# Patient Record
Sex: FEMALE | Race: BLACK OR AFRICAN AMERICAN | NOT HISPANIC OR LATINO | Employment: OTHER | URBAN - METROPOLITAN AREA
[De-identification: names, ages, dates, MRNs, and addresses within clinical notes are randomized per-mention and may not be internally consistent; named-entity substitution may affect disease eponyms.]

---

## 2017-04-10 ENCOUNTER — HISTORICAL (OUTPATIENT)
Dept: INTERVENTIONAL RADIOLOGY/VASCULAR | Facility: HOSPITAL | Age: 57
End: 2017-04-10

## 2017-11-06 LAB
COLOR STL: NORMAL
CONSISTENCY STL: NORMAL
HEMOCCULT SP1 STL QL: NEGATIVE

## 2017-11-07 LAB
COLOR STL: NORMAL
CONSISTENCY STL: NORMAL
HEMOCCULT SP2 STL QL: NEGATIVE

## 2017-11-08 ENCOUNTER — HISTORICAL (OUTPATIENT)
Dept: INTERVENTIONAL RADIOLOGY/VASCULAR | Facility: HOSPITAL | Age: 57
End: 2017-11-08

## 2017-11-08 LAB
ABS NEUT (OLG): 3.18 X10(3)/MCL (ref 2.1–9.2)
ALBUMIN SERPL-MCNC: 3.8 GM/DL (ref 3.4–5)
ALBUMIN/GLOB SERPL: 1 RATIO (ref 1–2)
ALP SERPL-CCNC: 99 UNIT/L (ref 45–117)
ALT SERPL-CCNC: 17 UNIT/L (ref 12–78)
APPEARANCE, UA: CLEAR
AST SERPL-CCNC: 16 UNIT/L (ref 15–37)
BACTERIA #/AREA URNS AUTO: ABNORMAL /[HPF]
BASOPHILS # BLD AUTO: 0.02 X10(3)/MCL
BASOPHILS NFR BLD AUTO: 0 % (ref 0–1)
BILIRUB SERPL-MCNC: 0.4 MG/DL (ref 0.2–1)
BILIRUB UR QL STRIP: NEGATIVE
BILIRUBIN DIRECT+TOT PNL SERPL-MCNC: 0.1 MG/DL
BILIRUBIN DIRECT+TOT PNL SERPL-MCNC: 0.3 MG/DL
BUN SERPL-MCNC: 13 MG/DL (ref 7–18)
CALCIUM SERPL-MCNC: 9.5 MG/DL (ref 8.5–10.1)
CHLORIDE SERPL-SCNC: 102 MMOL/L (ref 98–107)
CHOLEST SERPL-MCNC: 223 MG/DL
CHOLEST/HDLC SERPL: 5.3 {RATIO} (ref 0–4.4)
CO2 SERPL-SCNC: 32 MMOL/L (ref 21–32)
COLOR STL: NORMAL
COLOR UR: COLORLESS
CONSISTENCY STL: NORMAL
CREAT SERPL-MCNC: 0.9 MG/DL (ref 0.6–1.3)
EOSINOPHIL # BLD AUTO: 0.12 X10(3)/MCL
EOSINOPHIL NFR BLD AUTO: 2 % (ref 0–5)
ERYTHROCYTE [DISTWIDTH] IN BLOOD BY AUTOMATED COUNT: 13.1 % (ref 11.5–14.5)
EST. AVERAGE GLUCOSE BLD GHB EST-MCNC: 120 MG/DL
GLOBULIN SER-MCNC: 4.7 GM/ML (ref 2.3–3.5)
GLUCOSE (UA): NORMAL
GLUCOSE SERPL-MCNC: 85 MG/DL (ref 74–106)
HAV IGM SERPL QL IA: NONREACTIVE
HBA1C MFR BLD: 5.8 % (ref 4.2–6.3)
HBV CORE IGM SERPL QL IA: NONREACTIVE
HBV SURFACE AG SERPL QL IA: NEGATIVE
HCT VFR BLD AUTO: 42.8 % (ref 35–46)
HCV AB SERPL QL IA: NONREACTIVE
HDLC SERPL-MCNC: 42 MG/DL
HGB BLD-MCNC: 13.9 GM/DL (ref 12–16)
HGB UR QL STRIP: NEGATIVE
HIV 1+2 AB+HIV1 P24 AG SERPL QL IA: NONREACTIVE
HYALINE CASTS #/AREA URNS LPF: ABNORMAL /[LPF]
IMM GRANULOCYTES # BLD AUTO: 0.01 10*3/UL
IMM GRANULOCYTES NFR BLD AUTO: 0 %
KETONES UR QL STRIP: NEGATIVE
LDLC SERPL CALC-MCNC: 164 MG/DL (ref 0–130)
LEUKOCYTE ESTERASE UR QL STRIP: NEGATIVE
LYMPHOCYTES # BLD AUTO: 1.94 X10(3)/MCL
LYMPHOCYTES NFR BLD AUTO: 34 % (ref 15–40)
MCH RBC QN AUTO: 27.7 PG (ref 26–34)
MCHC RBC AUTO-ENTMCNC: 32.5 GM/DL (ref 31–37)
MCV RBC AUTO: 85.3 FL (ref 80–100)
MONOCYTES # BLD AUTO: 0.35 X10(3)/MCL
MONOCYTES NFR BLD AUTO: 6 % (ref 4–12)
NEUTROPHILS # BLD AUTO: 3.18 X10(3)/MCL
NEUTROPHILS NFR BLD AUTO: 57 X10(3)/MCL
NITRITE UR QL STRIP: NEGATIVE
PH UR STRIP: 5.5 [PH] (ref 4.5–8)
PLATELET # BLD AUTO: 308 X10(3)/MCL (ref 130–400)
PMV BLD AUTO: 9.7 FL (ref 7.4–10.4)
POTASSIUM SERPL-SCNC: 3.5 MMOL/L (ref 3.5–5.1)
PROT SERPL-MCNC: 8.5 GM/DL (ref 6.4–8.2)
PROT UR QL STRIP: NEGATIVE
RBC # BLD AUTO: 5.02 X10(6)/MCL (ref 4–5.2)
RBC #/AREA URNS AUTO: ABNORMAL /[HPF]
SODIUM SERPL-SCNC: 136 MMOL/L (ref 136–145)
SP GR UR STRIP: 1 (ref 1–1.03)
SQUAMOUS #/AREA URNS LPF: ABNORMAL /[LPF]
TRIGL SERPL-MCNC: 85 MG/DL
TSH SERPL-ACNC: 0.7 MIU/L (ref 0.36–3.74)
UROBILINOGEN UR STRIP-ACNC: NORMAL
VLDLC SERPL CALC-MCNC: 17 MG/DL
WBC # SPEC AUTO: 5.6 X10(3)/MCL (ref 4.5–11)
WBC #/AREA URNS AUTO: ABNORMAL /HPF

## 2018-01-19 ENCOUNTER — HISTORICAL (OUTPATIENT)
Dept: INTERNAL MEDICINE | Facility: CLINIC | Age: 58
End: 2018-01-19

## 2018-02-09 ENCOUNTER — HISTORICAL (OUTPATIENT)
Dept: RESPIRATORY THERAPY | Facility: HOSPITAL | Age: 58
End: 2018-02-09

## 2018-04-19 ENCOUNTER — HISTORICAL (OUTPATIENT)
Dept: RADIOLOGY | Facility: HOSPITAL | Age: 58
End: 2018-04-19

## 2018-07-19 ENCOUNTER — HISTORICAL (OUTPATIENT)
Dept: ADMINISTRATIVE | Facility: HOSPITAL | Age: 58
End: 2018-07-19

## 2018-07-19 LAB
ALBUMIN SERPL-MCNC: 3.6 GM/DL (ref 3.4–5)
ALBUMIN/GLOB SERPL: 1 RATIO (ref 1–2)
ALP SERPL-CCNC: 110 UNIT/L (ref 45–117)
ALT SERPL-CCNC: 22 UNIT/L (ref 12–78)
AST SERPL-CCNC: 15 UNIT/L (ref 15–37)
BILIRUB SERPL-MCNC: 0.3 MG/DL (ref 0.2–1)
BILIRUBIN DIRECT+TOT PNL SERPL-MCNC: <0.1 MG/DL
BILIRUBIN DIRECT+TOT PNL SERPL-MCNC: >0.2 MG/DL
BUN SERPL-MCNC: 11 MG/DL (ref 7–18)
CALCIUM SERPL-MCNC: 8.8 MG/DL (ref 8.5–10.1)
CHLORIDE SERPL-SCNC: 107 MMOL/L (ref 98–107)
CHOLEST SERPL-MCNC: 141 MG/DL
CHOLEST/HDLC SERPL: 3.6 {RATIO} (ref 0–4.4)
CO2 SERPL-SCNC: 29 MMOL/L (ref 21–32)
CREAT SERPL-MCNC: 1 MG/DL (ref 0.6–1.3)
GLOBULIN SER-MCNC: 4 GM/ML (ref 2.3–3.5)
GLUCOSE SERPL-MCNC: 75 MG/DL (ref 74–106)
HDLC SERPL-MCNC: 39 MG/DL
LDLC SERPL CALC-MCNC: 88 MG/DL (ref 0–130)
POTASSIUM SERPL-SCNC: 3.5 MMOL/L (ref 3.5–5.1)
PROT SERPL-MCNC: 7.6 GM/DL (ref 6.4–8.2)
SODIUM SERPL-SCNC: 141 MMOL/L (ref 136–145)
TRIGL SERPL-MCNC: 69 MG/DL
VLDLC SERPL CALC-MCNC: 14 MG/DL

## 2018-07-30 ENCOUNTER — HISTORICAL (OUTPATIENT)
Dept: RADIOLOGY | Facility: HOSPITAL | Age: 58
End: 2018-07-30

## 2019-05-09 ENCOUNTER — HISTORICAL (OUTPATIENT)
Dept: INTERNAL MEDICINE | Facility: CLINIC | Age: 59
End: 2019-05-09

## 2019-05-09 LAB
ABS NEUT (OLG): 4.25 X10(3)/MCL (ref 2.1–9.2)
ALBUMIN SERPL-MCNC: 3.6 GM/DL (ref 3.4–5)
ALBUMIN/GLOB SERPL: 0.9 RATIO (ref 1.1–2)
ALP SERPL-CCNC: 121 UNIT/L (ref 45–117)
ALT SERPL-CCNC: 14 UNIT/L (ref 12–78)
AST SERPL-CCNC: 16 UNIT/L (ref 15–37)
BASOPHILS # BLD AUTO: 0.02 X10(3)/MCL
BASOPHILS NFR BLD AUTO: 0 %
BILIRUB SERPL-MCNC: 0.3 MG/DL (ref 0.2–1)
BILIRUBIN DIRECT+TOT PNL SERPL-MCNC: 0.1 MG/DL
BILIRUBIN DIRECT+TOT PNL SERPL-MCNC: 0.2 MG/DL
BUN SERPL-MCNC: 14 MG/DL (ref 7–18)
CALCIUM SERPL-MCNC: 9.1 MG/DL (ref 8.5–10.1)
CHLORIDE SERPL-SCNC: 107 MMOL/L (ref 98–107)
CHOLEST SERPL-MCNC: 152 MG/DL
CHOLEST/HDLC SERPL: 3.8 {RATIO} (ref 0–4.4)
CO2 SERPL-SCNC: 30 MMOL/L (ref 21–32)
CREAT SERPL-MCNC: 1 MG/DL (ref 0.6–1.3)
EOSINOPHIL # BLD AUTO: 0.17 X10(3)/MCL
EOSINOPHIL NFR BLD AUTO: 2 %
ERYTHROCYTE [DISTWIDTH] IN BLOOD BY AUTOMATED COUNT: 13.2 % (ref 11.5–14.5)
EST. AVERAGE GLUCOSE BLD GHB EST-MCNC: 126 MG/DL
GLOBULIN SER-MCNC: 4.1 GM/ML (ref 2.3–3.5)
GLUCOSE SERPL-MCNC: 96 MG/DL (ref 74–106)
HBA1C MFR BLD: 6 % (ref 4.2–6.3)
HCT VFR BLD AUTO: 45.4 % (ref 35–46)
HDLC SERPL-MCNC: 40 MG/DL
HGB BLD-MCNC: 14.4 GM/DL (ref 12–16)
IMM GRANULOCYTES # BLD AUTO: 0.01 10*3/UL
IMM GRANULOCYTES NFR BLD AUTO: 0 %
LDLC SERPL CALC-MCNC: 100 MG/DL (ref 0–130)
LYMPHOCYTES # BLD AUTO: 2.16 X10(3)/MCL
LYMPHOCYTES NFR BLD AUTO: 31 % (ref 13–40)
MCH RBC QN AUTO: 27.7 PG (ref 26–34)
MCHC RBC AUTO-ENTMCNC: 31.7 GM/DL (ref 31–37)
MCV RBC AUTO: 87.5 FL (ref 80–100)
MONOCYTES # BLD AUTO: 0.42 X10(3)/MCL
MONOCYTES NFR BLD AUTO: 6 % (ref 4–12)
NEUTROPHILS # BLD AUTO: 4.25 X10(3)/MCL
NEUTROPHILS NFR BLD AUTO: 60 X10(3)/MCL
PLATELET # BLD AUTO: 318 X10(3)/MCL (ref 130–400)
PMV BLD AUTO: 10.3 FL (ref 7.4–10.4)
POTASSIUM SERPL-SCNC: 3.8 MMOL/L (ref 3.5–5.1)
PROT SERPL-MCNC: 7.7 GM/DL (ref 6.4–8.2)
RBC # BLD AUTO: 5.19 X10(6)/MCL (ref 4–5.2)
SODIUM SERPL-SCNC: 141 MMOL/L (ref 136–145)
TRIGL SERPL-MCNC: 60 MG/DL
TSH SERPL-ACNC: 1.99 MIU/L (ref 0.36–3.74)
VLDLC SERPL CALC-MCNC: 12 MG/DL
WBC # SPEC AUTO: 7 X10(3)/MCL (ref 4.5–11)

## 2019-06-13 ENCOUNTER — HISTORICAL (OUTPATIENT)
Dept: RADIOLOGY | Facility: HOSPITAL | Age: 59
End: 2019-06-13

## 2020-05-25 ENCOUNTER — HISTORICAL (OUTPATIENT)
Dept: RESPIRATORY THERAPY | Facility: HOSPITAL | Age: 60
End: 2020-05-25

## 2020-08-03 ENCOUNTER — HISTORICAL (OUTPATIENT)
Dept: RADIOLOGY | Facility: HOSPITAL | Age: 60
End: 2020-08-03

## 2021-02-05 ENCOUNTER — HISTORICAL (OUTPATIENT)
Dept: INTERNAL MEDICINE | Facility: CLINIC | Age: 61
End: 2021-02-05

## 2021-02-22 ENCOUNTER — HISTORICAL (OUTPATIENT)
Dept: RADIOLOGY | Facility: HOSPITAL | Age: 61
End: 2021-02-22

## 2021-04-26 ENCOUNTER — HISTORICAL (OUTPATIENT)
Dept: RADIOLOGY | Facility: HOSPITAL | Age: 61
End: 2021-04-26

## 2021-06-22 ENCOUNTER — HISTORICAL (OUTPATIENT)
Dept: RADIOLOGY | Facility: HOSPITAL | Age: 61
End: 2021-06-22

## 2021-07-19 ENCOUNTER — HISTORICAL (OUTPATIENT)
Dept: INTERNAL MEDICINE | Facility: CLINIC | Age: 61
End: 2021-07-19

## 2021-07-19 LAB
ABS NEUT (OLG): 2.83 X10(3)/MCL (ref 2.1–9.2)
ALBUMIN SERPL-MCNC: 3.8 GM/DL (ref 3.4–4.8)
ALBUMIN/GLOB SERPL: 1.2 RATIO (ref 1.1–2)
ALP SERPL-CCNC: 108 UNIT/L (ref 40–150)
ALT SERPL-CCNC: 12 UNIT/L (ref 0–55)
AST SERPL-CCNC: 15 UNIT/L (ref 5–34)
BASOPHILS # BLD AUTO: 0 X10(3)/MCL (ref 0–0.2)
BASOPHILS NFR BLD AUTO: 0 %
BILIRUB SERPL-MCNC: 0.5 MG/DL
BILIRUBIN DIRECT+TOT PNL SERPL-MCNC: 0.2 MG/DL (ref 0–0.5)
BILIRUBIN DIRECT+TOT PNL SERPL-MCNC: 0.3 MG/DL (ref 0–0.8)
BUN SERPL-MCNC: 7.5 MG/DL (ref 9.8–20.1)
CALCIUM SERPL-MCNC: 9.7 MG/DL (ref 8.4–10.2)
CHLORIDE SERPL-SCNC: 104 MMOL/L (ref 98–107)
CHOLEST SERPL-MCNC: 174 MG/DL
CHOLEST/HDLC SERPL: 4 {RATIO} (ref 0–5)
CO2 SERPL-SCNC: 31 MMOL/L (ref 23–31)
CREAT SERPL-MCNC: 0.83 MG/DL (ref 0.55–1.02)
EOSINOPHIL # BLD AUTO: 0.1 X10(3)/MCL (ref 0–0.9)
EOSINOPHIL NFR BLD AUTO: 2 %
ERYTHROCYTE [DISTWIDTH] IN BLOOD BY AUTOMATED COUNT: 13.6 % (ref 11.5–14.5)
GLOBULIN SER-MCNC: 3.2 GM/DL (ref 2.4–3.5)
GLUCOSE SERPL-MCNC: 89 MG/DL (ref 82–115)
HCT VFR BLD AUTO: 42.8 % (ref 35–46)
HDLC SERPL-MCNC: 41 MG/DL (ref 35–60)
HGB BLD-MCNC: 13.4 GM/DL (ref 12–16)
LDLC SERPL CALC-MCNC: 124 MG/DL (ref 50–140)
LYMPHOCYTES # BLD AUTO: 1.4 X10(3)/MCL (ref 0.6–4.6)
LYMPHOCYTES NFR BLD AUTO: 30 %
MCH RBC QN AUTO: 27 PG (ref 26–34)
MCHC RBC AUTO-ENTMCNC: 31.3 GM/DL (ref 31–37)
MCV RBC AUTO: 86.3 FL (ref 80–100)
MONOCYTES # BLD AUTO: 0.3 X10(3)/MCL (ref 0.1–1.3)
MONOCYTES NFR BLD AUTO: 6 %
NEUTROPHILS # BLD AUTO: 2.83 X10(3)/MCL (ref 2.1–9.2)
NEUTROPHILS NFR BLD AUTO: 61 %
NRBC BLD AUTO-RTO: 0 % (ref 0–0.2)
PLATELET # BLD AUTO: 337 X10(3)/MCL (ref 130–400)
PMV BLD AUTO: 9.4 FL (ref 7.4–10.4)
POTASSIUM SERPL-SCNC: 4.7 MMOL/L (ref 3.5–5.1)
PROT SERPL-MCNC: 7 GM/DL (ref 5.8–7.6)
RBC # BLD AUTO: 4.96 X10(6)/MCL (ref 4–5.2)
SODIUM SERPL-SCNC: 139 MMOL/L (ref 136–145)
T4 FREE SERPL-MCNC: 0.96 NG/DL (ref 0.7–1.48)
TRIGL SERPL-MCNC: 45 MG/DL (ref 37–140)
TSH SERPL-ACNC: 1.27 UIU/ML (ref 0.35–4.94)
VLDLC SERPL CALC-MCNC: 9 MG/DL
WBC # SPEC AUTO: 4.6 X10(3)/MCL (ref 4.5–11)

## 2021-07-21 ENCOUNTER — HISTORICAL (OUTPATIENT)
Dept: ADMINISTRATIVE | Facility: HOSPITAL | Age: 61
End: 2021-07-21

## 2022-04-07 ENCOUNTER — HISTORICAL (OUTPATIENT)
Dept: ADMINISTRATIVE | Facility: HOSPITAL | Age: 62
End: 2022-04-07
Payer: MEDICAID

## 2022-04-11 LAB — NONINV COLON CA DNA+OCC BLD SCRN STL QL: NEGATIVE

## 2022-04-24 VITALS
HEIGHT: 66 IN | BODY MASS INDEX: 24.77 KG/M2 | WEIGHT: 154.13 LBS | OXYGEN SATURATION: 98 % | SYSTOLIC BLOOD PRESSURE: 99 MMHG | DIASTOLIC BLOOD PRESSURE: 67 MMHG

## 2022-05-25 RX ORDER — BUPROPION HYDROCHLORIDE 150 MG/1
150 TABLET, EXTENDED RELEASE ORAL
COMMUNITY
Start: 2021-07-21 | End: 2022-05-31

## 2022-05-25 RX ORDER — CYCLOBENZAPRINE HCL 10 MG
10 TABLET ORAL
COMMUNITY
Start: 2021-07-21 | End: 2022-05-31 | Stop reason: DRUGHIGH

## 2022-05-25 RX ORDER — DEXAMETHASONE 4 MG/1
4 TABLET ORAL
COMMUNITY
Start: 2021-07-21 | End: 2022-05-31 | Stop reason: ALTCHOICE

## 2022-05-25 RX ORDER — PROMETHAZINE HYDROCHLORIDE AND DEXTROMETHORPHAN HYDROBROMIDE 6.25; 15 MG/5ML; MG/5ML
5 SYRUP ORAL EVERY 6 HOURS
COMMUNITY
Start: 2021-07-21 | End: 2022-05-31 | Stop reason: ALTCHOICE

## 2022-05-25 RX ORDER — FLUTICASONE PROPIONATE AND SALMETEROL 250; 50 UG/1; UG/1
POWDER RESPIRATORY (INHALATION)
COMMUNITY
Start: 2022-01-05 | End: 2022-07-14 | Stop reason: ALTCHOICE

## 2022-05-25 RX ORDER — NAPROXEN 500 MG/1
500 TABLET ORAL
COMMUNITY
Start: 2021-07-21 | End: 2022-05-31 | Stop reason: ALTCHOICE

## 2022-05-25 RX ORDER — HYDROCHLOROTHIAZIDE 12.5 MG/1
TABLET ORAL
COMMUNITY
Start: 2021-11-16 | End: 2022-07-14 | Stop reason: SDUPTHER

## 2022-05-25 RX ORDER — LEVOFLOXACIN 750 MG/1
750 TABLET ORAL
COMMUNITY
Start: 2021-07-21 | End: 2022-05-31 | Stop reason: ALTCHOICE

## 2022-05-25 RX ORDER — ALBUTEROL SULFATE 0.83 MG/ML
2.5 SOLUTION RESPIRATORY (INHALATION)
COMMUNITY
Start: 2021-06-01 | End: 2022-05-31 | Stop reason: DRUGHIGH

## 2022-05-31 ENCOUNTER — OFFICE VISIT (OUTPATIENT)
Dept: PULMONOLOGY | Facility: CLINIC | Age: 62
End: 2022-05-31
Payer: MEDICAID

## 2022-05-31 VITALS
WEIGHT: 154 LBS | BODY MASS INDEX: 24.17 KG/M2 | HEIGHT: 67 IN | RESPIRATION RATE: 20 BRPM | OXYGEN SATURATION: 100 % | DIASTOLIC BLOOD PRESSURE: 60 MMHG | SYSTOLIC BLOOD PRESSURE: 94 MMHG | HEART RATE: 77 BPM | TEMPERATURE: 98 F

## 2022-05-31 DIAGNOSIS — R06.09 DOE (DYSPNEA ON EXERTION): ICD-10-CM

## 2022-05-31 DIAGNOSIS — J44.9 STAGE 3 SEVERE COPD BY GOLD CLASSIFICATION: ICD-10-CM

## 2022-05-31 DIAGNOSIS — F17.200 TOBACCO DEPENDENCE WITH CURRENT USE: ICD-10-CM

## 2022-05-31 DIAGNOSIS — R91.1 LUNG NODULE: Primary | ICD-10-CM

## 2022-05-31 PROCEDURE — 4010F ACE/ARB THERAPY RXD/TAKEN: CPT | Mod: CPTII,,, | Performed by: INTERNAL MEDICINE

## 2022-05-31 PROCEDURE — 3074F PR MOST RECENT SYSTOLIC BLOOD PRESSURE < 130 MM HG: ICD-10-PCS | Mod: CPTII,,, | Performed by: INTERNAL MEDICINE

## 2022-05-31 PROCEDURE — 1159F PR MEDICATION LIST DOCUMENTED IN MEDICAL RECORD: ICD-10-PCS | Mod: CPTII,,, | Performed by: INTERNAL MEDICINE

## 2022-05-31 PROCEDURE — 4010F PR ACE/ARB THEARPY RXD/TAKEN: ICD-10-PCS | Mod: CPTII,,, | Performed by: INTERNAL MEDICINE

## 2022-05-31 PROCEDURE — 1160F PR REVIEW ALL MEDS BY PRESCRIBER/CLIN PHARMACIST DOCUMENTED: ICD-10-PCS | Mod: CPTII,,, | Performed by: INTERNAL MEDICINE

## 2022-05-31 PROCEDURE — 1160F RVW MEDS BY RX/DR IN RCRD: CPT | Mod: CPTII,,, | Performed by: INTERNAL MEDICINE

## 2022-05-31 PROCEDURE — 3078F DIAST BP <80 MM HG: CPT | Mod: CPTII,,, | Performed by: INTERNAL MEDICINE

## 2022-05-31 PROCEDURE — 1159F MED LIST DOCD IN RCRD: CPT | Mod: CPTII,,, | Performed by: INTERNAL MEDICINE

## 2022-05-31 PROCEDURE — 3074F SYST BP LT 130 MM HG: CPT | Mod: CPTII,,, | Performed by: INTERNAL MEDICINE

## 2022-05-31 PROCEDURE — 99213 OFFICE O/P EST LOW 20 MIN: CPT | Mod: PBBFAC

## 2022-05-31 PROCEDURE — 3078F PR MOST RECENT DIASTOLIC BLOOD PRESSURE < 80 MM HG: ICD-10-PCS | Mod: CPTII,,, | Performed by: INTERNAL MEDICINE

## 2022-05-31 PROCEDURE — 99214 OFFICE O/P EST MOD 30 MIN: CPT | Mod: S$PBB,,, | Performed by: INTERNAL MEDICINE

## 2022-05-31 PROCEDURE — 3008F PR BODY MASS INDEX (BMI) DOCUMENTED: ICD-10-PCS | Mod: CPTII,,, | Performed by: INTERNAL MEDICINE

## 2022-05-31 PROCEDURE — 3008F BODY MASS INDEX DOCD: CPT | Mod: CPTII,,, | Performed by: INTERNAL MEDICINE

## 2022-05-31 PROCEDURE — 99214 PR OFFICE/OUTPT VISIT, EST, LEVL IV, 30-39 MIN: ICD-10-PCS | Mod: S$PBB,,, | Performed by: INTERNAL MEDICINE

## 2022-05-31 RX ORDER — ALBUTEROL SULFATE 90 UG/1
1 AEROSOL, METERED RESPIRATORY (INHALATION) EVERY 4 HOURS PRN
COMMUNITY
Start: 2022-03-17 | End: 2022-07-14 | Stop reason: SDUPTHER

## 2022-05-31 RX ORDER — ATORVASTATIN CALCIUM 40 MG/1
40 TABLET, FILM COATED ORAL DAILY
COMMUNITY
Start: 2022-05-21 | End: 2022-07-14 | Stop reason: SDUPTHER

## 2022-05-31 RX ORDER — LISINOPRIL 10 MG/1
10 TABLET ORAL DAILY
COMMUNITY
Start: 2022-03-09 | End: 2022-07-14 | Stop reason: SDUPTHER

## 2022-05-31 RX ORDER — BUPROPION HYDROCHLORIDE 150 MG/1
150 TABLET ORAL DAILY
COMMUNITY
End: 2022-05-31 | Stop reason: SDUPTHER

## 2022-05-31 RX ORDER — AMLODIPINE BESYLATE 5 MG/1
5 TABLET ORAL DAILY
COMMUNITY
Start: 2022-05-21 | End: 2022-07-14 | Stop reason: SDUPTHER

## 2022-05-31 RX ORDER — ASPIRIN 81 MG/1
81 TABLET ORAL DAILY
COMMUNITY
End: 2022-07-14 | Stop reason: SDUPTHER

## 2022-05-31 NOTE — PROGRESS NOTES
[facility] Visit Note    CC: 1 year F/U    HPI   63 yo F with PMH of COPD GS III, chronic tobacco use, CKD, HTN, HLD presents for routine f/u. Has been unable to afford her Trelegy so has been using advair. Reports increasing dry cough at night since stopping Trelegy Ellipta . Reports Advair is not helping her at all and has actually been using SA inhalor 2-3 times/ week, with daily nebulizer treatment . Reports has decreased smoking from 1-1.5 PPD to 1/2 PPD. Reports does not refills at this time.       Review of Systems  14 pt ROS negative unless mentioned above    Physical Examination  /69 (BP Location: Left arm, Patient Position: Sitting)   Pulse (!) 54   Temp 97.9 °F (36.6 °C) (Oral)   Resp 18   Ht 6' (1.829 m)   Wt 85.7 kg (189 lb)   SpO2 99%   BMI 25.63 kg/m²   General appearance: alert, cooperative, no distress  HENT: Normocephalic, atraumatic, neck symmetrical, no nasal discharge   Lungs: clear to auscultation bilaterally, no dullness to percussion bilaterally  Heart: regular rate and rhythm without rub; no displacement of the PMI   Abdomen: soft, non-tender; bowel sounds normoactive; no organomegaly  Extremities: extremities symmetric; no clubbing, cyanosis, or edema  Neurologic: Alert and oriented X 3, normal strength, normal coordination and gait    Imagin2021 LDCT:  Lung-RADS 2: Benign Appearance or Behavior Lung-RADS 2: Benign Appearance or Behavior    Assessment/Plan:    COPD Gold, Stage III  Chronic Tobacco Use, Current  PPD  2020 PFT: severe obstruction, no bronchodilator response  -LDCT annually  -PFT every 2 years  -Exercise capacity 3 METS, get SOB with housework, climbing stairs, and with moderate housework   -Reports seeing smoking cessation counselor doesn't help and that she has tried multiple methods without success. Currently on Wellbutrin 150mg daily for Depression   -Pneumovax 2022, TO be administered Prevnar in 1 year  - Re-prescribed Trelegy  Ellipta 100-62.5 mcg INH 30p, 1 puff at the same time every day. Will send for PA.  -Continue Ventolin and nebulizer treatments as needed      RTC 1 year    Markos Sauer MD  PGY-2, Internal Medicine Resident

## 2022-06-01 ENCOUNTER — TELEPHONE (OUTPATIENT)
Dept: PULMONOLOGY | Facility: CLINIC | Age: 62
End: 2022-06-01
Payer: MEDICAID

## 2022-06-20 ENCOUNTER — TELEPHONE (OUTPATIENT)
Dept: INTERNAL MEDICINE | Facility: CLINIC | Age: 62
End: 2022-06-20
Payer: MEDICAID

## 2022-06-20 NOTE — TELEPHONE ENCOUNTER
Pt says she received a letter saying she needs a Low Dose CT scan of her lungs and she needs an order put in for it.

## 2022-06-22 ENCOUNTER — TELEPHONE (OUTPATIENT)
Dept: INTERNAL MEDICINE | Facility: CLINIC | Age: 62
End: 2022-06-22
Payer: MEDICAID

## 2022-06-22 DIAGNOSIS — Z72.0 TOBACCO ABUSE: Primary | ICD-10-CM

## 2022-06-22 NOTE — TELEPHONE ENCOUNTER
Hugh called from ultrasound and stated an order was needed for Mrs. Nazario. Hugh stated that the order as to be put in as CT chest-lung screening low dose, if it does not read this then they will not be able to schedule pt

## 2022-06-28 NOTE — TELEPHONE ENCOUNTER
CT will need to be re-ordered for patient as requested by radiology in directions below. Thank you.

## 2022-07-06 ENCOUNTER — HOSPITAL ENCOUNTER (OUTPATIENT)
Dept: RADIOLOGY | Facility: HOSPITAL | Age: 62
Discharge: HOME | End: 2022-07-06
Attending: INTERNAL MEDICINE
Payer: MEDICAID

## 2022-07-06 DIAGNOSIS — R91.8 LUNG NODULE, MULTIPLE: Primary | ICD-10-CM

## 2022-07-06 DIAGNOSIS — Z72.0 TOBACCO ABUSE: ICD-10-CM

## 2022-07-06 PROCEDURE — 71271 CT THORAX LUNG CANCER SCR C-: CPT | Mod: TC

## 2022-07-06 NOTE — PROGRESS NOTES
Informed patient that low dose ct picked up suspicious findings. I ordered a PET scan. Please make sure it is scheduled and inform patient when it is scheduled.   Thanks.

## 2022-07-14 ENCOUNTER — OFFICE VISIT (OUTPATIENT)
Dept: INTERNAL MEDICINE | Facility: CLINIC | Age: 62
End: 2022-07-14
Payer: MEDICAID

## 2022-07-14 ENCOUNTER — HOSPITAL ENCOUNTER (OUTPATIENT)
Dept: RADIOLOGY | Facility: HOSPITAL | Age: 62
Discharge: HOME | End: 2022-07-14
Attending: NURSE PRACTITIONER
Payer: MEDICAID

## 2022-07-14 VITALS
HEIGHT: 67 IN | WEIGHT: 155.44 LBS | SYSTOLIC BLOOD PRESSURE: 100 MMHG | HEART RATE: 73 BPM | DIASTOLIC BLOOD PRESSURE: 62 MMHG | TEMPERATURE: 98 F | BODY MASS INDEX: 24.4 KG/M2 | RESPIRATION RATE: 18 BRPM

## 2022-07-14 DIAGNOSIS — I10 PRIMARY HYPERTENSION: Chronic | ICD-10-CM

## 2022-07-14 DIAGNOSIS — F17.200 TOBACCO DEPENDENCE WITH CURRENT USE: Chronic | ICD-10-CM

## 2022-07-14 DIAGNOSIS — G89.29 CHRONIC MIDLINE LOW BACK PAIN WITHOUT SCIATICA: ICD-10-CM

## 2022-07-14 DIAGNOSIS — E78.49 OTHER HYPERLIPIDEMIA: Chronic | ICD-10-CM

## 2022-07-14 DIAGNOSIS — M54.50 CHRONIC MIDLINE LOW BACK PAIN WITHOUT SCIATICA: Primary | ICD-10-CM

## 2022-07-14 DIAGNOSIS — M54.50 CHRONIC MIDLINE LOW BACK PAIN WITHOUT SCIATICA: ICD-10-CM

## 2022-07-14 DIAGNOSIS — R91.8 LUNG NODULES: Primary | ICD-10-CM

## 2022-07-14 DIAGNOSIS — G89.29 CHRONIC MIDLINE LOW BACK PAIN WITHOUT SCIATICA: Primary | ICD-10-CM

## 2022-07-14 DIAGNOSIS — J44.9 STAGE 3 SEVERE COPD BY GOLD CLASSIFICATION: Chronic | ICD-10-CM

## 2022-07-14 DIAGNOSIS — N18.2 STAGE 2 CHRONIC KIDNEY DISEASE: Chronic | ICD-10-CM

## 2022-07-14 DIAGNOSIS — F17.200 TOBACCO DEPENDENCE WITH CURRENT USE: ICD-10-CM

## 2022-07-14 DIAGNOSIS — R91.1 SOLITARY PULMONARY NODULE: ICD-10-CM

## 2022-07-14 DIAGNOSIS — J44.9 STAGE 3 SEVERE COPD BY GOLD CLASSIFICATION: ICD-10-CM

## 2022-07-14 PROBLEM — E78.5 HYPERLIPIDEMIA: Status: ACTIVE | Noted: 2022-07-14

## 2022-07-14 PROBLEM — E78.5 HYPERLIPIDEMIA: Chronic | Status: ACTIVE | Noted: 2022-07-14

## 2022-07-14 PROCEDURE — 3008F BODY MASS INDEX DOCD: CPT | Mod: CPTII,,, | Performed by: NURSE PRACTITIONER

## 2022-07-14 PROCEDURE — 3078F DIAST BP <80 MM HG: CPT | Mod: CPTII,,, | Performed by: NURSE PRACTITIONER

## 2022-07-14 PROCEDURE — 99214 OFFICE O/P EST MOD 30 MIN: CPT | Mod: S$PBB,,, | Performed by: NURSE PRACTITIONER

## 2022-07-14 PROCEDURE — 1160F RVW MEDS BY RX/DR IN RCRD: CPT | Mod: CPTII,,, | Performed by: NURSE PRACTITIONER

## 2022-07-14 PROCEDURE — 1159F PR MEDICATION LIST DOCUMENTED IN MEDICAL RECORD: ICD-10-PCS | Mod: CPTII,,, | Performed by: NURSE PRACTITIONER

## 2022-07-14 PROCEDURE — 4010F ACE/ARB THERAPY RXD/TAKEN: CPT | Mod: CPTII,,, | Performed by: NURSE PRACTITIONER

## 2022-07-14 PROCEDURE — 3066F NEPHROPATHY DOC TX: CPT | Mod: CPTII,,, | Performed by: NURSE PRACTITIONER

## 2022-07-14 PROCEDURE — 3061F NEG MICROALBUMINURIA REV: CPT | Mod: CPTII,,, | Performed by: NURSE PRACTITIONER

## 2022-07-14 PROCEDURE — 72100 X-RAY EXAM L-S SPINE 2/3 VWS: CPT | Mod: TC

## 2022-07-14 PROCEDURE — 3074F SYST BP LT 130 MM HG: CPT | Mod: CPTII,,, | Performed by: NURSE PRACTITIONER

## 2022-07-14 PROCEDURE — 99214 OFFICE O/P EST MOD 30 MIN: CPT | Mod: PBBFAC | Performed by: NURSE PRACTITIONER

## 2022-07-14 PROCEDURE — 3061F PR NEG MICROALBUMINURIA RESULT DOCUMENTED/REVIEW: ICD-10-PCS | Mod: CPTII,,, | Performed by: NURSE PRACTITIONER

## 2022-07-14 PROCEDURE — 99214 PR OFFICE/OUTPT VISIT, EST, LEVL IV, 30-39 MIN: ICD-10-PCS | Mod: S$PBB,,, | Performed by: NURSE PRACTITIONER

## 2022-07-14 PROCEDURE — 1160F PR REVIEW ALL MEDS BY PRESCRIBER/CLIN PHARMACIST DOCUMENTED: ICD-10-PCS | Mod: CPTII,,, | Performed by: NURSE PRACTITIONER

## 2022-07-14 PROCEDURE — 3008F PR BODY MASS INDEX (BMI) DOCUMENTED: ICD-10-PCS | Mod: CPTII,,, | Performed by: NURSE PRACTITIONER

## 2022-07-14 PROCEDURE — 3074F PR MOST RECENT SYSTOLIC BLOOD PRESSURE < 130 MM HG: ICD-10-PCS | Mod: CPTII,,, | Performed by: NURSE PRACTITIONER

## 2022-07-14 PROCEDURE — 3066F PR DOCUMENTATION OF TREATMENT FOR NEPHROPATHY: ICD-10-PCS | Mod: CPTII,,, | Performed by: NURSE PRACTITIONER

## 2022-07-14 PROCEDURE — 1159F MED LIST DOCD IN RCRD: CPT | Mod: CPTII,,, | Performed by: NURSE PRACTITIONER

## 2022-07-14 PROCEDURE — 4010F PR ACE/ARB THEARPY RXD/TAKEN: ICD-10-PCS | Mod: CPTII,,, | Performed by: NURSE PRACTITIONER

## 2022-07-14 PROCEDURE — 3078F PR MOST RECENT DIASTOLIC BLOOD PRESSURE < 80 MM HG: ICD-10-PCS | Mod: CPTII,,, | Performed by: NURSE PRACTITIONER

## 2022-07-14 RX ORDER — AMLODIPINE BESYLATE 5 MG/1
5 TABLET ORAL DAILY
Qty: 90 TABLET | Refills: 2 | Status: SHIPPED | OUTPATIENT
Start: 2022-07-14 | End: 2022-11-22 | Stop reason: SDUPTHER

## 2022-07-14 RX ORDER — FLUTICASONE PROPIONATE 50 MCG
1 SPRAY, SUSPENSION (ML) NASAL DAILY
Qty: 15.8 ML | Refills: 2 | Status: SHIPPED | OUTPATIENT
Start: 2022-07-14 | End: 2022-11-22 | Stop reason: SDUPTHER

## 2022-07-14 RX ORDER — LISINOPRIL 10 MG/1
10 TABLET ORAL DAILY
Qty: 90 TABLET | Refills: 2 | Status: SHIPPED | OUTPATIENT
Start: 2022-07-14 | End: 2022-11-22 | Stop reason: SDUPTHER

## 2022-07-14 RX ORDER — LORATADINE 10 MG/1
10 TABLET ORAL DAILY
Qty: 90 TABLET | Refills: 2 | Status: SHIPPED | OUTPATIENT
Start: 2022-07-14 | End: 2022-08-31

## 2022-07-14 RX ORDER — BUPROPION HYDROCHLORIDE 150 MG/1
150 TABLET, EXTENDED RELEASE ORAL DAILY
COMMUNITY
Start: 2022-03-17 | End: 2022-07-14

## 2022-07-14 RX ORDER — ASPIRIN 81 MG/1
81 TABLET ORAL DAILY
Qty: 90 TABLET | Refills: 2 | Status: SHIPPED | OUTPATIENT
Start: 2022-07-14 | End: 2022-11-22 | Stop reason: SDUPTHER

## 2022-07-14 RX ORDER — ALBUTEROL SULFATE 90 UG/1
2 AEROSOL, METERED RESPIRATORY (INHALATION) EVERY 4 HOURS PRN
Qty: 18 G | Refills: 2 | Status: SHIPPED | OUTPATIENT
Start: 2022-07-14 | End: 2022-11-22

## 2022-07-14 RX ORDER — HYDROCHLOROTHIAZIDE 12.5 MG/1
12.5 TABLET ORAL DAILY
Qty: 90 TABLET | Refills: 2 | Status: SHIPPED | OUTPATIENT
Start: 2022-07-14 | End: 2022-11-22 | Stop reason: SDUPTHER

## 2022-07-14 RX ORDER — ATORVASTATIN CALCIUM 40 MG/1
40 TABLET, FILM COATED ORAL NIGHTLY
Qty: 90 TABLET | Refills: 2 | Status: SHIPPED | OUTPATIENT
Start: 2022-07-14 | End: 2022-11-22 | Stop reason: SDUPTHER

## 2022-07-14 NOTE — ASSESSMENT & PLAN NOTE
B/P: 100/62  BUN/Cr: 7.5/0.8  GFR: 89  HgbA1c: glucose level 89  H/H: 13.4/32.8  Vitamin D Level: ordered  Smoking cessation encouraged.  Increase physical activity to 30 minutes daily 5 days a week.  Avoid processed/fast foods.  Home blood pressure monitoring, notify office if B/P >139/89.  Avoid NSAIDs.

## 2022-07-14 NOTE — ASSESSMENT & PLAN NOTE
Continue Atorvastatin 40 mg daily  Weight loss encouraged  Low fat/high fiber diet  Increase physical activity  Tobacco cessation encouraged  Chol: 174  HDL: 41  Tri  LDL: 124  Repeat lipid panel ordered for today.

## 2022-07-14 NOTE — ASSESSMENT & PLAN NOTE
PFTs: 5/25/2020, no bronchodilator response, severe obstruction  Tobacco Cessation Encouraged  Continue Trelegy Ellipta daily, albuterol inhaler as needed.   Patient followed by Pulmonology Clinic

## 2022-07-14 NOTE — ASSESSMENT & PLAN NOTE
B/P: 100/62  UA Creatinine: ordered  UA Microalbumin: ordered  EK2021  Continue Amlodipine 5 mg daily, Lisinopril 10 mg daily, HCTZ 12.5 mg daily  DASH diet  Encouraged home blood pressure monitoring

## 2022-07-14 NOTE — PROGRESS NOTES
Subjective:       Patient ID: Mansi Jin is a 62 y.o. female.    Chief Complaint: Establish Care (C/O lower back pain.  Seen 03/17/22 in [facility] wanted to change providers/Need refills)    Patient has diagnosis of HTN, HLD, CKD Stage 2, COPD Stage 3, Tobacco Use. Patient seen in clinic today to establish care. Patient last seen in clinic on 03/17/2022 by Dr. Shree Carrasco. Patient would like to change providers. Patient was seen for right-sided lower back pain and memory issues. Patient instructed to use ice and continue ambulation, Tylenol as needed, informed that back pain likely pulled muscle. Patient was referred to GYN Clinic. Patient has low dose CT thorax on 07/06/2022; indicated LUNG RADS 4B; suspicious; risk of malignancy greater than 15%, recommend PET/CT and/or tissue sampling; for new large nodules that develop on an annual repeat screening, a 1 month LDCT may be considered to address potentially infection or inflammatory conditions. NM PET CT of Skull to mid thigh ordered on 07/06/2022, no appointment noted. Patient given number to scheduling department to call and have scan scheduled. Family Hx: Mother - liver cancer; sister with breast cancer (cancer in the age of 30s); Father also had colon cancer. Today, patient states still having low back pain, denies radiculopathy. Patient denies bladder/bowel incontinence, saddle anesthesia. Patient denies any other acute complaints.     Patient followed by Pulmonology Clinic. Last appointment on 05/31/2022. PMH of COPD GS III, chronic tobacco use, CKD, HTN, HLD presents for routine f/u. Has been unable to afford her Trelegy so has been using advair. Reports increasing dry cough at night since stopping Trelegy Ellipta . Reports Advair is not helping her at all and has actually been using SA inhalor 2-3 times/ week, with daily nebulizer treatment. Reports has decreased smoking from 1-1.5 PPD to 1/2 PPD. Reports does not need refills at this time. COPD Gold, Stage  III: Chronic Tobacco Use, Current 1/2 PPD; 2020 PFT: severe obstruction, no bronchodilator response; LDCT annually; PFT every 2 years; Exercise capacity 3 METS, get SOB with housework, climbing stairs, and with moderate housework; Reports seeing smoking cessation counselor doesn't help and that she has tried multiple methods without success; Currently on Wellbutrin 150mg daily for Depression; Pneumovax 2022, TO be administered Prevnar in 1 year; Re-prescribed Trelegy Ellipta 100-62.5 mcg INH 30p, 1 puff at the same time every day, will send for PA; Continue Ventolin and nebulizer treatments as needed. Patient has follow up appointment scheduled for 2023.       Mammogram: ordered 2022  Pap: Hysterectomy, GYN referral 2022  FIT: Cologuard negative (2022)  Lung Cancer Screenin2022  Bone Density: deferred due to age  Medicare Wellness: N/A    Review of Systems   Constitutional: Negative.  Negative for unexpected weight change.   HENT: Negative.  Negative for hearing loss, rhinorrhea and trouble swallowing.    Eyes: Negative.  Negative for discharge and visual disturbance.   Respiratory: Positive for wheezing. Negative for chest tightness.    Cardiovascular: Negative.  Negative for chest pain.   Gastrointestinal: Negative.  Negative for blood in stool, constipation, diarrhea and vomiting.   Endocrine: Negative.  Negative for polydipsia and polyuria.   Genitourinary: Negative.  Negative for difficulty urinating, dysuria, hematuria and menstrual problem.   Musculoskeletal: Positive for back pain. Negative for arthralgias and joint swelling.   Integumentary:  Negative.   Allergic/Immunologic: Negative.    Neurological: Negative.  Negative for weakness and headaches.   Hematological: Negative.    Psychiatric/Behavioral: Negative.  Negative for dysphoric mood.         Objective:      Physical Exam  Vitals reviewed.   Constitutional:       Appearance: Normal appearance.   HENT:      Head:  Normocephalic and atraumatic.      Mouth/Throat:      Mouth: Mucous membranes are moist.      Pharynx: Oropharynx is clear.   Eyes:      Extraocular Movements: Extraocular movements intact.      Conjunctiva/sclera: Conjunctivae normal.      Pupils: Pupils are equal, round, and reactive to light.   Cardiovascular:      Rate and Rhythm: Normal rate and regular rhythm.      Heart sounds: Normal heart sounds.   Pulmonary:      Effort: Pulmonary effort is normal.      Breath sounds: Normal breath sounds.   Abdominal:      General: Bowel sounds are normal.   Musculoskeletal:         General: Tenderness present. Normal range of motion.      Cervical back: Normal range of motion.      Lumbar back: Tenderness present.        Back:    Skin:     General: Skin is warm and dry.   Neurological:      Mental Status: She is alert and oriented to person, place, and time.   Psychiatric:         Mood and Affect: Mood normal.         Behavior: Behavior normal.         Assessment:       Problem List Items Addressed This Visit        Pulmonary    Stage 3 severe COPD by GOLD classification (Chronic)     PFTs: 2020, no bronchodilator response, severe obstruction  Tobacco Cessation Encouraged  Continue Trelegy Ellipta daily, albuterol inhaler as needed.   Patient followed by Pulmonology Clinic              Cardiac/Vascular    Hyperlipidemia (Chronic)     Continue Atorvastatin 40 mg daily  Weight loss encouraged  Low fat/high fiber diet  Increase physical activity  Tobacco cessation encouraged  Chol: 174  HDL: 41  Tri  LDL: 124  Repeat lipid panel ordered for today.           Relevant Orders    Lipid Panel (Completed)    Hypertension (Chronic)     B/P: 100/62  UA Creatinine: ordered  UA Microalbumin: ordered  EK2021  Continue Amlodipine 5 mg daily, Lisinopril 10 mg daily, HCTZ 12.5 mg daily  DASH diet  Encouraged home blood pressure monitoring           Relevant Orders    CBC Auto Differential (Completed)    Comprehensive  Metabolic Panel (Completed)    Urinalysis, Reflex to Urine Culture Urine, Clean Catch (Completed)    TSH (Completed)    Microalbumin/Creatinine Ratio, Urine (Completed)       Renal/    Stage 2 chronic kidney disease (Chronic)     B/P: 100/62  BUN/Cr: 7.5/0.8  GFR: 89  HgbA1c: glucose level 89  H/H: 13.4/32.8  Vitamin D Level: ordered  Smoking cessation encouraged.  Increase physical activity to 30 minutes daily 5 days a week.  Avoid processed/fast foods.  Home blood pressure monitoring, notify office if B/P >139/89.  Avoid NSAIDs.              Orthopedic    Chronic midline low back pain without sciatica - Primary     Lumbar X-ray ordered           Relevant Orders    X-Ray Lumbar Spine 2 Or 3 Views (Completed)       Other    Tobacco dependence with current use (Chronic)     Smoking cessation education provided, encouraged. Patient refused smoking cessation at this time.                  Plan:    Patient to follow up in 4 months with labs to be done prior to visit to assess HLD, Vitamin D, CKD.

## 2022-07-15 RX ORDER — CYCLOBENZAPRINE HCL 10 MG
10 TABLET ORAL NIGHTLY
Qty: 10 TABLET | Refills: 0 | Status: SHIPPED | OUTPATIENT
Start: 2022-07-15 | End: 2022-07-25

## 2022-07-15 NOTE — PROGRESS NOTES
Informed back X-ray was negative. Flexeril 10 mg Qhs as needed for pain. If no relief with medication,  notify clinic for referral to physical therapy.  She voiced understanding.

## 2022-07-19 ENCOUNTER — TELEPHONE (OUTPATIENT)
Dept: PULMONOLOGY | Facility: CLINIC | Age: 62
End: 2022-07-19
Payer: MEDICAID

## 2022-07-19 NOTE — TELEPHONE ENCOUNTER
Halima Carlos,    She is scheduled for a PET/CT and I will make sure she has a follow up appt with pulmonologist.      Thank you,  Zora

## 2022-07-27 ENCOUNTER — TELEPHONE (OUTPATIENT)
Dept: INTERNAL MEDICINE | Facility: CLINIC | Age: 62
End: 2022-07-27
Payer: MEDICAID

## 2022-07-27 ENCOUNTER — TELEPHONE (OUTPATIENT)
Dept: PULMONOLOGY | Facility: CLINIC | Age: 62
End: 2022-07-27
Payer: MEDICAID

## 2022-07-27 DIAGNOSIS — I10 PRIMARY HYPERTENSION: Primary | ICD-10-CM

## 2022-07-27 NOTE — TELEPHONE ENCOUNTER
PET CT scan was denied. Peer to Peer was completed on 07/27/2022 @ 3:40 pm.   Authorization # [id number]  Authorization valid until 09/24/2022  PET CT to be performed at [facility].

## 2022-07-28 ENCOUNTER — HOSPITAL ENCOUNTER (OUTPATIENT)
Dept: PULMONOLOGY | Facility: HOSPITAL | Age: 62
Discharge: HOME | End: 2022-07-28
Attending: STUDENT IN AN ORGANIZED HEALTH CARE EDUCATION/TRAINING PROGRAM
Payer: MEDICAID

## 2022-07-28 DIAGNOSIS — J44.9 STAGE 3 SEVERE COPD BY GOLD CLASSIFICATION: Primary | ICD-10-CM

## 2022-07-28 LAB
DLCO SINGLE BREATH LLN: 18.29
DLCO SINGLE BREATH PRE REF: 29.8 %
DLCO SINGLE BREATH REF: 24.02
DLCOC SBVA LLN: 3.09
DLCOC SBVA REF: 4.41
DLCOC SINGLE BREATH LLN: 18.29
DLCOC SINGLE BREATH REF: 24.02
DLCOCSBVAULN: 5.74
DLCOCSINGLEBREATHULN: 29.75
DLCOSINGLEBREATHULN: 29.75
DLCOVA LLN: 3.09
DLCOVA PRE REF: 46.5 %
DLCOVA PRE: 2.05 ML/(MIN*MMHG*L) (ref 3.09–5.74)
DLCOVA REF: 4.41
DLCOVAULN: 5.74
ERV LLN: -16449.23
ERV PRE REF: 85.6 %
ERV REF: 0.77
ERVULN: ABNORMAL
FEF 25 75 LLN: 0.8
FEF 25 75 PRE REF: 35.3 %
FEF 25 75 REF: 1.96
FEV1 FVC LLN: 67
FEV1 FVC PRE REF: 80.1 %
FEV1 FVC REF: 79
FEV1 LLN: 1.61
FEV1 PRE REF: 62.8 %
FEV1 REF: 2.25
FRCPLETH LLN: 2.05
FRCPLETH PREREF: 103.6 %
FRCPLETH REF: 2.88
FRCPLETHULN: 3.7
FVC LLN: 2.08
FVC PRE REF: 77.9 %
FVC REF: 2.88
IVC PRE: 2.37 L (ref 2.08–3.71)
IVC SINGLE BREATH LLN: 2.08
IVC SINGLE BREATH PRE REF: 82.3 %
IVC SINGLE BREATH REF: 2.88
IVCSINGLEBREATHULN: 3.71
LLN IC: -16447.66
MVV LLN: 86
MVV PRE REF: 44.6 %
MVV REF: 101
PEF LLN: 3.63
PEF PRE REF: 52.6 %
PEF REF: 5.82
PRE DLCO: 7.16 ML/(MIN*MMHG) (ref 18.29–29.75)
PRE ERV: 0.66 L (ref -16449.23–16450.77)
PRE FEF 25 75: 0.69 L/S (ref 0.8–3.67)
PRE FET 100: 8.16 SEC
PRE FEV1 FVC: 63.13 % (ref 66.72–89.3)
PRE FEV1: 1.41 L (ref 1.61–2.86)
PRE FRC PL: 2.98 L (ref 2.05–3.7)
PRE FVC: 2.24 L (ref 2.08–3.71)
PRE IC: 1.62 L (ref -16447.66–16452.34)
PRE MVV: 44.99 L/MIN (ref 85.75–116.02)
PRE PEF: 3.06 L/S (ref 3.63–8.02)
PRE REF IC: 69.1 %
PRE RV: 2.32 L (ref 1.53–2.68)
PRE TLC: 4.6 L (ref 4.45–6.43)
RAW PRE REF: 61.7 %
RAW PRE: 1.89 CMH2O*S/L (ref 3.06–3.06)
RAW REF: 3.06
REF IC: 2.34
RV LLN: 1.53
RV PRE REF: 110.3 %
RV REF: 2.1
RVTLC LLN: 31
RVTLC PRE REF: 123.9 %
RVTLC PRE: 50.43 % (ref 31.13–50.31)
RVTLC REF: 41
RVTLCULN: 50
RVULN: 2.68
SGAW PRE REF: 158.4 %
SGAW PRE: 0.16 1/(CMH2O*S) (ref 0.1–0.1)
SGAW REF: 0.1
TLC LLN: 4.45
TLC PRE REF: 84.5 %
TLC REF: 5.44
TLC ULN: 6.43
ULN IC: ABNORMAL
VA PRE: 3.49 L (ref 5.29–5.29)
VA SINGLE BREATH PRE REF: 65.9 %
VA SINGLE BREATH REF: 5.29
VC LLN: 2.08
VC PRE REF: 79.3 %
VC PRE: 2.28 L (ref 2.08–3.71)
VC REF: 2.88
VC ULN: 3.71

## 2022-07-28 PROCEDURE — 94726 PLETHYSMOGRAPHY LUNG VOLUMES: CPT

## 2022-07-28 PROCEDURE — 94010 BREATHING CAPACITY TEST: CPT

## 2022-07-28 PROCEDURE — 94729 DIFFUSING CAPACITY: CPT

## 2022-07-28 RX ORDER — IPRATROPIUM BROMIDE 0.5 MG/2.5ML
0.5 SOLUTION RESPIRATORY (INHALATION) ONCE
Status: COMPLETED | OUTPATIENT
Start: 2022-07-28 | End: 2022-07-28

## 2022-07-28 RX ADMIN — IPRATROPIUM BROMIDE 0.5 MG: 0.5 SOLUTION RESPIRATORY (INHALATION) at 09:07

## 2022-08-04 ENCOUNTER — TELEPHONE (OUTPATIENT)
Dept: INTERNAL MEDICINE | Facility: CLINIC | Age: 62
End: 2022-08-04
Payer: MEDICAID

## 2022-08-04 NOTE — TELEPHONE ENCOUNTER
Simon called from [facility] called and stated that they were no able to do Ms. Mansi PET scan. The place out [facility] where the medication comes from for the PET scan is out, so her test was not done. Pt needs to be rescheduled and Simon stated that he did inform the pt

## 2022-08-08 ENCOUNTER — TELEPHONE (OUTPATIENT)
Dept: PULMONOLOGY | Facility: CLINIC | Age: 62
End: 2022-08-08
Payer: MEDICAID

## 2022-08-10 ENCOUNTER — HOSPITAL ENCOUNTER (OUTPATIENT)
Dept: RADIOLOGY | Facility: HOSPITAL | Age: 62
Discharge: HOME | End: 2022-08-10
Attending: NURSE PRACTITIONER
Payer: MEDICAID

## 2022-08-10 DIAGNOSIS — F17.200 TOBACCO DEPENDENCE WITH CURRENT USE: ICD-10-CM

## 2022-08-10 DIAGNOSIS — J44.9 STAGE 3 SEVERE COPD BY GOLD CLASSIFICATION: ICD-10-CM

## 2022-08-10 DIAGNOSIS — R91.8 LUNG NODULES: ICD-10-CM

## 2022-08-10 PROCEDURE — 78815 PET IMAGE W/CT SKULL-THIGH: CPT | Mod: TC

## 2022-08-23 ENCOUNTER — OFFICE VISIT (OUTPATIENT)
Dept: PULMONOLOGY | Facility: CLINIC | Age: 62
End: 2022-08-23
Payer: MEDICAID

## 2022-08-23 VITALS
TEMPERATURE: 98 F | HEART RATE: 86 BPM | RESPIRATION RATE: 20 BRPM | OXYGEN SATURATION: 99 % | DIASTOLIC BLOOD PRESSURE: 70 MMHG | BODY MASS INDEX: 25.13 KG/M2 | SYSTOLIC BLOOD PRESSURE: 105 MMHG | HEIGHT: 66 IN | WEIGHT: 156.38 LBS

## 2022-08-23 DIAGNOSIS — Z72.0 TOBACCO USE: Primary | ICD-10-CM

## 2022-08-23 DIAGNOSIS — J44.9 STAGE 3 SEVERE COPD BY GOLD CLASSIFICATION: ICD-10-CM

## 2022-08-23 DIAGNOSIS — F17.200 TOBACCO DEPENDENCE WITH CURRENT USE: Chronic | ICD-10-CM

## 2022-08-23 DIAGNOSIS — R91.8 PULMONARY NODULES: ICD-10-CM

## 2022-08-23 DIAGNOSIS — J44.9 STAGE 3 SEVERE COPD BY GOLD CLASSIFICATION: Primary | Chronic | ICD-10-CM

## 2022-08-23 PROCEDURE — 3008F BODY MASS INDEX DOCD: CPT | Mod: CPTII,,, | Performed by: INTERNAL MEDICINE

## 2022-08-23 PROCEDURE — 4010F ACE/ARB THERAPY RXD/TAKEN: CPT | Mod: CPTII,,, | Performed by: INTERNAL MEDICINE

## 2022-08-23 PROCEDURE — 99214 OFFICE O/P EST MOD 30 MIN: CPT | Mod: PBBFAC

## 2022-08-23 PROCEDURE — 3061F NEG MICROALBUMINURIA REV: CPT | Mod: CPTII,,, | Performed by: INTERNAL MEDICINE

## 2022-08-23 PROCEDURE — 1160F RVW MEDS BY RX/DR IN RCRD: CPT | Mod: CPTII,,, | Performed by: INTERNAL MEDICINE

## 2022-08-23 PROCEDURE — 3066F PR DOCUMENTATION OF TREATMENT FOR NEPHROPATHY: ICD-10-PCS | Mod: CPTII,,, | Performed by: INTERNAL MEDICINE

## 2022-08-23 PROCEDURE — 4010F PR ACE/ARB THEARPY RXD/TAKEN: ICD-10-PCS | Mod: CPTII,,, | Performed by: INTERNAL MEDICINE

## 2022-08-23 PROCEDURE — 3061F PR NEG MICROALBUMINURIA RESULT DOCUMENTED/REVIEW: ICD-10-PCS | Mod: CPTII,,, | Performed by: INTERNAL MEDICINE

## 2022-08-23 PROCEDURE — 3074F SYST BP LT 130 MM HG: CPT | Mod: CPTII,,, | Performed by: INTERNAL MEDICINE

## 2022-08-23 PROCEDURE — 3074F PR MOST RECENT SYSTOLIC BLOOD PRESSURE < 130 MM HG: ICD-10-PCS | Mod: CPTII,,, | Performed by: INTERNAL MEDICINE

## 2022-08-23 PROCEDURE — 1159F MED LIST DOCD IN RCRD: CPT | Mod: CPTII,,, | Performed by: INTERNAL MEDICINE

## 2022-08-23 PROCEDURE — 3078F DIAST BP <80 MM HG: CPT | Mod: CPTII,,, | Performed by: INTERNAL MEDICINE

## 2022-08-23 PROCEDURE — 3008F PR BODY MASS INDEX (BMI) DOCUMENTED: ICD-10-PCS | Mod: CPTII,,, | Performed by: INTERNAL MEDICINE

## 2022-08-23 PROCEDURE — 1159F PR MEDICATION LIST DOCUMENTED IN MEDICAL RECORD: ICD-10-PCS | Mod: CPTII,,, | Performed by: INTERNAL MEDICINE

## 2022-08-23 PROCEDURE — 1160F PR REVIEW ALL MEDS BY PRESCRIBER/CLIN PHARMACIST DOCUMENTED: ICD-10-PCS | Mod: CPTII,,, | Performed by: INTERNAL MEDICINE

## 2022-08-23 PROCEDURE — 99214 PR OFFICE/OUTPT VISIT, EST, LEVL IV, 30-39 MIN: ICD-10-PCS | Mod: S$PBB,,, | Performed by: INTERNAL MEDICINE

## 2022-08-23 PROCEDURE — 3078F PR MOST RECENT DIASTOLIC BLOOD PRESSURE < 80 MM HG: ICD-10-PCS | Mod: CPTII,,, | Performed by: INTERNAL MEDICINE

## 2022-08-23 PROCEDURE — 99214 OFFICE O/P EST MOD 30 MIN: CPT | Mod: S$PBB,,, | Performed by: INTERNAL MEDICINE

## 2022-08-23 PROCEDURE — 3066F NEPHROPATHY DOC TX: CPT | Mod: CPTII,,, | Performed by: INTERNAL MEDICINE

## 2022-08-23 NOTE — PROGRESS NOTES
[facility] Visit Note    HPI   63 yo F with PMH of COPD GS III, chronic tobacco use, CKD, HTN, HLD presents for routine f/u. Last seen May 2022     Remains smoking about 10 cigs/day; max was 2.5 PPD in life since teenager  PET CT was ordered since last visit 2/2 nodules on low dose CT as below   Endorses SOB after 50 feet of walking   Trelegy switch at this visit has improved symptoms   Uses duoneb 1x/month and albuterol 1x/every other week   Has cough, yellow, no hemoptysis, uses OTC mucus relief; no f/c/s/cp  Stable weight.  Good appetite.  No refills at this time    Review of Systems  14 pt ROS negative unless mentioned above    Physical Examination  Vitals:    22 1510   BP: 105/70   Pulse: 86   Resp: 20   Temp: 98.1 °F (36.7 °C)     General appearance: alert, cooperative, no distress, on room air   HENT: Normocephalic, atraumatic, neck symmetrical, no nasal discharge   Lungs: clear to auscultation bilaterally  Heart: regular rate and rhythm without rub; no edema  Abdomen: soft, non-tender; bowel sounds normoactive  Extremities: extremities symmetric  Neurologic: Alert and oriented X 3, normal strength, normal coordination and gait, no FND    Imagin2022 LDCT:  Impression:     New pulmonary nodules.  The largest is seen in the periphery of the right lower lobe.  Suggest further evaluation with PET-CT.     ASSESSMENT:     LUNG RADS 4B. Suspicious. Risk of malignancy greater than 15%.  Recommend PET/CT and/or tissue sampling.     Note: For new large nodules that develop on an annual repeat screening, a 1 month LDCT may be considered to address potentially infection or inflammatory conditions.    PET CT: 8/10/22    FINDINGS:  Activity within the imaged brain is symmetric.  There is fairly intense but symmetric tonsillar and parotid activity.  No hypermetabolic cervical lymph nodes.     No hypermetabolic mediastinal, hilar or axillary adenopathy.     There is severe emphysema.  There is  elongated subpleural right lower lobe nodule image 98 series 3 which measures up to 19 mm with max SUV 5.9.  Adjacent 8 mm right lower lobe nodule image 100 series 3 has max SUV only 0.9.  Tiny left upper lobe nodule discussed on recent prior CT not clearly evident on this study.  No significant FDG activity here.     No suspicious PET findings in the abdomen or pelvis.  No adrenal nodule.     No suspicious osseous lesion.     Impression:     1. The elongated subpleural right lower lobe nodule demonstrates fairly intense FDG activity.  Infectious, inflammatory and neoplastic etiologies remain possible.  2. No suspicious PET findings elsewhere.      Current Outpatient Medications:     albuterol (PROVENTIL/VENTOLIN HFA) 90 mcg/actuation inhaler, Inhale 2 puffs into the lungs every 4 (four) hours as needed for Wheezing or Shortness of Breath., Disp: 18 g, Rfl: 2    amLODIPine (NORVASC) 5 MG tablet, Take 1 tablet (5 mg total) by mouth once daily., Disp: 90 tablet, Rfl: 2    aspirin (ECOTRIN) 81 MG EC tablet, Take 1 tablet (81 mg total) by mouth once daily., Disp: 90 tablet, Rfl: 2    atorvastatin (LIPITOR) 40 MG tablet, Take 1 tablet (40 mg total) by mouth every evening., Disp: 90 tablet, Rfl: 2    fluticasone propionate (FLONASE) 50 mcg/actuation nasal spray, 1 spray (50 mcg total) by Each Nostril route once daily., Disp: 15.8 mL, Rfl: 2    fluticasone-umeclidin-vilanter (TRELEGY ELLIPTA) 100-62.5-25 mcg DsDv, Inhale 1 puff into the lungs once daily at 6am., Disp: , Rfl:     hydroCHLOROthiazide (HYDRODIURIL) 12.5 MG Tab, Take 1 tablet (12.5 mg total) by mouth once daily., Disp: 90 tablet, Rfl: 2    lisinopriL 10 MG tablet, Take 1 tablet (10 mg total) by mouth once daily., Disp: 90 tablet, Rfl: 2    loratadine (CLARITIN) 10 mg tablet, Take 1 tablet (10 mg total) by mouth once daily., Disp: 90 tablet, Rfl: 2    Assessment/Plan:    COPD Gold, Stage III  Hypermetabolic RLL pulmonary nodule   Chronic Tobacco Use,  Current 1/2 PPD    -reviewed recent PFT from July 2022  -reviewed recent low-dose CT and PET-CT scan   -Highly concerning right lower lobe hypermetabolic nodule on PET-CT as above   -will schedule IR CT-guided biopsy as soon as possible   -PFT every 2 years  -encouraged absolute smoking cessation  -continue Trelegy triple inhaler  -Continue Ventolin and nebulizer treatments as needed    RTC post biopsy result     Rick De Santiago MD, PGY-3

## 2022-08-31 ENCOUNTER — TELEPHONE (OUTPATIENT)
Dept: PREADMISSION TESTING | Facility: HOSPITAL | Age: 62
End: 2022-08-31

## 2022-09-01 ENCOUNTER — HOSPITAL ENCOUNTER (OUTPATIENT)
Dept: RADIOLOGY | Facility: HOSPITAL | Age: 62
Discharge: HOME | End: 2022-09-01
Attending: INTERNAL MEDICINE
Payer: MEDICAID

## 2022-09-01 ENCOUNTER — HOSPITAL ENCOUNTER (OUTPATIENT)
Dept: INTERVENTIONAL RADIOLOGY/VASCULAR | Facility: HOSPITAL | Age: 62
Discharge: HOME | End: 2022-09-01
Attending: INTERNAL MEDICINE
Payer: MEDICAID

## 2022-09-01 VITALS
RESPIRATION RATE: 20 BRPM | DIASTOLIC BLOOD PRESSURE: 64 MMHG | WEIGHT: 157.44 LBS | TEMPERATURE: 98 F | SYSTOLIC BLOOD PRESSURE: 100 MMHG | OXYGEN SATURATION: 100 % | HEART RATE: 79 BPM | BODY MASS INDEX: 25.41 KG/M2

## 2022-09-01 DIAGNOSIS — R91.1 LUNG NODULE: ICD-10-CM

## 2022-09-01 DIAGNOSIS — J44.9 STAGE 3 SEVERE COPD BY GOLD CLASSIFICATION: ICD-10-CM

## 2022-09-01 DIAGNOSIS — I10 PRIMARY HYPERTENSION: ICD-10-CM

## 2022-09-01 DIAGNOSIS — M54.50 CHRONIC MIDLINE LOW BACK PAIN WITHOUT SCIATICA: ICD-10-CM

## 2022-09-01 DIAGNOSIS — N18.2 STAGE 2 CHRONIC KIDNEY DISEASE: Primary | ICD-10-CM

## 2022-09-01 DIAGNOSIS — G89.18 POST-OP PAIN: ICD-10-CM

## 2022-09-01 DIAGNOSIS — F17.200 TOBACCO DEPENDENCE WITH CURRENT USE: ICD-10-CM

## 2022-09-01 DIAGNOSIS — R06.09 DOE (DYSPNEA ON EXERTION): ICD-10-CM

## 2022-09-01 DIAGNOSIS — G89.29 CHRONIC MIDLINE LOW BACK PAIN WITHOUT SCIATICA: ICD-10-CM

## 2022-09-01 DIAGNOSIS — E78.49 OTHER HYPERLIPIDEMIA: ICD-10-CM

## 2022-09-01 LAB
ANION GAP SERPL CALC-SCNC: 7 MEQ/L
BASOPHILS # BLD AUTO: 0.02 X10(3)/MCL (ref 0–0.2)
BASOPHILS NFR BLD AUTO: 0.4 %
BUN SERPL-MCNC: 11.5 MG/DL (ref 9.8–20.1)
CALCIUM SERPL-MCNC: 9 MG/DL (ref 8.4–10.2)
CHLORIDE SERPL-SCNC: 109 MMOL/L (ref 98–107)
CO2 SERPL-SCNC: 25 MMOL/L (ref 23–31)
CREAT SERPL-MCNC: 0.73 MG/DL (ref 0.55–1.02)
CREAT/UREA NIT SERPL: 16
EOSINOPHIL # BLD AUTO: 0.14 X10(3)/MCL (ref 0–0.9)
EOSINOPHIL NFR BLD AUTO: 2.5 %
ERYTHROCYTE [DISTWIDTH] IN BLOOD BY AUTOMATED COUNT: 13.6 % (ref 11.5–17)
GFR SERPLBLD CREATININE-BSD FMLA CKD-EPI: >60 MLS/MIN/1.73/M2
GLUCOSE SERPL-MCNC: 96 MG/DL (ref 82–115)
HCT VFR BLD AUTO: 39 % (ref 37–47)
HGB BLD-MCNC: 12.5 GM/DL (ref 12–16)
IMM GRANULOCYTES # BLD AUTO: 0.01 X10(3)/MCL (ref 0–0.04)
IMM GRANULOCYTES NFR BLD AUTO: 0.2 %
LYMPHOCYTES # BLD AUTO: 1.59 X10(3)/MCL (ref 0.6–4.6)
LYMPHOCYTES NFR BLD AUTO: 28.9 %
MCH RBC QN AUTO: 27.4 PG (ref 27–31)
MCHC RBC AUTO-ENTMCNC: 32.1 MG/DL (ref 33–36)
MCV RBC AUTO: 85.3 FL (ref 80–94)
MONOCYTES # BLD AUTO: 0.53 X10(3)/MCL (ref 0.1–1.3)
MONOCYTES NFR BLD AUTO: 9.6 %
NEUTROPHILS # BLD AUTO: 3.2 X10(3)/MCL (ref 2.1–9.2)
NEUTROPHILS NFR BLD AUTO: 58.4 %
NRBC BLD AUTO-RTO: 0 %
PLATELET # BLD AUTO: 305 X10(3)/MCL (ref 130–400)
PMV BLD AUTO: 10.3 FL (ref 7.4–10.4)
POTASSIUM SERPL-SCNC: 3.9 MMOL/L (ref 3.5–5.1)
RBC # BLD AUTO: 4.57 X10(6)/MCL (ref 4.2–5.4)
SODIUM SERPL-SCNC: 141 MMOL/L (ref 136–145)
WBC # SPEC AUTO: 5.5 X10(3)/MCL (ref 4.5–11.5)

## 2022-09-01 PROCEDURE — 85025 COMPLETE CBC W/AUTO DIFF WBC: CPT | Performed by: INTERNAL MEDICINE

## 2022-09-01 PROCEDURE — 32408 CORE NDL BX LNG/MED PERQ: CPT

## 2022-09-01 PROCEDURE — 99152 MOD SED SAME PHYS/QHP 5/>YRS: CPT | Performed by: RADIOLOGY

## 2022-09-01 PROCEDURE — 99153 MOD SED SAME PHYS/QHP EA: CPT | Performed by: RADIOLOGY

## 2022-09-01 PROCEDURE — 85610 PROTHROMBIN TIME: CPT | Performed by: INTERNAL MEDICINE

## 2022-09-01 PROCEDURE — 71045 X-RAY EXAM CHEST 1 VIEW: CPT | Mod: TC

## 2022-09-01 PROCEDURE — 36415 COLL VENOUS BLD VENIPUNCTURE: CPT | Performed by: INTERNAL MEDICINE

## 2022-09-01 PROCEDURE — 63600175 PHARM REV CODE 636 W HCPCS: Performed by: RADIOLOGY

## 2022-09-01 PROCEDURE — 25000003 PHARM REV CODE 250: Performed by: RADIOLOGY

## 2022-09-01 PROCEDURE — 80048 BASIC METABOLIC PNL TOTAL CA: CPT | Performed by: INTERNAL MEDICINE

## 2022-09-01 RX ORDER — MIDAZOLAM HYDROCHLORIDE 5 MG/ML
INJECTION INTRAMUSCULAR; INTRAVENOUS
Status: DISCONTINUED | OUTPATIENT
Start: 2022-09-01 | End: 2022-09-02 | Stop reason: HOSPADM

## 2022-09-01 RX ORDER — SODIUM CHLORIDE 9 MG/ML
0-999 INJECTION, SOLUTION INTRAVENOUS CONTINUOUS
Status: DISCONTINUED | OUTPATIENT
Start: 2022-09-01 | End: 2022-09-02 | Stop reason: HOSPADM

## 2022-09-01 RX ORDER — LIDOCAINE HYDROCHLORIDE 10 MG/ML
INJECTION INFILTRATION; PERINEURAL
Status: DISCONTINUED | OUTPATIENT
Start: 2022-09-01 | End: 2022-09-02 | Stop reason: HOSPADM

## 2022-09-01 RX ORDER — SODIUM CHLORIDE 0.9 % (FLUSH) 0.9 %
10 SYRINGE (ML) INJECTION
Status: DISCONTINUED | OUTPATIENT
Start: 2022-09-01 | End: 2022-09-02 | Stop reason: HOSPADM

## 2022-09-01 RX ORDER — FENTANYL CITRATE 50 UG/ML
INJECTION, SOLUTION INTRAMUSCULAR; INTRAVENOUS
Status: DISCONTINUED | OUTPATIENT
Start: 2022-09-01 | End: 2022-09-02 | Stop reason: HOSPADM

## 2022-09-01 RX ADMIN — LIDOCAINE HYDROCHLORIDE 5 ML: 10 INJECTION, SOLUTION INFILTRATION; PERINEURAL at 09:09

## 2022-09-01 RX ADMIN — FENTANYL CITRATE 25 MCG: 50 INJECTION INTRAMUSCULAR; INTRAVENOUS at 09:09

## 2022-09-01 RX ADMIN — MIDAZOLAM HYDROCHLORIDE 0.5 MG: 5 INJECTION, SOLUTION INTRAMUSCULAR; INTRAVENOUS at 09:09

## 2022-09-01 NOTE — DISCHARGE SUMMARY
Radiology Post-Procedure Note    Pre Op Diagnosis: Right lung mass    Post Op Diagnosis: Right lung mass    Procedure: right lung biopsy    Procedure performed by: Johnny Osei M.D.    Written Informed Consent Obtained: Yes    Specimen Removed: YES Minimal    Estimated Blood Loss: Minimal    Findings:   Standard Lung Biopsy    Patient tolerated procedure well.    Johnny Osei MD

## 2022-09-01 NOTE — DISCHARGE INSTRUCTIONS
-Take it easy for the next 24 hours since you have had anesthesia sedation.      -Be sure to stay hydrated.     - No bending, straining or or heavy lifting over 15 pounds for 1 week.      -You may remove your bandaid in 24 hours and shower then.     - Notify MD if you are running fever over 100.4, see any reddness or foul smelling discharge from biopsy area.      -Go to your nearest ER or call 911 if you have any difficulty breathing or notice swelling or bleeding in your back. Thank you for choosing [facility] for your care and it was my pleasure taking care of you.  [phone]

## 2022-09-01 NOTE — SEDATION DOCUMENTATION
Dr Osei at bedside after viewing recan of CT. He advised that no chest tube is needed. OK to D/C to recovery.

## 2022-09-01 NOTE — H&P
Radiology History & Physical      SUBJECTIVE:     Chief Complaint: Right lung nodule    History of Present Illness:  Mansi Jin is a 62 y.o. female who presents for Right lung Biopsy  Past Medical History:   Diagnosis Date    ASTHMA     COPD (chronic obstructive pulmonary disease)     SEVERE    High cholesterol     HLD (hyperlipidemia)     HTN (hypertension)     Stage 2 chronic kidney disease     Tobacco abuse      Past Surgical History:   Procedure Laterality Date    LAPAROSCOPIC TOTAL HYSTERECTOMY      TOTAL ABDOMINAL HYSTERECTOMY W/ BILATERAL SALPINGOOPHORECTOMY         Home Meds:   Prior to Admission medications    Medication Sig Start Date End Date Taking? Authorizing Provider   albuterol (PROVENTIL/VENTOLIN HFA) 90 mcg/actuation inhaler Inhale 2 puffs into the lungs every 4 (four) hours as needed for Wheezing or Shortness of Breath. 7/14/22  Yes PAULINE Loya   amLODIPine (NORVASC) 5 MG tablet Take 1 tablet (5 mg total) by mouth once daily. 7/14/22 10/12/22 Yes PAULINE Loya   atorvastatin (LIPITOR) 40 MG tablet Take 1 tablet (40 mg total) by mouth every evening. 7/14/22 10/12/22 Yes PAULINE Loya   fluticasone-umeclidin-vilanter (TRELEGY ELLIPTA) 100-62.5-25 mcg DsDv Inhale 1 puff into the lungs once daily at 6am. 3/17/22  Yes Historical Provider   lisinopriL 10 MG tablet Take 1 tablet (10 mg total) by mouth once daily. 7/14/22 10/12/22 Yes PAULINE Loya   aspirin (ECOTRIN) 81 MG EC tablet Take 1 tablet (81 mg total) by mouth once daily. 7/14/22 10/12/22  PAULINE Loya   fluticasone propionate (FLONASE) 50 mcg/actuation nasal spray 1 spray (50 mcg total) by Each Nostril route once daily. 7/14/22   APULINE Loya   hydroCHLOROthiazide (HYDRODIURIL) 12.5 MG Tab Take 1 tablet (12.5 mg total) by mouth once daily. 7/14/22 10/12/22  PAULINE Loya     Anticoagulants/Antiplatelets: no anticoagulation    Allergies: Review of patient's allergies indicates:  No Known  Allergies  Sedation History:  no adverse reactions    Review of Systems:   Hematological: no known coagulopathies  Respiratory: no shortness of breath  Cardiovascular: no chest pain  Gastrointestinal: no abdominal pain  Genito-Urinary: no dysuria  Musculoskeletal: negative  Neurological: no TIA or stroke symptoms         OBJECTIVE:     Vital Signs (Most Recent)  Temp: 98 °F (36.7 °C) (09/01/22 0712)  Pulse: 81 (09/01/22 0930)  Resp: 16 (09/01/22 0930)  BP: 100/76 (09/01/22 0930)  SpO2: 100 % (09/01/22 0930)    Physical Exam:  ASA: 2    General: no acute distress  Mental Status: alert and oriented to person, place and time  HEENT: normocephalic, atraumatic  Chest: unlabored breathing  Heart: regular heart rate  Abdomen: nondistended  Extremity: moves all extremities    Laboratory  Lab Results   Component Value Date    INR 1.05 09/01/2022       Lab Results   Component Value Date    WBC 5.5 09/01/2022    HGB 12.5 09/01/2022    HCT 39.0 09/01/2022    MCV 85.3 09/01/2022     09/01/2022      Lab Results   Component Value Date     09/01/2022    K 3.9 09/01/2022    CO2 25 09/01/2022    BUN 11.5 09/01/2022    CREATININE 0.73 09/01/2022    CALCIUM 9.0 09/01/2022    MG 2.2 03/18/2019    ALT 12 07/14/2022    AST 15 07/14/2022    ALBUMIN 3.9 07/14/2022    BILITOT 0.4 07/14/2022    BILIDIR 0.2 07/19/2021       ASSESSMENT/PLAN:     Sedation Plan: Moderate  Patient will undergo Right lung Biopsy.    Johnny Osei MD

## 2022-09-02 NOTE — PROGRESS NOTES
Post op phone call to pt -denies fever, nausea or vomiting but does complain of some pain to the left lateral dorsal chest area under and mild to moderate SOB and increased use of home breathing treatments and inhaler since procedure yesterday.  Instructed pt to report to ER for SOB and to let ER know she had lung biopsy yesterday-pt verbalized understanding and states that she will go to [facility] ER.

## 2022-09-06 ENCOUNTER — OFFICE VISIT (OUTPATIENT)
Dept: PULMONOLOGY | Facility: CLINIC | Age: 62
End: 2022-09-06
Payer: MEDICAID

## 2022-09-06 VITALS
WEIGHT: 156.38 LBS | RESPIRATION RATE: 20 BRPM | HEIGHT: 66 IN | TEMPERATURE: 98 F | OXYGEN SATURATION: 96 % | HEART RATE: 104 BPM | SYSTOLIC BLOOD PRESSURE: 107 MMHG | DIASTOLIC BLOOD PRESSURE: 73 MMHG | BODY MASS INDEX: 25.13 KG/M2

## 2022-09-06 DIAGNOSIS — J44.1 CHRONIC OBSTRUCTIVE PULMONARY DISEASE WITH (ACUTE) EXACERBATION: ICD-10-CM

## 2022-09-06 DIAGNOSIS — R91.1 SOLITARY PULMONARY NODULE: ICD-10-CM

## 2022-09-06 LAB
ESTROGEN SERPL-MCNC: NORMAL PG/ML
INSULIN SERPL-ACNC: NORMAL U[IU]/ML
LAB AP CLINICAL INFORMATION: NORMAL
LAB AP GROSS DESCRIPTION: NORMAL
LAB AP INTRA OP: NORMAL
LAB AP REPORT FOOTNOTES: NORMAL
T3RU NFR SERPL: NORMAL %

## 2022-09-06 PROCEDURE — 99214 OFFICE O/P EST MOD 30 MIN: CPT | Mod: PBBFAC

## 2022-09-06 PROCEDURE — 99213 PR OFFICE/OUTPT VISIT, EST, LEVL III, 20-29 MIN: ICD-10-PCS | Mod: S$PBB,,, | Performed by: INTERNAL MEDICINE

## 2022-09-06 PROCEDURE — 99213 OFFICE O/P EST LOW 20 MIN: CPT | Mod: S$PBB,,, | Performed by: INTERNAL MEDICINE

## 2022-09-06 PROCEDURE — 3066F NEPHROPATHY DOC TX: CPT | Mod: CPTII,,, | Performed by: INTERNAL MEDICINE

## 2022-09-06 PROCEDURE — 1159F MED LIST DOCD IN RCRD: CPT | Mod: CPTII,,, | Performed by: INTERNAL MEDICINE

## 2022-09-06 PROCEDURE — 3074F PR MOST RECENT SYSTOLIC BLOOD PRESSURE < 130 MM HG: ICD-10-PCS | Mod: CPTII,,, | Performed by: INTERNAL MEDICINE

## 2022-09-06 PROCEDURE — 1159F PR MEDICATION LIST DOCUMENTED IN MEDICAL RECORD: ICD-10-PCS | Mod: CPTII,,, | Performed by: INTERNAL MEDICINE

## 2022-09-06 PROCEDURE — 3074F SYST BP LT 130 MM HG: CPT | Mod: CPTII,,, | Performed by: INTERNAL MEDICINE

## 2022-09-06 PROCEDURE — 3066F PR DOCUMENTATION OF TREATMENT FOR NEPHROPATHY: ICD-10-PCS | Mod: CPTII,,, | Performed by: INTERNAL MEDICINE

## 2022-09-06 PROCEDURE — 3061F NEG MICROALBUMINURIA REV: CPT | Mod: CPTII,,, | Performed by: INTERNAL MEDICINE

## 2022-09-06 PROCEDURE — 3078F DIAST BP <80 MM HG: CPT | Mod: CPTII,,, | Performed by: INTERNAL MEDICINE

## 2022-09-06 PROCEDURE — 4010F PR ACE/ARB THEARPY RXD/TAKEN: ICD-10-PCS | Mod: CPTII,,, | Performed by: INTERNAL MEDICINE

## 2022-09-06 PROCEDURE — 3078F PR MOST RECENT DIASTOLIC BLOOD PRESSURE < 80 MM HG: ICD-10-PCS | Mod: CPTII,,, | Performed by: INTERNAL MEDICINE

## 2022-09-06 PROCEDURE — 3008F PR BODY MASS INDEX (BMI) DOCUMENTED: ICD-10-PCS | Mod: CPTII,,, | Performed by: INTERNAL MEDICINE

## 2022-09-06 PROCEDURE — 4010F ACE/ARB THERAPY RXD/TAKEN: CPT | Mod: CPTII,,, | Performed by: INTERNAL MEDICINE

## 2022-09-06 PROCEDURE — 3008F BODY MASS INDEX DOCD: CPT | Mod: CPTII,,, | Performed by: INTERNAL MEDICINE

## 2022-09-06 PROCEDURE — 3061F PR NEG MICROALBUMINURIA RESULT DOCUMENTED/REVIEW: ICD-10-PCS | Mod: CPTII,,, | Performed by: INTERNAL MEDICINE

## 2022-09-06 RX ORDER — AZITHROMYCIN 250 MG/1
TABLET, FILM COATED ORAL
Qty: 6 TABLET | Refills: 0 | Status: SHIPPED | OUTPATIENT
Start: 2022-09-06 | End: 2022-09-11

## 2022-09-06 NOTE — PROGRESS NOTES
"  Subjective:        Chief Complaint: COPD (Pt states is having shortness of breath since she has done her biopsy states is using her nebulizer more often)      HPI: Mansi Jin is a 62 y.o. female.  She has a history of stage III COPD, chronic kidney disease, hypertension, and was recently diagnosed with a 1.9 cm solid right lower lobe pulmonary nodule.  She has a long history of smoking up to 2.5 packs per day since a teenager, currently around 1/2 pack per day.  PET/CT was performed 08/10/2022, demonstrating hypermetabolic uptake within the nodule to an SUV of 5.9.  She was then referred to Interventional Radiology for CT-guided needle biopsy, which was performed on 09/01/2022.  She admits to cough productive of yellow secretions, denies chest pain, back pain, or shoulder pain.  She has no hemoptysis.  It appears that her pathology is still not completed at this point.  We have called pathology Department while patient was in clinic today, who states that this is not been completed.    Complete PFTs (07/28/2022):  FEV1 1.41 (63% predicted), FVC 2.24 (78% predicted), FEV1/FVC equals 63%; TLC 4.6 (85% predicted), DLCO 7.2 (30% predicted), DLCO/VA 2.1 (47% predicted)      Current Outpatient Medications   Medication Instructions    albuterol (PROVENTIL/VENTOLIN HFA) 90 mcg/actuation inhaler 2 puffs, Inhalation, Every 4 hours PRN    amLODIPine (NORVASC) 5 mg, Oral, Daily    aspirin (ECOTRIN) 81 mg, Oral, Daily    atorvastatin (LIPITOR) 40 mg, Oral, Nightly    fluticasone propionate (FLONASE) 50 mcg, Each Nostril, Daily    fluticasone-umeclidin-vilanter (TRELEGY ELLIPTA) 100-62.5-25 mcg DsDv 1 puff, Inhalation, Daily    hydroCHLOROthiazide (HYDRODIURIL) 12.5 mg, Oral, Daily    lisinopriL 10 mg, Oral, Daily       No flowsheet data found.    Objective:   /73 (BP Location: Left arm, Patient Position: Sitting)   Pulse 104   Temp 98.3 °F (36.8 °C)   Resp 20   Ht 5' 6" (1.676 m)   Wt 70.9 kg (156 lb 6.4 oz)   " SpO2 96%   BMI 25.24 kg/m²     Physical Exam  Constitutional:       Appearance: Normal appearance.   HENT:      Nose: Nose normal.      Mouth/Throat:      Mouth: Mucous membranes are moist.      Pharynx: Oropharynx is clear.   Eyes:      Conjunctiva/sclera: Conjunctivae normal.      Pupils: Pupils are equal, round, and reactive to light.   Cardiovascular:      Rate and Rhythm: Normal rate and regular rhythm.      Heart sounds: No murmur heard.  Pulmonary:      Effort: Pulmonary effort is normal.      Breath sounds: Normal breath sounds.   Abdominal:      General: Bowel sounds are normal.      Palpations: Abdomen is soft.   Musculoskeletal:      Right lower leg: No edema.      Left lower leg: No edema.   Skin:     General: Skin is warm and dry.   Neurological:      Mental Status: She is alert and oriented to person, place, and time.       Assessment:     1.9 cm solid right lower lobe pulmonary nodule demonstrating hypermetabolic uptake (SUV 5.9), radiographically very suspicious for malignancy, suspecting primary bronchogenic carcinoma most likely at this point.  She has a long history of smoking, continues smoking currently.  She is post CT-guided needle biopsy performed 09/01/2022, pathology is still not completed at this time.  Chronic obstructive pulmonary disease with moderate obstruction and severe decreased DLCO.  Purulent tracheobronchitis    Plan:     I have discussed the above findings in detail with patient and her daughter today.  I have explained that her pathology is still not completed at this point.  I have again discussed my high suspicion of this being a malignancy.  Will have further to follow when pathology is completed, and will contact patient by phone at that time.  Zithromax Z-Toni for her purulent tracheal bronchitis.  I have again discussed the importance of complete smoking cessation with patient today.    Daily Adams MD, [facility]

## 2022-09-08 ENCOUNTER — TELEPHONE (OUTPATIENT)
Dept: PULMONOLOGY | Facility: CLINIC | Age: 62
End: 2022-09-08
Payer: MEDICAID

## 2022-09-08 NOTE — TELEPHONE ENCOUNTER
CT-guided needle biopsy of the hypermetabolic right lower lobe superior segment 1.9 cm transverse diameter nodular attenuation was performed by Interventional radiology on 09/01/2022.  Pathology returns insufficient tissue for diagnostic evaluation, noting bronchoalveolar macrophages only.  Her recent complete PFTs 07/28/2022 reveal moderate obstruction with decreased DLCO/VA, consistent with her known emphysema.    I have discussed the above findings in detail with patient and her daughter today.  I have explained that the CT-guided biopsy was nondiagnostic, and did not give any answer concerning the etiology of her above radiographic findings.  I have recommended considering either directly proceeding to thoracotomy and resection, or repeating CT of the chest 3 months after her PET/CT (which would be 11/10/2022), and proceeding to resection if still present at that time.  She and her daughter are currently discussing options, and will let me know her answer shortly.    Addendum:  Patient has decided to go with repeating CT of the chest without contrast on around 11/10/2022, proceeding to surgical resection at that point if radiographic findings persist.

## 2022-09-12 DIAGNOSIS — J44.1 CHRONIC OBSTRUCTIVE PULMONARY DISEASE WITH (ACUTE) EXACERBATION: Primary | ICD-10-CM

## 2022-09-12 DIAGNOSIS — R91.8 LUNG MASS: ICD-10-CM

## 2022-09-12 DIAGNOSIS — F17.200 TOBACCO DEPENDENCE WITH CURRENT USE: Chronic | ICD-10-CM

## 2022-09-12 DIAGNOSIS — R91.1 SOLITARY PULMONARY NODULE: ICD-10-CM

## 2022-11-11 ENCOUNTER — TELEPHONE (OUTPATIENT)
Dept: PULMONOLOGY | Facility: CLINIC | Age: 62
End: 2022-11-11
Payer: MEDICAID

## 2022-11-11 DIAGNOSIS — R91.1 SOLITARY PULMONARY NODULE: ICD-10-CM

## 2022-11-11 DIAGNOSIS — F17.200 TOBACCO DEPENDENCE WITH CURRENT USE: Chronic | ICD-10-CM

## 2022-11-11 DIAGNOSIS — R91.8 LUNG MASS: ICD-10-CM

## 2022-11-11 DIAGNOSIS — J44.9 STAGE 3 SEVERE COPD BY GOLD CLASSIFICATION: Primary | Chronic | ICD-10-CM

## 2022-11-11 DIAGNOSIS — R06.09 DOE (DYSPNEA ON EXERTION): ICD-10-CM

## 2022-11-22 ENCOUNTER — HOSPITAL ENCOUNTER (OUTPATIENT)
Dept: RADIOLOGY | Facility: HOSPITAL | Age: 62
Discharge: HOME | End: 2022-11-22
Attending: NURSE PRACTITIONER
Payer: MEDICAID

## 2022-11-22 ENCOUNTER — HOSPITAL ENCOUNTER (OUTPATIENT)
Dept: RADIOLOGY | Facility: HOSPITAL | Age: 62
Discharge: HOME | End: 2022-11-22
Attending: INTERNAL MEDICINE
Payer: MEDICARE

## 2022-11-22 ENCOUNTER — OFFICE VISIT (OUTPATIENT)
Dept: INTERNAL MEDICINE | Facility: CLINIC | Age: 62
End: 2022-11-22
Payer: MEDICAID

## 2022-11-22 VITALS
HEIGHT: 66 IN | HEART RATE: 109 BPM | BODY MASS INDEX: 25.26 KG/M2 | DIASTOLIC BLOOD PRESSURE: 75 MMHG | OXYGEN SATURATION: 96 % | RESPIRATION RATE: 18 BRPM | SYSTOLIC BLOOD PRESSURE: 109 MMHG | WEIGHT: 157.19 LBS | TEMPERATURE: 98 F

## 2022-11-22 DIAGNOSIS — F17.200 TOBACCO DEPENDENCE WITH CURRENT USE: ICD-10-CM

## 2022-11-22 DIAGNOSIS — F17.200 TOBACCO DEPENDENCE WITH CURRENT USE: Chronic | ICD-10-CM

## 2022-11-22 DIAGNOSIS — R91.1 SOLITARY PULMONARY NODULE: ICD-10-CM

## 2022-11-22 DIAGNOSIS — M54.50 CHRONIC RIGHT-SIDED LOW BACK PAIN WITHOUT SCIATICA: ICD-10-CM

## 2022-11-22 DIAGNOSIS — Z23 NEEDS FLU SHOT: ICD-10-CM

## 2022-11-22 DIAGNOSIS — G89.29 CHRONIC RIGHT-SIDED LOW BACK PAIN WITHOUT SCIATICA: ICD-10-CM

## 2022-11-22 DIAGNOSIS — R91.8 LUNG MASS: ICD-10-CM

## 2022-11-22 DIAGNOSIS — R06.09 DOE (DYSPNEA ON EXERTION): ICD-10-CM

## 2022-11-22 DIAGNOSIS — E78.2 MIXED HYPERLIPIDEMIA: Chronic | ICD-10-CM

## 2022-11-22 DIAGNOSIS — Z00.00 WELLNESS EXAMINATION: Primary | ICD-10-CM

## 2022-11-22 DIAGNOSIS — I10 PRIMARY HYPERTENSION: Chronic | ICD-10-CM

## 2022-11-22 DIAGNOSIS — J44.9 STAGE 3 SEVERE COPD BY GOLD CLASSIFICATION: Chronic | ICD-10-CM

## 2022-11-22 DIAGNOSIS — Z12.31 ENCOUNTER FOR SCREENING MAMMOGRAM FOR MALIGNANT NEOPLASM OF BREAST: ICD-10-CM

## 2022-11-22 DIAGNOSIS — J44.9 STAGE 3 SEVERE COPD BY GOLD CLASSIFICATION: ICD-10-CM

## 2022-11-22 PROCEDURE — 1160F PR REVIEW ALL MEDS BY PRESCRIBER/CLIN PHARMACIST DOCUMENTED: ICD-10-PCS | Mod: CPTII,,, | Performed by: NURSE PRACTITIONER

## 2022-11-22 PROCEDURE — 99396 PR PREVENTIVE VISIT,EST,40-64: ICD-10-PCS | Mod: S$PBB,,, | Performed by: NURSE PRACTITIONER

## 2022-11-22 PROCEDURE — 1159F PR MEDICATION LIST DOCUMENTED IN MEDICAL RECORD: ICD-10-PCS | Mod: CPTII,,, | Performed by: NURSE PRACTITIONER

## 2022-11-22 PROCEDURE — 4010F ACE/ARB THERAPY RXD/TAKEN: CPT | Mod: CPTII,,, | Performed by: NURSE PRACTITIONER

## 2022-11-22 PROCEDURE — 3078F DIAST BP <80 MM HG: CPT | Mod: CPTII,,, | Performed by: NURSE PRACTITIONER

## 2022-11-22 PROCEDURE — 3074F SYST BP LT 130 MM HG: CPT | Mod: CPTII,,, | Performed by: NURSE PRACTITIONER

## 2022-11-22 PROCEDURE — 1159F MED LIST DOCD IN RCRD: CPT | Mod: CPTII,,, | Performed by: NURSE PRACTITIONER

## 2022-11-22 PROCEDURE — 99396 PREV VISIT EST AGE 40-64: CPT | Mod: S$PBB,,, | Performed by: NURSE PRACTITIONER

## 2022-11-22 PROCEDURE — 3008F BODY MASS INDEX DOCD: CPT | Mod: CPTII,,, | Performed by: NURSE PRACTITIONER

## 2022-11-22 PROCEDURE — 99215 OFFICE O/P EST HI 40 MIN: CPT | Mod: PBBFAC,25 | Performed by: NURSE PRACTITIONER

## 2022-11-22 PROCEDURE — 3074F PR MOST RECENT SYSTOLIC BLOOD PRESSURE < 130 MM HG: ICD-10-PCS | Mod: CPTII,,, | Performed by: NURSE PRACTITIONER

## 2022-11-22 PROCEDURE — 1160F RVW MEDS BY RX/DR IN RCRD: CPT | Mod: CPTII,,, | Performed by: NURSE PRACTITIONER

## 2022-11-22 PROCEDURE — 3066F NEPHROPATHY DOC TX: CPT | Mod: CPTII,,, | Performed by: NURSE PRACTITIONER

## 2022-11-22 PROCEDURE — 4010F PR ACE/ARB THEARPY RXD/TAKEN: ICD-10-PCS | Mod: CPTII,,, | Performed by: NURSE PRACTITIONER

## 2022-11-22 PROCEDURE — 99214 OFFICE O/P EST MOD 30 MIN: CPT | Mod: 25,S$PBB,, | Performed by: NURSE PRACTITIONER

## 2022-11-22 PROCEDURE — 3061F NEG MICROALBUMINURIA REV: CPT | Mod: CPTII,,, | Performed by: NURSE PRACTITIONER

## 2022-11-22 PROCEDURE — 3078F PR MOST RECENT DIASTOLIC BLOOD PRESSURE < 80 MM HG: ICD-10-PCS | Mod: CPTII,,, | Performed by: NURSE PRACTITIONER

## 2022-11-22 PROCEDURE — 72100 X-RAY EXAM L-S SPINE 2/3 VWS: CPT | Mod: TC

## 2022-11-22 PROCEDURE — 3008F PR BODY MASS INDEX (BMI) DOCUMENTED: ICD-10-PCS | Mod: CPTII,,, | Performed by: NURSE PRACTITIONER

## 2022-11-22 PROCEDURE — 3061F PR NEG MICROALBUMINURIA RESULT DOCUMENTED/REVIEW: ICD-10-PCS | Mod: CPTII,,, | Performed by: NURSE PRACTITIONER

## 2022-11-22 PROCEDURE — 99214 PR OFFICE/OUTPT VISIT, EST, LEVL IV, 30-39 MIN: ICD-10-PCS | Mod: 25,S$PBB,, | Performed by: NURSE PRACTITIONER

## 2022-11-22 PROCEDURE — 3066F PR DOCUMENTATION OF TREATMENT FOR NEPHROPATHY: ICD-10-PCS | Mod: CPTII,,, | Performed by: NURSE PRACTITIONER

## 2022-11-22 PROCEDURE — 90686 IIV4 VACC NO PRSV 0.5 ML IM: CPT | Mod: PBBFAC

## 2022-11-22 PROCEDURE — 71250 CT THORAX DX C-: CPT | Mod: TC

## 2022-11-22 RX ORDER — LORATADINE 10 MG/1
10 TABLET ORAL DAILY
Qty: 90 TABLET | Refills: 2 | Status: SHIPPED | OUTPATIENT
Start: 2022-11-22 | End: 2023-01-25 | Stop reason: SDUPTHER

## 2022-11-22 RX ORDER — ASPIRIN 81 MG/1
81 TABLET ORAL DAILY
Qty: 90 TABLET | Refills: 2 | Status: SHIPPED | OUTPATIENT
Start: 2022-11-22 | End: 2023-11-22

## 2022-11-22 RX ORDER — FLUTICASONE PROPIONATE 50 MCG
1 SPRAY, SUSPENSION (ML) NASAL DAILY
Qty: 15.8 ML | Refills: 2 | Status: SHIPPED | OUTPATIENT
Start: 2022-11-22

## 2022-11-22 RX ORDER — ATORVASTATIN CALCIUM 40 MG/1
40 TABLET, FILM COATED ORAL NIGHTLY
Qty: 90 TABLET | Refills: 2 | Status: SHIPPED | OUTPATIENT
Start: 2022-11-22 | End: 2023-03-22 | Stop reason: SDUPTHER

## 2022-11-22 RX ORDER — HYDROCHLOROTHIAZIDE 12.5 MG/1
12.5 TABLET ORAL DAILY
Qty: 90 TABLET | Refills: 2 | Status: SHIPPED | OUTPATIENT
Start: 2022-11-22 | End: 2022-12-27

## 2022-11-22 RX ORDER — LISINOPRIL 10 MG/1
10 TABLET ORAL DAILY
Qty: 90 TABLET | Refills: 2 | Status: SHIPPED | OUTPATIENT
Start: 2022-11-22 | End: 2022-12-27

## 2022-11-22 RX ORDER — LORATADINE 10 MG/1
10 TABLET ORAL DAILY
COMMUNITY
Start: 2022-11-20 | End: 2022-11-22 | Stop reason: SDUPTHER

## 2022-11-22 RX ORDER — AMLODIPINE BESYLATE 5 MG/1
5 TABLET ORAL DAILY
Qty: 90 TABLET | Refills: 2 | Status: SHIPPED | OUTPATIENT
Start: 2022-11-22 | End: 2022-12-27

## 2022-11-22 NOTE — ASSESSMENT & PLAN NOTE
PFTs on 09/02/2022: moderate obstruction, decreased DLCO suggest altered pulmonary gas exchange surface, possible emphysema  Continue Trelegy Ellipta daily  Stop Albuterol Inh  Start Combivent inhaler QID prn SOB  Followed by Pulmonology

## 2022-11-22 NOTE — ASSESSMENT & PLAN NOTE
B/P: 109/75  [id number]  Continue Amlodipine 5 mg daily, Lisinopril 10 mg daily, HCTZ 12.5 mg daily  DASH diet  Encouraged home blood pressure monitoring   Latest Reference Range & Units 22 09:34   Creatinine, Urine 47.0 - 110.0 mg/dL 54.2   Microalbumin, Urine <=30.0 ug/ml <5.0

## 2022-11-22 NOTE — PROGRESS NOTES
Subjective:       Patient ID: Mansi Jin is a 62 y.o. female.    Chief Complaint: Follow-up (cough)    Patient has diagnosis of HTN, HLD, CKD Stage 2, COPD Stage 3, RLL Lung Nodule, Tobacco Use. Patient seen in clinic today for wellness exam. Patient last seen in clinic on 07/14/2022. Today, patient states SOB at night when she lays down. States she uses her Albuterol inhaler at night and it does help but not for long. Patient states she also uses an albuterol nebulizer but that doesn't help any better than the inhaler. Patient denies chest pain, edema, snoring. Patient currently on Trelegy inhaler daily. Patient a current tobacco user. Patient currently followed by Pulmonology for lung nodule. Patient has CT Chest scheduled for today per Pulmonology. Patient also stating low back pain. Patient denies unilateral/bilateral sciatica, bowel/bladder incontinence, saddle anesthesia. Patient taking OTC Tylenol/Ibuprofen but not helping with pain. Patient denies any physical therapy. Patient denies any other acute complaints.      Patient followed by Pulmonology Clinic. Last appointment on 09/06/2022. She has a history of stage III COPD, chronic kidney disease, hypertension, and was recently diagnosed with a 1.9 cm solid right lower lobe pulmonary nodule.  She has a long history of smoking up to 2.5 packs per day since a teenager, currently around 1/2 pack per day.  PET/CT was performed 08/10/2022, demonstrating hypermetabolic uptake within the nodule to an SUV of 5.9.  She was then referred to Interventional Radiology for CT-guided needle biopsy, which was performed on 09/01/2022.  She admits to cough productive of yellow secretions, denies chest pain, back pain, or shoulder pain.  She has no hemoptysis.  It appears that her pathology is still not completed at this point.  We have called pathology Department while patient was in clinic today, who states that this is not been completed. Complete PFTs (07/28/2022):  FEV1  1.41 (63% predicted), FVC 2.24 (78% predicted), FEV1/FVC equals 63%; TLC 4.6 (85% predicted), DLCO 7.2 (30% predicted), DLCO/VA 2.1 (47% predicted). 1.9 cm solid right lower lobe pulmonary nodule demonstrating hypermetabolic uptake (SUV 5.9), radiographically very suspicious for malignancy, suspecting primary bronchogenic carcinoma most likely at this point.  She has a long history of smoking, continues smoking currently.  She is post CT-guided needle biopsy performed 2022, pathology is still not completed at this time. Chronic obstructive pulmonary disease with moderate obstruction and severe decreased DLCO. Purulent tracheobronchitis. I have discussed the above findings in detail with patient and her daughter today.  I have explained that her pathology is still not completed at this point.  I have again discussed my high suspicion of this being a malignancy. Will have further to follow when pathology is completed, and will contact patient by phone at that time. Zithromax Z-Toni for her purulent tracheal bronchitis. I have again discussed the importance of complete smoking cessation with patient today. Patient has a follow up appointment scheduled for 2022.        Mammogram: ordered 2022  Pap: Hysterectomy, GYN referral 2022  Cologuard: 2022; negative  Lung Cancer Screenin2022  Bone Density: deferred due to age  Medicare Wellness: N/A    Review of Systems   Constitutional: Negative.    HENT: Negative.     Eyes: Negative.    Respiratory:  Positive for cough and shortness of breath.    Cardiovascular: Negative.    Gastrointestinal: Negative.    Endocrine: Negative.    Genitourinary: Negative.    Musculoskeletal:  Positive for back pain.   Integumentary:  Negative.   Allergic/Immunologic: Negative.    Neurological: Negative.    Hematological: Negative.    Psychiatric/Behavioral: Negative.         Objective:      Physical Exam  Vitals reviewed.   Constitutional:       Appearance:  Normal appearance.   HENT:      Head: Normocephalic and atraumatic.      Mouth/Throat:      Mouth: Mucous membranes are moist.      Pharynx: Oropharynx is clear.   Eyes:      Extraocular Movements: Extraocular movements intact.      Conjunctiva/sclera: Conjunctivae normal.      Pupils: Pupils are equal, round, and reactive to light.   Cardiovascular:      Rate and Rhythm: Normal rate and regular rhythm.      Heart sounds: Normal heart sounds.   Pulmonary:      Effort: Pulmonary effort is normal.      Breath sounds: Normal breath sounds.   Abdominal:      General: Bowel sounds are normal.   Musculoskeletal:         General: Normal range of motion.      Cervical back: Normal range of motion.   Skin:     General: Skin is warm and dry.   Neurological:      Mental Status: She is alert and oriented to person, place, and time.   Psychiatric:         Mood and Affect: Mood normal.         Behavior: Behavior normal.       Assessment:       Problem List Items Addressed This Visit          Pulmonary    Stage 3 severe COPD by GOLD classification (Chronic)     PFTs on 2022: moderate obstruction, decreased DLCO suggest altered pulmonary gas exchange surface, possible emphysema  Continue Trelegy Ellipta daily  Stop Albuterol Inh  Start Combivent inhaler QID prn SOB  Followed by Pulmonology         Solitary pulmonary nodule     Followed by Pulmonology            Cardiac/Vascular    Hyperlipidemia (Chronic)     Continue Atorvastatin 40 mg daily  Weight loss encouraged  Low fat/high fiber diet  Increase physical activity  Tobacco cessation encouraged   Latest Reference Range & Units 22 09:34   Cholesterol <=200 mg/dL 172   HDL 35 - 60 mg/dL 42   LDL Cholesterol External 50.00 - 140.00 mg/dL 119.00   Total Cholesterol/HDL Ratio 0 - 5  4   Triglycerides 37 - 140 mg/dL 57   Very Low Density Lipoprotein  11            Relevant Orders    Lipid Panel    Hypertension (Chronic)     B/P: 109/75  EK2021  Continue Amlodipine  5 mg daily, Lisinopril 10 mg daily, HCTZ 12.5 mg daily  DASH diet  Encouraged home blood pressure monitoring   Latest Reference Range & Units 07/14/22 09:34   Creatinine, Urine 47.0 - 110.0 mg/dL 54.2   Microalbumin, Urine <=30.0 ug/ml <5.0            Relevant Orders    CBC Auto Differential    Comprehensive Metabolic Panel    Urinalysis, Reflex to Urine Culture Urine, Clean Catch    Microalbumin/Creatinine Ratio, Urine       Renal/    Encounter for screening mammogram for malignant neoplasm of breast    Relevant Orders    Mammo Digital Screening Bilat w/ Tha       ID    Needs flu shot     Flu vaccine given today         Relevant Orders    Influenza - Quadrivalent (PF) (Completed)       Orthopedic    Chronic right-sided low back pain without sciatica     Lumbar X-ray ordered         Relevant Orders    X-Ray Lumbar Spine 2 Or 3 Views       Other    Tobacco dependence with current use (Chronic)     Smoking cessation education provided, encouraged. Patient refused smoking cessation at this time.          Wellness examination - Primary     Health Preventatives reviewed  Labs ordered              Plan:    Patient to follow up in 4 months with labs to be done prior to appointment to reassess HLD.

## 2022-11-22 NOTE — ASSESSMENT & PLAN NOTE
Continue Atorvastatin 40 mg daily  Weight loss encouraged  Low fat/high fiber diet  Increase physical activity  Tobacco cessation encouraged   Latest Reference Range & Units 07/14/22 09:34   Cholesterol <=200 mg/dL 172   HDL 35 - 60 mg/dL 42   LDL Cholesterol External 50.00 - 140.00 mg/dL 119.00   Total Cholesterol/HDL Ratio 0 - 5  4   Triglycerides 37 - 140 mg/dL 57   Very Low Density Lipoprotein  11

## 2022-11-23 DIAGNOSIS — M54.50 CHRONIC RIGHT-SIDED LOW BACK PAIN WITHOUT SCIATICA: Primary | ICD-10-CM

## 2022-11-23 DIAGNOSIS — G89.29 CHRONIC RIGHT-SIDED LOW BACK PAIN WITHOUT SCIATICA: Primary | ICD-10-CM

## 2022-11-28 ENCOUNTER — OFFICE VISIT (OUTPATIENT)
Dept: PULMONOLOGY | Facility: CLINIC | Age: 62
End: 2022-11-28
Payer: MEDICAID

## 2022-11-28 VITALS
BODY MASS INDEX: 24.17 KG/M2 | WEIGHT: 154 LBS | OXYGEN SATURATION: 95 % | SYSTOLIC BLOOD PRESSURE: 99 MMHG | TEMPERATURE: 98 F | RESPIRATION RATE: 20 BRPM | HEIGHT: 67 IN | HEART RATE: 95 BPM | DIASTOLIC BLOOD PRESSURE: 67 MMHG

## 2022-11-28 DIAGNOSIS — R91.8 LUNG MASS: Primary | ICD-10-CM

## 2022-11-28 DIAGNOSIS — R91.8 LUNG MASS: ICD-10-CM

## 2022-11-28 PROCEDURE — 99213 OFFICE O/P EST LOW 20 MIN: CPT | Mod: S$PBB,,, | Performed by: INTERNAL MEDICINE

## 2022-11-28 PROCEDURE — 4010F ACE/ARB THERAPY RXD/TAKEN: CPT | Mod: CPTII,,, | Performed by: INTERNAL MEDICINE

## 2022-11-28 PROCEDURE — 3078F DIAST BP <80 MM HG: CPT | Mod: CPTII,,, | Performed by: INTERNAL MEDICINE

## 2022-11-28 PROCEDURE — 3061F PR NEG MICROALBUMINURIA RESULT DOCUMENTED/REVIEW: ICD-10-PCS | Mod: CPTII,,, | Performed by: INTERNAL MEDICINE

## 2022-11-28 PROCEDURE — 4010F PR ACE/ARB THEARPY RXD/TAKEN: ICD-10-PCS | Mod: CPTII,,, | Performed by: INTERNAL MEDICINE

## 2022-11-28 PROCEDURE — 3061F NEG MICROALBUMINURIA REV: CPT | Mod: CPTII,,, | Performed by: INTERNAL MEDICINE

## 2022-11-28 PROCEDURE — 3078F PR MOST RECENT DIASTOLIC BLOOD PRESSURE < 80 MM HG: ICD-10-PCS | Mod: CPTII,,, | Performed by: INTERNAL MEDICINE

## 2022-11-28 PROCEDURE — 3008F PR BODY MASS INDEX (BMI) DOCUMENTED: ICD-10-PCS | Mod: CPTII,,, | Performed by: INTERNAL MEDICINE

## 2022-11-28 PROCEDURE — 3066F PR DOCUMENTATION OF TREATMENT FOR NEPHROPATHY: ICD-10-PCS | Mod: CPTII,,, | Performed by: INTERNAL MEDICINE

## 2022-11-28 PROCEDURE — 3074F PR MOST RECENT SYSTOLIC BLOOD PRESSURE < 130 MM HG: ICD-10-PCS | Mod: CPTII,,, | Performed by: INTERNAL MEDICINE

## 2022-11-28 PROCEDURE — 99213 PR OFFICE/OUTPT VISIT, EST, LEVL III, 20-29 MIN: ICD-10-PCS | Mod: S$PBB,,, | Performed by: INTERNAL MEDICINE

## 2022-11-28 PROCEDURE — 1159F PR MEDICATION LIST DOCUMENTED IN MEDICAL RECORD: ICD-10-PCS | Mod: CPTII,,, | Performed by: INTERNAL MEDICINE

## 2022-11-28 PROCEDURE — 3074F SYST BP LT 130 MM HG: CPT | Mod: CPTII,,, | Performed by: INTERNAL MEDICINE

## 2022-11-28 PROCEDURE — 1159F MED LIST DOCD IN RCRD: CPT | Mod: CPTII,,, | Performed by: INTERNAL MEDICINE

## 2022-11-28 PROCEDURE — 3008F BODY MASS INDEX DOCD: CPT | Mod: CPTII,,, | Performed by: INTERNAL MEDICINE

## 2022-11-28 PROCEDURE — 99214 OFFICE O/P EST MOD 30 MIN: CPT | Mod: PBBFAC

## 2022-11-28 PROCEDURE — 3066F NEPHROPATHY DOC TX: CPT | Mod: CPTII,,, | Performed by: INTERNAL MEDICINE

## 2022-11-28 NOTE — PROGRESS NOTES
"Subjective:       Patient ID: Mansi Jin is a 62 y.o. female.    Chief Complaint: Discuss CT results 11/22/22; lung mass (Reports sob at night when lying down; also reports dyspnea with exertion.)    Patient is a very pleasant 62-year-old black female seen previously by Dr. Adams.  She underwent CT-guided needle biopsy of the right lower lobe nodule.  Path eventually returned nondiagnostic.  Decision was made to repeat imaging which was recently completed.  Unfortunately that imaging showed further progression in size of the right lower lobe opacity.    Review of Systems   Constitutional: Negative.    HENT: Negative.     Eyes: Negative.    Respiratory:  Positive for cough and shortness of breath.    Cardiovascular: Negative.    Gastrointestinal: Negative.    Genitourinary: Negative.    Musculoskeletal:  Positive for back pain.   Skin: Negative.        Objective:   BP 99/67 (BP Location: Left arm, Patient Position: Sitting, BP Method: Large (Automatic))   Pulse 95   Temp 97.7 °F (36.5 °C) (Oral)   Resp 20   Ht 5' 7" (1.702 m)   Wt 69.9 kg (154 lb)   SpO2 95%   BMI 24.12 kg/m²     Physical Exam  Vitals reviewed.   Constitutional:       Appearance: Normal appearance.   HENT:      Head: Normocephalic and atraumatic.   Eyes:      Extraocular Movements: Extraocular movements intact.      Pupils: Pupils are equal, round, and reactive to light.   Cardiovascular:      Rate and Rhythm: Normal rate and regular rhythm.      Pulses: Normal pulses.      Heart sounds: Normal heart sounds.   Pulmonary:      Effort: Pulmonary effort is normal. No respiratory distress.      Breath sounds: Normal breath sounds. No wheezing, rhonchi or rales.   Abdominal:      General: There is no distension.      Palpations: Abdomen is soft. There is no mass.      Tenderness: There is no abdominal tenderness. There is no guarding.   Musculoskeletal:         General: Normal range of motion.      Cervical back: No tenderness.      Right lower " leg: No edema.      Left lower leg: No edema.   Lymphadenopathy:      Cervical: No cervical adenopathy.   Neurological:      General: No focal deficit present.      Mental Status: She is alert and oriented to person, place, and time.         EXAMINATION:  CT CHEST WITHOUT CONTRAST     CLINICAL HISTORY:  Lung nodule, < 6mm, high cancer risk;Lung nodule, > 8mm;Right lower lobe lung mass highly suspicious for malignancy with pts extensive h/o tobacco abuse/3 month repeat for possible lung resection; Chronic obstructive pulmonary disease, unspecified     TECHNIQUE:  Low dose axial images, sagittal and coronal reformations were obtained from the thoracic inlet to the lung bases. Contrast was not administered.  .  Automated exposure control used.     COMPARISON:  07/06/2022     FINDINGS:  Moderate atherosclerotic calcification.  Heart, pericardium, trachea, esophagus otherwise normal.  There is an enlarged precarinal lymph node measuring approximately 1.1 cm short axis on image 115 of series 10 compared to 9 mm on the prior.  Soft tissues of the surrounding chest wall are normal.  Visualized upper abdominal organs normal.  There are 3 soft tissue density nodules arranged in a linear arrangement along peribronchial vascular structure in the superior segment of the right lower lobe, the largest a subpleural nodule measuring 1.9 cm compared to 1.3 cm on the prior, the 2nd nodule 1.1 cm compared to 7 mm the prior, and the 3rd most medial nodule measuring 9 mm, new in the interval.  The left upper lobe nodule has essentially resolved.  There is severe upper lobe predominant centrilobular emphysematous change.  Diffuse ground-glass density noted throughout the lungs.  Bones intact.     Impression:     Linear arrangement of 3 nodules in the superior segment of the right lower lobe new or significantly larger compared to the prior exam suspicious for malignancy.     Interval resolution of the left apical nodule seen on the  prior.     Marginal enlargement of a precarinal lymph node in the mediastinum.     Moderate severe upper lobe predominant centrilobular emphysematous change.  Generalized ground-glass density.        Assessment:       Lung mass  -     CT Chest Without Contrast       Plan:     Unfortunately the right lower lobe nodularity as enlarged further.  This is very suspicious for neoplasm.  There was noted marginal mediastinal lymph node enlargement but this is not evident on my review of the CT scan.  I believe that resection of the right lower lobe mass even if only by wedge resection is appropriate at this time to obtain a tissue diagnosis.  We will refer to CV surgery.    Marck Ashraf MD

## 2022-11-29 ENCOUNTER — OFFICE VISIT (OUTPATIENT)
Dept: CARDIAC SURGERY | Facility: CLINIC | Age: 62
End: 2022-11-29
Payer: MEDICAID

## 2022-11-29 VITALS
WEIGHT: 155 LBS | DIASTOLIC BLOOD PRESSURE: 65 MMHG | HEART RATE: 97 BPM | SYSTOLIC BLOOD PRESSURE: 109 MMHG | BODY MASS INDEX: 24.33 KG/M2 | HEIGHT: 67 IN

## 2022-11-29 DIAGNOSIS — R91.8 LUNG MASS: ICD-10-CM

## 2022-11-29 PROCEDURE — 4010F ACE/ARB THERAPY RXD/TAKEN: CPT | Mod: CPTII,,, | Performed by: SPECIALIST

## 2022-11-29 PROCEDURE — 99203 PR OFFICE/OUTPT VISIT, NEW, LEVL III, 30-44 MIN: ICD-10-PCS | Mod: ,,, | Performed by: SPECIALIST

## 2022-11-29 PROCEDURE — 4010F PR ACE/ARB THEARPY RXD/TAKEN: ICD-10-PCS | Mod: CPTII,,, | Performed by: SPECIALIST

## 2022-11-29 PROCEDURE — 1159F PR MEDICATION LIST DOCUMENTED IN MEDICAL RECORD: ICD-10-PCS | Mod: CPTII,,, | Performed by: SPECIALIST

## 2022-11-29 PROCEDURE — 3066F PR DOCUMENTATION OF TREATMENT FOR NEPHROPATHY: ICD-10-PCS | Mod: CPTII,,, | Performed by: SPECIALIST

## 2022-11-29 PROCEDURE — 3066F NEPHROPATHY DOC TX: CPT | Mod: CPTII,,, | Performed by: SPECIALIST

## 2022-11-29 PROCEDURE — 3078F PR MOST RECENT DIASTOLIC BLOOD PRESSURE < 80 MM HG: ICD-10-PCS | Mod: CPTII,,, | Performed by: SPECIALIST

## 2022-11-29 PROCEDURE — 3008F PR BODY MASS INDEX (BMI) DOCUMENTED: ICD-10-PCS | Mod: CPTII,,, | Performed by: SPECIALIST

## 2022-11-29 PROCEDURE — 3008F BODY MASS INDEX DOCD: CPT | Mod: CPTII,,, | Performed by: SPECIALIST

## 2022-11-29 PROCEDURE — 1159F MED LIST DOCD IN RCRD: CPT | Mod: CPTII,,, | Performed by: SPECIALIST

## 2022-11-29 PROCEDURE — 99203 OFFICE O/P NEW LOW 30 MIN: CPT | Mod: ,,, | Performed by: SPECIALIST

## 2022-11-29 PROCEDURE — 3074F PR MOST RECENT SYSTOLIC BLOOD PRESSURE < 130 MM HG: ICD-10-PCS | Mod: CPTII,,, | Performed by: SPECIALIST

## 2022-11-29 PROCEDURE — 3061F PR NEG MICROALBUMINURIA RESULT DOCUMENTED/REVIEW: ICD-10-PCS | Mod: CPTII,,, | Performed by: SPECIALIST

## 2022-11-29 PROCEDURE — 3074F SYST BP LT 130 MM HG: CPT | Mod: CPTII,,, | Performed by: SPECIALIST

## 2022-11-29 PROCEDURE — 3078F DIAST BP <80 MM HG: CPT | Mod: CPTII,,, | Performed by: SPECIALIST

## 2022-11-29 PROCEDURE — 3061F NEG MICROALBUMINURIA REV: CPT | Mod: CPTII,,, | Performed by: SPECIALIST

## 2022-11-29 NOTE — H&P (VIEW-ONLY)
[facility]  History & Physical  Cardiothoracic Surgery    SUBJECTIVE:     Chief Complaint/Reason for Visit:   Chief Complaint   Patient presents with    Pre-op Exam      RE: Lung Mass        History of Present Illness:  Patient is a 62 y.o. female presents referred by Dr. Ashraf for evaluation of an enlarging right lower lobe lung mass.  She is a smoker who has been followed for a right lower lobe lung mass and is now enlarged.  She has attempted CT-guided lung biopsy that was nondiagnostic.  It is highly PET avid.  Her FEV1 is 1.4.  The mass is highly suspicious for a malignancy.  I do think the patient will benefit from a right lower pulmonary lobectomy and mediastinal lymph node dissection.  I discussed the risks, benefits, and alternatives in great detail with the patient and her daughter.  They understand and she would like to proceed as soon as possible.  We will plan for surgery next week.    Review of patient's allergies indicates:  No Known Allergies    Past Medical History:   Diagnosis Date    ASTHMA     COPD (chronic obstructive pulmonary disease)     SEVERE    High cholesterol     HLD (hyperlipidemia)     HTN (hypertension)     Stage 2 chronic kidney disease     Tobacco abuse      Past Surgical History:   Procedure Laterality Date    LAPAROSCOPIC TOTAL HYSTERECTOMY      TOTAL ABDOMINAL HYSTERECTOMY W/ BILATERAL SALPINGOOPHORECTOMY       Family History   Problem Relation Age of Onset    Hypertension Mother     Hepatitis Mother     Ovarian cancer Mother     Heart failure Father     Fibroids Sister     Asthma Brother     Asthma Brother      Social History     Tobacco Use    Smoking status: Every Day     Packs/day: 0.50     Years: 50.00     Pack years: 25.00     Types: Cigarettes    Smokeless tobacco: Never   Substance Use Topics    Alcohol use: Not Currently     Comment: quit 25years ago    Drug use: Never        Review of Systems:  Review of Systems    Constitutional: Negative.   HENT: Negative.     Eyes: Negative.    Cardiovascular: Negative.    Respiratory: Negative.     Endocrine: Negative.    Hematologic/Lymphatic: Negative.    Skin: Negative.    Musculoskeletal: Negative.    Gastrointestinal: Negative.    Genitourinary: Negative.    Neurological: Negative.    Psychiatric/Behavioral: Negative.     Allergic/Immunologic: Negative.      OBJECTIVE:     Vital Signs (Most Recent):  Pulse: 97 (11/29/22 1059)  BP: 109/65 (11/29/22 1059)    Admission: Weight: 70.3 kg (155 lb) (11/29/22 1059)   Most Recent: Weight: 70.3 kg (155 lb) (11/29/22 1059)    Physical Exam:  Physical Exam  Constitutional:       Appearance: Normal appearance.   HENT:      Head: Normocephalic and atraumatic.   Eyes:      Pupils: Pupils are equal, round, and reactive to light.   Neck:      Vascular: No carotid bruit.   Cardiovascular:      Rate and Rhythm: Normal rate and regular rhythm.      Pulses: Normal pulses.   Pulmonary:      Breath sounds: Normal breath sounds.   Abdominal:      Palpations: Abdomen is soft.      Tenderness: There is no abdominal tenderness.   Musculoskeletal:         General: Normal range of motion.      Cervical back: Neck supple.   Neurological:      General: No focal deficit present.      Mental Status: She is alert.   Psychiatric:         Mood and Affect: Mood normal.       Diagnostic Results:  CT: Reviewed  PFT Results: Reviewed      ASSESSMENT/PLAN:     1. Lung mass    Right lower lobe lung mass  COPD  FEV1 1.41   Hypertension  Hyperlipidemia  Chronic kidney disease, stage II   Tobacco abuse   Planning right lower lobe pulmonary lobectomy with lymph node dissection

## 2022-11-29 NOTE — PROGRESS NOTES
[facility]  History & Physical  Cardiothoracic Surgery    SUBJECTIVE:     Chief Complaint/Reason for Visit:   Chief Complaint   Patient presents with    Pre-op Exam      RE: Lung Mass        History of Present Illness:  Patient is a 62 y.o. female presents referred by Dr. Ashraf for evaluation of an enlarging right lower lobe lung mass.  She is a smoker who has been followed for a right lower lobe lung mass and is now enlarged.  She has attempted CT-guided lung biopsy that was nondiagnostic.  It is highly PET avid.  Her FEV1 is 1.4.  The mass is highly suspicious for a malignancy.  I do think the patient will benefit from a right lower pulmonary lobectomy and mediastinal lymph node dissection.  I discussed the risks, benefits, and alternatives in great detail with the patient and her daughter.  They understand and she would like to proceed as soon as possible.  We will plan for surgery next week.    Review of patient's allergies indicates:  No Known Allergies    Past Medical History:   Diagnosis Date    ASTHMA     COPD (chronic obstructive pulmonary disease)     SEVERE    High cholesterol     HLD (hyperlipidemia)     HTN (hypertension)     Stage 2 chronic kidney disease     Tobacco abuse      Past Surgical History:   Procedure Laterality Date    LAPAROSCOPIC TOTAL HYSTERECTOMY      TOTAL ABDOMINAL HYSTERECTOMY W/ BILATERAL SALPINGOOPHORECTOMY       Family History   Problem Relation Age of Onset    Hypertension Mother     Hepatitis Mother     Ovarian cancer Mother     Heart failure Father     Fibroids Sister     Asthma Brother     Asthma Brother      Social History     Tobacco Use    Smoking status: Every Day     Packs/day: 0.50     Years: 50.00     Pack years: 25.00     Types: Cigarettes    Smokeless tobacco: Never   Substance Use Topics    Alcohol use: Not Currently     Comment: quit 25years ago    Drug use: Never        Review of Systems:  Review of Systems    Constitutional: Negative.   HENT: Negative.     Eyes: Negative.    Cardiovascular: Negative.    Respiratory: Negative.     Endocrine: Negative.    Hematologic/Lymphatic: Negative.    Skin: Negative.    Musculoskeletal: Negative.    Gastrointestinal: Negative.    Genitourinary: Negative.    Neurological: Negative.    Psychiatric/Behavioral: Negative.     Allergic/Immunologic: Negative.      OBJECTIVE:     Vital Signs (Most Recent):  Pulse: 97 (11/29/22 1059)  BP: 109/65 (11/29/22 1059)    Admission: Weight: 70.3 kg (155 lb) (11/29/22 1059)   Most Recent: Weight: 70.3 kg (155 lb) (11/29/22 1059)    Physical Exam:  Physical Exam  Constitutional:       Appearance: Normal appearance.   HENT:      Head: Normocephalic and atraumatic.   Eyes:      Pupils: Pupils are equal, round, and reactive to light.   Neck:      Vascular: No carotid bruit.   Cardiovascular:      Rate and Rhythm: Normal rate and regular rhythm.      Pulses: Normal pulses.   Pulmonary:      Breath sounds: Normal breath sounds.   Abdominal:      Palpations: Abdomen is soft.      Tenderness: There is no abdominal tenderness.   Musculoskeletal:         General: Normal range of motion.      Cervical back: Neck supple.   Neurological:      General: No focal deficit present.      Mental Status: She is alert.   Psychiatric:         Mood and Affect: Mood normal.       Diagnostic Results:  CT: Reviewed  PFT Results: Reviewed      ASSESSMENT/PLAN:     1. Lung mass    Right lower lobe lung mass  COPD  FEV1 1.41   Hypertension  Hyperlipidemia  Chronic kidney disease, stage II   Tobacco abuse   Planning right lower lobe pulmonary lobectomy with lymph node dissection

## 2022-11-30 DIAGNOSIS — R91.8 LUNG MASS: Primary | ICD-10-CM

## 2022-12-03 ENCOUNTER — DOCUMENTATION ONLY (OUTPATIENT)
Dept: INTERNAL MEDICINE | Facility: CLINIC | Age: 62
End: 2022-12-03
Payer: MEDICAID

## 2022-12-05 ENCOUNTER — ANESTHESIA EVENT (OUTPATIENT)
Dept: SURGERY | Facility: HOSPITAL | Age: 62
DRG: 164 | End: 2022-12-05
Payer: MEDICAID

## 2022-12-06 ENCOUNTER — HOSPITAL ENCOUNTER (OUTPATIENT)
Dept: RADIOLOGY | Facility: HOSPITAL | Age: 62
Discharge: HOME | End: 2022-12-06
Attending: SPECIALIST
Payer: MEDICARE

## 2022-12-06 DIAGNOSIS — R91.8 LUNG MASS: ICD-10-CM

## 2022-12-06 PROCEDURE — 71046 X-RAY EXAM CHEST 2 VIEWS: CPT | Mod: TC

## 2022-12-08 ENCOUNTER — ANESTHESIA (OUTPATIENT)
Dept: SURGERY | Facility: HOSPITAL | Age: 62
DRG: 164 | End: 2022-12-08
Payer: MEDICAID

## 2022-12-08 ENCOUNTER — HOSPITAL ENCOUNTER (INPATIENT)
Facility: HOSPITAL | Age: 62
LOS: 5 days | Discharge: HOME HEALTH | DRG: 164 | End: 2022-12-13
Attending: SPECIALIST | Admitting: SPECIALIST
Payer: MEDICAID

## 2022-12-08 DIAGNOSIS — J44.1 CHRONIC OBSTRUCTIVE PULMONARY DISEASE WITH (ACUTE) EXACERBATION: Primary | ICD-10-CM

## 2022-12-08 DIAGNOSIS — R91.8 LUNG MASS: ICD-10-CM

## 2022-12-08 DIAGNOSIS — J44.9 STAGE 3 SEVERE COPD BY GOLD CLASSIFICATION: Chronic | ICD-10-CM

## 2022-12-08 LAB
BASOPHILS # BLD AUTO: 0.01 X10(3)/MCL (ref 0–0.2)
BASOPHILS NFR BLD AUTO: 0.2 %
EOSINOPHIL # BLD AUTO: 0.04 X10(3)/MCL (ref 0–0.9)
EOSINOPHIL NFR BLD AUTO: 0.7 %
ERYTHROCYTE [DISTWIDTH] IN BLOOD BY AUTOMATED COUNT: 13.2 % (ref 11.5–17)
HCT VFR BLD AUTO: 36.2 % (ref 37–47)
HGB BLD-MCNC: 11.2 GM/DL (ref 12–16)
IMM GRANULOCYTES # BLD AUTO: 0.02 X10(3)/MCL (ref 0–0.04)
IMM GRANULOCYTES NFR BLD AUTO: 0.3 %
LYMPHOCYTES # BLD AUTO: 1.23 X10(3)/MCL (ref 0.6–4.6)
LYMPHOCYTES NFR BLD AUTO: 20.3 %
MCH RBC QN AUTO: 27.1 PG (ref 27–31)
MCHC RBC AUTO-ENTMCNC: 30.9 MG/DL (ref 33–36)
MCV RBC AUTO: 87.4 FL (ref 80–94)
MONOCYTES # BLD AUTO: 0.3 X10(3)/MCL (ref 0.1–1.3)
MONOCYTES NFR BLD AUTO: 4.9 %
NEUTROPHILS # BLD AUTO: 4.5 X10(3)/MCL (ref 2.1–9.2)
NEUTROPHILS NFR BLD AUTO: 73.6 %
NRBC BLD AUTO-RTO: 0 %
PLATELET # BLD AUTO: 295 X10(3)/MCL (ref 130–400)
PMV BLD AUTO: 9.6 FL (ref 7.4–10.4)
RBC # BLD AUTO: 4.14 X10(6)/MCL (ref 4.2–5.4)
WBC # SPEC AUTO: 6.1 X10(3)/MCL (ref 4.5–11.5)

## 2022-12-08 PROCEDURE — 38746 REMOVE THORACIC LYMPH NODES: CPT | Mod: ,,, | Performed by: SPECIALIST

## 2022-12-08 PROCEDURE — 25000242 PHARM REV CODE 250 ALT 637 W/ HCPCS: Performed by: ANESTHESIOLOGY

## 2022-12-08 PROCEDURE — 63600175 PHARM REV CODE 636 W HCPCS

## 2022-12-08 PROCEDURE — C1729 CATH, DRAINAGE: HCPCS | Performed by: SPECIALIST

## 2022-12-08 PROCEDURE — 25000003 PHARM REV CODE 250: Performed by: SPECIALIST

## 2022-12-08 PROCEDURE — 37000008 HC ANESTHESIA 1ST 15 MINUTES: Performed by: SPECIALIST

## 2022-12-08 PROCEDURE — 25000003 PHARM REV CODE 250

## 2022-12-08 PROCEDURE — 94640 AIRWAY INHALATION TREATMENT: CPT

## 2022-12-08 PROCEDURE — 85025 COMPLETE CBC W/AUTO DIFF WBC: CPT | Performed by: SPECIALIST

## 2022-12-08 PROCEDURE — 36000711: Performed by: SPECIALIST

## 2022-12-08 PROCEDURE — 38746 REMOVE THORACIC LYMPH NODES: CPT | Mod: AS,,, | Performed by: PHYSICIAN ASSISTANT

## 2022-12-08 PROCEDURE — 38746 PR REMOVE THOR LYMPH NODES RAD REGNL: ICD-10-PCS | Mod: AS,,, | Performed by: PHYSICIAN ASSISTANT

## 2022-12-08 PROCEDURE — 63600175 PHARM REV CODE 636 W HCPCS: Performed by: SPECIALIST

## 2022-12-08 PROCEDURE — 27201423 OPTIME MED/SURG SUP & DEVICES STERILE SUPPLY: Performed by: SPECIALIST

## 2022-12-08 PROCEDURE — 32482 BILOBECTOMY: CPT | Mod: AS,,, | Performed by: PHYSICIAN ASSISTANT

## 2022-12-08 PROCEDURE — 37000009 HC ANESTHESIA EA ADD 15 MINS: Performed by: SPECIALIST

## 2022-12-08 PROCEDURE — 38746 PR REMOVE THOR LYMPH NODES RAD REGNL: ICD-10-PCS | Mod: ,,, | Performed by: SPECIALIST

## 2022-12-08 PROCEDURE — 32482 PR REMOVAL OF LUNG,BILOBECTOMY: ICD-10-PCS | Mod: AS,,, | Performed by: PHYSICIAN ASSISTANT

## 2022-12-08 PROCEDURE — 71000033 HC RECOVERY, INTIAL HOUR: Performed by: SPECIALIST

## 2022-12-08 PROCEDURE — 36620 INSERTION CATHETER ARTERY: CPT

## 2022-12-08 PROCEDURE — 21400001 HC TELEMETRY ROOM

## 2022-12-08 PROCEDURE — 25000242 PHARM REV CODE 250 ALT 637 W/ HCPCS: Performed by: SPECIALIST

## 2022-12-08 PROCEDURE — 36000710: Performed by: SPECIALIST

## 2022-12-08 PROCEDURE — 71000039 HC RECOVERY, EACH ADD'L HOUR: Performed by: SPECIALIST

## 2022-12-08 PROCEDURE — 27000221 HC OXYGEN, UP TO 24 HOURS

## 2022-12-08 PROCEDURE — 11000001 HC ACUTE MED/SURG PRIVATE ROOM

## 2022-12-08 PROCEDURE — 32482 BILOBECTOMY: CPT | Mod: ,,, | Performed by: SPECIALIST

## 2022-12-08 PROCEDURE — 32482 PR REMOVAL OF LUNG,BILOBECTOMY: ICD-10-PCS | Mod: ,,, | Performed by: SPECIALIST

## 2022-12-08 PROCEDURE — 63600175 PHARM REV CODE 636 W HCPCS: Performed by: ANESTHESIOLOGY

## 2022-12-08 RX ORDER — MIDAZOLAM HYDROCHLORIDE 1 MG/ML
INJECTION INTRAMUSCULAR; INTRAVENOUS
Status: DISCONTINUED | OUTPATIENT
Start: 2022-12-08 | End: 2022-12-08

## 2022-12-08 RX ORDER — IPRATROPIUM BROMIDE AND ALBUTEROL SULFATE 2.5; .5 MG/3ML; MG/3ML
SOLUTION RESPIRATORY (INHALATION)
Status: DISPENSED
Start: 2022-12-08 | End: 2022-12-09

## 2022-12-08 RX ORDER — TALC
6 POWDER (GRAM) TOPICAL NIGHTLY PRN
Status: DISCONTINUED | OUTPATIENT
Start: 2022-12-08 | End: 2022-12-13 | Stop reason: HOSPADM

## 2022-12-08 RX ORDER — ONDANSETRON 4 MG/1
8 TABLET, ORALLY DISINTEGRATING ORAL EVERY 8 HOURS PRN
Status: DISCONTINUED | OUTPATIENT
Start: 2022-12-08 | End: 2022-12-13 | Stop reason: HOSPADM

## 2022-12-08 RX ORDER — MUPIROCIN 20 MG/G
1 OINTMENT TOPICAL 2 TIMES DAILY
Status: COMPLETED | OUTPATIENT
Start: 2022-12-08 | End: 2022-12-10

## 2022-12-08 RX ORDER — METHOCARBAMOL 100 MG/ML
500 INJECTION, SOLUTION INTRAMUSCULAR; INTRAVENOUS ONCE
Status: COMPLETED | OUTPATIENT
Start: 2022-12-08 | End: 2022-12-08

## 2022-12-08 RX ORDER — ENOXAPARIN SODIUM 100 MG/ML
40 INJECTION SUBCUTANEOUS EVERY 24 HOURS
Status: DISCONTINUED | OUTPATIENT
Start: 2022-12-08 | End: 2022-12-13 | Stop reason: HOSPADM

## 2022-12-08 RX ORDER — CEFAZOLIN SODIUM 2 G/50ML
2 SOLUTION INTRAVENOUS
Status: COMPLETED | OUTPATIENT
Start: 2022-12-08 | End: 2022-12-09

## 2022-12-08 RX ORDER — ALBUTEROL SULFATE 0.83 MG/ML
2.5 SOLUTION RESPIRATORY (INHALATION) EVERY 4 HOURS PRN
Status: DISCONTINUED | OUTPATIENT
Start: 2022-12-08 | End: 2022-12-13 | Stop reason: HOSPADM

## 2022-12-08 RX ORDER — IPRATROPIUM BROMIDE AND ALBUTEROL SULFATE 2.5; .5 MG/3ML; MG/3ML
3 SOLUTION RESPIRATORY (INHALATION) ONCE
Status: COMPLETED | OUTPATIENT
Start: 2022-12-08 | End: 2022-12-08

## 2022-12-08 RX ORDER — FENTANYL CITRATE 50 UG/ML
INJECTION, SOLUTION INTRAMUSCULAR; INTRAVENOUS
Status: DISCONTINUED | OUTPATIENT
Start: 2022-12-08 | End: 2022-12-08

## 2022-12-08 RX ORDER — SODIUM CHLORIDE 450 MG/100ML
75 INJECTION, SOLUTION INTRAVENOUS CONTINUOUS
Status: DISCONTINUED | OUTPATIENT
Start: 2022-12-08 | End: 2022-12-09

## 2022-12-08 RX ORDER — SODIUM CHLORIDE 0.9 % (FLUSH) 0.9 %
10 SYRINGE (ML) INJECTION
Status: DISCONTINUED | OUTPATIENT
Start: 2022-12-08 | End: 2022-12-13 | Stop reason: HOSPADM

## 2022-12-08 RX ORDER — METHOCARBAMOL 100 MG/ML
INJECTION, SOLUTION INTRAMUSCULAR; INTRAVENOUS
Status: COMPLETED
Start: 2022-12-08 | End: 2022-12-08

## 2022-12-08 RX ORDER — NALOXONE HCL 0.4 MG/ML
0.02 VIAL (ML) INJECTION
Status: DISCONTINUED | OUTPATIENT
Start: 2022-12-08 | End: 2022-12-13 | Stop reason: HOSPADM

## 2022-12-08 RX ORDER — PROPOFOL 10 MG/ML
VIAL (ML) INTRAVENOUS
Status: DISCONTINUED | OUTPATIENT
Start: 2022-12-08 | End: 2022-12-08

## 2022-12-08 RX ORDER — FENTANYL CITRATE 50 UG/ML
25 INJECTION, SOLUTION INTRAMUSCULAR; INTRAVENOUS EVERY 5 MIN PRN
Status: DISCONTINUED | OUTPATIENT
Start: 2022-12-08 | End: 2022-12-13 | Stop reason: HOSPADM

## 2022-12-08 RX ORDER — 3% SODIUM CHLORIDE 3 G/100ML
INJECTION, SOLUTION INTRAVENOUS
Status: COMPLETED | OUTPATIENT
Start: 2022-12-08 | End: 2022-12-08

## 2022-12-08 RX ORDER — IPRATROPIUM BROMIDE AND ALBUTEROL SULFATE 2.5; .5 MG/3ML; MG/3ML
SOLUTION RESPIRATORY (INHALATION)
Status: DISPENSED
Start: 2022-12-08 | End: 2022-12-08

## 2022-12-08 RX ORDER — ALBUMIN HUMAN 250 G/1000ML
SOLUTION INTRAVENOUS CONTINUOUS PRN
Status: DISCONTINUED | OUTPATIENT
Start: 2022-12-08 | End: 2022-12-08

## 2022-12-08 RX ORDER — LOPERAMIDE HYDROCHLORIDE 2 MG/1
4 CAPSULE ORAL ONCE
Status: DISCONTINUED | OUTPATIENT
Start: 2022-12-08 | End: 2022-12-13 | Stop reason: HOSPADM

## 2022-12-08 RX ORDER — BUPIVACAINE HYDROCHLORIDE 2.5 MG/ML
INJECTION, SOLUTION EPIDURAL; INFILTRATION; INTRACAUDAL
Status: DISCONTINUED | OUTPATIENT
Start: 2022-12-08 | End: 2022-12-08

## 2022-12-08 RX ORDER — GLYCOPYRROLATE 0.2 MG/ML
INJECTION INTRAMUSCULAR; INTRAVENOUS
Status: DISCONTINUED | OUTPATIENT
Start: 2022-12-08 | End: 2022-12-08

## 2022-12-08 RX ORDER — FAMOTIDINE 10 MG/ML
20 INJECTION INTRAVENOUS ONCE
Status: CANCELLED | OUTPATIENT
Start: 2022-12-08

## 2022-12-08 RX ORDER — FAMOTIDINE 20 MG/1
20 TABLET, FILM COATED ORAL 2 TIMES DAILY
Status: DISCONTINUED | OUTPATIENT
Start: 2022-12-08 | End: 2022-12-13 | Stop reason: HOSPADM

## 2022-12-08 RX ORDER — ACETAMINOPHEN 325 MG/1
650 TABLET ORAL EVERY 4 HOURS PRN
Status: DISCONTINUED | OUTPATIENT
Start: 2022-12-08 | End: 2022-12-13 | Stop reason: HOSPADM

## 2022-12-08 RX ORDER — VANCOMYCIN HYDROCHLORIDE 1 G/20ML
INJECTION, POWDER, LYOPHILIZED, FOR SOLUTION INTRAVENOUS
Status: DISCONTINUED | OUTPATIENT
Start: 2022-12-08 | End: 2022-12-08 | Stop reason: HOSPADM

## 2022-12-08 RX ORDER — LIDOCAINE HYDROCHLORIDE 10 MG/ML
1 INJECTION, SOLUTION EPIDURAL; INFILTRATION; INTRACAUDAL; PERINEURAL ONCE
Status: CANCELLED | OUTPATIENT
Start: 2022-12-08 | End: 2022-12-08

## 2022-12-08 RX ORDER — IPRATROPIUM BROMIDE AND ALBUTEROL SULFATE 2.5; .5 MG/3ML; MG/3ML
3 SOLUTION RESPIRATORY (INHALATION) EVERY 4 HOURS PRN
Status: DISCONTINUED | OUTPATIENT
Start: 2022-12-08 | End: 2022-12-13 | Stop reason: HOSPADM

## 2022-12-08 RX ORDER — VANCOMYCIN HCL IN 5 % DEXTROSE 1G/250ML
1000 PLASTIC BAG, INJECTION (ML) INTRAVENOUS
Status: COMPLETED | OUTPATIENT
Start: 2022-12-08 | End: 2022-12-10

## 2022-12-08 RX ORDER — METOCLOPRAMIDE HYDROCHLORIDE 5 MG/ML
5 INJECTION INTRAMUSCULAR; INTRAVENOUS EVERY 6 HOURS PRN
Status: DISCONTINUED | OUTPATIENT
Start: 2022-12-08 | End: 2022-12-13 | Stop reason: HOSPADM

## 2022-12-08 RX ORDER — LIDOCAINE HYDROCHLORIDE 20 MG/ML
INJECTION, SOLUTION EPIDURAL; INFILTRATION; INTRACAUDAL; PERINEURAL
Status: DISCONTINUED | OUTPATIENT
Start: 2022-12-08 | End: 2022-12-08

## 2022-12-08 RX ORDER — DOCUSATE SODIUM 100 MG/1
100 CAPSULE, LIQUID FILLED ORAL 2 TIMES DAILY
Status: DISCONTINUED | OUTPATIENT
Start: 2022-12-08 | End: 2022-12-13 | Stop reason: HOSPADM

## 2022-12-08 RX ORDER — PHENYLEPHRINE HYDROCHLORIDE 10 MG/ML
INJECTION INTRAVENOUS
Status: DISCONTINUED | OUTPATIENT
Start: 2022-12-08 | End: 2022-12-08

## 2022-12-08 RX ORDER — CALCIUM CHLORIDE INJECTION 100 MG/ML
INJECTION, SOLUTION INTRAVENOUS
Status: DISCONTINUED | OUTPATIENT
Start: 2022-12-08 | End: 2022-12-08

## 2022-12-08 RX ORDER — OXYCODONE HYDROCHLORIDE 5 MG/1
5 TABLET ORAL EVERY 4 HOURS PRN
Status: DISCONTINUED | OUTPATIENT
Start: 2022-12-08 | End: 2022-12-13 | Stop reason: HOSPADM

## 2022-12-08 RX ORDER — KETAMINE HYDROCHLORIDE 100 MG/ML
INJECTION, SOLUTION INTRAMUSCULAR; INTRAVENOUS
Status: DISCONTINUED | OUTPATIENT
Start: 2022-12-08 | End: 2022-12-08

## 2022-12-08 RX ORDER — HYDROMORPHONE HYDROCHLORIDE 2 MG/ML
0.2 INJECTION, SOLUTION INTRAMUSCULAR; INTRAVENOUS; SUBCUTANEOUS EVERY 5 MIN PRN
Status: DISCONTINUED | OUTPATIENT
Start: 2022-12-08 | End: 2022-12-13 | Stop reason: HOSPADM

## 2022-12-08 RX ORDER — ROCURONIUM BROMIDE 10 MG/ML
INJECTION, SOLUTION INTRAVENOUS
Status: DISCONTINUED | OUTPATIENT
Start: 2022-12-08 | End: 2022-12-08

## 2022-12-08 RX ORDER — FENTANYL/BUPIVACAINE/NS/PF 2-1250MCG
PLASTIC BAG, INJECTION (ML) INJECTION CONTINUOUS
Status: DISPENSED | OUTPATIENT
Start: 2022-12-08 | End: 2022-12-09

## 2022-12-08 RX ORDER — KETOROLAC TROMETHAMINE 30 MG/ML
15 INJECTION, SOLUTION INTRAMUSCULAR; INTRAVENOUS 4 TIMES DAILY
Status: DISPENSED | OUTPATIENT
Start: 2022-12-08 | End: 2022-12-10

## 2022-12-08 RX ORDER — ACETAMINOPHEN 10 MG/ML
1000 INJECTION, SOLUTION INTRAVENOUS ONCE
Status: COMPLETED | OUTPATIENT
Start: 2022-12-08 | End: 2022-12-08

## 2022-12-08 RX ADMIN — PHENYLEPHRINE HYDROCHLORIDE 200 MCG: 10 INJECTION INTRAVENOUS at 07:12

## 2022-12-08 RX ADMIN — HYDROMORPHONE HYDROCHLORIDE 0.2 MG: 2 INJECTION INTRAMUSCULAR; INTRAVENOUS; SUBCUTANEOUS at 01:12

## 2022-12-08 RX ADMIN — ACETAMINOPHEN 1000 MG: 10 INJECTION, SOLUTION INTRAVENOUS at 11:12

## 2022-12-08 RX ADMIN — HYDROMORPHONE HYDROCHLORIDE 0.2 MG: 2 INJECTION INTRAMUSCULAR; INTRAVENOUS; SUBCUTANEOUS at 11:12

## 2022-12-08 RX ADMIN — KETAMINE HYDROCHLORIDE 30 MG: 100 INJECTION, SOLUTION, CONCENTRATE INTRAMUSCULAR; INTRAVENOUS at 08:12

## 2022-12-08 RX ADMIN — GLYCOPYRROLATE 0.2 MG: 0.2 INJECTION INTRAMUSCULAR; INTRAVENOUS at 07:12

## 2022-12-08 RX ADMIN — CALCIUM CHLORIDE INJECTION 250 MG: 100 INJECTION, SOLUTION INTRAVENOUS at 07:12

## 2022-12-08 RX ADMIN — FENTANYL CITRATE 75 MCG: 50 INJECTION, SOLUTION INTRAMUSCULAR; INTRAVENOUS at 10:12

## 2022-12-08 RX ADMIN — KETOROLAC TROMETHAMINE 15 MG: 30 INJECTION, SOLUTION INTRAMUSCULAR at 01:12

## 2022-12-08 RX ADMIN — LIDOCAINE HYDROCHLORIDE 80 MG: 20 INJECTION, SOLUTION EPIDURAL; INFILTRATION; INTRACAUDAL; PERINEURAL at 07:12

## 2022-12-08 RX ADMIN — FENTANYL CITRATE 100 MCG: 50 INJECTION, SOLUTION INTRAMUSCULAR; INTRAVENOUS at 07:12

## 2022-12-08 RX ADMIN — ALBUTEROL SULFATE 2.5 MG: 2.5 SOLUTION RESPIRATORY (INHALATION) at 09:12

## 2022-12-08 RX ADMIN — VANCOMYCIN HYDROCHLORIDE 1000 MG: 1 INJECTION, POWDER, LYOPHILIZED, FOR SOLUTION INTRAVENOUS at 06:12

## 2022-12-08 RX ADMIN — SODIUM CHLORIDE 75 ML/HR: 4.5 INJECTION, SOLUTION INTRAVENOUS at 12:12

## 2022-12-08 RX ADMIN — DOCUSATE SODIUM 100 MG: 100 CAPSULE, LIQUID FILLED ORAL at 09:12

## 2022-12-08 RX ADMIN — CALCIUM CHLORIDE INJECTION 250 MG: 100 INJECTION, SOLUTION INTRAVENOUS at 08:12

## 2022-12-08 RX ADMIN — MUPIROCIN 1 G: 20 OINTMENT TOPICAL at 09:12

## 2022-12-08 RX ADMIN — ALBUMIN HUMAN: 250 SOLUTION INTRAVENOUS at 07:12

## 2022-12-08 RX ADMIN — ENOXAPARIN SODIUM 40 MG: 40 INJECTION SUBCUTANEOUS at 05:12

## 2022-12-08 RX ADMIN — METHOCARBAMOL 500 MG: 100 INJECTION, SOLUTION INTRAMUSCULAR; INTRAVENOUS at 11:12

## 2022-12-08 RX ADMIN — SUGAMMADEX 200 MG: 100 INJECTION, SOLUTION INTRAVENOUS at 10:12

## 2022-12-08 RX ADMIN — SODIUM CHLORIDE, SODIUM GLUCONATE, SODIUM ACETATE, POTASSIUM CHLORIDE AND MAGNESIUM CHLORIDE: 526; 502; 368; 37; 30 INJECTION, SOLUTION INTRAVENOUS at 06:12

## 2022-12-08 RX ADMIN — KETAMINE HYDROCHLORIDE 20 MG: 100 INJECTION, SOLUTION, CONCENTRATE INTRAMUSCULAR; INTRAVENOUS at 10:12

## 2022-12-08 RX ADMIN — FENTANYL CITRATE 25 MCG: 50 INJECTION, SOLUTION INTRAMUSCULAR; INTRAVENOUS at 07:12

## 2022-12-08 RX ADMIN — IPRATROPIUM BROMIDE AND ALBUTEROL SULFATE 3 ML: 2.5; .5 SOLUTION RESPIRATORY (INHALATION) at 06:12

## 2022-12-08 RX ADMIN — ROCURONIUM BROMIDE 20 MG: 10 INJECTION, SOLUTION INTRAVENOUS at 07:12

## 2022-12-08 RX ADMIN — ROCURONIUM BROMIDE 50 MG: 10 INJECTION, SOLUTION INTRAVENOUS at 07:12

## 2022-12-08 RX ADMIN — KETOROLAC TROMETHAMINE 15 MG: 30 INJECTION, SOLUTION INTRAMUSCULAR at 09:12

## 2022-12-08 RX ADMIN — ROCURONIUM BROMIDE 20 MG: 10 INJECTION, SOLUTION INTRAVENOUS at 08:12

## 2022-12-08 RX ADMIN — HYDROMORPHONE HYDROCHLORIDE 0.2 MG: 2 INJECTION INTRAMUSCULAR; INTRAVENOUS; SUBCUTANEOUS at 12:12

## 2022-12-08 RX ADMIN — CEFAZOLIN SODIUM 2 G: 2 SOLUTION INTRAVENOUS at 11:12

## 2022-12-08 RX ADMIN — FAMOTIDINE 20 MG: 20 TABLET, FILM COATED ORAL at 09:12

## 2022-12-08 RX ADMIN — PHENYLEPHRINE HYDROCHLORIDE 50 MCG/MIN: 10 INJECTION INTRAVENOUS at 07:12

## 2022-12-08 RX ADMIN — CEFUROXIME SODIUM 1.5 G: 1.5 INJECTION, POWDER, FOR SOLUTION INTRAVENOUS at 07:12

## 2022-12-08 RX ADMIN — MIDAZOLAM HYDROCHLORIDE 2 MG: 1 INJECTION, SOLUTION INTRAMUSCULAR; INTRAVENOUS at 06:12

## 2022-12-08 RX ADMIN — CEFAZOLIN SODIUM 2 G: 2 SOLUTION INTRAVENOUS at 05:12

## 2022-12-08 RX ADMIN — BUPIVACAINE HYDROCHLORIDE 10 MG: 2.5 INJECTION, SOLUTION EPIDURAL; INFILTRATION; INTRACAUDAL; PERINEURAL at 10:12

## 2022-12-08 RX ADMIN — PROPOFOL 140 MG: 10 INJECTION, EMULSION INTRAVENOUS at 07:12

## 2022-12-08 NOTE — TRANSFER OF CARE
"Anesthesia Transfer of Care Note    Patient: Mansi Jin    Procedure(s) Performed: Procedure(s) (LRB):  THORACOTOMY (Right)    Patient location: PACU    Anesthesia Type: general    Transport from OR: Transported from OR on room air with adequate spontaneous ventilation    Post pain: adequate analgesia    Post assessment: no apparent anesthetic complications    Post vital signs: stable    Level of consciousness: responds to stimulation    Nausea/Vomiting: no nausea/vomiting    Complications: none    Transfer of care protocol was followed      Last vitals:   Visit Vitals  /70   Pulse 92   Temp 36.8 °C (98.2 °F) (Oral)   Resp 18   Ht 5' 7" (1.702 m)   Wt 68.8 kg (151 lb 10.8 oz)   SpO2 97%   Breastfeeding No   BMI 23.76 kg/m²     "

## 2022-12-08 NOTE — ANESTHESIA PREPROCEDURE EVALUATION
12/08/2022  Mansi Jin is a 62 y.o., female.      Pre-op Assessment    I have reviewed the Patient Summary Reports.     I have reviewed the Nursing Notes. I have reviewed the NPO Status.   I have reviewed the Medications.     Review of Systems  Social:  Smoker    Cardiovascular:   Hypertension Denies MI.    Denies Angina.  Denies CHF. STUBBS    Pulmonary:   COPD, moderate Asthma Shortness of breath COPD Stage 3   Renal/:   Chronic Renal Disease, CKD no renal calculi CKD Stage 2   Hepatic/GI:   Denies GERD. Denies Liver Disease.  Denies Hepatitis.    Neurological:   TIA, Denies Seizures.    Endocrine:   Denies Diabetes. Denies Hypothyroidism. Denies Hyperthyroidism.  Denies Obesity / BMI > 30      Physical Exam  General: Well nourished, Cooperative, Alert and Oriented    Airway:  Mallampati: I   Mouth Opening: Normal  TM Distance: Normal  Tongue: Normal  Neck ROM: Normal ROM    Dental:  Partial Dentures        Anesthesia Plan  Type of Anesthesia, risks & benefits discussed:    Anesthesia Type: Gen ETT  Intra-op Monitoring Plan: Standard ASA Monitors and Art Line  Induction:  IV  Airway Plan: Video  Informed Consent: Informed consent signed with the Patient and all parties understand the risks and agree with anesthesia plan.  All questions answered.   ASA Score: 4  Day of Surgery Review of History & Physical: H&P Update referred to the surgeon/provider.    Ready For Surgery From Anesthesia Perspective.     .

## 2022-12-08 NOTE — ANESTHESIA PROCEDURE NOTES
Arterial    Diagnosis: Thoracotomy    Patient location during procedure: pre-op  Procedure end time: 12/8/2022 6:50 AM    Staffing  Authorizing Provider: Neftaly Avitia DO  Performing Provider: Clint Bolden CRNA      Preanesthetic Checklist  Completed: patient identified, IV checked, site marked, risks and benefits discussed, surgical consent, monitors and equipment checked, pre-op evaluation, timeout performed and anesthesia consent givenArterial  Skin Prep: chlorhexidine gluconate and isopropyl alcohol  Local Infiltration: lidocaine  Orientation: right  Location: radial    Catheter Size: 20 G  Catheter placement by Anatomical landmarks. Heme positive aspiration all ports. Insertion Attempts: 1  Assessment  Dressing: secured with tape and tegaderm and tegaderm  Patient: Tolerated well

## 2022-12-08 NOTE — CONSULTS
[facility] - Periop Services  Pulmonary Critical Care Note    Patient Name: Mansi Jin  MRN: [id number]  Admission Date: 12/8/2022  Hospital Length of Stay: 0 days  Code Status: Prior  Attending Provider: Jass Browne IV, MD  Primary Care Provider: PAULINE Loya     Subjective:     HPI:   Ms. Jin is a 61yo female with RLL lung mass who has been seen in lung mass clinic and underwent CT guided biopsy which returned as non diagnostic due to insufficient tissue.  PET scanning performed demonstrated significant avidity within the lesion.  Given her high risk for pulmonary malignancy due to active tobacco abuse and PET avid lung mass, the decision was made to refer to thoracic surgery for consideration of resection.  She underwent right lower lobectomy and right middle lobectomy on 12/08/2022.  She was successfully extubated following the procedure in the operating room and was transferred to the recovery room on nasal cannula support only.  At the time of my evaluation, she complains primarily of surgical site pain.  There is a small air leak noted within the chest tube, and approximately 100 mL bloody drainage postoperatively.  She is currently requiring no vasopressor support.  Intraoperative frozen section pathology returned most likely consistent with non-small cell lung cancer, presumed adenocarcinoma.          Past Medical History:   Diagnosis Date    ASTHMA     COPD (chronic obstructive pulmonary disease)     SEVERE    High cholesterol     HLD (hyperlipidemia)     HTN (hypertension)     Stage 2 chronic kidney disease     TIA (transient ischemic attack)     Tobacco abuse     Unspecified osteoarthritis, unspecified site     Uterine cancer        Past Surgical History:   Procedure Laterality Date    LAPAROSCOPIC TOTAL HYSTERECTOMY      lipoma multiple sites      TOTAL ABDOMINAL HYSTERECTOMY W/ BILATERAL SALPINGOOPHORECTOMY         Social History     Socioeconomic History    Marital  status: Single    Number of children: 5   Tobacco Use    Smoking status: Every Day     Packs/day: 0.50     Years: 50.00     Pack years: 25.00     Types: Cigarettes    Smokeless tobacco: Never   Substance and Sexual Activity    Alcohol use: Not Currently     Comment: quit 25years ago    Drug use: Never    Sexual activity: Yes     Partners: Male           Current Outpatient Medications   Medication Instructions    amLODIPine (NORVASC) 5 mg, Oral, Daily    aspirin (ECOTRIN) 81 mg, Oral, Daily    atorvastatin (LIPITOR) 40 mg, Oral, Nightly    fluticasone propionate (FLONASE) 50 mcg, Each Nostril, Daily    fluticasone-umeclidin-vilanter (TRELEGY ELLIPTA) 100-62.5-25 mcg DsDv 1 puff, Inhalation, Daily    hydroCHLOROthiazide (HYDRODIURIL) 12.5 mg, Oral, Daily    ipratropium-albuteroL (COMBIVENT)  mcg/actuation inhaler 1 puff, Inhalation, 4 times daily, Rescue    lisinopriL 10 mg, Oral, Daily    loratadine (CLARITIN) 10 mg, Oral, Daily       Current Inpatient Medications   albuterol-ipratropium        ceFAZolin (ANCEF) IVPB  2 g Intravenous Q8H    docusate sodium  100 mg Oral BID    enoxaparin  40 mg Subcutaneous Daily    famotidine  20 mg Oral BID    ketorolac  15 mg Intravenous QID    loperamide  4 mg Oral Once    mupirocin  1 g Nasal BID    vancomycin (VANCOCIN) IVPB  1,000 mg Intravenous Q12H       Current Intravenous Infusions   sodium chloride 0.45%      fentanyl 2 mcg/mL with BUPivacaine 0.125% in sodium chloride 0.9% Epidural           14 point ROS negative unless documented in the history of present illness.       Objective:       Intake/Output Summary (Last 24 hours) at 12/8/2022 1134  Last data filed at 12/8/2022 1009  Gross per 24 hour   Intake 1400 ml   Output 150 ml   Net 1250 ml         Vital Signs (Most Recent):  Temp: 97.9 °F (36.6 °C) (12/08/22 1017)  Pulse: 80 (12/08/22 1017)  Resp: 20 (12/08/22 1125)  BP: 101/70 (12/08/22 0610)  SpO2: 97 % (12/08/22 1017)  Body mass index is 23.76 kg/m².  Weight:  68.8 kg (151 lb 10.8 oz) Vital Signs (24h Range):  Temp:  [97.9 °F (36.6 °C)-98.2 °F (36.8 °C)] 97.9 °F (36.6 °C)  Pulse:  [] 80  Resp:  [16-20] 20  SpO2:  [97 %] 97 %  BP: (101-109)/(70-74) 101/70       Physical Exam  Gen- awake, mild distress  CV- RRR, no murmurs; on no vasopressor support  Resp- CTAB, unlabored respirations, saturations mid 90s on 2L NC  Chest- surgical dressing C/D/I, chest tube air leak noted  Skin- warm, dry, no rashes   MSK- WWP, no LE edema  Neuro- no gross deficits      Lines/Drains/Airways       Drain  Duration                  Chest Tube 12/08/22 0911 Tube - 1 Right Midaxillary 28 Fr. <1 day         Urethral Catheter 12/08/22 0712 Silicone;Non-latex 16 Fr. <1 day              Arterial Line  Duration             Arterial Line 12/08/22 0739 Right Radial <1 day              Peripheral Intravenous Line  Duration                  Peripheral IV - Single Lumen 12/08/22 0525 18 G Left Antecubital <1 day         Peripheral IV - Single Lumen 12/08/22 0737 20 G Right Forearm <1 day                    Significant Labs:  Lab Results   Component Value Date    WBC 4.8 12/06/2022    HGB 14.5 12/06/2022    HCT 46.0 12/06/2022    MCV 85.7 12/06/2022     (H) 12/06/2022         BMP  Lab Results   Component Value Date     12/06/2022    K 4.1 12/06/2022    CO2 26 12/06/2022    BUN 9.1 (L) 12/06/2022    CREATININE 1.05 (H) 12/06/2022    CALCIUM 9.7 12/06/2022    EGFRNONAA 74 (L) 07/19/2021         Assessment/Plan:     Ms. Jin is a 63yo female with history of tobacco abuse, right lower lobe lung mass who is currently POD #0 from right middle and right lower lobectomy.      -preliminary pathology most consistent with adenocarcinoma, final pathology and oncology consult pending   -postprocedure chest x-ray personally reviewed, with no focal infiltrates  -wean supplemental oxygen as tolerated for goal saturations >90%  -IS, ambulate as tolerated following transfer to floor   -CT management  per thoracic surgery primary   -PFTs 07/2022 with moderate obstruction, FEV1 1.41 L, no hyperinflation but evidence of air trapping, DLCO/VA moderately reduced   -remainder of postoperative care per thoracic surgery primary            Dallas Pham MD  Pulmonary Disease

## 2022-12-08 NOTE — ANESTHESIA PROCEDURE NOTES
Intubation    Date/Time: 12/8/2022 7:08 AM  Performed by: Clint Bolden CRNA  Authorized by: Neftaly Avitia DO     Intubation:     Induction:  Intravenous    Intubated:  Postinduction    Mask Ventilation:  Easy mask    Attempts:  1    Attempted By:  Student    Method of Intubation:  Direct    Blade:  Danielle 3    Laryngeal View Grade: Grade I - full view of cords      Difficult Airway Encountered?: No      Complications:  None    Airway Device:  Double lumen tube left    Airway Device Size:  35F    Style/Cuff Inflation:  Cuffed (inflated to minimal occlusive pressure)    Inflation Amount (mL):  8    Tube secured:  29    Secured at:  The lips    Placement Verified By:  Capnometry    Complicating Factors:  None    Findings Post-Intubation:  BS equal bilateral and atraumatic/condition of teeth unchanged

## 2022-12-08 NOTE — ANESTHESIA PROCEDURE NOTES
Peripheral IV Insertion    Diagnosis: I99.8 Other disorder of circulatory system    Patient location during procedure: OR  Procedure end time: 12/8/2022 7:10 AM    Staffing  Authorizing Provider: Neftaly Avitia DO  Performing Provider: Clint Bolden CRNA      Preanesthetic Checklist  Completed: patient identified, IV checked, site marked, risks and benefits discussed, surgical consent, monitors and equipment checked, pre-op evaluation, timeout performed and anesthesia consent givenPeripheral IV Insertion  Skin Prep: alcohol swabs  Local Infiltration: none  Orientation: right  Location: forearm  Catheter Type: peripheral IV (single lumen)  Catheter Size: 20 G  Catheter placement by Anatomical landmarks. Heme positive aspiration all ports. Insertion Attempts: 1  Assessment  Dressing: secured with tape and tegaderm  Patient: Tolerated well  Line flushed easily.

## 2022-12-08 NOTE — OP NOTE
Date of service 12/08/2022   Pre analysis: Right lower lobe lung mass   Postop diagnosis: Right lower lobe lung cancer   Procedure:  Right lower and middle bilobectomy with mediastinal lymph node dissection  Surgeon:  Pavan  Assistant:  Kyle   Anesthesia:  General endotracheal  Drains:  Chest tube x1     Procedure in detail:  The patient was taken to the operating room under informed consent.  Placed on table in a supine position where general endotracheal anesthesia was induced by the anesthesia department.  She was rotated onto her left side all pressure points appropriately padded.  The right chest prepped and draped in the usual sterile fashion.  A posterolateral thoracotomy incision made and carried down with electrocautery to the chest wall.  The 4th interspace was entered.  The chest cavity explored the mass identified in the lower lobe.  There was also a large very hard lymph node right in the middle of the fissure in the hilum.  The inferior ligament was taken down and a lymph node harvested from the inferior pulmonary ligament.  There were several large posterior mediastinal nodes that were sort of periesophageal they were harvested and sent for permanent analysis.  The inferior pulmonary vein was dissected and then divided with a vascular stapler.  Dissection then began in the fissure in the hilum the pulmonary pulmonary artery to the lower lobe was dissected there was a very large hilar lymph node that was firm and attached to this pulmonary artery that was dissected free and sent as a permanent specimen.  The pulmonary artery to the lower lobe was dissected and then divided with a vascular stapler.  Of the bronchus to the middle lobe Wayne fairly deep into the parenchyma of the lower lobe and as such the middle lobe had to be taken also.  A TA 30° stapler was used to divide the bronchus and the lower lobe sent as a frozen section for both margin and tumor.  The tumor did return positive for  non-small cell likely adenoma.  The bronchial margin was clear.  The pulmonary veins and arteries to the middle lobe were then ligated and divided and the fissure completed with an Endo stapler.  This specimen also sent for permanent analysis.  The R4 space was entered and a very large swath of lymph nodes harvested and sent for permanent analysis.  Hemostasis was assured copiously irrigated and then chest cavity filled with saline and 40 cm of pressure held on the bronchial stump.  There was no air leak noted from the bronchial stump.  There were a few small parenchymal length leaks.  These were dressed with ProGEL.  Chest tube brought in through the right chest and secured to the skin.  The ribs closed with interrupted 2. Vicryl.  The muscle layer closed with a running 1. Vicryl.  The soft tissue with 2-0 Vicryl the skin with a 3-0 subcuticular stitch.  The patient tolerated this procedure well and left the operating room in stable condition.

## 2022-12-09 LAB
ANION GAP SERPL CALC-SCNC: 7 MEQ/L
BASOPHILS # BLD AUTO: 0.02 X10(3)/MCL (ref 0–0.2)
BASOPHILS NFR BLD AUTO: 0.2 %
BUN SERPL-MCNC: 11.1 MG/DL (ref 9.8–20.1)
CALCIUM SERPL-MCNC: 8.5 MG/DL (ref 8.4–10.2)
CHLORIDE SERPL-SCNC: 103 MMOL/L (ref 98–107)
CO2 SERPL-SCNC: 24 MMOL/L (ref 23–31)
CREAT SERPL-MCNC: 0.83 MG/DL (ref 0.55–1.02)
CREAT/UREA NIT SERPL: 13
EOSINOPHIL # BLD AUTO: 0.01 X10(3)/MCL (ref 0–0.9)
EOSINOPHIL NFR BLD AUTO: 0.1 %
ERYTHROCYTE [DISTWIDTH] IN BLOOD BY AUTOMATED COUNT: 13.2 % (ref 11.5–17)
GFR SERPLBLD CREATININE-BSD FMLA CKD-EPI: >60 MLS/MIN/1.73/M2
GLUCOSE SERPL-MCNC: 122 MG/DL (ref 82–115)
HCT VFR BLD AUTO: 33.5 % (ref 37–47)
HGB BLD-MCNC: 10.7 GM/DL (ref 12–16)
IMM GRANULOCYTES # BLD AUTO: 0.03 X10(3)/MCL (ref 0–0.04)
IMM GRANULOCYTES NFR BLD AUTO: 0.3 %
LYMPHOCYTES # BLD AUTO: 1.36 X10(3)/MCL (ref 0.6–4.6)
LYMPHOCYTES NFR BLD AUTO: 14.4 %
MCH RBC QN AUTO: 27 PG (ref 27–31)
MCHC RBC AUTO-ENTMCNC: 31.9 MG/DL (ref 33–36)
MCV RBC AUTO: 84.4 FL (ref 80–94)
MONOCYTES # BLD AUTO: 0.49 X10(3)/MCL (ref 0.1–1.3)
MONOCYTES NFR BLD AUTO: 5.2 %
NEUTROPHILS # BLD AUTO: 7.5 X10(3)/MCL (ref 2.1–9.2)
NEUTROPHILS NFR BLD AUTO: 79.8 %
NRBC BLD AUTO-RTO: 0 %
PLATELET # BLD AUTO: 267 X10(3)/MCL (ref 130–400)
PMV BLD AUTO: 9.4 FL (ref 7.4–10.4)
POTASSIUM SERPL-SCNC: 3.4 MMOL/L (ref 3.5–5.1)
RBC # BLD AUTO: 3.97 X10(6)/MCL (ref 4.2–5.4)
SODIUM SERPL-SCNC: 134 MMOL/L (ref 136–145)
WBC # SPEC AUTO: 9.4 X10(3)/MCL (ref 4.5–11.5)

## 2022-12-09 PROCEDURE — 80048 BASIC METABOLIC PNL TOTAL CA: CPT | Performed by: SPECIALIST

## 2022-12-09 PROCEDURE — 25000242 PHARM REV CODE 250 ALT 637 W/ HCPCS: Performed by: PHYSICIAN ASSISTANT

## 2022-12-09 PROCEDURE — 63600175 PHARM REV CODE 636 W HCPCS: Performed by: SPECIALIST

## 2022-12-09 PROCEDURE — 25000003 PHARM REV CODE 250: Performed by: PHYSICIAN ASSISTANT

## 2022-12-09 PROCEDURE — 21400001 HC TELEMETRY ROOM

## 2022-12-09 PROCEDURE — 25000242 PHARM REV CODE 250 ALT 637 W/ HCPCS: Performed by: SPECIALIST

## 2022-12-09 PROCEDURE — 94640 AIRWAY INHALATION TREATMENT: CPT

## 2022-12-09 PROCEDURE — 85025 COMPLETE CBC W/AUTO DIFF WBC: CPT | Performed by: SPECIALIST

## 2022-12-09 PROCEDURE — 36415 COLL VENOUS BLD VENIPUNCTURE: CPT | Performed by: SPECIALIST

## 2022-12-09 PROCEDURE — 27000221 HC OXYGEN, UP TO 24 HOURS

## 2022-12-09 PROCEDURE — 25000003 PHARM REV CODE 250: Performed by: SPECIALIST

## 2022-12-09 RX ORDER — ASPIRIN 81 MG/1
81 TABLET ORAL DAILY
Status: DISCONTINUED | OUTPATIENT
Start: 2022-12-09 | End: 2022-12-13 | Stop reason: HOSPADM

## 2022-12-09 RX ORDER — FLUTICASONE PROPIONATE 50 MCG
1 SPRAY, SUSPENSION (ML) NASAL DAILY
Status: DISCONTINUED | OUTPATIENT
Start: 2022-12-09 | End: 2022-12-13 | Stop reason: HOSPADM

## 2022-12-09 RX ORDER — AMLODIPINE BESYLATE 5 MG/1
5 TABLET ORAL DAILY
Status: DISCONTINUED | OUTPATIENT
Start: 2022-12-09 | End: 2022-12-13 | Stop reason: HOSPADM

## 2022-12-09 RX ORDER — GUAIFENESIN 600 MG/1
600 TABLET, EXTENDED RELEASE ORAL 2 TIMES DAILY
Status: DISCONTINUED | OUTPATIENT
Start: 2022-12-09 | End: 2022-12-13 | Stop reason: HOSPADM

## 2022-12-09 RX ORDER — LISINOPRIL 10 MG/1
10 TABLET ORAL DAILY
Status: DISCONTINUED | OUTPATIENT
Start: 2022-12-09 | End: 2022-12-13 | Stop reason: HOSPADM

## 2022-12-09 RX ORDER — CETIRIZINE HYDROCHLORIDE 10 MG/1
10 TABLET ORAL DAILY
Status: DISCONTINUED | OUTPATIENT
Start: 2022-12-09 | End: 2022-12-13 | Stop reason: HOSPADM

## 2022-12-09 RX ADMIN — KETOROLAC TROMETHAMINE 15 MG: 30 INJECTION, SOLUTION INTRAMUSCULAR at 04:12

## 2022-12-09 RX ADMIN — GUAIFENESIN 600 MG: 600 TABLET, EXTENDED RELEASE ORAL at 09:12

## 2022-12-09 RX ADMIN — ALBUTEROL SULFATE 2.5 MG: 2.5 SOLUTION RESPIRATORY (INHALATION) at 05:12

## 2022-12-09 RX ADMIN — KETOROLAC TROMETHAMINE 15 MG: 30 INJECTION, SOLUTION INTRAMUSCULAR at 06:12

## 2022-12-09 RX ADMIN — FLUTICASONE PROPIONATE 50 MCG: 50 SPRAY, METERED NASAL at 11:12

## 2022-12-09 RX ADMIN — ASPIRIN 81 MG: 81 TABLET, COATED ORAL at 12:12

## 2022-12-09 RX ADMIN — ENOXAPARIN SODIUM 40 MG: 40 INJECTION SUBCUTANEOUS at 04:12

## 2022-12-09 RX ADMIN — CETIRIZINE HYDROCHLORIDE 10 MG: 10 TABLET, FILM COATED ORAL at 12:12

## 2022-12-09 RX ADMIN — ALBUTEROL SULFATE 2.5 MG: 2.5 SOLUTION RESPIRATORY (INHALATION) at 02:12

## 2022-12-09 RX ADMIN — VANCOMYCIN HYDROCHLORIDE 1000 MG: 1 INJECTION, POWDER, LYOPHILIZED, FOR SOLUTION INTRAVENOUS at 05:12

## 2022-12-09 RX ADMIN — FLUTICASONE FUROATE, UMECLIDINIUM BROMIDE AND VILANTEROL TRIFENATATE 1 PUFF: 100; 62.5; 25 POWDER RESPIRATORY (INHALATION) at 04:12

## 2022-12-09 RX ADMIN — FAMOTIDINE 20 MG: 20 TABLET, FILM COATED ORAL at 08:12

## 2022-12-09 RX ADMIN — MUPIROCIN 1 G: 20 OINTMENT TOPICAL at 08:12

## 2022-12-09 RX ADMIN — GUAIFENESIN 600 MG: 600 TABLET, EXTENDED RELEASE ORAL at 12:12

## 2022-12-09 RX ADMIN — LISINOPRIL 10 MG: 10 TABLET ORAL at 12:12

## 2022-12-09 RX ADMIN — ALBUTEROL SULFATE 2.5 MG: 2.5 SOLUTION RESPIRATORY (INHALATION) at 11:12

## 2022-12-09 RX ADMIN — DOCUSATE SODIUM 100 MG: 100 CAPSULE, LIQUID FILLED ORAL at 08:12

## 2022-12-09 RX ADMIN — KETOROLAC TROMETHAMINE 15 MG: 30 INJECTION, SOLUTION INTRAMUSCULAR at 12:12

## 2022-12-09 RX ADMIN — FAMOTIDINE 20 MG: 20 TABLET, FILM COATED ORAL at 09:12

## 2022-12-09 RX ADMIN — ALBUTEROL SULFATE 2.5 MG: 2.5 SOLUTION RESPIRATORY (INHALATION) at 08:12

## 2022-12-09 RX ADMIN — DOCUSATE SODIUM 100 MG: 100 CAPSULE, LIQUID FILLED ORAL at 09:12

## 2022-12-09 RX ADMIN — AMLODIPINE BESYLATE 5 MG: 5 TABLET ORAL at 12:12

## 2022-12-09 RX ADMIN — KETOROLAC TROMETHAMINE 15 MG: 30 INJECTION, SOLUTION INTRAMUSCULAR at 09:12

## 2022-12-09 RX ADMIN — MUPIROCIN 1 G: 20 OINTMENT TOPICAL at 09:12

## 2022-12-09 NOTE — NURSING
Nurses Note -- 4 Eyes      12/9/2022   6:51 AM      Skin assessed during: Admit      [x] No Pressure Injuries Present    [x]Prevention Measures Documented      [x] Yes- Altered Skin Integrity Present or Discovered   [] LDA Added if Not in Epic (Describe Wound) Surgical incision to right back.   [] New Altered Skin Integrity was Present on Admit and Documented in LDA   [] Wound Image Taken    Wound Care Consulted? No    Attending Nurse:  Johnny Madison RN     Second RN/Staff Member:  Femrin Ramirez RN

## 2022-12-09 NOTE — PLAN OF CARE
Patient wants [facility] for Home Health.       12/09/22 1492   Post-Acute Status   Post-Acute Authorization Home Health   Home Health Status Referrals Sent   Coverage Medicaid/[employer]   Discharge Plan   Discharge Plan A Home Health   Discharge Plan B Home Health

## 2022-12-09 NOTE — PROGRESS NOTES
CT Surgery  Progress Note    Subjective:  NAEO.   Patient had some eyelid drooping overnight with negative CTH.  Tolerating diet, no N/V.  Overall pain well controlled, hurts with coughing.  Asking about home COPD and asthma meds.    Objective:  Temp:  [97.6 °F (36.4 °C)-98 °F (36.7 °C)] 97.8 °F (36.6 °C)  Pulse:  [] 106  Resp:  [14-26] 22  SpO2:  [92 %-100 %] 99 %  BP: ()/(60-79) 100/68    Physical Exam:  Gen: no acute distress. Alert and oriented x3.  HEENT: normocephalic and atraumatic. EOMI.   Resp: unlabored respirations  CV: regular rate, R CT in place with serosang output  Abd: soft, NT, ND  Ext: warm and well perfused  MSK: normal range of motion, normal strength  Neuro: no focal deficits, normal sensation    I/O:  UOP 450cc  R CT 700cc serosang    Labs:  WBC 9  H/H 10/33      BUN/Cr 11/0.83  K 3.4    Imaging:  CXR pending    CTH 12/8: No acute intracranial abnormality identified.  Findings of mild microvascular ischemic disease.     Assessment & Plan:  62F with asthma, COPD, HTN, CKD2, prior TIA, and RLL adenocarcinoma s/p open R lower and middle lobectomy 12/8.    -Continue R CT   -Follow-up CXR this AM  -Restart home COPD and asthma meds  -Continue multimodal pain control  -Ambulate/OOB/PT/OT  -Continue vanc 48h postop  -Encourage IS  -lovenox and pepcid for ppx    Fozia Friedman MD  [facility] Surgery - PGY4

## 2022-12-09 NOTE — PROGRESS NOTES
[facility] - Periop Services  Pulmonary Critical Care Note    Patient Name: Mansi Jin  MRN: [id number]  Admission Date: 12/8/2022  Hospital Length of Stay: 1 days  Code Status: Prior  Attending Provider: Jass Browne IV, MD  Primary Care Provider: PAULINE Loya     Subjective:     HPI:   Ms. Jin is a 61yo female with RLL lung mass who has been seen in lung mass clinic and underwent CT guided biopsy which returned as non diagnostic due to insufficient tissue.  PET scanning performed demonstrated significant avidity within the lesion.  Given her high risk for pulmonary malignancy due to active tobacco abuse and PET avid lung mass, the decision was made to refer to thoracic surgery for consideration of resection.  She underwent right lower lobectomy and right middle lobectomy on 12/08/2022.  She was successfully extubated following the procedure in the operating room and was transferred to the recovery room on nasal cannula support only.  At the time of my evaluation, she complains primarily of surgical site pain.  There is a small air leak noted within the chest tube, and approximately 100 mL bloody drainage postoperatively.  She is currently requiring no vasopressor support.  Pathology still pending    Subjective/overnight:  No major issues noted overnight.  No complaints by patient other than mild SOB          Past Medical History:   Diagnosis Date    ASTHMA     COPD (chronic obstructive pulmonary disease)     SEVERE    High cholesterol     HLD (hyperlipidemia)     HTN (hypertension)     Stage 2 chronic kidney disease     TIA (transient ischemic attack)     Tobacco abuse     Unspecified osteoarthritis, unspecified site     Uterine cancer        Past Surgical History:   Procedure Laterality Date    LAPAROSCOPIC TOTAL HYSTERECTOMY      lipoma multiple sites      TOTAL ABDOMINAL HYSTERECTOMY W/ BILATERAL SALPINGOOPHORECTOMY         Social History     Socioeconomic History    Marital status:  Single    Number of children: 5   Tobacco Use    Smoking status: Every Day     Packs/day: 0.50     Years: 50.00     Pack years: 25.00     Types: Cigarettes    Smokeless tobacco: Never   Substance and Sexual Activity    Alcohol use: Not Currently     Comment: quit 25years ago    Drug use: Never    Sexual activity: Yes     Partners: Male           Current Outpatient Medications   Medication Instructions    amLODIPine (NORVASC) 5 mg, Oral, Daily    aspirin (ECOTRIN) 81 mg, Oral, Daily    atorvastatin (LIPITOR) 40 mg, Oral, Nightly    fluticasone propionate (FLONASE) 50 mcg, Each Nostril, Daily    fluticasone-umeclidin-vilanter (TRELEGY ELLIPTA) 100-62.5-25 mcg DsDv 1 puff, Inhalation, Daily    hydroCHLOROthiazide (HYDRODIURIL) 12.5 mg, Oral, Daily    ipratropium-albuteroL (COMBIVENT)  mcg/actuation inhaler 1 puff, Inhalation, 4 times daily, Rescue    lisinopriL 10 mg, Oral, Daily    loratadine (CLARITIN) 10 mg, Oral, Daily       Current Inpatient Medications   docusate sodium  100 mg Oral BID    enoxaparin  40 mg Subcutaneous Daily    famotidine  20 mg Oral BID    ketorolac  15 mg Intravenous QID    loperamide  4 mg Oral Once    mupirocin  1 g Nasal BID    vancomycin (VANCOCIN) IVPB  1,000 mg Intravenous Q12H       Current Intravenous Infusions   sodium chloride 0.45% 75 mL/hr (12/08/22 1230)    fentanyl 2 mcg/mL with BUPivacaine 0.125% in sodium chloride 0.9% Epidural           14 point ROS negative unless documented in the history of present illness.       Objective:       Intake/Output Summary (Last 24 hours) at 12/9/2022 0805  Last data filed at 12/9/2022 0636  Gross per 24 hour   Intake 1440 ml   Output 1150 ml   Net 290 ml           Vital Signs (Most Recent):  Temp: 98.5 °F (36.9 °C) (12/09/22 0733)  Pulse: 103 (12/09/22 0802)  Resp: 20 (12/09/22 0802)  BP: 103/63 (12/09/22 0733)  SpO2: 98 % (12/09/22 0802)  Body mass index is 23.76 kg/m².  Weight: 68.8 kg (151 lb 10.8 oz) Vital Signs (24h Range):  Temp:   [97.6 °F (36.4 °C)-98.5 °F (36.9 °C)] 98.5 °F (36.9 °C)  Pulse:  [] 103  Resp:  [14-26] 20  SpO2:  [92 %-100 %] 98 %  BP: ()/(60-79) 103/63       Physical Exam  Gen- awake, mild distress  CV- RRR, no murmurs; on no vasopressor support  Resp- CTAB, unlabored respirations, saturations mid 90s on 2L NC  Chest- surgical dressing C/D/I, chest tube air leak noted  Skin- warm, dry, no rashes   MSK- WWP, no LE edema  Neuro- no gross deficits      Lines/Drains/Airways       Drain  Duration                  Urethral Catheter 12/08/22 0712 Silicone;Non-latex 16 Fr. 1 day         Chest Tube 12/08/22 0911 Tube - 1 Right Midaxillary 28 Fr. <1 day              Arterial Line  Duration             Arterial Line 12/08/22 0739 Right Radial 1 day              Peripheral Intravenous Line  Duration                  Peripheral IV - Single Lumen 12/08/22 0525 18 G Left Antecubital 1 day         Peripheral IV - Single Lumen 12/08/22 0737 20 G Right Forearm 1 day                    Significant Labs:  Lab Results   Component Value Date    WBC 9.4 12/09/2022    HGB 10.7 (L) 12/09/2022    HCT 33.5 (L) 12/09/2022    MCV 84.4 12/09/2022     12/09/2022         BMP  Lab Results   Component Value Date     (L) 12/09/2022    K 3.4 (L) 12/09/2022    CO2 24 12/09/2022    BUN 11.1 12/09/2022    CREATININE 0.83 12/09/2022    CALCIUM 8.5 12/09/2022    EGFRNONAA 74 (L) 07/19/2021         Assessment/Plan:     1) status post right middle lobe and right lower lobe lobectomy-postoperative day 2.   2 ) moderate obstructive lung disease consistent with COPD per prior PFTs      -CT management per thoracic surgery primary   -continue with incentive spirometry.  Ambulate as tolerated  -chest tube output slightly over 600 cc over the last 24 hours.  Continue with monitoring of drainage output.           Hever Camara MD  Pulmonary Disease

## 2022-12-09 NOTE — ANESTHESIA POSTPROCEDURE EVALUATION
Anesthesia Post Evaluation    Patient: Mansi Jin    Procedure(s) Performed: Procedure(s) (LRB):  THORACOTOMY (Right)  LOBECTOMY, LUNG (Right)  EXCISION, LYMPH NODE    Final Anesthesia Type: general      Patient location during evaluation: PACU  Patient participation: Yes- Able to Participate  Level of consciousness: awake and alert  Post-procedure vital signs: reviewed and stable  Pain management: adequate  Airway patency: patent    PONV status at discharge: No PONV  Anesthetic complications: no      Cardiovascular status: blood pressure returned to baseline  Respiratory status: spontaneous ventilation and unassisted  Hydration status: euvolemic  Follow-up needed           Vitals Value Taken Time   /68 12/09/22 1123   Temp 36.7 °C (98.1 °F) 12/09/22 1123   Pulse 90 12/09/22 1133   Resp 18 12/09/22 1133   SpO2 96 % 12/09/22 1408         Event Time   Out of Recovery 15:48:00         Pain/Estela Score: Pain Rating Prior to Med Admin: 4 (12/9/2022 12:32 PM)  Pain Rating Post Med Admin: 1 (12/9/2022  1:02 PM)  Estela Score: 9 (12/8/2022  3:30 PM)

## 2022-12-09 NOTE — PLAN OF CARE
12/09/22 0910   Discharge Assessment   Assessment Type Discharge Planning Assessment   Confirmed/corrected address, phone number and insurance Yes   Confirmed Demographics Correct on Facesheet   Source of Information patient;family   Does patient/caregiver understand observation status Yes   Communicated JC with patient/caregiver Yes;Date not available/Unable to determine   Reason For Admission Lung mass   People in Home child(noe), adult   Do you expect to return to your current living situation? Yes   Prior to hospitilization cognitive status: Alert/Oriented   Current cognitive status: Alert/Oriented   Equipment Currently Used at Home none   Readmission within 30 days? No   Do you currently have service(s) that help you manage your care at home? No   Do you take prescription medications? Yes   Do you have prescription coverage? Yes   Coverage Medicaid/[employer]   Who is going to help you get home at discharge? Family will take home.   How do you get to doctors appointments? family or friend will provide   Are you on dialysis? No   Do you take coumadin? No   Discharge Plan A Home Health   Discharge Plan B Home Health   Discharge Plan discussed with: Patient;Adult children

## 2022-12-10 ENCOUNTER — ANESTHESIA EVENT (OUTPATIENT)
Dept: MEDSURG UNIT | Facility: HOSPITAL | Age: 62
End: 2022-12-10

## 2022-12-10 ENCOUNTER — ANESTHESIA (OUTPATIENT)
Dept: MEDSURG UNIT | Facility: HOSPITAL | Age: 62
End: 2022-12-10

## 2022-12-10 LAB
ANION GAP SERPL CALC-SCNC: 8 MEQ/L
BUN SERPL-MCNC: 8.6 MG/DL (ref 9.8–20.1)
CALCIUM SERPL-MCNC: 8.4 MG/DL (ref 8.4–10.2)
CHLORIDE SERPL-SCNC: 101 MMOL/L (ref 98–107)
CO2 SERPL-SCNC: 24 MMOL/L (ref 23–31)
CREAT SERPL-MCNC: 0.82 MG/DL (ref 0.55–1.02)
CREAT/UREA NIT SERPL: 10
GFR SERPLBLD CREATININE-BSD FMLA CKD-EPI: >60 MLS/MIN/1.73/M2
GLUCOSE SERPL-MCNC: 114 MG/DL (ref 82–115)
POTASSIUM SERPL-SCNC: 3.5 MMOL/L (ref 3.5–5.1)
SODIUM SERPL-SCNC: 133 MMOL/L (ref 136–145)

## 2022-12-10 PROCEDURE — 25000003 PHARM REV CODE 250: Performed by: PHYSICIAN ASSISTANT

## 2022-12-10 PROCEDURE — 99024 POSTOP FOLLOW-UP VISIT: CPT | Mod: ,,, | Performed by: PHYSICIAN ASSISTANT

## 2022-12-10 PROCEDURE — 94640 AIRWAY INHALATION TREATMENT: CPT

## 2022-12-10 PROCEDURE — 94761 N-INVAS EAR/PLS OXIMETRY MLT: CPT

## 2022-12-10 PROCEDURE — 21400001 HC TELEMETRY ROOM

## 2022-12-10 PROCEDURE — 27000221 HC OXYGEN, UP TO 24 HOURS

## 2022-12-10 PROCEDURE — 25000003 PHARM REV CODE 250: Performed by: ANESTHESIOLOGY

## 2022-12-10 PROCEDURE — 63600175 PHARM REV CODE 636 W HCPCS: Performed by: ANESTHESIOLOGY

## 2022-12-10 PROCEDURE — 63600175 PHARM REV CODE 636 W HCPCS: Performed by: PHYSICIAN ASSISTANT

## 2022-12-10 PROCEDURE — 63600175 PHARM REV CODE 636 W HCPCS: Performed by: SPECIALIST

## 2022-12-10 PROCEDURE — S4991 NICOTINE PATCH NONLEGEND: HCPCS | Performed by: NURSE PRACTITIONER

## 2022-12-10 PROCEDURE — 25000003 PHARM REV CODE 250: Performed by: NURSE PRACTITIONER

## 2022-12-10 PROCEDURE — 25000242 PHARM REV CODE 250 ALT 637 W/ HCPCS: Performed by: SPECIALIST

## 2022-12-10 PROCEDURE — 80048 BASIC METABOLIC PNL TOTAL CA: CPT | Performed by: SPECIALIST

## 2022-12-10 PROCEDURE — 25000003 PHARM REV CODE 250: Performed by: SPECIALIST

## 2022-12-10 PROCEDURE — 99024 PR POST-OP FOLLOW-UP VISIT: ICD-10-PCS | Mod: ,,, | Performed by: PHYSICIAN ASSISTANT

## 2022-12-10 PROCEDURE — 36415 COLL VENOUS BLD VENIPUNCTURE: CPT | Performed by: SPECIALIST

## 2022-12-10 RX ORDER — FENTANYL/BUPIVACAINE/NS/PF 2-1250MCG
PLASTIC BAG, INJECTION (ML) INJECTION CONTINUOUS
Status: DISCONTINUED | OUTPATIENT
Start: 2022-12-10 | End: 2022-12-12

## 2022-12-10 RX ORDER — KETOROLAC TROMETHAMINE 30 MG/ML
15 INJECTION, SOLUTION INTRAMUSCULAR; INTRAVENOUS EVERY 6 HOURS PRN
Status: DISCONTINUED | OUTPATIENT
Start: 2022-12-10 | End: 2022-12-13 | Stop reason: HOSPADM

## 2022-12-10 RX ORDER — IBUPROFEN 200 MG
1 TABLET ORAL DAILY
Status: DISCONTINUED | OUTPATIENT
Start: 2022-12-10 | End: 2022-12-13 | Stop reason: HOSPADM

## 2022-12-10 RX ORDER — NALOXONE HCL 0.4 MG/ML
0.02 VIAL (ML) INJECTION
Status: DISCONTINUED | OUTPATIENT
Start: 2022-12-10 | End: 2022-12-13 | Stop reason: HOSPADM

## 2022-12-10 RX ADMIN — OXYCODONE 5 MG: 5 TABLET ORAL at 08:12

## 2022-12-10 RX ADMIN — ALBUTEROL SULFATE 2.5 MG: 2.5 SOLUTION RESPIRATORY (INHALATION) at 01:12

## 2022-12-10 RX ADMIN — NICOTINE 1 PATCH: 14 PATCH TRANSDERMAL at 11:12

## 2022-12-10 RX ADMIN — CETIRIZINE HYDROCHLORIDE 10 MG: 10 TABLET, FILM COATED ORAL at 08:12

## 2022-12-10 RX ADMIN — GUAIFENESIN 600 MG: 600 TABLET, EXTENDED RELEASE ORAL at 08:12

## 2022-12-10 RX ADMIN — HYDROMORPHONE HYDROCHLORIDE 0.2 MG: 2 INJECTION INTRAMUSCULAR; INTRAVENOUS; SUBCUTANEOUS at 04:12

## 2022-12-10 RX ADMIN — FLUTICASONE PROPIONATE 50 MCG: 50 SPRAY, METERED NASAL at 08:12

## 2022-12-10 RX ADMIN — KETOROLAC TROMETHAMINE 15 MG: 30 INJECTION, SOLUTION INTRAMUSCULAR; INTRAVENOUS at 11:12

## 2022-12-10 RX ADMIN — OXYCODONE 5 MG: 5 TABLET ORAL at 09:12

## 2022-12-10 RX ADMIN — DOCUSATE SODIUM 100 MG: 100 CAPSULE, LIQUID FILLED ORAL at 08:12

## 2022-12-10 RX ADMIN — FAMOTIDINE 20 MG: 20 TABLET, FILM COATED ORAL at 08:12

## 2022-12-10 RX ADMIN — KETOROLAC TROMETHAMINE 15 MG: 30 INJECTION, SOLUTION INTRAMUSCULAR at 03:12

## 2022-12-10 RX ADMIN — ASPIRIN 81 MG: 81 TABLET, COATED ORAL at 08:12

## 2022-12-10 RX ADMIN — ALBUTEROL SULFATE 2.5 MG: 2.5 SOLUTION RESPIRATORY (INHALATION) at 05:12

## 2022-12-10 RX ADMIN — OXYCODONE 5 MG: 5 TABLET ORAL at 03:12

## 2022-12-10 RX ADMIN — KETOROLAC TROMETHAMINE 15 MG: 30 INJECTION, SOLUTION INTRAMUSCULAR; INTRAVENOUS at 05:12

## 2022-12-10 RX ADMIN — ALBUTEROL SULFATE 2.5 MG: 2.5 SOLUTION RESPIRATORY (INHALATION) at 07:12

## 2022-12-10 RX ADMIN — ALBUTEROL SULFATE 2.5 MG: 2.5 SOLUTION RESPIRATORY (INHALATION) at 11:12

## 2022-12-10 RX ADMIN — OXYCODONE 5 MG: 5 TABLET ORAL at 01:12

## 2022-12-10 RX ADMIN — VANCOMYCIN HYDROCHLORIDE 1000 MG: 1 INJECTION, POWDER, LYOPHILIZED, FOR SOLUTION INTRAVENOUS at 05:12

## 2022-12-10 RX ADMIN — MUPIROCIN 1 G: 20 OINTMENT TOPICAL at 08:12

## 2022-12-10 RX ADMIN — ENOXAPARIN SODIUM 40 MG: 40 INJECTION SUBCUTANEOUS at 04:12

## 2022-12-10 RX ADMIN — Medication: at 05:12

## 2022-12-10 NOTE — PROGRESS NOTES
CT SURGERY PROGRESS NOTE  Mansi Jin  62 y.o.  1960    Patients Procedure: Procedure(s) (LRB):  THORACOTOMY (Right)  LOBECTOMY, LUNG (Right)  EXCISION, LYMPH NODE    Subjective  Interval History: POD 2    ROS    Medication List  Infusions   fentanyl 2 mcg/mL with BUPivacaine 0.125% in sodium chloride 0.9% Epidural 5 mL/hr at 12/10/22 0515     Scheduled   amLODIPine  5 mg Oral Daily    aspirin  81 mg Oral Daily    cetirizine  10 mg Oral Daily    docusate sodium  100 mg Oral BID    enoxaparin  40 mg Subcutaneous Daily    famotidine  20 mg Oral BID    fluticasone propionate  1 spray Each Nostril Daily    guaiFENesin  600 mg Oral BID    ipratropium-albuteroL  1 puff Inhalation QID    ketorolac  15 mg Intravenous QID    lisinopriL  10 mg Oral Daily    loperamide  4 mg Oral Once       Objective:  Recent Vitals:  Temp:  [98.1 °F (36.7 °C)-98.5 °F (36.9 °C)] 98.5 °F (36.9 °C)  Pulse:  [] 99  Resp:  [14-21] 18  SpO2:  [94 %-99 %] 94 %  BP: ()/(62-68) 98/62    Physical Exam     I/O last 24 hrs:  Intake/Output - Last 3 Shifts         12/08 0700 12/09 0659 12/09 0700  12/10 0659 12/10 0700 12/11 0659    P.O. 240 780     I.V. (mL/kg) 200 (2.9)      IV Piggyback 1200      Total Intake(mL/kg) 1640 (23.8) 780 (11.3)     Urine (mL/kg/hr) 450 (0.3) 3600 (2.2)     Stool 0 0     Chest Tube 700 270     Total Output 1150 3870     Net +490 -3090            Stool Occurrence  0 x             Labs  BMP:   Recent Labs   Lab 12/10/22  0431   *   K 3.5   CO2 24   BUN 8.6*   CREATININE 0.82   CALCIUM 8.4     CBC:   Recent Labs   Lab 12/09/22  0336   WBC 9.4   RBC 3.97*   HGB 10.7*   HCT 33.5*      MCV 84.4   MCH 27.0   MCHC 31.9*     CMP:   Recent Labs   Lab 12/10/22  0431   CALCIUM 8.4   *   K 3.5   CO2 24   BUN 8.6*   CREATININE 0.82         Imaging:   CXR: No results found in the last 24 hours.        ASSESSMENT/PLAN:    CT mgt  Daily CXR  Mobilize  DC planning     Case and plan of care discussed with  MD Sbeastian Wright PA-C

## 2022-12-10 NOTE — PROGRESS NOTES
[facility] - Periop Services  Pulmonary Critical Care Note    Patient Name: Mansi Jin  MRN: [id number]  Admission Date: 12/8/2022  Hospital Length of Stay: 2 days  Code Status: Prior  Attending Provider: Jass Browne IV, MD  Primary Care Provider: PAULINE Loya     Subjective:     HPI:   Ms. Jin is a 63yo female with RLL lung mass who has been seen in lung mass clinic and underwent CT guided biopsy which returned as non diagnostic due to insufficient tissue.  PET scanning performed demonstrated significant avidity within the lesion.  Given her high risk for pulmonary malignancy due to active tobacco abuse and PET avid lung mass, the decision was made to refer to thoracic surgery for consideration of resection.  She underwent right lower lobectomy and right middle lobectomy on 12/08/2022.  She was successfully extubated following the procedure in the operating room and was transferred to the recovery room on nasal cannula support only.Pathology still pending    Subjective/overnight:  No major issues noted overnight. Chest tube output 270mLs over the past 24 hours. Doing well on 2L NC.           Past Medical History:   Diagnosis Date    ASTHMA     COPD (chronic obstructive pulmonary disease)     SEVERE    High cholesterol     HLD (hyperlipidemia)     HTN (hypertension)     Stage 2 chronic kidney disease     TIA (transient ischemic attack)     Tobacco abuse     Unspecified osteoarthritis, unspecified site     Uterine cancer        Past Surgical History:   Procedure Laterality Date    LAPAROSCOPIC TOTAL HYSTERECTOMY      lipoma multiple sites      LUNG LOBECTOMY Right 12/8/2022    Procedure: LOBECTOMY, LUNG;  Surgeon: Jass Browne IV, MD;  Location: [facility];  Service: Cardiothoracic;  Laterality: Right;    SURGICAL REMOVAL OF LYMPH NODE  12/8/2022    Procedure: EXCISION, LYMPH NODE;  Surgeon: Jass Browne IV, MD;  Location: [facility] OR;  Service: Cardiothoracic;;    THORACOTOMY Right  12/8/2022    Procedure: THORACOTOMY;  Surgeon: Jass Browne IV, MD;  Location: [facility];  Service: Cardiothoracic;  Laterality: Right;  Right Lower Lobectomy with Lymph Node Dissection    TOTAL ABDOMINAL HYSTERECTOMY W/ BILATERAL SALPINGOOPHORECTOMY         Social History     Socioeconomic History    Marital status: Single    Number of children: 5   Tobacco Use    Smoking status: Every Day     Packs/day: 0.50     Years: 50.00     Pack years: 25.00     Types: Cigarettes    Smokeless tobacco: Never   Substance and Sexual Activity    Alcohol use: Not Currently     Comment: quit 25years ago    Drug use: Never    Sexual activity: Yes     Partners: Male           Current Outpatient Medications   Medication Instructions    amLODIPine (NORVASC) 5 mg, Oral, Daily    aspirin (ECOTRIN) 81 mg, Oral, Daily    atorvastatin (LIPITOR) 40 mg, Oral, Nightly    fluticasone propionate (FLONASE) 50 mcg, Each Nostril, Daily    fluticasone-umeclidin-vilanter (TRELEGY ELLIPTA) 100-62.5-25 mcg DsDv 1 puff, Inhalation, Daily    hydroCHLOROthiazide (HYDRODIURIL) 12.5 mg, Oral, Daily    ipratropium-albuteroL (COMBIVENT)  mcg/actuation inhaler 1 puff, Inhalation, 4 times daily, Rescue    lisinopriL 10 mg, Oral, Daily    loratadine (CLARITIN) 10 mg, Oral, Daily       Current Inpatient Medications   amLODIPine  5 mg Oral Daily    aspirin  81 mg Oral Daily    cetirizine  10 mg Oral Daily    docusate sodium  100 mg Oral BID    enoxaparin  40 mg Subcutaneous Daily    famotidine  20 mg Oral BID    fluticasone propionate  1 spray Each Nostril Daily    guaiFENesin  600 mg Oral BID    ipratropium-albuteroL  1 puff Inhalation QID    ketorolac  15 mg Intravenous QID    lisinopriL  10 mg Oral Daily    loperamide  4 mg Oral Once       Current Intravenous Infusions   fentanyl 2 mcg/mL with BUPivacaine 0.125% in sodium chloride 0.9% Epidural 5 mL/hr at 12/10/22 0515         14 point ROS negative unless documented in the history of present illness.        Objective:       Intake/Output Summary (Last 24 hours) at 12/10/2022 1002  Last data filed at 12/10/2022 0645  Gross per 24 hour   Intake 780 ml   Output 2370 ml   Net -1590 ml           Vital Signs (Most Recent):  Temp: 98.5 °F (36.9 °C) (12/10/22 0738)  Pulse: 99 (12/10/22 0855)  Resp: 18 (12/10/22 0903)  BP: 98/62 (12/10/22 0855)  SpO2: 100 % (12/10/22 0800)  Body mass index is 23.82 kg/m².  Weight: 69 kg (152 lb 1.9 oz) Vital Signs (24h Range):  Temp:  [98.1 °F (36.7 °C)-98.5 °F (36.9 °C)] 98.5 °F (36.9 °C)  Pulse:  [] 99  Resp:  [14-21] 18  SpO2:  [94 %-100 %] 100 %  BP: ()/(62-68) 98/62       Physical Exam  Gen- awake and alert  CV- RRR, no murmurs; on no vasopressor support  Resp- CTAB, unlabored respirations, saturations mid 90s on 2L NC  Chest- surgical dressing C/D/I, chest tube air leak noted  Skin- warm, dry, no rashes   MSK- WWP, no LE edema  Neuro- no gross deficits      Lines/Drains/Airways       Drain  Duration                  Chest Tube 12/08/22 0911 Tube - 1 Right Midaxillary 28 Fr. 2 days         Urethral Catheter 12/08/22 0712 Silicone;Non-latex 16 Fr. 2 days              Peripheral Intravenous Line  Duration                  Peripheral IV - Single Lumen 12/10/22 0705 20 G Left;Posterior;Proximal Forearm <1 day                    Significant Labs:  Lab Results   Component Value Date    WBC 9.4 12/09/2022    HGB 10.7 (L) 12/09/2022    HCT 33.5 (L) 12/09/2022    MCV 84.4 12/09/2022     12/09/2022         BMP  Lab Results   Component Value Date     (L) 12/10/2022    K 3.5 12/10/2022    CO2 24 12/10/2022    BUN 8.6 (L) 12/10/2022    CREATININE 0.82 12/10/2022    CALCIUM 8.4 12/10/2022    EGFRNONAA 74 (L) 07/19/2021         Assessment/Plan:     1) status post right middle lobe and right lower lobe lobectomy on 12/8, pathology pending   2 ) moderate obstructive lung disease consistent with COPD per prior PFTs      -CT management per thoracic surgery primary   -continue  with incentive spirometry and mobilization as tolerated  -Continue with monitoring of drainage output.  -Asking about smoking cessation tactics and nicotene patch, will order.            Kimmy Chirinos, FNP  Pulmonary Disease

## 2022-12-11 PROCEDURE — 21400001 HC TELEMETRY ROOM

## 2022-12-11 PROCEDURE — 27000221 HC OXYGEN, UP TO 24 HOURS

## 2022-12-11 PROCEDURE — 25000003 PHARM REV CODE 250: Performed by: PHYSICIAN ASSISTANT

## 2022-12-11 PROCEDURE — S4991 NICOTINE PATCH NONLEGEND: HCPCS | Performed by: NURSE PRACTITIONER

## 2022-12-11 PROCEDURE — 94761 N-INVAS EAR/PLS OXIMETRY MLT: CPT

## 2022-12-11 PROCEDURE — 94640 AIRWAY INHALATION TREATMENT: CPT

## 2022-12-11 PROCEDURE — 25000003 PHARM REV CODE 250: Performed by: ANESTHESIOLOGY

## 2022-12-11 PROCEDURE — 25000003 PHARM REV CODE 250: Performed by: SPECIALIST

## 2022-12-11 PROCEDURE — 25000242 PHARM REV CODE 250 ALT 637 W/ HCPCS: Performed by: SPECIALIST

## 2022-12-11 PROCEDURE — 63600175 PHARM REV CODE 636 W HCPCS: Performed by: SPECIALIST

## 2022-12-11 PROCEDURE — 63600175 PHARM REV CODE 636 W HCPCS: Performed by: PHYSICIAN ASSISTANT

## 2022-12-11 PROCEDURE — 25000003 PHARM REV CODE 250: Performed by: NURSE PRACTITIONER

## 2022-12-11 RX ORDER — ADHESIVE BANDAGE
30 BANDAGE TOPICAL DAILY PRN
Status: DISCONTINUED | OUTPATIENT
Start: 2022-12-11 | End: 2022-12-13 | Stop reason: HOSPADM

## 2022-12-11 RX ADMIN — DOCUSATE SODIUM 100 MG: 100 CAPSULE, LIQUID FILLED ORAL at 08:12

## 2022-12-11 RX ADMIN — KETOROLAC TROMETHAMINE 15 MG: 30 INJECTION, SOLUTION INTRAMUSCULAR; INTRAVENOUS at 06:12

## 2022-12-11 RX ADMIN — ASPIRIN 81 MG: 81 TABLET, COATED ORAL at 08:12

## 2022-12-11 RX ADMIN — CETIRIZINE HYDROCHLORIDE 10 MG: 10 TABLET, FILM COATED ORAL at 08:12

## 2022-12-11 RX ADMIN — GUAIFENESIN 600 MG: 600 TABLET, EXTENDED RELEASE ORAL at 08:12

## 2022-12-11 RX ADMIN — ALBUTEROL SULFATE 2.5 MG: 2.5 SOLUTION RESPIRATORY (INHALATION) at 07:12

## 2022-12-11 RX ADMIN — OXYCODONE 5 MG: 5 TABLET ORAL at 01:12

## 2022-12-11 RX ADMIN — ALBUTEROL SULFATE 2.5 MG: 2.5 SOLUTION RESPIRATORY (INHALATION) at 03:12

## 2022-12-11 RX ADMIN — FAMOTIDINE 20 MG: 20 TABLET, FILM COATED ORAL at 08:12

## 2022-12-11 RX ADMIN — ENOXAPARIN SODIUM 40 MG: 40 INJECTION SUBCUTANEOUS at 04:12

## 2022-12-11 RX ADMIN — OXYCODONE 5 MG: 5 TABLET ORAL at 08:12

## 2022-12-11 RX ADMIN — Medication: at 01:12

## 2022-12-11 RX ADMIN — NICOTINE 1 PATCH: 14 PATCH TRANSDERMAL at 08:12

## 2022-12-11 NOTE — PROGRESS NOTES
[facility] - Periop Services  Pulmonary Critical Care Note    Patient Name: Mansi Jin  MRN: [id number]  Admission Date: 12/8/2022  Hospital Length of Stay: 3 days  Code Status: Prior  Attending Provider: Jass Browne IV, MD  Primary Care Provider: PAULINE Loya     Subjective:     HPI:   Ms. Jin is a 61yo female with RLL lung mass who has been seen in lung mass clinic and underwent CT guided biopsy which returned as non diagnostic due to insufficient tissue.  PET scanning performed demonstrated significant avidity within the lesion.  Given her high risk for pulmonary malignancy due to active tobacco abuse and PET avid lung mass, the decision was made to refer to thoracic surgery for consideration of resection.  She underwent right lower lobectomy and right middle lobectomy on 12/08/2022.  She was successfully extubated following the procedure in the operating room and was transferred to the recovery room on nasal cannula support only.Pathology still pending    Subjective/overnight:  No major issues noted overnight. Chest tube removed this AM. Doing well on 2L NC.           Past Medical History:   Diagnosis Date    ASTHMA     COPD (chronic obstructive pulmonary disease)     SEVERE    High cholesterol     HLD (hyperlipidemia)     HTN (hypertension)     Stage 2 chronic kidney disease     TIA (transient ischemic attack)     Tobacco abuse     Unspecified osteoarthritis, unspecified site     Uterine cancer        Past Surgical History:   Procedure Laterality Date    LAPAROSCOPIC TOTAL HYSTERECTOMY      lipoma multiple sites      LUNG LOBECTOMY Right 12/8/2022    Procedure: LOBECTOMY, LUNG;  Surgeon: Jass Browne IV, MD;  Location: [facility];  Service: Cardiothoracic;  Laterality: Right;    SURGICAL REMOVAL OF LYMPH NODE  12/8/2022    Procedure: EXCISION, LYMPH NODE;  Surgeon: Jass Browne IV, MD;  Location: [facility] OR;  Service: Cardiothoracic;;    THORACOTOMY Right 12/8/2022    Procedure:  THORACOTOMY;  Surgeon: Jass Browne IV, MD;  Location: [facility] OR;  Service: Cardiothoracic;  Laterality: Right;  Right Lower Lobectomy with Lymph Node Dissection    TOTAL ABDOMINAL HYSTERECTOMY W/ BILATERAL SALPINGOOPHORECTOMY         Social History     Socioeconomic History    Marital status: Single    Number of children: 5   Tobacco Use    Smoking status: Every Day     Packs/day: 0.50     Years: 50.00     Pack years: 25.00     Types: Cigarettes    Smokeless tobacco: Never   Substance and Sexual Activity    Alcohol use: Not Currently     Comment: quit 25years ago    Drug use: Never    Sexual activity: Yes     Partners: Male           Current Outpatient Medications   Medication Instructions    amLODIPine (NORVASC) 5 mg, Oral, Daily    aspirin (ECOTRIN) 81 mg, Oral, Daily    atorvastatin (LIPITOR) 40 mg, Oral, Nightly    fluticasone propionate (FLONASE) 50 mcg, Each Nostril, Daily    fluticasone-umeclidin-vilanter (TRELEGY ELLIPTA) 100-62.5-25 mcg DsDv 1 puff, Inhalation, Daily    hydroCHLOROthiazide (HYDRODIURIL) 12.5 mg, Oral, Daily    ipratropium-albuteroL (COMBIVENT)  mcg/actuation inhaler 1 puff, Inhalation, 4 times daily, Rescue    lisinopriL 10 mg, Oral, Daily    loratadine (CLARITIN) 10 mg, Oral, Daily       Current Inpatient Medications   amLODIPine  5 mg Oral Daily    aspirin  81 mg Oral Daily    cetirizine  10 mg Oral Daily    docusate sodium  100 mg Oral BID    enoxaparin  40 mg Subcutaneous Daily    famotidine  20 mg Oral BID    fluticasone propionate  1 spray Each Nostril Daily    guaiFENesin  600 mg Oral BID    ipratropium-albuteroL  1 puff Inhalation QID    lisinopriL  10 mg Oral Daily    loperamide  4 mg Oral Once    nicotine  1 patch Transdermal Daily       Current Intravenous Infusions   fentanyl 2 mcg/mL with BUPivacaine 0.125% in sodium chloride 0.9% Epidural 5 mL/hr at 12/11/22 0157         14 point ROS negative unless documented in the history of present illness.       Objective:        Intake/Output Summary (Last 24 hours) at 12/11/2022 1001  Last data filed at 12/11/2022 0600  Gross per 24 hour   Intake 1850 ml   Output 1870 ml   Net -20 ml           Vital Signs (Most Recent):  Temp: 98.2 °F (36.8 °C) (12/11/22 0815)  Pulse: 91 (12/11/22 0815)  Resp: 16 (12/11/22 0320)  BP: 103/66 (12/11/22 0815)  SpO2: 95 % (12/11/22 0815)  Body mass index is 28.22 kg/m².  Weight: 81.7 kg (180 lb 3.2 oz) Vital Signs (24h Range):  Temp:  [97.7 °F (36.5 °C)-99.3 °F (37.4 °C)] 98.2 °F (36.8 °C)  Pulse:  [] 91  Resp:  [16-21] 16  SpO2:  [92 %-97 %] 95 %  BP: ()/(57-68) 103/66       Physical Exam  Gen- awake and alert  CV- RRR, no murmurs; on no vasopressor support  Resp- CTAB, unlabored respirations, saturations mid 90s on 2L NC  Chest- surgical dressing C/D/I  Skin- warm, dry, no rashes   MSK- WWP, no LE edema  Neuro- no gross deficits      Lines/Drains/Airways       Drain  Duration                  Chest Tube 12/08/22 0911 Tube - 1 Right Midaxillary 28 Fr. 3 days         Urethral Catheter 12/08/22 0712 Silicone;Non-latex 16 Fr. 3 days              Peripheral Intravenous Line  Duration                  Peripheral IV - Single Lumen 12/10/22 0705 20 G Left;Posterior;Proximal Forearm 1 day                    Significant Labs:  Lab Results   Component Value Date    WBC 9.4 12/09/2022    HGB 10.7 (L) 12/09/2022    HCT 33.5 (L) 12/09/2022    MCV 84.4 12/09/2022     12/09/2022         BMP  Lab Results   Component Value Date     (L) 12/10/2022    K 3.5 12/10/2022    CO2 24 12/10/2022    BUN 8.6 (L) 12/10/2022    CREATININE 0.82 12/10/2022    CALCIUM 8.4 12/10/2022    EGFRNONAA 74 (L) 07/19/2021         Assessment/Plan:     1) status post right middle lobe and right lower lobe lobectomy on 12/8, pathology pending   2 ) moderate obstructive lung disease consistent with COPD per prior PFTs      -Pathology pending  -continue with incentive spirometry and mobilization as tolerated  -Continue  nicotene patch, smoking cessation discussed           Kimmy Chirinos, FNP  Pulmonary Disease

## 2022-12-11 NOTE — PROGRESS NOTES
Afebrile.  Vital signs stable.  Patient feels well and offers no new complaints.  Chest x-ray satisfactory  Chest tube drainage is minimal    Heart irregularly irregular.  No murmur  Lungs-clear  Surgical incision-clean dry and intact    Plan-chest tube removed without difficulty.  Discontinue epidural and Olsen.  Anticipate discharge in 24-48 hours

## 2022-12-11 NOTE — ANESTHESIA PROCEDURE NOTES
Procedures  Thoracic epidural removed per CV surgery.  Last lovenox dose 16:30 12/10/22.   Cath tip intact.

## 2022-12-11 NOTE — PROGRESS NOTES
Thoracic Epidural bag change at 0200 12/11/2022  Rate increased to 5 ml/hr (decreased previously to 4 ml/hr for low BP).  BP had improved today.  Patient requested increasing her dosage.    Awake, alert.

## 2022-12-12 PROCEDURE — 97166 OT EVAL MOD COMPLEX 45 MIN: CPT

## 2022-12-12 PROCEDURE — 25000003 PHARM REV CODE 250: Performed by: NURSE PRACTITIONER

## 2022-12-12 PROCEDURE — 63600175 PHARM REV CODE 636 W HCPCS: Performed by: SPECIALIST

## 2022-12-12 PROCEDURE — 27000221 HC OXYGEN, UP TO 24 HOURS

## 2022-12-12 PROCEDURE — 97535 SELF CARE MNGMENT TRAINING: CPT

## 2022-12-12 PROCEDURE — 21400001 HC TELEMETRY ROOM

## 2022-12-12 PROCEDURE — 99024 PR POST-OP FOLLOW-UP VISIT: ICD-10-PCS | Mod: ,,, | Performed by: PHYSICIAN ASSISTANT

## 2022-12-12 PROCEDURE — 97162 PT EVAL MOD COMPLEX 30 MIN: CPT

## 2022-12-12 PROCEDURE — 99900031 HC PATIENT EDUCATION (STAT)

## 2022-12-12 PROCEDURE — 63600175 PHARM REV CODE 636 W HCPCS: Performed by: PHYSICIAN ASSISTANT

## 2022-12-12 PROCEDURE — 99024 POSTOP FOLLOW-UP VISIT: CPT | Mod: ,,, | Performed by: PHYSICIAN ASSISTANT

## 2022-12-12 PROCEDURE — 94640 AIRWAY INHALATION TREATMENT: CPT

## 2022-12-12 PROCEDURE — 25000003 PHARM REV CODE 250: Performed by: THORACIC SURGERY (CARDIOTHORACIC VASCULAR SURGERY)

## 2022-12-12 PROCEDURE — 94761 N-INVAS EAR/PLS OXIMETRY MLT: CPT

## 2022-12-12 PROCEDURE — 25000003 PHARM REV CODE 250: Performed by: SPECIALIST

## 2022-12-12 PROCEDURE — S4991 NICOTINE PATCH NONLEGEND: HCPCS | Performed by: NURSE PRACTITIONER

## 2022-12-12 PROCEDURE — 25000003 PHARM REV CODE 250: Performed by: PHYSICIAN ASSISTANT

## 2022-12-12 PROCEDURE — 25000242 PHARM REV CODE 250 ALT 637 W/ HCPCS: Performed by: SPECIALIST

## 2022-12-12 RX ADMIN — NICOTINE 1 PATCH: 14 PATCH TRANSDERMAL at 09:12

## 2022-12-12 RX ADMIN — FAMOTIDINE 20 MG: 20 TABLET, FILM COATED ORAL at 08:12

## 2022-12-12 RX ADMIN — DOCUSATE SODIUM 100 MG: 100 CAPSULE, LIQUID FILLED ORAL at 09:12

## 2022-12-12 RX ADMIN — MAGNESIUM HYDROXIDE 2400 MG: 400 SUSPENSION ORAL at 04:12

## 2022-12-12 RX ADMIN — KETOROLAC TROMETHAMINE 15 MG: 30 INJECTION, SOLUTION INTRAMUSCULAR; INTRAVENOUS at 01:12

## 2022-12-12 RX ADMIN — FAMOTIDINE 20 MG: 20 TABLET, FILM COATED ORAL at 09:12

## 2022-12-12 RX ADMIN — GUAIFENESIN 600 MG: 600 TABLET, EXTENDED RELEASE ORAL at 09:12

## 2022-12-12 RX ADMIN — KETOROLAC TROMETHAMINE 15 MG: 30 INJECTION, SOLUTION INTRAMUSCULAR; INTRAVENOUS at 08:12

## 2022-12-12 RX ADMIN — ENOXAPARIN SODIUM 40 MG: 40 INJECTION SUBCUTANEOUS at 04:12

## 2022-12-12 RX ADMIN — DOCUSATE SODIUM 100 MG: 100 CAPSULE, LIQUID FILLED ORAL at 08:12

## 2022-12-12 RX ADMIN — ALBUTEROL SULFATE 2.5 MG: 2.5 SOLUTION RESPIRATORY (INHALATION) at 09:12

## 2022-12-12 RX ADMIN — OXYCODONE 5 MG: 5 TABLET ORAL at 07:12

## 2022-12-12 RX ADMIN — IPRATROPIUM BROMIDE AND ALBUTEROL SULFATE 3 ML: .5; 3 SOLUTION RESPIRATORY (INHALATION) at 12:12

## 2022-12-12 RX ADMIN — IPRATROPIUM BROMIDE AND ALBUTEROL SULFATE 3 ML: .5; 3 SOLUTION RESPIRATORY (INHALATION) at 02:12

## 2022-12-12 RX ADMIN — CETIRIZINE HYDROCHLORIDE 10 MG: 10 TABLET, FILM COATED ORAL at 09:12

## 2022-12-12 RX ADMIN — GUAIFENESIN 600 MG: 600 TABLET, EXTENDED RELEASE ORAL at 08:12

## 2022-12-12 RX ADMIN — ASPIRIN 81 MG: 81 TABLET, COATED ORAL at 09:12

## 2022-12-12 RX ADMIN — KETOROLAC TROMETHAMINE 15 MG: 30 INJECTION, SOLUTION INTRAMUSCULAR; INTRAVENOUS at 02:12

## 2022-12-12 RX ADMIN — FLUTICASONE PROPIONATE 50 MCG: 50 SPRAY, METERED NASAL at 10:12

## 2022-12-12 NOTE — PLAN OF CARE
Problem: Occupational Therapy  Goal: Occupational Therapy Goal  Description: Goals to be met by: 12/26/22     Patient will increase functional independence with ADLs by performing:    LE Dressing with Modified [place].  Grooming while standing at sink with Modified [place].  Toileting from toilet with Modified [place] for hygiene and clothing management.   Toilet transfer to toilet with Modified [place].    Outcome: Ongoing, Progressing

## 2022-12-12 NOTE — PROGRESS NOTES
"Mansi Jin is a 62 y.o. female patient.   1. Lung mass      Past Medical History:   Diagnosis Date    ASTHMA     COPD (chronic obstructive pulmonary disease)     SEVERE    High cholesterol     HLD (hyperlipidemia)     HTN (hypertension)     Stage 2 chronic kidney disease     TIA (transient ischemic attack)     Tobacco abuse     Unspecified osteoarthritis, unspecified site     Uterine cancer      No past surgical history pertinent negatives on file.  Scheduled Meds:   amLODIPine  5 mg Oral Daily    aspirin  81 mg Oral Daily    cetirizine  10 mg Oral Daily    docusate sodium  100 mg Oral BID    enoxaparin  40 mg Subcutaneous Daily    famotidine  20 mg Oral BID    fluticasone propionate  1 spray Each Nostril Daily    guaiFENesin  600 mg Oral BID    ipratropium-albuteroL  1 puff Inhalation QID    lisinopriL  10 mg Oral Daily    loperamide  4 mg Oral Once    nicotine  1 patch Transdermal Daily     Continuous Infusions:  PRN Meds:acetaminophen, albuterol sulfate, albuterol-ipratropium, fentaNYL, HYDROmorphone, ketorolac, magnesium hydroxide 400 mg/5 ml, melatonin, metoclopramide HCl, naloxone, naloxone, ondansetron, oxyCODONE, sodium chloride 0.9%    Review of patient's allergies indicates:  No Known Allergies  There are no hospital problems to display for this patient.    Blood pressure 102/67, pulse 91, temperature 98.3 °F (36.8 °C), temperature source Oral, resp. rate 18, height 5' 7" (1.702 m), weight 72 kg (158 lb 11.7 oz), SpO2 97 %, not currently breastfeeding.    Subjective:    POD #4  Awake. Alert.  Sitting up in chair  Family at bedside  Patient working on increasing mobility and stamina  Chest tube removed yesterday    Objective:   AFVSS. Low to mid 90s on 2.5L NC  Heart: RRR  Lungs: nonlabored, CTAB  Incision: c/d/I  Cxr: Interval increase in left lower lobe effusion.  Increased subsegmental atelectatic change or opacification is slightly increased. The cardiomediastinal silhouette is within normal limits. " No acute osseous abnormality.    Assesment/Plan:    S/p RLL/RML  - final path pending  - d/w Pulmonology, will arrange for Pulmonary rehab program. May need home O2  - possible discharge home tomorrow if all arranged.            GISSELL Cook  12/12/2022

## 2022-12-12 NOTE — PT/OT/SLP EVAL
Physical Therapy Evaluation    Patient Name:  Mansi Jin   MRN:  [id number]    Recommendations:     Discharge Recommendations: home, home health PT   Discharge Equipment Recommendations: oxygen, bedside commode (RW vs rollator)   Barriers to discharge:  medical dx; decreased tolerance to ax    Assessment:     Mansi Jin is a 62 y.o. female admitted with a medical diagnosis of s/p R low and middle lobectomy (12/8).  She presents with the following impairments/functional limitations: weakness, gait instability, impaired endurance, impaired balance, decreased lower extremity function, impaired cardiopulmonary response to activity, impaired self care skills, impaired functional mobility.    Rehab Prognosis: Good; patient would benefit from acute skilled PT services to address these deficits and reach maximum level of function.    Recent Surgery: Procedure(s) (LRB):  THORACOTOMY (Right)  LOBECTOMY, LUNG (Right)  EXCISION, LYMPH NODE 4 Days Post-Op    Plan:     During this hospitalization, patient to be seen 6 x/week to address the identified rehab impairments via gait training, therapeutic activities, therapeutic exercises and progress toward the following goals:    Plan of Care Expires:  01/12/23    Subjective     Chief Complaint: n/a  Patient/Family Comments/goals: to go home  Pain/Comfort:  Pain Rating 1: 0/10    Patients cultural, spiritual, Hinduism conflicts given the current situation: no    Living Environment:  Pt lives with another individual in a [facility]; 3 steps to enter/exit  Prior to admission, patients level of function was independent.    Equipment used at home: none.  DME owned (not currently used): none.    Upon discharge, patient will have assistance from family; will be in and out checking on her.    Objective:     Communicated with NSG prior to session.  Patient found  sitting on couch  with pulse ox (continuous), telemetry, oxygen  upon PT entry to room.    General Precautions: Standard,     Orthopedic Precautions:N/A   Braces: N/A  Respiratory Status: Nasal cannula, flow 3 L/min   SpO2 at rest: 98%   SpO2 w ax: 81%    Exams:  Cognitive Exam:  Patient is oriented to Person, Place, Time, and Situation  RLE Strength: WFL  LLE Strength: WFL    Functional Mobility:  Transfers:     Sit to Stand:  stand by assistance with rolling walker  Gait: pt ambulated 35 feet + 40 feet with a seated rest break in btw; slow but steady gait pattern; overall limited by decreased endurance       Patient left up in chair with all lines intact, call button in reach, and family present.    GOALS:   Multidisciplinary Problems       Physical Therapy Goals          Problem: Physical Therapy    Goal Priority Disciplines Outcome Goal Variances Interventions   Physical Therapy Goal     PT, PT/OT Ongoing, Progressing     Description: Goals to be met by: 23     Patient will increase functional independence with mobility by performin. Supine to sit with Modified [place]  2. Sit to supine with Modified [place]  3. Sit to stand transfer with Modified [place]  4. Gait  x 150 feet with Modified [place] using Rolling Walker.                          History:     Past Medical History:   Diagnosis Date    ASTHMA     COPD (chronic obstructive pulmonary disease)     SEVERE    High cholesterol     HLD (hyperlipidemia)     HTN (hypertension)     Stage 2 chronic kidney disease     TIA (transient ischemic attack)     Tobacco abuse     Unspecified osteoarthritis, unspecified site     Uterine cancer        Past Surgical History:   Procedure Laterality Date    LAPAROSCOPIC TOTAL HYSTERECTOMY      lipoma multiple sites      LUNG LOBECTOMY Right 2022    Procedure: LOBECTOMY, LUNG;  Surgeon: Jass Browne IV, MD;  Location: [facility];  Service: Cardiothoracic;  Laterality: Right;    SURGICAL REMOVAL OF LYMPH NODE  2022    Procedure: EXCISION, LYMPH NODE;  Surgeon: Jass Browne IV, MD;  Location: [facility];   Service: Cardiothoracic;;    THORACOTOMY Right 12/8/2022    Procedure: THORACOTOMY;  Surgeon: Jass Browne IV, MD;  Location: [facility];  Service: Cardiothoracic;  Laterality: Right;  Right Lower Lobectomy with Lymph Node Dissection    TOTAL ABDOMINAL HYSTERECTOMY W/ BILATERAL SALPINGOOPHORECTOMY         Time Tracking:     PT Received On: 12/12/22  PT Start Time: 0940     PT Stop Time: 1000  PT Total Time (min): 20 min     Billable Minutes: Evaluation mod      12/12/2022

## 2022-12-12 NOTE — PROGRESS NOTES
CT Surgery  Progress Note    Subjective:  NAEO.   On 3L NC satting in 90s  Tolerating diet, but not much appetite  No nausea or vomiting  Good UOP 2.7L  Ambulating around room    Objective:  Temp:  [97.7 °F (36.5 °C)-99.2 °F (37.3 °C)] 97.7 °F (36.5 °C)  Pulse:  [] 90  Resp:  [16-23] 18  SpO2:  [92 %-99 %] 99 %  BP: (100-123)/(63-81) 109/77    Physical Exam:  Gen: no acute distress. Alert and oriented x3.  HEENT: normocephalic and atraumatic. EOMI.   Resp: unlabored respirations, on 3L NC, incision healing well  CV: mild tachycardia to 110s  Abd: soft, NT, ND  Ext: warm and well perfused  MSK: normal range of motion, normal strength  Neuro: no focal deficits, normal sensation    Imaging:  CXR pending    Assessment & Plan:  62F with asthma, COPD, HTN, CKD2, prior TIA, and RLL adenocarcinoma s/p open R lower and middle lobectomy 12/8.    -Wean O2 as tolerated, ambulate more   -Restart home COPD and asthma meds  -Continue multimodal pain control  -Ambulate/OOB/PT/OT  -Encourage IS  -lovenox and pepcid for ppx    Fozia Friedman MD  [employer] General Surgery - PGY4

## 2022-12-12 NOTE — PROGRESS NOTES
[facility] - Periop Services  Pulmonary Critical Care Note    Patient Name: Mansi Jin  MRN: [id number]  Admission Date: 12/8/2022  Hospital Length of Stay: 4 days  Code Status: Prior  Attending Provider: Jass Browne IV, MD  Primary Care Provider: PAULINE Loya     Subjective:     HPI:   Ms. Jin is a 61yo female with RLL lung mass who has been seen in lung mass clinic and underwent CT guided biopsy which returned as non diagnostic due to insufficient tissue.  PET scanning performed demonstrated significant avidity within the lesion.  Given her high risk for pulmonary malignancy due to active tobacco abuse and PET avid lung mass, the decision was made to refer to thoracic surgery for consideration of resection.  She underwent right lower lobectomy and right middle lobectomy on 12/08/2022.  She was successfully extubated following the procedure in the operating room and was transferred to the recovery room on nasal cannula support only.Pathology still pending      Subjective/overnight:  No acute events overnight.  Patient states that overall she feels well.  Stable on 2-3 L nasal cannula.  Ambulatory without significant difficulties.  Per primary service, likely discharge home soon.  Final pathology remains pending.        Past Medical History:   Diagnosis Date    ASTHMA     COPD (chronic obstructive pulmonary disease)     SEVERE    High cholesterol     HLD (hyperlipidemia)     HTN (hypertension)     Stage 2 chronic kidney disease     TIA (transient ischemic attack)     Tobacco abuse     Unspecified osteoarthritis, unspecified site     Uterine cancer        Past Surgical History:   Procedure Laterality Date    LAPAROSCOPIC TOTAL HYSTERECTOMY      lipoma multiple sites      LUNG LOBECTOMY Right 12/8/2022    Procedure: LOBECTOMY, LUNG;  Surgeon: Jass Browne IV, MD;  Location: [facility];  Service: Cardiothoracic;  Laterality: Right;    SURGICAL REMOVAL OF LYMPH NODE  12/8/2022     Procedure: EXCISION, LYMPH NODE;  Surgeon: Jass Browne IV, MD;  Location: [facility];  Service: Cardiothoracic;;    THORACOTOMY Right 12/8/2022    Procedure: THORACOTOMY;  Surgeon: Jass Browne IV, MD;  Location: [facility];  Service: Cardiothoracic;  Laterality: Right;  Right Lower Lobectomy with Lymph Node Dissection    TOTAL ABDOMINAL HYSTERECTOMY W/ BILATERAL SALPINGOOPHORECTOMY         Social History     Socioeconomic History    Marital status: Single    Number of children: 5   Tobacco Use    Smoking status: Every Day     Packs/day: 0.50     Years: 50.00     Pack years: 25.00     Types: Cigarettes    Smokeless tobacco: Never   Substance and Sexual Activity    Alcohol use: Not Currently     Comment: quit 25years ago    Drug use: Never    Sexual activity: Yes     Partners: Male           Current Outpatient Medications   Medication Instructions    amLODIPine (NORVASC) 5 mg, Oral, Daily    aspirin (ECOTRIN) 81 mg, Oral, Daily    atorvastatin (LIPITOR) 40 mg, Oral, Nightly    fluticasone propionate (FLONASE) 50 mcg, Each Nostril, Daily    fluticasone-umeclidin-vilanter (TRELEGY ELLIPTA) 100-62.5-25 mcg DsDv 1 puff, Inhalation, Daily    hydroCHLOROthiazide (HYDRODIURIL) 12.5 mg, Oral, Daily    ipratropium-albuteroL (COMBIVENT)  mcg/actuation inhaler 1 puff, Inhalation, 4 times daily, Rescue    lisinopriL 10 mg, Oral, Daily    loratadine (CLARITIN) 10 mg, Oral, Daily       Current Inpatient Medications   amLODIPine  5 mg Oral Daily    aspirin  81 mg Oral Daily    cetirizine  10 mg Oral Daily    docusate sodium  100 mg Oral BID    enoxaparin  40 mg Subcutaneous Daily    famotidine  20 mg Oral BID    fluticasone propionate  1 spray Each Nostril Daily    guaiFENesin  600 mg Oral BID    ipratropium-albuteroL  1 puff Inhalation QID    lisinopriL  10 mg Oral Daily    loperamide  4 mg Oral Once    nicotine  1 patch Transdermal Daily         14 point ROS reviewed and negative unless documented in the history of  present illness.       Objective:       Intake/Output Summary (Last 24 hours) at 12/12/2022 1310  Last data filed at 12/12/2022 0236  Gross per 24 hour   Intake 960 ml   Output 2750 ml   Net -1790 ml           Vital Signs (Most Recent):  Temp: 98.3 °F (36.8 °C) (12/12/22 0713)  Pulse: 91 (12/12/22 1021)  Resp: 18 (12/12/22 1021)  BP: 102/67 (12/12/22 0713)  SpO2: 97 % (12/12/22 1021)  Body mass index is 24.86 kg/m².  Weight: 72 kg (158 lb 11.7 oz) Vital Signs (24h Range):  Temp:  [97.7 °F (36.5 °C)-99.2 °F (37.3 °C)] 98.3 °F (36.8 °C)  Pulse:  [] 91  Resp:  [16-23] 18  SpO2:  [92 %-99 %] 97 %  BP: (100-123)/(63-81) 102/67       Physical Exam  Gen- awake and alert  CV- RRR, no murmurs  Resp- right lower lung field reduced breath sounds with faint crackles, otherwise clear to auscultation; unlabored respirations  Skin- warm, dry, no rashes   MSK- WWP, no LE edema  Neuro- no gross deficits      Lines/Drains/Airways       Peripheral Intravenous Line  Duration                  Peripheral IV - Single Lumen 12/10/22 0705 20 G Left;Posterior;Proximal Forearm 2 days                    Significant Labs:  Lab Results   Component Value Date    WBC 9.4 12/09/2022    HGB 10.7 (L) 12/09/2022    HCT 33.5 (L) 12/09/2022    MCV 84.4 12/09/2022     12/09/2022         BMP  Lab Results   Component Value Date     (L) 12/10/2022    K 3.5 12/10/2022    CO2 24 12/10/2022    BUN 8.6 (L) 12/10/2022    CREATININE 0.82 12/10/2022    CALCIUM 8.4 12/10/2022    EGFRNONAA 74 (L) 07/19/2021         Assessment/Plan:     1) Right lung mass status post right middle lobe and right lower lobe lobectomy on 12/08/2022  -preliminary pathology concerning for adenocarcinoma, final pathology pending  2) COPD      -final pathology pending at this time, but preliminary pathology consistent with adenocarcinoma   -counseled extensively on smoking cessation, including appropriate use of nicotine replacement patches and gums   -chest x-ray this  morning personally visualized, with right-sided effusion which I suspect is a space-occupying effusion given her recent lobectomy  -will need outpatient follow-up imaging for evaluation of stability of effusion  -recommend resting and ambulatory oxygen saturations for evaluation of long-term oxygen needs  -recommend starting Spiriva           Dallas Pham MD  Pulmonary Disease

## 2022-12-12 NOTE — PLAN OF CARE
Verbal FOC obtained for [facility]. Order for BSC and rollator sent to [facility] via [employer]. I spoke to pt's dgts Tabby and Marleen who state pt will go home with hh and has great family support, states someone will always be with pt. Elliot (pt's nurse) will do O2 eval today to determine if home O2 needed.

## 2022-12-12 NOTE — PT/OT/SLP EVAL
Occupational Therapy   Evaluation    Name: Mansi Jin  MRN: [id number]  Admitting Diagnosis: Asthma, COPD, HTN, CKD2, prior TIA, and RLL adenocarcinoma s/p open R lower and middle lobectomy 12/8.  Recent Surgery: Procedure(s) (LRB):  THORACOTOMY (Right)  LOBECTOMY, LUNG (Right)  EXCISION, LYMPH NODE 4 Days Post-Op    Recommendations:     Discharge Recommendations: home with home health and family assist provided  Discharge Equipment Recommendations: walker, rolling, bath bench, oxygen (TBD, pending progress)  Barriers to discharge: medical dx    Assessment:     Mansi Jin is a 62 y.o. female with a medical diagnosis of Asthma, COPD, HTN, CKD2, prior TIA, and RLL adenocarcinoma s/p open R lower and middle lobectomy 12/8. She presents with c/o pain with deep breathing. Performance deficits affecting function: weakness, impaired endurance, impaired self care skills, impaired functional mobility, impaired balance, decreased safety awareness, pain, impaired cardiopulmonary response to activity.      Rehab Prognosis: Good; patient would benefit from acute skilled OT services to address these deficits and reach maximum level of function.       Plan:     Patient to be seen 6 x/week to address the above listed problems via self-care/home management, therapeutic activities, therapeutic exercises  Plan of Care Expires: 12/26/22  Plan of Care Reviewed with: patient, daughters    Subjective     Chief Complaint: Pt c/o pain with deep breathing.  Patient/Family Comments/goals: Return home to [facility].    Occupational Profile:  Living Environment: Lives with s/o, daughter, son, and grandson in [facility] with 3 LILLIAN and NO HR. Owns tub only.  Previous level of function: Independent with ADL/IADLs and pt was driving.  Equipment Used at Home: none  Assistance upon Discharge: Pt's daughter present, and reported that pt will be provided with 24/7 supv post-d/c.    Patients cultural, spiritual, Hoahaoism conflicts given the current situation:  no    Objective:     Communicated with: RN prior to session.  Patient found up in chair with telemetry, pulse ox (continuous), oxygen upon OT entry to room.    General Precautions: Standard, fall  Respiratory Status: Nasal cannula, flow 3 L/min  Vitals:  O2: Ranged from 89-99%. RN notified.  HR:     Occupational Performance:    Functional Mobility/Transfers:  Patient completed Sit <> Stand Transfer with stand by assistance  with  rolling walker   Functional Mobility: Pt performed long distance gait trials x2 in hallway with seated rest break provided between gait trials, SBA, and standing rest breaks provided.    Activities of Daily Living:  Lower Body Dressing: Pt doffed/donned B socks with SBA provided while seated in chair. Pt able to position each LE in piriformis stretch during task.    Cognitive/Visual Perceptual:  Cognitive/Psychosocial Skills:     -       Oriented to: Person, Place, Time, and Situation   -       Follows Commands/attention:Follows one-step commands and Follows two-step commands    Physical Exam:  Upper Extremity Range of Motion:     -       Right Upper Extremity: WFL  -       Left Upper Extremity: WFL  Upper Extremity Strength:    -       Right Upper Extremity: WFL  -       Left Upper Extremity: WFL    Education:  OT provided education on proper breathing technique to conserve pt's energy and increase O2 intake during eval.    Note: MD arrived during eval to educate and speak with pt and pt's family.    Patient left up in chair with all lines intact, call button in reach, and pt's daughters present    GOALS:   Multidisciplinary Problems       Occupational Therapy Goals          Problem: Occupational Therapy    Goal Priority Disciplines Outcome Interventions   Occupational Therapy Goal     OT, PT/OT Ongoing, Progressing    Description: Goals to be met by: 12/26/22     Patient will increase functional independence with ADLs by performing:    LE Dressing with Modified  [place].  Grooming while standing at sink with Modified [place].  Toileting from toilet with Modified [place] for hygiene and clothing management.   Toilet transfer to toilet with Modified [place].                         History:     Past Medical History:   Diagnosis Date    ASTHMA     COPD (chronic obstructive pulmonary disease)     SEVERE    High cholesterol     HLD (hyperlipidemia)     HTN (hypertension)     Stage 2 chronic kidney disease     TIA (transient ischemic attack)     Tobacco abuse     Unspecified osteoarthritis, unspecified site     Uterine cancer          Past Surgical History:   Procedure Laterality Date    LAPAROSCOPIC TOTAL HYSTERECTOMY      lipoma multiple sites      LUNG LOBECTOMY Right 12/8/2022    Procedure: LOBECTOMY, LUNG;  Surgeon: Jass Browne IV, MD;  Location: [facility] OR;  Service: Cardiothoracic;  Laterality: Right;    SURGICAL REMOVAL OF LYMPH NODE  12/8/2022    Procedure: EXCISION, LYMPH NODE;  Surgeon: Jass Browne IV, MD;  Location: [facility] OR;  Service: Cardiothoracic;;    THORACOTOMY Right 12/8/2022    Procedure: THORACOTOMY;  Surgeon: Jass Browne IV, MD;  Location: [facility] OR;  Service: Cardiothoracic;  Laterality: Right;  Right Lower Lobectomy with Lymph Node Dissection    TOTAL ABDOMINAL HYSTERECTOMY W/ BILATERAL SALPINGOOPHORECTOMY         Time Tracking:     OT Date of Treatment: 12/12/22  OT Start Time: 1053  OT Stop Time: 1139  OT Total Time (min): 46 min    Billable Minutes: Evaluation Mod complexity 36 mins  Self Care/Home Management 10 mins    12/12/2022

## 2022-12-12 NOTE — PLAN OF CARE
Problem: Physical Therapy  Goal: Physical Therapy Goal  Description: Goals to be met by: 23     Patient will increase functional independence with mobility by performin. Supine to sit with Modified [place]  2. Sit to supine with Modified [place]  3. Sit to stand transfer with Modified [place]  4. Gait  x 150 feet with Modified [place] using Rolling Walker.     Outcome: Ongoing, Progressing

## 2022-12-13 ENCOUNTER — TELEPHONE (OUTPATIENT)
Dept: CARDIAC SURGERY | Facility: CLINIC | Age: 62
End: 2022-12-13
Payer: MEDICAID

## 2022-12-13 ENCOUNTER — TELEPHONE (OUTPATIENT)
Dept: INTERNAL MEDICINE | Facility: CLINIC | Age: 62
End: 2022-12-13
Payer: MEDICAID

## 2022-12-13 VITALS
TEMPERATURE: 98 F | WEIGHT: 158.94 LBS | HEART RATE: 104 BPM | DIASTOLIC BLOOD PRESSURE: 73 MMHG | OXYGEN SATURATION: 91 % | SYSTOLIC BLOOD PRESSURE: 112 MMHG | RESPIRATION RATE: 16 BRPM | HEIGHT: 67 IN | BODY MASS INDEX: 24.94 KG/M2

## 2022-12-13 PROBLEM — R91.8 LUNG MASS: Status: ACTIVE | Noted: 2022-12-13

## 2022-12-13 PROCEDURE — 25000003 PHARM REV CODE 250: Performed by: PHYSICIAN ASSISTANT

## 2022-12-13 PROCEDURE — 99024 PR POST-OP FOLLOW-UP VISIT: ICD-10-PCS | Mod: ,,,

## 2022-12-13 PROCEDURE — 99900035 HC TECH TIME PER 15 MIN (STAT)

## 2022-12-13 PROCEDURE — 97116 GAIT TRAINING THERAPY: CPT

## 2022-12-13 PROCEDURE — 25000003 PHARM REV CODE 250: Performed by: SPECIALIST

## 2022-12-13 PROCEDURE — 25000242 PHARM REV CODE 250 ALT 637 W/ HCPCS: Performed by: SPECIALIST

## 2022-12-13 PROCEDURE — 27000221 HC OXYGEN, UP TO 24 HOURS

## 2022-12-13 PROCEDURE — 25000003 PHARM REV CODE 250: Performed by: NURSE PRACTITIONER

## 2022-12-13 PROCEDURE — S4991 NICOTINE PATCH NONLEGEND: HCPCS | Performed by: NURSE PRACTITIONER

## 2022-12-13 PROCEDURE — 94640 AIRWAY INHALATION TREATMENT: CPT

## 2022-12-13 PROCEDURE — 99024 POSTOP FOLLOW-UP VISIT: CPT | Mod: ,,,

## 2022-12-13 RX ORDER — OXYCODONE AND ACETAMINOPHEN 5; 325 MG/1; MG/1
1 TABLET ORAL EVERY 6 HOURS PRN
Qty: 28 TABLET | Refills: 0 | Status: SHIPPED | OUTPATIENT
Start: 2022-12-13 | End: 2022-12-28

## 2022-12-13 RX ADMIN — NICOTINE 1 PATCH: 14 PATCH TRANSDERMAL at 08:12

## 2022-12-13 RX ADMIN — IPRATROPIUM BROMIDE AND ALBUTEROL SULFATE 3 ML: .5; 3 SOLUTION RESPIRATORY (INHALATION) at 04:12

## 2022-12-13 RX ADMIN — ASPIRIN 81 MG: 81 TABLET, COATED ORAL at 08:12

## 2022-12-13 RX ADMIN — FAMOTIDINE 20 MG: 20 TABLET, FILM COATED ORAL at 08:12

## 2022-12-13 RX ADMIN — CETIRIZINE HYDROCHLORIDE 10 MG: 10 TABLET, FILM COATED ORAL at 08:12

## 2022-12-13 RX ADMIN — GUAIFENESIN 600 MG: 600 TABLET, EXTENDED RELEASE ORAL at 08:12

## 2022-12-13 RX ADMIN — OXYCODONE 5 MG: 5 TABLET ORAL at 10:12

## 2022-12-13 RX ADMIN — LISINOPRIL 10 MG: 10 TABLET ORAL at 08:12

## 2022-12-13 RX ADMIN — OXYCODONE 5 MG: 5 TABLET ORAL at 04:12

## 2022-12-13 RX ADMIN — AMLODIPINE BESYLATE 5 MG: 5 TABLET ORAL at 08:12

## 2022-12-13 RX ADMIN — IPRATROPIUM BROMIDE AND ALBUTEROL SULFATE 3 ML: .5; 3 SOLUTION RESPIRATORY (INHALATION) at 07:12

## 2022-12-13 RX ADMIN — DOCUSATE SODIUM 100 MG: 100 CAPSULE, LIQUID FILLED ORAL at 08:12

## 2022-12-13 RX ADMIN — IPRATROPIUM BROMIDE AND ALBUTEROL SULFATE 3 ML: .5; 3 SOLUTION RESPIRATORY (INHALATION) at 01:12

## 2022-12-13 NOTE — PLAN OF CARE
Home O2 order sent to [facility] via [employer].  [facility] approved home O2 and will be delivering the portable O2 this am.  I called our Pulm rehab dept and they do not have an opening until April 2023. I called [facility] Pulm rehab dept and they do have openings now but stated Medicaid no not reimburse. Pt is set up with [facility].  The above info given to pt, family and Ko BORRERO.

## 2022-12-13 NOTE — PLAN OF CARE
Referral sent to [facility] for apt in 2 mo and [facility] for apt in 6 weeks via Epic. I faxed orders also to [facility] at [phone].  DC orders sent to [facility] via [employer].

## 2022-12-13 NOTE — NURSING
Patient and family educated on all discharge information including follow up appointment with Dr. Ambrose, Dr. Browne, PCP, and new [facility] pulm clinic. Patient instructed to resume all home meds. Retail pharmacy delivered percocet to bedside. Oxygen, walker, [facility] set up. IV and Tele discontinued. VSS. NAD. No complaints of pain. Patient being transported home in private vehicle via wheelchair with family.

## 2022-12-13 NOTE — CARE UPDATE
After further discussion with Dr. Ambrose- we will see the patient in our pulmonary clinic next week to make sure the patient receives her pathology and gets referred to correct places. She will need oncology through [facility] in 6 weeks and Pulmonary clinic in 2 months. I did consult CM prior to her discharge to set up with Appts.  Her appt is December 21st, Wednesday at 9:50

## 2022-12-13 NOTE — PT/OT/SLP PROGRESS
Physical Therapy         Treatment        Mansi Jin   MRN: [id number]     PT Received On: 22  PT Start Time: 938     PT Stop Time: 955    PT Total Time (min): 17 min       Billable Minutes:  Gait Training1  Total Minutes: 17    Treatment Type: Treatment  PT/PTA: PT     PTA Visit Number: 1       General Precautions: Standard,    Orthopedic Precautions: Orthopedic Precautions : N/A   Braces: Braces: N/A    Spiritual, Cultural Beliefs, Roman Catholic Practices, Values that Affect Care: no    Subjective:  Communicated with NSG prior to session.    Pain/Comfort  Pain Rating 1: 0/10    Objective:  Patient found sitting EOB, with: pulse ox (continuous), telemetry, oxygen    Oxygen  3L NC  SpO2: 98% at rest  SpO2: 80% with ax    Functional Mobility:    Transfer Training:  Sit to stand:Modified Independent with rollator     Gait Training:  Pt ambulated 75 feet +130 feet with use of rollator and SBA-- seated rest breaks taken in btw; slow but steady step through pattern; limited by decreased endurance however tolerated increased distance well    Activity Tolerance:  Patient tolerated treatment well    Patient left sitting edge of bed with all lines intact, call button in reach, and daughters present.    Assessment:  Mansi Jin is a 62 y.o. female with a medical diagnosis of Lung mass s/p lobectomy    Rehab potential is excellent.    Activity tolerance: Excellent    Discharge recommendations: Discharge Facility/Level of Care Needs: home, home health PT     Equipment recommendations: Equipment Needed After Discharge: rollator, oxygen, bedside commode     GOALS:   Multidisciplinary Problems       Physical Therapy Goals          Problem: Physical Therapy    Goal Priority Disciplines Outcome Goal Variances Interventions   Physical Therapy Goal     PT, PT/OT Ongoing, Progressing     Description: Goals to be met by: 23     Patient will increase functional independence with mobility by performin. Supine to sit with  Modified [place]  2. Sit to supine with Modified [place]  3. Sit to stand transfer with Modified [place]  4. Gait  x 150 feet with Modified [place] using Rolling Walker.                          PLAN:    Patient to be seen 6 x/week  to address the above listed problems via gait training, therapeutic activities, therapeutic exercises  Plan of Care expires: 01/12/23  Plan of Care reviewed with: patient, daughter         12/13/2022

## 2022-12-13 NOTE — TELEPHONE ENCOUNTER
Pt daughter called and stated that her mother was discharged from the hospital with nicotine patches and pt's daughter stated that she only has a patch today and tomorrow and curious if you can refill it for her

## 2022-12-13 NOTE — TELEPHONE ENCOUNTER
Not listed on discharge summary per daughter to continue.  Pt has f/u 12/21/22 with Pulmonology and states she will call pcp to see if they will fill or wait until appt with Pulmonology.   ----- Message from Jay Ferris sent at 12/13/2022 12:26 PM CST -----  Regarding: Nicotine Patch  Contact: pts daughter  Daughter Tabby called #[phone] Re:Call in Nicotine patch Kahlil in [place] pt was discharged today

## 2022-12-13 NOTE — PROGRESS NOTES
CT Surgery  Progress Note    Subjective:  NAEO.   Feeling great this morning  On 3L NC satting in 90s  Tolerating diet, but not much appetite  No nausea or vomiting  Good UOP  Ambulating around room    Objective:  Temp:  [97.8 °F (36.6 °C)-98.9 °F (37.2 °C)] 98.9 °F (37.2 °C)  Pulse:  [] 111  Resp:  [17-22] 19  SpO2:  [92 %-98 %] 93 %  BP: (105-124)/(71-79) 124/73    Physical Exam:  Gen: no acute distress. Alert and oriented x3.  HEENT: normocephalic and atraumatic. EOMI.   Resp: unlabored respirations, on 3L NC, incision healing well  CV: mild tachycardia to 110s  Abd: soft, NT, ND  Ext: warm and well perfused  MSK: normal range of motion, normal strength  Neuro: no focal deficits, normal sensation    Assessment & Plan:  62F with asthma, COPD, HTN, CKD2, prior TIA, and RLL adenocarcinoma s/p open R lower and middle lobectomy 12/8.    -Home with O2 and HH  -Continue home COPD and asthma meds  -Continue multimodal pain control  -Ambulate/OOB/PT/OT  -Encourage IS  -lovenox and pepcid for ppx    Fozia Friedman MD  [facility] General Surgery - PGY4

## 2022-12-13 NOTE — PROGRESS NOTES
[facility] - Periop Services  Pulmonary Critical Care Note    Patient Name: Mansi Jin  MRN: [id number]  Admission Date: 12/8/2022  Hospital Length of Stay: 5 days  Code Status: Prior  Attending Provider: Jass Browne IV, MD  Primary Care Provider: PAULINE Loya     Subjective:     HPI:   Ms. Jin is a 61yo female with RLL lung mass who has been seen in lung mass clinic and underwent CT guided biopsy which returned as non diagnostic due to insufficient tissue.  PET scanning performed demonstrated significant avidity within the lesion.  Given her high risk for pulmonary malignancy due to active tobacco abuse and PET avid lung mass, the decision was made to refer to thoracic surgery for consideration of resection.  She underwent right lower lobectomy and right middle lobectomy on 12/08/2022.  She was successfully extubated following the procedure in the operating room and was transferred to the recovery room on nasal cannula support only.Pathology still pending; I discussed with Pathology on 12/13/2022 - they are working on it today and should have results.       Subjective/overnight:  No acute events overnight.  Patient states that overall she feels well.  Stable on 2-3 L nasal cannula.  Potential Discharge home.     Past Medical History:   Diagnosis Date    ASTHMA     COPD (chronic obstructive pulmonary disease)     SEVERE    High cholesterol     HLD (hyperlipidemia)     HTN (hypertension)     Stage 2 chronic kidney disease     TIA (transient ischemic attack)     Tobacco abuse     Unspecified osteoarthritis, unspecified site     Uterine cancer        Past Surgical History:   Procedure Laterality Date    LAPAROSCOPIC TOTAL HYSTERECTOMY      lipoma multiple sites      LUNG LOBECTOMY Right 12/8/2022    Procedure: LOBECTOMY, LUNG;  Surgeon: Jass Browne IV, MD;  Location: [facility];  Service: Cardiothoracic;  Laterality: Right;    SURGICAL REMOVAL OF LYMPH NODE  12/8/2022    Procedure:  EXCISION, LYMPH NODE;  Surgeon: Jass Browne IV, MD;  Location: [facility];  Service: Cardiothoracic;;    THORACOTOMY Right 12/8/2022    Procedure: THORACOTOMY;  Surgeon: Jass Browne IV, MD;  Location: [facility] OR;  Service: Cardiothoracic;  Laterality: Right;  Right Lower Lobectomy with Lymph Node Dissection    TOTAL ABDOMINAL HYSTERECTOMY W/ BILATERAL SALPINGOOPHORECTOMY         Social History     Socioeconomic History    Marital status: Single    Number of children: 5   Tobacco Use    Smoking status: Every Day     Packs/day: 0.50     Years: 50.00     Pack years: 25.00     Types: Cigarettes    Smokeless tobacco: Never   Substance and Sexual Activity    Alcohol use: Not Currently     Comment: quit 25years ago    Drug use: Never    Sexual activity: Yes     Partners: Male           Current Outpatient Medications   Medication Instructions    amLODIPine (NORVASC) 5 mg, Oral, Daily    aspirin (ECOTRIN) 81 mg, Oral, Daily    atorvastatin (LIPITOR) 40 mg, Oral, Nightly    fluticasone propionate (FLONASE) 50 mcg, Each Nostril, Daily    fluticasone-umeclidin-vilanter (TRELEGY ELLIPTA) 100-62.5-25 mcg DsDv 1 puff, Inhalation, Daily    hydroCHLOROthiazide (HYDRODIURIL) 12.5 mg, Oral, Daily    ipratropium-albuteroL (COMBIVENT)  mcg/actuation inhaler 1 puff, Inhalation, 4 times daily, Rescue    lisinopriL 10 mg, Oral, Daily    loratadine (CLARITIN) 10 mg, Oral, Daily       Current Inpatient Medications   amLODIPine  5 mg Oral Daily    aspirin  81 mg Oral Daily    cetirizine  10 mg Oral Daily    docusate sodium  100 mg Oral BID    enoxaparin  40 mg Subcutaneous Daily    famotidine  20 mg Oral BID    fluticasone propionate  1 spray Each Nostril Daily    guaiFENesin  600 mg Oral BID    ipratropium-albuteroL  1 puff Inhalation QID    lisinopriL  10 mg Oral Daily    loperamide  4 mg Oral Once    nicotine  1 patch Transdermal Daily         14 point ROS reviewed and negative unless documented in the history of present illness.        Objective:       Intake/Output Summary (Last 24 hours) at 12/13/2022 0852  Last data filed at 12/13/2022 0537  Gross per 24 hour   Intake 480 ml   Output 2350 ml   Net -1870 ml           Vital Signs (Most Recent):  Temp: 98.9 °F (37.2 °C) (12/13/22 0733)  Pulse: 109 (12/13/22 0745)  Resp: 18 (12/13/22 0745)  BP: 124/73 (12/13/22 0733)  SpO2: (!) 94 % (12/13/22 0745)  Body mass index is 24.9 kg/m².  Weight: 72.1 kg (158 lb 15.2 oz) Vital Signs (24h Range):  Temp:  [97.8 °F (36.6 °C)-98.9 °F (37.2 °C)] 98.9 °F (37.2 °C)  Pulse:  [] 109  Resp:  [17-22] 18  SpO2:  [92 %-98 %] 94 %  BP: (105-124)/(71-79) 124/73       Physical Exam  Gen- awake and alert  CV- RRR, no murmurs  Resp-  occasional rhonchi; unlabored respirations  Skin- warm, dry, no rashes   MSK- WWP, no LE edema  Neuro- no gross deficits      Lines/Drains/Airways       Peripheral Intravenous Line  Duration                  Peripheral IV - Single Lumen 12/10/22 0705 20 G Left;Posterior;Proximal Forearm 3 days                    Significant Labs:  Lab Results   Component Value Date    WBC 9.4 12/09/2022    HGB 10.7 (L) 12/09/2022    HCT 33.5 (L) 12/09/2022    MCV 84.4 12/09/2022     12/09/2022         BMP  Lab Results   Component Value Date     (L) 12/10/2022    K 3.5 12/10/2022    CO2 24 12/10/2022    BUN 8.6 (L) 12/10/2022    CREATININE 0.82 12/10/2022    CALCIUM 8.4 12/10/2022    EGFRNONAA 74 (L) 07/19/2021     X-Ray Chest PA And Lateral  Narrative: EXAMINATION:  XR CHEST PA AND LATERAL    CLINICAL HISTORY:  chest tube removal;    TECHNIQUE:  Two views of the chest    COMPARISON:  12/11/2022    FINDINGS:  Interval increase in left lower lobe effusion.  Increased subsegmental atelectatic change or opacification is slightly increased.    The cardiomediastinal silhouette is within normal limits.    No acute osseous abnormality.  Impression: Interval increase in left lower lobe effusion.  Increased subsegmental atelectatic change or  opacification is slightly increased.    Electronically signed by: Johnny Osei  Date:    12/12/2022  Time:    07:27       Assessment/Plan:     1) Right lung mass status post right middle lobe and right lower lobe lobectomy on 12/08/2022  -preliminary pathology concerning for adenocarcinoma, final pathology pending- should be up today.   2) COPD    -final pathology pending at this time, but preliminary pathology consistent with adenocarcinoma. She will need oncology follow up with [facility] in 6 weeks.   -counseled extensively on smoking cessation, including appropriate use of nicotine replacement patches and gums   -recommend resting and ambulatory oxygen saturations for evaluation of long-term oxygen needs prior to discharge.   -will need pulmonary follow at [facility] clinic after discharge.       Meghan Lopez, ANP  Pulmonary Disease

## 2022-12-14 PROCEDURE — G0180 MD CERTIFICATION HHA PATIENT: HCPCS | Mod: ,,, | Performed by: SPECIALIST

## 2022-12-14 PROCEDURE — G0180 PR HOME HEALTH MD CERTIFICATION: ICD-10-PCS | Mod: ,,, | Performed by: SPECIALIST

## 2022-12-16 RX ORDER — IBUPROFEN 200 MG
1 TABLET ORAL DAILY
Qty: 14 PATCH | Refills: 2 | Status: SHIPPED | OUTPATIENT
Start: 2022-12-16 | End: 2023-03-22

## 2022-12-20 NOTE — DISCHARGE SUMMARY
[facility] - 3rd Floor [facility]  General Surgery  Discharge Summary      Patient Name: Mansi Jin  MRN: [id number]  Admission Date: 12/8/2022  Hospital Length of Stay: 5 days  Discharge Date and Time: 12/13/2022 12:14 PM  Attending Physician: No att. providers found   Discharging Provider: GISSELL Douglas  Primary Care Provider: PAULINE Loya     HPI: Patient is a 62 y.o. female presents referred by Dr. Ashraf for evaluation of an enlarging right lower lobe lung mass.  She is a smoker who has been followed for a right lower lobe lung mass and is now enlarged.  She has attempted CT-guided lung biopsy that was nondiagnostic.  It is highly PET avid.  Her FEV1 is 1.4.  The mass is highly suspicious for a malignancy.  I do think the patient will benefit from a right lower pulmonary lobectomy and mediastinal lymph node dissection.  I discussed the risks, benefits, and alternatives in great detail with the patient and her daughter.  They understand and she would like to proceed as soon as possible.  We will plan for surgery next week.    Procedure(s) (LRB):  THORACOTOMY (Right)  LOBECTOMY, LUNG (Right)  EXCISION, LYMPH NODE     Hospital Course: Patient underwent Right lower and middle bilobectomy with mediastinal lymph node dissection on 12/8/22 and was transferred to ICU from PACU. Once hemodynamically stable the patient was transferred to telemetry. Chest tubes were pulled once output was less than 200cc per 24 hours. Prior to discharge chest xray showed no acute changes from chest tube removal. Hospital course without complication.      Consults (From admission, onward)          Status Ordering Provider     Inpatient consult to Pulmonology  Once        Provider:  Marck Ashraf MD    Completed MARISEL IV, ESPERANZA POON            Significant Diagnostic Studies: Labs: All labs within the past 24 hours have been reviewed    Pending Diagnostic Studies:       None          Final Active  "Diagnoses:    Diagnosis Date Noted POA    PRINCIPAL PROBLEM:  Lung mass [R91.8] 12/13/2022 Yes      Problems Resolved During this Admission:      Discharged Condition: good    Disposition: Home or Self Care    Follow Up:   Follow-up Information       [facility] Yuliya Tsang  Marina. Call.    Contact information:  [address] [facility] [address]  [phone]               [employer] Follow up.    Why: This will be your provider for your home oxygen,  rollator and bedside commode.  Contact information:  [address] [place] [address]  [phone]             Jass Browne Iv, MD Follow up on 1/10/2023.    Specialties: Cardiothoracic Surgery, Cardiology  Why: @ 8:00am  Contact information:  [address]  [address]  [place] [address]  [phone]               [employer]. Go on 12/21/2022.    Specialties: Critical Care Medicine, Pulmonary Disease  Why: Follow up in clinic or pathlogy 12/21/2022 @ 9:50  Contact information:  [address]  [address] [place] [address]  [phone]               [facility] Follow up.    Why: A referral was sent to [facility] Pulmonology Dept to be seen in 2 mo; A referal was also sent to [facility] Oncology Dept for an apt in 6 weeks. For each dept the nurse reviews then sends to doctor to approve. They will call you with an appointment. You may call [phone] if you have any questions.  Contact information:  [address] [facility] [address]                         Patient Instructions:      COMMODE FOR HOME USE   Order Comments: Dx: Severe COPD; Lung Mass s/p right lower and middle bilobectomy     Order Specific Question Answer Comments   Type: Standard    Height: 5' 7" (1.702 m)    Weight: 72 kg (158 lb 11.7 oz)    Does patient have medical equipment at home? none    Length of need (1-99 months): 99      WALKER FOR HOME USE   Order Comments: Dx: Severe COPD; Lung mass s/p rt " "lower and middle bilobectomy     Order Specific Question Answer Comments   Type of Walker: Rollator with brakes and/or seat    With wheels? Yes    Height: 5' 7" (1.702 m)    Weight: 72 kg (158 lb 11.7 oz)    Length of need (1-99 months): 99    Does patient have medical equipment at home? none    Please check all that apply: Patient's condition impairs ambulation.    Please check all that apply: Patient is unable to safely ambulate without equipment.      OXYGEN FOR HOME USE   Order Comments: Dx: severe COPD;  Rt upper lobe adenocarcinoma s/p r lower and middle lobectomy on 12/08/22     Order Specific Question Answer Comments   Liter Flow 3    Duration Continuous    Qualifying Test Performed at: Rest    Oxygen saturation: 88    Portable mode: continuous    Route nasal cannula    Device: home concentrator with portable tanks    Length of need (in months): 12 mos    Patient condition with qualifying saturation COPD    Height: 5' 7" (1.702 m)    Weight: 72.1 kg (158 lb 15.2 oz)    Alternative treatment measures have been tried or considered and deemed clinically ineffective. Yes      Ambulatory referral/consult to Hematology / Oncology   Standing Status: Future Standing Exp. Date: 01/13/24   Referral Priority: Routine   Referral Reason: Specialty Services Required   Requested Specialty: Hematology and Oncology   Number of Visits Requested: 1     Ambulatory referral/consult to Pulmonology   Standing Status: Future   Referral Priority: Routine Referral Type: Consultation   Referral Reason: Specialty Services Required   Requested Specialty: Pulmonary Disease   Number of Visits Requested: 1     SUBSEQUENT HOME HEALTH ORDERS   Order Comments: Skilled Nursing     Order Specific Question Answer Comments   What Home Health Agency is the patient currently using? Other/External      No dressing needed     Notify your health care provider if you experience any of the following:  temperature >100.4     Notify your health care " provider if you experience any of the following:  persistent nausea and vomiting or diarrhea     Notify your health care provider if you experience any of the following:  severe uncontrolled pain     Notify your health care provider if you experience any of the following:  redness, tenderness, or signs of infection (pain, swelling, redness, odor or green/yellow discharge around incision site)     Notify your health care provider if you experience any of the following:  difficulty breathing or increased cough     Notify your health care provider if you experience any of the following:  persistent dizziness, light-headedness, or visual disturbances     Notify your health care provider if you experience any of the following:  increased confusion or weakness     Activity as tolerated     Medications:  Reconciled Home Medications:      Medication List        START taking these medications      oxyCODONE-acetaminophen 5-325 mg per tablet  Commonly known as: PERCOCET  Take 1 tablet by mouth every 6 (six) hours as needed for Pain.            CONTINUE taking these medications      amLODIPine 5 MG tablet  Commonly known as: NORVASC  Take 1 tablet (5 mg total) by mouth once daily.     aspirin 81 MG EC tablet  Commonly known as: ECOTRIN  Take 1 tablet (81 mg total) by mouth once daily.     atorvastatin 40 MG tablet  Commonly known as: LIPITOR  Take 1 tablet (40 mg total) by mouth every evening.     fluticasone propionate 50 mcg/actuation nasal spray  Commonly known as: FLONASE  1 spray (50 mcg total) by Each Nostril route once daily.     fluticasone-umeclidin-vilanter 100-62.5-25 mcg Dsdv  Commonly known as: TRELEGY ELLIPTA  Inhale 1 puff into the lungs once daily at 6am.     hydroCHLOROthiazide 12.5 MG Tab  Commonly known as: HYDRODIURIL  Take 1 tablet (12.5 mg total) by mouth once daily.     ipratropium-albuteroL  mcg/actuation inhaler  Commonly known as: CombiVENT  Inhale 1 puff into the lungs 4 (four) times daily.  Rescue     lisinopriL 10 MG tablet  Take 1 tablet (10 mg total) by mouth once daily.     loratadine 10 mg tablet  Commonly known as: CLARITIN  Take 1 tablet (10 mg total) by mouth once daily.              GISSELL Douglas  General Surgery  [facility] - 3rd Floor Medical Telemetry

## 2022-12-21 DIAGNOSIS — C34.91 ADENOCARCINOMA, LUNG, RIGHT: Primary | ICD-10-CM

## 2022-12-22 ENCOUNTER — TELEPHONE (OUTPATIENT)
Dept: INTERNAL MEDICINE | Facility: CLINIC | Age: 62
End: 2022-12-22
Payer: MEDICAID

## 2022-12-22 ENCOUNTER — TELEPHONE (OUTPATIENT)
Dept: CARDIAC SURGERY | Facility: CLINIC | Age: 62
End: 2022-12-22
Payer: MEDICAID

## 2022-12-22 ENCOUNTER — HOSPITAL ENCOUNTER (EMERGENCY)
Facility: HOSPITAL | Age: 62
Discharge: HOME | End: 2022-12-22
Attending: INTERNAL MEDICINE
Payer: MEDICAID

## 2022-12-22 VITALS
BODY MASS INDEX: 24.9 KG/M2 | RESPIRATION RATE: 18 BRPM | DIASTOLIC BLOOD PRESSURE: 65 MMHG | WEIGHT: 159 LBS | OXYGEN SATURATION: 99 % | HEART RATE: 84 BPM | TEMPERATURE: 98 F | SYSTOLIC BLOOD PRESSURE: 103 MMHG

## 2022-12-22 DIAGNOSIS — F32.A DEPRESSION, UNSPECIFIED DEPRESSION TYPE: ICD-10-CM

## 2022-12-22 DIAGNOSIS — R53.1 WEAKNESS: ICD-10-CM

## 2022-12-22 DIAGNOSIS — M54.9 BACK PAIN: ICD-10-CM

## 2022-12-22 DIAGNOSIS — E87.1 HYPONATREMIA: Primary | ICD-10-CM

## 2022-12-22 LAB
ALBUMIN SERPL-MCNC: 3.3 G/DL (ref 3.4–4.8)
ALBUMIN/GLOB SERPL: 0.8 RATIO (ref 1.1–2)
ALP SERPL-CCNC: 116 UNIT/L (ref 40–150)
ALT SERPL-CCNC: 11 UNIT/L (ref 0–55)
APPEARANCE UR: CLEAR
AST SERPL-CCNC: 15 UNIT/L (ref 5–34)
BASOPHILS # BLD AUTO: 0.06 X10(3)/MCL (ref 0–0.2)
BASOPHILS NFR BLD AUTO: 0.6 %
BILIRUB UR QL STRIP.AUTO: NEGATIVE MG/DL
BILIRUBIN DIRECT+TOT PNL SERPL-MCNC: 0.5 MG/DL
BUN SERPL-MCNC: 23 MG/DL (ref 9.8–20.1)
CALCIUM SERPL-MCNC: 9.7 MG/DL (ref 8.4–10.2)
CHLORIDE SERPL-SCNC: 94 MMOL/L (ref 98–107)
CO2 SERPL-SCNC: 21 MMOL/L (ref 23–31)
COLOR UR AUTO: YELLOW
CREAT SERPL-MCNC: 1.13 MG/DL (ref 0.55–1.02)
EOSINOPHIL # BLD AUTO: 0.35 X10(3)/MCL (ref 0–0.9)
EOSINOPHIL NFR BLD AUTO: 3.7 %
ERYTHROCYTE [DISTWIDTH] IN BLOOD BY AUTOMATED COUNT: 12.5 % (ref 11–14.5)
FLUAV AG UPPER RESP QL IA.RAPID: NOT DETECTED
FLUBV AG UPPER RESP QL IA.RAPID: NOT DETECTED
GFR SERPLBLD CREATININE-BSD FMLA CKD-EPI: 54 MLS/MIN/1.73/M2
GLOBULIN SER-MCNC: 4.4 GM/DL (ref 2.4–3.5)
GLUCOSE SERPL-MCNC: 76 MG/DL (ref 82–115)
GLUCOSE UR QL STRIP.AUTO: NEGATIVE MG/DL
HCT VFR BLD AUTO: 38.5 % (ref 37–47)
HGB BLD-MCNC: 12 GM/DL (ref 12–16)
IMM GRANULOCYTES # BLD AUTO: 0.04 X10(3)/MCL (ref 0–0.04)
IMM GRANULOCYTES NFR BLD AUTO: 0.4 %
KETONES UR QL STRIP.AUTO: 15 MG/DL
LEUKOCYTE ESTERASE UR QL STRIP.AUTO: NEGATIVE UNIT/L
LYMPHOCYTES # BLD AUTO: 1.61 X10(3)/MCL (ref 0.6–4.6)
LYMPHOCYTES NFR BLD AUTO: 16.8 %
MCH RBC QN AUTO: 26.6 PG
MCHC RBC AUTO-ENTMCNC: 31.2 MG/DL (ref 33–36)
MCV RBC AUTO: 85.4 FL (ref 80–94)
MONOCYTES # BLD AUTO: 0.69 X10(3)/MCL (ref 0.1–1.3)
MONOCYTES NFR BLD AUTO: 7.2 %
NEUTROPHILS # BLD AUTO: 6.83 X10(3)/MCL (ref 2.1–9.2)
NEUTROPHILS NFR BLD AUTO: 71.3 %
NITRITE UR QL STRIP.AUTO: NEGATIVE
PH UR STRIP.AUTO: 5 [PH]
PLATELET # BLD AUTO: 424 X10(3)/MCL (ref 140–371)
PMV BLD AUTO: 9.3 FL (ref 9.4–12.4)
POCT GLUCOSE: 80 MG/DL (ref 70–110)
POTASSIUM SERPL-SCNC: 4.4 MMOL/L (ref 3.5–5.1)
PROT SERPL-MCNC: 7.7 GM/DL (ref 5.8–7.6)
PROT UR QL STRIP.AUTO: NEGATIVE MG/DL
RBC # BLD AUTO: 4.51 X10(6)/MCL (ref 4.2–5.4)
RBC UR QL AUTO: NEGATIVE UNIT/L
SARS-COV-2 RNA RESP QL NAA+PROBE: NOT DETECTED
SODIUM SERPL-SCNC: 127 MMOL/L (ref 136–145)
SP GR UR STRIP.AUTO: 1.01
UROBILINOGEN UR STRIP-ACNC: 0.2 MG/DL
WBC # SPEC AUTO: 9.6 X10(3)/MCL (ref 4.5–11.5)

## 2022-12-22 PROCEDURE — 99285 EMERGENCY DEPT VISIT HI MDM: CPT | Mod: 25

## 2022-12-22 PROCEDURE — 96374 THER/PROPH/DIAG INJ IV PUSH: CPT

## 2022-12-22 PROCEDURE — 0240U COVID/FLU A&B PCR: CPT | Performed by: INTERNAL MEDICINE

## 2022-12-22 PROCEDURE — 96361 HYDRATE IV INFUSION ADD-ON: CPT

## 2022-12-22 PROCEDURE — 93005 ELECTROCARDIOGRAM TRACING: CPT

## 2022-12-22 PROCEDURE — 85025 COMPLETE CBC W/AUTO DIFF WBC: CPT | Performed by: INTERNAL MEDICINE

## 2022-12-22 PROCEDURE — 93010 ELECTROCARDIOGRAM REPORT: CPT | Mod: ,,, | Performed by: INTERNAL MEDICINE

## 2022-12-22 PROCEDURE — 81003 URINALYSIS AUTO W/O SCOPE: CPT | Performed by: INTERNAL MEDICINE

## 2022-12-22 PROCEDURE — 80053 COMPREHEN METABOLIC PANEL: CPT | Performed by: INTERNAL MEDICINE

## 2022-12-22 PROCEDURE — 93010 EKG 12-LEAD: ICD-10-PCS | Mod: ,,, | Performed by: INTERNAL MEDICINE

## 2022-12-22 PROCEDURE — 63600175 PHARM REV CODE 636 W HCPCS: Performed by: INTERNAL MEDICINE

## 2022-12-22 PROCEDURE — 25000003 PHARM REV CODE 250: Performed by: INTERNAL MEDICINE

## 2022-12-22 RX ORDER — ONDANSETRON 2 MG/ML
4 INJECTION INTRAMUSCULAR; INTRAVENOUS
Status: COMPLETED | OUTPATIENT
Start: 2022-12-22 | End: 2022-12-22

## 2022-12-22 RX ADMIN — ONDANSETRON 4 MG: 2 INJECTION INTRAMUSCULAR; INTRAVENOUS at 09:12

## 2022-12-22 RX ADMIN — SODIUM CHLORIDE 1000 ML: 9 INJECTION, SOLUTION INTRAVENOUS at 08:12

## 2022-12-22 RX ADMIN — SODIUM CHLORIDE 1000 ML: 9 INJECTION, SOLUTION INTRAVENOUS at 09:12

## 2022-12-22 NOTE — TELEPHONE ENCOUNTER
ER note reviewed in chart and spoke nurse at [facility]. Inst to encourage pt to increase fluid intake, nutritional supplements, not taking pain meds on empty stomach, reach out to PCP/Oncologist for further management of nutritional status.   ----- Message from Jay Ferris sent at 12/22/2022  8:37 AM CST -----  Regarding: BRYANT  Contact: [facility]  Breanna [phone] with [facility] called to notify nurse that pt went to ER for nausea and feeling good

## 2022-12-22 NOTE — TELEPHONE ENCOUNTER
Pt daughter called and stated that she took her mother to the ER and he wants her to stop taking a few of her medications. Pt states that her mother was told to stop her amlodipine, lisinopril, and hydrochlorothiazide she states it is because her mother b/p and sodium was really low so the doctor think it is best to stop them. Pt daughter would rather discuss it with you and get your opinion before they stop her from taking it.

## 2022-12-22 NOTE — ED PROVIDER NOTES
12/22/2022         8:44 AM    Source of History:  History obtained from the patient.     Chief complaint:  From Nurse Triage:  Weakness (C/o generalized weakness, nausea, & poor appetite. Pt had right sided partial lobectomy on 12/8/22 for lung cancer)    HISTORY OF PRESENT ILLNES:  Mansi Jin is a 62 y.o. female presenting with Weakness (C/o generalized weakness, nausea, & poor appetite. Pt had right sided partial lobectomy on 12/8/22 for lung cancer)       Patient with history of COPD, is brought to the emergency room with complaint of generalized weakness, nausea and poor appetite.  Patient had a lobectomy on 12/8/2022 of the right lung, she was put on pain medication but it was making her very drowsy and sleepy and the she was getting nausea so she stopped it, she was told to take Tylenol, also she is running low blood pressure, but she was told that they should talk to the cancer doctor about the blood pressure medication, they have an scheduled appointment with the cancer doctor next week to plan the further treatment, in the meantime she is just having generalized weakness, pain, nausea, poor appetite, weight loss, and she is not able to eat the food because the smell of the food makes her sick.  She is able to swallow.    REVIEW OF SYSTEMS:   Constitutional symptoms:  No Fever. No Chills , generalized weakness   Skin symptoms:  No Rash.    Eye symptoms:  No Visual disturbance reported.   ENMT symptoms:  No Sore throat,    Respiratory symptoms:  No Shortness of Breath, no Cough, no Wheezing.    Cardiovascular symptoms:  No Chest Pain, No Palpitations, No Tachycardia.    Gastrointestinal symptoms:  No Abdominal Pain, Nausea, No Vomiting, No Diarrhea, No Constipation.    Genitourinary symptoms:  No Dysuria,    Musculoskeletal symptoms:  Back pain,    Neurologic symptoms:  No Headache, No Dizziness.    Psychiatric symptoms:  Anxiety, No Depression, No Substance Abuse.              Additional review of systems  information: Patient Denies Any Other Complaints.    All Other Systems Reviewed With Patient And Negative.    ALLEGIES:  Review of patient's allergies indicates:  No Known Allergies    MEDICINE LIST:  Current Outpatient Medications   Medication Instructions    amLODIPine (NORVASC) 5 mg, Oral, Daily    aspirin (ECOTRIN) 81 mg, Oral, Daily    atorvastatin (LIPITOR) 40 mg, Oral, Nightly    fluticasone propionate (FLONASE) 50 mcg, Each Nostril, Daily    fluticasone-umeclidin-vilanter (TRELEGY ELLIPTA) 100-62.5-25 mcg DsDv 1 puff, Inhalation, Daily    hydroCHLOROthiazide (HYDRODIURIL) 12.5 mg, Oral, Daily    ipratropium-albuteroL (COMBIVENT)  mcg/actuation inhaler 1 puff, Inhalation, 4 times daily, Rescue    lisinopriL 10 mg, Oral, Daily    loratadine (CLARITIN) 10 mg, Oral, Daily    nicotine (NICODERM CQ) 21 mg/24 hr 1 patch, Transdermal, Daily    ondansetron (ZOFRAN-ODT) 4 mg, Oral, Every 8 hours PRN    oxyCODONE-acetaminophen (PERCOCET) 5-325 mg per tablet 1 tablet, Oral, Every 6 hours PRN        PMH:  As per HPI and below:    Reviewed and updated in chart.    PAST MEDICAL HISTORY:  Past Medical History:   Diagnosis Date    ASTHMA     COPD (chronic obstructive pulmonary disease)     SEVERE    High cholesterol     HLD (hyperlipidemia)     HTN (hypertension)     Lung cancer 12/08/2022    invasive adenocarcinoma, poorly differentiated with solid morphology, focal tumor involvement of visceral pleura and focal tumor vascular invasion (vein), with 2 of 3 posterior mediastinal lymph nodes positive for metastatic carcinoma    Stage 2 chronic kidney disease     TIA (transient ischemic attack)     Tobacco abuse     Unspecified osteoarthritis, unspecified site     Uterine cancer         PAST SURGICAL HISTORY:  Past Surgical History:   Procedure Laterality Date    lipoma multiple sites      LUNG LOBECTOMY Right 12/08/2022    Procedure: LOBECTOMY, LUNG;  Surgeon: Jass Browne IV, MD;  Location: [facility];  Service:  Cardiothoracic;  Laterality: Right;    SURGICAL REMOVAL OF LYMPH NODE  2022    Procedure: EXCISION, LYMPH NODE;  Surgeon: Jass Browne IV, MD;  Location: [facility] OR;  Service: Cardiothoracic;;    THORACOTOMY Right 2022    Procedure: THORACOTOMY;  Surgeon: Jass Browne IV, MD;  Location: [facility] OR;  Service: Cardiothoracic;  Laterality: Right;  Right Lower Lobectomy with Lymph Node Dissection    TOTAL ABDOMINAL HYSTERECTOMY W/ BILATERAL SALPINGOOPHORECTOMY         SOCIAL HISTORY:  Social History     Tobacco Use    Smoking status: Former     Packs/day: 0.50     Years: 50.00     Pack years: 25.00     Types: Cigarettes     Quit date: 2022     Years since quittin.0    Smokeless tobacco: Never   Substance Use Topics    Alcohol use: Not Currently     Comment: quit 25years ago    Drug use: Never       FAMILY HISTORY:  Family History   Problem Relation Age of Onset    Hypertension Mother     Hepatitis Mother     Ovarian cancer Mother     Heart failure Father     Fibroids Sister     Asthma Brother     Asthma Brother         PROBLEM LIST:  Patient Active Problem List   Diagnosis    Stage 3 severe COPD by GOLD classification    Tobacco dependence with current use    STUBBS (dyspnea on exertion)    Hyperlipidemia    Hypertension    Stage 2 chronic kidney disease    Chronic right-sided low back pain without sciatica    Solitary pulmonary nodule    Chronic obstructive pulmonary disease with (acute) exacerbation    Wellness examination    Needs flu shot    Encounter for screening mammogram for malignant neoplasm of breast    Lung mass        PHYSICAL EXAM:      ED Triage Vitals [22 0808]   BP (!) 92/54   Pulse 104   Resp 18   Temp 97.7 °F (36.5 °C)   SpO2 99 %        Vital Signs: Reviewed As In Chart.  General:  Alert, No Cardiorespiratory Distress Noted.  Anxious and depressed facies  Skin: Normal For Ethnic Origin  Eye:  Extraocular Movements Are Intact.   ENT: Mucus membranes are moist.    Cardiovascular:  Regular Rate And Rhythm, No Murmur, No Pedal Edema.    Respiratory:  Respirations Nonlabored, No Respiratory Distress, Good Bilateral Air Entry, No Rales, No Rhonchi.  Mild tenderness in the right lateral chest wall,   Musculoskeletal:  No Gross Deformity Noted.   Gastrointestinal:  Soft, Non Distended, mild upper abdominal Tenderness, Normal Bowel Sounds.    Neurological:  Alert And Oriented To Person, Place, Time, And Situation, Normal Motor Observed, Normal Speech Observed.    Psychiatric:  Cooperative, anxious, depressed.    INITIAL IMPRESSION/ DIFFERENTIAL DX:    MEDICAL DECISION MAKING:      Reviewed Nurses Note. Reviewed Vital Signs.     Reviewed Pertinent old records, History and updated as necessary.    62 y.o. female with Weakness (C/o generalized weakness, nausea, & poor appetite. Pt had right sided partial lobectomy on 12/8/22 for lung cancer)        MEDICAL DECISION MAKING:    Medical Decision Making  Patient with a recent diagnosis of cancer poorly differentiated locally invasive, is scheduled for evaluation by oncologist next week, stop taking pain medication because of making her very drowsy and sleepy and nauseated, taking Tylenol for pain, comes to the emergency room with complaint of generalized weakness, poor appetite, the smell of food makes her sick, has not been eating for 2-3 days now, having back pain, no particular shortness of breath or chest pain or major abdominal pain.  Just poor appetite.  Her blood pressure is running low, she is on 3 blood pressure medicines,    Amount and/or Complexity of Data Reviewed  Independent Historian: caregiver     Details: Daughter and  and patient  External Data Reviewed: labs and radiology.  Labs: ordered.  Radiology: ordered.  ECG/medicine tests: ordered and independent interpretation performed. Decision-making details documented in ED Course.        ED COURSE AND REEVALUATIONS:  Vitals:    12/22/22 0931   BP: 103/65   Pulse:  101   Resp: 17   Temp:    :  ED Course as of 12/22/22 1102   Thu Dec 22, 2022   0851 Mainly patient with generalized weakness, I am going to give her normal saline bolus 1 L, since her blood pressures to the low side, she is on 3 blood pressure medicines, her blood pressure has been running low for some time now, she has poor appetite, she is losing weight I will do CBC, CMP, EKG, UA and a chest x-ray and decide further. [GQ]   1058 Patient is feeling much better after about 2 L of normal saline bolus and feels stronger, also her blood sugar was low, we are going to give her some orange juice and I am going to put her on Zofran and I have advised them that they should stop her hydrochlorothiazide and they should monitor her blood pressure and see if other medicines can be cut down also, family reports that her blood pressure always runs low now, she is feeling better, I have advised him to talk to the family doctor about putting her on some medicine for depression and keep an appointment with the oncologist and I will let her go home. [GQ]      ED Course User Index  [GQ] Cleo Kumar MD           ED WORKUP FOR DECISION MAKING:  ED ORDERS:  Orders Placed This Encounter   Procedures    X-Ray Chest 1 View    CBC auto differential    Comprehensive metabolic panel    Urinalysis, Reflex to Urine Culture Urine, Clean Catch    COVID/FLU A&B PCR    CBC with Differential    EKG 12-lead    Insert Saline lock IV       ED MEDICINES:  Medications   sodium chloride 0.9% bolus 1,000 mL 1,000 mL (0 mLs Intravenous Stopped 12/22/22 0944)   sodium chloride 0.9% bolus 1,000 mL 1,000 mL (1,000 mLs Intravenous New Bag 12/22/22 0944)   ondansetron injection 4 mg (4 mg Intravenous Given 12/22/22 0944)            ECG Results              EKG 12-lead (Preliminary result)  Result time 12/22/22 09:39:40      ED Interpretation by Cleo Kumar MD (12/22/22 09:39:40, [facility] Emergency Dept, Emergency Medicine)    EKG:  Interpreted by Cleo Kumar MD. independently as Normal Sinus Rhythm, Rate 97, Normal Axis, Normal Intervals., No STEMI, low-voltage with some left atrial enlargement.                                      ED LABS ORDERED AND REVIEWED:  Admission on 12/22/2022   Component Date Value Ref Range Status    POCT Glucose 12/22/2022 80  70 - 110 mg/dL Final    Sodium Level 12/22/2022 127 (L)  136 - 145 mmol/L Final    Potassium Level 12/22/2022 4.4  3.5 - 5.1 mmol/L Final    Chloride 12/22/2022 94 (L)  98 - 107 mmol/L Final    Carbon Dioxide 12/22/2022 21 (L)  23 - 31 mmol/L Final    Glucose Level 12/22/2022 76 (L)  82 - 115 mg/dL Final    Blood Urea Nitrogen 12/22/2022 23.0 (H)  9.8 - 20.1 mg/dL Final    Creatinine 12/22/2022 1.13 (H)  0.55 - 1.02 mg/dL Final    Calcium Level Total 12/22/2022 9.7  8.4 - 10.2 mg/dL Final    Protein Total 12/22/2022 7.7 (H)  5.8 - 7.6 gm/dL Final    Albumin Level 12/22/2022 3.3 (L)  3.4 - 4.8 g/dL Final    Globulin 12/22/2022 4.4 (H)  2.4 - 3.5 gm/dL Final    Albumin/Globulin Ratio 12/22/2022 0.8 (L)  1.1 - 2.0 ratio Final    Bilirubin Total 12/22/2022 0.5  <=1.5 mg/dL Final    Alkaline Phosphatase 12/22/2022 116  40 - 150 unit/L Final    Alanine Aminotransferase 12/22/2022 11  0 - 55 unit/L Final    Aspartate Aminotransferase 12/22/2022 15  5 - 34 unit/L Final    eGFR 12/22/2022 54  mls/min/1.73/m2 Final    Color, UA 12/22/2022 Yellow  Yellow, Light-Yellow, Dark Yellow, [place], Straw Final    Appearance, UA 12/22/2022 Clear  Clear Final    Specific Gravity, UA 12/22/2022 1.010   Final    pH, UA 12/22/2022 5.0  5.0 - 8.5 Final    Protein, UA 12/22/2022 Negative  Negative mg/dL Final    Glucose, UA 12/22/2022 Negative  Negative, Normal mg/dL Final    Ketones, UA 12/22/2022 15 (A)  Negative mg/dL Final    Blood, UA 12/22/2022 Negative  Negative unit/L Final    Bilirubin, UA 12/22/2022 Negative  Negative mg/dL Final    Urobilinogen, UA 12/22/2022 0.2  0.2, 1.0, Normal mg/dL Final    Nitrites,  UA 12/22/2022 Negative  Negative Final    Leukocyte Esterase, UA 12/22/2022 Negative  Negative unit/L Final    Influenza A PCR 12/22/2022 Not Detected  Not Detected Final    Influenza B PCR 12/22/2022 Not Detected  Not Detected Final    SARS-CoV-2 PCR 12/22/2022 Not Detected  Not Detected Final    WBC 12/22/2022 9.6  4.5 - 11.5 x10(3)/mcL Final    RBC 12/22/2022 4.51  4.20 - 5.40 x10(6)/mcL Final    Hgb 12/22/2022 12.0  12.0 - 16.0 gm/dL Final    Hct 12/22/2022 38.5  37.0 - 47.0 % Final    MCV 12/22/2022 85.4  80.0 - 94.0 fL Final    MCH 12/22/2022 26.6  pg Final    MCHC 12/22/2022 31.2 (L)  33.0 - 36.0 mg/dL Final    RDW 12/22/2022 12.5  11.0 - 14.5 % Final    Platelet 12/22/2022 424 (H)  140 - 371 x10(3)/mcL Final    MPV 12/22/2022 9.3 (L)  9.4 - 12.4 fL Final    Neut % 12/22/2022 71.3  % Final    Lymph % 12/22/2022 16.8  % Final    Mono % 12/22/2022 7.2  % Final    Eos % 12/22/2022 3.7  % Final    Basophil % 12/22/2022 0.6  % Final    Lymph # 12/22/2022 1.61  0.6 - 4.6 x10(3)/mcL Final    Neut # 12/22/2022 6.83  2.1 - 9.2 x10(3)/mcL Final    Mono # 12/22/2022 0.69  0.1 - 1.3 x10(3)/mcL Final    Eos # 12/22/2022 0.35  0 - 0.9 x10(3)/mcL Final    Baso # 12/22/2022 0.06  0 - 0.2 x10(3)/mcL Final    IG# 12/22/2022 0.04  0 - 0.04 x10(3)/mcL Final    IG% 12/22/2022 0.4  % Final       RADIOLOGY STUDIES ORDERED AND REVIEWED:  Imaging Results              X-Ray Chest 1 View (Final result)  Result time 12/22/22 10:49:29      Final result by Simon Westfall MD (12/22/22 10:49:29)                   Impression:      1. Persistent opacification right lower lung  2. Postsurgical changes right hilum      Electronically signed by: Simon Westfall  Date:    12/22/2022  Time:    10:49               Narrative:    EXAMINATION:  XR CHEST 1 VIEW    CLINICAL HISTORY:  , Dorsalgia, unspecified.    COMPARISON:  12/12/2022    FINDINGS:  An AP view or more reveals the heart to be normal in size.  There is continued opacification of the right  lower lung.  The trachea is to the right of midline.  The left lung is relatively clear.                        ED Interpretation by Cleo Kumar MD (12/22/22 09:05:31, [facility] - Emergency Dept, Emergency Medicine)    Chest one view:  Loss of volume on the right side due to lobectomy, no other particular consolidation identified.                                    ED COURSE AND REEVALUATIONS:  Vitals:    12/22/22 0931   BP: 103/65   Pulse: 101   Resp: 17   Temp:        PROCEDURES PERFORMED IN ED:  Procedures    DIAGNOSTIC IMPRESSION:      1. Hyponatremia    2. Back pain    3. Weakness    4. Depression, unspecified depression type         ED Disposition Condition    Discharge Stable               Medication List        ASK your doctor about these medications      amLODIPine 5 MG tablet  Commonly known as: NORVASC  Take 1 tablet (5 mg total) by mouth once daily.     aspirin 81 MG EC tablet  Commonly known as: ECOTRIN  Take 1 tablet (81 mg total) by mouth once daily.     atorvastatin 40 MG tablet  Commonly known as: LIPITOR  Take 1 tablet (40 mg total) by mouth every evening.     fluticasone propionate 50 mcg/actuation nasal spray  Commonly known as: FLONASE  1 spray (50 mcg total) by Each Nostril route once daily.     fluticasone-umeclidin-vilanter 100-62.5-25 mcg Dsdv  Commonly known as: TRELEGY ELLIPTA     hydroCHLOROthiazide 12.5 MG Tab  Commonly known as: HYDRODIURIL  Take 1 tablet (12.5 mg total) by mouth once daily.     ipratropium-albuteroL  mcg/actuation inhaler  Commonly known as: CombiVENT  Inhale 1 puff into the lungs 4 (four) times daily. Rescue     lisinopriL 10 MG tablet  Take 1 tablet (10 mg total) by mouth once daily.     loratadine 10 mg tablet  Commonly known as: CLARITIN  Take 1 tablet (10 mg total) by mouth once daily.     nicotine 21 mg/24 hr  Commonly known as: NICODERM CQ  Place 1 patch onto the skin once daily.     ondansetron 4 MG Tbdl  Commonly known as:  ZOFRAN-ODT  Take 1 tablet (4 mg total) by mouth every 8 (eight) hours as needed.     oxyCODONE-acetaminophen 5-325 mg per tablet  Commonly known as: PERCOCET  Take 1 tablet by mouth every 6 (six) hours as needed for Pain.                Follow-up Information       PAULINE Loya In 2 days.    Specialty: Family Medicine  Contact information:  [address] [id number] [phone]                              ED Prescriptions    None       Follow-up Information       Follow up With Specialties Details Why Contact Info    PAULINE Loya [facility] In 2 days  [address] [facility] [id number] [phone]                 Cleo Kumar MD  12/22/22 4424

## 2022-12-27 ENCOUNTER — OFFICE VISIT (OUTPATIENT)
Dept: INTERNAL MEDICINE | Facility: CLINIC | Age: 62
End: 2022-12-27
Payer: MEDICAID

## 2022-12-27 VITALS
HEART RATE: 84 BPM | HEIGHT: 67 IN | RESPIRATION RATE: 20 BRPM | SYSTOLIC BLOOD PRESSURE: 104 MMHG | TEMPERATURE: 98 F | DIASTOLIC BLOOD PRESSURE: 71 MMHG | BODY MASS INDEX: 23.17 KG/M2 | WEIGHT: 147.63 LBS | OXYGEN SATURATION: 100 %

## 2022-12-27 DIAGNOSIS — J44.9 STAGE 3 SEVERE COPD BY GOLD CLASSIFICATION: Chronic | ICD-10-CM

## 2022-12-27 DIAGNOSIS — F17.200 TOBACCO DEPENDENCE WITH CURRENT USE: Chronic | ICD-10-CM

## 2022-12-27 DIAGNOSIS — R91.8 LUNG MASS: ICD-10-CM

## 2022-12-27 DIAGNOSIS — R91.8 LUNG MASS: Primary | ICD-10-CM

## 2022-12-27 DIAGNOSIS — I10 PRIMARY HYPERTENSION: Primary | Chronic | ICD-10-CM

## 2022-12-27 PROCEDURE — 1160F RVW MEDS BY RX/DR IN RCRD: CPT | Mod: CPTII,,, | Performed by: NURSE PRACTITIONER

## 2022-12-27 PROCEDURE — 1160F PR REVIEW ALL MEDS BY PRESCRIBER/CLIN PHARMACIST DOCUMENTED: ICD-10-PCS | Mod: CPTII,,, | Performed by: NURSE PRACTITIONER

## 2022-12-27 PROCEDURE — 3066F NEPHROPATHY DOC TX: CPT | Mod: CPTII,,, | Performed by: NURSE PRACTITIONER

## 2022-12-27 PROCEDURE — 1111F DSCHRG MED/CURRENT MED MERGE: CPT | Mod: CPTII,,, | Performed by: NURSE PRACTITIONER

## 2022-12-27 PROCEDURE — 3066F PR DOCUMENTATION OF TREATMENT FOR NEPHROPATHY: ICD-10-PCS | Mod: CPTII,,, | Performed by: NURSE PRACTITIONER

## 2022-12-27 PROCEDURE — 3078F PR MOST RECENT DIASTOLIC BLOOD PRESSURE < 80 MM HG: ICD-10-PCS | Mod: CPTII,,, | Performed by: NURSE PRACTITIONER

## 2022-12-27 PROCEDURE — 99215 OFFICE O/P EST HI 40 MIN: CPT | Mod: PBBFAC | Performed by: NURSE PRACTITIONER

## 2022-12-27 PROCEDURE — 4010F ACE/ARB THERAPY RXD/TAKEN: CPT | Mod: CPTII,,, | Performed by: NURSE PRACTITIONER

## 2022-12-27 PROCEDURE — 3074F SYST BP LT 130 MM HG: CPT | Mod: CPTII,,, | Performed by: NURSE PRACTITIONER

## 2022-12-27 PROCEDURE — 3008F BODY MASS INDEX DOCD: CPT | Mod: CPTII,,, | Performed by: NURSE PRACTITIONER

## 2022-12-27 PROCEDURE — 99213 PR OFFICE/OUTPT VISIT, EST, LEVL III, 20-29 MIN: ICD-10-PCS | Mod: S$PBB,,, | Performed by: NURSE PRACTITIONER

## 2022-12-27 PROCEDURE — 3061F PR NEG MICROALBUMINURIA RESULT DOCUMENTED/REVIEW: ICD-10-PCS | Mod: CPTII,,, | Performed by: NURSE PRACTITIONER

## 2022-12-27 PROCEDURE — 4010F PR ACE/ARB THEARPY RXD/TAKEN: ICD-10-PCS | Mod: CPTII,,, | Performed by: NURSE PRACTITIONER

## 2022-12-27 PROCEDURE — 1159F PR MEDICATION LIST DOCUMENTED IN MEDICAL RECORD: ICD-10-PCS | Mod: CPTII,,, | Performed by: NURSE PRACTITIONER

## 2022-12-27 PROCEDURE — 99213 OFFICE O/P EST LOW 20 MIN: CPT | Mod: S$PBB,,, | Performed by: NURSE PRACTITIONER

## 2022-12-27 PROCEDURE — 3078F DIAST BP <80 MM HG: CPT | Mod: CPTII,,, | Performed by: NURSE PRACTITIONER

## 2022-12-27 PROCEDURE — 1159F MED LIST DOCD IN RCRD: CPT | Mod: CPTII,,, | Performed by: NURSE PRACTITIONER

## 2022-12-27 PROCEDURE — 3074F PR MOST RECENT SYSTOLIC BLOOD PRESSURE < 130 MM HG: ICD-10-PCS | Mod: CPTII,,, | Performed by: NURSE PRACTITIONER

## 2022-12-27 PROCEDURE — 1111F PR DISCHARGE MEDS RECONCILED W/ CURRENT OUTPATIENT MED LIST: ICD-10-PCS | Mod: CPTII,,, | Performed by: NURSE PRACTITIONER

## 2022-12-27 PROCEDURE — 3061F NEG MICROALBUMINURIA REV: CPT | Mod: CPTII,,, | Performed by: NURSE PRACTITIONER

## 2022-12-27 PROCEDURE — 3008F PR BODY MASS INDEX (BMI) DOCUMENTED: ICD-10-PCS | Mod: CPTII,,, | Performed by: NURSE PRACTITIONER

## 2022-12-27 NOTE — PROGRESS NOTES
HEMATOLOGY / ONCOLOGY   CLINIC NOTE     ONCOLOGICAL HISTORY:     Diagnosis:  - NSCLC / Adenocarcinoma Stage IIIA pT2a pN2    Treatment History:  - 12/08/2022: Right lower and middle bilobectomy with mediastinal lymph node dissection.     Current Treatment:   -     Subjective:       Chief Complaint: Lung Cancer (NPO---Lung Cancer)          YOANA Jin  62 y.o.  female with past medical history significant for HTN, HLD, CKD Stage 2, COPD Stage 3, referred for management of NSCLC s/p RLL / RML lobectomy with Mediastinal Lymph Node Dissection on 12/8/2022.     She was noted to have RLL lung mass and underwent CT guided biopsy which was non diagnostic. PET scan demonstrated significant avidity within the lesion. Given high risk for malignancy, she underwent right lower lobectomy and right middle lobectomy on 12/08/2022. Pathology came back with invasive adenocarcinoma, poorly differentiated with solid morphology, focal tumor involvement of visceral pleura and focal tumor vascular invasion (vein), with mediastinal lymph nodes positive for metastatic carcinoma.      Patient is here today for initial evaluation and to be started on chemotherapy.  Patient is on continuous home oxygen since surgery.  She is complaining of some discomfort and pain in site of surgery. Denies any other concerns.  Patient was also noted to have thrombophlebitis of the cephalic vein, in the cubital region after blood draw, which was noted on during the previous visit and was started on aspirin and is almost resolved now.      Interval History:         Past Medical History:   Diagnosis Date    ASTHMA     COPD (chronic obstructive pulmonary disease)     SEVERE    High cholesterol     HLD (hyperlipidemia)     HTN (hypertension)     Lung cancer 12/08/2022    invasive adenocarcinoma, poorly differentiated with solid morphology, focal tumor involvement of visceral pleura and focal tumor vascular invasion (vein), with 2 of 3 posterior  mediastinal lymph nodes positive for metastatic carcinoma    Stage 2 chronic kidney disease     TIA (transient ischemic attack)     Tobacco abuse     Unspecified osteoarthritis, unspecified site     Uterine cancer       Past Surgical History:   Procedure Laterality Date    lipoma multiple sites      LUNG LOBECTOMY Right 2022    Procedure: LOBECTOMY, LUNG;  Surgeon: Jass Browne IV, MD;  Location: [facility] OR;  Service: Cardiothoracic;  Laterality: Right;    SURGICAL REMOVAL OF LYMPH NODE  2022    Procedure: EXCISION, LYMPH NODE;  Surgeon: Jass Browne IV, MD;  Location: [facility] OR;  Service: Cardiothoracic;;    THORACOTOMY Right 2022    Procedure: THORACOTOMY;  Surgeon: Jass Browne IV, MD;  Location: [facility] OR;  Service: Cardiothoracic;  Laterality: Right;  Right Lower Lobectomy with Lymph Node Dissection    TOTAL ABDOMINAL HYSTERECTOMY W/ BILATERAL SALPINGOOPHORECTOMY       Social History     Socioeconomic History    Marital status: Single    Number of children: 5   Tobacco Use    Smoking status: Former     Packs/day: 0.50     Years: 50.00     Pack years: 25.00     Types: Cigarettes     Quit date: 2022     Years since quittin.0    Smokeless tobacco: Never   Substance and Sexual Activity    Alcohol use: Not Currently     Comment: quit 25years ago    Drug use: Never    Sexual activity: Yes     Partners: Male      Family History   Problem Relation Age of Onset    Hypertension Mother     Hepatitis Mother     Ovarian cancer Mother     Liver cancer Mother     Heart failure Father     Prostate cancer Father     Fibroids Sister     Asthma Brother     Asthma Brother     Cancer Maternal Grandfather       Review of patient's allergies indicates:  No Known Allergies     Review of Systems   Constitutional: Negative.  Negative for activity change, chills, fatigue and fever.   HENT: Negative.     Eyes: Negative.    Respiratory:  Negative for cough and shortness of breath.    Cardiovascular:   Negative for chest pain and leg swelling.   Gastrointestinal:  Negative for constipation, diarrhea, nausea and vomiting.   Endocrine: Negative.    Genitourinary: Negative.    Musculoskeletal:  Negative for arthralgias and myalgias.   Integumentary:  Negative.   Allergic/Immunologic: Negative.    Neurological:  Negative for light-headedness, numbness and headaches.   Hematological: Negative.    Psychiatric/Behavioral: Negative.         Objective:        Vitals:    12/28/22 1505   BP: (!) 89/58   Pulse: 90   Resp: 18   Temp: 97.7 °F (36.5 °C)        Physical Exam  Constitutional:       General: She is not in acute distress.     Appearance: She is well-developed. She is not ill-appearing.   HENT:      Head: Normocephalic and atraumatic.      Mouth/Throat:      Mouth: Mucous membranes are moist.   Eyes:      Extraocular Movements: Extraocular movements intact.      Conjunctiva/sclera: Conjunctivae normal.   Cardiovascular:      Rate and Rhythm: Normal rate and regular rhythm.      Heart sounds: Normal heart sounds.   Pulmonary:      Effort: Pulmonary effort is normal. No respiratory distress.      Breath sounds: No wheezing.   Abdominal:      General: Bowel sounds are normal. There is no distension.      Palpations: Abdomen is soft.      Tenderness: There is no abdominal tenderness.   Musculoskeletal:         General: Normal range of motion.      Cervical back: Normal range of motion and neck supple.   Skin:     General: Skin is warm.   Neurological:      General: No focal deficit present.      Mental Status: She is alert and oriented to person, place, and time. Mental status is at baseline.      Cranial Nerves: No cranial nerve deficit.   Psychiatric:         Mood and Affect: Mood normal.         LABS / IMAGING        IMAGING:     - 08/10/2022 PET: The elongated subpleural right lower lobe nodule demonstrates fairly intense FDG activity.  Infectious, inflammatory and neoplastic etiologies remain possible. No suspicious  PET findings elsewhere.     - 12/08/2022 CT HEAD without contrast: No acute intracranial abnormality identified.  Findings of mild microvascular ischemic disease.     - 12/2022: U/S Upper extremity: Thrombophlebitis of the left cephalic vein. No deep venous thrombosis in the left upper extremity.    PATHOLOGY:  - 12/08/2022: Invasive solid adenocarcinoma (3.1 CM); G3, poorly differentiated. Visceral Pleura Invasion present without definite serosal surface involvement. Lymphovascular invasion (Vein) present. All margins negative for invasive carcinoma, distance 2.5 cm. LN - 4/14 (10R: Hilar, Posterior mediastinal (RACHEAL), 4R Lower paratracheal, 9R Pulmonary ligament, 10R). pT2a pN2      Assessment:     ECOG Performance status: 2     NSCLC / Adenocarcinoma Stage IIIA pT2a pN2  - 12/08/2022: Right lower and middle bilobectomy with mediastinal lymph node dissection. Path: Invasive adenocarcinoma, poorly differentiated, focal tumor involvement of visceral pleura and focal tumor vascular invasion (vein), with mediastinal lymph nodes positive for metastatic carcinoma      Plan:     - Discussed pathology findings with patient and called and discussed with radiation oncology for evaluation for radiation therapy as pathology showing pleural invasion, LVI, RACHEAL and positive lymph nodes. Pt will be seen next week by radiation oncology.   - Also will need to have MRI brain to rule out metastatic disease. Pt had previous PET scan done in 08/10/2022, so will repeat PET scan to rule out metastatic disease.   - Requested NGS and PD-L1 on tissue specimen.   - Referred to audiology for evaluation as likely planning to start on cisplatin if ruled out metastatic disease  - MD VISIT - 1 WEEK to follow up on the workup to rule out metastatic disease and radiation oncology recommendation and to be started immediately on treatment likely chemotherapy    The patient was seen, interviewed and examined. Pertinent lab and radiology studies were  reviewed. Pt instructed to call should develop concerning signs/symptoms or have further questions.       Portions of the record may have been created with voice recognition software. Occasional wrong-word or sound-a-like substitutions may have occurred due to the inherent limitations of voice recognition software. Read the chart carefully and recognize, using context, where substitutions have occurred.    Spencer Murray MD  Hematology / Oncology

## 2022-12-27 NOTE — PROGRESS NOTES
Subjective:       Patient ID: Mansi Jin is a 62 y.o. female.    Chief Complaint: Shortness of Breath (States has sob when walking.Blood pressure been running low./No bp medication since Thursday.)    Patient has diagnosis of HTN, HLD, CKD Stage 2, COPD Stage 3, Right Lower and Middle Bilobectomy with Mediastinal Lymph Node Dissection S/T Lung Mass (12/8/2022), Tobacco Use. Patient seen in clinic today for Hypotension. Patient last seen in clinic on 11/22/2022. Patient has lumbar X-ray completed on 11/22/2022; indicated degenerative listhesis related to facet arthropathy in the lower lumbar spine, similar to prior. Patient referred to physical therapy. Today, patient states she has been having low blood pressures. States she stopped taking her medications on Thursday, 12/22/2022, and blood pressure has been WNL. Patient currently on Amlodipine 5 mg daily, HCTZ 12.5 mg daily, Lisinopril 10 mg daily. Patient's daughter present during visit, states she checks B/P daily. Had a recent ED visit due to low B/P, was told to stop all medication but worried about stopping all B/P medications. B/P today 104/71, patient did not take B/P medications. Patient recently has RLL and RML Bilobectomy due to lung mass on 12/08/2022. States she stopped smoking since surgery and is still currently using Nicotine patches. Patient also states currently on 2L O2 per NC since surgery. O2 100%. Patient denies any acute complaints. Patient has appointment with oncology scheduled for 12/28/2022.      Patient seen in Ed on 12/22/2022. Patient with history of COPD, is brought to the emergency room with complaint of generalized weakness, nausea and poor appetite.  Patient had a lobectomy on 12/8/2022 of the right lung, she was put on pain medication but it was making her very drowsy and sleepy and the she was getting nausea so she stopped it, she was told to take Tylenol, also she is running low blood pressure, but she was told that they should  talk to the cancer doctor about the blood pressure medication, they have an scheduled appointment with the cancer doctor next week to plan the further treatment, in the meantime she is just having generalized weakness, pain, nausea, poor appetite, weight loss, and she is not able to eat the food because the smell of the food makes her sick.  She is able to swallow. Patient with a recent diagnosis of cancer poorly differentiated locally invasive, is scheduled for evaluation by oncologist next week, stop taking pain medication because of making her very drowsy and sleepy and nauseated, taking Tylenol for pain, comes to the emergency room with complaint of generalized weakness, poor appetite, the smell of food makes her sick, has not been eating for 2-3 days now, having back pain, no particular shortness of breath or chest pain or major abdominal pain.  Just poor appetite.  Her blood pressure is running low, she is on 3 blood pressure medicines.     Patient has surgery on 12/08/2022. Patient is a 62 y.o. female presents referred by Dr. Ashraf for evaluation of an enlarging right lower lobe lung mass.  She is a smoker who has been followed for a right lower lobe lung mass and is now enlarged.  She has attempted CT-guided lung biopsy that was nondiagnostic.  It is highly PET avid.  Her FEV1 is 1.4.  The mass is highly suspicious for a malignancy.  I do think the patient will benefit from a right lower pulmonary lobectomy and mediastinal lymph node dissection.  I discussed the risks, benefits, and alternatives in great detail with the patient and her daughter.  They understand and she would like to proceed as soon as possible.  We will plan for surgery next week. Patient underwent Right lower and middle bilobectomy with mediastinal lymph node dissection on 12/8/22 and was transferred to ICU from PACU. Once hemodynamically stable the patient was transferred to telemetry. Chest tubes were pulled once output was less than  200cc per 24 hours. Prior to discharge chest xray showed no acute changes from chest tube removal. Hospital course without complication.     Patient followed by Pulmonology Clinic. Last appointment on 12/21/2022. Ms. Jin is a 63yo female with RLL lung mass who has been seen in lung mass clinic and underwent CT guided biopsy which returned as non diagnostic due to insufficient tissue.  PET scanning performed demonstrated significant avidity within the lesion.  Given her high risk for pulmonary malignancy due to active tobacco abuse and PET avid lung mass, the decision was made to refer to thoracic surgery for consideration of resection.  She underwent right lower lobectomy and right middle lobectomy on 12/08/2022. Pathology came back with invasive adenocarcinoma, poorly differentiated with solid morphology, focal tumor involvement of visceral pleura and focal tumor vascular invasion (vein), with 2 of 3 posterior mediastinal lymph nodes positive for metastatic carcinoma. Here today for hospital follow up. Still with ongoing  weakness, nausea, and needing oxygen continuously. Daughter concerned about left upper extremity erythema, edema, and tenderness. Dx: Right lung mass status post right middle lobe and right lower lobe lobectomy on 12/08/2022 -- Pathology came back with invasive adenocarcinoma, poorly differentiated with solid morphology, focal tumor involvement of visceral pleura and focal tumor vascular invasion (vein), with 2 of 3 posterior mediastinal lymph nodes positive for metastatic carcinom, stage III A; Stage III COPD; Hypoxia; Left upper extremity edema. Discussed pathology findings with patient and that she will need chemotherapy and oncology referral to [facility] oncology clinic -- [facility] panel and PDL-1 testing to be done on tumor. Sending patient for NIVA of LUE to be done today - if DVT present then will consider CT PE protocol due to ongoing hypoxia. Continue  trelegy and combivent. Continue oxygen, montior o2 sats at home and instructed if staying below 88 then to present to nearest ED. Needs close follow up in [facility] pulmonary clinic as well as with [facility] oncology.    Patient followed by CV Surgeon, Dr. Browne. Last appointment on 2022. Patient is a 62 y.o. female presents referred by Dr. Ashraf for evaluation of an enlarging right lower lobe lung mass.  She is a smoker who has been followed for a right lower lobe lung mass and is now enlarged.  She has attempted CT-guided lung biopsy that was nondiagnostic.  It is highly PET avid.  Her FEV1 is 1.4.  The mass is highly suspicious for a malignancy.  I do think the patient will benefit from a right lower pulmonary lobectomy and mediastinal lymph node dissection.  I discussed the risks, benefits, and alternatives in great detail with the patient and her daughter.  They understand and she would like to proceed as soon as possible.  We will plan for surgery next week. Dx: Lung mass, Right lower lobe lung mass, COPD with FEV1 1.41, Hypertension, Hyperlipidemia, Chronic kidney disease stage II, Tobacco abuse. Planning right lower lobe pulmonary lobectomy with lymph node dissection. Patient has follow up appointment scheduled for 01/10/2023.         Mammogram: ordered 2022  Pap: Hysterectomy, GYN referral 2022  Cologuard: 2022; negative  Lung Cancer Screenin2022  Bone Density: deferred due to age  Medicare Wellness: N/A    Review of Systems   Constitutional: Negative.    HENT: Negative.     Eyes: Negative.    Respiratory: Negative.     Cardiovascular: Negative.    Gastrointestinal: Negative.    Endocrine: Negative.    Genitourinary: Negative.    Musculoskeletal: Negative.    Integumentary:  Negative.   Allergic/Immunologic: Negative.    Neurological: Negative.    Hematological: Negative.    Psychiatric/Behavioral: Negative.         Objective:      Physical Exam  Vitals reviewed.   Constitutional:        Appearance: Normal appearance.   HENT:      Head: Normocephalic and atraumatic.      Mouth/Throat:      Mouth: Mucous membranes are moist.      Pharynx: Oropharynx is clear.   Eyes:      Extraocular Movements: Extraocular movements intact.      Conjunctiva/sclera: Conjunctivae normal.      Pupils: Pupils are equal, round, and reactive to light.   Cardiovascular:      Rate and Rhythm: Normal rate and regular rhythm.      Heart sounds: Normal heart sounds.   Pulmonary:      Effort: Pulmonary effort is normal.      Breath sounds: Normal breath sounds.   Abdominal:      General: Bowel sounds are normal.   Musculoskeletal:         General: Normal range of motion.      Cervical back: Normal range of motion.   Skin:     General: Skin is warm and dry.   Neurological:      Mental Status: She is alert and oriented to person, place, and time.   Psychiatric:         Mood and Affect: Mood normal.         Behavior: Behavior normal.       Assessment:       Problem List Items Addressed This Visit          Pulmonary    Stage 3 severe COPD by GOLD classification (Chronic)     PFTs on 2022: moderate obstruction, decreased DLCO suggest altered pulmonary gas exchange surface, possible emphysema  Continue Trelegy Ellipta daily  Continue Combivent inhaler QID prn SOB  Followed by Pulmonology         Lung mass     RLL and RML Bilobectomy on 2022  Followed by Pulmonology, CV Surgeon, Oncology            Cardiac/Vascular    Hypertension - Primary (Chronic)     B/P: 104/710  EK2021  Stop Amlodipine 5 mg daily, Lisinopril 10 mg daily, HCTZ 12.5 mg daily  DASH diet  Encouraged home blood pressure monitoring            Other    Tobacco dependence with current use (Chronic)     Patient stopped smoking 2022  Patient currently prescribed Nicotine replacement patches  Patient tolerating well            Plan:    Patient has follow up appointment scheduled for 2023.

## 2022-12-27 NOTE — ASSESSMENT & PLAN NOTE
PFTs on 09/02/2022: moderate obstruction, decreased DLCO suggest altered pulmonary gas exchange surface, possible emphysema  Continue Trelegy Ellipta daily  Continue Combivent inhaler QID prn SOB  Followed by Pulmonology

## 2022-12-27 NOTE — ASSESSMENT & PLAN NOTE
Patient stopped smoking 12/08/2022  Patient currently prescribed Nicotine replacement patches  Patient tolerating well

## 2022-12-27 NOTE — ASSESSMENT & PLAN NOTE
B/P: 104/710  EK2021  Stop Amlodipine 5 mg daily, Lisinopril 10 mg daily, HCTZ 12.5 mg daily  DASH diet  Encouraged home blood pressure monitoring

## 2022-12-28 ENCOUNTER — OFFICE VISIT (OUTPATIENT)
Dept: HEMATOLOGY/ONCOLOGY | Facility: CLINIC | Age: 62
End: 2022-12-28
Payer: MEDICAID

## 2022-12-28 ENCOUNTER — TELEPHONE (OUTPATIENT)
Dept: CARDIAC SURGERY | Facility: CLINIC | Age: 62
End: 2022-12-28
Payer: MEDICAID

## 2022-12-28 VITALS
WEIGHT: 147 LBS | HEIGHT: 67 IN | TEMPERATURE: 98 F | RESPIRATION RATE: 18 BRPM | HEART RATE: 90 BPM | BODY MASS INDEX: 23.07 KG/M2 | DIASTOLIC BLOOD PRESSURE: 58 MMHG | OXYGEN SATURATION: 100 % | SYSTOLIC BLOOD PRESSURE: 89 MMHG

## 2022-12-28 DIAGNOSIS — R91.8 LUNG MASS: ICD-10-CM

## 2022-12-28 DIAGNOSIS — C34.91 NSCLC OF RIGHT LUNG: Primary | ICD-10-CM

## 2022-12-28 PROCEDURE — 3008F BODY MASS INDEX DOCD: CPT | Mod: CPTII,,, | Performed by: INTERNAL MEDICINE

## 2022-12-28 PROCEDURE — 1159F MED LIST DOCD IN RCRD: CPT | Mod: CPTII,,, | Performed by: INTERNAL MEDICINE

## 2022-12-28 PROCEDURE — 3074F SYST BP LT 130 MM HG: CPT | Mod: CPTII,,, | Performed by: INTERNAL MEDICINE

## 2022-12-28 PROCEDURE — 4010F PR ACE/ARB THEARPY RXD/TAKEN: ICD-10-PCS | Mod: CPTII,,, | Performed by: INTERNAL MEDICINE

## 2022-12-28 PROCEDURE — 4010F ACE/ARB THERAPY RXD/TAKEN: CPT | Mod: CPTII,,, | Performed by: INTERNAL MEDICINE

## 2022-12-28 PROCEDURE — 99204 PR OFFICE/OUTPT VISIT, NEW, LEVL IV, 45-59 MIN: ICD-10-PCS | Mod: S$PBB,,, | Performed by: INTERNAL MEDICINE

## 2022-12-28 PROCEDURE — 99215 OFFICE O/P EST HI 40 MIN: CPT | Mod: PBBFAC | Performed by: INTERNAL MEDICINE

## 2022-12-28 PROCEDURE — 99999 PR PBB SHADOW E&M-EST. PATIENT-LVL V: CPT | Mod: PBBFAC,,, | Performed by: INTERNAL MEDICINE

## 2022-12-28 PROCEDURE — 3066F PR DOCUMENTATION OF TREATMENT FOR NEPHROPATHY: ICD-10-PCS | Mod: CPTII,,, | Performed by: INTERNAL MEDICINE

## 2022-12-28 PROCEDURE — 3066F NEPHROPATHY DOC TX: CPT | Mod: CPTII,,, | Performed by: INTERNAL MEDICINE

## 2022-12-28 PROCEDURE — 3074F PR MOST RECENT SYSTOLIC BLOOD PRESSURE < 130 MM HG: ICD-10-PCS | Mod: CPTII,,, | Performed by: INTERNAL MEDICINE

## 2022-12-28 PROCEDURE — 99204 OFFICE O/P NEW MOD 45 MIN: CPT | Mod: S$PBB,,, | Performed by: INTERNAL MEDICINE

## 2022-12-28 PROCEDURE — 3061F PR NEG MICROALBUMINURIA RESULT DOCUMENTED/REVIEW: ICD-10-PCS | Mod: CPTII,,, | Performed by: INTERNAL MEDICINE

## 2022-12-28 PROCEDURE — 3078F DIAST BP <80 MM HG: CPT | Mod: CPTII,,, | Performed by: INTERNAL MEDICINE

## 2022-12-28 PROCEDURE — 99999 PR PBB SHADOW E&M-EST. PATIENT-LVL V: ICD-10-PCS | Mod: PBBFAC,,, | Performed by: INTERNAL MEDICINE

## 2022-12-28 PROCEDURE — 3078F PR MOST RECENT DIASTOLIC BLOOD PRESSURE < 80 MM HG: ICD-10-PCS | Mod: CPTII,,, | Performed by: INTERNAL MEDICINE

## 2022-12-28 PROCEDURE — 1111F PR DISCHARGE MEDS RECONCILED W/ CURRENT OUTPATIENT MED LIST: ICD-10-PCS | Mod: CPTII,,, | Performed by: INTERNAL MEDICINE

## 2022-12-28 PROCEDURE — 1159F PR MEDICATION LIST DOCUMENTED IN MEDICAL RECORD: ICD-10-PCS | Mod: CPTII,,, | Performed by: INTERNAL MEDICINE

## 2022-12-28 PROCEDURE — 1111F DSCHRG MED/CURRENT MED MERGE: CPT | Mod: CPTII,,, | Performed by: INTERNAL MEDICINE

## 2022-12-28 PROCEDURE — 3008F PR BODY MASS INDEX (BMI) DOCUMENTED: ICD-10-PCS | Mod: CPTII,,, | Performed by: INTERNAL MEDICINE

## 2022-12-28 PROCEDURE — 3061F NEG MICROALBUMINURIA REV: CPT | Mod: CPTII,,, | Performed by: INTERNAL MEDICINE

## 2022-12-28 NOTE — TELEPHONE ENCOUNTER
----- Message from Caitlyn Downs sent at 12/28/2022  3:46 PM CST -----  Dr. Murray would like to speak to him about this pt before he does a procedure on her  He is aware that he is out of town.     Dr. Murray [phone]

## 2022-12-29 ENCOUNTER — EXTERNAL HOME HEALTH (OUTPATIENT)
Dept: HOME HEALTH SERVICES | Facility: HOSPITAL | Age: 62
End: 2022-12-29
Payer: MEDICAID

## 2022-12-29 ENCOUNTER — DOCUMENTATION ONLY (OUTPATIENT)
Dept: HEMATOLOGY/ONCOLOGY | Facility: CLINIC | Age: 62
End: 2022-12-29
Payer: MEDICAID

## 2022-12-29 DIAGNOSIS — C34.91 NSCLC OF RIGHT LUNG: Primary | ICD-10-CM

## 2022-12-29 NOTE — PROGRESS NOTES
Oncology Navigation  Initial Assessment Done   Intake  Date of Diagnosis: 22  Cancer Type: Other  Initial Nurse Navigator Contact: 22  Appointment Date: 22  Multiple appointments: No  Reason for Treatment Delay: Further work-up     Treatment  Current Status: Staging work-up    Surgery: Completed  Type of Surgery: Right lower lobectomy and right middle lobectomy on 2022  Surgery Schedule Date: 22          Procedures: MRI; Genomic testing; PET scan             Support Systems: Spouse/significant other; Family members     Acuity  Surgical Procedure Complexity: 2 (Done on 2022,RLL, RML lobectomy with mediastinal lymph node dissection)  Treatment Tolerability: Has not started treatment yet/treatment fully completed and side effects resolved  ECO  Comorbidities in Medical History: 1  Hospitalization Within the Past Month: 2  Other Medical Factors (+2 each): Home medical equipment required (Patient is on home oxygen)   Needed: 0  Support: 0  Verbalizes Financial Concerns: 0  Transportation: 0  Mental Health: PHQ Score: 1  History of noncompliance/frequent no shows and cancellations: 0  Verbalizes the need for more education: 0  Other Factors (+1 for Each): 2  Navigation Acuity: 14     Follow Up  No follow-ups on file.   Patient came to visit with Significant Other and her daughter. Patient was in a wheelchair with Oxygen on. Patient stated that she only get short of breath with exertion. Patient cope with stress by being quiet and watching TV. Patient is still healing for big lung surgery on 22. Patient  was informed that Dr Murray wants her to be seen as soon as possible with Radiation/Oncologics.  Referral was send to Dr. Mireles by Dr Murray.

## 2022-12-29 NOTE — PHYSICIAN QUERY
PT Name: Mansi Jin  MR #: [id number]    DOCUMENTATION CLARIFICATION     CDS/: Abby German RN, CCDS             Contact information: [email or web address]  This form is a permanent document in the medical record.     Query Date: December 29, 2022    By submitting this query, we are merely seeking further clarification of documentation.  Please utilize your independent clinical judgment when addressing the question(s) below.    The medical record contains the following:  Pathology Findings Location in Medical Record   Final Diagnosis     1. LYMPH NODE, INFERIOR PULMONARY (EXCISION):     ONE LYMPH NODE NEGATIVE FOR METASTATIC CARCINOMA (0/1).     2. LYMPH NODE, POSTERIOR MEDIASTINAL (EXCISION):      TWO OF THREE LYMPH NODES POSITIVE FOR METASTATIC CARCINOMA (2/3).     3. LYMPH NODE, HILAR (EXCISION):     TWO OF THREE LYMPH NODES POSITIVE FOR METASTATIC CARCINOMA (2/3).     4. LYMPH NODE, PULMONARY 9R (EXCISION):     ONE LYMPH NODE NEGATIVE FOR METASTATIC CARCINOMA (0/1).     5. LUNG, RIGHT LOWER LOBE MASS (LOBECTOMY):       INVASIVE ADENOCARCINOMA (3.1 CM), POORLY DIFFERENTIATED, WITH SOLID MORPHOLOGY.     FOCAL TUMOR INVOLVEMENT OF VISCERAL PLEURA WITHOUT DEFINITE SEROSAL SURFACE INVOLVEMENT.     FOCAL TUMOR VASCULAR INVASION (VEIN).     BRONCHIAL SURGICAL RESECTION MARGIN CLEAR.      TWO PERIBRONCHIAL LYMPH NODES NEGATIVE FOR METASTATIC TUMOR (0/2).     6. LUNG, RIGHT MIDDLE LOBE (LOBECTOMY):     A) NO EVIDENCE OF MALIGNANCY IDENTIFIED.     B) FOCAL MODERATE PERIBRONCHIAL CHRONIC INFLAMMATION WITH MILD BRONCHIECTASIS, NON SPECIFIC.     C) TWO PERIBRONCHIAL LYMPH NODES NEGATIVE FOR METASTATIC CARCINOMA (0/2).     7. LYMPH NODE, PULMONARY 4R (EXCISION):    TWO LYMPH NODES NEGATIVE FOR METASTATIC CARCINOMA (0/2). 12/8 path report       Please clarify the pathology findings.  [ X ] Pathology findings noted above are ruled in/confirmed as diagnoses   [  ] Pathology findings noted above are not confirmed as  diagnoses   [  ] Pathology findings noted above are incidental   [  ] Other diagnosis (please specify): ___________   [  ] Clinically Undetermined     Please document in your progress notes daily for the duration of treatment until resolved and include in your discharge summary.    Form No. [id number]

## 2023-01-03 ENCOUNTER — HOSPITAL ENCOUNTER (OUTPATIENT)
Dept: RADIOLOGY | Facility: HOSPITAL | Age: 63
Discharge: HOME | End: 2023-01-03
Attending: INTERNAL MEDICINE
Payer: MEDICAID

## 2023-01-03 ENCOUNTER — DOCUMENTATION ONLY (OUTPATIENT)
Dept: HEMATOLOGY/ONCOLOGY | Facility: CLINIC | Age: 63
End: 2023-01-03
Payer: MEDICAID

## 2023-01-03 DIAGNOSIS — C34.91 NSCLC OF RIGHT LUNG: ICD-10-CM

## 2023-01-03 PROCEDURE — 25500020 PHARM REV CODE 255

## 2023-01-03 PROCEDURE — A9577 INJ MULTIHANCE: HCPCS

## 2023-01-03 PROCEDURE — 70553 MRI BRAIN STEM W/O & W/DYE: CPT | Mod: TC

## 2023-01-03 RX ADMIN — GADOBENATE DIMEGLUMINE 15 ML: 529 INJECTION, SOLUTION INTRAVENOUS at 09:01

## 2023-01-03 NOTE — PROGRESS NOTES
Patient has an appointment schedule with Audiologist at [facility] on Jan 23rd at 0800

## 2023-01-03 NOTE — PROGRESS NOTES
HEMATOLOGY / ONCOLOGY   CLINIC NOTE     ONCOLOGICAL HISTORY:     Diagnosis:  - NSCLC / Adenocarcinoma Stage IIIA pT2a pN2    Treatment History:  - 12/08/2022: Right lower and middle bilobectomy with mediastinal lymph node dissection.     Current Treatment:   -     Subjective:       Chief Complaint: Lung Cancer (1 week f/u with MRI results--- Patient reports some weakness in right hip that started today.)      YOANA Jin  62 y.o.  female with past medical history significant for HTN, HLD, uterine cancer status post total hysterectomy in 1989,  CKD Stage 2, COPD Stage 3, referred for management of NSCLC s/p RLL / RML lobectomy with Mediastinal Lymph Node Dissection on 12/8/2022.     She was noted to have RLL lung mass and underwent CT guided biopsy which was non diagnostic. PET scan demonstrated significant avidity within the lesion. Given high risk for malignancy, she underwent right lower lobectomy and right middle lobectomy on 12/08/2022. Pathology came back with invasive adenocarcinoma, poorly differentiated with solid morphology, focal tumor involvement of visceral pleura and focal tumor vascular invasion (vein), with mediastinal lymph nodes positive for metastatic carcinoma.      Patient is here today for initial evaluation and to be started on chemotherapy.  Patient is on continuous home oxygen since surgery.  She is complaining of some discomfort and pain in site of surgery. Denies any other concerns.  Patient was also noted to have thrombophlebitis of the cephalic vein, in the cubital region after blood draw, which was noted on during the previous visit and was started on aspirin and is almost resolved now.      Interval History:     Patient is here for follow-up after being seen by radiation oncology.  She is feeling better today is not using oxygen and even walking without a walker.  Patient is still waiting for the PET scan to be done.  Patient has chronic cough with some phlegm but  denies any chest pain, shortness her breath, palpitation or any other concerns.    Past Medical History:   Diagnosis Date    ASTHMA     COPD (chronic obstructive pulmonary disease)     SEVERE    High cholesterol     HLD (hyperlipidemia)     HTN (hypertension)     Lung cancer 2022    invasive adenocarcinoma, poorly differentiated with solid morphology, focal tumor involvement of visceral pleura and focal tumor vascular invasion (vein), with 2 of 3 posterior mediastinal lymph nodes positive for metastatic carcinoma    Stage 2 chronic kidney disease     TIA (transient ischemic attack)     Tobacco abuse     Unspecified osteoarthritis, unspecified site     Uterine cancer       Past Surgical History:   Procedure Laterality Date    lipoma multiple sites      LUNG LOBECTOMY Right 2022    Procedure: LOBECTOMY, LUNG;  Surgeon: Jass Browne IV, MD;  Location: [facility] OR;  Service: Cardiothoracic;  Laterality: Right;    SURGICAL REMOVAL OF LYMPH NODE  2022    Procedure: EXCISION, LYMPH NODE;  Surgeon: Jass Browne IV, MD;  Location: [facility];  Service: Cardiothoracic;;    THORACOTOMY Right 2022    Procedure: THORACOTOMY;  Surgeon: Jass Browne IV, MD;  Location: [facility];  Service: Cardiothoracic;  Laterality: Right;  Right Lower Lobectomy with Lymph Node Dissection    TOTAL ABDOMINAL HYSTERECTOMY W/ BILATERAL SALPINGOOPHORECTOMY       Social History     Socioeconomic History    Marital status: Single    Number of children: 5   Tobacco Use    Smoking status: Former     Packs/day: 0.50     Years: 50.00     Pack years: 25.00     Types: Cigarettes     Quit date: 2022     Years since quittin.0    Smokeless tobacco: Never   Substance and Sexual Activity    Alcohol use: Not Currently     Comment: quit 25years ago    Drug use: Never    Sexual activity: Yes     Partners: Male      Family History   Problem Relation Age of Onset    Hypertension Mother     Hepatitis Mother     Ovarian cancer Mother      Liver cancer Mother     Heart failure Father     Prostate cancer Father     Fibroids Sister     Asthma Brother     Asthma Brother     Cancer Maternal Grandfather       Review of patient's allergies indicates:  No Known Allergies     Review of Systems   Constitutional: Negative.  Negative for activity change, chills, fatigue and fever.   HENT: Negative.     Eyes: Negative.    Respiratory:  Negative for cough and shortness of breath.    Cardiovascular:  Negative for chest pain and leg swelling.   Gastrointestinal:  Negative for constipation, diarrhea, nausea and vomiting.   Endocrine: Negative.    Genitourinary: Negative.    Musculoskeletal:  Negative for arthralgias and myalgias.   Integumentary:  Negative.   Allergic/Immunologic: Negative.    Neurological:  Negative for light-headedness, numbness and headaches.   Hematological: Negative.    Psychiatric/Behavioral: Negative.         Objective:        Vitals:    01/04/23 1414   BP: 96/65   Pulse: 103   Resp: 20   Temp: 98 °F (36.7 °C)          Physical Exam  Constitutional:       General: She is not in acute distress.     Appearance: She is well-developed. She is not ill-appearing.   HENT:      Head: Normocephalic and atraumatic.      Mouth/Throat:      Mouth: Mucous membranes are moist.   Eyes:      Extraocular Movements: Extraocular movements intact.      Conjunctiva/sclera: Conjunctivae normal.   Cardiovascular:      Rate and Rhythm: Normal rate and regular rhythm.      Heart sounds: Normal heart sounds.   Pulmonary:      Effort: Pulmonary effort is normal. No respiratory distress.      Breath sounds: No wheezing.   Abdominal:      General: Bowel sounds are normal. There is no distension.      Palpations: Abdomen is soft.      Tenderness: There is no abdominal tenderness.   Musculoskeletal:         General: Normal range of motion.      Cervical back: Normal range of motion and neck supple.   Skin:     General: Skin is warm.   Neurological:      General: No focal  deficit present.      Mental Status: She is alert and oriented to person, place, and time. Mental status is at baseline.      Cranial Nerves: No cranial nerve deficit.   Psychiatric:         Mood and Affect: Mood normal.         LABS / IMAGING        IMAGING:     - 08/10/2022 PET: The elongated subpleural right lower lobe nodule demonstrates fairly intense FDG activity.  Infectious, inflammatory and neoplastic etiologies remain possible. No suspicious PET findings elsewhere.     - 12/08/2022 CT HEAD without contrast: No acute intracranial abnormality identified.  Findings of mild microvascular ischemic disease.     - 12/2022: U/S Upper extremity: Thrombophlebitis of the left cephalic vein. No deep venous thrombosis in the left upper extremity.    - 01/03/2023 MRI Brain : [facility]     PATHOLOGY:  - 12/08/2022: Invasive solid adenocarcinoma (3.1 CM); G3, poorly differentiated. Visceral Pleura Invasion present without definite serosal surface involvement. Lymphovascular invasion (Vein) present. All margins negative for invasive carcinoma, distance 2.5 cm. LN - 4/14 (10R: Hilar, Posterior mediastinal (RACHEAL), 4R Lower paratracheal, 9R Pulmonary ligament, 10R). pT2a pN2      Assessment:     ECOG Performance status: 2     NSCLC / Adenocarcinoma Stage IIIA pT2a pN2  - 12/08/2022: Right lower and middle bilobectomy with mediastinal lymph node dissection. Path: Invasive adenocarcinoma, poorly differentiated, focal tumor involvement of visceral pleura and focal tumor vascular invasion (vein), with mediastinal lymph nodes positive for metastatic carcinoma  - patient evaluated by Radiation Oncology with recommendation to treat with chemotherapy for now if no metastatic disease and will re-evaluate the patient by the end of chemotherapy for radiation therapy      Plan:     - Pt had previous PET scan done in 08/10/2022, so will repeat PET scan to rule out metastatic disease before starting with chemotherapy. If noted to have no evidence  of metastatic disease then will benefit from adjuvant chemotherapy with cisplatin and pemetrexed for 4 cycles and then followed up either by osimertinib (if EGFR positive) or atezolizumab  - Requested NGS and PD-L1 on tissue specimen, still pending   - Referred to audiology for evaluation as likely planning to start on cisplatin if ruled out metastatic disease  - MD VISIT - 2 WEEK to follow up on the PET scan to rule out metastatic disease and then to be started immediately on treatment     The patient was seen, interviewed and examined. Pertinent lab and radiology studies were reviewed. Pt instructed to call should develop concerning signs/symptoms or have further questions.       Portions of the record may have been created with voice recognition software. Occasional wrong-word or sound-a-like substitutions may have occurred due to the inherent limitations of voice recognition software. Read the chart carefully and recognize, using context, where substitutions have occurred.    Spencer Murray MD  Hematology / Oncology

## 2023-01-04 ENCOUNTER — PATIENT MESSAGE (OUTPATIENT)
Dept: INTERNAL MEDICINE | Facility: CLINIC | Age: 63
End: 2023-01-04
Payer: MEDICAID

## 2023-01-04 ENCOUNTER — OFFICE VISIT (OUTPATIENT)
Dept: HEMATOLOGY/ONCOLOGY | Facility: CLINIC | Age: 63
End: 2023-01-04
Payer: MEDICAID

## 2023-01-04 VITALS
OXYGEN SATURATION: 96 % | BODY MASS INDEX: 22.82 KG/M2 | TEMPERATURE: 98 F | HEART RATE: 103 BPM | SYSTOLIC BLOOD PRESSURE: 96 MMHG | HEIGHT: 67 IN | DIASTOLIC BLOOD PRESSURE: 65 MMHG | RESPIRATION RATE: 20 BRPM | WEIGHT: 145.38 LBS

## 2023-01-04 DIAGNOSIS — C34.91 NSCLC OF RIGHT LUNG: Primary | ICD-10-CM

## 2023-01-04 PROCEDURE — 1111F PR DISCHARGE MEDS RECONCILED W/ CURRENT OUTPATIENT MED LIST: ICD-10-PCS | Mod: CPTII,,, | Performed by: INTERNAL MEDICINE

## 2023-01-04 PROCEDURE — 1111F DSCHRG MED/CURRENT MED MERGE: CPT | Mod: CPTII,,, | Performed by: INTERNAL MEDICINE

## 2023-01-04 PROCEDURE — 99214 OFFICE O/P EST MOD 30 MIN: CPT | Mod: S$PBB,,, | Performed by: INTERNAL MEDICINE

## 2023-01-04 PROCEDURE — 99214 PR OFFICE/OUTPT VISIT, EST, LEVL IV, 30-39 MIN: ICD-10-PCS | Mod: S$PBB,,, | Performed by: INTERNAL MEDICINE

## 2023-01-04 PROCEDURE — 3074F PR MOST RECENT SYSTOLIC BLOOD PRESSURE < 130 MM HG: ICD-10-PCS | Mod: CPTII,,, | Performed by: INTERNAL MEDICINE

## 2023-01-04 PROCEDURE — 1159F PR MEDICATION LIST DOCUMENTED IN MEDICAL RECORD: ICD-10-PCS | Mod: CPTII,,, | Performed by: INTERNAL MEDICINE

## 2023-01-04 PROCEDURE — 1159F MED LIST DOCD IN RCRD: CPT | Mod: CPTII,,, | Performed by: INTERNAL MEDICINE

## 2023-01-04 PROCEDURE — 99999 PR PBB SHADOW E&M-EST. PATIENT-LVL IV: ICD-10-PCS | Mod: PBBFAC,,, | Performed by: INTERNAL MEDICINE

## 2023-01-04 PROCEDURE — 3074F SYST BP LT 130 MM HG: CPT | Mod: CPTII,,, | Performed by: INTERNAL MEDICINE

## 2023-01-04 PROCEDURE — 99999 PR PBB SHADOW E&M-EST. PATIENT-LVL IV: CPT | Mod: PBBFAC,,, | Performed by: INTERNAL MEDICINE

## 2023-01-04 PROCEDURE — 99214 OFFICE O/P EST MOD 30 MIN: CPT | Mod: PBBFAC | Performed by: INTERNAL MEDICINE

## 2023-01-04 PROCEDURE — 3008F BODY MASS INDEX DOCD: CPT | Mod: CPTII,,, | Performed by: INTERNAL MEDICINE

## 2023-01-04 PROCEDURE — 3078F DIAST BP <80 MM HG: CPT | Mod: CPTII,,, | Performed by: INTERNAL MEDICINE

## 2023-01-04 PROCEDURE — 3078F PR MOST RECENT DIASTOLIC BLOOD PRESSURE < 80 MM HG: ICD-10-PCS | Mod: CPTII,,, | Performed by: INTERNAL MEDICINE

## 2023-01-04 PROCEDURE — 3008F PR BODY MASS INDEX (BMI) DOCUMENTED: ICD-10-PCS | Mod: CPTII,,, | Performed by: INTERNAL MEDICINE

## 2023-01-08 ENCOUNTER — DOCUMENTATION ONLY (OUTPATIENT)
Dept: HEMATOLOGY/ONCOLOGY | Facility: CLINIC | Age: 63
End: 2023-01-08
Payer: MEDICAID

## 2023-01-09 ENCOUNTER — HOSPITAL ENCOUNTER (EMERGENCY)
Facility: HOSPITAL | Age: 63
Discharge: HOME | End: 2023-01-09
Attending: INTERNAL MEDICINE
Payer: MEDICARE

## 2023-01-09 VITALS
SYSTOLIC BLOOD PRESSURE: 130 MMHG | DIASTOLIC BLOOD PRESSURE: 78 MMHG | HEART RATE: 93 BPM | HEIGHT: 67 IN | WEIGHT: 144 LBS | OXYGEN SATURATION: 96 % | TEMPERATURE: 99 F | BODY MASS INDEX: 22.6 KG/M2 | RESPIRATION RATE: 18 BRPM

## 2023-01-09 DIAGNOSIS — R25.2 MUSCLE CRAMP: Primary | ICD-10-CM

## 2023-01-09 DIAGNOSIS — R10.31 GROIN PAIN, RIGHT: ICD-10-CM

## 2023-01-09 LAB
ANION GAP SERPL CALC-SCNC: 8 MEQ/L
BASOPHILS # BLD AUTO: 0.03 X10(3)/MCL (ref 0–0.2)
BASOPHILS NFR BLD AUTO: 0.4 %
BUN SERPL-MCNC: 5 MG/DL (ref 9.8–20.1)
CALCIUM SERPL-MCNC: 9.4 MG/DL (ref 8.4–10.2)
CHLORIDE SERPL-SCNC: 106 MMOL/L (ref 98–107)
CO2 SERPL-SCNC: 27 MMOL/L (ref 23–31)
CREAT SERPL-MCNC: 0.75 MG/DL (ref 0.55–1.02)
CREAT/UREA NIT SERPL: 7
EOSINOPHIL # BLD AUTO: 0.43 X10(3)/MCL (ref 0–0.9)
EOSINOPHIL NFR BLD AUTO: 5.5 %
ERYTHROCYTE [DISTWIDTH] IN BLOOD BY AUTOMATED COUNT: 12.9 % (ref 11–14.5)
GFR SERPLBLD CREATININE-BSD FMLA CKD-EPI: 89 MLS/MIN/1.73/M2
GLUCOSE SERPL-MCNC: 82 MG/DL (ref 82–115)
HCT VFR BLD AUTO: 35.2 % (ref 37–47)
HGB BLD-MCNC: 10.7 GM/DL (ref 12–16)
IMM GRANULOCYTES # BLD AUTO: 0.02 X10(3)/MCL (ref 0–0.04)
IMM GRANULOCYTES NFR BLD AUTO: 0.3 %
LYMPHOCYTES # BLD AUTO: 2.24 X10(3)/MCL (ref 0.6–4.6)
LYMPHOCYTES NFR BLD AUTO: 28.7 %
MCH RBC QN AUTO: 25.8 PG
MCHC RBC AUTO-ENTMCNC: 30.4 MG/DL (ref 33–36)
MCV RBC AUTO: 85 FL (ref 80–94)
MONOCYTES # BLD AUTO: 0.63 X10(3)/MCL (ref 0.1–1.3)
MONOCYTES NFR BLD AUTO: 8.1 %
NEUTROPHILS # BLD AUTO: 4.45 X10(3)/MCL (ref 2.1–9.2)
NEUTROPHILS NFR BLD AUTO: 57 %
PLATELET # BLD AUTO: 355 X10(3)/MCL (ref 140–371)
PMV BLD AUTO: 9.4 FL (ref 9.4–12.4)
POTASSIUM SERPL-SCNC: 3.3 MMOL/L (ref 3.5–5.1)
RBC # BLD AUTO: 4.14 X10(6)/MCL (ref 4.2–5.4)
SODIUM SERPL-SCNC: 141 MMOL/L (ref 136–145)
WBC # SPEC AUTO: 7.8 X10(3)/MCL (ref 4.5–11.5)

## 2023-01-09 PROCEDURE — 99285 EMERGENCY DEPT VISIT HI MDM: CPT | Mod: 25

## 2023-01-09 PROCEDURE — 85025 COMPLETE CBC W/AUTO DIFF WBC: CPT | Performed by: INTERNAL MEDICINE

## 2023-01-09 PROCEDURE — 80048 BASIC METABOLIC PNL TOTAL CA: CPT | Performed by: INTERNAL MEDICINE

## 2023-01-09 RX ORDER — LANOLIN ALCOHOL/MO/W.PET/CERES
400 CREAM (GRAM) TOPICAL ONCE
Status: DISCONTINUED | OUTPATIENT
Start: 2023-01-09 | End: 2023-01-09 | Stop reason: HOSPADM

## 2023-01-09 RX ORDER — LANOLIN ALCOHOL/MO/W.PET/CERES
400 CREAM (GRAM) TOPICAL DAILY
Qty: 15 TABLET | Refills: 0 | Status: SHIPPED | OUTPATIENT
Start: 2023-01-09 | End: 2023-01-24

## 2023-01-09 NOTE — ED PROVIDER NOTES
01/09/2023         10:44 AM    Source of History:  History obtained from the patient.     Chief complaint:  From Nurse Triage:  Leg Pain (Right thigh pain, denies injury. )    HISTORY OF PRESENT ILLNES:  Mansi Jin is a 62 y.o. female presenting with Leg Pain (Right thigh pain, denies injury. )       Patient with the history of right lung partial pneumonectomy for a CA lung on 12/10/2022 is waiting for a PET scan and chemotherapy comes to the emergency room with complaint of right lower extremity pain which is mainly in the groin, hurts when she moves, or puts weight on the right lower extremity, denies any injuries, denies any other complaints.    REVIEW OF SYSTEMS:   Constitutional symptoms:  No Fever. No Chills    Skin symptoms:  No Rash.    Eye symptoms:  No Visual disturbance reported.   ENMT symptoms:  No Sore throat,    Respiratory symptoms:  No Shortness of Breath, no Cough, no Wheezing.    Cardiovascular symptoms:  No Chest Pain, No Palpitations, No Tachycardia.    Gastrointestinal symptoms:  No Abdominal Pain, No Nausea, No Vomiting, No Diarrhea, No Constipation.    Genitourinary symptoms:  No Dysuria,    Musculoskeletal symptoms:  Right groin pain, right lower extremity pain on weight-bearing  Neurologic symptoms:  No Headache, No Dizziness.    Psychiatric symptoms:  No Anxiety, No Depression, No Substance Abuse.              Additional review of systems information: Patient Denies Any Other Complaints.    All Other Systems Reviewed With Patient And Negative.    ALLEGIES:  Review of patient's allergies indicates:  No Known Allergies    MEDICINE LIST:  Current Outpatient Medications   Medication Instructions    aspirin (ECOTRIN) 81 mg, Oral, Daily    atorvastatin (LIPITOR) 40 mg, Oral, Nightly    fluticasone propionate (FLONASE) 50 mcg, Each Nostril, Daily    fluticasone-umeclidin-vilanter (TRELEGY ELLIPTA) 100-62.5-25 mcg DsDv 1 puff, Inhalation, Daily    ipratropium-albuteroL (COMBIVENT)   mcg/actuation inhaler 1 puff, Inhalation, 4 times daily, Rescue    loratadine (CLARITIN) 10 mg, Oral, Daily    magnesium oxide (MAG-OX) 400 mg, Oral, Daily    nicotine (NICODERM CQ) 21 mg/24 hr 1 patch, Transdermal, Daily        PMH:  As per HPI and below:    Reviewed and updated in chart.    PAST MEDICAL HISTORY:  Past Medical History:   Diagnosis Date    ASTHMA     COPD (chronic obstructive pulmonary disease)     SEVERE    High cholesterol     HLD (hyperlipidemia)     HTN (hypertension)     Lung cancer 2022    invasive adenocarcinoma, poorly differentiated with solid morphology, focal tumor involvement of visceral pleura and focal tumor vascular invasion (vein), with 2 of 3 posterior mediastinal lymph nodes positive for metastatic carcinoma    Stage 2 chronic kidney disease     TIA (transient ischemic attack)     Tobacco abuse     Unspecified osteoarthritis, unspecified site     Uterine cancer         PAST SURGICAL HISTORY:  Past Surgical History:   Procedure Laterality Date    lipoma multiple sites      LUNG LOBECTOMY Right 2022    Procedure: LOBECTOMY, LUNG;  Surgeon: Jass Browne IV, MD;  Location: [facility] OR;  Service: Cardiothoracic;  Laterality: Right;    SURGICAL REMOVAL OF LYMPH NODE  2022    Procedure: EXCISION, LYMPH NODE;  Surgeon: Jass Browne IV, MD;  Location: [facility] OR;  Service: Cardiothoracic;;    THORACOTOMY Right 2022    Procedure: THORACOTOMY;  Surgeon: Jass Browne IV, MD;  Location: [facility] OR;  Service: Cardiothoracic;  Laterality: Right;  Right Lower Lobectomy with Lymph Node Dissection    TOTAL ABDOMINAL HYSTERECTOMY W/ BILATERAL SALPINGOOPHORECTOMY         SOCIAL HISTORY:  Social History     Tobacco Use    Smoking status: Former     Packs/day: 0.50     Years: 50.00     Pack years: 25.00     Types: Cigarettes     Quit date: 2022     Years since quittin.0    Smokeless tobacco: Never   Substance Use Topics    Alcohol use: Not Currently     Comment: quit  25years ago    Drug use: Never       FAMILY HISTORY:  Family History   Problem Relation Age of Onset    Hypertension Mother     Hepatitis Mother     Ovarian cancer Mother     Liver cancer Mother     Heart failure Father     Prostate cancer Father     Fibroids Sister     Asthma Brother     Asthma Brother     Cancer Maternal Grandfather         PROBLEM LIST:  Patient Active Problem List   Diagnosis    Stage 3 severe COPD by GOLD classification    Tobacco dependence with current use    STUBBS (dyspnea on exertion)    Hyperlipidemia    Hypertension    Stage 2 chronic kidney disease    Chronic right-sided low back pain without sciatica    Solitary pulmonary nodule    Chronic obstructive pulmonary disease with (acute) exacerbation    Wellness examination    Needs flu shot    Encounter for screening mammogram for malignant neoplasm of breast    NSCLC of right lung        PHYSICAL EXAM:      ED Triage Vitals [01/09/23 1030]   BP (!) 163/90   Pulse 90   Resp 18   Temp 98.5 °F (36.9 °C)   SpO2 100 %        Vital Signs: Reviewed As In Chart.  General:  Alert, No Cardiorespiratory Distress Noted.   Skin: Normal For Ethnic Origin  Eye:  Extraocular Movements Are Intact.   ENT: Mucus membranes are moist.   Cardiovascular:  Regular Rate And Rhythm, No Murmur, No Pedal Edema.    Respiratory:  Respirations Nonlabored, No Respiratory Distress, Good Bilateral Air Entry, No Rales, No Rhonchi.    Musculoskeletal:  No particular tenderness elicited in the muscles, bones of the right lower extremity, when I went her leg, she did not have any pain at that time either, when I started extending her leg she developed pain in the hamstrings area, no erythema, no swelling, mainly pain in the groin and worse on movement of the leg which involves the hamstring area.   Gastrointestinal:  Soft, Non Distended, Non Tenderness, Normal Bowel Sounds.    Neurological:  Alert And Oriented To Person, Place, Time, And Situation, Normal Motor Observed, Normal  Speech Observed.    Psychiatric:  Cooperative, Appropriate Mood & Affect.    INITIAL IMPRESSION/ DIFFERENTIAL DX:    MEDICAL DECISION MAKING:      Reviewed Nurses Note. Reviewed Vital Signs.     Reviewed Pertinent old records, History and updated as necessary.    62 y.o. female with Leg Pain (Right thigh pain, denies injury. )        MEDICAL DECISION MAKING:    Medical Decision Making  Patient with right groin pain which started yesterday worse on would bearing, worse on movement, if she is laying down straight she does not have much pain, she is waiting for a PET scan to be done to start chemotherapy for her lung CA, she had partial pneumonectomy on 10th of December and she is been more sedentary these days    I am going to check her for electrolytes abnormality mainly calcium and potassium and also I will check her for DVT I will do an x-ray of the pelvis and decide further.    Amount and/or Complexity of Data Reviewed  Labs: ordered.  Radiology: ordered.        ED COURSE AND REEVALUATIONS:  Vitals:    01/09/23 1030   BP: (!) 163/90   Pulse: 90   Resp: 18   Temp: 98.5 °F (36.9 °C)   :  ED Course as of 01/09/23 1123   Mon Jan 09, 2023   1122 Patient's lower extremity ultrasound is negative for deep vein thrombosis, her calcium is normal 9.4 and her potassium is 3.3 I am going to give her a dose of potassium and magnesium in the emergency room, her pelvic x-ray is negative, I will let her go home with instruction to take a dose of magnesium once a day, and see if that helps with cramps, because that is what I saw when I moved her leg she had a cramp in her  hamstring with no other positive findings of anything else going on.  Patient has 2+ dorsalis pedis pulse in the foot, she has capillary refill which is normal and she is moving the right lower extremity adjust that she gets the cramp in the hamstring or the thigh when she moves it.  I explained this to patient and her daughter and they verbalized understanding  and they are willing to go home and they will follow-up with the family doctor. [GQ]      ED Course User Index  [GQ] Cleo Kumar MD           ED WORKUP FOR DECISION MAKING:  ED ORDERS:  Orders Placed This Encounter   Procedures    US Lower Extremity Veins Right    X-Ray Pelvis Routine AP    Basic metabolic panel    CBC auto differential    CBC with Differential       ED MEDICINES:  Medications   magnesium oxide tablet 400 mg (has no administration in time range)   potassium bicarbonate disintegrating tablet 50 mEq (has no administration in time range)                ED LABS ORDERED AND REVIEWED:  Admission on 01/09/2023   Component Date Value Ref Range Status    Sodium Level 01/09/2023 141  136 - 145 mmol/L Final    Potassium Level 01/09/2023 3.3 (L)  3.5 - 5.1 mmol/L Final    Chloride 01/09/2023 106  98 - 107 mmol/L Final    Carbon Dioxide 01/09/2023 27  23 - 31 mmol/L Final    Glucose Level 01/09/2023 82  82 - 115 mg/dL Final    Blood Urea Nitrogen 01/09/2023 5.0 (L)  9.8 - 20.1 mg/dL Final    Creatinine 01/09/2023 0.75  0.55 - 1.02 mg/dL Final    BUN/Creatinine Ratio 01/09/2023 7   Final    Calcium Level Total 01/09/2023 9.4  8.4 - 10.2 mg/dL Final    Anion Gap 01/09/2023 8.0  mEq/L Final    eGFR 01/09/2023 89  mls/min/1.73/m2 Final    WBC 01/09/2023 7.8  4.5 - 11.5 x10(3)/mcL Final    RBC 01/09/2023 4.14 (L)  4.20 - 5.40 x10(6)/mcL Final    Hgb 01/09/2023 10.7 (L)  12.0 - 16.0 gm/dL Final    Hct 01/09/2023 35.2 (L)  37.0 - 47.0 % Final    MCV 01/09/2023 85.0  80.0 - 94.0 fL Final    MCH 01/09/2023 25.8  pg Final    MCHC 01/09/2023 30.4 (L)  33.0 - 36.0 mg/dL Final    RDW 01/09/2023 12.9  11.0 - 14.5 % Final    Platelet 01/09/2023 355  140 - 371 x10(3)/mcL Final    MPV 01/09/2023 9.4  9.4 - 12.4 fL Final    Neut % 01/09/2023 57.0  % Final    Lymph % 01/09/2023 28.7  % Final    Mono % 01/09/2023 8.1  % Final    Eos % 01/09/2023 5.5  % Final    Basophil % 01/09/2023 0.4  % Final    Lymph # 01/09/2023 2.24   0.6 - 4.6 x10(3)/mcL Final    Neut # 01/09/2023 4.45  2.1 - 9.2 x10(3)/mcL Final    Mono # 01/09/2023 0.63  0.1 - 1.3 x10(3)/mcL Final    Eos # 01/09/2023 0.43  0 - 0.9 x10(3)/mcL Final    Baso # 01/09/2023 0.03  0 - 0.2 x10(3)/mcL Final    IG# 01/09/2023 0.02  0 - 0.04 x10(3)/mcL Final    IG% 01/09/2023 0.3  % Final       RADIOLOGY STUDIES ORDERED AND REVIEWED:  Imaging Results              X-Ray Pelvis Routine AP (Final result)  Result time 01/09/23 11:12:39      Final result by Simon Westfall MD (01/09/23 11:12:39)                   Impression:      1. No acute osseous defect identified  2. Lumbosacral spondylosis  3. Mild degenerative changes are noted to the hips bilaterally      Electronically signed by: Simon Westfall  Date:    01/09/2023  Time:    11:12               Narrative:    EXAMINATION:  XR PELVIS ROUTINE AP    CLINICAL HISTORY:  Right lower quadrant pain; .    COMPARISON:  None available.    FINDINGS:  Single AP view reveals no definite fracture or dislocation.  Hip joints appear grossly intact.  Degenerative changes are noted to the lumbosacral spine.  Bony structures are mildly Obturator foramen and iliac bones are asymmetric in appearance; likely due to patient positioning/rotation.  Osteopenic.                                       US Lower Extremity Veins Right (Final result)  Result time 01/09/23 11:11:44      Final result by Simon Westfall MD (01/09/23 11:11:44)                   Impression:      1. NEGATIVE RIGHT LOWER EXTREMITY VENOUS DOPPLER EXAM WITHOUT SONOGRAPHIC EVIDENCE OF A DEEP VENOUS THROMBOSIS.      Electronically signed by: Simon Westfall  Date:    01/09/2023  Time:    11:11               Narrative:    EXAMINATION:  VENOUS ULTRASOUND OF  THE RIGHT LOWER EXTREMITY:    CLINICAL HISTORY:  , Pain;    COMPARISON:  None available    FINDINGS:  There is normal compression and flow noted in the right common femoral vein, superficial femoral vein, popliteal vein, and  posterior tibial  vein. No evidence of deep venous thrombosis is identified.                                      ED COURSE AND REEVALUATIONS:  Vitals:    01/09/23 1030   BP: (!) 163/90   Pulse: 90   Resp: 18   Temp: 98.5 °F (36.9 °C)       PROCEDURES PERFORMED IN ED:  Procedures    DIAGNOSTIC IMPRESSION:        ICD-10-CM ICD-9-CM   1. Muscle cramp  R25.2 729.82   2. Groin pain, right  R10.31 789.03         ED Disposition Condition    Discharge Stable               Medication List        START taking these medications      magnesium oxide 400 mg (241.3 mg magnesium) tablet  Commonly known as: MAG-OX  Take 1 tablet (400 mg total) by mouth once daily. for 15 days            ASK your doctor about these medications      aspirin 81 MG EC tablet  Commonly known as: ECOTRIN  Take 1 tablet (81 mg total) by mouth once daily.     atorvastatin 40 MG tablet  Commonly known as: LIPITOR  Take 1 tablet (40 mg total) by mouth every evening.     fluticasone propionate 50 mcg/actuation nasal spray  Commonly known as: FLONASE  1 spray (50 mcg total) by Each Nostril route once daily.     fluticasone-umeclidin-vilanter 100-62.5-25 mcg Dsdv  Commonly known as: TRELEGY ELLIPTA     ipratropium-albuteroL  mcg/actuation inhaler  Commonly known as: CombiVENT  Inhale 1 puff into the lungs 4 (four) times daily. Rescue     loratadine 10 mg tablet  Commonly known as: CLARITIN  Take 1 tablet (10 mg total) by mouth once daily.     nicotine 21 mg/24 hr  Commonly known as: NICODERM CQ  Place 1 patch onto the skin once daily.               Where to Get Your Medications        These medications were sent to [id number] - [facility] [place] [address] AT [facility] [address] [place] [address] & [facility]  [address], [place] [address]      Phone: [phone]   magnesium oxide 400 mg (241.3 mg magnesium) tablet           Follow-up Information       PAULINE Loya In 2 days.    Specialty: Family Medicine  Contact information:  [address] [facility]  [address]  [phone]                              ED Prescriptions       Medication Sig Dispense Start Date End Date Auth. Provider    magnesium oxide (MAG-OX) 400 mg (241.3 mg magnesium) tablet Take 1 tablet (400 mg total) by mouth once daily. for 15 days 15 tablet 1/9/2023 1/24/2023 Cleo Kumar MD          Follow-up Information       Follow up With Specialties Details Why Contact Info    PAULINE L[facility] In 2 days  [address] [facility] [address]  [phone]                 Cleo Kumar MD  01/09/23 1126

## 2023-01-09 NOTE — PROGRESS NOTES
Received this message from [facility] with . Good morning. Dr Osei reviewed the consult regarding the biopsy for this patients right acetabulum. He is unable to biopsy this but for sure recommends that it continue to be monitored. Let me know if there are any questions! Message was sent to Dr. Murray. Patient daughter Tabby informed about what doctor Osei said regarding biopsy.

## 2023-01-10 ENCOUNTER — OFFICE VISIT (OUTPATIENT)
Dept: CARDIAC SURGERY | Facility: CLINIC | Age: 63
End: 2023-01-10
Payer: MEDICAID

## 2023-01-10 ENCOUNTER — OFFICE VISIT (OUTPATIENT)
Dept: URGENT CARE | Facility: CLINIC | Age: 63
End: 2023-01-10
Payer: MEDICAID

## 2023-01-10 VITALS
HEART RATE: 95 BPM | SYSTOLIC BLOOD PRESSURE: 107 MMHG | DIASTOLIC BLOOD PRESSURE: 69 MMHG | BODY MASS INDEX: 22.6 KG/M2 | HEIGHT: 67 IN | WEIGHT: 144 LBS

## 2023-01-10 VITALS
DIASTOLIC BLOOD PRESSURE: 71 MMHG | HEIGHT: 67 IN | SYSTOLIC BLOOD PRESSURE: 106 MMHG | WEIGHT: 144.13 LBS | RESPIRATION RATE: 16 BRPM | TEMPERATURE: 98 F | HEART RATE: 90 BPM | BODY MASS INDEX: 22.62 KG/M2 | OXYGEN SATURATION: 98 %

## 2023-01-10 DIAGNOSIS — M79.604 RIGHT LEG PAIN: Primary | ICD-10-CM

## 2023-01-10 DIAGNOSIS — C34.11 MALIGNANT NEOPLASM OF UPPER LOBE OF RIGHT LUNG: Primary | ICD-10-CM

## 2023-01-10 PROCEDURE — 3078F DIAST BP <80 MM HG: CPT | Mod: CPTII,,, | Performed by: SPECIALIST

## 2023-01-10 PROCEDURE — 3008F PR BODY MASS INDEX (BMI) DOCUMENTED: ICD-10-PCS | Mod: CPTII,,, | Performed by: SPECIALIST

## 2023-01-10 PROCEDURE — 1111F PR DISCHARGE MEDS RECONCILED W/ CURRENT OUTPATIENT MED LIST: ICD-10-PCS | Mod: CPTII,,, | Performed by: SPECIALIST

## 2023-01-10 PROCEDURE — 3078F PR MOST RECENT DIASTOLIC BLOOD PRESSURE < 80 MM HG: ICD-10-PCS | Mod: CPTII,,, | Performed by: SPECIALIST

## 2023-01-10 PROCEDURE — 1159F MED LIST DOCD IN RCRD: CPT | Mod: CPTII,,, | Performed by: SPECIALIST

## 2023-01-10 PROCEDURE — 1159F PR MEDICATION LIST DOCUMENTED IN MEDICAL RECORD: ICD-10-PCS | Mod: CPTII,,, | Performed by: SPECIALIST

## 2023-01-10 PROCEDURE — 99213 OFFICE O/P EST LOW 20 MIN: CPT | Mod: S$PBB,,,

## 2023-01-10 PROCEDURE — 3074F PR MOST RECENT SYSTOLIC BLOOD PRESSURE < 130 MM HG: ICD-10-PCS | Mod: CPTII,,, | Performed by: SPECIALIST

## 2023-01-10 PROCEDURE — 3008F BODY MASS INDEX DOCD: CPT | Mod: CPTII,,, | Performed by: SPECIALIST

## 2023-01-10 PROCEDURE — 99214 OFFICE O/P EST MOD 30 MIN: CPT | Mod: PBBFAC

## 2023-01-10 PROCEDURE — 1111F DSCHRG MED/CURRENT MED MERGE: CPT | Mod: CPTII,,, | Performed by: SPECIALIST

## 2023-01-10 PROCEDURE — 99024 POSTOP FOLLOW-UP VISIT: CPT | Mod: ,,, | Performed by: SPECIALIST

## 2023-01-10 PROCEDURE — 99024 PR POST-OP FOLLOW-UP VISIT: ICD-10-PCS | Mod: ,,, | Performed by: SPECIALIST

## 2023-01-10 PROCEDURE — 1111F PR DISCHARGE MEDS RECONCILED W/ CURRENT OUTPATIENT MED LIST: ICD-10-PCS | Mod: CPTII,,,

## 2023-01-10 PROCEDURE — 99213 PR OFFICE/OUTPT VISIT, EST, LEVL III, 20-29 MIN: ICD-10-PCS | Mod: S$PBB,,,

## 2023-01-10 PROCEDURE — 3074F SYST BP LT 130 MM HG: CPT | Mod: CPTII,,, | Performed by: SPECIALIST

## 2023-01-10 PROCEDURE — 1111F DSCHRG MED/CURRENT MED MERGE: CPT | Mod: CPTII,,,

## 2023-01-10 RX ORDER — HYDROCODONE BITARTRATE AND ACETAMINOPHEN 5; 325 MG/1; MG/1
1 TABLET ORAL EVERY 6 HOURS PRN
Qty: 12 TABLET | Refills: 0 | Status: SHIPPED | OUTPATIENT
Start: 2023-01-10 | End: 2023-01-13

## 2023-01-10 NOTE — PROGRESS NOTES
"Subjective:       Patient ID: Mansi Jni is a 62 y.o. female.    Vitals:  height is 5' 7" (1.702 m) and weight is 65.4 kg (144 lb 1.6 oz). Her temperature is 98.1 °F (36.7 °C). Her blood pressure is 106/71 and her pulse is 90. Her respiration is 16 and oxygen saturation is 98%.     Chief Complaint: Spasms (Muscle spasms, Rt leg (groin) started yesterday. Pt was seen in ED, U/S, x-ray, labs all obtained.)    Pt states continued muscle spasms in R upper leg. Was seen in the ED last night, negative US for DVT. Given K and Mg. PT states muscles still cramping whenever she moves leg.    Spasms      Constitution: Negative.   HENT: Negative.     Neck: neck negative.   Cardiovascular: Negative.    Eyes: Negative.    Respiratory: Negative.     Gastrointestinal: Negative.    Genitourinary: Negative.    Musculoskeletal:  Positive for pain and muscle cramps.   Skin: Negative.    Allergic/Immunologic: Negative.    Neurological: Negative.      Objective:      Physical Exam   Constitutional: She is oriented to person, place, and time. normal  HENT:   Head: Normocephalic.   Mouth/Throat: Mucous membranes are moist. Oropharynx is clear.   Eyes: Pupils are equal, round, and reactive to light.   Cardiovascular: Normal rate and normal pulses.   Pulmonary/Chest: Effort normal.   Abdominal: Normal appearance. Soft.   Musculoskeletal: Normal range of motion.         General: Tenderness present. Normal range of motion.   Neurological: She is alert and oriented to person, place, and time.   Skin: Skin is warm and dry.   Vitals reviewed.      Assessment:       1. Right leg pain            Plan:         Right leg pain  -     HYDROcodone-acetaminophen (NORCO) 5-325 mg per tablet; Take 1 tablet by mouth every 6 (six) hours as needed for Pain.  Dispense: 12 tablet; Refill: 0    Pt attempted to follow up with PCP but was not able to get an appt today. Pt will follow up as soon as possible.    Pain medication side effects discuss, pt and " daughter verbalized understanding.    ER precautions given, patient verbalized understanding.     Please see provided patient education for guidance.    Follow up with PCP or return to clinic if symptoms worsen or do not improve.

## 2023-01-10 NOTE — PROGRESS NOTES
Patient returns now 1 month out from a right lower bilobectomy.  Her pathology returned positive for a 3.1 cm poorly differentiated adenocarcinoma with 2 posterior mediastinal lymph nodes positive.  Patient was doing well for the 1st several weeks after surgery.  She was walking without any shortness of breath or pain.  However over the last 3 days she is suddenly developed a spasm and shooting pain in the right thigh.  She is been to the emergency room for evaluation and she did not have a DVT and all x-rays were negative.  She was told she has frequent muscle spasms.  She is really getting not much relief from this.  Her incision has healed nicely.  She is already lined up with oncology for chemotherapy.  She will follow up with them and with her primary care for further evaluation of this right leg problem.  We will see her back p.r.n..

## 2023-01-12 ENCOUNTER — HOSPITAL ENCOUNTER (OUTPATIENT)
Dept: RADIOLOGY | Facility: HOSPITAL | Age: 63
Discharge: HOME | End: 2023-01-12
Attending: INTERNAL MEDICINE
Payer: MEDICAID

## 2023-01-12 DIAGNOSIS — C34.91 NSCLC OF RIGHT LUNG: ICD-10-CM

## 2023-01-12 PROCEDURE — 78815 PET IMAGE W/CT SKULL-THIGH: CPT | Mod: TC

## 2023-01-13 ENCOUNTER — TREATMENT PLANNING (OUTPATIENT)
Dept: HEMATOLOGY/ONCOLOGY | Facility: CLINIC | Age: 63
End: 2023-01-13
Payer: MEDICAID

## 2023-01-13 DIAGNOSIS — C34.91 NSCLC OF RIGHT LUNG: Primary | ICD-10-CM

## 2023-01-13 NOTE — PROGRESS NOTES
Reviewed PET scan done on 1/12/2023.  It was mentioned about focal uptake at the medial right acetabular roof without appreciable focal osseous lesion by CT evaluation.  Metastasis is a consideration.    Refer the patient urgently to Interventional Radiology for evaluation and biopsy to rule out for metastasis.    Called the patient and informed her about the PET scan findings and the referral that has been placed to Interventional Radiology.      Spencer Murray MD  Hematology / Oncology              01/12/2023 NM PET CT ROUTINE      1. Postsurgical changes of recent right lower and middle lobectomies.  There is a small right pleural effusion.  There is nonspecific peripheral uptake along the pleura at the right lung base and perihilar region which may be related to healing response in the setting of recent surgery.  Similarly borderline mediastinal uptake is nonspecific in the recent postoperative setting and could be reactive.  Continued attention recommended on follow-up.  2. Focal uptake at the medial right acetabular roof without appreciable focal osseous lesion by CT evaluation.  This is new compared to previous exam.  Metastasis is a consideration

## 2023-01-23 ENCOUNTER — HOSPITAL ENCOUNTER (OUTPATIENT)
Dept: RADIOLOGY | Facility: HOSPITAL | Age: 63
Discharge: HOME | End: 2023-01-23
Attending: NURSE PRACTITIONER
Payer: MEDICAID

## 2023-01-23 ENCOUNTER — CLINICAL SUPPORT (OUTPATIENT)
Dept: AUDIOLOGY | Facility: HOSPITAL | Age: 63
End: 2023-01-23
Attending: NURSE PRACTITIONER
Payer: MEDICAID

## 2023-01-23 DIAGNOSIS — H90.3 SENSORINEURAL HEARING LOSS, BILATERAL: Primary | ICD-10-CM

## 2023-01-23 DIAGNOSIS — Z12.31 ENCOUNTER FOR SCREENING MAMMOGRAM FOR MALIGNANT NEOPLASM OF BREAST: ICD-10-CM

## 2023-01-23 DIAGNOSIS — C34.91 NSCLC OF RIGHT LUNG: ICD-10-CM

## 2023-01-23 DIAGNOSIS — C34.91 NSCLC OF RIGHT LUNG: Primary | ICD-10-CM

## 2023-01-23 PROCEDURE — 77063 BREAST TOMOSYNTHESIS BI: CPT | Mod: 26,,, | Performed by: RADIOLOGY

## 2023-01-23 PROCEDURE — 77067 SCR MAMMO BI INCL CAD: CPT | Mod: 26,,, | Performed by: RADIOLOGY

## 2023-01-23 PROCEDURE — 77067 SCR MAMMO BI INCL CAD: CPT | Mod: TC

## 2023-01-23 PROCEDURE — 92550 TYMPANOMETRY & REFLEX THRESH: CPT | Performed by: AUDIOLOGIST

## 2023-01-23 PROCEDURE — 77063 MAMMO DIGITAL SCREENING BILAT WITH TOMO: ICD-10-PCS | Mod: 26,,, | Performed by: RADIOLOGY

## 2023-01-23 PROCEDURE — 92652 AEP THRSHLD EST MLT FREQ I&R: CPT | Performed by: AUDIOLOGIST

## 2023-01-23 PROCEDURE — 77067 MAMMO DIGITAL SCREENING BILAT WITH TOMO: ICD-10-PCS | Mod: 26,,, | Performed by: RADIOLOGY

## 2023-01-23 NOTE — PROGRESS NOTES
Hearing Evaluation      Patient History: Ms. Jin is a 62 y.o. female in for a baseline hearing evaluation prior to chemotherapy (cisplatin) treatments.  She denies difficulty hearing, tinnitus, vertigo or middle ear issues. All additional history is unremarkable.      Test Results:       Pure Tone Testing:     Right ear:   Mild sensorineural hearing loss from 3k-6kHz rising to within normal limits at 8kHz. Speech reception threshold is in agreement with puretone findings.  Discrimination score of 80% is considered good.      Left ear: Mild to moderate sensorineural hearing loss from 2k-6kHz rising to mild at 8kHz. Speech reception threshold is in agreement with puretone findings.  Discrimination score of 76% is considered good.         Tympanometry:        Right ear:   Type 'A' tymp, normal middle ear pressure/function    Left ear: Type 'A' tymp, normal middle ear pressure/function       Distortion Product Otoacoustic Emission Testing (DPOAE):         Right ear: Present emissions only from 1500-3kHz absent at 1k, 4k, and 6kHz      Left ear: Absent emissions from 1k-6kHz with the exception of 4kHz         Interpretations:     Behavioral test findings indicate a mild mejia frequency sensorineural hearing loss for the right ear.  Findings for the left ear indicate a mild to moderate high frequency sensorineural hearing loss (slight asymmetry noted). Speech reception thresholds obtained at 15dB, AU, and are in agreement with puretone findings. Speech discrimination scores of 80%, AD and 76, AS, are considered good.  DPOAE findings indicate cochlear site of lesion, bilaterally. Immittance measures indicate normal middle ear pressure/function, bilaterally.         Recommendations:   Repeat hearing evaluation 24 hours after first cisplatin treatment.  2.   Continue serial audiograms during treatment, especially if changes in hearing and/or tinnitus are reported.

## 2023-01-25 ENCOUNTER — DOCUMENTATION ONLY (OUTPATIENT)
Dept: HEMATOLOGY/ONCOLOGY | Facility: CLINIC | Age: 63
End: 2023-01-25
Payer: MEDICAID

## 2023-01-25 ENCOUNTER — OFFICE VISIT (OUTPATIENT)
Dept: INTERNAL MEDICINE | Facility: CLINIC | Age: 63
End: 2023-01-25
Payer: MEDICAID

## 2023-01-25 VITALS
RESPIRATION RATE: 18 BRPM | SYSTOLIC BLOOD PRESSURE: 113 MMHG | TEMPERATURE: 98 F | WEIGHT: 145.63 LBS | HEART RATE: 90 BPM | HEIGHT: 67 IN | DIASTOLIC BLOOD PRESSURE: 80 MMHG | BODY MASS INDEX: 22.86 KG/M2

## 2023-01-25 DIAGNOSIS — G89.29 CHRONIC RIGHT-SIDED LOW BACK PAIN WITH RIGHT-SIDED SCIATICA: Primary | ICD-10-CM

## 2023-01-25 DIAGNOSIS — J44.9 STAGE 3 SEVERE COPD BY GOLD CLASSIFICATION: Chronic | ICD-10-CM

## 2023-01-25 DIAGNOSIS — I10 PRIMARY HYPERTENSION: ICD-10-CM

## 2023-01-25 DIAGNOSIS — M54.41 CHRONIC RIGHT-SIDED LOW BACK PAIN WITH RIGHT-SIDED SCIATICA: Primary | ICD-10-CM

## 2023-01-25 DIAGNOSIS — C34.91 NSCLC OF RIGHT LUNG: ICD-10-CM

## 2023-01-25 PROCEDURE — 99213 OFFICE O/P EST LOW 20 MIN: CPT | Mod: S$PBB,,, | Performed by: NURSE PRACTITIONER

## 2023-01-25 PROCEDURE — 3008F BODY MASS INDEX DOCD: CPT | Mod: CPTII,,, | Performed by: NURSE PRACTITIONER

## 2023-01-25 PROCEDURE — 3074F SYST BP LT 130 MM HG: CPT | Mod: CPTII,,, | Performed by: NURSE PRACTITIONER

## 2023-01-25 PROCEDURE — 1160F PR REVIEW ALL MEDS BY PRESCRIBER/CLIN PHARMACIST DOCUMENTED: ICD-10-PCS | Mod: CPTII,,, | Performed by: NURSE PRACTITIONER

## 2023-01-25 PROCEDURE — 1160F RVW MEDS BY RX/DR IN RCRD: CPT | Mod: CPTII,,, | Performed by: NURSE PRACTITIONER

## 2023-01-25 PROCEDURE — 1159F PR MEDICATION LIST DOCUMENTED IN MEDICAL RECORD: ICD-10-PCS | Mod: CPTII,,, | Performed by: NURSE PRACTITIONER

## 2023-01-25 PROCEDURE — 3079F DIAST BP 80-89 MM HG: CPT | Mod: CPTII,,, | Performed by: NURSE PRACTITIONER

## 2023-01-25 PROCEDURE — 99214 OFFICE O/P EST MOD 30 MIN: CPT | Mod: PBBFAC | Performed by: NURSE PRACTITIONER

## 2023-01-25 PROCEDURE — 3079F PR MOST RECENT DIASTOLIC BLOOD PRESSURE 80-89 MM HG: ICD-10-PCS | Mod: CPTII,,, | Performed by: NURSE PRACTITIONER

## 2023-01-25 PROCEDURE — 3008F PR BODY MASS INDEX (BMI) DOCUMENTED: ICD-10-PCS | Mod: CPTII,,, | Performed by: NURSE PRACTITIONER

## 2023-01-25 PROCEDURE — 3074F PR MOST RECENT SYSTOLIC BLOOD PRESSURE < 130 MM HG: ICD-10-PCS | Mod: CPTII,,, | Performed by: NURSE PRACTITIONER

## 2023-01-25 PROCEDURE — 99213 PR OFFICE/OUTPT VISIT, EST, LEVL III, 20-29 MIN: ICD-10-PCS | Mod: S$PBB,,, | Performed by: NURSE PRACTITIONER

## 2023-01-25 PROCEDURE — 1159F MED LIST DOCD IN RCRD: CPT | Mod: CPTII,,, | Performed by: NURSE PRACTITIONER

## 2023-01-25 RX ORDER — LORATADINE 10 MG/1
10 TABLET ORAL DAILY
Qty: 90 TABLET | Refills: 2 | Status: SHIPPED | OUTPATIENT
Start: 2023-01-25 | End: 2023-03-22 | Stop reason: SDUPTHER

## 2023-01-25 NOTE — PROGRESS NOTES
Patient ID: [id number]     Chief Complaint: Follow-up and Leg Pain (States pain is much better. 2. Soreness to right side)    HPI:     Mansi Jin is a 62 y.o. female with diagnosis of HTN, HLD, CKD Stage 2, COPD Stage 3, Right Lower and Middle Bilobectomy with Mediastinal Lymph Node Dissection S/T Lung Mass (12/8/2022), Tobacco Use. Patient seen in clinic today for Urgent Care follow up for right leg pain. Patient last seen in clinic on 12/27/2022. Patient's daughter present for visit. Patient had lumbar X-ray completed on 11/22/2022; indicated degenerative listhesis related to facet arthropathy in the lower lumbar spine, similar to prior. Patient referred to physical therapy, patient states she never received an appointment, referral was denied due to clinic not accepting her insurance. Patient states right leg pain, back pain improved. Patient states still ambulates with rolling walker at times. Patient states she does get extremely SOB with ambulation at times. Patient does have COPD Stage 3 and is S/P RLL and RML Lobectomy due to lung mass. Patient states she does user her Trelegy Ellipta and Combivent as prescribed.   Patient seen previously for having low blood pressure readings at home, states B/P improved. Patient's daughter states patient's appetite improved and B/P has improved. Patient's B/P medications discontinued. B/P today noted to be 113/80.      Patient seen in Urgent Care on 01/10/2023. Pt states continued muscle spasms in R upper leg. Was seen in the ED last night, negative US for DVT. Given K and Mg. PT states muscles still cramping whenever she moves leg. Patient prescribed [place] and discharged home.     Patient followed by CV Surgeon, Dr. Browne. Last appointment on 01/10/2023. Patient returns now 1 month out from a right lower bilobectomy.  Her pathology returned positive for a 3.1 cm poorly differentiated adenocarcinoma with 2 posterior mediastinal lymph nodes positive.  Patient was doing  well for the 1st several weeks after surgery.  She was walking without any shortness of breath or pain.  However over the last 3 days she is suddenly developed a spasm and shooting pain in the right thigh.  She is been to the emergency room for evaluation and she did not have a DVT and all x-rays were negative.  She was told she has frequent muscle spasms.  She is really getting not much relief from this.  Her incision has healed nicely.  She is already lined up with oncology for chemotherapy.  She will follow up with them and with her primary care for further evaluation of this right leg problem.  We will see her back p.r.n.     Patient seen in ED on 01/09/2023. Patient with the history of right lung partial pneumonectomy for a CA lung on 12/10/2022 is waiting for a PET scan and chemotherapy comes to the emergency room with complaint of right lower extremity pain which is mainly in the groin, hurts when she moves, or puts weight on the right lower extremity, denies any injuries, denies any other complaints. Patient with right groin pain which started yesterday worse on would bearing, worse on movement, if she is laying down straight she does not have much pain, she is waiting for a PET scan to be done to start chemotherapy for her lung CA, she had partial pneumonectomy on 10th of December and she is been more sedentary these days. I am going to check her for electrolytes abnormality mainly calcium and potassium and also I will check her for DVT I will do an x-ray of the pelvis and decide further. Patient's lower extremity ultrasound is negative for deep vein thrombosis, her calcium is normal 9.4 and her potassium is 3.3 I am going to give her a dose of potassium and magnesium in the emergency room, her pelvic x-ray is negative, I will let her go home with instruction to take a dose of magnesium once a day, and see if that helps with cramps, because that is what I saw when I moved her leg she had a cramp in her   hamstring with no other positive findings of anything else going on.  Patient has 2+ dorsalis pedis pulse in the foot, she has capillary refill which is normal and she is moving the right lower extremity adjust that she gets the cramp in the hamstring or the thigh when she moves it.  I explained this to patient and her daughter and they verbalized understanding and they are willing to go home and they will follow-up with the family doctor.    Patient followed by Oncology, Dr. Spencer Murray with [facility] Last appointment on 01/04/2023. Mansi Jin  62 y.o.  female with past medical history significant for HTN, HLD, uterine cancer status post total hysterectomy in 1989,  CKD Stage 2, COPD Stage 3, referred for management of NSCLC s/p RLL / RML lobectomy with Mediastinal Lymph Node Dissection on 12/8/2022. She was noted to have RLL lung mass and underwent CT guided biopsy which was non diagnostic. PET scan demonstrated significant avidity within the lesion. Given high risk for malignancy, she underwent right lower lobectomy and right middle lobectomy on 12/08/2022. Patholy came back with invasive adenocarcinoma, poorly differentiated with solid morphology, focal tumor involvement of visceral pleura and focal tumor vascular invasion (vein), with mediastinal lymph nodes positive for metastatic carcinoma. Patient is here today for initial evaluation and to be started on chemotherapy.  Patient is on continuous home oxygen since surgery.  She is complaining of some discomfort and pain in site of surgery. Denies any other concerns.  Patient was also noted to have thrombophlebitis of the cephalic vein, in the cubital region after blood draw, which was noted on during the previous visit and was started on aspirin and is almost resolved now. Dx: NSCLC / Adenocarcinoma Stage IIIA pT2a pN2: Pt had previous PET scan done in 08/10/2022, so will repeat PET scan to rule out metastatic disease before starting with  chemotherapy; If noted to have no evidence of metastatic disease then will benefit from adjuvant chemotherapy with cisplatin and pemetrexed for 4 cycles and then followed up either by osimertinib (if EGFR positive) or atezolizumab; Requested NGS and PD-L1 on tissue specimen, still pending; Referred to audiology for evaluation as likely planning to start on cisplatin if ruled out metastatic disease; MD VISIT 2 WEEK to follow up on the PET scan to rule out metastatic disease and then to be started immediately on treatment. Patient has follow up appointment scheduled on 01/31/2023.     Patient followed by Radiation Oncologist, Dr. Dinorah Mireles. Last appointment on 01/04/2023. The patient has a stage III non-small cell lung cancer status post resection. We discussed the controversy does around postoperative radiotherapy in the setting of positive mediastinal nodes. She does have a high risk factor of extranodal extension. Explained to the patient and her daughter that at this time the most important component of her treatment is the chemotherapy. Will see her back towards the end of chemotherapy and we will rediscuss the role of postoperative radiation therapy. Recent meta analyses have shown no overall survival benefit in the setting of postoperative radiation therapy but there is data support a larger benefit in patients with extracapsular extension. If the patient tolerates chemotherapy well and has a good performance status, I will be more likely to recommend PORT. Also if the patient only receives 1-2 cycles of chemotherapy, I will be more inclined to recommend radiotherapy. If the patient is  able to complete most of her recommended chemotherapy but has a limited performance status after completing chemotherapy, we will likely not proceed with radiation therapy. The patient and her daughter understand the plan and are happy that we will reassess after completing chemotherapy.      Patient followed by Pulmonology  Clinic. Last appointment on 12/21/2022. Ms. Jin is a 63yo female with RLL lung mass who has been seen in lung mass clinic and underwent CT guided biopsy which returned as non diagnostic due to insufficient tissue.  PET scanning performed demonstrated significant avidity within the lesion.  Given her high risk for pulmonary malignancy due to active tobacco abuse and PET avid lung mass, the decision was made to refer to thoracic surgery for consideration of resection.  She underwent right lower lobectomy and right middle lobectomy on 12/08/2022. Pathology came back with invasive adenocarcinoma, poorly differentiated with solid morphology, focal tumor involvement of visceral pleura and focal tumor vascular invasion (vein), with 2 of 3 posterior mediastinal lymph nodes positive for metastatic carcinoma. Here today for hospital follow up. Still with ongoing  weakness, nausea, and needing oxygen continuously. Daughter concerned about left upper extremity erythema, edema, and tenderness. Dx: Right lung mass status post right middle lobe and right lower lobe lobectomy on 12/08/2022 -- Pathology came back with invasive adenocarcinoma, poorly differentiated with solid morphology, focal tumor involvement of visceral pleura and focal tumor vascular invasion (vein), with 2 of 3 posterior mediastinal lymph nodes positive for metastatic carcinom, stage III A; Stage III COPD; Hypoxia; Left upper extremity edema. Discussed pathology findings with patient and that she will need chemotherapy and oncology referral to [facility] oncology clinic -- faxing order for [facility] lung cancer panel and PDL-1 testing to be done on tumor. Sending patient for NIVA of LUE to be done today - if DVT present then will consider CT PE protocol due to ongoing hypoxia. Continue trelegy and combivent. Continue oxygen, montior o2 sats at home and instructed if staying below 88 then to present to nearest ED. Needs close follow up in [facility] pulmonary clinic as  well as with [facility] oncology. Patient has follow up appointment scheduled for 2023.       Review of patient's allergies indicates:  No Known Allergies      Breast Cancer Screenin2023, negative  Cervical Cancer Screening: Hysterectomy, GYN referral 2022  Colorectal Cancer Screening: Cologuard negative on 2022  Lung Cancer Screening: currently being treated by Oncology for Lung Cancer  AAA Screening: deferred due to age  Osteoporosis Screening: deferred due to age  Medicare Wellness: N/A  Immunizations:   Immunization History   Administered Date(s) Administered    COVID-19, MRNA, LN-S, PF (MODERNA FULL 0.5 ML DOSE) 2021, 2021, 2021    Influenza 10/19/2017    Influenza - Quadrivalent 2020, 2022    Influenza - Quadrivalent - PF (6-35 months) 10/19/2017, 2018    Influenza - Quadrivalent - PF *Preferred* (6 months and older) 2022    Influenza - Trivalent - PF (ADULT) 11/15/2011    Pneumococcal Polysaccharide - 23 Valent 2022    Tdap 10/19/2017    Zoster 2021, 2021    Zoster Recombinant 2021, 2021       Past Surgical History:   Procedure Laterality Date    HYSTERECTOMY  Unknowing    lipoma multiple sites      LUNG LOBECTOMY Right 2022    Procedure: LOBECTOMY, LUNG;  Surgeon: Jass Browne IV, MD;  Location: [facility] OR;  Service: Cardiothoracic;  Laterality: Right;    SURGICAL REMOVAL OF LYMPH NODE  2022    Procedure: EXCISION, LYMPH NODE;  Surgeon: Jass Browne IV, MD;  Location: [facility] OR;  Service: Cardiothoracic;;    THORACOTOMY Right 2022    Procedure: THORACOTOMY;  Surgeon: Jass Browne IV, MD;  Location: [facility] OR;  Service: Cardiothoracic;  Laterality: Right;  Right Lower Lobectomy with Lymph Node Dissection    TOTAL ABDOMINAL HYSTERECTOMY W/ BILATERAL SALPINGOOPHORECTOMY         family history includes Asthma in her brother and brother; Cancer in her maternal grandfather; Fibroids in her sister;  "Heart failure in her father; Hepatitis in her mother; Hypertension in her mother; Liver cancer in her mother; Ovarian cancer in her mother; Prostate cancer in her father.    Social History     Socioeconomic History    Marital status: Single    Number of children: 5   Tobacco Use    Smoking status: Former     Packs/day: 0.50     Years: 50.00     Pack years: 25.00     Types: Cigarettes     Quit date: 2022     Years since quittin.1    Smokeless tobacco: Never   Substance and Sexual Activity    Alcohol use: Not Currently     Comment: quit 25years ago    Drug use: Never    Sexual activity: Yes     Partners: Male       Current Outpatient Medications   Medication Instructions    aspirin (ECOTRIN) 81 mg, Oral, Daily    atorvastatin (LIPITOR) 40 mg, Oral, Nightly    fluticasone propionate (FLONASE) 50 mcg, Each Nostril, Daily    fluticasone-umeclidin-vilanter (TRELEGY ELLIPTA) 100-62.5-25 mcg DsDv 1 puff, Inhalation, Daily    ipratropium-albuteroL (COMBIVENT)  mcg/actuation inhaler 1 puff, Inhalation, 4 times daily, Rescue    loratadine (CLARITIN) 10 mg, Oral, Daily    nicotine (NICODERM CQ) 21 mg/24 hr 1 patch, Transdermal, Daily       Subjective:     Review of Systems   Constitutional: Negative.    HENT: Negative.     Eyes: Negative.    Respiratory: Negative.     Cardiovascular: Negative.    Gastrointestinal: Negative.    Endocrine: Negative.    Genitourinary: Negative.    Musculoskeletal: Negative.    Skin: Negative.    Allergic/Immunologic: Negative.    Neurological: Negative.    Hematological: Negative.    Psychiatric/Behavioral: Negative.       Objective:     Visit Vitals  /80 (BP Location: Left arm, Patient Position: Sitting, BP Method: Large (Automatic))   Pulse 90   Temp 98.1 °F (36.7 °C) (Oral)   Resp 18   Ht 5' 7.01" (1.702 m)   Wt 66 kg (145 lb 9.6 oz)   BMI 22.80 kg/m²       Physical Exam  Vitals reviewed.   Constitutional:       Appearance: Normal appearance.   HENT:      Head: " Normocephalic and atraumatic.      Mouth/Throat:      Mouth: Mucous membranes are moist.      Pharynx: Oropharynx is clear.   Eyes:      Extraocular Movements: Extraocular movements intact.      Conjunctiva/sclera: Conjunctivae normal.      Pupils: Pupils are equal, round, and reactive to light.   Cardiovascular:      Rate and Rhythm: Normal rate and regular rhythm.      Heart sounds: Normal heart sounds.   Pulmonary:      Effort: Pulmonary effort is normal.      Breath sounds: Examination of the right-middle field reveals decreased breath sounds. Examination of the right-lower field reveals decreased breath sounds. Decreased breath sounds present.   Abdominal:      General: Bowel sounds are normal.   Musculoskeletal:         General: Normal range of motion.      Cervical back: Normal range of motion.   Skin:     General: Skin is warm and dry.   Neurological:      Mental Status: She is alert and oriented to person, place, and time.   Psychiatric:         Mood and Affect: Mood normal.         Behavior: Behavior normal.       Labs Reviewed:      Labs reviewed    Assessment:       ICD-10-CM ICD-9-CM   1. Chronic right-sided low back pain with right-sided sciatica  M54.41 724.2    G89.29 724.3     338.29   2. Stage 3 severe COPD by GOLD classification  J44.9 496   3. Primary hypertension  I10 401.9   4. NSCLC of right lung  C34.91 162.9        Plan:     1. Chronic right-sided low back pain with right-sided sciatica  Resolved     2. Stage 3 severe COPD by GOLD classification  PFTs on 09/02/2022: moderate obstruction, decreased DLCO suggest altered pulmonary gas exchange surface, possible emphysema  Continue Trelegy Ellipta daily  Continue Combivent inhaler QID prn SOB  Followed by Pulmonology  [place] [employer]ment DMV Form completed    3. Primary hypertension  B/P: 113/80  No medications noted  B/P has been controlled without medications since surgery in 12/2022.   Will continue to monitor    4. NSCLC of right  lung  Followed by Oncology      Follow up if symptoms worsen or fail to improve. In addition to their scheduled follow up, the patient has also been instructed to follow up on as needed basis.     Jennifer Dacosta, DOUGIEP

## 2023-01-27 ENCOUNTER — DOCUMENTATION ONLY (OUTPATIENT)
Dept: HEMATOLOGY/ONCOLOGY | Facility: CLINIC | Age: 63
End: 2023-01-27
Payer: MEDICAID

## 2023-01-27 NOTE — PROGRESS NOTES
Dr. Rushing decided that he will do the bx. The biopsy is schedule for Feb 22 at 0800. Message sent to Dr. Murray.

## 2023-02-01 ENCOUNTER — TELEPHONE (OUTPATIENT)
Dept: AUDIOLOGY | Facility: HOSPITAL | Age: 63
End: 2023-02-01
Payer: MEDICARE

## 2023-02-01 NOTE — TELEPHONE ENCOUNTER
Baseline Audio was completed on 1/23/23. Patient was scheduled for 2nd audio on 2/2/23. Patient's daughter called to cancel audio on 2/2/23 due to she has not started treatments yet. Once daughter has a better idea of when treatments will start she will call to reschedule.

## 2023-02-14 ENCOUNTER — OFFICE VISIT (OUTPATIENT)
Dept: PULMONOLOGY | Facility: CLINIC | Age: 63
End: 2023-02-14
Payer: MEDICARE

## 2023-02-14 VITALS
BODY MASS INDEX: 23.7 KG/M2 | DIASTOLIC BLOOD PRESSURE: 77 MMHG | OXYGEN SATURATION: 97 % | RESPIRATION RATE: 20 BRPM | TEMPERATURE: 98 F | HEIGHT: 67 IN | WEIGHT: 151 LBS | HEART RATE: 90 BPM | SYSTOLIC BLOOD PRESSURE: 114 MMHG

## 2023-02-14 DIAGNOSIS — C34.91 NSCLC OF RIGHT LUNG: ICD-10-CM

## 2023-02-14 DIAGNOSIS — R91.1 SOLITARY PULMONARY NODULE: ICD-10-CM

## 2023-02-14 DIAGNOSIS — J44.9 STAGE 3 SEVERE COPD BY GOLD CLASSIFICATION: Primary | Chronic | ICD-10-CM

## 2023-02-14 PROCEDURE — 3078F PR MOST RECENT DIASTOLIC BLOOD PRESSURE < 80 MM HG: ICD-10-PCS | Mod: CPTII,,, | Performed by: INTERNAL MEDICINE

## 2023-02-14 PROCEDURE — 3078F DIAST BP <80 MM HG: CPT | Mod: CPTII,,, | Performed by: INTERNAL MEDICINE

## 2023-02-14 PROCEDURE — 3074F PR MOST RECENT SYSTOLIC BLOOD PRESSURE < 130 MM HG: ICD-10-PCS | Mod: CPTII,,, | Performed by: INTERNAL MEDICINE

## 2023-02-14 PROCEDURE — 1159F MED LIST DOCD IN RCRD: CPT | Mod: CPTII,,, | Performed by: INTERNAL MEDICINE

## 2023-02-14 PROCEDURE — 99213 OFFICE O/P EST LOW 20 MIN: CPT | Mod: PBBFAC

## 2023-02-14 PROCEDURE — 99214 PR OFFICE/OUTPT VISIT, EST, LEVL IV, 30-39 MIN: ICD-10-PCS | Mod: S$PBB,,, | Performed by: INTERNAL MEDICINE

## 2023-02-14 PROCEDURE — 99214 OFFICE O/P EST MOD 30 MIN: CPT | Mod: S$PBB,,, | Performed by: INTERNAL MEDICINE

## 2023-02-14 PROCEDURE — 1159F PR MEDICATION LIST DOCUMENTED IN MEDICAL RECORD: ICD-10-PCS | Mod: CPTII,,, | Performed by: INTERNAL MEDICINE

## 2023-02-14 PROCEDURE — 3074F SYST BP LT 130 MM HG: CPT | Mod: CPTII,,, | Performed by: INTERNAL MEDICINE

## 2023-02-14 PROCEDURE — 3008F PR BODY MASS INDEX (BMI) DOCUMENTED: ICD-10-PCS | Mod: CPTII,,, | Performed by: INTERNAL MEDICINE

## 2023-02-14 PROCEDURE — 3008F BODY MASS INDEX DOCD: CPT | Mod: CPTII,,, | Performed by: INTERNAL MEDICINE

## 2023-02-14 NOTE — PROGRESS NOTES
Subjective:       Patient ID: Mansi Jin is a 62 y.o. female.    Chief Complaint: No chief complaint on file.    HPI  Ms. Jin is a 63yo female with RLL lung mass and COPD stage III who has presents to pulmonology for 1 month follow up.  Recent PET scan depicted a lymphadenopathy in right lower leg.  Pending IR biopsy on 02/22/2023.  Oncology will begin chemotherapy once pathology has resulted.  She states she is getting some energy.  And has gained some weight.  States she was unable to tolerate Combivent as it causes some strangling sensation and shortness of breath.  Has been tolerating Trelegy well.  States her shortness of breath episodes have diminished and she has to use her rescue inhaler maybe once a week.  States she uses her nebulizer twice a week.  Denies any fevers, chills, nausea, vomiting, unintentional weight loss, syncopal episodes, changes in vision, headaches, chest pains, palpitations, paresthesias.  Has no other complaints at this time.      Review of Systems  Constitutional: no fever, admits to fatigue, weakness  Eye: no vision loss, eye redness, drainage, or pain  ENMT: no sore throat, ear pain, sinus pain/congestion, nasal congestion/drainage  Respiratory: no cough, no wheezing, admits to episodes of SOB  Cardiovascular: no chest pain, no palpitations, no edema  Gastrointestinal: no nausea, vomiting, or diarrhea. No abdominal pain  Genitourinary: no dysuria, no urinary frequency or urgency, no hematuria  Hema/Lymph: no abnormal bruising or bleeding  Endocrine: no heat or cold intolerance, no excessive thirst or excessive urination  Musculoskeletal: no muscle or joint pain, no joint swelling  Integumentary: no skin rash or abnormal lesion  Neurologic: no headache, no dizziness, no weakness or numbness        Current Outpatient Medications   Medication Instructions    aspirin (ECOTRIN) 81 mg, Oral, Daily    atorvastatin (LIPITOR) 40 mg, Oral, Nightly    fluticasone propionate (FLONASE)  50 mcg, Each Nostril, Daily    fluticasone-umeclidin-vilanter (TRELEGY ELLIPTA) 100-62.5-25 mcg DsDv 1 puff, Inhalation, Daily    loratadine (CLARITIN) 10 mg, Oral, Daily    nicotine (NICODERM CQ) 21 mg/24 hr 1 patch, Transdermal, Daily           Objective:      Physical Exam    General appearance: alert, thin, frail, appears stated age, cooperative and no distress  Head: Normocephalic, without obvious abnormality, atraumatic  Neck: no adenopathy, no carotid bruit, no JVD and supple, symmetrical, trachea midline  Lungs: diminished breath sounds RUL, no wheezes, no rales, noronchi  Heart: regular rate and rhythm, S1, S2 normal, no murmur, click, rub or gallop  Abdomen: soft, non-tender; bowel sounds normal; no masses,  no organomegaly  Extremities: extremities normal, atraumatic, no cyanosis or edema  Pulses: 2+ and symmetric  Skin: Skin color, texture, turgor normal. No rashes or lesions  Neurologic: Grossly normal        Assessment:       Right lung mass status post right middle lobe and right lower lobe lobectomy on 12/08/2022 -- Pathology came back with invasive adenocarcinoma, poorly differentiated with solid morphology, focal tumor involvement of visceral pleura and focal tumor vascular invasion (vein), with 2 of 3 posterior mediastinal lymph nodes positive for metastatic carcinom, stage III A  Stage III COPD  Hypoxia   Left upper extremity edema       Plan:       Repeat PET on 1/12/23 with potential metastasis  - oncology planning urgent initiation of chemo, however pending biopsy with IR on 2/22 for evaluation of potential metastasis  -Continue trelegy  - will discontinue combivent 2/2 to side effects  -Continue oxygen, montior o2 sats at home and instructed if staying below 88 then to present to nearest ED  -Needs close follow up with pulmonology and  oncology        RTC: 3-6 months    Christine Razo, DO  [facility] Internal Medicine, -2  02/14/2023

## 2023-02-27 PROBLEM — Z00.00 WELLNESS EXAMINATION: Status: RESOLVED | Noted: 2022-11-22 | Resolved: 2023-02-27

## 2023-03-03 NOTE — PROGRESS NOTES
HEMATOLOGY / ONCOLOGY   CLINIC NOTE     ONCOLOGICAL HISTORY:     Diagnosis:  - NSCLC / Adenocarcinoma Stage IIIA pT2a pN2    Treatment History:  - 12/08/2022: Right lower and middle bilobectomy with mediastinal lymph node dissection.     Current Treatment:   - Carboplatin + Pemetrexed     Subjective:       Chief Complaint: Lung Cancer (F/u with biopsy--- Patient reports increase of SOB)      YOANA Jin  63 y.o.  female with past medical history significant for HTN, HLD, uterine cancer status post total hysterectomy in 1989,  CKD Stage 2, COPD Stage 3, referred for management of NSCLC s/p RLL / RML lobectomy with Mediastinal Lymph Node Dissection on 12/8/2022.     She was noted to have RLL lung mass and underwent CT guided biopsy which was non diagnostic. PET scan demonstrated significant avidity within the lesion. Given high risk for malignancy, she underwent right lower lobectomy and right middle lobectomy on 12/08/2022. Pathology came back with invasive adenocarcinoma, poorly differentiated with solid morphology, focal tumor involvement of visceral pleura and focal tumor vascular invasion (vein), with mediastinal lymph nodes positive for metastatic carcinoma.      PET on 01/12/2023 showed focal uptake at the medial right acetabular roof but the biopsy was negative for malignancy. Discussed with radiology, they recommended adjuvant chemotherapy and then they will reassess later for radiation therapy.     Interval History:     Patient is here for follow-up. She is in wheel chair and c/o being more short of breath lately.     Past Medical History:   Diagnosis Date    ASTHMA     COPD (chronic obstructive pulmonary disease)     SEVERE    High cholesterol     HLD (hyperlipidemia)     HTN (hypertension)     Lung cancer 12/08/2022    invasive adenocarcinoma, poorly differentiated with solid morphology, focal tumor involvement of visceral pleura and focal tumor vascular invasion (vein), with 2 of 3  posterior mediastinal lymph nodes positive for metastatic carcinoma    Stage 2 chronic kidney disease     TIA (transient ischemic attack)     Tobacco abuse     Unspecified osteoarthritis, unspecified site     Uterine cancer       Past Surgical History:   Procedure Laterality Date    HYSTERECTOMY  Unknowing    lipoma multiple sites      LUNG LOBECTOMY Right 2022    Procedure: LOBECTOMY, LUNG;  Surgeon: Jass Browne IV, MD;  Location: [facility] OR;  Service: Cardiothoracic;  Laterality: Right;    SURGICAL REMOVAL OF LYMPH NODE  2022    Procedure: EXCISION, LYMPH NODE;  Surgeon: Jass Browne IV, MD;  Location: [facility] OR;  Service: Cardiothoracic;;    THORACOTOMY Right 2022    Procedure: THORACOTOMY;  Surgeon: Jass Browne IV, MD;  Location: [facility] OR;  Service: Cardiothoracic;  Laterality: Right;  Right Lower Lobectomy with Lymph Node Dissection    TOTAL ABDOMINAL HYSTERECTOMY W/ BILATERAL SALPINGOOPHORECTOMY       Social History     Socioeconomic History    Marital status: Single    Number of children: 5   Tobacco Use    Smoking status: Former     Packs/day: 0.50     Years: 50.00     Pack years: 25.00     Types: Cigarettes     Quit date: 2022     Years since quittin.2    Smokeless tobacco: Never   Substance and Sexual Activity    Alcohol use: Not Currently     Comment: quit 25years ago    Drug use: Never    Sexual activity: Yes     Partners: Male      Family History   Problem Relation Age of Onset    Hypertension Mother     Hepatitis Mother     Ovarian cancer Mother     Liver cancer Mother     Heart failure Father     Prostate cancer Father     Fibroids Sister     Asthma Brother     Asthma Brother     Cancer Maternal Grandfather       Review of patient's allergies indicates:  No Known Allergies     Review of Systems   Constitutional:  Positive for fatigue. Negative for activity change, chills and fever.   HENT: Negative.     Eyes: Negative.    Respiratory:  Positive for shortness of  breath. Negative for cough.    Cardiovascular:  Negative for chest pain and leg swelling.   Gastrointestinal:  Negative for constipation, diarrhea, nausea and vomiting.   Endocrine: Negative.    Genitourinary: Negative.    Musculoskeletal:  Negative for arthralgias and myalgias.   Integumentary:  Negative.   Allergic/Immunologic: Negative.    Neurological:  Negative for light-headedness, numbness and headaches.   Hematological: Negative.    Psychiatric/Behavioral: Negative.         Objective:        Vitals:    03/06/23 1318   BP: 126/78   Pulse: 106   Resp: 20   Temp: 98.3 °F (36.8 °C)          Physical Exam  Constitutional:       General: She is not in acute distress.     Appearance: She is well-developed. She is not ill-appearing.   HENT:      Head: Normocephalic and atraumatic.      Mouth/Throat:      Mouth: Mucous membranes are moist.   Eyes:      Extraocular Movements: Extraocular movements intact.      Conjunctiva/sclera: Conjunctivae normal.   Cardiovascular:      Rate and Rhythm: Normal rate and regular rhythm.      Heart sounds: Normal heart sounds.   Pulmonary:      Effort: Pulmonary effort is normal. No respiratory distress.      Breath sounds: No wheezing.      Comments: Dec breath sounds RLL   Abdominal:      General: Bowel sounds are normal. There is no distension.      Palpations: Abdomen is soft.      Tenderness: There is no abdominal tenderness.   Musculoskeletal:         General: Normal range of motion.      Cervical back: Normal range of motion and neck supple.   Skin:     General: Skin is warm.   Neurological:      General: No focal deficit present.      Mental Status: She is alert and oriented to person, place, and time. Mental status is at baseline.      Cranial Nerves: No cranial nerve deficit.   Psychiatric:         Mood and Affect: Mood normal.         LABS / IMAGING        IMAGING:     - 08/10/2022 PET: The elongated subpleural right lower lobe nodule demonstrates fairly intense FDG  activity.  Infectious, inflammatory and neoplastic etiologies remain possible. No suspicious PET findings elsewhere.     - 12/08/2022 CT HEAD without contrast: No acute intracranial abnormality identified.  Findings of mild microvascular ischemic disease.     - 12/2022: U/S Upper extremity: Thrombophlebitis of the left cephalic vein. No deep venous thrombosis in the left upper extremity.    - 01/03/2023 MRI Brain : GARY     01/12/2023 NM PET CT ROUTINE      1. Postsurgical changes of recent right lower and middle lobectomies.  There is a small right pleural effusion.  There is nonspecific peripheral uptake along the pleura at the right lung base and perihilar region which may be related to healing response in the setting of recent surgery.  Similarly borderline mediastinal uptake is nonspecific in the recent postoperative setting and could be reactive.  Continued attention recommended on follow-up.  2. Focal uptake at the medial right acetabular roof without appreciable focal osseous lesion by CT evaluation.  This is new compared to previous exam.  Metastasis is a consideration    PATHOLOGY:  - 12/08/2022: Invasive solid adenocarcinoma (3.1 CM); G3, poorly differentiated. Visceral Pleura Invasion present without definite serosal surface involvement. Lymphovascular invasion (Vein) present. All margins negative for invasive carcinoma, distance 2.5 cm. LN - 4/14 (10R: Hilar, Posterior mediastinal (RACHEAL), 4R Lower paratracheal, 9R Pulmonary ligament, 10R). pT2a pN2        - 02/22/2023: RIGHT ACETABULAR LESION, CT - GUIDE CORE NEEDLE BIOPSY : FIBRINOPURULENT EXUDATE ADMIXED WITH ATYPICAL EPITHELIOID GROUPS,   NORMAL    MARROW ELEMENTS AND ABUNDANT BLOOD CLOT      Assessment:     ECOG Performance status: 2 - 3.     NSCLC / Adenocarcinoma Stage IIIA pT2a pN2  - 12/08/2022: Right lower and middle bilobectomy with mediastinal lymph node dissection. Path: Invasive adenocarcinoma, poorly differentiated, focal tumor involvement  of visceral pleura and focal tumor vascular invasion (vein), with mediastinal lymph nodes positive for metastatic carcinoma   - TMB - 5, MSI-S, MET amplification, ROS1.  No mutations in EGFR, BRAF, ALK, ERBB2, KRAS, RET,    - 1% tumor cells are positive for PD-L1   - patient evaluated by Radiation Oncology with recommendation to treat with chemotherapy for now if no metastatic disease and will re-evaluate the patient by the end of chemotherapy for radiation therapy  - 01/12/2023 PET: Focal uptake at the medial right acetabular roof. Metastasis is a consideration  - 02/22/2023: RIGHT ACETABULAR LESION, CT - GUIDE CORE NEEDLE BIOPSY : FIBRINOPURULENT EXUDATE ADMIXED WITH ATYPICAL EPITHELIOID GROUPS,   NORMAL    MARROW ELEMENTS AND ABUNDANT BLOOD CLOT    Plan:     - PET scan showed a suspicious finding in the right acetabular roof and patient then had a CT-guided biopsy no evidence of malignancy. Start with adjuvant chemotherapy with Carboplatin and pemetrexed for 4 cycles and then followed by atezolizumab/pembrolizumab. Also follow up again with radiation oncology after chemotherapy to evaluate for radiation therapy    - labs and CXR requested for today  - patient to get chemo education and to be started on treatment immediately   - MD/LABS/TREATMENT - 1 week   The patient was seen, interviewed and examined. Pertinent lab and radiology studies were reviewed. Pt instructed to call should develop concerning signs/symptoms or have further questions.       Portions of the record may have been created with voice recognition software. Occasional wrong-word or sound-a-like substitutions may have occurred due to the inherent limitations of voice recognition software. Read the chart carefully and recognize, using context, where substitutions have occurred.    Spencer Murray MD  Hematology / Oncology

## 2023-03-06 ENCOUNTER — HOSPITAL ENCOUNTER (OUTPATIENT)
Dept: RADIOLOGY | Facility: HOSPITAL | Age: 63
Discharge: HOME | End: 2023-03-06
Attending: INTERNAL MEDICINE
Payer: MEDICARE

## 2023-03-06 ENCOUNTER — OFFICE VISIT (OUTPATIENT)
Dept: HEMATOLOGY/ONCOLOGY | Facility: CLINIC | Age: 63
End: 2023-03-06
Payer: MEDICARE

## 2023-03-06 VITALS
HEART RATE: 106 BPM | BODY MASS INDEX: 24.33 KG/M2 | WEIGHT: 155 LBS | SYSTOLIC BLOOD PRESSURE: 126 MMHG | OXYGEN SATURATION: 96 % | TEMPERATURE: 98 F | HEIGHT: 67 IN | DIASTOLIC BLOOD PRESSURE: 78 MMHG | RESPIRATION RATE: 20 BRPM

## 2023-03-06 DIAGNOSIS — C34.91 NSCLC OF RIGHT LUNG: ICD-10-CM

## 2023-03-06 DIAGNOSIS — C34.91 NSCLC OF RIGHT LUNG: Primary | ICD-10-CM

## 2023-03-06 LAB
ALBUMIN SERPL-MCNC: 3.5 G/DL (ref 3.4–4.8)
ALBUMIN/GLOB SERPL: 0.9 RATIO (ref 1.1–2)
ALP SERPL-CCNC: 128 UNIT/L (ref 40–150)
ALT SERPL-CCNC: 38 UNIT/L (ref 0–55)
AST SERPL-CCNC: 27 UNIT/L (ref 5–34)
BASOPHILS # BLD AUTO: 0.02 X10(3)/MCL (ref 0–0.2)
BASOPHILS NFR BLD AUTO: 0.3 %
BILIRUBIN DIRECT+TOT PNL SERPL-MCNC: 0.3 MG/DL
BUN SERPL-MCNC: 13 MG/DL (ref 9.8–20.1)
CALCIUM SERPL-MCNC: 9.4 MG/DL (ref 8.4–10.2)
CHLORIDE SERPL-SCNC: 108 MMOL/L (ref 98–107)
CO2 SERPL-SCNC: 28 MMOL/L (ref 23–31)
CREAT SERPL-MCNC: 0.79 MG/DL (ref 0.55–1.02)
EOSINOPHIL # BLD AUTO: 0.18 X10(3)/MCL (ref 0–0.9)
EOSINOPHIL NFR BLD AUTO: 2.6 %
ERYTHROCYTE [DISTWIDTH] IN BLOOD BY AUTOMATED COUNT: 14.6 % (ref 11.5–17)
GFR SERPLBLD CREATININE-BSD FMLA CKD-EPI: >60 MLS/MIN/1.73/M2
GLOBULIN SER-MCNC: 3.7 GM/DL (ref 2.4–3.5)
GLUCOSE SERPL-MCNC: 85 MG/DL (ref 82–115)
HCT VFR BLD AUTO: 39.2 % (ref 37–47)
HGB BLD-MCNC: 12 G/DL (ref 12–16)
IMM GRANULOCYTES # BLD AUTO: 0.01 X10(3)/MCL (ref 0–0.04)
IMM GRANULOCYTES NFR BLD AUTO: 0.1 %
LYMPHOCYTES # BLD AUTO: 2.03 X10(3)/MCL (ref 0.6–4.6)
LYMPHOCYTES NFR BLD AUTO: 29.3 %
MAGNESIUM SERPL-MCNC: 1.9 MG/DL (ref 1.6–2.6)
MCH RBC QN AUTO: 25.7 PG
MCHC RBC AUTO-ENTMCNC: 30.6 G/DL (ref 33–36)
MCV RBC AUTO: 83.9 FL (ref 80–94)
MONOCYTES # BLD AUTO: 0.58 X10(3)/MCL (ref 0.1–1.3)
MONOCYTES NFR BLD AUTO: 8.4 %
NEUTROPHILS # BLD AUTO: 4.11 X10(3)/MCL (ref 2.1–9.2)
NEUTROPHILS NFR BLD AUTO: 59.3 %
PHOSPHATE SERPL-MCNC: 3.3 MG/DL (ref 2.3–4.7)
PLATELET # BLD AUTO: 333 X10(3)/MCL (ref 130–400)
PMV BLD AUTO: 10 FL (ref 7.4–10.4)
POTASSIUM SERPL-SCNC: 3.8 MMOL/L (ref 3.5–5.1)
PROT SERPL-MCNC: 7.2 GM/DL (ref 5.8–7.6)
RBC # BLD AUTO: 4.67 X10(6)/MCL (ref 4.2–5.4)
SODIUM SERPL-SCNC: 143 MMOL/L (ref 136–145)
WBC # SPEC AUTO: 6.9 X10(3)/MCL (ref 4.5–11.5)

## 2023-03-06 PROCEDURE — 3008F BODY MASS INDEX DOCD: CPT | Mod: CPTII,S$GLB,, | Performed by: INTERNAL MEDICINE

## 2023-03-06 PROCEDURE — 80053 COMPREHEN METABOLIC PANEL: CPT | Performed by: INTERNAL MEDICINE

## 2023-03-06 PROCEDURE — 1159F PR MEDICATION LIST DOCUMENTED IN MEDICAL RECORD: ICD-10-PCS | Mod: CPTII,S$GLB,, | Performed by: INTERNAL MEDICINE

## 2023-03-06 PROCEDURE — 99214 OFFICE O/P EST MOD 30 MIN: CPT | Mod: S$GLB,,, | Performed by: INTERNAL MEDICINE

## 2023-03-06 PROCEDURE — 85025 COMPLETE CBC W/AUTO DIFF WBC: CPT | Performed by: INTERNAL MEDICINE

## 2023-03-06 PROCEDURE — 71046 X-RAY EXAM CHEST 2 VIEWS: CPT | Mod: TC

## 2023-03-06 PROCEDURE — 36415 COLL VENOUS BLD VENIPUNCTURE: CPT | Performed by: INTERNAL MEDICINE

## 2023-03-06 PROCEDURE — 3078F DIAST BP <80 MM HG: CPT | Mod: CPTII,S$GLB,, | Performed by: INTERNAL MEDICINE

## 2023-03-06 PROCEDURE — 83735 ASSAY OF MAGNESIUM: CPT | Performed by: INTERNAL MEDICINE

## 2023-03-06 PROCEDURE — 1101F PR PT FALLS ASSESS DOC 0-1 FALLS W/OUT INJ PAST YR: ICD-10-PCS | Mod: CPTII,S$GLB,, | Performed by: INTERNAL MEDICINE

## 2023-03-06 PROCEDURE — 3288F FALL RISK ASSESSMENT DOCD: CPT | Mod: CPTII,S$GLB,, | Performed by: INTERNAL MEDICINE

## 2023-03-06 PROCEDURE — 99214 PR OFFICE/OUTPT VISIT, EST, LEVL IV, 30-39 MIN: ICD-10-PCS | Mod: S$GLB,,, | Performed by: INTERNAL MEDICINE

## 2023-03-06 PROCEDURE — 1159F MED LIST DOCD IN RCRD: CPT | Mod: CPTII,S$GLB,, | Performed by: INTERNAL MEDICINE

## 2023-03-06 PROCEDURE — 99999 PR PBB SHADOW E&M-EST. PATIENT-LVL III: ICD-10-PCS | Mod: PBBFAC,,, | Performed by: INTERNAL MEDICINE

## 2023-03-06 PROCEDURE — 3288F PR FALLS RISK ASSESSMENT DOCUMENTED: ICD-10-PCS | Mod: CPTII,S$GLB,, | Performed by: INTERNAL MEDICINE

## 2023-03-06 PROCEDURE — 3074F PR MOST RECENT SYSTOLIC BLOOD PRESSURE < 130 MM HG: ICD-10-PCS | Mod: CPTII,S$GLB,, | Performed by: INTERNAL MEDICINE

## 2023-03-06 PROCEDURE — 1101F PT FALLS ASSESS-DOCD LE1/YR: CPT | Mod: CPTII,S$GLB,, | Performed by: INTERNAL MEDICINE

## 2023-03-06 PROCEDURE — 99213 OFFICE O/P EST LOW 20 MIN: CPT | Mod: PBBFAC,25 | Performed by: INTERNAL MEDICINE

## 2023-03-06 PROCEDURE — 3078F PR MOST RECENT DIASTOLIC BLOOD PRESSURE < 80 MM HG: ICD-10-PCS | Mod: CPTII,S$GLB,, | Performed by: INTERNAL MEDICINE

## 2023-03-06 PROCEDURE — 3008F PR BODY MASS INDEX (BMI) DOCUMENTED: ICD-10-PCS | Mod: CPTII,S$GLB,, | Performed by: INTERNAL MEDICINE

## 2023-03-06 PROCEDURE — 84100 ASSAY OF PHOSPHORUS: CPT | Performed by: INTERNAL MEDICINE

## 2023-03-06 PROCEDURE — 3074F SYST BP LT 130 MM HG: CPT | Mod: CPTII,S$GLB,, | Performed by: INTERNAL MEDICINE

## 2023-03-06 PROCEDURE — 99999 PR PBB SHADOW E&M-EST. PATIENT-LVL III: CPT | Mod: PBBFAC,,, | Performed by: INTERNAL MEDICINE

## 2023-03-07 ENCOUNTER — CLINICAL SUPPORT (OUTPATIENT)
Dept: HEMATOLOGY/ONCOLOGY | Facility: CLINIC | Age: 63
End: 2023-03-07
Payer: MEDICARE

## 2023-03-07 DIAGNOSIS — R91.8 LUNG MASS: ICD-10-CM

## 2023-03-07 DIAGNOSIS — Z29.89 IMMUNOTHERAPY: ICD-10-CM

## 2023-03-07 DIAGNOSIS — C34.91 NSCLC OF RIGHT LUNG: Primary | ICD-10-CM

## 2023-03-07 PROCEDURE — 99215 OFFICE O/P EST HI 40 MIN: CPT | Mod: 95,,,

## 2023-03-07 PROCEDURE — 99215 PR OFFICE/OUTPT VISIT, EST, LEVL V, 40-54 MIN: ICD-10-PCS | Mod: 95,,,

## 2023-03-07 RX ORDER — FOLIC ACID 1 MG/1
1 TABLET ORAL DAILY
Qty: 30 TABLET | Refills: 2 | Status: SHIPPED | OUTPATIENT
Start: 2023-03-07 | End: 2023-06-02

## 2023-03-07 RX ORDER — PROCHLORPERAZINE MALEATE 10 MG
10 TABLET ORAL EVERY 6 HOURS PRN
Qty: 30 TABLET | Refills: 2 | Status: SHIPPED | OUTPATIENT
Start: 2023-03-07 | End: 2023-03-22

## 2023-03-07 RX ORDER — ONDANSETRON HYDROCHLORIDE 8 MG/1
8 TABLET, FILM COATED ORAL EVERY 8 HOURS PRN
Qty: 30 TABLET | Refills: 2 | Status: SHIPPED | OUTPATIENT
Start: 2023-03-07 | End: 2023-03-22

## 2023-03-07 RX ORDER — DEXAMETHASONE 4 MG/1
8 TABLET ORAL DAILY
Qty: 8 TABLET | Refills: 4 | Status: SHIPPED | OUTPATIENT
Start: 2023-03-07 | End: 2023-10-30 | Stop reason: SDUPTHER

## 2023-03-07 RX ORDER — CYANOCOBALAMIN 1000 UG/ML
1000 INJECTION, SOLUTION INTRAMUSCULAR; SUBCUTANEOUS ONCE
Status: CANCELLED | OUTPATIENT
Start: 2023-03-08

## 2023-03-07 NOTE — PROGRESS NOTES
HEMATOLOGY / ONCOLOGY   CLINIC NOTE     ONCOLOGICAL HISTORY:     Diagnosis:  - NSCLC / Adenocarcinoma Stage IIIA pT2a pN2    Treatment History:  - 12/08/2022: Right lower and middle bilobectomy with mediastinal lymph node dissection.     Current Treatment:   - Carboplatin + Pemetrexed     Subjective:       Chief Complaint: Lung Cancer (Pt reports SOB and cough x3 weeks)  Chemo education for Carboplatin + Pemetrexed     YOANA Jin  63 y.o.  female with past medical history significant for HTN, HLD, uterine cancer status post total hysterectomy in 1989,  CKD Stage 2, COPD Stage 3, referred for management of NSCLC s/p RLL / RML lobectomy with Mediastinal Lymph Node Dissection on 12/8/2022.     She was noted to have RLL lung mass and underwent CT guided biopsy which was non diagnostic. PET scan demonstrated significant avidity within the lesion. Given high risk for malignancy, she underwent right lower lobectomy and right middle lobectomy on 12/08/2022. Pathology came back with invasive adenocarcinoma, poorly differentiated with solid morphology, focal tumor involvement of visceral pleura and focal tumor vascular invasion (vein), with mediastinal lymph nodes positive for metastatic carcinoma.      PET on 01/12/2023 showed focal uptake at the medial right acetabular roof but the biopsy was negative for malignancy. Discussed with radiology, they recommended adjuvant chemotherapy and then they will reassess later for radiation therapy.     Interval History:   She returns to the clinic today for chemotherapy education via audio only. Her daughter Tabby is also present on the call. Chemotherapy education regarding q3w Carboplatin and Pemetrexed was discussed in detail with both the patient and the daughter. All questions and concerns were answered. They verbalized understanding and consent was obtained.     The patient location is: home  The chief complaint leading to consultation is: chemotherapy  education   Visit type: Virtual visit with audio only (telephone)  Total time spent with patient: 60mins        The reason for the audio only service rather than synchronous audio and video virtual visit was related to technical difficulties or patient preference/necessity.     Each patient to whom I provide medical services by telemedicine is:  (1) informed of the relationship between the physician and patient and the respective role of any other health care provider with respect to management of the patient; and (2) notified that they may decline to receive medical services by telemedicine and may withdraw from such care at any time. Patient verbally consented to receive this service via voice-only telephone call.     Past Medical History:   Diagnosis Date    ASTHMA     COPD (chronic obstructive pulmonary disease)     SEVERE    High cholesterol     HLD (hyperlipidemia)     HTN (hypertension)     Lung cancer 12/08/2022    invasive adenocarcinoma, poorly differentiated with solid morphology, focal tumor involvement of visceral pleura and focal tumor vascular invasion (vein), with 2 of 3 posterior mediastinal lymph nodes positive for metastatic carcinoma    Stage 2 chronic kidney disease     TIA (transient ischemic attack)     Tobacco abuse     Unspecified osteoarthritis, unspecified site     Uterine cancer       Past Surgical History:   Procedure Laterality Date    HYSTERECTOMY  Unknowing    lipoma multiple sites      LUNG LOBECTOMY Right 12/08/2022    Procedure: LOBECTOMY, LUNG;  Surgeon: Jass Browne IV, MD;  Location: [facility] OR;  Service: Cardiothoracic;  Laterality: Right;    SURGICAL REMOVAL OF LYMPH NODE  12/08/2022    Procedure: EXCISION, LYMPH NODE;  Surgeon: Jass Browne IV, MD;  Location: [facility] OR;  Service: Cardiothoracic;;    THORACOTOMY Right 12/08/2022    Procedure: THORACOTOMY;  Surgeon: Jass Browne IV, MD;  Location: [facility] OR;  Service: Cardiothoracic;  Laterality: Right;  Right Lower  Lobectomy with Lymph Node Dissection    TOTAL ABDOMINAL HYSTERECTOMY W/ BILATERAL SALPINGOOPHORECTOMY       Social History     Socioeconomic History    Marital status: Single    Number of children: 5   Tobacco Use    Smoking status: Former     Packs/day: 0.50     Years: 50.00     Pack years: 25.00     Types: Cigarettes     Quit date: 2022     Years since quittin.2    Smokeless tobacco: Never   Substance and Sexual Activity    Alcohol use: Not Currently     Comment: quit 25years ago    Drug use: Never    Sexual activity: Yes     Partners: Male      Family History   Problem Relation Age of Onset    Hypertension Mother     Hepatitis Mother     Ovarian cancer Mother     Liver cancer Mother     Heart failure Father     Prostate cancer Father     Fibroids Sister     Asthma Brother     Asthma Brother     Cancer Maternal Grandfather       Review of patient's allergies indicates:  No Known Allergies     Review of Systems   Constitutional:  Positive for fatigue. Negative for activity change, chills and fever.   HENT: Negative.     Eyes: Negative.    Respiratory:  Positive for shortness of breath. Negative for cough.    Cardiovascular:  Negative for chest pain and leg swelling.   Gastrointestinal:  Negative for constipation, diarrhea, nausea and vomiting.   Endocrine: Negative.    Genitourinary: Negative.    Musculoskeletal:  Negative for arthralgias and myalgias.   Integumentary:  Negative.   Allergic/Immunologic: Negative.    Neurological:  Negative for light-headedness, numbness and headaches.   Hematological: Negative.    Psychiatric/Behavioral: Negative.         Objective:        There were no vitals filed for this visit.         No physical exam was completed due to the nature of this call.     LABS / IMAGING        IMAGING:     - 08/10/2022 PET: The elongated subpleural right lower lobe nodule demonstrates fairly intense FDG activity.  Infectious, inflammatory and neoplastic etiologies remain possible. No  suspicious PET findings elsewhere.     - 12/08/2022 CT HEAD without contrast: No acute intracranial abnormality identified.  Findings of mild microvascular ischemic disease.     - 12/2022: U/S Upper extremity: Thrombophlebitis of the left cephalic vein. No deep venous thrombosis in the left upper extremity.    - 01/03/2023 MRI Brain : GARY     01/12/2023 NM PET CT ROUTINE      1. Postsurgical changes of recent right lower and middle lobectomies.  There is a small right pleural effusion.  There is nonspecific peripheral uptake along the pleura at the right lung base and perihilar region which may be related to healing response in the setting of recent surgery.  Similarly borderline mediastinal uptake is nonspecific in the recent postoperative setting and could be reactive.  Continued attention recommended on follow-up.  2. Focal uptake at the medial right acetabular roof without appreciable focal osseous lesion by CT evaluation.  This is new compared to previous exam.  Metastasis is a consideration    PATHOLOGY:  - 12/08/2022: Invasive solid adenocarcinoma (3.1 CM); G3, poorly differentiated. Visceral Pleura Invasion present without definite serosal surface involvement. Lymphovascular invasion (Vein) present. All margins negative for invasive carcinoma, distance 2.5 cm. LN - 4/14 (10R: Hilar, Posterior mediastinal (RACHEAL), 4R Lower paratracheal, 9R Pulmonary ligament, 10R). pT2a pN2        - 02/22/2023: RIGHT ACETABULAR LESION, CT - GUIDE CORE NEEDLE BIOPSY : FIBRINOPURULENT EXUDATE ADMIXED WITH ATYPICAL EPITHELIOID GROUPS,   NORMAL    MARROW ELEMENTS AND ABUNDANT BLOOD CLOT      Assessment:   ECOG Performance status: 2 - 3.     NSCLC / Adenocarcinoma Stage IIIA pT2a pN2  - 12/08/2022: Right lower and middle bilobectomy with mediastinal lymph node dissection. Path: Invasive adenocarcinoma, poorly differentiated, focal tumor involvement of visceral pleura and focal tumor vascular invasion (vein), with mediastinal lymph  nodes positive for metastatic carcinoma   - TMB - 5, MSI-S, MET amplification, ROS1.  No mutations in EGFR, BRAF, ALK, ERBB2, KRAS, RET,    - 1% tumor cells are positive for PD-L1   - patient evaluated by Radiation Oncology with recommendation to treat with chemotherapy for now if no metastatic disease and will re-evaluate the patient by the end of chemotherapy for radiation therapy  - 01/12/2023 PET: Focal uptake at the medial right acetabular roof. Metastasis is a consideration  - 02/22/2023: RIGHT ACETABULAR LESION, CT - GUIDE CORE NEEDLE BIOPSY : FIBRINOPURULENT EXUDATE ADMIXED WITH ATYPICAL EPITHELIOID GROUPS,   NORMAL    MARROW ELEMENTS AND ABUNDANT BLOOD CLOT  PET scan showed a suspicious finding in the right acetabular roof and patient then had a CT-guided biopsy no evidence of malignancy. Start with adjuvant chemotherapy with Carboplatin and pemetrexed for 4 cycles and then followed by atezolizumab/pembrolizumab. Also follow up again with radiation oncology after chemotherapy to evaluate for radiation therapy      Plan:   B12 ordered to be completed tomorrow in preparation of chemo  Zofran, compazine, folic acid, and dex sent to pharmacy.  RTC in one week as currently scheduled with MD for FU/lab/treatment    Chemotherapy teaching was provided to patient. The plan of care including chemotherapy regimen, schedule expectations, potential side effects, as well as prevention/management of side effects were discussed in detail. Office contact information was provided along with instructions on symptoms that warrant a call to the office, for example a temperature of > 100.5, uncontrolled side effects, severe fatigue etc. Time was given for patient/ family members to ask questions. All questions answered to the satisfaction of the patient and they verbalized understanding. They were instructed to call with any concerns or additional questions.     I spent 20 minutes preparing for education session reviewing clinic  note, labs, scans, pathology, and treatment plan. I spent 60 minutes with patient.     The patient was seen, interviewed and examined. Pertinent lab and radiology studies were reviewed. Pt instructed to call should develop concerning signs/symptoms or have further questions.       Portions of the record may have been created with voice recognition software. Occasional wrong-word or sound-a-like substitutions may have occurred due to the inherent limitations of voice recognition software. Read the chart carefully and recognize, using context, where substitutions have occurred.    Shelbi Johansen, FNP-C  Oncology/Hematology   [facility]

## 2023-03-08 ENCOUNTER — INFUSION (OUTPATIENT)
Dept: INFUSION THERAPY | Facility: HOSPITAL | Age: 63
End: 2023-03-08
Attending: INTERNAL MEDICINE
Payer: MEDICARE

## 2023-03-08 VITALS
HEIGHT: 67 IN | WEIGHT: 155 LBS | BODY MASS INDEX: 24.33 KG/M2 | TEMPERATURE: 98 F | RESPIRATION RATE: 18 BRPM | DIASTOLIC BLOOD PRESSURE: 75 MMHG | SYSTOLIC BLOOD PRESSURE: 133 MMHG | HEART RATE: 97 BPM

## 2023-03-08 DIAGNOSIS — C34.91 NSCLC OF RIGHT LUNG: Primary | ICD-10-CM

## 2023-03-08 PROCEDURE — 63600175 PHARM REV CODE 636 W HCPCS: Performed by: INTERNAL MEDICINE

## 2023-03-08 PROCEDURE — 63600175 PHARM REV CODE 636 W HCPCS

## 2023-03-08 PROCEDURE — 96372 THER/PROPH/DIAG INJ SC/IM: CPT

## 2023-03-08 RX ORDER — CYANOCOBALAMIN 1000 UG/ML
1000 INJECTION, SOLUTION INTRAMUSCULAR; SUBCUTANEOUS ONCE
Status: CANCELLED | OUTPATIENT
Start: 2023-03-08

## 2023-03-08 RX ORDER — CYANOCOBALAMIN 1000 UG/ML
1000 INJECTION, SOLUTION INTRAMUSCULAR; SUBCUTANEOUS ONCE
Status: COMPLETED | OUTPATIENT
Start: 2023-03-08 | End: 2023-03-08

## 2023-03-08 RX ADMIN — CYANOCOBALAMIN 1000 MCG: 1000 INJECTION, SOLUTION INTRAMUSCULAR; SUBCUTANEOUS at 09:03

## 2023-03-13 NOTE — PROGRESS NOTES
HEMATOLOGY / ONCOLOGY   CLINIC NOTE     ONCOLOGICAL HISTORY:     Diagnosis:  - NSCLC / Adenocarcinoma Stage IIIA pT2a pN2--dx 12/2022  - History Uterine cancer - Dx 1989    Current Treatment:   - Carboplatin + Pemetrexed to start 3/15/2023    Treatment History:  - 1989 s/p total hysterectomy   - 12/08/2022: Right lower and middle bilobectomy with mediastinal lymph node dissection.   - Adjuvant chemotherapy - Carboplatin + Pemetrexed     Plan of care: Adjuvant chemotherapy x 4 cycles --> Immunotherapy q 4 weeks x 1 yr.    IMAGING:   - 08/10/2022 PET: The elongated subpleural right lower lobe nodule demonstrates fairly intense FDG activity.  Infectious, inflammatory and neoplastic etiologies remain possible. No suspicious PET findings elsewhere.  - 12/08/2022 CT HEAD without contrast: No acute intracranial abnormality identified.  Findings of mild microvascular ischemic disease.  - 12/2022: U/S Upper extremity: Thrombophlebitis of the left cephalic vein. No deep venous thrombosis in the left upper extremity.  - 01/03/2023 MRI Brain : GARY   - 01/12/2023 NM PET CT 1. Postsurgical changes of recent right lower and middle lobectomies.  There is a small right pleural effusion.  There is nonspecific peripheral uptake along the pleura at the right lung base and perihilar region which may be related to healing response in the setting of recent surgery.  Similarly borderline mediastinal uptake is nonspecific in the recent postoperative setting and could be reactive.  Continued attention recommended on follow-up. 2. Focal uptake at the medial right acetabular roof without appreciable focal osseous lesion by CT evaluation.  This is new compared to previous exam.  Metastasis is a consideration    PATHOLOGY:  - 12/08/2022: Invasive solid adenocarcinoma (3.1 CM); G3, poorly differentiated. Visceral Pleura Invasion present without definite serosal surface involvement. Lymphovascular invasion (Vein) present. All margins  negative for invasive carcinoma, distance 2.5 cm. LN - 4/14 (10R: Hilar, Posterior mediastinal (RACHEAL), 4R Lower paratracheal, 9R Pulmonary ligament, 10R). pT2a pN2    - 02/22/2023: RIGHT ACETABULAR LESION, CT - GUIDE CORE NEEDLE BIOPSY : FIBRINOPURULENT EXUDATE ADMIXED WITH ATYPICAL EPITHELIOID GROUPS,   NORMAL    MARROW ELEMENTS AND ABUNDANT BLOOD CLOT    Subjective:     HPI  Mansi ELIZABET Jin  63 y.o.  female with past medical history significant for HTN, HLD, uterine cancer status post total hysterectomy in 1989,  CKD Stage 2, COPD Stage 3, referred for management of NSCLC s/p RLL / RML lobectomy with Mediastinal Lymph Node Dissection on 12/8/2022.     She was noted to have RLL lung mass and underwent CT guided biopsy which was non diagnostic. PET scan demonstrated significant avidity within the lesion. Given high risk for malignancy, she underwent right lower lobectomy and right middle lobectomy on 12/08/2022. Pathology came back with invasive adenocarcinoma, poorly differentiated with solid morphology, focal tumor involvement of visceral pleura and focal tumor vascular invasion (vein), with mediastinal lymph nodes positive for metastatic carcinoma.      PET on 01/12/2023 showed focal uptake at the medial right acetabular roof but the biopsy was negative for malignancy. Discussed with radiology, they recommended adjuvant chemotherapy and then they will reassess later for radiation therapy.     Chief Complaint: Lung Cancer (F/U with labs. Pt c/o SOB when walking. Pt s/o R leg pain, as well as weakness. No further complaints noted. )        Interval History:   Patient is here today for chemo clearance to start Pemetrexed/Carbo on 3/15/23. She continues to have SOB while walking. She reports that she is having muscles spasms and has seen her PCP for this who has prescribed Baclofen 2.5 mg , patient states this is not helping the pain. She reports that she has had this in the past but is becoming more often and painful.  She is also taking tylenol and Ibuprofen. She had a biopsy of a lesion on her hip that was neg.       Past Medical History:   Diagnosis Date    ASTHMA     COPD (chronic obstructive pulmonary disease)     SEVERE    High cholesterol     HLD (hyperlipidemia)     HTN (hypertension)     Lung cancer 2022    invasive adenocarcinoma, poorly differentiated with solid morphology, focal tumor involvement of visceral pleura and focal tumor vascular invasion (vein), with 2 of 3 posterior mediastinal lymph nodes positive for metastatic carcinoma    Stage 2 chronic kidney disease     TIA (transient ischemic attack)     Tobacco abuse     Unspecified osteoarthritis, unspecified site     Uterine cancer       Past Surgical History:   Procedure Laterality Date    HYSTERECTOMY  Unknowing    lipoma multiple sites      LUNG LOBECTOMY Right 2022    Procedure: LOBECTOMY, LUNG;  Surgeon: Jass Browne IV, MD;  Location: [facility] OR;  Service: Cardiothoracic;  Laterality: Right;    SURGICAL REMOVAL OF LYMPH NODE  2022    Procedure: EXCISION, LYMPH NODE;  Surgeon: Jass Browne IV, MD;  Location: [facility] OR;  Service: Cardiothoracic;;    THORACOTOMY Right 2022    Procedure: THORACOTOMY;  Surgeon: Jass Browne IV, MD;  Location: [facility];  Service: Cardiothoracic;  Laterality: Right;  Right Lower Lobectomy with Lymph Node Dissection    TOTAL ABDOMINAL HYSTERECTOMY W/ BILATERAL SALPINGOOPHORECTOMY       Social History     Socioeconomic History    Marital status: Single    Number of children: 5   Tobacco Use    Smoking status: Former     Packs/day: 0.50     Years: 50.00     Pack years: 25.00     Types: Cigarettes     Quit date: 2022     Years since quittin.2    Smokeless tobacco: Never   Substance and Sexual Activity    Alcohol use: Not Currently     Comment: quit 25years ago    Drug use: Never    Sexual activity: Yes     Partners: Male      Family History   Problem Relation Age of Onset    Hypertension Mother      Hepatitis Mother     Ovarian cancer Mother     Liver cancer Mother     Heart failure Father     Prostate cancer Father     Fibroids Sister     Asthma Brother     Asthma Brother     Cancer Maternal Grandfather       Review of patient's allergies indicates:  No Known Allergies     Review of Systems   Constitutional:  Positive for fatigue. Negative for activity change, appetite change, chills, fever and unexpected weight change.   HENT: Negative.  Negative for mouth dryness, mouth sores, nosebleeds, sore throat and trouble swallowing.    Eyes: Negative.  Negative for visual disturbance.   Respiratory:  Positive for shortness of breath. Negative for cough.    Cardiovascular:  Negative for chest pain, palpitations and leg swelling.   Gastrointestinal:  Negative for abdominal distention, abdominal pain, blood in stool, change in bowel habit, constipation, diarrhea, nausea, vomiting and change in bowel habit.   Endocrine: Negative.    Genitourinary: Negative.  Negative for dysuria, frequency, hematuria and urgency.   Musculoskeletal:  Positive for leg pain. Negative for arthralgias, back pain, myalgias and neck pain.   Integumentary:  Negative for rash, breast mass, breast discharge and breast tenderness. Negative.   Allergic/Immunologic: Negative.    Neurological:  Negative for dizziness, tremors, syncope, speech difficulty, weakness, light-headedness, numbness, headaches and memory loss.   Hematological: Negative.  Does not bruise/bleed easily.   Psychiatric/Behavioral: Negative.  Negative for confusion and suicidal ideas.    Breast: Negative for mass and tenderness      Objective:        Vitals:    03/14/23 0924   BP: 122/81   Pulse: 88   Resp: 18   Temp: 97.6 °F (36.4 °C)        Physical Exam  Vitals and nursing note reviewed.   Constitutional:       Appearance: Normal appearance.   HENT:      Head: Normocephalic and atraumatic.   Eyes:      General: No scleral icterus.     Extraocular Movements: Extraocular  movements intact.      Conjunctiva/sclera: Conjunctivae normal.   Neck:      Vascular: No JVD.   Cardiovascular:      Rate and Rhythm: Normal rate and regular rhythm.      Heart sounds: No murmur heard.  Pulmonary:      Effort: Pulmonary effort is normal.      Breath sounds: Normal breath sounds. No wheezing or rhonchi.   Abdominal:      General: Bowel sounds are normal. There is no distension.      Palpations: Abdomen is soft.      Tenderness: There is no abdominal tenderness.   Musculoskeletal:      Cervical back: Neck supple.   Lymphadenopathy:      Head:      Right side of head: No submental or submandibular adenopathy.      Left side of head: No submental or submandibular adenopathy.      Cervical: No cervical adenopathy.      Upper Body:      Right upper body: No supraclavicular or axillary adenopathy.      Left upper body: No supraclavicular or axillary adenopathy.      Lower Body: No right inguinal adenopathy. No left inguinal adenopathy.   Skin:     General: Skin is warm.      Coloration: Skin is not jaundiced.      Findings: No lesion or rash.   Neurological:      General: No focal deficit present.      Mental Status: She is alert and oriented to person, place, and time.      Cranial Nerves: Cranial nerves 2-12 are intact.      Gait: Gait abnormal.      Comments: On wheelchair due to leg pain   Psychiatric:         Attention and Perception: Attention normal.         Speech: Speech normal.         Behavior: Behavior is cooperative.         Cognition and Memory: Cognition normal.         Judgment: Judgment normal.     LABS       Latest Reference Range & Units 03/14/23 08:40   WBC 4.5 - 11.5 x10(3)/mcL 5.9   RBC 4.20 - 5.40 x10(6)/mcL 5.04   Hemoglobin 12.0 - 16.0 g/dL 12.9   Hematocrit 37.0 - 47.0 % 43.8   MCV 80.0 - 94.0 fL 86.9   MCH pg 25.6   MCHC 33.0 - 36.0 g/dL 29.5 (L)   RDW 11.5 - 17.0 % 14.6   Platelets 130 - 400 x10(3)/mcL 334   (L): Data is abnormally low   Latest Reference Range & Units 03/14/23  08:40   Sodium 136 - 145 mmol/L 141   Potassium 3.5 - 5.1 mmol/L 3.9   Chloride 98 - 107 mmol/L 107   CO2 23 - 31 mmol/L 27   BUN 9.8 - 20.1 mg/dL 9.0 (L)   Creatinine 0.55 - 1.02 mg/dL 0.84   eGFR mls/min/1.73/m2 >60   Glucose 82 - 115 mg/dL 82   Calcium 8.4 - 10.2 mg/dL 9.6   Phosphorus 2.3 - 4.7 mg/dL 3.1   Magnesium 1.60 - 2.60 mg/dL 2.00   Alkaline Phosphatase 40 - 150 unit/L 128   PROTEIN TOTAL 5.8 - 7.6 gm/dL 7.6   Albumin 3.4 - 4.8 g/dL 3.4   Albumin/Globulin Ratio 1.1 - 2.0 ratio 0.8 (L)   BILIRUBIN TOTAL <=1.5 mg/dL 0.3   AST 5 - 34 unit/L 36 (H)   ALT 0 - 55 unit/L 53   Globulin, Total 2.4 - 3.5 gm/dL 4.2 (H)   (L): Data is abnormally low  (H): Data is abnormally high    Assessment:   ECOG Performance status: 2 - 3.     NSCLC / Adenocarcinoma Stage IIIA pT2a pN2  - 12/08/2022: Right lower and middle bilobectomy with mediastinal lymph node dissection. Path: Invasive adenocarcinoma, poorly differentiated, focal tumor involvement of visceral pleura and focal tumor vascular invasion (vein), with mediastinal lymph nodes positive for metastatic carcinoma   - TMB - 5, MSI-S, MET amplification, ROS1.  No mutations in EGFR, BRAF, ALK, ERBB2, KRAS, RET,    - 1% tumor cells are positive for PD-L1   - patient evaluated by Radiation Oncology with recommendation to treat with chemotherapy for now if no metastatic disease and will re-evaluate the patient by the end of chemotherapy for radiation therapy  - 01/12/2023 PET: Focal uptake at the medial right acetabular roof. Metastasis is a consideration  - 02/22/2023: RIGHT ACETABULAR LESION, CT - GUIDE CORE NEEDLE BIOPSY : FIBRINOPURULENT EXUDATE ADMIXED WITH ATYPICAL EPITHELIOID GROUPS,   NORMAL    MARROW ELEMENTS AND ABUNDANT BLOOD CLOT  PET scan showed a suspicious finding in the right acetabular roof and patient then had a CT-guided biopsy no evidence of malignancy. Start with adjuvant chemotherapy with Carboplatin and pemetrexed for 4 cycles and then followed by  atezolizumab/pembrolizumab. Also follow up again with radiation oncology after chemotherapy to evaluate for radiation therapy      Plan:   - Labs adequate for Cycle 1 of Pemetrexed+Carbo on 3/15/23  - RTC in 1 week for tox check with NP with labs  - cbc, cmp, mag, phos- 1 hr prior @ [facility]  - Increase Baclofen to 5 mg po TID PRN  - RTC in 3 weeks with MD prior to next cycle with labs  - cbc, cmp, mag, phos- 1 hr prior @ [facility]    The patient was seen, interviewed and examined. Pertinent lab and radiology studies were reviewed.   The patient was given ample opportunity to ask questions, and to the best of my abilities, all questions answered to satisfaction; patient demonstrated understanding of what we discussed and agreeable to the plan. Pt instructed to call should develop concerning signs/symptoms or have further questions.       Phoebe Gomez MD  Hematology/Oncology

## 2023-03-14 ENCOUNTER — LAB VISIT (OUTPATIENT)
Dept: LAB | Facility: HOSPITAL | Age: 63
End: 2023-03-14
Payer: MEDICARE

## 2023-03-14 ENCOUNTER — OFFICE VISIT (OUTPATIENT)
Dept: HEMATOLOGY/ONCOLOGY | Facility: CLINIC | Age: 63
End: 2023-03-14
Payer: MEDICARE

## 2023-03-14 VITALS
RESPIRATION RATE: 18 BRPM | HEIGHT: 67 IN | OXYGEN SATURATION: 97 % | BODY MASS INDEX: 24.48 KG/M2 | SYSTOLIC BLOOD PRESSURE: 122 MMHG | TEMPERATURE: 98 F | WEIGHT: 156 LBS | HEART RATE: 88 BPM | DIASTOLIC BLOOD PRESSURE: 81 MMHG

## 2023-03-14 DIAGNOSIS — F17.200 TOBACCO DEPENDENCE WITH CURRENT USE: Chronic | ICD-10-CM

## 2023-03-14 DIAGNOSIS — C34.91 NSCLC OF RIGHT LUNG: ICD-10-CM

## 2023-03-14 DIAGNOSIS — Z79.899 ON ANTINEOPLASTIC CHEMOTHERAPY: ICD-10-CM

## 2023-03-14 DIAGNOSIS — C34.91 NSCLC OF RIGHT LUNG: Primary | ICD-10-CM

## 2023-03-14 DIAGNOSIS — Z85.42 HISTORY OF UTERINE CANCER: ICD-10-CM

## 2023-03-14 LAB
ALBUMIN SERPL-MCNC: 3.4 G/DL (ref 3.4–4.8)
ALBUMIN/GLOB SERPL: 0.8 RATIO (ref 1.1–2)
ALP SERPL-CCNC: 128 UNIT/L (ref 40–150)
ALT SERPL-CCNC: 53 UNIT/L (ref 0–55)
AST SERPL-CCNC: 36 UNIT/L (ref 5–34)
BASOPHILS # BLD AUTO: 0.03 X10(3)/MCL (ref 0–0.2)
BASOPHILS NFR BLD AUTO: 0.5 %
BILIRUBIN DIRECT+TOT PNL SERPL-MCNC: 0.3 MG/DL
BUN SERPL-MCNC: 9 MG/DL (ref 9.8–20.1)
CALCIUM SERPL-MCNC: 9.6 MG/DL (ref 8.4–10.2)
CHLORIDE SERPL-SCNC: 107 MMOL/L (ref 98–107)
CO2 SERPL-SCNC: 27 MMOL/L (ref 23–31)
CREAT SERPL-MCNC: 0.84 MG/DL (ref 0.55–1.02)
EOSINOPHIL # BLD AUTO: 0.16 X10(3)/MCL (ref 0–0.9)
EOSINOPHIL NFR BLD AUTO: 2.7 %
ERYTHROCYTE [DISTWIDTH] IN BLOOD BY AUTOMATED COUNT: 14.6 % (ref 11.5–17)
GFR SERPLBLD CREATININE-BSD FMLA CKD-EPI: >60 MLS/MIN/1.73/M2
GLOBULIN SER-MCNC: 4.2 GM/DL (ref 2.4–3.5)
GLUCOSE SERPL-MCNC: 82 MG/DL (ref 82–115)
HCT VFR BLD AUTO: 43.8 % (ref 37–47)
HGB BLD-MCNC: 12.9 G/DL (ref 12–16)
IMM GRANULOCYTES # BLD AUTO: 0.03 X10(3)/MCL (ref 0–0.04)
IMM GRANULOCYTES NFR BLD AUTO: 0.5 %
LYMPHOCYTES # BLD AUTO: 1.55 X10(3)/MCL (ref 0.6–4.6)
LYMPHOCYTES NFR BLD AUTO: 26.3 %
MAGNESIUM SERPL-MCNC: 2 MG/DL (ref 1.6–2.6)
MCH RBC QN AUTO: 25.6 PG
MCHC RBC AUTO-ENTMCNC: 29.5 G/DL (ref 33–36)
MCV RBC AUTO: 86.9 FL (ref 80–94)
MONOCYTES # BLD AUTO: 0.42 X10(3)/MCL (ref 0.1–1.3)
MONOCYTES NFR BLD AUTO: 7.1 %
NEUTROPHILS # BLD AUTO: 3.71 X10(3)/MCL (ref 2.1–9.2)
NEUTROPHILS NFR BLD AUTO: 62.9 %
PHOSPHATE SERPL-MCNC: 3.1 MG/DL (ref 2.3–4.7)
PLATELET # BLD AUTO: 334 X10(3)/MCL (ref 130–400)
PMV BLD AUTO: 9.6 FL (ref 7.4–10.4)
POTASSIUM SERPL-SCNC: 3.9 MMOL/L (ref 3.5–5.1)
PROT SERPL-MCNC: 7.6 GM/DL (ref 5.8–7.6)
RBC # BLD AUTO: 5.04 X10(6)/MCL (ref 4.2–5.4)
SODIUM SERPL-SCNC: 141 MMOL/L (ref 136–145)
WBC # SPEC AUTO: 5.9 X10(3)/MCL (ref 4.5–11.5)

## 2023-03-14 PROCEDURE — 99999 PR PBB SHADOW E&M-EST. PATIENT-LVL V: CPT | Mod: PBBFAC,,, | Performed by: INTERNAL MEDICINE

## 2023-03-14 PROCEDURE — 1101F PT FALLS ASSESS-DOCD LE1/YR: CPT | Mod: CPTII,S$GLB,, | Performed by: INTERNAL MEDICINE

## 2023-03-14 PROCEDURE — 3008F BODY MASS INDEX DOCD: CPT | Mod: CPTII,S$GLB,, | Performed by: INTERNAL MEDICINE

## 2023-03-14 PROCEDURE — 1159F PR MEDICATION LIST DOCUMENTED IN MEDICAL RECORD: ICD-10-PCS | Mod: CPTII,S$GLB,, | Performed by: INTERNAL MEDICINE

## 2023-03-14 PROCEDURE — 85025 COMPLETE CBC W/AUTO DIFF WBC: CPT

## 2023-03-14 PROCEDURE — 3079F PR MOST RECENT DIASTOLIC BLOOD PRESSURE 80-89 MM HG: ICD-10-PCS | Mod: CPTII,S$GLB,, | Performed by: INTERNAL MEDICINE

## 2023-03-14 PROCEDURE — 3074F PR MOST RECENT SYSTOLIC BLOOD PRESSURE < 130 MM HG: ICD-10-PCS | Mod: CPTII,S$GLB,, | Performed by: INTERNAL MEDICINE

## 2023-03-14 PROCEDURE — 80053 COMPREHEN METABOLIC PANEL: CPT

## 2023-03-14 PROCEDURE — 83735 ASSAY OF MAGNESIUM: CPT

## 2023-03-14 PROCEDURE — 3288F FALL RISK ASSESSMENT DOCD: CPT | Mod: CPTII,S$GLB,, | Performed by: INTERNAL MEDICINE

## 2023-03-14 PROCEDURE — 3079F DIAST BP 80-89 MM HG: CPT | Mod: CPTII,S$GLB,, | Performed by: INTERNAL MEDICINE

## 2023-03-14 PROCEDURE — 99999 PR PBB SHADOW E&M-EST. PATIENT-LVL V: ICD-10-PCS | Mod: PBBFAC,,, | Performed by: INTERNAL MEDICINE

## 2023-03-14 PROCEDURE — 3288F PR FALLS RISK ASSESSMENT DOCUMENTED: ICD-10-PCS | Mod: CPTII,S$GLB,, | Performed by: INTERNAL MEDICINE

## 2023-03-14 PROCEDURE — 1101F PR PT FALLS ASSESS DOC 0-1 FALLS W/OUT INJ PAST YR: ICD-10-PCS | Mod: CPTII,S$GLB,, | Performed by: INTERNAL MEDICINE

## 2023-03-14 PROCEDURE — 36415 COLL VENOUS BLD VENIPUNCTURE: CPT

## 2023-03-14 PROCEDURE — 3008F PR BODY MASS INDEX (BMI) DOCUMENTED: ICD-10-PCS | Mod: CPTII,S$GLB,, | Performed by: INTERNAL MEDICINE

## 2023-03-14 PROCEDURE — 3074F SYST BP LT 130 MM HG: CPT | Mod: CPTII,S$GLB,, | Performed by: INTERNAL MEDICINE

## 2023-03-14 PROCEDURE — 99215 OFFICE O/P EST HI 40 MIN: CPT | Mod: S$GLB,,, | Performed by: INTERNAL MEDICINE

## 2023-03-14 PROCEDURE — 99215 PR OFFICE/OUTPT VISIT, EST, LEVL V, 40-54 MIN: ICD-10-PCS | Mod: S$GLB,,, | Performed by: INTERNAL MEDICINE

## 2023-03-14 PROCEDURE — 1159F MED LIST DOCD IN RCRD: CPT | Mod: CPTII,S$GLB,, | Performed by: INTERNAL MEDICINE

## 2023-03-14 PROCEDURE — 84100 ASSAY OF PHOSPHORUS: CPT

## 2023-03-14 RX ORDER — DEXAMETHASONE 4 MG/1
8 TABLET ORAL DAILY
Qty: 8 TABLET | Refills: 4 | Status: CANCELLED | OUTPATIENT
Start: 2023-03-14

## 2023-03-14 RX ORDER — FOLIC ACID 1 MG/1
1 TABLET ORAL DAILY
Qty: 30 TABLET | Refills: 3 | Status: CANCELLED | OUTPATIENT
Start: 2023-03-14

## 2023-03-14 RX ORDER — IBUPROFEN 200 MG
400 TABLET ORAL EVERY 6 HOURS PRN
Status: ON HOLD | COMMUNITY
End: 2023-12-04

## 2023-03-14 RX ORDER — ONDANSETRON 8 MG/1
8 TABLET, ORALLY DISINTEGRATING ORAL EVERY 8 HOURS PRN
Qty: 30 TABLET | Refills: 3 | Status: CANCELLED | OUTPATIENT
Start: 2023-03-14

## 2023-03-14 RX ORDER — ACETAMINOPHEN 500 MG
1000 TABLET ORAL EVERY 6 HOURS PRN
COMMUNITY

## 2023-03-14 RX ORDER — BACLOFEN 10 MG/1
10 TABLET ORAL 3 TIMES DAILY
COMMUNITY
End: 2023-03-22

## 2023-03-14 RX ORDER — SODIUM CHLORIDE 0.9 % (FLUSH) 0.9 %
10 SYRINGE (ML) INJECTION
Status: CANCELLED | OUTPATIENT
Start: 2023-03-15

## 2023-03-14 RX ORDER — HEPARIN 100 UNIT/ML
500 SYRINGE INTRAVENOUS
Status: CANCELLED | OUTPATIENT
Start: 2023-03-15

## 2023-03-15 ENCOUNTER — INFUSION (OUTPATIENT)
Dept: INFUSION THERAPY | Facility: HOSPITAL | Age: 63
End: 2023-03-15
Payer: MEDICARE

## 2023-03-15 VITALS
HEIGHT: 67 IN | BODY MASS INDEX: 24.39 KG/M2 | HEART RATE: 84 BPM | OXYGEN SATURATION: 94 % | RESPIRATION RATE: 18 BRPM | DIASTOLIC BLOOD PRESSURE: 82 MMHG | SYSTOLIC BLOOD PRESSURE: 131 MMHG | WEIGHT: 155.38 LBS | TEMPERATURE: 99 F

## 2023-03-15 DIAGNOSIS — C34.91 NSCLC OF RIGHT LUNG: Primary | ICD-10-CM

## 2023-03-15 PROCEDURE — 25000003 PHARM REV CODE 250: Performed by: INTERNAL MEDICINE

## 2023-03-15 PROCEDURE — 96411 CHEMO IV PUSH ADDL DRUG: CPT

## 2023-03-15 PROCEDURE — 96375 TX/PRO/DX INJ NEW DRUG ADDON: CPT

## 2023-03-15 PROCEDURE — 96367 TX/PROPH/DG ADDL SEQ IV INF: CPT

## 2023-03-15 PROCEDURE — 96365 THER/PROPH/DIAG IV INF INIT: CPT

## 2023-03-15 PROCEDURE — 96413 CHEMO IV INFUSION 1 HR: CPT

## 2023-03-15 PROCEDURE — 63600175 PHARM REV CODE 636 W HCPCS: Mod: JZ,JG | Performed by: INTERNAL MEDICINE

## 2023-03-15 RX ORDER — HEPARIN 100 UNIT/ML
500 SYRINGE INTRAVENOUS
Status: DISPENSED | OUTPATIENT
Start: 2023-03-15

## 2023-03-15 RX ORDER — SODIUM CHLORIDE 0.9 % (FLUSH) 0.9 %
10 SYRINGE (ML) INJECTION
Status: ACTIVE | OUTPATIENT
Start: 2023-03-15

## 2023-03-15 RX ADMIN — SODIUM CHLORIDE: 9 INJECTION, SOLUTION INTRAVENOUS at 08:03

## 2023-03-15 RX ADMIN — PALONOSETRON HYDROCHLORIDE 0.25 MG: 0.25 INJECTION, SOLUTION INTRAVENOUS at 08:03

## 2023-03-15 RX ADMIN — CARBOPLATIN 495 MG: 10 INJECTION, SOLUTION INTRAVENOUS at 09:03

## 2023-03-15 RX ADMIN — APREPITANT 130 MG: 130 INJECTION, EMULSION INTRAVENOUS at 08:03

## 2023-03-15 RX ADMIN — SODIUM CHLORIDE 900 MG: 9 INJECTION, SOLUTION INTRAVENOUS at 09:03

## 2023-03-22 ENCOUNTER — OFFICE VISIT (OUTPATIENT)
Dept: INTERNAL MEDICINE | Facility: CLINIC | Age: 63
End: 2023-03-22
Payer: MEDICARE

## 2023-03-22 ENCOUNTER — LAB VISIT (OUTPATIENT)
Dept: LAB | Facility: HOSPITAL | Age: 63
End: 2023-03-22
Attending: NURSE PRACTITIONER
Payer: MEDICARE

## 2023-03-22 VITALS
HEART RATE: 106 BPM | HEIGHT: 67 IN | RESPIRATION RATE: 18 BRPM | SYSTOLIC BLOOD PRESSURE: 109 MMHG | TEMPERATURE: 99 F | BODY MASS INDEX: 24.14 KG/M2 | WEIGHT: 153.81 LBS | DIASTOLIC BLOOD PRESSURE: 72 MMHG

## 2023-03-22 DIAGNOSIS — J44.9 STAGE 3 SEVERE COPD BY GOLD CLASSIFICATION: Chronic | ICD-10-CM

## 2023-03-22 DIAGNOSIS — C34.91 NSCLC OF RIGHT LUNG: ICD-10-CM

## 2023-03-22 DIAGNOSIS — N18.2 STAGE 2 CHRONIC KIDNEY DISEASE: Chronic | ICD-10-CM

## 2023-03-22 DIAGNOSIS — I10 PRIMARY HYPERTENSION: ICD-10-CM

## 2023-03-22 DIAGNOSIS — E78.2 MIXED HYPERLIPIDEMIA: Chronic | ICD-10-CM

## 2023-03-22 DIAGNOSIS — Z78.0 POST-MENOPAUSE: ICD-10-CM

## 2023-03-22 DIAGNOSIS — E78.2 MIXED HYPERLIPIDEMIA: Primary | Chronic | ICD-10-CM

## 2023-03-22 PROBLEM — J44.1 CHRONIC OBSTRUCTIVE PULMONARY DISEASE WITH (ACUTE) EXACERBATION: Status: RESOLVED | Noted: 2022-09-06 | Resolved: 2023-03-22

## 2023-03-22 LAB
ALBUMIN SERPL-MCNC: 3.5 G/DL (ref 3.4–4.8)
ALBUMIN/GLOB SERPL: 0.9 RATIO (ref 1.1–2)
ALP SERPL-CCNC: 120 UNIT/L (ref 40–150)
ALT SERPL-CCNC: 23 UNIT/L (ref 0–55)
AST SERPL-CCNC: 14 UNIT/L (ref 5–34)
BASOPHILS # BLD AUTO: 0 X10(3)/MCL (ref 0–0.2)
BASOPHILS NFR BLD AUTO: 0 %
BILIRUBIN DIRECT+TOT PNL SERPL-MCNC: 0.5 MG/DL
BUN SERPL-MCNC: 11.1 MG/DL (ref 9.8–20.1)
CALCIUM SERPL-MCNC: 9.5 MG/DL (ref 8.4–10.2)
CHLORIDE SERPL-SCNC: 103 MMOL/L (ref 98–107)
CHOLEST SERPL-MCNC: 182 MG/DL
CHOLEST/HDLC SERPL: 4 {RATIO} (ref 0–5)
CO2 SERPL-SCNC: 27 MMOL/L (ref 23–31)
CREAT SERPL-MCNC: 0.87 MG/DL (ref 0.55–1.02)
EOSINOPHIL # BLD AUTO: 0.26 X10(3)/MCL (ref 0–0.9)
EOSINOPHIL NFR BLD AUTO: 6.8 %
ERYTHROCYTE [DISTWIDTH] IN BLOOD BY AUTOMATED COUNT: 13.5 % (ref 11.5–17)
GFR SERPLBLD CREATININE-BSD FMLA CKD-EPI: >60 MLS/MIN/1.73/M2
GLOBULIN SER-MCNC: 3.8 GM/DL (ref 2.4–3.5)
GLUCOSE SERPL-MCNC: 117 MG/DL (ref 82–115)
HCT VFR BLD AUTO: 41.4 % (ref 37–47)
HDLC SERPL-MCNC: 45 MG/DL (ref 35–60)
HGB BLD-MCNC: 13.2 G/DL (ref 12–16)
IMM GRANULOCYTES # BLD AUTO: 0.01 X10(3)/MCL (ref 0–0.04)
IMM GRANULOCYTES NFR BLD AUTO: 0.3 %
LDLC SERPL CALC-MCNC: 115 MG/DL (ref 50–140)
LYMPHOCYTES # BLD AUTO: 1.48 X10(3)/MCL (ref 0.6–4.6)
LYMPHOCYTES NFR BLD AUTO: 38.9 %
MCH RBC QN AUTO: 25.9 PG
MCHC RBC AUTO-ENTMCNC: 31.9 G/DL (ref 33–36)
MCV RBC AUTO: 81.3 FL (ref 80–94)
MONOCYTES # BLD AUTO: 0.14 X10(3)/MCL (ref 0.1–1.3)
MONOCYTES NFR BLD AUTO: 3.7 %
NEUTROPHILS # BLD AUTO: 1.91 X10(3)/MCL (ref 2.1–9.2)
NEUTROPHILS NFR BLD AUTO: 50.3 %
NRBC BLD AUTO-RTO: 0 %
PLATELET # BLD AUTO: 329 X10(3)/MCL (ref 130–400)
PMV BLD AUTO: 9.6 FL (ref 7.4–10.4)
POTASSIUM SERPL-SCNC: 4 MMOL/L (ref 3.5–5.1)
PROT SERPL-MCNC: 7.3 GM/DL (ref 5.8–7.6)
RBC # BLD AUTO: 5.09 X10(6)/MCL (ref 4.2–5.4)
SODIUM SERPL-SCNC: 137 MMOL/L (ref 136–145)
TRIGL SERPL-MCNC: 110 MG/DL (ref 37–140)
VLDLC SERPL CALC-MCNC: 22 MG/DL
WBC # SPEC AUTO: 3.8 X10(3)/MCL (ref 4.5–11.5)

## 2023-03-22 PROCEDURE — 99214 OFFICE O/P EST MOD 30 MIN: CPT | Mod: S$PBB,,, | Performed by: NURSE PRACTITIONER

## 2023-03-22 PROCEDURE — 99214 PR OFFICE/OUTPT VISIT, EST, LEVL IV, 30-39 MIN: ICD-10-PCS | Mod: S$PBB,,, | Performed by: NURSE PRACTITIONER

## 2023-03-22 PROCEDURE — 3066F PR DOCUMENTATION OF TREATMENT FOR NEPHROPATHY: ICD-10-PCS | Mod: CPTII,,, | Performed by: NURSE PRACTITIONER

## 2023-03-22 PROCEDURE — 99215 OFFICE O/P EST HI 40 MIN: CPT | Mod: PBBFAC | Performed by: NURSE PRACTITIONER

## 2023-03-22 PROCEDURE — 36415 COLL VENOUS BLD VENIPUNCTURE: CPT

## 2023-03-22 PROCEDURE — 3066F NEPHROPATHY DOC TX: CPT | Mod: CPTII,,, | Performed by: NURSE PRACTITIONER

## 2023-03-22 PROCEDURE — 85025 COMPLETE CBC W/AUTO DIFF WBC: CPT

## 2023-03-22 PROCEDURE — 3008F BODY MASS INDEX DOCD: CPT | Mod: CPTII,,, | Performed by: NURSE PRACTITIONER

## 2023-03-22 PROCEDURE — 1101F PR PT FALLS ASSESS DOC 0-1 FALLS W/OUT INJ PAST YR: ICD-10-PCS | Mod: CPTII,,, | Performed by: NURSE PRACTITIONER

## 2023-03-22 PROCEDURE — 3078F PR MOST RECENT DIASTOLIC BLOOD PRESSURE < 80 MM HG: ICD-10-PCS | Mod: CPTII,,, | Performed by: NURSE PRACTITIONER

## 2023-03-22 PROCEDURE — 3060F PR POS MICROALBUMINURIA RESULT DOCUMENTED/REVIEW: ICD-10-PCS | Mod: CPTII,,, | Performed by: NURSE PRACTITIONER

## 2023-03-22 PROCEDURE — 3078F DIAST BP <80 MM HG: CPT | Mod: CPTII,,, | Performed by: NURSE PRACTITIONER

## 2023-03-22 PROCEDURE — 3288F FALL RISK ASSESSMENT DOCD: CPT | Mod: CPTII,,, | Performed by: NURSE PRACTITIONER

## 2023-03-22 PROCEDURE — 3074F PR MOST RECENT SYSTOLIC BLOOD PRESSURE < 130 MM HG: ICD-10-PCS | Mod: CPTII,,, | Performed by: NURSE PRACTITIONER

## 2023-03-22 PROCEDURE — 80061 LIPID PANEL: CPT

## 2023-03-22 PROCEDURE — 3060F POS MICROALBUMINURIA REV: CPT | Mod: CPTII,,, | Performed by: NURSE PRACTITIONER

## 2023-03-22 PROCEDURE — 3074F SYST BP LT 130 MM HG: CPT | Mod: CPTII,,, | Performed by: NURSE PRACTITIONER

## 2023-03-22 PROCEDURE — 3008F PR BODY MASS INDEX (BMI) DOCUMENTED: ICD-10-PCS | Mod: CPTII,,, | Performed by: NURSE PRACTITIONER

## 2023-03-22 PROCEDURE — 3288F PR FALLS RISK ASSESSMENT DOCUMENTED: ICD-10-PCS | Mod: CPTII,,, | Performed by: NURSE PRACTITIONER

## 2023-03-22 PROCEDURE — 80053 COMPREHEN METABOLIC PANEL: CPT

## 2023-03-22 PROCEDURE — 1101F PT FALLS ASSESS-DOCD LE1/YR: CPT | Mod: CPTII,,, | Performed by: NURSE PRACTITIONER

## 2023-03-22 RX ORDER — ATORVASTATIN CALCIUM 40 MG/1
40 TABLET, FILM COATED ORAL NIGHTLY
Qty: 90 TABLET | Refills: 2 | Status: SHIPPED | OUTPATIENT
Start: 2023-03-22 | End: 2023-09-22 | Stop reason: SDUPTHER

## 2023-03-22 RX ORDER — LORATADINE 10 MG/1
10 TABLET ORAL DAILY
Qty: 90 TABLET | Refills: 2 | Status: SHIPPED | OUTPATIENT
Start: 2023-03-22 | End: 2024-01-10 | Stop reason: SDUPTHER

## 2023-03-22 NOTE — PROGRESS NOTES
Patient ID: [id number]     Chief Complaint: Follow-up and Results    HPI:     Mansi Jin is a 63 y.o. female with diagnosis of HTN, HLD, CKD 2, COPD 3, Right Lower and Middle Bilobectomy with Mediastinal Lymph Node Dissection S/T Lung Mass (12/18/2022), Tobacco Use. Patient seen in clinic today for follow upon chronic illesses. Patient last seen in clinic on 01/25/2023.   Patient's daughter present for visit. Patient had lumbar X-ray completed on 11/22/2022; indicated degenerative listhesis related to facet arthropathy in the lower lumbar spine, similar to prior. Patient referred to physical therapy, patient states she never received an appointment, referral was denied due to clinic not accepting her insurance. Patient states right leg pain, back pain improved. Patient states she is doing well, denies any acute complaints.   Patient states she does user her Trelegy Ellipta and Combivent as prescribed for COPD.   Patient's B/P medications discontinued due to hypotension at home. B/P today noted to be 111/76. Patient denies SOB, chest pain.     Patient followed by Oncology Clinic. Last appointment on 03/14/2023. Mansi Jin  63 y.o.  female with past medical history significant for HTN, HLD, uterine cancer status post total hysterectomy in 1989,  CKD Stage 2, COPD Stage 3, referred for management of NSCLC s/p RLL / RML lobectomy with Mediastinal Lymph Node Dissection on 12/8/2022. She was noted to have RLL lung mass and underwent CT guided biopsy which was non diagnostic. PET scan demonstrated significant avidity within the lesion. Given high risk for malignancy, she underwent right lower lobectomy and right middle lobectomy on 12/08/2022. Pathology came back with invasive adenocarcinoma, poorly differentiated with solid morphology, focal tumor involvement of visceral pleura and focal tumor vascular invasion (vein), with mediastinal lymph nodes positive for metastatic carcinoma. PET on 01/12/2023 showed focal  uptake at the medial right acetabular roof but the biopsy was negative for malignancy. Discussed with radiology, they recommended adjuvant chemotherapy and then they will reassess later for radiation therapy. Labs adequate for Cycle 1 of Pemetrexed+Carbo on 3/15/23. RTC in 1 week for tox check with NP with labs. cbc, cmp, mag, phos- 1 hr prior @ [facility]. Increase Baclofen to 5 mg po TID PRN. RTC in 3 weeks with MD prior to next cycle with labs. cbc, cmp, mag, phos- 1 hr prior @ [facility]. Patient has follow up appointment scheduled for 03/24/2023.     Patient followed by Pulmonology Clinic. Last appointment on 02/14/2023. Ms. Jin is a 63yo female with RLL lung mass and COPD stage III who has presents to pulmonology for 1 month follow up. Recent PET scan depicted a lymphadenopathy in right lower leg. Pending IR biopsy on 02/22/2023. Oncology will begin chemotherapy once pathology has resulted. She states she is getting some energy. And has gained some weight. States she was unable to tolerate Combivent as it causes some strangling sensation and shortness of breath. Has been tolerating Trelegy well. States her shortness of breath episodes have diminished and she has to use her rescue inhaler maybe once a week. States she uses her nebulizer twice a week. Denies any fevers, chills, nausea, vomiting, unintentional weight loss, syncopal episodes, changes in vision, headaches, chest pains, palpitations, paresthesias. Has no other complaints at this time. Repeat PET on 1/12/23 with potential metastasis. oncology planning urgent initiation of chemo, however pending biopsy with IR on 2/22 for evaluation of potential metastasis. Continue trelegy. will discontinue combivent 2/2 to side effects. Continue oxygen, montior o2 sats at home and instructed if staying below 88 then to present to nearest ED. Needs close follow up with pulmonology and  oncology. Patient has follow up appointment scheduled for 05/16/2023.      Patient followed  by CV Surgeon, Dr. Browne. Last appointment on 01/10/2023. Patient returns now 1 month out from a right lower bilobectomy.  Her pathology returned positive for a 3.1 cm poorly differentiated adenocarcinoma with 2 posterior mediastinal lymph nodes positive.  Patient was doing well for the 1st several weeks after surgery.  She was walking without any shortness of breath or pain.  However over the last 3 days she is suddenly developed a spasm and shooting pain in the right thigh.  She is been to the emergency room for evaluation and she did not have a DVT and all x-rays were negative.  She was told she has frequent muscle spasms.  She is really getting not much relief from this.  Her incision has healed nicely.  She is already lined up with oncology for chemotherapy.  She will follow up with them and with her primary care for further evaluation of this right leg problem.  We will see her back p.r.n.      Patient followed by Radiation Oncologist, Dr. Dinorah Mireles. Last appointment on 01/04/2023. The patient has a stage III non-small cell lung cancer status post resection. We discussed the controversy does around postoperative radiotherapy in the setting of positive mediastinal nodes. She does have a high risk factor of extranodal extension. Explained to the patient and her daughter that at this time the most important component of her treatment is the chemotherapy. Will see her back towards the end of chemotherapy and we will rediscuss the role of postoperative radiation therapy. Recent meta analyses have shown no overall survival benefit in the setting of postoperative radiation therapy but there is data support a larger benefit in patients with extracapsular extension. If the patient tolerates chemotherapy well and has a good performance status, I will be more likely to recommend PORT. Also if the patient only receives 1-2 cycles of chemotherapy, I will be more inclined to recommend radiotherapy. If the patient  is  able to complete most of her recommended chemotherapy but has a limited performance status after completing chemotherapy, we will likely not proceed with radiation therapy. The patient and her daughter understand the plan and are happy that we will reassess after completing chemotherapy.    Review of patient's allergies indicates:  No Known Allergies    Breast Cancer Screening: MMG negative on 01/23/2023   Cervical Cancer Screening: Hysterectomy  Colorectal Cancer Screening: Cologuard negativ ania 04/11/2022  Diabetic Eye Exam: N/A  Diabetic Foot Exam: N/A  Lung Cancer Screening: Followed by Oncology/Pulmonology for Lung Cancer  Prostate Cancer Screening: N/A  AAA Screening: N/A  Osteoporosis Screening: ordered  Medicare Wellness: N/A  Immunizations:   Immunization History   Administered Date(s) Administered    COVID-19, MRNA, LN-S, PF (MODERNA FULL 0.5 ML DOSE) 03/02/2021, 03/30/2021, 11/05/2021    Influenza 10/19/2017    Influenza - Quadrivalent 12/01/2020, 03/17/2022    Influenza - Quadrivalent - PF (6-35 months) 10/19/2017, 11/12/2018    Influenza - Quadrivalent - PF *Preferred* (6 months and older) 11/22/2022    Influenza - Trivalent - PF (ADULT) 11/15/2011    Pneumococcal Polysaccharide - 23 Valent 03/17/2022    Tdap 10/19/2017    Zoster 01/28/2021, 07/21/2021    Zoster Recombinant 01/28/2021, 07/21/2021     Past Surgical History:   Procedure Laterality Date    HYSTERECTOMY  Unknowing    lipoma multiple sites      LUNG LOBECTOMY Right 12/08/2022    Procedure: LOBECTOMY, LUNG;  Surgeon: Jass Browne IV, MD;  Location: [facility] OR;  Service: Cardiothoracic;  Laterality: Right;    SURGICAL REMOVAL OF LYMPH NODE  12/08/2022    Procedure: EXCISION, LYMPH NODE;  Surgeon: Jass Browne IV, MD;  Location: [facility] OR;  Service: Cardiothoracic;;    THORACOTOMY Right 12/08/2022    Procedure: THORACOTOMY;  Surgeon: Jass Browne IV, MD;  Location: [facility] OR;  Service: Cardiothoracic;  Laterality: Right;  Right Lower  Lobectomy with Lymph Node Dissection    TOTAL ABDOMINAL HYSTERECTOMY W/ BILATERAL SALPINGOOPHORECTOMY       family history includes Asthma in her brother and brother; Cancer in her maternal grandfather; Fibroids in her sister; Heart failure in her father; Hepatitis in her mother; Hypertension in her mother; Liver cancer in her mother; Ovarian cancer in her mother; Prostate cancer in her father.    Social History     Socioeconomic History    Marital status: Single    Number of children: 5   Tobacco Use    Smoking status: Former     Packs/day: 0.50     Years: 50.00     Pack years: 25.00     Types: Cigarettes     Quit date: 2022     Years since quittin.2    Smokeless tobacco: Never   Substance and Sexual Activity    Alcohol use: Not Currently     Comment: quit 25years ago    Drug use: Never    Sexual activity: Yes     Partners: Male     Current Outpatient Medications   Medication Instructions    acetaminophen (TYLENOL) 1,000 mg, Oral, Every 6 hours PRN    aspirin (ECOTRIN) 81 mg, Oral, Daily    atorvastatin (LIPITOR) 40 mg, Oral, Nightly    dexAMETHasone (DECADRON) 8 mg, Oral, Daily, Take 8mg on the day prior to chemotherapy and on days 2, 3, and 4 following chemotherapy    fluticasone propionate (FLONASE) 50 mcg, Each Nostril, Daily    fluticasone-umeclidin-vilanter (TRELEGY ELLIPTA) 100-62.5-25 mcg DsDv 1 puff, Inhalation, Daily    folic acid (FOLVITE) 1 mg, Oral, Daily    ibuprofen (ADVIL,MOTRIN) 400 mg, Oral, Every 6 hours PRN    ipratropium/albuterol sulfate (COMBIVENT INHL) Inhalation, As needed (PRN)    loratadine (CLARITIN) 10 mg, Oral, Daily       Subjective:     Review of Systems   Constitutional: Negative.    HENT: Negative.     Eyes: Negative.    Respiratory: Negative.     Cardiovascular: Negative.    Gastrointestinal: Negative.    Endocrine: Negative.    Genitourinary: Negative.    Musculoskeletal: Negative.    Skin: Negative.    Allergic/Immunologic: Negative.    Neurological: Negative.   "  Hematological: Negative.    Psychiatric/Behavioral: Negative.       Objective:     Visit Vitals  /72 (BP Location: Right arm, Patient Position: Sitting, BP Method: Large (Automatic))   Pulse 106   Temp 98.6 °F (37 °C) (Oral)   Resp 18   Ht 5' 7.01" (1.702 m)   Wt 69.8 kg (153 lb 12.8 oz)   BMI 24.08 kg/m²       Physical Exam  Vitals reviewed.   Constitutional:       Appearance: Normal appearance.   HENT:      Head: Normocephalic and atraumatic.      Mouth/Throat:      Mouth: Mucous membranes are moist.      Pharynx: Oropharynx is clear.   Eyes:      Extraocular Movements: Extraocular movements intact.      Conjunctiva/sclera: Conjunctivae normal.      Pupils: Pupils are equal, round, and reactive to light.   Cardiovascular:      Rate and Rhythm: Normal rate and regular rhythm.      Heart sounds: Normal heart sounds.   Pulmonary:      Effort: Pulmonary effort is normal.      Breath sounds: Normal breath sounds.   Abdominal:      General: Bowel sounds are normal.   Musculoskeletal:         General: Normal range of motion.      Cervical back: Normal range of motion.   Skin:     General: Skin is warm and dry.   Neurological:      Mental Status: She is alert and oriented to person, place, and time.   Psychiatric:         Mood and Affect: Mood normal.         Behavior: Behavior normal.       Labs Reviewed:     Hematology:  Lab Results   Component Value Date    WBC 4.5 03/24/2023    HGB 12.9 03/24/2023    HCT 41.3 03/24/2023     03/24/2023     Chemistry:  Lab Results   Component Value Date     03/24/2023    K 4.7 03/24/2023    CHLORIDE 105 03/24/2023    BUN 9.0 (L) 03/24/2023    CREATININE 0.86 03/24/2023    EGFRNORACEVR >60 03/24/2023    GLUCOSE 94 03/24/2023    CALCIUM 9.4 03/24/2023    ALKPHOS 130 03/24/2023    LABPROT 7.2 03/24/2023    ALBUMIN 3.4 03/24/2023    BILIDIR 0.2 07/19/2021    IBILI 0.30 07/19/2021    AST 20 03/24/2023    ALT 28 03/24/2023    MG 2.00 03/24/2023    PHOS 2.7 03/24/2023 "     Lab Results   Component Value Date    HGBA1C 6.0 05/09/2019     Lipid Panel:  Lab Results   Component Value Date    CHOL 182 03/22/2023    HDL 45 03/22/2023    .00 03/22/2023    TRIG 110 03/22/2023    TOTALCHOLEST 4 03/22/2023     Thyroid:  Lab Results   Component Value Date    TSH 1.529 03/06/2023     Urine:  Lab Results   Component Value Date    COLORUA Yellow 03/22/2023    APPEARANCEUA Clear 03/22/2023    SGUA 1.020 03/22/2023    PHUA 6.5 03/22/2023    PROTEINUA 1+ (A) 03/22/2023    GLUCOSEUA Trace (A) 03/22/2023    KETONESUA Negative 03/22/2023    BLOODUA Negative 03/22/2023    NITRITESUA Negative 03/22/2023    LEUKOCYTESUR Negative 03/22/2023    RBCUA 0-5 03/22/2023    WBCUA 0-5 03/22/2023    BACTERIA None Seen 03/22/2023    SQEPUA Few (A) 03/22/2023    HYALINECASTS 0-2 (A) 03/22/2023    CREATRANDUR 188.0 (H) 03/22/2023        Assessment:       ICD-10-CM ICD-9-CM   1. Mixed hyperlipidemia  E78.2 272.2   2. Stage 2 chronic kidney disease  N18.2 585.2   3. Stage 3 severe COPD by GOLD classification  J44.9 496   4. NSCLC of right lung  C34.91 162.9   5. Post-menopause  Z78.0 V49.81        Plan:     1. Mixed hyperlipidemia  Continue Atorvastatin 40 mg daily  Weight loss encouraged  Low fat/high fiber diet  Increase physical activity  Tobacco cessation encouraged  Cholesterol Total   Date Value Ref Range Status   03/22/2023 182 <=200 mg/dL Final     HDL Cholesterol   Date Value Ref Range Status   03/22/2023 45 35 - 60 mg/dL Final     Triglyceride   Date Value Ref Range Status   03/22/2023 110 37 - 140 mg/dL Final     LDL Cholesterol   Date Value Ref Range Status   03/22/2023 115.00 50.00 - 140.00 mg/dL Final   - Lipid Panel; Future    2. Stage 3 severe COPD by GOLD classification  Followed by Pulmonology  Continue Trelegy Ellipta 100-62.5-25 mcg daily, Combivent Inh prn.    3. NSCLC of right lung  Followed by Oncology/Pulmonology    4. Post-menopause  - DXA Bone Density Axial Skeleton 1 or more sites;  Future      Follow up in about 6 months (around 9/22/2023) for Labs. In addition to their scheduled follow up, the patient has also been instructed to follow up on as needed basis.     PAULINE Loya

## 2023-03-24 ENCOUNTER — OFFICE VISIT (OUTPATIENT)
Dept: HEMATOLOGY/ONCOLOGY | Facility: CLINIC | Age: 63
End: 2023-03-24
Payer: MEDICARE

## 2023-03-24 ENCOUNTER — LAB VISIT (OUTPATIENT)
Dept: LAB | Facility: HOSPITAL | Age: 63
End: 2023-03-24
Attending: INTERNAL MEDICINE
Payer: MEDICARE

## 2023-03-24 VITALS
SYSTOLIC BLOOD PRESSURE: 111 MMHG | TEMPERATURE: 98 F | BODY MASS INDEX: 24.12 KG/M2 | HEART RATE: 111 BPM | RESPIRATION RATE: 18 BRPM | HEIGHT: 67 IN | WEIGHT: 153.69 LBS | DIASTOLIC BLOOD PRESSURE: 76 MMHG | OXYGEN SATURATION: 98 %

## 2023-03-24 DIAGNOSIS — Z29.89 IMMUNOTHERAPY: ICD-10-CM

## 2023-03-24 DIAGNOSIS — Z85.42 HISTORY OF UTERINE CANCER: ICD-10-CM

## 2023-03-24 DIAGNOSIS — C34.91 NSCLC OF RIGHT LUNG: Primary | ICD-10-CM

## 2023-03-24 DIAGNOSIS — Z79.899 ON ANTINEOPLASTIC CHEMOTHERAPY: ICD-10-CM

## 2023-03-24 DIAGNOSIS — C34.91 NSCLC OF RIGHT LUNG: ICD-10-CM

## 2023-03-24 LAB
ALBUMIN SERPL-MCNC: 3.4 G/DL (ref 3.4–4.8)
ALBUMIN/GLOB SERPL: 0.9 RATIO (ref 1.1–2)
ALP SERPL-CCNC: 130 UNIT/L (ref 40–150)
ALT SERPL-CCNC: 28 UNIT/L (ref 0–55)
AST SERPL-CCNC: 20 UNIT/L (ref 5–34)
BASOPHILS # BLD AUTO: 0 X10(3)/MCL (ref 0–0.2)
BASOPHILS NFR BLD AUTO: 0 %
BILIRUBIN DIRECT+TOT PNL SERPL-MCNC: 0.4 MG/DL
BUN SERPL-MCNC: 9 MG/DL (ref 9.8–20.1)
CALCIUM SERPL-MCNC: 9.4 MG/DL (ref 8.4–10.2)
CHLORIDE SERPL-SCNC: 105 MMOL/L (ref 98–107)
CO2 SERPL-SCNC: 28 MMOL/L (ref 23–31)
CREAT SERPL-MCNC: 0.86 MG/DL (ref 0.55–1.02)
EOSINOPHIL # BLD AUTO: 0.09 X10(3)/MCL (ref 0–0.9)
EOSINOPHIL NFR BLD AUTO: 2 %
ERYTHROCYTE [DISTWIDTH] IN BLOOD BY AUTOMATED COUNT: 14 % (ref 11.5–17)
GFR SERPLBLD CREATININE-BSD FMLA CKD-EPI: >60 MLS/MIN/1.73/M2
GLOBULIN SER-MCNC: 3.8 GM/DL (ref 2.4–3.5)
GLUCOSE SERPL-MCNC: 94 MG/DL (ref 82–115)
HCT VFR BLD AUTO: 41.3 % (ref 37–47)
HGB BLD-MCNC: 12.9 G/DL (ref 12–16)
IMM GRANULOCYTES # BLD AUTO: 0.01 X10(3)/MCL (ref 0–0.04)
IMM GRANULOCYTES NFR BLD AUTO: 0.2 %
LYMPHOCYTES # BLD AUTO: 1.71 X10(3)/MCL (ref 0.6–4.6)
LYMPHOCYTES NFR BLD AUTO: 38.1 %
MAGNESIUM SERPL-MCNC: 2 MG/DL (ref 1.6–2.6)
MCH RBC QN AUTO: 26 PG (ref 27–31)
MCHC RBC AUTO-ENTMCNC: 31.2 G/DL (ref 33–36)
MCV RBC AUTO: 83.3 FL (ref 80–94)
MONOCYTES # BLD AUTO: 0.49 X10(3)/MCL (ref 0.1–1.3)
MONOCYTES NFR BLD AUTO: 10.9 %
NEUTROPHILS # BLD AUTO: 2.19 X10(3)/MCL (ref 2.1–9.2)
NEUTROPHILS NFR BLD AUTO: 48.8 %
PHOSPHATE SERPL-MCNC: 2.7 MG/DL (ref 2.3–4.7)
PLATELET # BLD AUTO: 265 X10(3)/MCL (ref 130–400)
PMV BLD AUTO: 9.4 FL (ref 7.4–10.4)
POTASSIUM SERPL-SCNC: 4.7 MMOL/L (ref 3.5–5.1)
PROT SERPL-MCNC: 7.2 GM/DL (ref 5.8–7.6)
RBC # BLD AUTO: 4.96 X10(6)/MCL (ref 4.2–5.4)
SODIUM SERPL-SCNC: 139 MMOL/L (ref 136–145)
WBC # SPEC AUTO: 4.5 X10(3)/MCL (ref 4.5–11.5)

## 2023-03-24 PROCEDURE — 84100 ASSAY OF PHOSPHORUS: CPT

## 2023-03-24 PROCEDURE — 3074F PR MOST RECENT SYSTOLIC BLOOD PRESSURE < 130 MM HG: ICD-10-PCS | Mod: CPTII,S$GLB,,

## 2023-03-24 PROCEDURE — 3066F PR DOCUMENTATION OF TREATMENT FOR NEPHROPATHY: ICD-10-PCS | Mod: CPTII,S$GLB,,

## 2023-03-24 PROCEDURE — 3060F PR POS MICROALBUMINURIA RESULT DOCUMENTED/REVIEW: ICD-10-PCS | Mod: CPTII,S$GLB,,

## 2023-03-24 PROCEDURE — 99215 PR OFFICE/OUTPT VISIT, EST, LEVL V, 40-54 MIN: ICD-10-PCS | Mod: S$GLB,,,

## 2023-03-24 PROCEDURE — 1159F MED LIST DOCD IN RCRD: CPT | Mod: CPTII,S$GLB,,

## 2023-03-24 PROCEDURE — 99999 PR PBB SHADOW E&M-EST. PATIENT-LVL IV: ICD-10-PCS | Mod: PBBFAC,,,

## 2023-03-24 PROCEDURE — 3008F PR BODY MASS INDEX (BMI) DOCUMENTED: ICD-10-PCS | Mod: CPTII,S$GLB,,

## 2023-03-24 PROCEDURE — 99999 PR PBB SHADOW E&M-EST. PATIENT-LVL IV: CPT | Mod: PBBFAC,,,

## 2023-03-24 PROCEDURE — 1160F PR REVIEW ALL MEDS BY PRESCRIBER/CLIN PHARMACIST DOCUMENTED: ICD-10-PCS | Mod: CPTII,S$GLB,,

## 2023-03-24 PROCEDURE — 1160F RVW MEDS BY RX/DR IN RCRD: CPT | Mod: CPTII,S$GLB,,

## 2023-03-24 PROCEDURE — 3288F FALL RISK ASSESSMENT DOCD: CPT | Mod: CPTII,S$GLB,,

## 2023-03-24 PROCEDURE — 1101F PR PT FALLS ASSESS DOC 0-1 FALLS W/OUT INJ PAST YR: ICD-10-PCS | Mod: CPTII,S$GLB,,

## 2023-03-24 PROCEDURE — 3066F NEPHROPATHY DOC TX: CPT | Mod: CPTII,S$GLB,,

## 2023-03-24 PROCEDURE — 3060F POS MICROALBUMINURIA REV: CPT | Mod: CPTII,S$GLB,,

## 2023-03-24 PROCEDURE — 3288F PR FALLS RISK ASSESSMENT DOCUMENTED: ICD-10-PCS | Mod: CPTII,S$GLB,,

## 2023-03-24 PROCEDURE — 36415 COLL VENOUS BLD VENIPUNCTURE: CPT

## 2023-03-24 PROCEDURE — 3078F DIAST BP <80 MM HG: CPT | Mod: CPTII,S$GLB,,

## 2023-03-24 PROCEDURE — 1159F PR MEDICATION LIST DOCUMENTED IN MEDICAL RECORD: ICD-10-PCS | Mod: CPTII,S$GLB,,

## 2023-03-24 PROCEDURE — 99215 OFFICE O/P EST HI 40 MIN: CPT | Mod: S$GLB,,,

## 2023-03-24 PROCEDURE — 3078F PR MOST RECENT DIASTOLIC BLOOD PRESSURE < 80 MM HG: ICD-10-PCS | Mod: CPTII,S$GLB,,

## 2023-03-24 PROCEDURE — 3008F BODY MASS INDEX DOCD: CPT | Mod: CPTII,S$GLB,,

## 2023-03-24 PROCEDURE — 85025 COMPLETE CBC W/AUTO DIFF WBC: CPT

## 2023-03-24 PROCEDURE — 1101F PT FALLS ASSESS-DOCD LE1/YR: CPT | Mod: CPTII,S$GLB,,

## 2023-03-24 PROCEDURE — 80053 COMPREHEN METABOLIC PANEL: CPT

## 2023-03-24 PROCEDURE — 3074F SYST BP LT 130 MM HG: CPT | Mod: CPTII,S$GLB,,

## 2023-03-24 PROCEDURE — 83735 ASSAY OF MAGNESIUM: CPT

## 2023-03-24 NOTE — PROGRESS NOTES
HEMATOLOGY / ONCOLOGY   CLINIC NOTE     ONCOLOGICAL HISTORY:     Diagnosis:  - NSCLC / Adenocarcinoma Stage IIIA pT2a pN2--dx 12/2022  - History Uterine cancer - Dx 1989    Current Treatment:   - Carboplatin + Pemetrexed to start 3/15/2023    Treatment History:  - 1989 s/p total hysterectomy   - 12/08/2022: Right lower and middle bilobectomy with mediastinal lymph node dissection.   - Adjuvant chemotherapy - Carboplatin + Pemetrexed     Plan of care: Adjuvant chemotherapy x 4 cycles --> Immunotherapy q 4 weeks x 1 yr.    IMAGING:   - 08/10/2022 PET: The elongated subpleural right lower lobe nodule demonstrates fairly intense FDG activity.  Infectious, inflammatory and neoplastic etiologies remain possible. No suspicious PET findings elsewhere.  - 12/08/2022 CT HEAD without contrast: No acute intracranial abnormality identified.  Findings of mild microvascular ischemic disease.  - 12/2022: U/S Upper extremity: Thrombophlebitis of the left cephalic vein. No deep venous thrombosis in the left upper extremity.  - 01/03/2023 MRI Brain : GARY   - 01/12/2023 NM PET CT 1. Postsurgical changes of recent right lower and middle lobectomies.  There is a small right pleural effusion.  There is nonspecific peripheral uptake along the pleura at the right lung base and perihilar region which may be related to healing response in the setting of recent surgery.  Similarly borderline mediastinal uptake is nonspecific in the recent postoperative setting and could be reactive.  Continued attention recommended on follow-up. 2. Focal uptake at the medial right acetabular roof without appreciable focal osseous lesion by CT evaluation.  This is new compared to previous exam.  Metastasis is a consideration    PATHOLOGY:  - 12/08/2022: Invasive solid adenocarcinoma (3.1 CM); G3, poorly differentiated. Visceral Pleura Invasion present without definite serosal surface involvement. Lymphovascular invasion (Vein) present. All margins  negative for invasive carcinoma, distance 2.5 cm. LN - 4/14 (10R: Hilar, Posterior mediastinal (RACHEAL), 4R Lower paratracheal, 9R Pulmonary ligament, 10R). pT2a pN2    - 02/22/2023: RIGHT ACETABULAR LESION, CT - GUIDE CORE NEEDLE BIOPSY : FIBRINOPURULENT EXUDATE ADMIXED WITH ATYPICAL EPITHELIOID GROUPS,   NORMAL    MARROW ELEMENTS AND ABUNDANT BLOOD CLOT    Subjective:     HPI  Mansi ELIZABET Jin  63 y.o.  female with past medical history significant for HTN, HLD, uterine cancer status post total hysterectomy in 1989,  CKD Stage 2, COPD Stage 3, referred for management of NSCLC s/p RLL / RML lobectomy with Mediastinal Lymph Node Dissection on 12/8/2022.     She was noted to have RLL lung mass and underwent CT guided biopsy which was non diagnostic. PET scan demonstrated significant avidity within the lesion. Given high risk for malignancy, she underwent right lower lobectomy and right middle lobectomy on 12/08/2022. Pathology came back with invasive adenocarcinoma, poorly differentiated with solid morphology, focal tumor involvement of visceral pleura and focal tumor vascular invasion (vein), with mediastinal lymph nodes positive for metastatic carcinoma.      PET on 01/12/2023 showed focal uptake at the medial right acetabular roof but the biopsy was negative for malignancy. Discussed with radiology, they recommended adjuvant chemotherapy and then they will reassess later for radiation therapy.     Chief Complaint: Lung Cancer (Pt reports dry cough and sharp pain on right side x 1week)        Interval History:   She returns to the clinic today for a 1 week toxicity check after receiving her 1st cycle of carbo/pemetrexed on 03/15/2023.  Overall, she tolerated her 1st cycle well.  She did have 1 episode of nausea that was controlled with Zofran.  She does report having a dry cough with occasional postnasal drip.  She also continues to have right sided occasional, sharp pains that have been present since surgery.  She  continues to take baclofen 5 mg 3 times daily as needed as prescribed for her right leg pain.  She denies any shortness of breath, chest pain, fever, chills, vomiting, diarrhea, constipation, or recent hospitalizations.      Past Medical History:   Diagnosis Date    ASTHMA     COPD (chronic obstructive pulmonary disease)     SEVERE    High cholesterol     HLD (hyperlipidemia)     HTN (hypertension)     Lung cancer 2022    invasive adenocarcinoma, poorly differentiated with solid morphology, focal tumor involvement of visceral pleura and focal tumor vascular invasion (vein), with 2 of 3 posterior mediastinal lymph nodes positive for metastatic carcinoma    Stage 2 chronic kidney disease     TIA (transient ischemic attack)     Tobacco abuse     Unspecified osteoarthritis, unspecified site     Uterine cancer       Past Surgical History:   Procedure Laterality Date    HYSTERECTOMY  Unknowing    lipoma multiple sites      LUNG LOBECTOMY Right 2022    Procedure: LOBECTOMY, LUNG;  Surgeon: Jass Browne IV, MD;  Location: [facility] OR;  Service: Cardiothoracic;  Laterality: Right;    SURGICAL REMOVAL OF LYMPH NODE  2022    Procedure: EXCISION, LYMPH NODE;  Surgeon: Jass Browne IV, MD;  Location: [facility] OR;  Service: Cardiothoracic;;    THORACOTOMY Right 2022    Procedure: THORACOTOMY;  Surgeon: Jass Browne IV, MD;  Location: [facility] OR;  Service: Cardiothoracic;  Laterality: Right;  Right Lower Lobectomy with Lymph Node Dissection    TOTAL ABDOMINAL HYSTERECTOMY W/ BILATERAL SALPINGOOPHORECTOMY       Social History     Socioeconomic History    Marital status: Single    Number of children: 5   Tobacco Use    Smoking status: Former     Packs/day: 0.50     Years: 50.00     Pack years: 25.00     Types: Cigarettes     Quit date: 2022     Years since quittin.2    Smokeless tobacco: Never   Substance and Sexual Activity    Alcohol use: Not Currently     Comment: quit 25years ago    Drug use:  Never    Sexual activity: Yes     Partners: Male      Family History   Problem Relation Age of Onset    Hypertension Mother     Hepatitis Mother     Ovarian cancer Mother     Liver cancer Mother     Heart failure Father     Prostate cancer Father     Fibroids Sister     Asthma Brother     Asthma Brother     Cancer Maternal Grandfather       Review of patient's allergies indicates:  No Known Allergies     Review of Systems   Constitutional:  Positive for fatigue. Negative for activity change, appetite change, chills, fever and unexpected weight change.   HENT: Negative.  Negative for mouth dryness, mouth sores, nosebleeds, sore throat and trouble swallowing.    Eyes: Negative.  Negative for visual disturbance.   Respiratory:  Positive for cough and shortness of breath (on exertion, chronic).    Cardiovascular:  Negative for chest pain, palpitations and leg swelling.   Gastrointestinal:  Negative for abdominal distention, abdominal pain, blood in stool, change in bowel habit, constipation, diarrhea, nausea, vomiting and change in bowel habit.   Endocrine: Negative.    Genitourinary: Negative.  Negative for dysuria, frequency, hematuria and urgency.   Musculoskeletal:  Positive for leg pain. Negative for arthralgias, back pain, myalgias and neck pain.   Integumentary:  Negative for rash, breast mass, breast discharge and breast tenderness. Negative.   Allergic/Immunologic: Negative.    Neurological:  Negative for dizziness, tremors, syncope, speech difficulty, weakness, light-headedness, numbness, headaches and memory loss.   Hematological: Negative.  Does not bruise/bleed easily.   Psychiatric/Behavioral: Negative.  Negative for confusion and suicidal ideas.    Breast: Negative for mass and tenderness      Objective:        Vitals:    03/24/23 0919   BP: 111/76   Pulse: (!) 111   Resp: 18   Temp: 98.1 °F (36.7 °C)        Physical Exam  Vitals and nursing note reviewed.   Constitutional:       Appearance: Normal  appearance.   HENT:      Head: Normocephalic and atraumatic.   Eyes:      General: No scleral icterus.     Extraocular Movements: Extraocular movements intact.      Conjunctiva/sclera: Conjunctivae normal.   Neck:      Vascular: No JVD.   Cardiovascular:      Rate and Rhythm: Normal rate and regular rhythm.      Heart sounds: No murmur heard.  Pulmonary:      Effort: Pulmonary effort is normal.      Breath sounds: Decreased breath sounds present. No wheezing or rhonchi.   Abdominal:      General: Bowel sounds are normal. There is no distension.      Palpations: Abdomen is soft.      Tenderness: There is no abdominal tenderness.   Musculoskeletal:      Cervical back: Neck supple.   Lymphadenopathy:      Head:      Right side of head: No submental or submandibular adenopathy.      Left side of head: No submental or submandibular adenopathy.      Cervical: No cervical adenopathy.      Upper Body:      Right upper body: No supraclavicular or axillary adenopathy.      Left upper body: No supraclavicular or axillary adenopathy.      Lower Body: No right inguinal adenopathy. No left inguinal adenopathy.   Skin:     General: Skin is warm.      Coloration: Skin is not jaundiced.      Findings: No lesion or rash.   Neurological:      General: No focal deficit present.      Mental Status: She is alert and oriented to person, place, and time.      Cranial Nerves: Cranial nerves 2-12 are intact.      Gait: Gait abnormal.   Psychiatric:         Attention and Perception: Attention normal.         Speech: Speech normal.         Behavior: Behavior is cooperative.         Cognition and Memory: Cognition normal.         Judgment: Judgment normal.     LABS      Lab Visit on 03/24/2023   Component Date Value    Sodium Level 03/24/2023 139     Potassium Level 03/24/2023 4.7     Chloride 03/24/2023 105     Carbon Dioxide 03/24/2023 28     Glucose Level 03/24/2023 94     Blood Urea Nitrogen 03/24/2023 9.0 (L)     Creatinine 03/24/2023 0.86      Calcium Level Total 03/24/2023 9.4     Protein Total 03/24/2023 7.2     Albumin Level 03/24/2023 3.4     Globulin 03/24/2023 3.8 (H)     Albumin/Globulin Ratio 03/24/2023 0.9 (L)     Bilirubin Total 03/24/2023 0.4     Alkaline Phosphatase 03/24/2023 130     Alanine Aminotransferase 03/24/2023 28     Aspartate Aminotransfera* 03/24/2023 20     eGFR 03/24/2023 >60     Magnesium Level 03/24/2023 2.00     Phosphorus Level 03/24/2023 2.7     WBC 03/24/2023 4.5     RBC 03/24/2023 4.96     Hgb 03/24/2023 12.9     Hct 03/24/2023 41.3     MCV 03/24/2023 83.3     MCH 03/24/2023 26.0 (L)     MCHC 03/24/2023 31.2 (L)     RDW 03/24/2023 14.0     Platelet 03/24/2023 265     MPV 03/24/2023 9.4     Neut % 03/24/2023 48.8     Lymph % 03/24/2023 38.1     Mono % 03/24/2023 10.9     Eos % 03/24/2023 2.0     Basophil % 03/24/2023 0.0     Lymph # 03/24/2023 1.71     Neut # 03/24/2023 2.19     Mono # 03/24/2023 0.49     Eos # 03/24/2023 0.09     Baso # 03/24/2023 0.00     IG# 03/24/2023 0.01     IG% 03/24/2023 0.2          Assessment:   ECOG Performance status: 2 - 3.     NSCLC / Adenocarcinoma Stage IIIA pT2a pN2  - 12/08/2022: Right lower and middle bilobectomy with mediastinal lymph node dissection. Path: Invasive adenocarcinoma, poorly differentiated, focal tumor involvement of visceral pleura and focal tumor vascular invasion (vein), with mediastinal lymph nodes positive for metastatic carcinoma   - TMB - 5, MSI-S, MET amplification, ROS1.  No mutations in EGFR, BRAF, ALK, ERBB2, KRAS, RET,    - 1% tumor cells are positive for PD-L1   - patient evaluated by Radiation Oncology with recommendation to treat with chemotherapy for now if no metastatic disease and will re-evaluate the patient by the end of chemotherapy for radiation therapy  - 01/12/2023 PET: Focal uptake at the medial right acetabular roof. Metastasis is a consideration  - 02/22/2023: RIGHT ACETABULAR LESION, CT - GUIDE CORE NEEDLE BIOPSY : FIBRINOPURULENT EXUDATE  ADMIXED WITH ATYPICAL EPITHELIOID GROUPS,   NORMAL    MARROW ELEMENTS AND ABUNDANT BLOOD CLOT  PET scan showed a suspicious finding in the right acetabular roof and patient then had a CT-guided biopsy no evidence of malignancy. Start with adjuvant chemotherapy with Carboplatin and pemetrexed for 4 cycles and then followed by atezolizumab/pembrolizumab. Also follow up again with radiation oncology after chemotherapy to evaluate for radiation therapy      Chemotherapy   Cycle 1 of carboplatin/pemetrexed completed 03/15/2023.  Overall, she tolerated chemotherapy well.  Labs are stable and adequate today.  She denies any complaints or concerns today accepted dry cough  Right chest wall pain  She reports this has been present since after surgery.  Pain is intermittent and sharp.  Most likely related to nerves.  She denies any increased shortness of breath.  Dry cough  Presented after chemotherapy.  Recommended Claritin daily.  Leg pain  Currently taking baclofen 5 mg 3 times daily as needed.  Pain today is controlled with current medication regimen.    Plan:   - Labs reviewed in detail with patient and daughter. Stable today.   - Claritin as needed for dry cough.  - Continue Baclofen to 5 mg po TID PRN  - RTC in 2 weeks with MD as schedule for FU/lab/treat   - cbc, cmp, mag, phos- 1 hr prior @ [facility]    The patient was seen, interviewed and examined. Pertinent lab and radiology studies were reviewed.   The patient was given ample opportunity to ask questions, and to the best of my abilities, all questions answered to satisfaction; patient demonstrated understanding of what we discussed and agreeable to the plan. Pt instructed to call should develop concerning signs/symptoms or have further questions.     Shelbi Johansen, PAULINE-C  Oncology/Hematology   [facility]

## 2023-03-28 ENCOUNTER — HOSPITAL ENCOUNTER (OUTPATIENT)
Dept: RADIOLOGY | Facility: HOSPITAL | Age: 63
Discharge: HOME | End: 2023-03-28
Attending: NURSE PRACTITIONER
Payer: MEDICARE

## 2023-03-28 DIAGNOSIS — Z78.0 POST-MENOPAUSE: ICD-10-CM

## 2023-03-28 PROCEDURE — 77080 DXA BONE DENSITY AXIAL: CPT | Mod: TC

## 2023-03-31 LAB
C1INH SERPL-MCNC: NORMAL MG/DL
DHEA SERPL-MCNC: NORMAL
ESTRIOL SERPL-MCNC: NORMAL NG/ML
ESTROGEN SERPL-MCNC: NORMAL PG/ML
INSULIN SERPL-ACNC: NORMAL U[IU]/ML
LAB AP CLINICAL INFORMATION: NORMAL
LAB AP GROSS DESCRIPTION: NORMAL
LAB AP INTRA OP: NORMAL
LAB AP REPORT FOOTNOTES: NORMAL
Lab: NORMAL
T3RU NFR SERPL: NORMAL %

## 2023-04-03 ENCOUNTER — CLINICAL SUPPORT (OUTPATIENT)
Dept: AUDIOLOGY | Facility: HOSPITAL | Age: 63
End: 2023-04-03
Payer: MEDICARE

## 2023-04-03 DIAGNOSIS — H90.3 SENSORINEURAL HEARING LOSS, BILATERAL: Primary | ICD-10-CM

## 2023-04-03 PROCEDURE — 92567 TYMPANOMETRY: CPT | Performed by: AUDIOLOGIST

## 2023-04-03 PROCEDURE — 92557 COMPREHENSIVE HEARING TEST: CPT | Performed by: AUDIOLOGIST

## 2023-04-03 NOTE — PROGRESS NOTES
Hearing Evaluation      Patient History: Ms. Jin is in for a hearing evaluation for the purpose of ototoxic monitoring.  Previous baseline audio indicated a mild high frequency SNHL, bilaterally. She denies tinnitus, vertigo, or middle ear issues. First chemo treatment done about a week ago and she is reporting no changes in hearing.  All additional history is unremarkable.      Test Results:       Pure Tone Testing:     Right ear:   Mild to moderate sensorineural hearing loss from 3k-8kHz. Speech reception threshold is in agreement with puretone findings.  Discrimination score of 96% is considered excellent.      Left ear: Mild to moderate sensorineural hearing loss from 3k-8kHz. Speech reception threshold is in agreement with puretone findings.  Discrimination score of 96% is considered excellent.         Tympanometry:        Right ear:   Type 'A' tymp, normal middle ear pressure/function    Left ear: Type 'A' tymp, normal middle ear pressure/function       Distortion Product Otoacoustic Emission Testing (DPOAE):         Right ear: Absent emissions from 1k-6kHz, present only at 2kHz      Left ear: Absent emissions from 2k-3kHz, present at 1k, 4k, & 6kHz         Interpretations:     Behavioral test findings indicate a slight decrease in hearing from 3k-4kHz, AD since previous hearing evaluation. Results for the left ear are unchanged. Speech reception thresholds obtained at 15dB, AU, and are in agreement with puretone findings. Speech discrimination scores of 96%, AU, are considered excellent.  DPOAE findings indicate cochlear site of lesion, bilaterally. Immittance measures indicate normal middle ear pressure/function, bilaterally.         Recommendations:   Due to noted decrease, continued hearing evaluations following treatments are recommended to monitor for further progression.

## 2023-04-04 ENCOUNTER — OFFICE VISIT (OUTPATIENT)
Dept: HEMATOLOGY/ONCOLOGY | Facility: CLINIC | Age: 63
End: 2023-04-04
Payer: MEDICARE

## 2023-04-04 ENCOUNTER — LAB VISIT (OUTPATIENT)
Dept: LAB | Facility: HOSPITAL | Age: 63
End: 2023-04-04
Attending: INTERNAL MEDICINE
Payer: MEDICARE

## 2023-04-04 VITALS
TEMPERATURE: 98 F | OXYGEN SATURATION: 96 % | SYSTOLIC BLOOD PRESSURE: 107 MMHG | RESPIRATION RATE: 18 BRPM | WEIGHT: 152.31 LBS | HEIGHT: 67 IN | DIASTOLIC BLOOD PRESSURE: 73 MMHG | HEART RATE: 102 BPM | BODY MASS INDEX: 23.91 KG/M2

## 2023-04-04 DIAGNOSIS — R05.9 COUGH, UNSPECIFIED TYPE: ICD-10-CM

## 2023-04-04 DIAGNOSIS — Z29.89 IMMUNOTHERAPY: ICD-10-CM

## 2023-04-04 DIAGNOSIS — C34.91 NSCLC OF RIGHT LUNG: ICD-10-CM

## 2023-04-04 DIAGNOSIS — C34.91 NSCLC OF RIGHT LUNG: Primary | ICD-10-CM

## 2023-04-04 DIAGNOSIS — Z79.899 ON ANTINEOPLASTIC CHEMOTHERAPY: ICD-10-CM

## 2023-04-04 DIAGNOSIS — M54.50 CHRONIC RIGHT-SIDED LOW BACK PAIN WITHOUT SCIATICA: ICD-10-CM

## 2023-04-04 DIAGNOSIS — F17.200 TOBACCO DEPENDENCE WITH CURRENT USE: ICD-10-CM

## 2023-04-04 DIAGNOSIS — Z85.42 HISTORY OF UTERINE CANCER: ICD-10-CM

## 2023-04-04 DIAGNOSIS — R91.8 LUNG MASS: ICD-10-CM

## 2023-04-04 DIAGNOSIS — J44.9 STAGE 3 SEVERE COPD BY GOLD CLASSIFICATION: Chronic | ICD-10-CM

## 2023-04-04 DIAGNOSIS — G89.29 CHRONIC RIGHT-SIDED LOW BACK PAIN WITHOUT SCIATICA: ICD-10-CM

## 2023-04-04 LAB
ALBUMIN SERPL-MCNC: 3.2 G/DL (ref 3.4–4.8)
ALBUMIN/GLOB SERPL: 0.7 RATIO (ref 1.1–2)
ALP SERPL-CCNC: 109 UNIT/L (ref 40–150)
ALT SERPL-CCNC: 33 UNIT/L (ref 0–55)
AST SERPL-CCNC: 25 UNIT/L (ref 5–34)
BASOPHILS # BLD AUTO: 0.01 X10(3)/MCL (ref 0–0.2)
BASOPHILS NFR BLD AUTO: 0.2 %
BILIRUBIN DIRECT+TOT PNL SERPL-MCNC: 0.4 MG/DL
BUN SERPL-MCNC: 10 MG/DL (ref 9.8–20.1)
CALCIUM SERPL-MCNC: 9.8 MG/DL (ref 8.4–10.2)
CHLORIDE SERPL-SCNC: 106 MMOL/L (ref 98–107)
CO2 SERPL-SCNC: 25 MMOL/L (ref 23–31)
CREAT SERPL-MCNC: 0.9 MG/DL (ref 0.55–1.02)
EOSINOPHIL # BLD AUTO: 0.1 X10(3)/MCL (ref 0–0.9)
EOSINOPHIL NFR BLD AUTO: 1.6 %
ERYTHROCYTE [DISTWIDTH] IN BLOOD BY AUTOMATED COUNT: 14.5 % (ref 11.5–17)
GFR SERPLBLD CREATININE-BSD FMLA CKD-EPI: >60 MLS/MIN/1.73/M2
GLOBULIN SER-MCNC: 4.4 GM/DL (ref 2.4–3.5)
GLUCOSE SERPL-MCNC: 127 MG/DL (ref 82–115)
HCT VFR BLD AUTO: 38.5 % (ref 37–47)
HGB BLD-MCNC: 11.9 G/DL (ref 12–16)
IMM GRANULOCYTES # BLD AUTO: 0.04 X10(3)/MCL (ref 0–0.04)
IMM GRANULOCYTES NFR BLD AUTO: 0.6 %
LYMPHOCYTES # BLD AUTO: 0.82 X10(3)/MCL (ref 0.6–4.6)
LYMPHOCYTES NFR BLD AUTO: 12.9 %
MAGNESIUM SERPL-MCNC: 2.3 MG/DL (ref 1.6–2.6)
MCH RBC QN AUTO: 25.6 PG (ref 27–31)
MCHC RBC AUTO-ENTMCNC: 30.9 G/DL (ref 33–36)
MCV RBC AUTO: 83 FL (ref 80–94)
MONOCYTES # BLD AUTO: 0.36 X10(3)/MCL (ref 0.1–1.3)
MONOCYTES NFR BLD AUTO: 5.7 %
NEUTROPHILS # BLD AUTO: 5.04 X10(3)/MCL (ref 2.1–9.2)
NEUTROPHILS NFR BLD AUTO: 79 %
NRBC BLD AUTO-RTO: 0 %
PHOSPHATE SERPL-MCNC: 2.4 MG/DL (ref 2.3–4.7)
PLATELET # BLD AUTO: 404 X10(3)/MCL (ref 130–400)
PMV BLD AUTO: 9.2 FL (ref 7.4–10.4)
POTASSIUM SERPL-SCNC: 3.9 MMOL/L (ref 3.5–5.1)
PROT SERPL-MCNC: 7.6 GM/DL (ref 5.8–7.6)
RBC # BLD AUTO: 4.64 X10(6)/MCL (ref 4.2–5.4)
SODIUM SERPL-SCNC: 141 MMOL/L (ref 136–145)
WBC # SPEC AUTO: 6.4 X10(3)/MCL (ref 4.5–11.5)

## 2023-04-04 PROCEDURE — 3288F FALL RISK ASSESSMENT DOCD: CPT | Mod: CPTII,S$GLB,,

## 2023-04-04 PROCEDURE — 99215 PR OFFICE/OUTPT VISIT, EST, LEVL V, 40-54 MIN: ICD-10-PCS | Mod: S$GLB,,,

## 2023-04-04 PROCEDURE — 3074F SYST BP LT 130 MM HG: CPT | Mod: CPTII,S$GLB,,

## 2023-04-04 PROCEDURE — 1101F PT FALLS ASSESS-DOCD LE1/YR: CPT | Mod: CPTII,S$GLB,,

## 2023-04-04 PROCEDURE — 85025 COMPLETE CBC W/AUTO DIFF WBC: CPT

## 2023-04-04 PROCEDURE — 3008F BODY MASS INDEX DOCD: CPT | Mod: CPTII,S$GLB,,

## 2023-04-04 PROCEDURE — 99999 PR PBB SHADOW E&M-EST. PATIENT-LVL V: ICD-10-PCS | Mod: PBBFAC,,,

## 2023-04-04 PROCEDURE — 3060F POS MICROALBUMINURIA REV: CPT | Mod: CPTII,S$GLB,,

## 2023-04-04 PROCEDURE — 36415 COLL VENOUS BLD VENIPUNCTURE: CPT

## 2023-04-04 PROCEDURE — 3066F NEPHROPATHY DOC TX: CPT | Mod: CPTII,S$GLB,,

## 2023-04-04 PROCEDURE — 3060F PR POS MICROALBUMINURIA RESULT DOCUMENTED/REVIEW: ICD-10-PCS | Mod: CPTII,S$GLB,,

## 2023-04-04 PROCEDURE — 1159F PR MEDICATION LIST DOCUMENTED IN MEDICAL RECORD: ICD-10-PCS | Mod: CPTII,S$GLB,,

## 2023-04-04 PROCEDURE — 3078F DIAST BP <80 MM HG: CPT | Mod: CPTII,S$GLB,,

## 2023-04-04 PROCEDURE — 1160F PR REVIEW ALL MEDS BY PRESCRIBER/CLIN PHARMACIST DOCUMENTED: ICD-10-PCS | Mod: CPTII,S$GLB,,

## 2023-04-04 PROCEDURE — 80053 COMPREHEN METABOLIC PANEL: CPT

## 2023-04-04 PROCEDURE — 3008F PR BODY MASS INDEX (BMI) DOCUMENTED: ICD-10-PCS | Mod: CPTII,S$GLB,,

## 2023-04-04 PROCEDURE — 99999 PR PBB SHADOW E&M-EST. PATIENT-LVL V: CPT | Mod: PBBFAC,,,

## 2023-04-04 PROCEDURE — 99215 OFFICE O/P EST HI 40 MIN: CPT | Mod: S$GLB,,,

## 2023-04-04 PROCEDURE — 3074F PR MOST RECENT SYSTOLIC BLOOD PRESSURE < 130 MM HG: ICD-10-PCS | Mod: CPTII,S$GLB,,

## 2023-04-04 PROCEDURE — 3078F PR MOST RECENT DIASTOLIC BLOOD PRESSURE < 80 MM HG: ICD-10-PCS | Mod: CPTII,S$GLB,,

## 2023-04-04 PROCEDURE — 3066F PR DOCUMENTATION OF TREATMENT FOR NEPHROPATHY: ICD-10-PCS | Mod: CPTII,S$GLB,,

## 2023-04-04 PROCEDURE — 84100 ASSAY OF PHOSPHORUS: CPT

## 2023-04-04 PROCEDURE — 1160F RVW MEDS BY RX/DR IN RCRD: CPT | Mod: CPTII,S$GLB,,

## 2023-04-04 PROCEDURE — 3288F PR FALLS RISK ASSESSMENT DOCUMENTED: ICD-10-PCS | Mod: CPTII,S$GLB,,

## 2023-04-04 PROCEDURE — 83735 ASSAY OF MAGNESIUM: CPT

## 2023-04-04 PROCEDURE — 1101F PR PT FALLS ASSESS DOC 0-1 FALLS W/OUT INJ PAST YR: ICD-10-PCS | Mod: CPTII,S$GLB,,

## 2023-04-04 PROCEDURE — 1159F MED LIST DOCD IN RCRD: CPT | Mod: CPTII,S$GLB,,

## 2023-04-04 RX ORDER — HEPARIN 100 UNIT/ML
500 SYRINGE INTRAVENOUS
Status: CANCELLED | OUTPATIENT
Start: 2023-04-05

## 2023-04-04 RX ORDER — FAMOTIDINE 40 MG/1
40 TABLET, FILM COATED ORAL NIGHTLY
Qty: 30 TABLET | Refills: 1 | Status: SHIPPED | OUTPATIENT
Start: 2023-04-04 | End: 2023-04-04

## 2023-04-04 RX ORDER — SODIUM CHLORIDE 0.9 % (FLUSH) 0.9 %
10 SYRINGE (ML) INJECTION
Status: CANCELLED | OUTPATIENT
Start: 2023-04-05

## 2023-04-04 RX ORDER — BENZONATATE 200 MG/1
200 CAPSULE ORAL 3 TIMES DAILY PRN
Qty: 90 CAPSULE | Refills: 1 | Status: SHIPPED | OUTPATIENT
Start: 2023-04-04 | End: 2023-09-29

## 2023-04-04 NOTE — PROGRESS NOTES
HEMATOLOGY / ONCOLOGY   CLINIC NOTE     ONCOLOGICAL HISTORY:     Diagnosis:  - NSCLC / Adenocarcinoma Stage IIIA pT2a pN2--dx 12/2022  - History Uterine cancer - Dx 1989    Current Treatment:   - Carboplatin + Pemetrexed to start 3/15/2023    Treatment History:  - 1989 s/p total hysterectomy   - 12/08/2022: Right lower and middle bilobectomy with mediastinal lymph node dissection.   - Adjuvant chemotherapy - Carboplatin + Pemetrexed     Plan of care: Adjuvant chemotherapy x 4 cycles --> Immunotherapy q 4 weeks x 1 yr.    IMAGING:   - 08/10/2022 PET: The elongated subpleural right lower lobe nodule demonstrates fairly intense FDG activity.  Infectious, inflammatory and neoplastic etiologies remain possible. No suspicious PET findings elsewhere.  - 12/08/2022 CT HEAD without contrast: No acute intracranial abnormality identified.  Findings of mild microvascular ischemic disease.  - 12/2022: U/S Upper extremity: Thrombophlebitis of the left cephalic vein. No deep venous thrombosis in the left upper extremity.  - 01/03/2023 MRI Brain : GARY   - 01/12/2023 NM PET CT 1. Postsurgical changes of recent right lower and middle lobectomies.  There is a small right pleural effusion.  There is nonspecific peripheral uptake along the pleura at the right lung base and perihilar region which may be related to healing response in the setting of recent surgery.  Similarly borderline mediastinal uptake is nonspecific in the recent postoperative setting and could be reactive.  Continued attention recommended on follow-up. 2. Focal uptake at the medial right acetabular roof without appreciable focal osseous lesion by CT evaluation.  This is new compared to previous exam.  Metastasis is a consideration    PATHOLOGY:  - 12/08/2022: Invasive solid adenocarcinoma (3.1 CM); G3, poorly differentiated. Visceral Pleura Invasion present without definite serosal surface involvement. Lymphovascular invasion (Vein) present. All margins  negative for invasive carcinoma, distance 2.5 cm. LN - 4/14 (10R: Hilar, Posterior mediastinal (RACHEAL), 4R Lower paratracheal, 9R Pulmonary ligament, 10R). pT2a pN2    - 02/22/2023: RIGHT ACETABULAR LESION, CT - GUIDE CORE NEEDLE BIOPSY : FIBRINOPURULENT EXUDATE ADMIXED WITH ATYPICAL EPITHELIOID GROUPS,   NORMAL    MARROW ELEMENTS AND ABUNDANT BLOOD CLOT    Subjective:     HPI  Mansi ELIZABET Jin  63 y.o.  female with past medical history significant for HTN, HLD, uterine cancer status post total hysterectomy in 1989,  CKD Stage 2, COPD Stage 3, referred for management of NSCLC s/p RLL / RML lobectomy with Mediastinal Lymph Node Dissection on 12/8/2022.     She was noted to have RLL lung mass and underwent CT guided biopsy which was non diagnostic. PET scan demonstrated significant avidity within the lesion. Given high risk for malignancy, she underwent right lower lobectomy and right middle lobectomy on 12/08/2022. Pathology came back with invasive adenocarcinoma, poorly differentiated with solid morphology, focal tumor involvement of visceral pleura and focal tumor vascular invasion (vein), with mediastinal lymph nodes positive for metastatic carcinoma.      PET on 01/12/2023 showed focal uptake at the medial right acetabular roof but the biopsy was negative for malignancy. Discussed with radiology, they recommended adjuvant chemotherapy and then they will reassess later for radiation therapy.     Chief Complaint: Lung Cancer (Pt reports dry cough at night)        Interval History:   She returns to the clinic today for a 2 week follow-up and to begin C2 of carbo/pemetrexed.  She continues to report a dry cough at night. She has tried Flonase, Claritin, and her albuterol inhaler with no relief.  She also continues to have right sided occasional, sharp pains that have been present since surgery that are stable and unchanged.  Her right leg pain is no longer bothersome to her, but she continues to take baclofen as needed.   She did have an audiology appointment on 2023 to establish care.  They recommended continuing evaluations following treatments to monitor for further hearing loss.  She denies any shortness of breath, chest pain, fever, chills, vomiting, diarrhea, constipation, or recent hospitalizations.      Past Medical History:   Diagnosis Date    ASTHMA     COPD (chronic obstructive pulmonary disease)     SEVERE    High cholesterol     HLD (hyperlipidemia)     HTN (hypertension)     Lung cancer 2022    invasive adenocarcinoma, poorly differentiated with solid morphology, focal tumor involvement of visceral pleura and focal tumor vascular invasion (vein), with 2 of 3 posterior mediastinal lymph nodes positive for metastatic carcinoma    Stage 2 chronic kidney disease     TIA (transient ischemic attack)     Tobacco abuse     Unspecified osteoarthritis, unspecified site     Uterine cancer       Past Surgical History:   Procedure Laterality Date    HYSTERECTOMY  Unknowing    lipoma multiple sites      LUNG LOBECTOMY Right 2022    Procedure: LOBECTOMY, LUNG;  Surgeon: Jass Browne IV, MD;  Location: [facility] OR;  Service: Cardiothoracic;  Laterality: Right;    SURGICAL REMOVAL OF LYMPH NODE  2022    Procedure: EXCISION, LYMPH NODE;  Surgeon: Jass Browne IV, MD;  Location: [facility] OR;  Service: Cardiothoracic;;    THORACOTOMY Right 2022    Procedure: THORACOTOMY;  Surgeon: Jass Browne IV, MD;  Location: [facility] OR;  Service: Cardiothoracic;  Laterality: Right;  Right Lower Lobectomy with Lymph Node Dissection    TOTAL ABDOMINAL HYSTERECTOMY W/ BILATERAL SALPINGOOPHORECTOMY       Social History     Socioeconomic History    Marital status: Single    Number of children: 5   Tobacco Use    Smoking status: Former     Packs/day: 0.50     Years: 50.00     Pack years: 25.00     Types: Cigarettes     Quit date: 2022     Years since quittin.3    Smokeless tobacco: Never   Substance and Sexual Activity     Alcohol use: Not Currently     Comment: quit 25years ago    Drug use: Never    Sexual activity: Yes     Partners: Male      Family History   Problem Relation Age of Onset    Hypertension Mother     Hepatitis Mother     Ovarian cancer Mother     Liver cancer Mother     Heart failure Father     Prostate cancer Father     Fibroids Sister     Asthma Brother     Asthma Brother     Cancer Maternal Grandfather       Review of patient's allergies indicates:  No Known Allergies     Review of Systems   Constitutional:  Positive for fatigue. Negative for activity change, appetite change, chills, fever and unexpected weight change.   HENT: Negative.  Negative for mouth dryness, mouth sores, nosebleeds, sore throat and trouble swallowing.    Eyes: Negative.  Negative for visual disturbance.   Respiratory:  Positive for cough and shortness of breath (on exertion, chronic).    Cardiovascular:  Negative for chest pain, palpitations and leg swelling.   Gastrointestinal:  Negative for abdominal distention, abdominal pain, blood in stool, change in bowel habit, constipation, diarrhea, nausea, vomiting and change in bowel habit.   Endocrine: Negative.    Genitourinary: Negative.  Negative for dysuria, frequency, hematuria and urgency.   Musculoskeletal:  Positive for leg pain. Negative for arthralgias, back pain, myalgias and neck pain.   Integumentary:  Negative for rash, breast mass, breast discharge and breast tenderness. Negative.   Allergic/Immunologic: Negative.    Neurological:  Negative for dizziness, tremors, syncope, speech difficulty, weakness, light-headedness, numbness, headaches and memory loss.   Hematological: Negative.  Does not bruise/bleed easily.   Psychiatric/Behavioral: Negative.  Negative for confusion and suicidal ideas.    Breast: Negative for mass and tenderness      Objective:        Vitals:    04/04/23 0926   BP: 107/73   Pulse: 102   Resp: 18   Temp: 97.7 °F (36.5 °C)        Physical Exam  Vitals and  nursing note reviewed.   Constitutional:       Appearance: Normal appearance.   HENT:      Head: Normocephalic and atraumatic.   Eyes:      General: No scleral icterus.     Extraocular Movements: Extraocular movements intact.      Conjunctiva/sclera: Conjunctivae normal.   Neck:      Vascular: No JVD.   Cardiovascular:      Rate and Rhythm: Normal rate and regular rhythm.      Heart sounds: No murmur heard.  Pulmonary:      Effort: Pulmonary effort is normal.      Breath sounds: Decreased breath sounds present. No wheezing or rhonchi.   Abdominal:      General: Bowel sounds are normal. There is no distension.      Palpations: Abdomen is soft.      Tenderness: There is no abdominal tenderness.   Musculoskeletal:      Cervical back: Neck supple.   Lymphadenopathy:      Head:      Right side of head: No submental or submandibular adenopathy.      Left side of head: No submental or submandibular adenopathy.      Cervical: No cervical adenopathy.      Upper Body:      Right upper body: No supraclavicular or axillary adenopathy.      Left upper body: No supraclavicular or axillary adenopathy.      Lower Body: No right inguinal adenopathy. No left inguinal adenopathy.   Skin:     General: Skin is warm.      Coloration: Skin is not jaundiced.      Findings: No lesion or rash.   Neurological:      General: No focal deficit present.      Mental Status: She is alert and oriented to person, place, and time.      Cranial Nerves: Cranial nerves 2-12 are intact.      Gait: Gait normal.   Psychiatric:         Attention and Perception: Attention normal.         Speech: Speech normal.         Behavior: Behavior is cooperative.         Cognition and Memory: Cognition normal.         Judgment: Judgment normal.     LABS      Lab Visit on 04/04/2023   Component Date Value    Magnesium Level 04/04/2023 2.30     Phosphorus Level 04/04/2023 2.4     Sodium Level 04/04/2023 141     Potassium Level 04/04/2023 3.9     Chloride 04/04/2023 106      Carbon Dioxide 04/04/2023 25     Glucose Level 04/04/2023 127 (H)     Blood Urea Nitrogen 04/04/2023 10.0     Creatinine 04/04/2023 0.90     Calcium Level Total 04/04/2023 9.8     Protein Total 04/04/2023 7.6     Albumin Level 04/04/2023 3.2 (L)     Globulin 04/04/2023 4.4 (H)     Albumin/Globulin Ratio 04/04/2023 0.7 (L)     Bilirubin Total 04/04/2023 0.4     Alkaline Phosphatase 04/04/2023 109     Alanine Aminotransferase 04/04/2023 33     Aspartate Aminotransfera* 04/04/2023 25     eGFR 04/04/2023 >60     WBC 04/04/2023 6.4     RBC 04/04/2023 4.64     Hgb 04/04/2023 11.9 (L)     Hct 04/04/2023 38.5     MCV 04/04/2023 83.0     MCH 04/04/2023 25.6 (L)     MCHC 04/04/2023 30.9 (L)     RDW 04/04/2023 14.5     Platelet 04/04/2023 404 (H)     MPV 04/04/2023 9.2     Neut % 04/04/2023 79.0     Lymph % 04/04/2023 12.9     Mono % 04/04/2023 5.7     Eos % 04/04/2023 1.6     Basophil % 04/04/2023 0.2     Lymph # 04/04/2023 0.82     Neut # 04/04/2023 5.04     Mono # 04/04/2023 0.36     Eos # 04/04/2023 0.10     Baso # 04/04/2023 0.01     IG# 04/04/2023 0.04     IG% 04/04/2023 0.6     NRBC% 04/04/2023 0.0          Assessment:   ECOG Performance status: 2 - 3.     NSCLC / Adenocarcinoma Stage IIIA pT2a pN2  - 12/08/2022: Right lower and middle bilobectomy with mediastinal lymph node dissection. Path: Invasive adenocarcinoma, poorly differentiated, focal tumor involvement of visceral pleura and focal tumor vascular invasion (vein), with mediastinal lymph nodes positive for metastatic carcinoma   - TMB - 5, MSI-S, MET amplification, ROS1.  No mutations in EGFR, BRAF, ALK, ERBB2, KRAS, RET,    - 1% tumor cells are positive for PD-L1   - patient evaluated by Radiation Oncology with recommendation to treat with chemotherapy for now if no metastatic disease and will re-evaluate the patient by the end of chemotherapy for radiation therapy  - 01/12/2023 PET: Focal uptake at the medial right acetabular roof. Metastasis is a  consideration  - 02/22/2023: RIGHT ACETABULAR LESION, CT - GUIDE CORE NEEDLE BIOPSY : FIBRINOPURULENT EXUDATE ADMIXED WITH ATYPICAL EPITHELIOID GROUPS,   NORMAL    MARROW ELEMENTS AND ABUNDANT BLOOD CLOT  PET scan showed a suspicious finding in the right acetabular roof and patient then had a CT-guided biopsy no evidence of malignancy. Start with adjuvant chemotherapy with Carboplatin and pemetrexed for 4 cycles and then followed by atezolizumab/pembrolizumab. Also follow up again with radiation oncology after chemotherapy to evaluate for radiation therapy      Chemotherapy   Cycle 1 of carboplatin/pemetrexed completed 03/15/2023.  Overall, she tolerated chemotherapy well.    Labs are stable and adequate today to continue with cycle 2 as scheduled tomorrow.    Right chest wall pain  She reports this has been present since after surgery.  Pain is intermittent and sharp.  Most likely related to nerves.  She denies any increased shortness of breath.  Stable today.    Dry cough  Presented after chemotherapy.  No relief noted with Flonase, Claritin, or albuterol use.  We will send in Pepcid 40 mg nightly as well as Tessalon Perles 3 times daily as needed  Recommended follow up with pulmonology if dry cough is persistent.    Leg pain  Currently taking baclofen 5 mg 3 times daily as needed.  Pain today is controlled with current medication regimen.    Plan:   - Labs reviewed in detail with patient and daughter. Stable today.   - ontinue with cycle 2 of carbo Alimta as scheduled tomorrow.  - Pepcid and Tessalon Perles sent to pharmacy.  - RTC in 3 weeks with NP or FU/lab/treat f  - cbc, cmp, mag, phos, CEA, LDH- 1 hr prior @ [facility]    The patient was seen, interviewed and examined. Pertinent lab and radiology studies were reviewed.   The patient was given ample opportunity to ask questions, and to the best of my abilities, all questions answered to satisfaction; patient demonstrated understanding of what we discussed and  agreeable to the plan. Pt instructed to call should develop concerning signs/symptoms or have further questions.     DOUGIE MartinezP-C  Oncology/Hematology   [facility]

## 2023-04-05 ENCOUNTER — INFUSION (OUTPATIENT)
Dept: INFUSION THERAPY | Facility: HOSPITAL | Age: 63
End: 2023-04-05
Payer: MEDICARE

## 2023-04-05 VITALS
BODY MASS INDEX: 23.91 KG/M2 | WEIGHT: 152.31 LBS | TEMPERATURE: 98 F | DIASTOLIC BLOOD PRESSURE: 83 MMHG | SYSTOLIC BLOOD PRESSURE: 129 MMHG | OXYGEN SATURATION: 95 % | HEART RATE: 109 BPM | HEIGHT: 67 IN

## 2023-04-05 DIAGNOSIS — C34.91 NSCLC OF RIGHT LUNG: Primary | ICD-10-CM

## 2023-04-05 PROCEDURE — 63600175 PHARM REV CODE 636 W HCPCS: Mod: JZ,JG

## 2023-04-05 PROCEDURE — 96413 CHEMO IV INFUSION 1 HR: CPT

## 2023-04-05 PROCEDURE — 96411 CHEMO IV PUSH ADDL DRUG: CPT

## 2023-04-05 PROCEDURE — 25000003 PHARM REV CODE 250

## 2023-04-05 PROCEDURE — 96375 TX/PRO/DX INJ NEW DRUG ADDON: CPT

## 2023-04-05 RX ADMIN — CARBOPLATIN 470 MG: 10 INJECTION, SOLUTION INTRAVENOUS at 10:04

## 2023-04-05 RX ADMIN — DEXAMETHASONE SODIUM PHOSPHATE 0.25 MG: 10 INJECTION, SOLUTION INTRAMUSCULAR; INTRAVENOUS at 09:04

## 2023-04-05 RX ADMIN — APREPITANT 130 MG: 130 INJECTION, EMULSION INTRAVENOUS at 09:04

## 2023-04-05 RX ADMIN — SODIUM CHLORIDE: 9 INJECTION, SOLUTION INTRAVENOUS at 09:04

## 2023-04-05 RX ADMIN — SODIUM CHLORIDE 900 MG: 9 INJECTION, SOLUTION INTRAVENOUS at 09:04

## 2023-04-05 NOTE — PLAN OF CARE
Patient will have no S&S of nausea and instructed understanding of how to take antiemetics. Patient will receive infusion in a timely manor Patient completed infusion without difficulty.

## 2023-04-11 ENCOUNTER — CLINICAL SUPPORT (OUTPATIENT)
Dept: AUDIOLOGY | Facility: HOSPITAL | Age: 63
End: 2023-04-11
Payer: MEDICARE

## 2023-04-11 DIAGNOSIS — H90.3 SENSORINEURAL HEARING LOSS, BILATERAL: Primary | ICD-10-CM

## 2023-04-11 PROCEDURE — 92557 COMPREHENSIVE HEARING TEST: CPT | Performed by: AUDIOLOGIST

## 2023-04-11 PROCEDURE — 92567 TYMPANOMETRY: CPT | Performed by: AUDIOLOGIST

## 2023-04-11 NOTE — PROGRESS NOTES
Hearing Evaluation      Patient History: Ms. Jin is in for a repeat hearing evaluation due to suspected chemo induced shift in hearing.  Previous audio indicates a slight shift in puretone thresholds in the right ear.  She is currently denying any changes in hearing or tinnitus.  Currently undergoing chemotherapy for treatment of lung cancer, last infusion on 4/5/23.  All additional history is unremarkable.      Test Results:       Pure Tone Testing:     Right ear:   Mild sensorineural hearing loss from 4k-6kHz. Speech reception threshold is in agreement with puretone findings.  Discrimination score of 96% is considered excellent.      Left ear: Mild to moderate sensorineural hearing loss from 3k-8kHz. Speech reception threshold is in agreement with puretone findings.  Discrimination score of 96% is considered excellent.         Tympanometry:        Right ear:   Type 'A' tymp, normal middle ear pressure/function    Left ear: Type 'A' tymp, normal middle ear pressure/function       Distortion Product Otoacoustic Emission Testing (DPOAE):         Right ear: Present emissions from 2k-4kHz      Left ear: Absent emissions from 1k-6kHz (present at 4kHz only)         Interpretations:     Today's audiological results are indicative of no changes in hearing since baseline audiogram. The most recent shift in the right ear could likely be due to interfering tinnitus. Speech reception thresholds obtained at 15dB, AU, and are in agreement with puretone findings. Speech discrimination scores of 96%, AU, are considered excellent.  DPOAE findings are consistent with puretone findings, bilaterally. Immittance measures indicate normal middle ear pressure/function, bilaterally.         Recommendations:     Ms. Jin is scheduled for a one month hearing evaluation following last chemo infusion.  She was encouraged to follow up sooner if she notices any changes in auditory status and/or changes in tinnitus.

## 2023-04-24 DIAGNOSIS — G89.29 CHRONIC RIGHT-SIDED LOW BACK PAIN WITHOUT SCIATICA: Primary | ICD-10-CM

## 2023-04-24 DIAGNOSIS — M54.50 CHRONIC RIGHT-SIDED LOW BACK PAIN WITHOUT SCIATICA: Primary | ICD-10-CM

## 2023-04-24 RX ORDER — BACLOFEN 5 MG/1
5 TABLET ORAL 3 TIMES DAILY
Qty: 45 TABLET | Refills: 0 | Status: SHIPPED | OUTPATIENT
Start: 2023-04-24 | End: 2023-05-23

## 2023-04-26 ENCOUNTER — OFFICE VISIT (OUTPATIENT)
Dept: HEMATOLOGY/ONCOLOGY | Facility: CLINIC | Age: 63
End: 2023-04-26
Payer: MEDICARE

## 2023-04-26 ENCOUNTER — LAB VISIT (OUTPATIENT)
Dept: LAB | Facility: HOSPITAL | Age: 63
End: 2023-04-26
Payer: MEDICARE

## 2023-04-26 VITALS
DIASTOLIC BLOOD PRESSURE: 73 MMHG | TEMPERATURE: 98 F | WEIGHT: 154.31 LBS | HEIGHT: 67 IN | RESPIRATION RATE: 18 BRPM | BODY MASS INDEX: 24.22 KG/M2 | HEART RATE: 79 BPM | SYSTOLIC BLOOD PRESSURE: 109 MMHG | OXYGEN SATURATION: 96 %

## 2023-04-26 DIAGNOSIS — Z79.899 ON ANTINEOPLASTIC CHEMOTHERAPY: ICD-10-CM

## 2023-04-26 DIAGNOSIS — R91.8 LUNG MASS: ICD-10-CM

## 2023-04-26 DIAGNOSIS — G89.29 CHRONIC RIGHT-SIDED LOW BACK PAIN WITHOUT SCIATICA: ICD-10-CM

## 2023-04-26 DIAGNOSIS — R05.9 COUGH, UNSPECIFIED TYPE: ICD-10-CM

## 2023-04-26 DIAGNOSIS — C34.91 NSCLC OF RIGHT LUNG: ICD-10-CM

## 2023-04-26 DIAGNOSIS — Z85.42 HISTORY OF UTERINE CANCER: ICD-10-CM

## 2023-04-26 DIAGNOSIS — Z29.89 IMMUNOTHERAPY: ICD-10-CM

## 2023-04-26 DIAGNOSIS — M54.50 CHRONIC RIGHT-SIDED LOW BACK PAIN WITHOUT SCIATICA: ICD-10-CM

## 2023-04-26 DIAGNOSIS — C34.91 NSCLC OF RIGHT LUNG: Primary | ICD-10-CM

## 2023-04-26 DIAGNOSIS — J44.9 STAGE 3 SEVERE COPD BY GOLD CLASSIFICATION: ICD-10-CM

## 2023-04-26 DIAGNOSIS — F17.200 TOBACCO DEPENDENCE WITH CURRENT USE: ICD-10-CM

## 2023-04-26 LAB
ALBUMIN SERPL-MCNC: 3.1 G/DL (ref 3.4–4.8)
ALBUMIN/GLOB SERPL: 0.8 RATIO (ref 1.1–2)
ALP SERPL-CCNC: 127 UNIT/L (ref 40–150)
ALT SERPL-CCNC: 14 UNIT/L (ref 0–55)
AST SERPL-CCNC: 14 UNIT/L (ref 5–34)
BASOPHILS # BLD AUTO: 0.02 X10(3)/MCL (ref 0–0.2)
BASOPHILS NFR BLD AUTO: 0.4 %
BILIRUBIN DIRECT+TOT PNL SERPL-MCNC: 0.2 MG/DL
BUN SERPL-MCNC: 13 MG/DL (ref 9.8–20.1)
CALCIUM SERPL-MCNC: 9.3 MG/DL (ref 8.4–10.2)
CHLORIDE SERPL-SCNC: 108 MMOL/L (ref 98–107)
CO2 SERPL-SCNC: 26 MMOL/L (ref 23–31)
CREAT SERPL-MCNC: 1.01 MG/DL (ref 0.55–1.02)
EOSINOPHIL # BLD AUTO: 0.15 X10(3)/MCL (ref 0–0.9)
EOSINOPHIL NFR BLD AUTO: 3 %
ERYTHROCYTE [DISTWIDTH] IN BLOOD BY AUTOMATED COUNT: 15.4 % (ref 11.5–17)
GFR SERPLBLD CREATININE-BSD FMLA CKD-EPI: >60 MLS/MIN/1.73/M2
GLOBULIN SER-MCNC: 3.9 GM/DL (ref 2.4–3.5)
GLUCOSE SERPL-MCNC: 99 MG/DL (ref 82–115)
HCT VFR BLD AUTO: 38.1 % (ref 37–47)
HGB BLD-MCNC: 11.6 G/DL (ref 12–16)
IMM GRANULOCYTES # BLD AUTO: 0.01 X10(3)/MCL (ref 0–0.04)
IMM GRANULOCYTES NFR BLD AUTO: 0.2 %
LYMPHOCYTES # BLD AUTO: 2.03 X10(3)/MCL (ref 0.6–4.6)
LYMPHOCYTES NFR BLD AUTO: 40.2 %
MAGNESIUM SERPL-MCNC: 2 MG/DL (ref 1.6–2.6)
MCH RBC QN AUTO: 26.3 PG (ref 27–31)
MCHC RBC AUTO-ENTMCNC: 30.4 G/DL (ref 33–36)
MCV RBC AUTO: 86.4 FL (ref 80–94)
MONOCYTES # BLD AUTO: 0.59 X10(3)/MCL (ref 0.1–1.3)
MONOCYTES NFR BLD AUTO: 11.7 %
NEUTROPHILS # BLD AUTO: 2.25 X10(3)/MCL (ref 2.1–9.2)
NEUTROPHILS NFR BLD AUTO: 44.5 %
PHOSPHATE SERPL-MCNC: 3.4 MG/DL (ref 2.3–4.7)
PLATELET # BLD AUTO: 452 X10(3)/MCL (ref 130–400)
PMV BLD AUTO: 9.3 FL (ref 7.4–10.4)
POTASSIUM SERPL-SCNC: 4.1 MMOL/L (ref 3.5–5.1)
PROT SERPL-MCNC: 7 GM/DL (ref 5.8–7.6)
RBC # BLD AUTO: 4.41 X10(6)/MCL (ref 4.2–5.4)
SODIUM SERPL-SCNC: 141 MMOL/L (ref 136–145)
WBC # SPEC AUTO: 5.1 X10(3)/MCL (ref 4.5–11.5)

## 2023-04-26 PROCEDURE — 80053 COMPREHEN METABOLIC PANEL: CPT

## 2023-04-26 PROCEDURE — 3288F PR FALLS RISK ASSESSMENT DOCUMENTED: ICD-10-PCS | Mod: CPTII,S$GLB,,

## 2023-04-26 PROCEDURE — 99999 PR PBB SHADOW E&M-EST. PATIENT-LVL IV: ICD-10-PCS | Mod: PBBFAC,,,

## 2023-04-26 PROCEDURE — 3008F PR BODY MASS INDEX (BMI) DOCUMENTED: ICD-10-PCS | Mod: CPTII,S$GLB,,

## 2023-04-26 PROCEDURE — 3066F NEPHROPATHY DOC TX: CPT | Mod: CPTII,S$GLB,,

## 2023-04-26 PROCEDURE — 3078F PR MOST RECENT DIASTOLIC BLOOD PRESSURE < 80 MM HG: ICD-10-PCS | Mod: CPTII,S$GLB,,

## 2023-04-26 PROCEDURE — 1159F MED LIST DOCD IN RCRD: CPT | Mod: CPTII,S$GLB,,

## 2023-04-26 PROCEDURE — 3074F PR MOST RECENT SYSTOLIC BLOOD PRESSURE < 130 MM HG: ICD-10-PCS | Mod: CPTII,S$GLB,,

## 2023-04-26 PROCEDURE — 1160F PR REVIEW ALL MEDS BY PRESCRIBER/CLIN PHARMACIST DOCUMENTED: ICD-10-PCS | Mod: CPTII,S$GLB,,

## 2023-04-26 PROCEDURE — 3060F POS MICROALBUMINURIA REV: CPT | Mod: CPTII,S$GLB,,

## 2023-04-26 PROCEDURE — 99999 PR PBB SHADOW E&M-EST. PATIENT-LVL IV: CPT | Mod: PBBFAC,,,

## 2023-04-26 PROCEDURE — 85025 COMPLETE CBC W/AUTO DIFF WBC: CPT

## 2023-04-26 PROCEDURE — 3008F BODY MASS INDEX DOCD: CPT | Mod: CPTII,S$GLB,,

## 2023-04-26 PROCEDURE — 99215 PR OFFICE/OUTPT VISIT, EST, LEVL V, 40-54 MIN: ICD-10-PCS | Mod: S$GLB,,,

## 2023-04-26 PROCEDURE — 3074F SYST BP LT 130 MM HG: CPT | Mod: CPTII,S$GLB,,

## 2023-04-26 PROCEDURE — 3078F DIAST BP <80 MM HG: CPT | Mod: CPTII,S$GLB,,

## 2023-04-26 PROCEDURE — 3060F PR POS MICROALBUMINURIA RESULT DOCUMENTED/REVIEW: ICD-10-PCS | Mod: CPTII,S$GLB,,

## 2023-04-26 PROCEDURE — 36415 COLL VENOUS BLD VENIPUNCTURE: CPT

## 2023-04-26 PROCEDURE — 1160F RVW MEDS BY RX/DR IN RCRD: CPT | Mod: CPTII,S$GLB,,

## 2023-04-26 PROCEDURE — 99215 OFFICE O/P EST HI 40 MIN: CPT | Mod: S$GLB,,,

## 2023-04-26 PROCEDURE — 84100 ASSAY OF PHOSPHORUS: CPT

## 2023-04-26 PROCEDURE — 83735 ASSAY OF MAGNESIUM: CPT

## 2023-04-26 PROCEDURE — 3288F FALL RISK ASSESSMENT DOCD: CPT | Mod: CPTII,S$GLB,,

## 2023-04-26 PROCEDURE — 3066F PR DOCUMENTATION OF TREATMENT FOR NEPHROPATHY: ICD-10-PCS | Mod: CPTII,S$GLB,,

## 2023-04-26 PROCEDURE — 1159F PR MEDICATION LIST DOCUMENTED IN MEDICAL RECORD: ICD-10-PCS | Mod: CPTII,S$GLB,,

## 2023-04-26 PROCEDURE — 1101F PT FALLS ASSESS-DOCD LE1/YR: CPT | Mod: CPTII,S$GLB,,

## 2023-04-26 PROCEDURE — 1101F PR PT FALLS ASSESS DOC 0-1 FALLS W/OUT INJ PAST YR: ICD-10-PCS | Mod: CPTII,S$GLB,,

## 2023-04-26 RX ORDER — SODIUM CHLORIDE 0.9 % (FLUSH) 0.9 %
10 SYRINGE (ML) INJECTION
Status: CANCELLED | OUTPATIENT
Start: 2023-04-27

## 2023-04-26 RX ORDER — HEPARIN 100 UNIT/ML
500 SYRINGE INTRAVENOUS
Status: CANCELLED | OUTPATIENT
Start: 2023-04-27

## 2023-04-26 NOTE — PROGRESS NOTES
HEMATOLOGY / ONCOLOGY   CLINIC NOTE     ONCOLOGICAL HISTORY:     Diagnosis:  - NSCLC / Adenocarcinoma Stage IIIA pT2a pN2--dx 12/2022  - History Uterine cancer - Dx 1989    Current Treatment:   - Carboplatin + Pemetrexed to start 3/15/2023    Treatment History:  - 1989 s/p total hysterectomy   - 12/08/2022: Right lower and middle bilobectomy with mediastinal lymph node dissection.   - Adjuvant chemotherapy - Carboplatin + Pemetrexed     Plan of care: Adjuvant chemotherapy x 4 cycles --> Immunotherapy q 4 weeks x 1 yr.    IMAGING:   - 08/10/2022 PET: The elongated subpleural right lower lobe nodule demonstrates fairly intense FDG activity.  Infectious, inflammatory and neoplastic etiologies remain possible. No suspicious PET findings elsewhere.  - 12/08/2022 CT HEAD without contrast: No acute intracranial abnormality identified.  Findings of mild microvascular ischemic disease.  - 12/2022: U/S Upper extremity: Thrombophlebitis of the left cephalic vein. No deep venous thrombosis in the left upper extremity.  - 01/03/2023 MRI Brain : GARY   - 01/12/2023 NM PET CT 1. Postsurgical changes of recent right lower and middle lobectomies.  There is a small right pleural effusion.  There is nonspecific peripheral uptake along the pleura at the right lung base and perihilar region which may be related to healing response in the setting of recent surgery.  Similarly borderline mediastinal uptake is nonspecific in the recent postoperative setting and could be reactive.  Continued attention recommended on follow-up. 2. Focal uptake at the medial right acetabular roof without appreciable focal osseous lesion by CT evaluation.  This is new compared to previous exam.  Metastasis is a consideration    PATHOLOGY:  - 12/08/2022: Invasive solid adenocarcinoma (3.1 CM); G3, poorly differentiated. Visceral Pleura Invasion present without definite serosal surface involvement. Lymphovascular invasion (Vein) present. All margins  "negative for invasive carcinoma, distance 2.5 cm. LN - 4/14 (10R: Hilar, Posterior mediastinal (RACHEAL), 4R Lower paratracheal, 9R Pulmonary ligament, 10R). pT2a pN2    - 02/22/2023: RIGHT ACETABULAR LESION, CT - GUIDE CORE NEEDLE BIOPSY : FIBRINOPURULENT EXUDATE ADMIXED WITH ATYPICAL EPITHELIOID GROUPS,   NORMAL    MARROW ELEMENTS AND ABUNDANT BLOOD CLOT    Subjective:     HPI  Mansi Jin  63 y.o.  female with past medical history significant for HTN, HLD, uterine cancer status post total hysterectomy in 1989,  CKD Stage 2, COPD Stage 3, referred for management of NSCLC s/p RLL / RML lobectomy with Mediastinal Lymph Node Dissection on 12/8/2022.     She was noted to have RLL lung mass and underwent CT guided biopsy which was non diagnostic. PET scan demonstrated significant avidity within the lesion. Given high risk for malignancy, she underwent right lower lobectomy and right middle lobectomy on 12/08/2022. Pathology came back with invasive adenocarcinoma, poorly differentiated with solid morphology, focal tumor involvement of visceral pleura and focal tumor vascular invasion (vein), with mediastinal lymph nodes positive for metastatic carcinoma.      PET on 01/12/2023 showed focal uptake at the medial right acetabular roof but the biopsy was negative for malignancy. Discussed with radiology, they recommended adjuvant chemotherapy and then they will reassess later for radiation therapy.     Chief Complaint: Lung Cancer (Pt reports ongoing right leg muscle spasms, pt also reports "popping" in ears since yesterday)        Interval History:   She returns to the clinic today for a 2 week follow-up and to begin C3 of carbo/pemetrexed.  She is doing well today overall. Her dry cough at night has improved with addition of pepcid nightly as well as Tessalon Perles as needed. She also continues to have right sided occasional, sharp pains that have been present since surgery that are improving.  She continues to have right " leg spasms controlled with baclofen as needed.  She denies any shortness of breath, chest pain, fever, chills, vomiting, diarrhea, constipation, neuropathy, or recent hospitalizations.      Past Medical History:   Diagnosis Date    ASTHMA     COPD (chronic obstructive pulmonary disease)     SEVERE    High cholesterol     HLD (hyperlipidemia)     HTN (hypertension)     Lung cancer 2022    invasive adenocarcinoma, poorly differentiated with solid morphology, focal tumor involvement of visceral pleura and focal tumor vascular invasion (vein), with 2 of 3 posterior mediastinal lymph nodes positive for metastatic carcinoma    Stage 2 chronic kidney disease     TIA (transient ischemic attack)     Tobacco abuse     Unspecified osteoarthritis, unspecified site     Uterine cancer       Past Surgical History:   Procedure Laterality Date    HYSTERECTOMY  Unknowing    lipoma multiple sites      LUNG LOBECTOMY Right 2022    Procedure: LOBECTOMY, LUNG;  Surgeon: Jass Browne IV, MD;  Location: [facility] OR;  Service: Cardiothoracic;  Laterality: Right;    SURGICAL REMOVAL OF LYMPH NODE  2022    Procedure: EXCISION, LYMPH NODE;  Surgeon: Jass Browne IV, MD;  Location: [facility] OR;  Service: Cardiothoracic;;    THORACOTOMY Right 2022    Procedure: THORACOTOMY;  Surgeon: Jass Browne IV, MD;  Location: [facility] OR;  Service: Cardiothoracic;  Laterality: Right;  Right Lower Lobectomy with Lymph Node Dissection    TOTAL ABDOMINAL HYSTERECTOMY W/ BILATERAL SALPINGOOPHORECTOMY       Social History     Socioeconomic History    Marital status: Single    Number of children: 5   Tobacco Use    Smoking status: Former     Packs/day: 0.50     Years: 50.00     Pack years: 25.00     Types: Cigarettes     Quit date: 2022     Years since quittin.3    Smokeless tobacco: Never   Substance and Sexual Activity    Alcohol use: Not Currently     Comment: quit 25years ago    Drug use: Never    Sexual activity: Yes      Partners: Male      Family History   Problem Relation Age of Onset    Hypertension Mother     Hepatitis Mother     Ovarian cancer Mother     Liver cancer Mother     Heart failure Father     Prostate cancer Father     Fibroids Sister     Asthma Brother     Asthma Brother     Cancer Maternal Grandfather       Review of patient's allergies indicates:  No Known Allergies     Review of Systems   Constitutional:  Positive for fatigue. Negative for activity change, appetite change, chills, fever and unexpected weight change.   HENT: Negative.  Negative for mouth dryness, mouth sores, nosebleeds, sore throat and trouble swallowing.    Eyes: Negative.  Negative for visual disturbance.   Respiratory:  Positive for cough and shortness of breath (on exertion, chronic).    Cardiovascular:  Negative for chest pain, palpitations and leg swelling.   Gastrointestinal:  Negative for abdominal distention, abdominal pain, blood in stool, change in bowel habit, constipation, diarrhea, nausea, vomiting and change in bowel habit.   Endocrine: Negative.    Genitourinary: Negative.  Negative for dysuria, frequency, hematuria and urgency.   Musculoskeletal:  Positive for leg pain. Negative for arthralgias, back pain, myalgias and neck pain.   Integumentary:  Negative for rash, breast mass, breast discharge and breast tenderness. Negative.   Allergic/Immunologic: Negative.    Neurological:  Negative for dizziness, tremors, syncope, speech difficulty, weakness, light-headedness, numbness, headaches and memory loss.   Hematological: Negative.  Does not bruise/bleed easily.   Psychiatric/Behavioral: Negative.  Negative for confusion and suicidal ideas.    Breast: Negative for mass and tenderness      Objective:        Vitals:    04/26/23 0822   BP: 109/73   Pulse: 79   Resp: 18   Temp: 97.8 °F (36.6 °C)          Physical Exam  Vitals and nursing note reviewed.   Constitutional:       Appearance: Normal appearance.   HENT:      Head:  Normocephalic and atraumatic.   Eyes:      General: No scleral icterus.     Extraocular Movements: Extraocular movements intact.      Conjunctiva/sclera: Conjunctivae normal.   Neck:      Vascular: No JVD.   Cardiovascular:      Rate and Rhythm: Normal rate and regular rhythm.      Heart sounds: No murmur heard.  Pulmonary:      Effort: Pulmonary effort is normal.      Breath sounds: Decreased breath sounds present. No wheezing or rhonchi.   Abdominal:      General: Bowel sounds are normal. There is no distension.      Palpations: Abdomen is soft.      Tenderness: There is no abdominal tenderness.   Musculoskeletal:      Cervical back: Neck supple.   Lymphadenopathy:      Head:      Right side of head: No submental or submandibular adenopathy.      Left side of head: No submental or submandibular adenopathy.      Cervical: No cervical adenopathy.      Upper Body:      Right upper body: No supraclavicular or axillary adenopathy.      Left upper body: No supraclavicular or axillary adenopathy.      Lower Body: No right inguinal adenopathy. No left inguinal adenopathy.      Comments: No adenopathy noted bilaterally   Skin:     General: Skin is warm.      Coloration: Skin is not jaundiced.      Findings: No lesion or rash.   Neurological:      General: No focal deficit present.      Mental Status: She is alert and oriented to person, place, and time.      Cranial Nerves: Cranial nerves 2-12 are intact.      Gait: Gait normal.   Psychiatric:         Attention and Perception: Attention normal.         Speech: Speech normal.         Behavior: Behavior is cooperative.         Cognition and Memory: Cognition normal.         Judgment: Judgment normal.     LABS      Lab Visit on 04/26/2023   Component Date Value    Sodium Level 04/26/2023 141     Potassium Level 04/26/2023 4.1     Chloride 04/26/2023 108 (H)     Carbon Dioxide 04/26/2023 26     Glucose Level 04/26/2023 99     Blood Urea Nitrogen 04/26/2023 13.0     Creatinine  04/26/2023 1.01     Calcium Level Total 04/26/2023 9.3     Protein Total 04/26/2023 7.0     Albumin Level 04/26/2023 3.1 (L)     Globulin 04/26/2023 3.9 (H)     Albumin/Globulin Ratio 04/26/2023 0.8 (L)     Bilirubin Total 04/26/2023 0.2     Alkaline Phosphatase 04/26/2023 127     Alanine Aminotransferase 04/26/2023 14     Aspartate Aminotransfera* 04/26/2023 14     eGFR 04/26/2023 >60     Magnesium Level 04/26/2023 2.00     Phosphorus Level 04/26/2023 3.4     WBC 04/26/2023 5.1     RBC 04/26/2023 4.41     Hgb 04/26/2023 11.6 (L)     Hct 04/26/2023 38.1     MCV 04/26/2023 86.4     MCH 04/26/2023 26.3 (L)     MCHC 04/26/2023 30.4 (L)     RDW 04/26/2023 15.4     Platelet 04/26/2023 452 (H)     MPV 04/26/2023 9.3     Neut % 04/26/2023 44.5     Lymph % 04/26/2023 40.2     Mono % 04/26/2023 11.7     Eos % 04/26/2023 3.0     Basophil % 04/26/2023 0.4     Lymph # 04/26/2023 2.03     Neut # 04/26/2023 2.25     Mono # 04/26/2023 0.59     Eos # 04/26/2023 0.15     Baso # 04/26/2023 0.02     IG# 04/26/2023 0.01     IG% 04/26/2023 0.2          Assessment:   ECOG Performance status: 2 - 3.     NSCLC / Adenocarcinoma Stage IIIA pT2a pN2  - 12/08/2022: Right lower and middle bilobectomy with mediastinal lymph node dissection. Path: Invasive adenocarcinoma, poorly differentiated, focal tumor involvement of visceral pleura and focal tumor vascular invasion (vein), with mediastinal lymph nodes positive for metastatic carcinoma   - TMB - 5, MSI-S, MET amplification, ROS1.  No mutations in EGFR, BRAF, ALK, ERBB2, KRAS, RET,    - 1% tumor cells are positive for PD-L1   - patient evaluated by Radiation Oncology with recommendation to treat with chemotherapy for now if no metastatic disease and will re-evaluate the patient by the end of chemotherapy for radiation therapy  - 01/12/2023 PET: Focal uptake at the medial right acetabular roof. Metastasis is a consideration  - 02/22/2023: RIGHT ACETABULAR LESION, CT - GUIDE CORE NEEDLE BIOPSY :  FIBRINOPURULENT EXUDATE ADMIXED WITH ATYPICAL EPITHELIOID GROUPS,   NORMAL    MARROW ELEMENTS AND ABUNDANT BLOOD CLOT  PET scan showed a suspicious finding in the right acetabular roof and patient then had a CT-guided biopsy no evidence of malignancy. Start with adjuvant chemotherapy with Carboplatin and pemetrexed for 4 cycles and then followed by atezolizumab/pembrolizumab. Also follow up again with radiation oncology after chemotherapy to evaluate for radiation therapy      Chemotherapy   Cycle 1 of carboplatin/pemetrexed completed 03/15/2023.  Overall, she tolerated chemotherapy well.    Labs are stable and adequate today to continue with cycle 3 as scheduled tomorrow.    Right chest wall pain  She reports this has been present since after surgery.  Pain is intermittent and sharp.  Most likely related to nerves.  She denies any increased shortness of breath.  Stable today.    Dry cough  Resolved with use of pepcid at night and tessalon Perles as needed.   Recommended follow up with pulmonology if dry cough is persistent.    Leg pain  Currently taking baclofen 5 mg 3 times daily as needed.  Pain today is controlled with current medication regimen.    Plan:   - Labs reviewed in detail with patient and daughter. Stable today.   - continue with cycle 3 of carbo Alimta as scheduled tomorrow.  - Continue Pepcid and Tessalon Perles for dry cough  - RTC in 3 weeks with NP or FU/lab/treat   - cbc, cmp, mag, phos, CEA, LDH- 1 hr prior @ [facility]    The patient was seen, interviewed and examined. Pertinent lab and radiology studies were reviewed.   The patient was given ample opportunity to ask questions, and to the best of my abilities, all questions answered to satisfaction; patient demonstrated understanding of what we discussed and agreeable to the plan. Pt instructed to call should develop concerning signs/symptoms or have further questions.     Shelbi Johansen, PAULINE-C  Oncology/Hematology   [facility]

## 2023-04-27 ENCOUNTER — INFUSION (OUTPATIENT)
Dept: INFUSION THERAPY | Facility: HOSPITAL | Age: 63
End: 2023-04-27
Attending: INTERNAL MEDICINE
Payer: MEDICARE

## 2023-04-27 VITALS
WEIGHT: 154 LBS | SYSTOLIC BLOOD PRESSURE: 133 MMHG | HEIGHT: 67 IN | RESPIRATION RATE: 18 BRPM | HEART RATE: 98 BPM | DIASTOLIC BLOOD PRESSURE: 84 MMHG | BODY MASS INDEX: 24.17 KG/M2 | TEMPERATURE: 98 F | OXYGEN SATURATION: 91 %

## 2023-04-27 DIAGNOSIS — C34.91 NSCLC OF RIGHT LUNG: Primary | ICD-10-CM

## 2023-04-27 PROCEDURE — 25000003 PHARM REV CODE 250

## 2023-04-27 PROCEDURE — 63600175 PHARM REV CODE 636 W HCPCS: Performed by: INTERNAL MEDICINE

## 2023-04-27 PROCEDURE — 96411 CHEMO IV PUSH ADDL DRUG: CPT

## 2023-04-27 PROCEDURE — 96367 TX/PROPH/DG ADDL SEQ IV INF: CPT

## 2023-04-27 PROCEDURE — 63600175 PHARM REV CODE 636 W HCPCS: Mod: JZ,JG

## 2023-04-27 PROCEDURE — 96413 CHEMO IV INFUSION 1 HR: CPT

## 2023-04-27 PROCEDURE — 96375 TX/PRO/DX INJ NEW DRUG ADDON: CPT

## 2023-04-27 RX ORDER — HEPARIN 100 UNIT/ML
500 SYRINGE INTRAVENOUS
Status: ACTIVE | OUTPATIENT
Start: 2023-04-27

## 2023-04-27 RX ORDER — SODIUM CHLORIDE 0.9 % (FLUSH) 0.9 %
10 SYRINGE (ML) INJECTION
Status: ACTIVE | OUTPATIENT
Start: 2023-04-27

## 2023-04-27 RX ADMIN — PEMETREXED DISODIUM 900 MG: 100 INJECTION, POWDER, LYOPHILIZED, FOR SOLUTION INTRAVENOUS at 09:04

## 2023-04-27 RX ADMIN — CARBOPLATIN 430 MG: 10 INJECTION, SOLUTION INTRAVENOUS at 10:04

## 2023-04-27 RX ADMIN — DEXAMETHASONE SODIUM PHOSPHATE 0.25 MG: 10 INJECTION, SOLUTION INTRAMUSCULAR; INTRAVENOUS at 09:04

## 2023-04-27 RX ADMIN — APREPITANT 130 MG: 130 INJECTION, EMULSION INTRAVENOUS at 09:04

## 2023-04-27 RX ADMIN — HEPARIN 500 UNITS: 100 SYRINGE at 10:04

## 2023-04-27 RX ADMIN — SODIUM CHLORIDE: 9 INJECTION, SOLUTION INTRAVENOUS at 09:04

## 2023-05-15 NOTE — PROGRESS NOTES
HEMATOLOGY / ONCOLOGY   CLINIC NOTE     ONCOLOGICAL HISTORY:     Diagnosis:  - NSCLC / Adenocarcinoma Stage IIIA pT2a pN2--dx 12/2022  - History Uterine cancer - Dx 1989    Current Treatment:   - Carboplatin + Pemetrexed to start 3/15/2023    Treatment History:  - 1989 s/p total hysterectomy   - 12/08/2022: Right lower and middle bilobectomy with mediastinal lymph node dissection.   - Adjuvant chemotherapy - Carboplatin + Pemetrexed     Plan of care: Adjuvant chemotherapy x 4 cycles --> Immunotherapy q 4 weeks x 1 yr.    IMAGING:   - 08/10/2022 PET: The elongated subpleural right lower lobe nodule demonstrates fairly intense FDG activity.  Infectious, inflammatory and neoplastic etiologies remain possible. No suspicious PET findings elsewhere.  - 12/08/2022 CT HEAD without contrast: No acute intracranial abnormality identified.  Findings of mild microvascular ischemic disease.  - 12/2022: U/S Upper extremity: Thrombophlebitis of the left cephalic vein. No deep venous thrombosis in the left upper extremity.  - 01/03/2023 MRI Brain : GARY   - 01/12/2023 NM PET CT 1. Postsurgical changes of recent right lower and middle lobectomies.  There is a small right pleural effusion.  There is nonspecific peripheral uptake along the pleura at the right lung base and perihilar region which may be related to healing response in the setting of recent surgery.  Similarly borderline mediastinal uptake is nonspecific in the recent postoperative setting and could be reactive.  Continued attention recommended on follow-up. 2. Focal uptake at the medial right acetabular roof without appreciable focal osseous lesion by CT evaluation.  This is new compared to previous exam.  Metastasis is a consideration    PATHOLOGY:  - 12/08/2022: Invasive solid adenocarcinoma (3.1 CM); G3, poorly differentiated. Visceral Pleura Invasion present without definite serosal surface involvement. Lymphovascular invasion (Vein) present. All margins  negative for invasive carcinoma, distance 2.5 cm. LN - 4/14 (10R: Hilar, Posterior mediastinal (RACHEAL), 4R Lower paratracheal, 9R Pulmonary ligament, 10R). pT2a pN2    - 02/22/2023: RIGHT ACETABULAR LESION, CT - GUIDE CORE NEEDLE BIOPSY : FIBRINOPURULENT EXUDATE ADMIXED WITH ATYPICAL EPITHELIOID GROUPS,   NORMAL    MARROW ELEMENTS AND ABUNDANT BLOOD CLOT    Subjective:     HPI  Mansi ELIZABET Jin  63 y.o.  female with past medical history significant for HTN, HLD, uterine cancer status post total hysterectomy in 1989,  CKD Stage 2, COPD Stage 3, referred for management of NSCLC s/p RLL / RML lobectomy with Mediastinal Lymph Node Dissection on 12/8/2022.     She was noted to have RLL lung mass and underwent CT guided biopsy which was non diagnostic. PET scan demonstrated significant avidity within the lesion. Given high risk for malignancy, she underwent right lower lobectomy and right middle lobectomy on 12/08/2022. Pathology came back with invasive adenocarcinoma, poorly differentiated with solid morphology, focal tumor involvement of visceral pleura and focal tumor vascular invasion (vein), with mediastinal lymph nodes positive for metastatic carcinoma.      PET on 01/12/2023 showed focal uptake at the medial right acetabular roof but the biopsy was negative for malignancy. Discussed with radiology, they recommended adjuvant chemotherapy and then they will reassess later for radiation therapy.     Chief Complaint: Lung Cancer (F/u with labs-- Patient reports no new concerns. Patient is still experiencing right leg spasms prior to treatment, currently taking Baclofen for this, states it does help)        Interval History:   She returns to the clinic today for a 2 week follow-up.  She is doing well today overall. Her dry cough at night has improved with addition of pepcid nightly as well as Tessalon Perles as needed. She also continues to have right sided occasional, sharp pains that have been present since surgery that  are improving.  She continues to have right leg spasms controlled with baclofen as needed. Last Chemo scheduled for Thursday. She denies any shortness of breath, chest pain, fever, chills, vomiting, diarrhea, constipation, neuropathy, or recent hospitalizations.      Past Medical History:   Diagnosis Date    ASTHMA     COPD (chronic obstructive pulmonary disease)     SEVERE    High cholesterol     HLD (hyperlipidemia)     HTN (hypertension)     Lung cancer 2022    invasive adenocarcinoma, poorly differentiated with solid morphology, focal tumor involvement of visceral pleura and focal tumor vascular invasion (vein), with 2 of 3 posterior mediastinal lymph nodes positive for metastatic carcinoma    Stage 2 chronic kidney disease     TIA (transient ischemic attack)     Tobacco abuse     Unspecified osteoarthritis, unspecified site     Uterine cancer       Past Surgical History:   Procedure Laterality Date    HYSTERECTOMY  Unknowing    lipoma multiple sites      LUNG LOBECTOMY Right 2022    Procedure: LOBECTOMY, LUNG;  Surgeon: Jass Browne IV, MD;  Location: [facility] OR;  Service: Cardiothoracic;  Laterality: Right;    SURGICAL REMOVAL OF LYMPH NODE  2022    Procedure: EXCISION, LYMPH NODE;  Surgeon: Jass Browne IV, MD;  Location: [facility] OR;  Service: Cardiothoracic;;    THORACOTOMY Right 2022    Procedure: THORACOTOMY;  Surgeon: Jass Browne IV, MD;  Location: [facility] OR;  Service: Cardiothoracic;  Laterality: Right;  Right Lower Lobectomy with Lymph Node Dissection    TOTAL ABDOMINAL HYSTERECTOMY W/ BILATERAL SALPINGOOPHORECTOMY       Social History     Socioeconomic History    Marital status: Single    Number of children: 5   Tobacco Use    Smoking status: Former     Packs/day: 0.50     Years: 50.00     Pack years: 25.00     Types: Cigarettes     Quit date: 2022     Years since quittin.4    Smokeless tobacco: Never   Substance and Sexual Activity    Alcohol use: Not Currently      Comment: quit 25years ago    Drug use: Never    Sexual activity: Yes     Partners: Male      Family History   Problem Relation Age of Onset    Hypertension Mother     Hepatitis Mother     Ovarian cancer Mother     Liver cancer Mother     Heart failure Father     Prostate cancer Father     Fibroids Sister     Asthma Brother     Asthma Brother     Cancer Maternal Grandfather       Review of patient's allergies indicates:  No Known Allergies     Review of Systems   Constitutional:  Positive for fatigue. Negative for activity change, appetite change, chills, fever and unexpected weight change.   HENT: Negative.  Negative for mouth dryness, mouth sores, nosebleeds, sore throat and trouble swallowing.    Eyes: Negative.  Negative for visual disturbance.   Respiratory:  Positive for cough and shortness of breath (on exertion, chronic).    Cardiovascular:  Negative for chest pain, palpitations and leg swelling.   Gastrointestinal:  Negative for abdominal distention, abdominal pain, blood in stool, change in bowel habit, constipation, diarrhea, nausea, vomiting and change in bowel habit.   Endocrine: Negative.    Genitourinary: Negative.  Negative for dysuria, frequency, hematuria and urgency.   Musculoskeletal:  Positive for leg pain. Negative for arthralgias, back pain, myalgias and neck pain.   Integumentary:  Negative for rash, breast mass, breast discharge and breast tenderness. Negative.   Allergic/Immunologic: Negative.    Neurological:  Negative for dizziness, tremors, syncope, speech difficulty, weakness, light-headedness, numbness, headaches and memory loss.   Hematological: Negative.  Does not bruise/bleed easily.   Psychiatric/Behavioral: Negative.  Negative for confusion and suicidal ideas.    Breast: Negative for mass and tenderness      Objective:        Vitals:    05/16/23 1125   BP: 113/76   Pulse: 91   Resp: 20   Temp: 97.4 °F (36.3 °C)     Wt Readings from Last 6 Encounters:   05/16/23 69 kg (152 lb 3.2  oz)   05/16/23 69 kg (152 lb 3.2 oz)   04/27/23 69.9 kg (154 lb)   04/26/23 70 kg (154 lb 5.2 oz)   04/05/23 69.1 kg (152 lb 5.4 oz)   04/04/23 69.1 kg (152 lb 5.4 oz)     Body mass index is 23.83 kg/m².  Body surface area is 1.81 meters squared.       Physical Exam  Vitals and nursing note reviewed.   Constitutional:       Appearance: Normal appearance.   HENT:      Head: Normocephalic and atraumatic.   Eyes:      General: No scleral icterus.     Extraocular Movements: Extraocular movements intact.      Conjunctiva/sclera: Conjunctivae normal.   Neck:      Vascular: No JVD.   Cardiovascular:      Rate and Rhythm: Normal rate and regular rhythm.      Heart sounds: No murmur heard.  Pulmonary:      Effort: Pulmonary effort is normal.      Breath sounds: Decreased breath sounds present. No wheezing or rhonchi.   Abdominal:      General: Bowel sounds are normal. There is no distension.      Palpations: Abdomen is soft.      Tenderness: There is no abdominal tenderness.   Musculoskeletal:      Cervical back: Neck supple.   Lymphadenopathy:      Head:      Right side of head: No submental or submandibular adenopathy.      Left side of head: No submental or submandibular adenopathy.      Cervical: No cervical adenopathy.      Upper Body:      Right upper body: No supraclavicular or axillary adenopathy.      Left upper body: No supraclavicular or axillary adenopathy.      Lower Body: No right inguinal adenopathy. No left inguinal adenopathy.      Comments: No adenopathy noted bilaterally   Skin:     General: Skin is warm.      Coloration: Skin is not jaundiced.      Findings: No lesion or rash.   Neurological:      General: No focal deficit present.      Mental Status: She is alert and oriented to person, place, and time.      Cranial Nerves: Cranial nerves 2-12 are intact.      Gait: Gait normal.   Psychiatric:         Attention and Perception: Attention normal.         Speech: Speech normal.         Behavior: Behavior is  cooperative.         Cognition and Memory: Cognition normal.         Judgment: Judgment normal.     LABS      Lab Visit on 05/16/2023   Component Date Value    Sodium Level 05/16/2023 143     Potassium Level 05/16/2023 4.3     Chloride 05/16/2023 109 (H)     Carbon Dioxide 05/16/2023 27     Glucose Level 05/16/2023 96     Blood Urea Nitrogen 05/16/2023 5.0 (L)     Creatinine 05/16/2023 1.00     Calcium Level Total 05/16/2023 9.6     Protein Total 05/16/2023 7.3     Albumin Level 05/16/2023 3.6     Globulin 05/16/2023 3.7 (H)     Albumin/Globulin Ratio 05/16/2023 1.0 (L)     Bilirubin Total 05/16/2023 0.2     Alkaline Phosphatase 05/16/2023 121     Alanine Aminotransferase 05/16/2023 15     Aspartate Aminotransfera* 05/16/2023 19     eGFR 05/16/2023 >60     Magnesium Level 05/16/2023 2.20     Phosphorus Level 05/16/2023 2.9     Lactate Dehydrogenase 05/16/2023 248 (H)     WBC 05/16/2023 4.21 (L)     RBC 05/16/2023 4.25     Hgb 05/16/2023 11.4 (L)     Hct 05/16/2023 37.9     MCV 05/16/2023 89.2     MCH 05/16/2023 26.8 (L)     MCHC 05/16/2023 30.1 (L)     RDW 05/16/2023 16.4     Platelet 05/16/2023 330     MPV 05/16/2023 9.5     Neut % 05/16/2023 41.9     Lymph % 05/16/2023 42.0     Mono % 05/16/2023 11.9     Eos % 05/16/2023 3.8     Basophil % 05/16/2023 0.2     Lymph # 05/16/2023 1.77     Neut # 05/16/2023 1.76 (L)     Mono # 05/16/2023 0.50     Eos # 05/16/2023 0.16     Baso # 05/16/2023 0.01     IG# 05/16/2023 0.01     IG% 05/16/2023 0.2          Assessment:   ECOG Performance status: 2 - 3.     NSCLC / Adenocarcinoma Stage IIIA pT2a pN2  - 12/08/2022: Right lower and middle bilobectomy with mediastinal lymph node dissection. Path: Invasive adenocarcinoma, poorly differentiated, focal tumor involvement of visceral pleura and focal tumor vascular invasion (vein), with mediastinal lymph nodes positive for metastatic carcinoma   - TMB - 5, MSI-S, MET amplification, ROS1.  No mutations in EGFR, BRAF, ALK, ERBB2, KRAS,  RET,    - 1% tumor cells are positive for PD-L1   - patient evaluated by Radiation Oncology with recommendation to treat with chemotherapy for now if no metastatic disease and will re-evaluate the patient by the end of chemotherapy for radiation therapy  - 01/12/2023 PET: Focal uptake at the medial right acetabular roof. Metastasis is a consideration  - 02/22/2023: RIGHT ACETABULAR LESION, CT - GUIDE CORE NEEDLE BIOPSY : FIBRINOPURULENT EXUDATE ADMIXED WITH ATYPICAL EPITHELIOID GROUPS,   NORMAL    MARROW ELEMENTS AND ABUNDANT BLOOD CLOT  PET scan showed a suspicious finding in the right acetabular roof and patient then had a CT-guided biopsy no evidence of malignancy. Start with adjuvant chemotherapy with Carboplatin and pemetrexed for 4 cycles and then followed by atezolizumab/pembrolizumab.   Patient was seen by Rad/Onc in January 2023. Dr Mireles will like to see her after she completes chemotherapyAlso follow up again with radiation oncology after chemotherapy to evaluate for radiation therapy      Chemotherapy   Cycle 1 of carboplatin/pemetrexed completed 03/15/2023.  Overall, she tolerated chemotherapy well.    Labs are stable and adequate today to continue with cycle 3 as scheduled tomorrow.    Right chest wall pain  She reports this has been present since after surgery.  Pain is intermittent and sharp.  Most likely related to nerves.  She denies any increased shortness of breath.  Stable today.    Dry cough  Resolved with use of pepcid at night and tessalon Perles as needed.   Recommended follow up with pulmonology if dry cough is persistent.    Leg pain  Currently taking baclofen 5 mg 3 times daily as needed.  Pain today is controlled with current medication regimen.    Plan:   - Labs reviewed. Stable today.   - continue with cycle 4 of carbo Alimta as scheduled tomorrow.  - Continue Pepcid and Tessalon Perles for dry cough  - RTC in 3 weeks with NP or FU/lab/treat   - Refer back to Dr Mireles  - If no RT  then she will have adjuvant immunotherapy with atezolizumab vs pembrolizumab  - cbc, cmp, mag, phos, CEA, LDH- 1 hr prior @ [facility]    The patient was seen, interviewed and examined. Pertinent lab and radiology studies were reviewed.   The patient was given ample opportunity to ask questions, and to the best of my abilities, all questions answered to satisfaction; patient demonstrated understanding of what we discussed and agreeable to the plan. Pt instructed to call should develop concerning signs/symptoms or have further questions.     Phoebe Gomez MD  Hematology/Oncology        Professional Services   I, Sandra Carvajal LPN, acted solely as a scribe for and in the presence of Dr. Phoebe Gomez, who performed these services.

## 2023-05-16 ENCOUNTER — OFFICE VISIT (OUTPATIENT)
Dept: PULMONOLOGY | Facility: CLINIC | Age: 63
End: 2023-05-16
Payer: MEDICARE

## 2023-05-16 ENCOUNTER — LAB VISIT (OUTPATIENT)
Dept: LAB | Facility: HOSPITAL | Age: 63
End: 2023-05-16
Payer: MEDICARE

## 2023-05-16 ENCOUNTER — OFFICE VISIT (OUTPATIENT)
Dept: HEMATOLOGY/ONCOLOGY | Facility: CLINIC | Age: 63
End: 2023-05-16
Payer: MEDICARE

## 2023-05-16 VITALS
RESPIRATION RATE: 20 BRPM | WEIGHT: 152.19 LBS | BODY MASS INDEX: 23.89 KG/M2 | TEMPERATURE: 97 F | OXYGEN SATURATION: 99 % | DIASTOLIC BLOOD PRESSURE: 76 MMHG | HEIGHT: 67 IN | SYSTOLIC BLOOD PRESSURE: 113 MMHG | HEART RATE: 91 BPM

## 2023-05-16 VITALS
HEIGHT: 68 IN | OXYGEN SATURATION: 97 % | BODY MASS INDEX: 23.06 KG/M2 | WEIGHT: 152.19 LBS | SYSTOLIC BLOOD PRESSURE: 109 MMHG | HEART RATE: 76 BPM | DIASTOLIC BLOOD PRESSURE: 75 MMHG | RESPIRATION RATE: 20 BRPM | TEMPERATURE: 98 F

## 2023-05-16 DIAGNOSIS — C34.91 NSCLC OF RIGHT LUNG: Primary | ICD-10-CM

## 2023-05-16 DIAGNOSIS — C34.91 NSCLC OF RIGHT LUNG: ICD-10-CM

## 2023-05-16 LAB
ALBUMIN SERPL-MCNC: 3.6 G/DL (ref 3.4–4.8)
ALBUMIN/GLOB SERPL: 1 RATIO (ref 1.1–2)
ALP SERPL-CCNC: 121 UNIT/L (ref 40–150)
ALT SERPL-CCNC: 15 UNIT/L (ref 0–55)
AST SERPL-CCNC: 19 UNIT/L (ref 5–34)
BASOPHILS # BLD AUTO: 0.01 X10(3)/MCL
BASOPHILS NFR BLD AUTO: 0.2 %
BILIRUBIN DIRECT+TOT PNL SERPL-MCNC: 0.2 MG/DL
BUN SERPL-MCNC: 5 MG/DL (ref 9.8–20.1)
CALCIUM SERPL-MCNC: 9.6 MG/DL (ref 8.4–10.2)
CEA SERPL-MCNC: <1.73 NG/ML (ref 0–3)
CHLORIDE SERPL-SCNC: 109 MMOL/L (ref 98–107)
CO2 SERPL-SCNC: 27 MMOL/L (ref 23–31)
CREAT SERPL-MCNC: 1 MG/DL (ref 0.55–1.02)
EOSINOPHIL # BLD AUTO: 0.16 X10(3)/MCL (ref 0–0.9)
EOSINOPHIL NFR BLD AUTO: 3.8 %
ERYTHROCYTE [DISTWIDTH] IN BLOOD BY AUTOMATED COUNT: 16.4 % (ref 11.5–17)
GFR SERPLBLD CREATININE-BSD FMLA CKD-EPI: >60 MLS/MIN/1.73/M2
GLOBULIN SER-MCNC: 3.7 GM/DL (ref 2.4–3.5)
GLUCOSE SERPL-MCNC: 96 MG/DL (ref 82–115)
HCT VFR BLD AUTO: 37.9 % (ref 37–47)
HGB BLD-MCNC: 11.4 G/DL (ref 12–16)
IMM GRANULOCYTES # BLD AUTO: 0.01 X10(3)/MCL (ref 0–0.04)
IMM GRANULOCYTES NFR BLD AUTO: 0.2 %
LDH SERPL-CCNC: 248 U/L (ref 125–220)
LYMPHOCYTES # BLD AUTO: 1.77 X10(3)/MCL (ref 0.6–4.6)
LYMPHOCYTES NFR BLD AUTO: 42 %
MAGNESIUM SERPL-MCNC: 2.2 MG/DL (ref 1.6–2.6)
MCH RBC QN AUTO: 26.8 PG (ref 27–31)
MCHC RBC AUTO-ENTMCNC: 30.1 G/DL (ref 33–36)
MCV RBC AUTO: 89.2 FL (ref 80–94)
MONOCYTES # BLD AUTO: 0.5 X10(3)/MCL (ref 0.1–1.3)
MONOCYTES NFR BLD AUTO: 11.9 %
NEUTROPHILS # BLD AUTO: 1.76 X10(3)/MCL (ref 2.1–9.2)
NEUTROPHILS NFR BLD AUTO: 41.9 %
PHOSPHATE SERPL-MCNC: 2.9 MG/DL (ref 2.3–4.7)
PLATELET # BLD AUTO: 330 X10(3)/MCL (ref 130–400)
PMV BLD AUTO: 9.5 FL (ref 7.4–10.4)
POTASSIUM SERPL-SCNC: 4.3 MMOL/L (ref 3.5–5.1)
PROT SERPL-MCNC: 7.3 GM/DL (ref 5.8–7.6)
RBC # BLD AUTO: 4.25 X10(6)/MCL (ref 4.2–5.4)
SODIUM SERPL-SCNC: 143 MMOL/L (ref 136–145)
WBC # SPEC AUTO: 4.21 X10(3)/MCL (ref 4.5–11.5)

## 2023-05-16 PROCEDURE — 3066F NEPHROPATHY DOC TX: CPT | Mod: CPTII,,, | Performed by: INTERNAL MEDICINE

## 2023-05-16 PROCEDURE — 3008F BODY MASS INDEX DOCD: CPT | Mod: CPTII,,, | Performed by: INTERNAL MEDICINE

## 2023-05-16 PROCEDURE — 3074F SYST BP LT 130 MM HG: CPT | Mod: CPTII,,, | Performed by: INTERNAL MEDICINE

## 2023-05-16 PROCEDURE — 85025 COMPLETE CBC W/AUTO DIFF WBC: CPT

## 2023-05-16 PROCEDURE — 99214 PR OFFICE/OUTPT VISIT, EST, LEVL IV, 30-39 MIN: ICD-10-PCS | Mod: S$PBB,GC,, | Performed by: INTERNAL MEDICINE

## 2023-05-16 PROCEDURE — 1101F PT FALLS ASSESS-DOCD LE1/YR: CPT | Mod: CPTII,,, | Performed by: INTERNAL MEDICINE

## 2023-05-16 PROCEDURE — 3078F PR MOST RECENT DIASTOLIC BLOOD PRESSURE < 80 MM HG: ICD-10-PCS | Mod: CPTII,,, | Performed by: INTERNAL MEDICINE

## 2023-05-16 PROCEDURE — 3078F DIAST BP <80 MM HG: CPT | Mod: CPTII,,, | Performed by: INTERNAL MEDICINE

## 2023-05-16 PROCEDURE — 3060F POS MICROALBUMINURIA REV: CPT | Mod: CPTII,,, | Performed by: INTERNAL MEDICINE

## 2023-05-16 PROCEDURE — 82378 CARCINOEMBRYONIC ANTIGEN: CPT

## 2023-05-16 PROCEDURE — 84100 ASSAY OF PHOSPHORUS: CPT

## 2023-05-16 PROCEDURE — 1101F PR PT FALLS ASSESS DOC 0-1 FALLS W/OUT INJ PAST YR: ICD-10-PCS | Mod: CPTII,,, | Performed by: INTERNAL MEDICINE

## 2023-05-16 PROCEDURE — 3074F PR MOST RECENT SYSTOLIC BLOOD PRESSURE < 130 MM HG: ICD-10-PCS | Mod: CPTII,,, | Performed by: INTERNAL MEDICINE

## 2023-05-16 PROCEDURE — 80053 COMPREHEN METABOLIC PANEL: CPT

## 2023-05-16 PROCEDURE — 3288F PR FALLS RISK ASSESSMENT DOCUMENTED: ICD-10-PCS | Mod: CPTII,,, | Performed by: INTERNAL MEDICINE

## 2023-05-16 PROCEDURE — 3060F PR POS MICROALBUMINURIA RESULT DOCUMENTED/REVIEW: ICD-10-PCS | Mod: CPTII,,, | Performed by: INTERNAL MEDICINE

## 2023-05-16 PROCEDURE — 83615 LACTATE (LD) (LDH) ENZYME: CPT

## 2023-05-16 PROCEDURE — 99215 PR OFFICE/OUTPT VISIT, EST, LEVL V, 40-54 MIN: ICD-10-PCS | Mod: ,,, | Performed by: INTERNAL MEDICINE

## 2023-05-16 PROCEDURE — 83735 ASSAY OF MAGNESIUM: CPT

## 2023-05-16 PROCEDURE — 3288F FALL RISK ASSESSMENT DOCD: CPT | Mod: CPTII,,, | Performed by: INTERNAL MEDICINE

## 2023-05-16 PROCEDURE — 36415 COLL VENOUS BLD VENIPUNCTURE: CPT

## 2023-05-16 PROCEDURE — 99999 PR PBB SHADOW E&M-EST. PATIENT-LVL V: ICD-10-PCS | Mod: PBBFAC,,, | Performed by: INTERNAL MEDICINE

## 2023-05-16 PROCEDURE — 99999 PR PBB SHADOW E&M-EST. PATIENT-LVL V: CPT | Mod: PBBFAC,,, | Performed by: INTERNAL MEDICINE

## 2023-05-16 PROCEDURE — 99214 OFFICE O/P EST MOD 30 MIN: CPT | Mod: PBBFAC

## 2023-05-16 PROCEDURE — 3066F PR DOCUMENTATION OF TREATMENT FOR NEPHROPATHY: ICD-10-PCS | Mod: CPTII,,, | Performed by: INTERNAL MEDICINE

## 2023-05-16 PROCEDURE — 99214 OFFICE O/P EST MOD 30 MIN: CPT | Mod: S$PBB,GC,, | Performed by: INTERNAL MEDICINE

## 2023-05-16 PROCEDURE — 3008F PR BODY MASS INDEX (BMI) DOCUMENTED: ICD-10-PCS | Mod: CPTII,,, | Performed by: INTERNAL MEDICINE

## 2023-05-16 PROCEDURE — 99215 OFFICE O/P EST HI 40 MIN: CPT | Mod: ,,, | Performed by: INTERNAL MEDICINE

## 2023-05-16 RX ORDER — CYANOCOBALAMIN 1000 UG/ML
1000 INJECTION, SOLUTION INTRAMUSCULAR; SUBCUTANEOUS
Status: CANCELLED | OUTPATIENT
Start: 2023-05-18

## 2023-05-16 RX ORDER — SODIUM CHLORIDE 0.9 % (FLUSH) 0.9 %
10 SYRINGE (ML) INJECTION
Status: CANCELLED | OUTPATIENT
Start: 2023-05-18

## 2023-05-16 RX ORDER — HEPARIN 100 UNIT/ML
500 SYRINGE INTRAVENOUS
Status: CANCELLED | OUTPATIENT
Start: 2023-05-18

## 2023-05-16 NOTE — PROGRESS NOTES
Subjective:       Patient ID: Mansi Jin is a 63 y.o. female.    Chief Complaint: No chief complaint on file.    HPI  Ms. Jin is a 61yo female with RLL lung mass and COPD stage III who has presents to pulmonology for three-month follow-up.  Recent PET scan depicted a lymphadenopathy in right lower leg.  IR biopsy of right acetabular lesion 02/22/2023 demonstrated fibro purulent exudate with normal marrow elements and abundant blood clot but no signs of malignancy.  She is currently on her 3rd cycle of chemotherapy and will finish chemotherapy this month she is had no adverse side effects and reports that she is been feeling significantly better following starting chemotherapy and her right lower lobectomy. Denies any fevers, chills, nausea, vomiting, unintentional weight loss, syncopal episodes, changes in vision, headaches, chest pains, palpitations, paresthesias.  Has no other complaints at this time.  She believes that she is planning to do radiation therapy following her chemotherapy treatment.      ROS  Constitutional: no fever, admits to fatigue, weakness  Eye: no vision loss, eye redness, drainage, or pain  ENMT: no sore throat, ear pain, sinus pain/congestion, nasal congestion/drainage  Respiratory: no cough, no wheezing, admits to episodes of SOB  Cardiovascular: no chest pain, no palpitations, no edema  Gastrointestinal: no nausea, vomiting, or diarrhea. No abdominal pain  Genitourinary: no dysuria, no urinary frequency or urgency, no hematuria  Hema/Lymph: no abnormal bruising or bleeding  Endocrine: no heat or cold intolerance, no excessive thirst or excessive urination  Musculoskeletal: no muscle or joint pain, no joint swelling  Integumentary: no skin rash or abnormal lesion  Neurologic: no headache, no dizziness, no weakness or numbness        Current Outpatient Medications   Medication Instructions    acetaminophen (TYLENOL) 1,000 mg, Oral, Every 6 hours PRN    aspirin (ECOTRIN) 81 mg, Oral,  Daily    atorvastatin (LIPITOR) 40 mg, Oral, Nightly    baclofen (LIORESAL) 5 mg, Oral, 3 times daily    benzonatate (TESSALON) 200 mg, Oral, 3 times daily PRN    dexAMETHasone (DECADRON) 8 mg, Oral, Daily, Take 8mg on the day prior to chemotherapy and on days 2, 3, and 4 following chemotherapy    famotidine (PEPCID) 40 MG tablet TAKE 1 TABLET(40 MG) BY MOUTH EVERY EVENING    fluticasone propionate (FLONASE) 50 mcg, Each Nostril, Daily    fluticasone-umeclidin-vilanter (TRELEGY ELLIPTA) 100-62.5-25 mcg DsDv 1 puff, Inhalation, Daily    folic acid (FOLVITE) 1 mg, Oral, Daily    ibuprofen (ADVIL,MOTRIN) 400 mg, Oral, Every 6 hours PRN    ipratropium/albuterol sulfate (COMBIVENT INHL) Inhalation, As needed (PRN)    loratadine (CLARITIN) 10 mg, Oral, Daily           Objective:      Physical exam:   General appearance: alert, thin, frail, appears stated age, cooperative and no distress  Head: Normocephalic, without obvious abnormality, atraumatic  Neck: no adenopathy, no carotid bruit, no JVD and supple, symmetrical, trachea midline  Lungs: diminished breath sounds RUL, no wheezes, no rales, noronchi  Heart: regular rate and rhythm, S1, S2 normal, no murmur, click, rub or gallop  Abdomen: soft, non-tender; bowel sounds normal; no masses,  no organomegaly  Extremities: extremities normal, atraumatic, no cyanosis or edema  Pulses: 2+ and symmetric  Skin: Skin color, texture, turgor normal. No rashes or lesions  Neurologic: Grossly normal        Assessment:       Right lung mass status post right middle lobe and right lower lobe lobectomy on 12/08/2022 -- Pathology came back with invasive adenocarcinoma, poorly differentiated with solid morphology, focal tumor involvement of visceral pleura and focal tumor vascular invasion (vein), with 2 of 3 posterior mediastinal lymph nodes positive for metastatic carcinom, stage III A T2a N2  Stage III COPD  Hypoxia   Left upper extremity edema       Plan:       NSCLC adenocarcenoma  stage 3a/T2a/N2 s/p RLL lobectomy with possible metastasis however bx negative now receiving chemotherapy with plans for adjuvant radiation therapy  - pending completion of chemotherapy course  - Continue trelegy  - currently sating well ORA walking >1 mile daily without SOB    RTC: 9-12 months  Shawn Raphael MD - PGY2  [employer] [facility]   05/16/2023

## 2023-05-18 ENCOUNTER — INFUSION (OUTPATIENT)
Dept: INFUSION THERAPY | Facility: HOSPITAL | Age: 63
End: 2023-05-18
Attending: NURSE PRACTITIONER
Payer: MEDICARE

## 2023-05-18 VITALS
HEART RATE: 96 BPM | TEMPERATURE: 99 F | RESPIRATION RATE: 18 BRPM | OXYGEN SATURATION: 96 % | WEIGHT: 149 LBS | SYSTOLIC BLOOD PRESSURE: 109 MMHG | BODY MASS INDEX: 23.39 KG/M2 | HEIGHT: 67 IN | DIASTOLIC BLOOD PRESSURE: 73 MMHG

## 2023-05-18 DIAGNOSIS — C34.91 NSCLC OF RIGHT LUNG: Primary | ICD-10-CM

## 2023-05-18 PROCEDURE — 63600175 PHARM REV CODE 636 W HCPCS: Mod: JZ,JG | Performed by: INTERNAL MEDICINE

## 2023-05-18 PROCEDURE — 25000003 PHARM REV CODE 250: Performed by: INTERNAL MEDICINE

## 2023-05-18 PROCEDURE — 96413 CHEMO IV INFUSION 1 HR: CPT

## 2023-05-18 PROCEDURE — 96367 TX/PROPH/DG ADDL SEQ IV INF: CPT

## 2023-05-18 PROCEDURE — 96372 THER/PROPH/DIAG INJ SC/IM: CPT | Mod: 59

## 2023-05-18 PROCEDURE — 96411 CHEMO IV PUSH ADDL DRUG: CPT

## 2023-05-18 RX ORDER — SODIUM CHLORIDE 0.9 % (FLUSH) 0.9 %
10 SYRINGE (ML) INJECTION
Status: ACTIVE | OUTPATIENT
Start: 2023-05-18

## 2023-05-18 RX ORDER — HEPARIN 100 UNIT/ML
500 SYRINGE INTRAVENOUS
Status: ACTIVE | OUTPATIENT
Start: 2023-05-18

## 2023-05-18 RX ORDER — CYANOCOBALAMIN 1000 UG/ML
1000 INJECTION, SOLUTION INTRAMUSCULAR; SUBCUTANEOUS
Status: COMPLETED | OUTPATIENT
Start: 2023-05-18 | End: 2023-05-18

## 2023-05-18 RX ADMIN — CYANOCOBALAMIN 1000 MCG: 1000 INJECTION, SOLUTION INTRAMUSCULAR at 10:05

## 2023-05-18 RX ADMIN — SODIUM CHLORIDE 900 MG: 9 INJECTION, SOLUTION INTRAVENOUS at 11:05

## 2023-05-18 RX ADMIN — DEXAMETHASONE SODIUM PHOSPHATE 0.25 MG: 10 INJECTION, SOLUTION INTRAMUSCULAR; INTRAVENOUS at 10:05

## 2023-05-18 RX ADMIN — CARBOPLATIN 430 MG: 10 INJECTION, SOLUTION INTRAVENOUS at 11:05

## 2023-05-18 RX ADMIN — SODIUM CHLORIDE 150 MG: 9 INJECTION, SOLUTION INTRAVENOUS at 10:05

## 2023-05-22 DIAGNOSIS — M54.50 CHRONIC RIGHT-SIDED LOW BACK PAIN WITHOUT SCIATICA: ICD-10-CM

## 2023-05-22 DIAGNOSIS — G89.29 CHRONIC RIGHT-SIDED LOW BACK PAIN WITHOUT SCIATICA: ICD-10-CM

## 2023-05-23 RX ORDER — BACLOFEN 5 MG/1
TABLET ORAL
Qty: 45 TABLET | Refills: 0 | Status: SHIPPED | OUTPATIENT
Start: 2023-05-23 | End: 2023-06-21 | Stop reason: SDUPTHER

## 2023-05-29 DIAGNOSIS — C34.31 SQUAMOUS CELL CARCINOMA OF BRONCHUS IN RIGHT LOWER LOBE: Primary | ICD-10-CM

## 2023-06-02 DIAGNOSIS — C34.91 NSCLC OF RIGHT LUNG: ICD-10-CM

## 2023-06-02 DIAGNOSIS — Z29.89 IMMUNOTHERAPY: ICD-10-CM

## 2023-06-02 RX ORDER — FOLIC ACID 1 MG/1
TABLET ORAL
Qty: 30 TABLET | Refills: 2 | Status: SHIPPED | OUTPATIENT
Start: 2023-06-02 | End: 2023-09-22

## 2023-06-05 ENCOUNTER — APPOINTMENT (OUTPATIENT)
Dept: RADIATION THERAPY | Facility: HOSPITAL | Age: 63
End: 2023-06-05
Attending: RADIOLOGY
Payer: MEDICARE

## 2023-06-05 ENCOUNTER — HOSPITAL ENCOUNTER (OUTPATIENT)
Dept: RADIOLOGY | Facility: HOSPITAL | Age: 63
Discharge: HOME | End: 2023-06-05
Attending: RADIOLOGY
Payer: MEDICARE

## 2023-06-05 DIAGNOSIS — C34.31 SQUAMOUS CELL CARCINOMA OF BRONCHUS IN RIGHT LOWER LOBE: ICD-10-CM

## 2023-06-05 PROCEDURE — 78815 PET IMAGE W/CT SKULL-THIGH: CPT | Mod: TC,PS

## 2023-06-05 PROCEDURE — 77334 RADIATION TREATMENT AID(S): CPT | Performed by: RADIOLOGY

## 2023-06-06 ENCOUNTER — LAB VISIT (OUTPATIENT)
Dept: LAB | Facility: HOSPITAL | Age: 63
End: 2023-06-06
Payer: MEDICARE

## 2023-06-06 ENCOUNTER — OFFICE VISIT (OUTPATIENT)
Dept: HEMATOLOGY/ONCOLOGY | Facility: CLINIC | Age: 63
End: 2023-06-06
Payer: MEDICARE

## 2023-06-06 VITALS
DIASTOLIC BLOOD PRESSURE: 81 MMHG | HEIGHT: 67 IN | RESPIRATION RATE: 18 BRPM | TEMPERATURE: 98 F | HEART RATE: 70 BPM | WEIGHT: 149.5 LBS | BODY MASS INDEX: 23.46 KG/M2 | OXYGEN SATURATION: 98 % | SYSTOLIC BLOOD PRESSURE: 126 MMHG

## 2023-06-06 DIAGNOSIS — C34.91 NSCLC OF RIGHT LUNG: ICD-10-CM

## 2023-06-06 DIAGNOSIS — C34.91 NSCLC OF RIGHT LUNG: Primary | ICD-10-CM

## 2023-06-06 DIAGNOSIS — D53.9 NUTRITIONAL ANEMIA, UNSPECIFIED: ICD-10-CM

## 2023-06-06 DIAGNOSIS — R63.0 APPETITE IMPAIRED: ICD-10-CM

## 2023-06-06 LAB
ALBUMIN SERPL-MCNC: 3.9 G/DL (ref 3.4–4.8)
ALBUMIN/GLOB SERPL: 1 RATIO (ref 1.1–2)
ALP SERPL-CCNC: 142 UNIT/L (ref 40–150)
ALT SERPL-CCNC: 11 UNIT/L (ref 0–55)
AST SERPL-CCNC: 19 UNIT/L (ref 5–34)
BASOPHILS # BLD AUTO: 0 X10(3)/MCL
BASOPHILS NFR BLD AUTO: 0 %
BILIRUBIN DIRECT+TOT PNL SERPL-MCNC: 0.3 MG/DL
BUN SERPL-MCNC: 8 MG/DL (ref 9.8–20.1)
CALCIUM SERPL-MCNC: 9.8 MG/DL (ref 8.4–10.2)
CEA SERPL-MCNC: <1.73 NG/ML (ref 0–3)
CHLORIDE SERPL-SCNC: 107 MMOL/L (ref 98–107)
CO2 SERPL-SCNC: 27 MMOL/L (ref 23–31)
CREAT SERPL-MCNC: 1.08 MG/DL (ref 0.55–1.02)
EOSINOPHIL # BLD AUTO: 0.08 X10(3)/MCL (ref 0–0.9)
EOSINOPHIL NFR BLD AUTO: 3 %
ERYTHROCYTE [DISTWIDTH] IN BLOOD BY AUTOMATED COUNT: 16.6 % (ref 11.5–17)
GFR SERPLBLD CREATININE-BSD FMLA CKD-EPI: 57 MLS/MIN/1.73/M2
GLOBULIN SER-MCNC: 3.9 GM/DL (ref 2.4–3.5)
GLUCOSE SERPL-MCNC: 104 MG/DL (ref 82–115)
HCT VFR BLD AUTO: 36 % (ref 37–47)
HGB BLD-MCNC: 11 G/DL (ref 12–16)
IMM GRANULOCYTES # BLD AUTO: 0.01 X10(3)/MCL (ref 0–0.04)
IMM GRANULOCYTES NFR BLD AUTO: 0.4 %
LDH SERPL-CCNC: 257 U/L (ref 125–220)
LYMPHOCYTES # BLD AUTO: 1.02 X10(3)/MCL (ref 0.6–4.6)
LYMPHOCYTES NFR BLD AUTO: 38.6 %
MAGNESIUM SERPL-MCNC: 2 MG/DL (ref 1.6–2.6)
MCH RBC QN AUTO: 27.6 PG (ref 27–31)
MCHC RBC AUTO-ENTMCNC: 30.6 G/DL (ref 33–36)
MCV RBC AUTO: 90.5 FL (ref 80–94)
MONOCYTES # BLD AUTO: 0.25 X10(3)/MCL (ref 0.1–1.3)
MONOCYTES NFR BLD AUTO: 9.5 %
NEUTROPHILS # BLD AUTO: 1.28 X10(3)/MCL (ref 2.1–9.2)
NEUTROPHILS NFR BLD AUTO: 48.5 %
PHOSPHATE SERPL-MCNC: 2.6 MG/DL (ref 2.3–4.7)
PLATELET # BLD AUTO: 233 X10(3)/MCL (ref 130–400)
PMV BLD AUTO: 9.9 FL (ref 7.4–10.4)
POTASSIUM SERPL-SCNC: 3.5 MMOL/L (ref 3.5–5.1)
PROT SERPL-MCNC: 7.8 GM/DL (ref 5.8–7.6)
RBC # BLD AUTO: 3.98 X10(6)/MCL (ref 4.2–5.4)
SODIUM SERPL-SCNC: 142 MMOL/L (ref 136–145)
WBC # SPEC AUTO: 2.64 X10(3)/MCL (ref 4.5–11.5)

## 2023-06-06 PROCEDURE — 99999 PR PBB SHADOW E&M-EST. PATIENT-LVL V: CPT | Mod: PBBFAC,,, | Performed by: NURSE PRACTITIONER

## 2023-06-06 PROCEDURE — 3074F SYST BP LT 130 MM HG: CPT | Mod: CPTII,S$GLB,, | Performed by: NURSE PRACTITIONER

## 2023-06-06 PROCEDURE — 99214 OFFICE O/P EST MOD 30 MIN: CPT | Mod: S$GLB,,, | Performed by: NURSE PRACTITIONER

## 2023-06-06 PROCEDURE — 1159F PR MEDICATION LIST DOCUMENTED IN MEDICAL RECORD: ICD-10-PCS | Mod: CPTII,S$GLB,, | Performed by: NURSE PRACTITIONER

## 2023-06-06 PROCEDURE — 85025 COMPLETE CBC W/AUTO DIFF WBC: CPT

## 2023-06-06 PROCEDURE — 99999 PR PBB SHADOW E&M-EST. PATIENT-LVL V: ICD-10-PCS | Mod: PBBFAC,,, | Performed by: NURSE PRACTITIONER

## 2023-06-06 PROCEDURE — 3060F PR POS MICROALBUMINURIA RESULT DOCUMENTED/REVIEW: ICD-10-PCS | Mod: CPTII,S$GLB,, | Performed by: NURSE PRACTITIONER

## 2023-06-06 PROCEDURE — 80053 COMPREHEN METABOLIC PANEL: CPT

## 2023-06-06 PROCEDURE — 1101F PT FALLS ASSESS-DOCD LE1/YR: CPT | Mod: CPTII,S$GLB,, | Performed by: NURSE PRACTITIONER

## 2023-06-06 PROCEDURE — 1159F MED LIST DOCD IN RCRD: CPT | Mod: CPTII,S$GLB,, | Performed by: NURSE PRACTITIONER

## 2023-06-06 PROCEDURE — 3060F POS MICROALBUMINURIA REV: CPT | Mod: CPTII,S$GLB,, | Performed by: NURSE PRACTITIONER

## 2023-06-06 PROCEDURE — 1160F PR REVIEW ALL MEDS BY PRESCRIBER/CLIN PHARMACIST DOCUMENTED: ICD-10-PCS | Mod: CPTII,S$GLB,, | Performed by: NURSE PRACTITIONER

## 2023-06-06 PROCEDURE — 3288F FALL RISK ASSESSMENT DOCD: CPT | Mod: CPTII,S$GLB,, | Performed by: NURSE PRACTITIONER

## 2023-06-06 PROCEDURE — 3066F PR DOCUMENTATION OF TREATMENT FOR NEPHROPATHY: ICD-10-PCS | Mod: CPTII,S$GLB,, | Performed by: NURSE PRACTITIONER

## 2023-06-06 PROCEDURE — 36415 COLL VENOUS BLD VENIPUNCTURE: CPT

## 2023-06-06 PROCEDURE — 1160F RVW MEDS BY RX/DR IN RCRD: CPT | Mod: CPTII,S$GLB,, | Performed by: NURSE PRACTITIONER

## 2023-06-06 PROCEDURE — 3079F PR MOST RECENT DIASTOLIC BLOOD PRESSURE 80-89 MM HG: ICD-10-PCS | Mod: CPTII,S$GLB,, | Performed by: NURSE PRACTITIONER

## 2023-06-06 PROCEDURE — 84100 ASSAY OF PHOSPHORUS: CPT

## 2023-06-06 PROCEDURE — 83735 ASSAY OF MAGNESIUM: CPT

## 2023-06-06 PROCEDURE — 3066F NEPHROPATHY DOC TX: CPT | Mod: CPTII,S$GLB,, | Performed by: NURSE PRACTITIONER

## 2023-06-06 PROCEDURE — 1101F PR PT FALLS ASSESS DOC 0-1 FALLS W/OUT INJ PAST YR: ICD-10-PCS | Mod: CPTII,S$GLB,, | Performed by: NURSE PRACTITIONER

## 2023-06-06 PROCEDURE — 83615 LACTATE (LD) (LDH) ENZYME: CPT

## 2023-06-06 PROCEDURE — 3008F BODY MASS INDEX DOCD: CPT | Mod: CPTII,S$GLB,, | Performed by: NURSE PRACTITIONER

## 2023-06-06 PROCEDURE — 3288F PR FALLS RISK ASSESSMENT DOCUMENTED: ICD-10-PCS | Mod: CPTII,S$GLB,, | Performed by: NURSE PRACTITIONER

## 2023-06-06 PROCEDURE — 82378 CARCINOEMBRYONIC ANTIGEN: CPT

## 2023-06-06 PROCEDURE — 3079F DIAST BP 80-89 MM HG: CPT | Mod: CPTII,S$GLB,, | Performed by: NURSE PRACTITIONER

## 2023-06-06 PROCEDURE — 3074F PR MOST RECENT SYSTOLIC BLOOD PRESSURE < 130 MM HG: ICD-10-PCS | Mod: CPTII,S$GLB,, | Performed by: NURSE PRACTITIONER

## 2023-06-06 PROCEDURE — 99214 PR OFFICE/OUTPT VISIT, EST, LEVL IV, 30-39 MIN: ICD-10-PCS | Mod: S$GLB,,, | Performed by: NURSE PRACTITIONER

## 2023-06-06 PROCEDURE — 3008F PR BODY MASS INDEX (BMI) DOCUMENTED: ICD-10-PCS | Mod: CPTII,S$GLB,, | Performed by: NURSE PRACTITIONER

## 2023-06-06 RX ORDER — MIRTAZAPINE 7.5 MG/1
7.5 TABLET, FILM COATED ORAL NIGHTLY
Qty: 30 TABLET | Refills: 3 | Status: SHIPPED | OUTPATIENT
Start: 2023-06-06 | End: 2023-07-18

## 2023-06-06 NOTE — PROGRESS NOTES
HEMATOLOGY / ONCOLOGY   CLINIC NOTE     ONCOLOGICAL HISTORY:     Diagnosis:  - NSCLC / Adenocarcinoma Stage IIIA pT2a pN2--dx 12/2022  - History Uterine cancer - Dx 1989    Current Treatment:   - Carboplatin + Pemetrexed started 3/15/2023    Treatment History:  - 1989 s/p total hysterectomy   - 12/08/2022: Right lower and middle bilobectomy with mediastinal lymph node dissection.   - Adjuvant chemotherapy - Carboplatin + Pemetrexed     Plan of care: Adjuvant chemotherapy x 4 cycles --> radiation--> Immunotherapy q 4 weeks x 1 yr.    IMAGING:   - 08/10/2022 PET: The elongated subpleural right lower lobe nodule demonstrates fairly intense FDG activity.  Infectious, inflammatory and neoplastic etiologies remain possible. No suspicious PET findings elsewhere.  - 12/08/2022 CT HEAD without contrast: No acute intracranial abnormality identified.  Findings of mild microvascular ischemic disease.  - 12/2022: U/S Upper extremity: Thrombophlebitis of the left cephalic vein. No deep venous thrombosis in the left upper extremity.  - 01/03/2023 MRI Brain : GARY   - 01/12/2023 NM PET CT 1. Postsurgical changes of recent right lower and middle lobectomies.  There is a small right pleural effusion.  There is nonspecific peripheral uptake along the pleura at the right lung base and perihilar region which may be related to healing response in the setting of recent surgery.  Similarly borderline mediastinal uptake is nonspecific in the recent postoperative setting and could be reactive.  Continued attention recommended on follow-up. 2. Focal uptake at the medial right acetabular roof without appreciable focal osseous lesion by CT evaluation.  This is new compared to previous exam.  Metastasis is a consideration    PATHOLOGY:  - 12/08/2022: Invasive solid adenocarcinoma (3.1 CM); G3, poorly differentiated. Visceral Pleura Invasion present without definite serosal surface involvement. Lymphovascular invasion (Vein) present. All  margins negative for invasive carcinoma, distance 2.5 cm. LN - 4/14 (10R: Hilar, Posterior mediastinal (RACHEAL), 4R Lower paratracheal, 9R Pulmonary ligament, 10R). pT2a pN2    - 02/22/2023: RIGHT ACETABULAR LESION, CT - GUIDE CORE NEEDLE BIOPSY : FIBRINOPURULENT EXUDATE ADMIXED WITH ATYPICAL EPITHELIOID GROUPS,   NORMAL    MARROW ELEMENTS AND ABUNDANT BLOOD CLOT    Subjective:     HPI  Mansi Sabrina Jin  63 y.o.  female with past medical history significant for HTN, HLD, uterine cancer status post total hysterectomy in 1989,  CKD Stage 2, COPD Stage 3, referred for management of NSCLC s/p RLL / RML lobectomy with Mediastinal Lymph Node Dissection on 12/8/2022.     She was noted to have RLL lung mass and underwent CT guided biopsy which was non diagnostic. PET scan demonstrated significant avidity within the lesion. Given high risk for malignancy, she underwent right lower lobectomy and right middle lobectomy on 12/08/2022. Pathology came back with invasive adenocarcinoma, poorly differentiated with solid morphology, focal tumor involvement of visceral pleura and focal tumor vascular invasion (vein), with mediastinal lymph nodes positive for metastatic carcinoma.      PET on 01/12/2023 showed focal uptake at the medial right acetabular roof but the biopsy was negative for malignancy. Discussed with radiology, they recommended adjuvant chemotherapy and then they will reassess later for radiation therapy.     Chief Complaint: Lung Cancer (Pt reports no new issues or concerns today)        Interval History:   Ms. Jin presents today for follow-up with her daughter.  She is doing well today overall. She reports she is no longer coughing and has stopped tessalon pearls and Pepcid. She has intermittent right sided, sharp pains that have been present since surgery that are improving, she does not use any pain medication and it is manageable.  She manages constipation with colace. She has undergone markings for radiation and  likely to start radiation next week, they are awaiting start date. She notes new onset of anxiety and low appetite  associated with thoughts of radiation. She denies any shortness of breath, chest pain, fever, chills, vomiting, diarrhea, neuropathy, or recent hospitalizations.      Past Medical History:   Diagnosis Date    ASTHMA     COPD (chronic obstructive pulmonary disease)     SEVERE    High cholesterol     HLD (hyperlipidemia)     HTN (hypertension)     Lung cancer 2022    invasive adenocarcinoma, poorly differentiated with solid morphology, focal tumor involvement of visceral pleura and focal tumor vascular invasion (vein), with 2 of 3 posterior mediastinal lymph nodes positive for metastatic carcinoma    Stage 2 chronic kidney disease     TIA (transient ischemic attack)     Tobacco abuse     Unspecified osteoarthritis, unspecified site     Uterine cancer       Past Surgical History:   Procedure Laterality Date    HYSTERECTOMY  Unknowing    lipoma multiple sites      LUNG LOBECTOMY Right 2022    Procedure: LOBECTOMY, LUNG;  Surgeon: Jass Browne IV, MD;  Location: [facility] OR;  Service: Cardiothoracic;  Laterality: Right;    SURGICAL REMOVAL OF LYMPH NODE  2022    Procedure: EXCISION, LYMPH NODE;  Surgeon: Jass Browne IV, MD;  Location: [facility] OR;  Service: Cardiothoracic;;    THORACOTOMY Right 2022    Procedure: THORACOTOMY;  Surgeon: Jass Browne IV, MD;  Location: [facility] OR;  Service: Cardiothoracic;  Laterality: Right;  Right Lower Lobectomy with Lymph Node Dissection    TOTAL ABDOMINAL HYSTERECTOMY W/ BILATERAL SALPINGOOPHORECTOMY       Social History     Socioeconomic History    Marital status: Single    Number of children: 5   Tobacco Use    Smoking status: Former     Packs/day: 0.50     Years: 50.00     Pack years: 25.00     Types: Cigarettes     Quit date: 2022     Years since quittin.4    Smokeless tobacco: Never   Substance and Sexual Activity    Alcohol use:  Not Currently     Comment: quit 25years ago    Drug use: Never    Sexual activity: Yes     Partners: Male      Family History   Problem Relation Age of Onset    Hypertension Mother     Hepatitis Mother     Ovarian cancer Mother     Liver cancer Mother     Heart failure Father     Prostate cancer Father     Fibroids Sister     Asthma Brother     Asthma Brother     Cancer Maternal Grandfather       Review of patient's allergies indicates:  No Known Allergies     Review of Systems   Constitutional:  Positive for fatigue. Negative for activity change, appetite change, chills, fever and unexpected weight change.   HENT: Negative.  Negative for mouth dryness, mouth sores, nosebleeds, sore throat and trouble swallowing.    Eyes: Negative.  Negative for visual disturbance.   Respiratory:  Positive for shortness of breath (on exertion, chronic). Negative for cough.    Cardiovascular:  Negative for chest pain, palpitations and leg swelling.   Gastrointestinal:  Negative for abdominal distention, abdominal pain, blood in stool, change in bowel habit, constipation, diarrhea, nausea, vomiting and change in bowel habit.   Endocrine: Negative.    Genitourinary: Negative.  Negative for dysuria, frequency, hematuria and urgency.   Musculoskeletal:  Negative for arthralgias, back pain, leg pain, myalgias and neck pain.   Integumentary:  Negative for rash, breast mass, breast discharge and breast tenderness. Negative.   Allergic/Immunologic: Negative.    Neurological:  Negative for dizziness, tremors, syncope, speech difficulty, weakness, light-headedness, numbness, headaches and memory loss.   Hematological: Negative.  Does not bruise/bleed easily.   Psychiatric/Behavioral: Negative.  Negative for confusion and suicidal ideas.    Breast: Negative for mass and tenderness      Objective:        Vitals:    06/06/23 0829   BP: 126/81   Pulse: 70   Resp: 18   Temp: 98.3 °F (36.8 °C)     Wt Readings from Last 6 Encounters:   06/06/23 67.8  kg (149 lb 7.6 oz)   05/18/23 67.6 kg (149 lb)   05/16/23 69 kg (152 lb 3.2 oz)   05/16/23 69 kg (152 lb 3.2 oz)   04/27/23 69.9 kg (154 lb)   04/26/23 70 kg (154 lb 5.2 oz)     Body mass index is 23.41 kg/m².  Body surface area is 1.79 meters squared.       Physical Exam  Vitals and nursing note reviewed.   Constitutional:       Appearance: Normal appearance.   HENT:      Head: Normocephalic and atraumatic.   Eyes:      General: No scleral icterus.     Extraocular Movements: Extraocular movements intact.      Conjunctiva/sclera: Conjunctivae normal.   Neck:      Vascular: No JVD.   Cardiovascular:      Rate and Rhythm: Normal rate and regular rhythm.      Heart sounds: No murmur heard.  Pulmonary:      Effort: Pulmonary effort is normal.      Breath sounds: Decreased breath sounds present. No wheezing or rhonchi.   Abdominal:      General: Bowel sounds are normal. There is no distension.      Palpations: Abdomen is soft.      Tenderness: There is no abdominal tenderness.   Musculoskeletal:      Cervical back: Neck supple.   Lymphadenopathy:      Head:      Right side of head: No submental or submandibular adenopathy.      Left side of head: No submental or submandibular adenopathy.      Cervical: No cervical adenopathy.      Upper Body:      Right upper body: No supraclavicular or axillary adenopathy.      Left upper body: No supraclavicular or axillary adenopathy.      Lower Body: No right inguinal adenopathy. No left inguinal adenopathy.      Comments: No adenopathy noted bilaterally   Skin:     General: Skin is warm.      Coloration: Skin is not jaundiced.      Findings: No lesion or rash.   Neurological:      General: No focal deficit present.      Mental Status: She is alert and oriented to person, place, and time.      Cranial Nerves: Cranial nerves 2-12 are intact.      Gait: Gait normal.   Psychiatric:         Attention and Perception: Attention normal.         Speech: Speech normal.         Behavior:  Behavior is cooperative.         Cognition and Memory: Cognition normal.         Judgment: Judgment normal.     LABS      Lab Visit on 06/06/2023   Component Date Value    Sodium Level 06/06/2023 142     Potassium Level 06/06/2023 3.5     Chloride 06/06/2023 107     Carbon Dioxide 06/06/2023 27     Glucose Level 06/06/2023 104     Blood Urea Nitrogen 06/06/2023 8.0 (L)     Creatinine 06/06/2023 1.08 (H)     Calcium Level Total 06/06/2023 9.8     Protein Total 06/06/2023 7.8 (H)     Albumin Level 06/06/2023 3.9     Globulin 06/06/2023 3.9 (H)     Albumin/Globulin Ratio 06/06/2023 1.0 (L)     Bilirubin Total 06/06/2023 0.3     Alkaline Phosphatase 06/06/2023 142     Alanine Aminotransferase 06/06/2023 11     Aspartate Aminotransfera* 06/06/2023 19     eGFR 06/06/2023 57     Magnesium Level 06/06/2023 2.00     Phosphorus Level 06/06/2023 2.6     Lactate Dehydrogenase 06/06/2023 257 (H)     WBC 06/06/2023 2.64 (L)     RBC 06/06/2023 3.98 (L)     Hgb 06/06/2023 11.0 (L)     Hct 06/06/2023 36.0 (L)     MCV 06/06/2023 90.5     MCH 06/06/2023 27.6     MCHC 06/06/2023 30.6 (L)     RDW 06/06/2023 16.6     Platelet 06/06/2023 233     MPV 06/06/2023 9.9     Neut % 06/06/2023 48.5     Lymph % 06/06/2023 38.6     Mono % 06/06/2023 9.5     Eos % 06/06/2023 3.0     Basophil % 06/06/2023 0.0     Lymph # 06/06/2023 1.02     Neut # 06/06/2023 1.28 (L)     Mono # 06/06/2023 0.25     Eos # 06/06/2023 0.08     Baso # 06/06/2023 0.00     IG# 06/06/2023 0.01     IG% 06/06/2023 0.4          Assessment:   ECOG Performance status: 2 - 3.     NSCLC / Adenocarcinoma Stage IIIA pT2a pN2  - 12/08/2022: Right lower and middle bilobectomy with mediastinal lymph node dissection. Path: Invasive adenocarcinoma, poorly differentiated, focal tumor involvement of visceral pleura and focal tumor vascular invasion (vein), with mediastinal lymph nodes positive for metastatic carcinoma   - TMB - 5, MSI-S, MET amplification, ROS1.  No mutations in EGFR, BRAF,  ALK, ERBB2, KRAS, RET,    - 1% tumor cells are positive for PD-L1   - patient evaluated by Radiation Oncology with recommendation to treat with chemotherapy 1st and will re-evaluate the patient by the end of chemotherapy for radiation therapy  - 01/12/2023 PET: Focal uptake at the medial right acetabular roof. Metastasis is a consideration  - 02/22/2023: RIGHT ACETABULAR LESION, CT - GUIDE CORE NEEDLE BIOPSY : FIBRINOPURULENT EXUDATE ADMIXED WITH ATYPICAL EPITHELIOID GROUPS,   NORMAL    MARROW ELEMENTS AND ABUNDANT BLOOD CLOT  PET scan showed a suspicious finding in the right acetabular roof and patient then had a CT-guided biopsy no evidence of malignancy. Started with adjuvant chemotherapy with Carboplatin and pemetrexed x 4 cycles (3/15/23-5/18/23)   Patient has been seen by Rad/Onc (Dr Mireles) since the completion of her chemotherapy and it has been decided to pursue radiation x 5-6 weeks. She has undergone mapping and slated to begin next week. After the completion of radiation we will consider immunotherapy with atezolizumab/pembrolizumab.       Right chest wall pain  She reports this has been present since after surgery.  Pain is intermittent and sharp but is improving and very infrequent.  Most likely related to nerves.  She denies any increased shortness of breath.      Dry cough  Resolved, she may use pepcid or tessalon Perles as needed.   Recommended follow up with pulmonology if dry cough is persistent.    Leg pain  Improved, she may use baclofen 5 mg 3 times daily as needed.    Anxiety/Low appetite               Will start her on Remeron 7.5mg low dose QHS. If not helpful by next visit we can increase the dose.                 Plan:   - Labs reviewed. WBC/ANC decreased, educated on neutropenic precautions and to call with any s/s of infection.   - RTC in 3 weeks with MD/labs  - Continue f/u with Dr Mireles and radiation to start possibly next week.   - Possible adjuvant immunotherapy with atezolizumab  vs pembrolizumab after radiation completion.   - cbc, cmp, mag, phos, TSH, LDH- 1 hr prior @ [facility]

## 2023-06-07 DIAGNOSIS — C79.52 SECONDARY MALIGNANT NEOPLASM OF BONE AND BONE MARROW: Primary | ICD-10-CM

## 2023-06-07 DIAGNOSIS — C79.51 SECONDARY MALIGNANT NEOPLASM OF BONE AND BONE MARROW: Primary | ICD-10-CM

## 2023-06-15 ENCOUNTER — HOSPITAL ENCOUNTER (OUTPATIENT)
Dept: RADIOLOGY | Facility: HOSPITAL | Age: 63
Discharge: HOME | End: 2023-06-15
Attending: RADIOLOGY
Payer: MEDICARE

## 2023-06-15 DIAGNOSIS — C79.51 SECONDARY MALIGNANT NEOPLASM OF BONE AND BONE MARROW: ICD-10-CM

## 2023-06-15 DIAGNOSIS — C79.52 SECONDARY MALIGNANT NEOPLASM OF BONE AND BONE MARROW: ICD-10-CM

## 2023-06-15 PROCEDURE — 25500020 PHARM REV CODE 255: Performed by: RADIOLOGY

## 2023-06-15 PROCEDURE — A9577 INJ MULTIHANCE: HCPCS | Performed by: RADIOLOGY

## 2023-06-15 PROCEDURE — 72157 MRI CHEST SPINE W/O & W/DYE: CPT | Mod: TC

## 2023-06-15 RX ADMIN — GADOBENATE DIMEGLUMINE 10 ML: 529 INJECTION, SOLUTION INTRAVENOUS at 10:06

## 2023-06-16 DIAGNOSIS — R93.7 ABNORMAL MRI, THORACIC SPINE: Primary | ICD-10-CM

## 2023-06-21 ENCOUNTER — HOSPITAL ENCOUNTER (EMERGENCY)
Facility: HOSPITAL | Age: 63
Discharge: HOME | End: 2023-06-21
Attending: INTERNAL MEDICINE
Payer: MEDICARE

## 2023-06-21 VITALS
TEMPERATURE: 98 F | DIASTOLIC BLOOD PRESSURE: 75 MMHG | HEART RATE: 102 BPM | RESPIRATION RATE: 16 BRPM | OXYGEN SATURATION: 99 % | HEIGHT: 67 IN | BODY MASS INDEX: 23.86 KG/M2 | WEIGHT: 152 LBS | SYSTOLIC BLOOD PRESSURE: 119 MMHG

## 2023-06-21 DIAGNOSIS — M62.838 MUSCLE SPASM OF RIGHT LEG: Primary | ICD-10-CM

## 2023-06-21 DIAGNOSIS — C34.90 NON-SMALL CELL LUNG CANCER, UNSPECIFIED LATERALITY: ICD-10-CM

## 2023-06-21 DIAGNOSIS — M54.50 CHRONIC RIGHT-SIDED LOW BACK PAIN WITHOUT SCIATICA: ICD-10-CM

## 2023-06-21 DIAGNOSIS — G89.29 CHRONIC RIGHT-SIDED LOW BACK PAIN WITHOUT SCIATICA: ICD-10-CM

## 2023-06-21 LAB
ANION GAP SERPL CALC-SCNC: 9 MEQ/L
BASOPHILS # BLD AUTO: 0.03 X10(3)/MCL
BASOPHILS NFR BLD AUTO: 0.4 %
BUN SERPL-MCNC: 7 MG/DL (ref 9.8–20.1)
CALCIUM SERPL-MCNC: 9.6 MG/DL (ref 8.4–10.2)
CHLORIDE SERPL-SCNC: 105 MMOL/L (ref 98–107)
CO2 SERPL-SCNC: 27 MMOL/L (ref 23–31)
CREAT SERPL-MCNC: 1.03 MG/DL (ref 0.55–1.02)
CREAT/UREA NIT SERPL: 7
EOSINOPHIL # BLD AUTO: 0.04 X10(3)/MCL (ref 0–0.9)
EOSINOPHIL NFR BLD AUTO: 0.5 %
ERYTHROCYTE [DISTWIDTH] IN BLOOD BY AUTOMATED COUNT: 17.1 % (ref 11.5–17)
GFR SERPLBLD CREATININE-BSD FMLA CKD-EPI: 60 MLS/MIN/1.73/M2
GLUCOSE SERPL-MCNC: 123 MG/DL (ref 82–115)
HCT VFR BLD AUTO: 35.3 % (ref 37–47)
HGB BLD-MCNC: 11 G/DL (ref 12–16)
IMM GRANULOCYTES # BLD AUTO: 0.02 X10(3)/MCL (ref 0–0.04)
IMM GRANULOCYTES NFR BLD AUTO: 0.2 %
LYMPHOCYTES # BLD AUTO: 1.84 X10(3)/MCL (ref 0.6–4.6)
LYMPHOCYTES NFR BLD AUTO: 21.9 %
MCH RBC QN AUTO: 28.1 PG (ref 27–31)
MCHC RBC AUTO-ENTMCNC: 31.2 G/DL (ref 33–36)
MCV RBC AUTO: 90.1 FL (ref 80–94)
MONOCYTES # BLD AUTO: 0.45 X10(3)/MCL (ref 0.1–1.3)
MONOCYTES NFR BLD AUTO: 5.4 %
NEUTROPHILS # BLD AUTO: 6.02 X10(3)/MCL (ref 2.1–9.2)
NEUTROPHILS NFR BLD AUTO: 71.6 %
PLATELET # BLD AUTO: 372 X10(3)/MCL (ref 130–400)
PMV BLD AUTO: 9.3 FL (ref 7.4–10.4)
POTASSIUM SERPL-SCNC: 3.6 MMOL/L (ref 3.5–5.1)
RBC # BLD AUTO: 3.92 X10(6)/MCL (ref 4.2–5.4)
SODIUM SERPL-SCNC: 141 MMOL/L (ref 136–145)
WBC # SPEC AUTO: 8.4 X10(3)/MCL (ref 4.5–11.5)

## 2023-06-21 PROCEDURE — 96372 THER/PROPH/DIAG INJ SC/IM: CPT | Performed by: INTERNAL MEDICINE

## 2023-06-21 PROCEDURE — 99284 EMERGENCY DEPT VISIT MOD MDM: CPT

## 2023-06-21 PROCEDURE — 63600175 PHARM REV CODE 636 W HCPCS: Performed by: INTERNAL MEDICINE

## 2023-06-21 PROCEDURE — 80048 BASIC METABOLIC PNL TOTAL CA: CPT | Performed by: INTERNAL MEDICINE

## 2023-06-21 PROCEDURE — 85025 COMPLETE CBC W/AUTO DIFF WBC: CPT | Performed by: INTERNAL MEDICINE

## 2023-06-21 RX ORDER — BACLOFEN 5 MG/1
5 TABLET ORAL EVERY 8 HOURS PRN
Qty: 30 TABLET | Refills: 0 | Status: SHIPPED | OUTPATIENT
Start: 2023-06-21 | End: 2023-07-01

## 2023-06-21 RX ORDER — KETOROLAC TROMETHAMINE 30 MG/ML
60 INJECTION, SOLUTION INTRAMUSCULAR; INTRAVENOUS
Status: COMPLETED | OUTPATIENT
Start: 2023-06-21 | End: 2023-06-21

## 2023-06-21 RX ADMIN — KETOROLAC TROMETHAMINE 60 MG: 30 INJECTION, SOLUTION INTRAMUSCULAR at 07:06

## 2023-06-21 NOTE — ED PROVIDER NOTES
Encounter Date: 6/21/2023       History     Chief Complaint   Patient presents with    Leg Pain     Non traumatic right thigh pain / cramping      63-year-old black female reports emergency department complaining of right thigh pain.  She reports that she has metastatic lung cancer and has had a part of her lung removed and recently was told she has a spot in her spine in her pains pretty well-controlled with baclofen and she is out and has been called in the oncologist office for refills and reports that they have not called her back nor refilled her baclofen.  She reports she sees Dr. Jason glez and the [facility]    Review of patient's allergies indicates:  No Known Allergies  Past Medical History:   Diagnosis Date    ASTHMA     COPD (chronic obstructive pulmonary disease)     SEVERE    High cholesterol     HLD (hyperlipidemia)     HTN (hypertension)     Lung cancer 12/08/2022    invasive adenocarcinoma, poorly differentiated with solid morphology, focal tumor involvement of visceral pleura and focal tumor vascular invasion (vein), with 2 of 3 posterior mediastinal lymph nodes positive for metastatic carcinoma    Stage 2 chronic kidney disease     TIA (transient ischemic attack)     Tobacco abuse     Unspecified osteoarthritis, unspecified site     Uterine cancer      Past Surgical History:   Procedure Laterality Date    HYSTERECTOMY  Unknowing    lipoma multiple sites      LUNG LOBECTOMY Right 12/08/2022    Procedure: LOBECTOMY, LUNG;  Surgeon: Jass Browne IV, MD;  Location: [facility] OR;  Service: Cardiothoracic;  Laterality: Right;    SURGICAL REMOVAL OF LYMPH NODE  12/08/2022    Procedure: EXCISION, LYMPH NODE;  Surgeon: Jass Browne IV, MD;  Location: [facility] OR;  Service: Cardiothoracic;;    THORACOTOMY Right 12/08/2022    Procedure: THORACOTOMY;  Surgeon: Jass Browne IV, MD;  Location: [facility] OR;  Service: Cardiothoracic;  Laterality: Right;  Right Lower Lobectomy with Lymph Node  Dissection    TOTAL ABDOMINAL HYSTERECTOMY W/ BILATERAL SALPINGOOPHORECTOMY       Family History   Problem Relation Age of Onset    Hypertension Mother     Hepatitis Mother     Ovarian cancer Mother     Liver cancer Mother     Heart failure Father     Prostate cancer Father     Fibroids Sister     Asthma Brother     Asthma Brother     Cancer Maternal Grandfather      Social History     Tobacco Use    Smoking status: Former     Packs/day: 0.50     Years: 50.00     Pack years: 25.00     Types: Cigarettes     Quit date: 2022     Years since quittin.5    Smokeless tobacco: Never   Substance Use Topics    Alcohol use: Not Currently     Comment: quit 25years ago    Drug use: Never     Review of Systems   Constitutional: Negative.  Negative for activity change, appetite change, chills, diaphoresis, fatigue, fever and unexpected weight change.   HENT: Negative.  Negative for congestion, dental problem, drooling, ear discharge, ear pain, facial swelling, hearing loss, mouth sores, nosebleeds, postnasal drip, rhinorrhea, sinus pressure, sinus pain, sneezing, sore throat, tinnitus, trouble swallowing and voice change.    Eyes: Negative.  Negative for photophobia, pain, discharge, redness, itching and visual disturbance.   Respiratory: Negative.  Negative for apnea, cough, choking, chest tightness, shortness of breath, wheezing and stridor.    Cardiovascular: Negative.  Negative for chest pain, palpitations and leg swelling.   Gastrointestinal: Negative.  Negative for abdominal distention, abdominal pain, anal bleeding, blood in stool, constipation, diarrhea, nausea, rectal pain and vomiting.   Endocrine: Negative.  Negative for cold intolerance, heat intolerance, polydipsia, polyphagia and polyuria.   Genitourinary: Negative.  Negative for decreased urine volume, difficulty urinating, dyspareunia, dysuria, enuresis, flank pain, frequency, genital sores, hematuria, menstrual problem, pelvic pain, urgency, vaginal  bleeding, vaginal discharge and vaginal pain.   Musculoskeletal:  Positive for gait problem and myalgias. Negative for arthralgias, back pain, joint swelling, neck pain and neck stiffness.   Skin: Negative.  Negative for color change, pallor, rash and wound.   Allergic/Immunologic: Negative.  Negative for environmental allergies, food allergies and immunocompromised state.   Neurological:  Negative for dizziness, tremors, seizures, syncope, facial asymmetry, speech difficulty, weakness, light-headedness, numbness and headaches.   Hematological: Negative.  Negative for adenopathy. Does not bruise/bleed easily.   Psychiatric/Behavioral: Negative.  Negative for agitation, behavioral problems, confusion, decreased concentration, dysphoric mood, hallucinations, self-injury, sleep disturbance and suicidal ideas. The patient is not nervous/anxious and is not hyperactive.    All other systems reviewed and are negative.    Physical Exam     Initial Vitals [06/21/23 1843]   BP Pulse Resp Temp SpO2   (!) 134/95 (!) 112 17 97.9 °F (36.6 °C) 95 %      MAP       --         Physical Exam    Nursing note and vitals reviewed.  Constitutional: She appears well-developed and well-nourished. She is not diaphoretic. No distress.   HENT:   Head: Normocephalic and atraumatic.   Mouth/Throat: Oropharynx is clear and moist. No oropharyngeal exudate.   Eyes: Conjunctivae and EOM are normal. Pupils are equal, round, and reactive to light. No scleral icterus.   Neck: Neck supple. No JVD present.   Normal range of motion.  Cardiovascular:  Regular rhythm, normal heart sounds and intact distal pulses.     Exam reveals no gallop and no friction rub.       No murmur heard.  Tachycardia   Pulmonary/Chest: Breath sounds normal. No respiratory distress. She has no wheezes. She exhibits no tenderness.   Abdominal: Abdomen is soft. Bowel sounds are normal. She exhibits no distension. There is no abdominal tenderness. There is no rebound.    Musculoskeletal:         General: Normal range of motion.      Cervical back: Normal range of motion and neck supple.      Comments: Tender palpation in anterior right thigh, there is no deformity.  Patient is got full range of motion  of the hip in ambulates with a walker and reports the pain is only when she is ambulating.  She is currently pain-free while sitting in bed     Neurological: She is alert and oriented to person, place, and time. She has normal strength.   Skin: Skin is warm and dry. Capillary refill takes less than 2 seconds.   Psychiatric: She has a normal mood and affect. Her behavior is normal. Judgment and thought content normal.       ED Course   Procedures  Admission on 06/21/2023   Component Date Value Ref Range Status    Sodium Level 06/21/2023 141  136 - 145 mmol/L Final    Potassium Level 06/21/2023 3.6  3.5 - 5.1 mmol/L Final    Chloride 06/21/2023 105  98 - 107 mmol/L Final    Carbon Dioxide 06/21/2023 27  23 - 31 mmol/L Final    Glucose Level 06/21/2023 123 (H)  82 - 115 mg/dL Final    Blood Urea Nitrogen 06/21/2023 7.0 (L)  9.8 - 20.1 mg/dL Final    Creatinine 06/21/2023 1.03 (H)  0.55 - 1.02 mg/dL Final    BUN/Creatinine Ratio 06/21/2023 7   Final    Calcium Level Total 06/21/2023 9.6  8.4 - 10.2 mg/dL Final    Anion Gap 06/21/2023 9.0  mEq/L Final    eGFR 06/21/2023 60  mls/min/1.73/m2 Final    WBC 06/21/2023 8.40  4.50 - 11.50 x10(3)/mcL Final    RBC 06/21/2023 3.92 (L)  4.20 - 5.40 x10(6)/mcL Final    Hgb 06/21/2023 11.0 (L)  12.0 - 16.0 g/dL Final    Hct 06/21/2023 35.3 (L)  37.0 - 47.0 % Final    MCV 06/21/2023 90.1  80.0 - 94.0 fL Final    MCH 06/21/2023 28.1  27.0 - 31.0 pg Final    MCHC 06/21/2023 31.2 (L)  33.0 - 36.0 g/dL Final    RDW 06/21/2023 17.1 (H)  11.5 - 17.0 % Final    Platelet 06/21/2023 372  130 - 400 x10(3)/mcL Final    MPV 06/21/2023 9.3  7.4 - 10.4 fL Final    Neut % 06/21/2023 71.6  % Final    Lymph % 06/21/2023 21.9  % Final    Mono % 06/21/2023 5.4  % Final     Eos % 06/21/2023 0.5  % Final    Basophil % 06/21/2023 0.4  % Final    Lymph # 06/21/2023 1.84  0.6 - 4.6 x10(3)/mcL Final    Neut # 06/21/2023 6.02  2.1 - 9.2 x10(3)/mcL Final    Mono # 06/21/2023 0.45  0.1 - 1.3 x10(3)/mcL Final    Eos # 06/21/2023 0.04  0 - 0.9 x10(3)/mcL Final    Baso # 06/21/2023 0.03  <=0.2 x10(3)/mcL Final    IG# 06/21/2023 0.02  0 - 0.04 x10(3)/mcL Final    IG% 06/21/2023 0.2  % Final       Labs Reviewed   BASIC METABOLIC PANEL - Abnormal; Notable for the following components:       Result Value    Glucose Level 123 (*)     Blood Urea Nitrogen 7.0 (*)     Creatinine 1.03 (*)     All other components within normal limits   CBC WITH DIFFERENTIAL - Abnormal; Notable for the following components:    RBC 3.92 (*)     Hgb 11.0 (*)     Hct 35.3 (*)     MCHC 31.2 (*)     RDW 17.1 (*)     All other components within normal limits   CBC W/ AUTO DIFFERENTIAL    Narrative:     The following orders were created for panel order CBC auto differential.  Procedure                               Abnormality         Status                     ---------                               -----------         ------                     CBC with Differential[[id number]]        Abnormal            Final result                 Please view results for these tests on the individual orders.          Imaging Results    None          Medications   ketorolac injection 60 mg (has no administration in time range)     Medical Decision Making:   Initial Assessment:   63-year-old black female with a history of metastatic lung cancer which is non-small cell with a thoracic met complaining of right thigh pain.  Patient reports she does not want a workup she just really only wants a refill of her baclofen so basically she came to the ER because the cancer center would not refill her baclofen or returned phone call  Differential Diagnosis:   Electrolyte abnormality, muscle spasm, cancer pain, dehydration, symptomatic anemia  Clinical  Tests:   Lab Tests: Ordered                        Clinical Impression:   Final diagnoses:  [M62.838] Muscle spasm of right leg (Primary)  [C34.90] Non-small cell lung cancer, unspecified laterality        ED Disposition Condition    Discharge Stable          ED Prescriptions       Medication Sig Dispense Start Date End Date Auth. Provider    baclofen (LIORESAL) 5 mg Tab tablet Take 1 tablet (5 mg total) by mouth every 8 (eight) hours as needed (Muscle spasms). 30 tablet 6/21/2023 7/1/2023 Michael Shrestha MD          Follow-up Information       Follow up With Specialties Details Why Contact Info    Oncologist  In 2 days            Heather Hill is a certified MA and was present during the entire interaction with this patient      Michael Shrestha MD  06/21/23 2796

## 2023-06-22 ENCOUNTER — OFFICE VISIT (OUTPATIENT)
Dept: URGENT CARE | Facility: CLINIC | Age: 63
End: 2023-06-22
Payer: MEDICARE

## 2023-06-22 VITALS
WEIGHT: 152 LBS | RESPIRATION RATE: 20 BRPM | HEART RATE: 91 BPM | SYSTOLIC BLOOD PRESSURE: 137 MMHG | DIASTOLIC BLOOD PRESSURE: 86 MMHG | OXYGEN SATURATION: 98 % | TEMPERATURE: 98 F | BODY MASS INDEX: 23.86 KG/M2 | HEIGHT: 67 IN

## 2023-06-22 DIAGNOSIS — M79.604 RIGHT LEG PAIN: Primary | ICD-10-CM

## 2023-06-22 PROBLEM — E78.00 HYPERCHOLESTEROLEMIA: Status: ACTIVE | Noted: 2022-07-14

## 2023-06-22 PROBLEM — M89.9 BONE LESION: Status: ACTIVE | Noted: 2023-02-22

## 2023-06-22 PROBLEM — J45.909 ASTHMA: Status: ACTIVE | Noted: 2023-06-22

## 2023-06-22 PROCEDURE — 99215 OFFICE O/P EST HI 40 MIN: CPT | Mod: PBBFAC | Performed by: NURSE PRACTITIONER

## 2023-06-22 PROCEDURE — 99213 OFFICE O/P EST LOW 20 MIN: CPT | Mod: S$PBB,,, | Performed by: NURSE PRACTITIONER

## 2023-06-22 PROCEDURE — 99213 PR OFFICE/OUTPT VISIT, EST, LEVL III, 20-29 MIN: ICD-10-PCS | Mod: S$PBB,,, | Performed by: NURSE PRACTITIONER

## 2023-06-22 RX ORDER — HYDROCODONE BITARTRATE AND ACETAMINOPHEN 7.5; 325 MG/1; MG/1
1 TABLET ORAL EVERY 6 HOURS PRN
Qty: 28 TABLET | Refills: 0 | Status: SHIPPED | OUTPATIENT
Start: 2023-06-22 | End: 2023-06-29

## 2023-06-22 NOTE — PROGRESS NOTES
"Subjective:      Patient ID: Mansi Jin is a 63 y.o. female.    Vitals:  height is 5' 7" (1.702 m) and weight is 68.9 kg (152 lb). Her temperature is 97.8 °F (36.6 °C). Her blood pressure is 137/86 and her pulse is 91. Her respiration is 20 and oxygen saturation is 98%.     Chief Complaint: Spasms (RT lower ext x 2wks, seen in ER YESTERDAY ([facility]) given Baclofen rx)    HPI ER note with Dr. Shrestha reviewed. Pt had labs, results as expected in setting of advanced Cancer, Kidney Disease and COPD. No electrolyte imbalance x2 this month. She went to ER yesterday stating all she wanted was a Baclofen refill and that her symptoms are well controlled on that medication. Today, states she took Baclofen, multiple doses, has no relief of leg pain. States Dr. Gomez does not have her on any chronic pain meds for her cancer. States next appointment with Dr. Gomez is next Tuesday.  ROS   Objective:     Physical Exam   Constitutional: She is oriented to person, place, and time.  Non-toxic appearance. She appears ill. No distress.   Eyes: Conjunctivae are normal.   Pulmonary/Chest: Effort normal.   Musculoskeletal:         General: Tenderness present.      Comments: Wheelchair; appears deconditioned.   Neurological: She is alert and oriented to person, place, and time.   Skin: Skin is warm, dry and not diaphoretic.   Psychiatric: Her behavior is normal. Mood, judgment and thought content normal.   Nursing note and vitals reviewed.chaperone present (2 family members)     Assessment:     1. Right leg pain      Results for orders placed or performed during the hospital encounter of 06/21/23   Basic metabolic panel   Result Value Ref Range    Sodium Level 141 136 - 145 mmol/L    Potassium Level 3.6 3.5 - 5.1 mmol/L    Chloride 105 98 - 107 mmol/L    Carbon Dioxide 27 23 - 31 mmol/L    Glucose Level 123 (H) 82 - 115 mg/dL    Blood Urea Nitrogen 7.0 (L) 9.8 - 20.1 mg/dL    Creatinine 1.03 (H) 0.55 - 1.02 mg/dL    " BUN/Creatinine Ratio 7     Calcium Level Total 9.6 8.4 - 10.2 mg/dL    Anion Gap 9.0 mEq/L    eGFR 60 mls/min/1.73/m2   CBC with Differential   Result Value Ref Range    WBC 8.40 4.50 - 11.50 x10(3)/mcL    RBC 3.92 (L) 4.20 - 5.40 x10(6)/mcL    Hgb 11.0 (L) 12.0 - 16.0 g/dL    Hct 35.3 (L) 37.0 - 47.0 %    MCV 90.1 80.0 - 94.0 fL    MCH 28.1 27.0 - 31.0 pg    MCHC 31.2 (L) 33.0 - 36.0 g/dL    RDW 17.1 (H) 11.5 - 17.0 %    Platelet 372 130 - 400 x10(3)/mcL    MPV 9.3 7.4 - 10.4 fL    Neut % 71.6 %    Lymph % 21.9 %    Mono % 5.4 %    Eos % 0.5 %    Basophil % 0.4 %    Lymph # 1.84 0.6 - 4.6 x10(3)/mcL    Neut # 6.02 2.1 - 9.2 x10(3)/mcL    Mono # 0.45 0.1 - 1.3 x10(3)/mcL    Eos # 0.04 0 - 0.9 x10(3)/mcL    Baso # 0.03 <=0.2 x10(3)/mcL    IG# 0.02 0 - 0.04 x10(3)/mcL    IG% 0.2 %       Plan:       Right leg pain    Other orders  -     HYDROcodone-acetaminophen (NORCO) 7.5-325 mg per tablet; Take 1 tablet by mouth every 6 (six) hours as needed for Pain. May cause drowsiness, take with caution.  Dispense: 28 tablet; Refill: 0          Please read attached patient education for more information and guidance.

## 2023-06-23 DIAGNOSIS — C34.91 NSCLC OF RIGHT LUNG: Primary | ICD-10-CM

## 2023-06-26 ENCOUNTER — CLINICAL SUPPORT (OUTPATIENT)
Dept: AUDIOLOGY | Facility: HOSPITAL | Age: 63
End: 2023-06-26
Payer: MEDICARE

## 2023-06-26 DIAGNOSIS — H90.3 SENSORINEURAL HEARING LOSS, BILATERAL: Primary | ICD-10-CM

## 2023-06-26 PROCEDURE — 77290 THER RAD SIMULAJ FIELD CPLX: CPT | Performed by: RADIOLOGY

## 2023-06-26 PROCEDURE — 92557 COMPREHENSIVE HEARING TEST: CPT | Performed by: AUDIOLOGIST

## 2023-06-26 PROCEDURE — 77334 RADIATION TREATMENT AID(S): CPT | Performed by: RADIOLOGY

## 2023-06-26 PROCEDURE — 92567 TYMPANOMETRY: CPT | Performed by: AUDIOLOGIST

## 2023-06-26 NOTE — PROGRESS NOTES
HEMATOLOGY / ONCOLOGY   CLINIC NOTE     ONCOLOGICAL HISTORY:     Diagnosis:  - NSCLC / Adenocarcinoma Stage IIIA pT2a pN2--dx 12/2022  - History Uterine cancer - Dx 1989    Current Treatment:   - Carboplatin + Pemetrexed started 3/15/2023    Treatment History:  - 1989 s/p total hysterectomy   - 12/08/2022: Right lower and middle bilobectomy with mediastinal lymph node dissection.   - Carboplatin + Pemetrexed  3/15/2023-5/1/2023  - Palliative RT       Plan of care: palliative systemic therapy      IMAGING:   - 08/10/2022 PET: The elongated subpleural right lower lobe nodule demonstrates fairly intense FDG activity.  Infectious, inflammatory and neoplastic etiologies remain possible. No suspicious PET findings elsewhere.  - 12/08/2022 CT HEAD without contrast: No acute intracranial abnormality identified.  Findings of mild microvascular ischemic disease.  - 12/2022: U/S Upper extremity: Thrombophlebitis of the left cephalic vein. No deep venous thrombosis in the left upper extremity.  - 01/03/2023 MRI Brain : GARY   - 01/12/2023 NM PET CT 1. Postsurgical changes of recent right lower and middle lobectomies.  There is a small right pleural effusion.  There is nonspecific peripheral uptake along the pleura at the right lung base and perihilar region which may be related to healing response in the setting of recent surgery.  Similarly borderline mediastinal uptake is nonspecific in the recent postoperative setting and could be reactive.  Continued attention recommended on follow-up. 2. Focal uptake at the medial right acetabular roof without appreciable focal osseous lesion by CT evaluation.  This is new compared to previous exam.  Metastasis is a consideration  -PET 6/5/2023: Previously visualized moderately FDG avid opacities towards the right lung base improved.  Pleural effusion also improved.  No new suspicious PET findings in the lungs.  Severe emphysema. Some left adrenal thickening is similar to prior but  there is now moderate associated FDG activity, max SUV is 3.6. FDG activity is again seen associated with the right superior acetabulum.  FDG avid area appears slightly larger today with slightly increased sclerosis.  SUV is 9.7, previously 6.4.  There is a newly evident subcentimeter FDG avid lesion left pedicle T10. Additional subcentimeter FDG avid lesion T4 vertebral body SUV 3.4.suspicious for metastatic disease.  -MRI T spine 6/15/2023:   1. Foci of abnormal signal intensity and enhancement at the inferior endplate of T3 and at body and left pedicle of T10.  Metastatic neoplastic etiology must be considered.  2. Thoracic spondylosis  3. Mild encroachment into the right neural foramina at the T3-4 and T4-5 secondary to mild posterior disc bulge and posterior osteophyte formation  4. Multilevel mild posterior disc bulge which does not result in significant central spinal stenosis.    PATHOLOGY:  - 12/08/2022: Invasive solid adenocarcinoma (3.1 CM); G3, poorly differentiated. Visceral Pleura Invasion present without definite serosal surface involvement. Lymphovascular invasion (Vein) present. All margins negative for invasive carcinoma, distance 2.5 cm. LN - 4/14 (10R: Hilar, Posterior mediastinal (RACHEAL), 4R Lower paratracheal, 9R Pulmonary ligament, 10R). pT2a pN2    - 02/22/2023: RIGHT ACETABULAR LESION, CT - GUIDE CORE NEEDLE BIOPSY : FIBRINOPURULENT EXUDATE ADMIXED WITH ATYPICAL EPITHELIOID GROUPS,   NORMAL    MARROW ELEMENTS AND ABUNDANT BLOOD CLOT    Subjective:     HPI  Mansi Bennett Davin  63 y.o.  female with past medical history significant for HTN, HLD, uterine cancer status post total hysterectomy in 1989,  CKD Stage 2, COPD Stage 3, referred for management of NSCLC s/p RLL / RML lobectomy with Mediastinal Lymph Node Dissection on 12/8/2022.     She was noted to have RLL lung mass and underwent CT guided biopsy which was non diagnostic. PET scan demonstrated significant avidity within the lesion. Given  high risk for malignancy, she underwent right lower lobectomy and right middle lobectomy on 12/08/2022. Pathology came back with invasive adenocarcinoma, poorly differentiated with solid morphology, focal tumor involvement of visceral pleura and focal tumor vascular invasion (vein), with mediastinal lymph nodes positive for metastatic carcinoma.      PET on 01/12/2023 showed focal uptake at the medial right acetabular roof but the biopsy was negative for malignancy. Discussed with radiology, they recommended adjuvant chemotherapy and then they will reassess later for radiation therapy.     Chief Complaint: Lung Cancer (F/u with labs-- Patient reports right hip pain rates it 4/10)      Interval History:   Ms. Jin presents today for follow-up with her daughter.  Pt has lab results as well. Patient was in the hospital on 6/21/23 for leg pain and discharged.  Seems like patient with bone metastases.  She will begin palliative radiation treatment with Dr. Mireles on 6/28/23, 10 treatments over two weeks. She is doing well today overall. She reports she is no longer coughing and has stopped tessalon pearls and Pepcid. She has intermittent right sided, sharp pains that have been present since surgery that are improving, she does not use any pain medication and it is manageable.  She manages constipation with colace. She denies any shortness of breath, chest pain, fever, chills, vomiting, diarrhea, neuropathy, or recent hospitalizations.       Past Medical History:   Diagnosis Date    ASTHMA     COPD (chronic obstructive pulmonary disease)     SEVERE    High cholesterol     HLD (hyperlipidemia)     HTN (hypertension)     Lung cancer 12/08/2022    invasive adenocarcinoma, poorly differentiated with solid morphology, focal tumor involvement of visceral pleura and focal tumor vascular invasion (vein), with 2 of 3 posterior mediastinal lymph nodes positive for metastatic carcinoma    Stage 2 chronic kidney disease     TIA  (transient ischemic attack)     Tobacco abuse     Unspecified osteoarthritis, unspecified site     Uterine cancer       Past Surgical History:   Procedure Laterality Date    HYSTERECTOMY  Unknowing    lipoma multiple sites      LUNG LOBECTOMY Right 2022    Procedure: LOBECTOMY, LUNG;  Surgeon: Jass Browne IV, MD;  Location: [facility] OR;  Service: Cardiothoracic;  Laterality: Right;    SURGICAL REMOVAL OF LYMPH NODE  2022    Procedure: EXCISION, LYMPH NODE;  Surgeon: Jass Browne IV, MD;  Location: [facility] OR;  Service: Cardiothoracic;;    THORACOTOMY Right 2022    Procedure: THORACOTOMY;  Surgeon: Jass Browne IV, MD;  Location: [facility] OR;  Service: Cardiothoracic;  Laterality: Right;  Right Lower Lobectomy with Lymph Node Dissection    TOTAL ABDOMINAL HYSTERECTOMY W/ BILATERAL SALPINGOOPHORECTOMY       Social History     Socioeconomic History    Marital status: Single    Number of children: 5   Tobacco Use    Smoking status: Former     Packs/day: 0.50     Years: 50.00     Pack years: 25.00     Types: Cigarettes     Quit date: 2022     Years since quittin.5    Smokeless tobacco: Never   Substance and Sexual Activity    Alcohol use: Not Currently     Comment: quit 25years ago    Drug use: Never    Sexual activity: Yes     Partners: Male      Family History   Problem Relation Age of Onset    Hypertension Mother     Hepatitis Mother     Ovarian cancer Mother     Liver cancer Mother     Heart failure Father     Prostate cancer Father     Fibroids Sister     Asthma Brother     Asthma Brother     Cancer Maternal Grandfather       Review of patient's allergies indicates:  No Known Allergies     Review of Systems   Constitutional:  Positive for fatigue. Negative for activity change, appetite change, chills, fever and unexpected weight change.   HENT: Negative.  Negative for mouth dryness, mouth sores, nosebleeds, sore throat and trouble swallowing.    Eyes: Negative.  Negative for visual  disturbance.   Respiratory:  Positive for shortness of breath (on exertion, chronic). Negative for cough.    Cardiovascular:  Negative for chest pain, palpitations and leg swelling.   Gastrointestinal:  Negative for abdominal distention, abdominal pain, blood in stool, change in bowel habit, constipation, diarrhea, nausea, vomiting and change in bowel habit.   Endocrine: Negative.    Genitourinary: Negative.  Negative for dysuria, frequency, hematuria and urgency.   Musculoskeletal:  Negative for arthralgias, back pain, leg pain, myalgias and neck pain.   Integumentary:  Negative for rash, breast mass, breast discharge and breast tenderness. Negative.   Allergic/Immunologic: Negative.    Neurological:  Negative for dizziness, tremors, syncope, speech difficulty, weakness, light-headedness, numbness, headaches and memory loss.   Hematological: Negative.  Does not bruise/bleed easily.   Psychiatric/Behavioral: Negative.  Negative for confusion and suicidal ideas.    Breast: Negative for mass and tenderness      Objective:        Vitals:    06/27/23 1116   BP: 127/84   Pulse: 84   Resp: 20   Temp: 97.7 °F (36.5 °C)       Wt Readings from Last 6 Encounters:   06/27/23 68 kg (150 lb)   06/22/23 68.9 kg (152 lb)   06/21/23 68.9 kg (152 lb)   06/06/23 67.8 kg (149 lb 7.6 oz)   05/18/23 67.6 kg (149 lb)   05/16/23 69 kg (152 lb 3.2 oz)     Body mass index is 23.49 kg/m².  Body surface area is 1.79 meters squared.       Physical Exam  Vitals and nursing note reviewed.   Constitutional:       Appearance: Normal appearance.   HENT:      Head: Normocephalic and atraumatic.   Eyes:      General: No scleral icterus.     Extraocular Movements: Extraocular movements intact.      Conjunctiva/sclera: Conjunctivae normal.   Neck:      Vascular: No JVD.   Cardiovascular:      Rate and Rhythm: Normal rate and regular rhythm.      Heart sounds: No murmur heard.  Pulmonary:      Effort: Pulmonary effort is normal.      Breath sounds:  Decreased breath sounds present. No wheezing or rhonchi.   Abdominal:      General: Bowel sounds are normal. There is no distension.      Palpations: Abdomen is soft.      Tenderness: There is no abdominal tenderness.   Musculoskeletal:      Cervical back: Neck supple.   Lymphadenopathy:      Head:      Right side of head: No submental or submandibular adenopathy.      Left side of head: No submental or submandibular adenopathy.      Cervical: No cervical adenopathy.      Upper Body:      Right upper body: No supraclavicular or axillary adenopathy.      Left upper body: No supraclavicular or axillary adenopathy.      Lower Body: No right inguinal adenopathy. No left inguinal adenopathy.      Comments: No adenopathy noted bilaterally   Skin:     General: Skin is warm.      Coloration: Skin is not jaundiced.      Findings: No lesion or rash.   Neurological:      General: No focal deficit present.      Mental Status: She is alert and oriented to person, place, and time.      Cranial Nerves: Cranial nerves 2-12 are intact.      Gait: Gait normal.   Psychiatric:         Attention and Perception: Attention normal.         Speech: Speech normal.         Behavior: Behavior is cooperative.         Cognition and Memory: Cognition normal.         Judgment: Judgment normal.     LABS      Lab Visit on 06/27/2023   Component Date Value    Sodium Level 06/27/2023 142     Potassium Level 06/27/2023 3.8     Chloride 06/27/2023 106     Carbon Dioxide 06/27/2023 29     Glucose Level 06/27/2023 93     Blood Urea Nitrogen 06/27/2023 8.0 (L)     Creatinine 06/27/2023 0.95     Calcium Level Total 06/27/2023 10.2     Protein Total 06/27/2023 7.4     Albumin Level 06/27/2023 3.4     Globulin 06/27/2023 4.0 (H)     Albumin/Globulin Ratio 06/27/2023 0.9 (L)     Bilirubin Total 06/27/2023 0.4     Alkaline Phosphatase 06/27/2023 130     Alanine Aminotransferase 06/27/2023 11     Aspartate Aminotransfera* 06/27/2023 19     eGFR 06/27/2023 >60      Magnesium Level 06/27/2023 1.90     Thyroid Stimulating Horm* 06/27/2023 0.929     Lactate Dehydrogenase 06/27/2023 267 (H)     Phosphorus Level 06/27/2023 2.9     WBC 06/27/2023 5.53     RBC 06/27/2023 3.68 (L)     Hgb 06/27/2023 10.3 (L)     Hct 06/27/2023 33.6 (L)     MCV 06/27/2023 91.3     MCH 06/27/2023 28.0     MCHC 06/27/2023 30.7 (L)     RDW 06/27/2023 16.8     Platelet 06/27/2023 281     MPV 06/27/2023 9.5     Neut % 06/27/2023 64.2     Lymph % 06/27/2023 24.2     Mono % 06/27/2023 9.6     Eos % 06/27/2023 1.4     Basophil % 06/27/2023 0.4     Lymph # 06/27/2023 1.34     Neut # 06/27/2023 3.55     Mono # 06/27/2023 0.53     Eos # 06/27/2023 0.08     Baso # 06/27/2023 0.02     IG# 06/27/2023 0.01     IG% 06/27/2023 0.2          Assessment:   ECOG Performance status: 2 - 3.     1. Metastasis to bone      NSCLC / Adenocarcinoma Stage IIIA pT2a pN2  - 12/08/2022: Right lower and middle bilobectomy with mediastinal lymph node dissection. Path: Invasive adenocarcinoma, poorly differentiated, focal tumor involvement of visceral pleura and focal tumor vascular invasion (vein), with mediastinal lymph nodes positive for metastatic carcinoma   - TMB - 5, MSI-S, MET amplification, ROS1.  No mutations in EGFR, BRAF, ALK, ERBB2, KRAS, RET,    - 1% tumor cells are positive for PD-L1   - patient evaluated by Radiation Oncology with recommendation to treat with chemotherapy 1st and will re-evaluate the patient by the end of chemotherapy for radiation therapy  - 01/12/2023 PET: Focal uptake at the medial right acetabular roof. Metastasis is a consideration  - 02/22/2023: RIGHT ACETABULAR LESION, CT - GUIDE CORE NEEDLE BIOPSY : FIBRINOPURULENT EXUDATE ADMIXED WITH ATYPICAL EPITHELIOID GROUPS,   NORMAL    MARROW ELEMENTS AND ABUNDANT BLOOD CLOT  PET scan showed a suspicious finding in the right acetabular roof and patient then had a CT-guided biopsy no evidence of malignancy. Started with adjuvant chemotherapy with  Carboplatin and pemetrexed x 4 cycles (3/15/23-5/18/23)   Patient has been seen by Rad/Onc (Dr Mireles) since the completion of her chemotherapy and plan was to pursue radiation x 5-6 weeks.  She discussed with the patient that they have showed no overall survival benefit in the setting of postoperative radiation therapy but data supports a larger benefit inpatient with extracapsular extension.  If she tolerated chemotherapy well and continue with good performance status then plan was for PORT   Restaging PET-CT after completion of chemotherapy and prior to radiation therapy showed progression at the right acetabulum.  Patient with progression of disease now with bone metastases.  She will start 10 sessions of palliative radiation tomorrow  Plan for Keytruda after she completes radiation.                      Plan:     PET CT wth progression Right superior acetabulum.  FDG avid area appears slightly larger today with slightly increased sclerosis.  SUV is 9.7, previously 6.4.  Case discussed with Dr. Mireles and she will start palliative radiation tomorrow.    RTC in 3 weeks upon finishing radiation, with MD/labs/tox check  Schedule Chemo ed  Begin treatment in 3 weeks   -Will begin XGEVA and Keytruda awaiting approval  -CT CAP in 3 months   - cbc, cmp, mag, phos, TSH, LDH- 1 hr prior @ [facility]    The patient was seen, interviewed and examined. Pertinent lab and radiology studies were reviewed.   The patient was given ample opportunity to ask questions, and to the best of my abilities, all questions answered to satisfaction; patient demonstrated understanding of what we discussed and agreeable to the plan. Pt instructed to call should develop concerning signs/symptoms or have further questions.       Phoebe Gomez MD  Hematology/Oncology      Portions of the record may have been created with voice recognition software. Occasional wrong-word or sound-a-like substitutions may have occurred due to the inherent  limitations of voice recognition software. Read the chart carefully and recognize, using context, where substitutions have occurred.

## 2023-06-26 NOTE — PROGRESS NOTES
Hearing Evaluation        Patient History: Ototoxic monitoring. Serial audiograms have been stable. Final chemo treatment on 5/18/23. No changes in tinnitus or hearing reported.        Test Results:                    Pure Tone Testing:      Right ear:       Mild sensorineural hearing loss from 4k-8kHz. Speech reception threshold is in agreement with puretone findings.  Discrimination score of 80% is considered good.        Left ear:          Mild sensorineural hearing loss from 3k-8kHz. Speech reception threshold is in agreement with puretone findings.  Discrimination score of 80% is considered good.                                                                            Tympanometry:                                           Right ear:       Type 'A' tymp, normal middle ear pressure/function     Left ear:          Type 'A' tymp, normal middle ear pressure/function            Distortion Product Otoacoustic Emission Testing (DPOAE):         Right ear: Present emissions from 1.5-4kHz      Left ear: Absent emissions from 1k-6kHz         Interpretations:     Behavioral test findings indicate no changes in hearing since previous evaluation. Speech reception thresholds obtained at 15dB, AU, and are in agreement with puretone findings. Speech discrimination scores of 80%, AU, are considered good.  DPOAE findings indicate cochlear site of lesion, bilaterally. Immittance measures indicate normal middle ear pressure/function, bilaterally.       Recommendations:  Hearing evaluation in August (3 months after final treatment)  Repeat testing sooner if changes in hearing noted prior to suggested follow up

## 2023-06-27 ENCOUNTER — OFFICE VISIT (OUTPATIENT)
Dept: HEMATOLOGY/ONCOLOGY | Facility: CLINIC | Age: 63
End: 2023-06-27
Payer: MEDICARE

## 2023-06-27 ENCOUNTER — LAB VISIT (OUTPATIENT)
Dept: LAB | Facility: HOSPITAL | Age: 63
End: 2023-06-27
Attending: NURSE PRACTITIONER
Payer: MEDICARE

## 2023-06-27 VITALS
WEIGHT: 150 LBS | BODY MASS INDEX: 23.54 KG/M2 | HEIGHT: 67 IN | OXYGEN SATURATION: 95 % | DIASTOLIC BLOOD PRESSURE: 84 MMHG | RESPIRATION RATE: 20 BRPM | SYSTOLIC BLOOD PRESSURE: 127 MMHG | TEMPERATURE: 98 F | HEART RATE: 84 BPM

## 2023-06-27 DIAGNOSIS — R93.7 ABNORMAL MRI, THORACIC SPINE: ICD-10-CM

## 2023-06-27 DIAGNOSIS — Z79.899 ON ANTINEOPLASTIC CHEMOTHERAPY: ICD-10-CM

## 2023-06-27 DIAGNOSIS — C34.91 NSCLC OF RIGHT LUNG: Primary | ICD-10-CM

## 2023-06-27 DIAGNOSIS — C34.91 NSCLC OF RIGHT LUNG: ICD-10-CM

## 2023-06-27 DIAGNOSIS — C79.51 METASTASIS TO BONE: ICD-10-CM

## 2023-06-27 DIAGNOSIS — R93.89 ABNORMAL FINDINGS ON DIAGNOSTIC IMAGING OF OTHER SPECIFIED BODY STRUCTURES: ICD-10-CM

## 2023-06-27 DIAGNOSIS — D53.9 NUTRITIONAL ANEMIA, UNSPECIFIED: ICD-10-CM

## 2023-06-27 DIAGNOSIS — D53.9 NUTRITIONAL ANEMIA, UNSPECIFIED: Primary | ICD-10-CM

## 2023-06-27 LAB
ALBUMIN SERPL-MCNC: 3.4 G/DL (ref 3.4–4.8)
ALBUMIN/GLOB SERPL: 0.9 RATIO (ref 1.1–2)
ALP SERPL-CCNC: 130 UNIT/L (ref 40–150)
ALT SERPL-CCNC: 11 UNIT/L (ref 0–55)
AST SERPL-CCNC: 19 UNIT/L (ref 5–34)
BASOPHILS # BLD AUTO: 0.02 X10(3)/MCL
BASOPHILS NFR BLD AUTO: 0.4 %
BILIRUBIN DIRECT+TOT PNL SERPL-MCNC: 0.4 MG/DL
BUN SERPL-MCNC: 8 MG/DL (ref 9.8–20.1)
CALCIUM SERPL-MCNC: 10.2 MG/DL (ref 8.4–10.2)
CHLORIDE SERPL-SCNC: 106 MMOL/L (ref 98–107)
CO2 SERPL-SCNC: 29 MMOL/L (ref 23–31)
CREAT SERPL-MCNC: 0.95 MG/DL (ref 0.55–1.02)
EOSINOPHIL # BLD AUTO: 0.08 X10(3)/MCL (ref 0–0.9)
EOSINOPHIL NFR BLD AUTO: 1.4 %
ERYTHROCYTE [DISTWIDTH] IN BLOOD BY AUTOMATED COUNT: 16.8 % (ref 11.5–17)
GFR SERPLBLD CREATININE-BSD FMLA CKD-EPI: >60 MLS/MIN/1.73/M2
GLOBULIN SER-MCNC: 4 GM/DL (ref 2.4–3.5)
GLUCOSE SERPL-MCNC: 93 MG/DL (ref 82–115)
HCT VFR BLD AUTO: 33.6 % (ref 37–47)
HGB BLD-MCNC: 10.3 G/DL (ref 12–16)
IMM GRANULOCYTES # BLD AUTO: 0.01 X10(3)/MCL (ref 0–0.04)
IMM GRANULOCYTES NFR BLD AUTO: 0.2 %
LDH SERPL-CCNC: 267 U/L (ref 125–220)
LYMPHOCYTES # BLD AUTO: 1.34 X10(3)/MCL (ref 0.6–4.6)
LYMPHOCYTES NFR BLD AUTO: 24.2 %
MAGNESIUM SERPL-MCNC: 1.9 MG/DL (ref 1.6–2.6)
MCH RBC QN AUTO: 28 PG (ref 27–31)
MCHC RBC AUTO-ENTMCNC: 30.7 G/DL (ref 33–36)
MCV RBC AUTO: 91.3 FL (ref 80–94)
MONOCYTES # BLD AUTO: 0.53 X10(3)/MCL (ref 0.1–1.3)
MONOCYTES NFR BLD AUTO: 9.6 %
NEUTROPHILS # BLD AUTO: 3.55 X10(3)/MCL (ref 2.1–9.2)
NEUTROPHILS NFR BLD AUTO: 64.2 %
PHOSPHATE SERPL-MCNC: 2.9 MG/DL (ref 2.3–4.7)
PLATELET # BLD AUTO: 281 X10(3)/MCL (ref 130–400)
PMV BLD AUTO: 9.5 FL (ref 7.4–10.4)
POTASSIUM SERPL-SCNC: 3.8 MMOL/L (ref 3.5–5.1)
PROT SERPL-MCNC: 7.4 GM/DL (ref 5.8–7.6)
RBC # BLD AUTO: 3.68 X10(6)/MCL (ref 4.2–5.4)
SODIUM SERPL-SCNC: 142 MMOL/L (ref 136–145)
TSH SERPL-ACNC: 0.93 UIU/ML (ref 0.35–4.94)
WBC # SPEC AUTO: 5.53 X10(3)/MCL (ref 4.5–11.5)

## 2023-06-27 PROCEDURE — 3079F PR MOST RECENT DIASTOLIC BLOOD PRESSURE 80-89 MM HG: ICD-10-PCS | Mod: CPTII,S$GLB,, | Performed by: INTERNAL MEDICINE

## 2023-06-27 PROCEDURE — 99215 OFFICE O/P EST HI 40 MIN: CPT | Mod: S$GLB,,, | Performed by: INTERNAL MEDICINE

## 2023-06-27 PROCEDURE — 3060F POS MICROALBUMINURIA REV: CPT | Mod: CPTII,S$GLB,, | Performed by: INTERNAL MEDICINE

## 2023-06-27 PROCEDURE — 84100 ASSAY OF PHOSPHORUS: CPT

## 2023-06-27 PROCEDURE — 3079F DIAST BP 80-89 MM HG: CPT | Mod: CPTII,S$GLB,, | Performed by: INTERNAL MEDICINE

## 2023-06-27 PROCEDURE — 83735 ASSAY OF MAGNESIUM: CPT

## 2023-06-27 PROCEDURE — 3288F FALL RISK ASSESSMENT DOCD: CPT | Mod: CPTII,S$GLB,, | Performed by: INTERNAL MEDICINE

## 2023-06-27 PROCEDURE — 3008F BODY MASS INDEX DOCD: CPT | Mod: CPTII,S$GLB,, | Performed by: INTERNAL MEDICINE

## 2023-06-27 PROCEDURE — 1101F PR PT FALLS ASSESS DOC 0-1 FALLS W/OUT INJ PAST YR: ICD-10-PCS | Mod: CPTII,S$GLB,, | Performed by: INTERNAL MEDICINE

## 2023-06-27 PROCEDURE — 3066F NEPHROPATHY DOC TX: CPT | Mod: CPTII,S$GLB,, | Performed by: INTERNAL MEDICINE

## 2023-06-27 PROCEDURE — 36415 COLL VENOUS BLD VENIPUNCTURE: CPT

## 2023-06-27 PROCEDURE — 99215 PR OFFICE/OUTPT VISIT, EST, LEVL V, 40-54 MIN: ICD-10-PCS | Mod: S$GLB,,, | Performed by: INTERNAL MEDICINE

## 2023-06-27 PROCEDURE — 83615 LACTATE (LD) (LDH) ENZYME: CPT

## 2023-06-27 PROCEDURE — 1159F MED LIST DOCD IN RCRD: CPT | Mod: CPTII,S$GLB,, | Performed by: INTERNAL MEDICINE

## 2023-06-27 PROCEDURE — 99999 PR PBB SHADOW E&M-EST. PATIENT-LVL V: ICD-10-PCS | Mod: PBBFAC,,, | Performed by: INTERNAL MEDICINE

## 2023-06-27 PROCEDURE — 3008F PR BODY MASS INDEX (BMI) DOCUMENTED: ICD-10-PCS | Mod: CPTII,S$GLB,, | Performed by: INTERNAL MEDICINE

## 2023-06-27 PROCEDURE — 77307 TELETHX ISODOSE PLAN CPLX: CPT | Performed by: RADIOLOGY

## 2023-06-27 PROCEDURE — 1101F PT FALLS ASSESS-DOCD LE1/YR: CPT | Mod: CPTII,S$GLB,, | Performed by: INTERNAL MEDICINE

## 2023-06-27 PROCEDURE — 80053 COMPREHEN METABOLIC PANEL: CPT

## 2023-06-27 PROCEDURE — 1160F RVW MEDS BY RX/DR IN RCRD: CPT | Mod: CPTII,S$GLB,, | Performed by: INTERNAL MEDICINE

## 2023-06-27 PROCEDURE — 77300 RADIATION THERAPY DOSE PLAN: CPT | Performed by: RADIOLOGY

## 2023-06-27 PROCEDURE — 99999 PR PBB SHADOW E&M-EST. PATIENT-LVL V: CPT | Mod: PBBFAC,,, | Performed by: INTERNAL MEDICINE

## 2023-06-27 PROCEDURE — 3074F PR MOST RECENT SYSTOLIC BLOOD PRESSURE < 130 MM HG: ICD-10-PCS | Mod: CPTII,S$GLB,, | Performed by: INTERNAL MEDICINE

## 2023-06-27 PROCEDURE — 3060F PR POS MICROALBUMINURIA RESULT DOCUMENTED/REVIEW: ICD-10-PCS | Mod: CPTII,S$GLB,, | Performed by: INTERNAL MEDICINE

## 2023-06-27 PROCEDURE — 77334 RADIATION TREATMENT AID(S): CPT | Performed by: RADIOLOGY

## 2023-06-27 PROCEDURE — 85025 COMPLETE CBC W/AUTO DIFF WBC: CPT

## 2023-06-27 PROCEDURE — 84443 ASSAY THYROID STIM HORMONE: CPT

## 2023-06-27 PROCEDURE — 3288F PR FALLS RISK ASSESSMENT DOCUMENTED: ICD-10-PCS | Mod: CPTII,S$GLB,, | Performed by: INTERNAL MEDICINE

## 2023-06-27 PROCEDURE — 3074F SYST BP LT 130 MM HG: CPT | Mod: CPTII,S$GLB,, | Performed by: INTERNAL MEDICINE

## 2023-06-27 PROCEDURE — 1159F PR MEDICATION LIST DOCUMENTED IN MEDICAL RECORD: ICD-10-PCS | Mod: CPTII,S$GLB,, | Performed by: INTERNAL MEDICINE

## 2023-06-27 PROCEDURE — 3066F PR DOCUMENTATION OF TREATMENT FOR NEPHROPATHY: ICD-10-PCS | Mod: CPTII,S$GLB,, | Performed by: INTERNAL MEDICINE

## 2023-06-27 PROCEDURE — 1160F PR REVIEW ALL MEDS BY PRESCRIBER/CLIN PHARMACIST DOCUMENTED: ICD-10-PCS | Mod: CPTII,S$GLB,, | Performed by: INTERNAL MEDICINE

## 2023-06-27 RX ORDER — HEPARIN 100 UNIT/ML
500 SYRINGE INTRAVENOUS
Status: CANCELLED | OUTPATIENT
Start: 2023-07-18

## 2023-06-27 RX ORDER — DIPHENHYDRAMINE HYDROCHLORIDE 50 MG/ML
50 INJECTION INTRAMUSCULAR; INTRAVENOUS ONCE AS NEEDED
Status: CANCELLED | OUTPATIENT
Start: 2023-07-18

## 2023-06-27 RX ORDER — EPINEPHRINE 0.3 MG/.3ML
0.3 INJECTION SUBCUTANEOUS ONCE AS NEEDED
Status: CANCELLED | OUTPATIENT
Start: 2023-07-18

## 2023-06-27 RX ORDER — SODIUM CHLORIDE 0.9 % (FLUSH) 0.9 %
10 SYRINGE (ML) INJECTION
Status: CANCELLED | OUTPATIENT
Start: 2023-07-18

## 2023-06-28 PROCEDURE — 77280 THER RAD SIMULAJ FIELD SMPL: CPT | Performed by: RADIOLOGY

## 2023-06-28 PROCEDURE — 77412 RADIATION TX DELIVERY LVL 3: CPT | Performed by: RADIOLOGY

## 2023-06-29 PROCEDURE — 77412 RADIATION TX DELIVERY LVL 3: CPT | Performed by: RADIOLOGY

## 2023-06-30 PROCEDURE — 77412 RADIATION TX DELIVERY LVL 3: CPT | Performed by: RADIOLOGY

## 2023-07-03 ENCOUNTER — OFFICE VISIT (OUTPATIENT)
Dept: RADIATION THERAPY | Facility: HOSPITAL | Age: 63
End: 2023-07-03
Attending: RADIOLOGY
Payer: MEDICARE

## 2023-07-03 PROCEDURE — 77412 RADIATION TX DELIVERY LVL 3: CPT | Performed by: RADIOLOGY

## 2023-07-03 PROCEDURE — 77336 RADIATION PHYSICS CONSULT: CPT | Performed by: RADIOLOGY

## 2023-07-05 ENCOUNTER — OFFICE VISIT (OUTPATIENT)
Dept: HEMATOLOGY/ONCOLOGY | Facility: CLINIC | Age: 63
End: 2023-07-05
Payer: MEDICARE

## 2023-07-05 ENCOUNTER — CLINICAL SUPPORT (OUTPATIENT)
Dept: HEMATOLOGY/ONCOLOGY | Facility: CLINIC | Age: 63
End: 2023-07-05
Payer: MEDICARE

## 2023-07-05 VITALS
BODY MASS INDEX: 23.23 KG/M2 | WEIGHT: 148 LBS | TEMPERATURE: 98 F | HEIGHT: 67 IN | OXYGEN SATURATION: 98 % | DIASTOLIC BLOOD PRESSURE: 75 MMHG | SYSTOLIC BLOOD PRESSURE: 115 MMHG | HEART RATE: 102 BPM

## 2023-07-05 DIAGNOSIS — C34.91 NSCLC OF RIGHT LUNG: Primary | ICD-10-CM

## 2023-07-05 PROCEDURE — 1159F MED LIST DOCD IN RCRD: CPT | Mod: CPTII,S$GLB,,

## 2023-07-05 PROCEDURE — 99999 PR PBB SHADOW E&M-EST. PATIENT-LVL IV: CPT | Mod: PBBFAC,,,

## 2023-07-05 PROCEDURE — 3066F NEPHROPATHY DOC TX: CPT | Mod: CPTII,S$GLB,,

## 2023-07-05 PROCEDURE — 3060F POS MICROALBUMINURIA REV: CPT | Mod: CPTII,S$GLB,,

## 2023-07-05 PROCEDURE — 3288F PR FALLS RISK ASSESSMENT DOCUMENTED: ICD-10-PCS | Mod: CPTII,S$GLB,,

## 2023-07-05 PROCEDURE — 3008F BODY MASS INDEX DOCD: CPT | Mod: CPTII,S$GLB,,

## 2023-07-05 PROCEDURE — 1101F PT FALLS ASSESS-DOCD LE1/YR: CPT | Mod: CPTII,S$GLB,,

## 2023-07-05 PROCEDURE — 3066F PR DOCUMENTATION OF TREATMENT FOR NEPHROPATHY: ICD-10-PCS | Mod: CPTII,S$GLB,,

## 2023-07-05 PROCEDURE — 1159F PR MEDICATION LIST DOCUMENTED IN MEDICAL RECORD: ICD-10-PCS | Mod: CPTII,S$GLB,,

## 2023-07-05 PROCEDURE — 99215 OFFICE O/P EST HI 40 MIN: CPT | Mod: S$GLB,,,

## 2023-07-05 PROCEDURE — 1101F PR PT FALLS ASSESS DOC 0-1 FALLS W/OUT INJ PAST YR: ICD-10-PCS | Mod: CPTII,S$GLB,,

## 2023-07-05 PROCEDURE — 3078F DIAST BP <80 MM HG: CPT | Mod: CPTII,S$GLB,,

## 2023-07-05 PROCEDURE — 3060F PR POS MICROALBUMINURIA RESULT DOCUMENTED/REVIEW: ICD-10-PCS | Mod: CPTII,S$GLB,,

## 2023-07-05 PROCEDURE — 77412 RADIATION TX DELIVERY LVL 3: CPT | Performed by: RADIOLOGY

## 2023-07-05 PROCEDURE — 3008F PR BODY MASS INDEX (BMI) DOCUMENTED: ICD-10-PCS | Mod: CPTII,S$GLB,,

## 2023-07-05 PROCEDURE — 99999 PR PBB SHADOW E&M-EST. PATIENT-LVL IV: ICD-10-PCS | Mod: PBBFAC,,,

## 2023-07-05 PROCEDURE — 3074F PR MOST RECENT SYSTOLIC BLOOD PRESSURE < 130 MM HG: ICD-10-PCS | Mod: CPTII,S$GLB,,

## 2023-07-05 PROCEDURE — 99215 PR OFFICE/OUTPT VISIT, EST, LEVL V, 40-54 MIN: ICD-10-PCS | Mod: S$GLB,,,

## 2023-07-05 PROCEDURE — 3288F FALL RISK ASSESSMENT DOCD: CPT | Mod: CPTII,S$GLB,,

## 2023-07-05 PROCEDURE — 3074F SYST BP LT 130 MM HG: CPT | Mod: CPTII,S$GLB,,

## 2023-07-05 PROCEDURE — 3078F PR MOST RECENT DIASTOLIC BLOOD PRESSURE < 80 MM HG: ICD-10-PCS | Mod: CPTII,S$GLB,,

## 2023-07-05 NOTE — PROGRESS NOTES
THERAPY EDUCATION: PEMBROLIZUMAB/ DENOSUMAB     ONCOLOGICAL HISTORY:     Diagnosis:  - NSCLC / Adenocarcinoma Stage IIIA pT2a pN2--dx 12/2022  - History Uterine cancer - Dx 1989    Current Treatment:   - Pembrolizumab/ Denosumab to start on 7/18/23    Treatment History:  - 1989 s/p total hysterectomy   - 12/08/2022: Right lower and middle bilobectomy with mediastinal lymph node dissection.   - Carboplatin + Pemetrexed  3/15/2023-5/1/2023  - Palliative RT       Plan of care: palliative systemic therapy      IMAGING:   - 08/10/2022 PET: The elongated subpleural right lower lobe nodule demonstrates fairly intense FDG activity.  Infectious, inflammatory and neoplastic etiologies remain possible. No suspicious PET findings elsewhere.  - 12/08/2022 CT HEAD without contrast: No acute intracranial abnormality identified.  Findings of mild microvascular ischemic disease.  - 12/2022: U/S Upper extremity: Thrombophlebitis of the left cephalic vein. No deep venous thrombosis in the left upper extremity.  - 01/03/2023 MRI Brain : GARY   - 01/12/2023 NM PET CT 1. Postsurgical changes of recent right lower and middle lobectomies.  There is a small right pleural effusion.  There is nonspecific peripheral uptake along the pleura at the right lung base and perihilar region which may be related to healing response in the setting of recent surgery.  Similarly borderline mediastinal uptake is nonspecific in the recent postoperative setting and could be reactive.  Continued attention recommended on follow-up. 2. Focal uptake at the medial right acetabular roof without appreciable focal osseous lesion by CT evaluation.  This is new compared to previous exam.  Metastasis is a consideration  -PET 6/5/2023: Previously visualized moderately FDG avid opacities towards the right lung base improved.  Pleural effusion also improved.  No new suspicious PET findings in the lungs.  Severe emphysema. Some left adrenal thickening is similar to prior  but there is now moderate associated FDG activity, max SUV is 3.6. FDG activity is again seen associated with the right superior acetabulum.  FDG avid area appears slightly larger today with slightly increased sclerosis.  SUV is 9.7, previously 6.4.  There is a newly evident subcentimeter FDG avid lesion left pedicle T10. Additional subcentimeter FDG avid lesion T4 vertebral body SUV 3.4.suspicious for metastatic disease.  -MRI T spine 6/15/2023:   1. Foci of abnormal signal intensity and enhancement at the inferior endplate of T3 and at body and left pedicle of T10.  Metastatic neoplastic etiology must be considered.  2. Thoracic spondylosis  3. Mild encroachment into the right neural foramina at the T3-4 and T4-5 secondary to mild posterior disc bulge and posterior osteophyte formation  4. Multilevel mild posterior disc bulge which does not result in significant central spinal stenosis.    PATHOLOGY:  - 12/08/2022: Invasive solid adenocarcinoma (3.1 CM); G3, poorly differentiated. Visceral Pleura Invasion present without definite serosal surface involvement. Lymphovascular invasion (Vein) present. All margins negative for invasive carcinoma, distance 2.5 cm. LN - 4/14 (10R: Hilar, Posterior mediastinal (RACHEAL), 4R Lower paratracheal, 9R Pulmonary ligament, 10R). pT2a pN2    - 02/22/2023: RIGHT ACETABULAR LESION, CT - GUIDE CORE NEEDLE BIOPSY : FIBRINOPURULENT EXUDATE ADMIXED WITH ATYPICAL EPITHELIOID GROUPS,   NORMAL    MARROW ELEMENTS AND ABUNDANT BLOOD CLOT    Subjective:     HPI  Mansi Sabrina Wilsonlot  63 y.o.  female with past medical history significant for HTN, HLD, uterine cancer status post total hysterectomy in 1989,  CKD Stage 2, COPD Stage 3, referred for management of NSCLC s/p RLL / RML lobectomy with Mediastinal Lymph Node Dissection on 12/8/2022.     She was noted to have RLL lung mass and underwent CT guided biopsy which was non diagnostic. PET scan demonstrated significant avidity within the lesion.  Given high risk for malignancy, she underwent right lower lobectomy and right middle lobectomy on 12/08/2022. Pathology came back with invasive adenocarcinoma, poorly differentiated with solid morphology, focal tumor involvement of visceral pleura and focal tumor vascular invasion (vein), with mediastinal lymph nodes positive for metastatic carcinoma.      PET on 01/12/2023 showed focal uptake at the medial right acetabular roof but the biopsy was negative for malignancy. Discussed with radiology, they recommended adjuvant chemotherapy and then they will reassess later for radiation therapy.     Chief Complaint: Therapy Education       Interval History:   7/5/23: Ms. Jin presents to the clinic accompanied by her daughter for therapy education. Patient reports no major complaints at today's visit. Denies fever, chills, SOB on exertion, N/V/D, constipation, chest pain, recent infection or bleeding.     6/27/23:Ms. Jin presents today for follow-up with her daughter.  Pt has lab results as well. Patient was in the hospital on 6/21/23 for leg pain and discharged.  Seems like patient with bone metastases.  She will begin palliative radiation treatment with Dr. Mireles on 6/28/23, 10 treatments over two weeks. She is doing well today overall. She reports she is no longer coughing and has stopped tessalon pearls and Pepcid. She has intermittent right sided, sharp pains that have been present since surgery that are improving, she does not use any pain medication and it is manageable.  She manages constipation with colace. She denies any shortness of breath, chest pain, fever, chills, vomiting, diarrhea, neuropathy, or recent hospitalizations.       Past Medical History:   Diagnosis Date    ASTHMA     COPD (chronic obstructive pulmonary disease)     SEVERE    High cholesterol     HLD (hyperlipidemia)     HTN (hypertension)     Lung cancer 12/08/2022    invasive adenocarcinoma, poorly differentiated with solid  morphology, focal tumor involvement of visceral pleura and focal tumor vascular invasion (vein), with 2 of 3 posterior mediastinal lymph nodes positive for metastatic carcinoma    Stage 2 chronic kidney disease     TIA (transient ischemic attack)     Tobacco abuse     Unspecified osteoarthritis, unspecified site     Uterine cancer       Past Surgical History:   Procedure Laterality Date    HYSTERECTOMY  Unknowing    lipoma multiple sites      LUNG LOBECTOMY Right 2022    Procedure: LOBECTOMY, LUNG;  Surgeon: Jass Browne IV, MD;  Location: [facility] OR;  Service: Cardiothoracic;  Laterality: Right;    SURGICAL REMOVAL OF LYMPH NODE  2022    Procedure: EXCISION, LYMPH NODE;  Surgeon: Jass Browne IV, MD;  Location: [facility] OR;  Service: Cardiothoracic;;    THORACOTOMY Right 2022    Procedure: THORACOTOMY;  Surgeon: Jass Browne IV, MD;  Location: [facility] OR;  Service: Cardiothoracic;  Laterality: Right;  Right Lower Lobectomy with Lymph Node Dissection    TOTAL ABDOMINAL HYSTERECTOMY W/ BILATERAL SALPINGOOPHORECTOMY       Social History     Socioeconomic History    Marital status: Single    Number of children: 5   Tobacco Use    Smoking status: Former     Packs/day: 0.50     Years: 50.00     Pack years: 25.00     Types: Cigarettes     Quit date: 2022     Years since quittin.5    Smokeless tobacco: Never   Substance and Sexual Activity    Alcohol use: Not Currently     Comment: quit 25years ago    Drug use: Never    Sexual activity: Yes     Partners: Male      Family History   Problem Relation Age of Onset    Hypertension Mother     Hepatitis Mother     Ovarian cancer Mother     Liver cancer Mother     Heart failure Father     Prostate cancer Father     Fibroids Sister     Asthma Brother     Asthma Brother     Cancer Maternal Grandfather       Review of patient's allergies indicates:  No Known Allergies     Review of Systems   Constitutional:  Positive for fatigue. Negative for activity  change, appetite change, chills, fever and unexpected weight change.   HENT: Negative.  Negative for mouth dryness, mouth sores, nosebleeds, sore throat and trouble swallowing.    Eyes: Negative.  Negative for visual disturbance.   Respiratory:  Positive for shortness of breath (on exertion, chronic). Negative for cough.    Cardiovascular:  Negative for chest pain, palpitations and leg swelling.   Gastrointestinal:  Negative for abdominal distention, abdominal pain, blood in stool, change in bowel habit, constipation, diarrhea, nausea, vomiting and change in bowel habit.   Endocrine: Negative.    Genitourinary: Negative.  Negative for dysuria, frequency, hematuria and urgency.   Musculoskeletal:  Negative for arthralgias, back pain, leg pain, myalgias and neck pain.   Integumentary:  Negative for rash, breast mass, breast discharge and breast tenderness. Negative.   Allergic/Immunologic: Negative.    Neurological:  Negative for dizziness, tremors, syncope, speech difficulty, weakness, light-headedness, numbness, headaches and memory loss.   Hematological: Negative.  Does not bruise/bleed easily.   Psychiatric/Behavioral: Negative.  Negative for confusion and suicidal ideas.    Breast: Negative for mass and tenderness      Objective:        Vitals:    07/05/23 0832   BP: 115/75   Pulse: 102   Temp: 97.8 °F (36.6 °C)         Wt Readings from Last 6 Encounters:   06/27/23 68 kg (150 lb)   06/22/23 68.9 kg (152 lb)   06/21/23 68.9 kg (152 lb)   06/06/23 67.8 kg (149 lb 7.6 oz)   05/18/23 67.6 kg (149 lb)   05/16/23 69 kg (152 lb 3.2 oz)     Body mass index is 23.18 kg/m².  Body surface area is 1.78 meters squared.         LABS      No visits with results within 1 Day(s) from this visit.   Latest known visit with results is:   Lab Visit on 06/27/2023   Component Date Value    Sodium Level 06/27/2023 142     Potassium Level 06/27/2023 3.8     Chloride 06/27/2023 106     Carbon Dioxide 06/27/2023 29     Glucose Level  06/27/2023 93     Blood Urea Nitrogen 06/27/2023 8.0 (L)     Creatinine 06/27/2023 0.95     Calcium Level Total 06/27/2023 10.2     Protein Total 06/27/2023 7.4     Albumin Level 06/27/2023 3.4     Globulin 06/27/2023 4.0 (H)     Albumin/Globulin Ratio 06/27/2023 0.9 (L)     Bilirubin Total 06/27/2023 0.4     Alkaline Phosphatase 06/27/2023 130     Alanine Aminotransferase 06/27/2023 11     Aspartate Aminotransfera* 06/27/2023 19     eGFR 06/27/2023 >60     Magnesium Level 06/27/2023 1.90     Thyroid Stimulating Horm* 06/27/2023 0.929     Lactate Dehydrogenase 06/27/2023 267 (H)     Phosphorus Level 06/27/2023 2.9     WBC 06/27/2023 5.53     RBC 06/27/2023 3.68 (L)     Hgb 06/27/2023 10.3 (L)     Hct 06/27/2023 33.6 (L)     MCV 06/27/2023 91.3     MCH 06/27/2023 28.0     MCHC 06/27/2023 30.7 (L)     RDW 06/27/2023 16.8     Platelet 06/27/2023 281     MPV 06/27/2023 9.5     Neut % 06/27/2023 64.2     Lymph % 06/27/2023 24.2     Mono % 06/27/2023 9.6     Eos % 06/27/2023 1.4     Basophil % 06/27/2023 0.4     Lymph # 06/27/2023 1.34     Neut # 06/27/2023 3.55     Mono # 06/27/2023 0.53     Eos # 06/27/2023 0.08     Baso # 06/27/2023 0.02     IG# 06/27/2023 0.01     IG% 06/27/2023 0.2          Assessment:   NSCLC / Adenocarcinoma Stage IIIA pT2a pN2                  Plan:   -Therapy education completed on 7/5/23.  -Plans to start Pembrolizumab and Xgeva on 7/18/23 after the completion of radiation.  -Emphasized to patient and family to get dental clearance from dentist prior to starting Xgeva due to risk of osteonecrosis of the jaw.  -Zofran PRN prescribed for at home with instructions to be taken by mouth every 8 hours as needed for nausea. If not working, Compazine can be prescribed as 2nd choice.    -OTC Imodium AD recommended for diarrhea (4-6 BMs a day). Take 2 tablets after the first loose bowel movement, and 1 tablet after each loose bowel movement after the first dose has been taken. No more than 4 tablets  should be taken in any 24-hour period. If not working, Lomotil can be prescribed as 2nd choice.     -Emphasized adequate hand-hygiene and limited contact with people who are sick.   -Monitor and notify any bleeding in urine, stool, or sputum.  As well as unusual bleeding or bruising and stomach pain.   -Emphasized hydration with 4 16 oz bottles of water a day.    -Importance of moisturizing with fragrance free lotion to prevent skin rash.  -Call clinic if fever >100.4, shakes or chills, shortness of breath, chest pain, uncontrolled vomiting or diarrhea, pain and tingling in the chest or arm, or just not feeling well.    - RTC on 7/18/23 upon finishing radiation, with MD/labs/tox check    Discussion:    ·       A total of 60 minutes were spent in counseling today of which 100% was face-to-face.   At today's teaching session we discussed the patient's cancer diagnosis as well as planned immunotherapy regimen, protocol, side effects and toxicities.  Handouts of each agent was given and reviewed.    ·       We reviewed that side effects and toxicities typically occur after 4-10 weeks of treatment, and include but are not limited to:    The following side effects are common (occurring in greater than 30%) for patients taking pembrolizumab:    Anemia  Fatigue  Hyperglycemia  Hyponatremia  Hypoalbuminemia  Itching  Cough  Nausea    These side effects are less common side effects (occurring in about 10-29%) of patients receiving pembrolizumab:    Rash  Decreased appetite  Hypertriglyceridemia  Increased liver enzymes  Hypocalcemia  Constipation  Diarrhea  Arthralgia  Pain in extremity  Shortness of breath  Swelling  Headache  Vomiting  Chills  Myalgia  Insomnia  Abdominal pain  Back pain  Fever  Vitiligo  Dizziness  Upper respiratory tract infection    The following are common (occurring in greater than 30%) side effects for patients taking denosumab:    Fatigue  Muscle weakness  Decreased levels of phosphorus in your blood  (hypophosphatemia)  Nausea    These are less common side effects (occurring in 10-29%) for patients receiving denosumab:    Diarrhea  Shortness of breath  Low levels of calcium in your blood (hypocalcemia).  Cough  Joint pain  Headache  Limb pain  Back pain  Eczema (a skin condition that may include any of the following: redness, skin swelling, itching and dryness, crusting, blistering or bleeding).  Rash    ·       Discussed the risk of immune-mediated pneumonitis, including interstitial lung disease.  Instructed to contact our office for new or worsening chest pain, severe cough or shortness of breath.    ·       Discussed the risk of immune-mediated colitis and diarrhea. Instructed to contact our office for diarrhea she cannot control or that results in =4 bowel movements per day.    ·       Discussed the risk of immune mediated hepatitis. Instructed to contact office for right upper quadrant pain, yellowing of skin or eyes, concentrated yellow urine, severe nausea and vomiting, or severe abdominal pain.    ·       Discussed the risk of immune-mediated nephritis. Instructed patient on possible symptoms of decreased urine output, blood in the urine or very dark urine, overall swelling, or very high blood pressure.    ·       Discussed the risk of immune-mediated cardiomyopathy, including symptoms of edema, including periorbital edema, generalized edema, peripheral edema, and pulmonary edema.    ·       Discussed the risk of immune-mediated adrenal insufficiency or hypophysitis, including symptoms of headache, visual disturbances, fatigue, weight loss, and hypotension.    ·       Discussed immune-mediated thyroiditis, which can result in either hypothyroid or hyperthyroid. These will be managed according to symptoms, without a dose adjustment needed.    ·       Discussed the risk of rashes including an immune-mediated rash, Solitario-Khoi syndrome (SJS), and toxic epidermal necrolysis (TENS). Also possible are  vitiligo/skin hypopigmentation.  Instructed to contact our office for any new onset moderate to severe rash.    ·       Discussed the risk of immune-mediated encephalitis, including symptoms of altered mental status, headache, fever, confusion, agitation or hallucinations, seizures, loss of sensation or paralysis, muscle weakness, double vision, problems with speech or hearing, or loss on consciousness.    ·       Discussed musculoskeletal side effects of muscle and joint pain.    ·       Discussed respiratory side effects of cough and dyspnea, increased risk of upper respiratory infections.    ·       Discussed GI side effects of nausea and vomiting, and to a lesser extent abdominal pain, decreased appetite, wt loss, and constipation.    ·       Discussed possible side effects of edema, including periorbital edema, generalized edema, peripheral edema, and pulmonary edema.    ·       Discussed general side effects of fatigue and fever.    ·       Discussed possible electrolyte abnormalities.    ·       Discussed possible hematologic toxicities.    ·       Instructed to contact our office for discussion of medication changes, vaccination, the addition of vitamin and/or herbal supplementation as they may interact with some immunotherapy agents.    ·       Discussed dietary modifications and the need to maintain adequate caloric intake and proper oral hydration.  Recommended at least 64-80 oz of fluid per day.    ·       Discussed anti-emetic protocol and bowel regimen protocol.    ·       Office contact information given including after hours number.  Discussed there is an oncologist on call 24/7, 365 days, including weekends.  Provided phone numbers to use as necessary during business, Monday through Friday.    ·       A tour of our infusion room was given.    ·       In summary, the patient is in agreement with the treatment plan.  Questions appeared to be answered to their satisfaction. Consented the patient to the  treatment plan and the patient was educated on the planned duration of the treatment and schedule of the treatment administration. Copy scanned into the chart.    All questions answered to the satisfaction of the patient and family.     Follow up appointments given to radha.      ANA CARPENTER-SHREYA  PATIENT EDUCATOR  [facility]

## 2023-07-05 NOTE — NURSING
I met with Mansi and her daughter for patient education. I introduced myself, explained my role and discussed her barriers to treatment. She denied having any stressors.

## 2023-07-05 NOTE — PROGRESS NOTES
Oncology Nutrition Evaluation      Mansi Jin   1960    Oncology Provider:   Phoebe Gomez MD    Reason for Visit:  Change in Treatment Education    Oncology/Hematology Diagnosis:   NSCLC / Adenocarcinoma Stage IIIA pT2a pN2--dx 12/2022  s/p 12/08/2022: Right lower and middle bilobectomy with mediastinal lymph node dissection.   - Carboplatin + Pemetrexed  3/15/2023-5/1/2023  - Palliative RT     Treatment Plan:  XGEVA and Keytruda     Nutrition Recommendations:  1. Regular diet as tolerated  2. Provided calcium containing food list, suggested consideration of supplemental calcium depending on po intake    Nutrition Assessment    7/5/23: This is a 63 y.o.female with a medical diagnosis of NSCLC, here for change of treatment education. She reports good appetite and po intake at this time. No c/o n/v/c/d. Her wt has fluctuated between 145#-152# or so over the past 6 months. She is curious about high calcium foods given upcoming Xgeva treatment. She states she is not very tolerant of dairy products. Reviewed some options and provided handouts for this.    Nutrition Factors Affecting Intake  none identified    PMHx: COPD, HLD, HTN, TID, CKD stg II, CECE/BSO    Allergies: Patient has no known allergies.    Current Medications:    Current Outpatient Medications:     acetaminophen (TYLENOL) 500 MG tablet, Take 1,000 mg by mouth every 6 (six) hours as needed., Disp: , Rfl:     aspirin (ECOTRIN) 81 MG EC tablet, Take 1 tablet (81 mg total) by mouth once daily., Disp: 90 tablet, Rfl: 2    atorvastatin (LIPITOR) 40 MG tablet, Take 1 tablet (40 mg total) by mouth every evening., Disp: 90 tablet, Rfl: 2    benzonatate (TESSALON) 200 MG capsule, Take 1 capsule (200 mg total) by mouth 3 (three) times daily as needed for Cough. (Patient not taking: Reported on 6/22/2023), Disp: 90 capsule, Rfl: 1    dexAMETHasone (DECADRON) 4 MG Tab, Take 2 tablets (8 mg total) by mouth once daily. Take 8mg on the day prior to  chemotherapy and on days 2, 3, and 4 following chemotherapy, Disp: 8 tablet, Rfl: 4    docusate sodium (COLACE) 50 MG capsule, Take by mouth Daily., Disp: , Rfl:     famotidine (PEPCID) 40 MG tablet, TAKE 1 TABLET(40 MG) BY MOUTH EVERY EVENING, Disp: 90 tablet, Rfl: 1    fluticasone propionate (FLONASE) 50 mcg/actuation nasal spray, 1 spray (50 mcg total) by Each Nostril route once daily., Disp: 15.8 mL, Rfl: 2    fluticasone-umeclidin-vilanter (TRELEGY ELLIPTA) 100-62.5-25 mcg DsDv, Inhale 1 puff into the lungs once daily., Disp: 60 each, Rfl: 5    folic acid (FOLVITE) 1 MG tablet, TAKE 1 TABLET(1 MG) BY MOUTH EVERY DAY, Disp: 30 tablet, Rfl: 2    ibuprofen (ADVIL,MOTRIN) 200 MG tablet, Take 400 mg by mouth every 6 (six) hours as needed., Disp: , Rfl:     ipratropium/albuterol sulfate (COMBIVENT INHL), Inhale into the lungs as needed., Disp: , Rfl:     loratadine (CLARITIN) 10 mg tablet, Take 1 tablet (10 mg total) by mouth once daily., Disp: 90 tablet, Rfl: 2    mirtazapine (REMERON) 7.5 MG Tab, Take 1 tablet (7.5 mg total) by mouth nightly., Disp: 30 tablet, Rfl: 3    Current Facility-Administered Medications:     alteplase injection 2 mg, 2 mg, Intra-Catheter, PRN, Phoebe Gomez MD    alteplase injection 2 mg, 2 mg, Intra-Catheter, PRN, Shelbi Johansen, FNP    alteplase injection 2 mg, 2 mg, Intra-Catheter, PRNPhoebe MD    heparin, porcine (PF) 100 unit/mL injection flush 500 Units, 500 Units, Intravenous, PRN, Phoebe Gomez MD, 500 Units at 04/27/23 1039    heparin, porcine (PF) 100 unit/mL injection flush 500 Units, 500 Units, Intravenous, PRN, Shelbi oJhansen, FNP    heparin, porcine (PF) 100 unit/mL injection flush 500 Units, 500 Units, Intravenous, PRN, Phoebe Gomez MD    sodium chloride 0.9% 250 mL flush bag, , Intravenous, 1 time in Clinic/HOD, Phoebe Gomez MD    sodium chloride 0.9% flush 10 mL, 10 mL, Intravenous, PRN, Phoebe Gomez MD    sodium chloride 0.9%  "flush 10 mL, 10 mL, Intravenous, PRN, PAULINE Vallecillo    sodium chloride 0.9% flush 10 mL, 10 mL, Intravenous, PRN, Phoebe Gomez MD    Labs: no new    Anthropometrics    Height:   Ht Readings from Last 1 Encounters:   07/05/23 5' 7" (1.702 m)      Weight:   Wt Readings from Last 3 Encounters:   07/05/23 67.1 kg (148 lb)   06/27/23 68 kg (150 lb)   06/22/23 68.9 kg (152 lb)        Usual Body Weight: 68.9 kg (152 lb)  % Weight Change: 0 (wt has fluctuated up and down from ~145#-152# past 6 months)    BMI: 23.1 (normal)    Ideal Weight: 61.3 kg (135 lb)  % Ideal Weight: 110%    Nutrition Diagnosis    PES: Food and nutrition related knowledge deficit related to chronic illness as evidenced by lack of prior exposure to high calcium nutrition information. (resolved)     Nutrition Risk  low    Nutrition Intervention    Interventions(treatment strategy):  general/healthful diet, multivitamin/mineral supplement therapy, and purpose of nutrition education    Education Provided:  high calcium foods  Teaching Method: explanation and printed materials  Comprehension: verbalizes understanding  Barriers to Learning: none evident  Expected Compliance: good  Comments: All questions were answered and dietitian's contact information was provided.      Nutrition Monitoring and Evaluation    Ongoing monitoring not warranted at this time. Please consult LIOR bruner.        Sarah Willett MS, RD, , LDN                                                                                                                                                                                                                                                                                   "

## 2023-07-06 PROCEDURE — 77412 RADIATION TX DELIVERY LVL 3: CPT | Performed by: RADIOLOGY

## 2023-07-06 PROCEDURE — 77417 THER RADIOLOGY PORT IMAGE(S): CPT | Performed by: RADIOLOGY

## 2023-07-07 PROCEDURE — 77412 RADIATION TX DELIVERY LVL 3: CPT | Performed by: RADIOLOGY

## 2023-07-10 PROCEDURE — 77336 RADIATION PHYSICS CONSULT: CPT | Performed by: RADIOLOGY

## 2023-07-10 PROCEDURE — 77412 RADIATION TX DELIVERY LVL 3: CPT | Performed by: RADIOLOGY

## 2023-07-11 PROCEDURE — 77412 RADIATION TX DELIVERY LVL 3: CPT | Performed by: RADIOLOGY

## 2023-07-12 PROCEDURE — 77412 RADIATION TX DELIVERY LVL 3: CPT | Performed by: RADIOLOGY

## 2023-07-17 NOTE — PROGRESS NOTES
HEMATOLOGY/ONCOLOGY OFFICE CLINIC VISIT  KAYDEN Christelle    ONCOLOGICAL HISTORY:     Diagnosis:  - Stage IV NSCLC with bone mets.  - NSCLC / Adenocarcinoma Stage IIIA pT2a pN2--dx 12/2022  - History Uterine cancer - Dx 1989    Current Treatment:   Keytruda + Xgeva 7/18/2023-present    Treatment History:  - 1989 s/p total hysterectomy   - 12/08/2022: Right lower and middle bilobectomy with mediastinal lymph node dissection.   - Carboplatin + Pemetrexed  3/15/2023-5/1/2023  - Palliative RT   - Carboplatin + Pemetrexed started 3/15/2023    Plan of care: palliative systemic therapy      IMAGING:   - 08/10/2022 PET: The elongated subpleural right lower lobe nodule demonstrates fairly intense FDG activity.  Infectious, inflammatory and neoplastic etiologies remain possible. No suspicious PET findings elsewhere.  - 12/08/2022 CT HEAD without contrast: No acute intracranial abnormality identified.  Findings of mild microvascular ischemic disease.  - 12/2022: U/S Upper extremity: Thrombophlebitis of the left cephalic vein. No deep venous thrombosis in the left upper extremity.  - 01/03/2023 MRI Brain : GARY   - 01/12/2023 NM PET CT 1. Postsurgical changes of recent right lower and middle lobectomies.  There is a small right pleural effusion.  There is nonspecific peripheral uptake along the pleura at the right lung base and perihilar region which may be related to healing response in the setting of recent surgery.  Similarly borderline mediastinal uptake is nonspecific in the recent postoperative setting and could be reactive.  Continued attention recommended on follow-up. 2. Focal uptake at the medial right acetabular roof without appreciable focal osseous lesion by CT evaluation.  This is new compared to previous exam.  Metastasis is a consideration  -PET 6/5/2023: Previously visualized moderately FDG avid opacities towards the right lung base improved.  Pleural effusion also improved.  No new suspicious PET findings in the  lungs.  Severe emphysema. Some left adrenal thickening is similar to prior but there is now moderate associated FDG activity, max SUV is 3.6. FDG activity is again seen associated with the right superior acetabulum.  FDG avid area appears slightly larger today with slightly increased sclerosis.  SUV is 9.7, previously 6.4.  There is a newly evident subcentimeter FDG avid lesion left pedicle T10. Additional subcentimeter FDG avid lesion T4 vertebral body SUV 3.4.suspicious for metastatic disease.  -MRI T spine 6/15/2023:   1. Foci of abnormal signal intensity and enhancement at the inferior endplate of T3 and at body and left pedicle of T10.  Metastatic neoplastic etiology must be considered.  2. Thoracic spondylosis  3. Mild encroachment into the right neural foramina at the T3-4 and T4-5 secondary to mild posterior disc bulge and posterior osteophyte formation  4. Multilevel mild posterior disc bulge which does not result in significant central spinal stenosis.    PATHOLOGY:  - 12/08/2022: Invasive solid adenocarcinoma (3.1 CM); G3, poorly differentiated. Visceral Pleura Invasion present without definite serosal surface involvement. Lymphovascular invasion (Vein) present. All margins negative for invasive carcinoma, distance 2.5 cm. LN - 4/14 (10R: Hilar, Posterior mediastinal (RACHEAL), 4R Lower paratracheal, 9R Pulmonary ligament, 10R). pT2a pN2    - 02/22/2023: RIGHT ACETABULAR LESION, CT - GUIDE CORE NEEDLE BIOPSY : FIBRINOPURULENT EXUDATE ADMIXED WITH ATYPICAL EPITHELIOID GROUPS,   NORMAL    MARROW ELEMENTS AND ABUNDANT BLOOD CLOT    Subjective:     HPI  Mansi Jin  63 y.o.  female with past medical history significant for HTN, HLD, uterine cancer status post total hysterectomy in 1989,  CKD Stage 2, COPD Stage 3, referred for management of NSCLC s/p RLL / RML lobectomy with Mediastinal Lymph Node Dissection on 12/8/2022.     She was noted to have RLL lung mass and underwent CT guided biopsy which was non  diagnostic. PET scan demonstrated significant avidity within the lesion. Given high risk for malignancy, she underwent right lower lobectomy and right middle lobectomy on 12/08/2022. Pathology came back with invasive adenocarcinoma, poorly differentiated with solid morphology, focal tumor involvement of visceral pleura and focal tumor vascular invasion (vein), with mediastinal lymph nodes positive for metastatic carcinoma.      PET on 01/12/2023 showed focal uptake at the medial right acetabular roof but the biopsy was negative for malignancy. Discussed with radiology, they recommended adjuvant chemotherapy and then they will reassess later for radiation therapy.     Chief Complaint: Lung Cancer (Pt reports no issues or concerns.)      Interval History:   Ms. Jin presents today for initial Chemo with her daughter.  Pt has lab results as well. Patient was in the hospital on 6/21/23 for leg pain and discharged.  Seems like patient with bone metastases.  She bagan palliative radiation treatment with Dr. Mireles on 6/28/23, 10 treatments over two weeks. She is doing well today overall. She reports she is no longer coughing and has stopped tessalon pearls and Pepcid. She has intermittent right sided, sharp pains that have been present since surgery that are improving, she does not use any pain medication and it is manageable.  She manages constipation with colace. She denies any shortness of breath, chest pain, fever, chills, vomiting, diarrhea, neuropathy, or recent hospitalizations.       Past Medical History:   Diagnosis Date    ASTHMA     COPD (chronic obstructive pulmonary disease)     SEVERE    High cholesterol     HLD (hyperlipidemia)     HTN (hypertension)     Lung cancer 12/08/2022    invasive adenocarcinoma, poorly differentiated with solid morphology, focal tumor involvement of visceral pleura and focal tumor vascular invasion (vein), with 2 of 3 posterior mediastinal lymph nodes positive for metastatic  carcinoma    Stage 2 chronic kidney disease     TIA (transient ischemic attack)     Tobacco abuse     Unspecified osteoarthritis, unspecified site     Uterine cancer       Past Surgical History:   Procedure Laterality Date    HYSTERECTOMY  Unknowing    lipoma multiple sites      LUNG LOBECTOMY Right 2022    Procedure: LOBECTOMY, LUNG;  Surgeon: Jass Browne IV, MD;  Location: [facility] OR;  Service: Cardiothoracic;  Laterality: Right;    SURGICAL REMOVAL OF LYMPH NODE  2022    Procedure: EXCISION, LYMPH NODE;  Surgeon: Jass Browne IV, MD;  Location: [facility] OR;  Service: Cardiothoracic;;    THORACOTOMY Right 2022    Procedure: THORACOTOMY;  Surgeon: Jass Browne IV, MD;  Location: [facility] OR;  Service: Cardiothoracic;  Laterality: Right;  Right Lower Lobectomy with Lymph Node Dissection    TOTAL ABDOMINAL HYSTERECTOMY W/ BILATERAL SALPINGOOPHORECTOMY       Social History     Socioeconomic History    Marital status: Single    Number of children: 5   Tobacco Use    Smoking status: Former     Packs/day: 0.50     Years: 50.00     Pack years: 25.00     Types: Cigarettes     Quit date: 2022     Years since quittin.6    Smokeless tobacco: Never   Substance and Sexual Activity    Alcohol use: Not Currently     Comment: quit 25years ago    Drug use: Never    Sexual activity: Yes     Partners: Male      Family History   Problem Relation Age of Onset    Hypertension Mother     Hepatitis Mother     Ovarian cancer Mother     Liver cancer Mother     Heart failure Father     Prostate cancer Father     Fibroids Sister     Asthma Brother     Asthma Brother     Cancer Maternal Grandfather       Review of patient's allergies indicates:  No Known Allergies     Review of Systems   Constitutional:  Positive for fatigue. Negative for activity change, appetite change, chills, fever and unexpected weight change.   HENT: Negative.  Negative for mouth dryness, mouth sores, nosebleeds, sore throat and trouble  swallowing.    Eyes: Negative.  Negative for visual disturbance.   Respiratory:  Positive for shortness of breath (on exertion, chronic). Negative for cough.    Cardiovascular:  Negative for chest pain, palpitations and leg swelling.   Gastrointestinal:  Negative for abdominal distention, abdominal pain, blood in stool, change in bowel habit, constipation, diarrhea, nausea, vomiting and change in bowel habit.   Endocrine: Negative.    Genitourinary: Negative.  Negative for dysuria, frequency, hematuria and urgency.   Musculoskeletal:  Negative for arthralgias, back pain, leg pain, myalgias and neck pain.   Integumentary:  Negative for rash, breast mass, breast discharge and breast tenderness. Negative.   Allergic/Immunologic: Negative.    Neurological:  Negative for dizziness, tremors, syncope, speech difficulty, weakness, light-headedness, numbness, headaches and memory loss.   Hematological: Negative.  Does not bruise/bleed easily.   Psychiatric/Behavioral: Negative.  Negative for confusion and suicidal ideas.    Breast: Negative for mass and tenderness      Objective:        Vitals:    07/18/23 1043   BP: 107/73   Pulse: 94   Resp: 18   Temp: 97.6 °F (36.4 °C)         Wt Readings from Last 6 Encounters:   07/18/23 66.8 kg (147 lb 4.3 oz)   07/05/23 67.1 kg (148 lb)   06/27/23 68 kg (150 lb)   06/22/23 68.9 kg (152 lb)   06/21/23 68.9 kg (152 lb)   06/06/23 67.8 kg (149 lb 7.6 oz)     Body mass index is 23.07 kg/m².  Body surface area is 1.78 meters squared.       Physical Exam  Vitals and nursing note reviewed.   Constitutional:       Appearance: Normal appearance.   HENT:      Head: Normocephalic and atraumatic.   Eyes:      General: No scleral icterus.     Extraocular Movements: Extraocular movements intact.      Conjunctiva/sclera: Conjunctivae normal.   Neck:      Vascular: No JVD.   Cardiovascular:      Rate and Rhythm: Normal rate and regular rhythm.      Heart sounds: No murmur heard.  Pulmonary:       Effort: Pulmonary effort is normal.      Breath sounds: Decreased breath sounds present. No wheezing or rhonchi.   Abdominal:      General: Bowel sounds are normal. There is no distension.      Palpations: Abdomen is soft.      Tenderness: There is no abdominal tenderness.   Musculoskeletal:      Cervical back: Neck supple.   Lymphadenopathy:      Head:      Right side of head: No submental or submandibular adenopathy.      Left side of head: No submental or submandibular adenopathy.      Cervical: No cervical adenopathy.      Upper Body:      Right upper body: No supraclavicular or axillary adenopathy.      Left upper body: No supraclavicular or axillary adenopathy.      Lower Body: No right inguinal adenopathy. No left inguinal adenopathy.      Comments: No adenopathy noted bilaterally   Skin:     General: Skin is warm.      Coloration: Skin is not jaundiced.      Findings: No lesion or rash.   Neurological:      General: No focal deficit present.      Mental Status: She is alert and oriented to person, place, and time.      Cranial Nerves: Cranial nerves 2-12 are intact.      Gait: Gait normal.   Psychiatric:         Attention and Perception: Attention normal.         Speech: Speech normal.         Behavior: Behavior is cooperative.         Cognition and Memory: Cognition normal.         Judgment: Judgment normal.     LABS      Lab Visit on 07/18/2023   Component Date Value    Magnesium Level 07/18/2023 2.20     Phosphorus Level 07/18/2023 3.0     Sodium Level 07/18/2023 142     Potassium Level 07/18/2023 4.1     Chloride 07/18/2023 105     Carbon Dioxide 07/18/2023 29     Glucose Level 07/18/2023 86     Blood Urea Nitrogen 07/18/2023 9.0 (L)     Creatinine 07/18/2023 0.86     Calcium Level Total 07/18/2023 10.0     Protein Total 07/18/2023 7.9 (H)     Albumin Level 07/18/2023 3.6     Globulin 07/18/2023 4.3 (H)     Albumin/Globulin Ratio 07/18/2023 0.8 (L)     Bilirubin Total 07/18/2023 0.3     Alkaline  Phosphatase 07/18/2023 117     Alanine Aminotransferase 07/18/2023 8     Aspartate Aminotransfera* 07/18/2023 15     eGFR 07/18/2023 >60     Thyroid Stimulating Horm* 07/18/2023 1.218     Lactate Dehydrogenase 07/18/2023 194     WBC 07/18/2023 4.57     RBC 07/18/2023 4.16 (L)     Hgb 07/18/2023 11.5 (L)     Hct 07/18/2023 38.2     MCV 07/18/2023 91.8     MCH 07/18/2023 27.6     MCHC 07/18/2023 30.1 (L)     RDW 07/18/2023 14.6     Platelet 07/18/2023 362     MPV 07/18/2023 9.4     Neut % 07/18/2023 65.3     Lymph % 07/18/2023 22.8     Mono % 07/18/2023 6.3     Eos % 07/18/2023 5.0     Basophil % 07/18/2023 0.4     Lymph # 07/18/2023 1.04     Neut # 07/18/2023 2.98     Mono # 07/18/2023 0.29     Eos # 07/18/2023 0.23     Baso # 07/18/2023 0.02     IG# 07/18/2023 0.01     IG% 07/18/2023 0.2          Assessment:   ECOG Performance status: 2 - 3.     1. NSCLC of right lung    2. Metastasis to bone    3. Maintenance antineoplastic immunotherapy      Stage IV NSCLC with bone mets.    Originally: NSCLC / Adenocarcinoma Stage IIIA pT2a pN2  - 12/08/2022: Right lower and middle bilobectomy with mediastinal lymph node dissection. Path: Invasive adenocarcinoma, poorly differentiated, focal tumor involvement of visceral pleura and focal tumor vascular invasion (vein), with mediastinal lymph nodes positive for metastatic carcinoma   - TMB - 5, MSI-S, MET amplification, ROS1.  No mutations in EGFR, BRAF, ALK, ERBB2, KRAS, RET,    - 1% tumor cells are positive for PD-L1   - patient evaluated by Radiation Oncology with recommendation to treat with chemotherapy 1st and will re-evaluate the patient by the end of chemotherapy for radiation therapy  - 01/12/2023 PET: Focal uptake at the medial right acetabular roof. Metastasis is a consideration  - 02/22/2023: RIGHT ACETABULAR LESION, CT - GUIDE CORE NEEDLE BIOPSY : FIBRINOPURULENT EXUDATE ADMIXED WITH ATYPICAL EPITHELIOID GROUPS,   NORMAL    MARROW ELEMENTS AND ABUNDANT BLOOD CLOT  PET  scan showed a suspicious finding in the right acetabular roof and patient then had a CT-guided biopsy no evidence of malignancy. Started with adjuvant chemotherapy with Carboplatin and pemetrexed x 4 cycles (3/15/23-5/18/23)   Patient has been seen by Rad/Onc (Dr Mireles) since the completion of her chemotherapy and plan was to pursue radiation x 5-6 weeks.  She discussed with the patient that they have showed no overall survival benefit in the setting of postoperative radiation therapy but data supports a larger benefit inpatient with extracapsular extension.  If she tolerated chemotherapy well and continue with good performance status then plan was for PORT   Restaging PET-CT after completion of chemotherapy and prior to radiation therapy showed progression at the right acetabulum.  Patient with progression of disease now with bone metastases.  She will start 10 sessions of palliative radiation tomorrow  Plan for Keytruda after she completes radiation.                      Plan:     PET CT wth progression Right superior acetabulum.  FDG avid area appears slightly larger today with slightly increased sclerosis.  SUV is 9.7, previously 6.4.  She had palliative radiation..    RTC in 3 weeks upon finishing radiation, with MD/labs/tox check  Schedule Chemo ed  Begin treatment in 3 weeks   -Will begin XGEVA and Keytruda awaiting approval  -CT CAP in 3 months   - cbc, cmp, mag, phos, TSH, LDH- 1 hr prior @ [facility]    The patient was seen, interviewed and examined. Pertinent lab and radiology studies were reviewed.   The patient was given ample opportunity to ask questions, and to the best of my abilities, all questions answered to satisfaction; patient demonstrated understanding of what we discussed and agreeable to the plan. Pt instructed to call should develop concerning signs/symptoms or have further questions.       Phoebe Gomez MD  Hematology/Oncology      Portions of the record may have been created with voice  recognition software. Occasional wrong-word or sound-a-like substitutions may have occurred due to the inherent limitations of voice recognition software. Read the chart carefully and recognize, using context, where substitutions have occurred.

## 2023-07-18 ENCOUNTER — INFUSION (OUTPATIENT)
Dept: INFUSION THERAPY | Facility: HOSPITAL | Age: 63
End: 2023-07-18
Attending: NURSE PRACTITIONER
Payer: MEDICARE

## 2023-07-18 ENCOUNTER — OFFICE VISIT (OUTPATIENT)
Dept: HEMATOLOGY/ONCOLOGY | Facility: CLINIC | Age: 63
End: 2023-07-18
Payer: MEDICARE

## 2023-07-18 VITALS
OXYGEN SATURATION: 97 % | WEIGHT: 147.25 LBS | HEART RATE: 94 BPM | HEIGHT: 67 IN | DIASTOLIC BLOOD PRESSURE: 73 MMHG | TEMPERATURE: 98 F | BODY MASS INDEX: 23.11 KG/M2 | SYSTOLIC BLOOD PRESSURE: 107 MMHG | RESPIRATION RATE: 18 BRPM

## 2023-07-18 DIAGNOSIS — C79.51 METASTASIS TO BONE: ICD-10-CM

## 2023-07-18 DIAGNOSIS — C34.91 NSCLC OF RIGHT LUNG: Primary | ICD-10-CM

## 2023-07-18 DIAGNOSIS — Z51.12 MAINTENANCE ANTINEOPLASTIC IMMUNOTHERAPY: ICD-10-CM

## 2023-07-18 PROCEDURE — 99215 PR OFFICE/OUTPT VISIT, EST, LEVL V, 40-54 MIN: ICD-10-PCS | Mod: S$GLB,,, | Performed by: INTERNAL MEDICINE

## 2023-07-18 PROCEDURE — 1101F PR PT FALLS ASSESS DOC 0-1 FALLS W/OUT INJ PAST YR: ICD-10-PCS | Mod: CPTII,S$GLB,, | Performed by: INTERNAL MEDICINE

## 2023-07-18 PROCEDURE — 3074F SYST BP LT 130 MM HG: CPT | Mod: CPTII,S$GLB,, | Performed by: INTERNAL MEDICINE

## 2023-07-18 PROCEDURE — 25000003 PHARM REV CODE 250: Performed by: INTERNAL MEDICINE

## 2023-07-18 PROCEDURE — 3008F BODY MASS INDEX DOCD: CPT | Mod: CPTII,S$GLB,, | Performed by: INTERNAL MEDICINE

## 2023-07-18 PROCEDURE — 3008F PR BODY MASS INDEX (BMI) DOCUMENTED: ICD-10-PCS | Mod: CPTII,S$GLB,, | Performed by: INTERNAL MEDICINE

## 2023-07-18 PROCEDURE — 3078F DIAST BP <80 MM HG: CPT | Mod: CPTII,S$GLB,, | Performed by: INTERNAL MEDICINE

## 2023-07-18 PROCEDURE — 3060F POS MICROALBUMINURIA REV: CPT | Mod: CPTII,S$GLB,, | Performed by: INTERNAL MEDICINE

## 2023-07-18 PROCEDURE — 1159F MED LIST DOCD IN RCRD: CPT | Mod: CPTII,S$GLB,, | Performed by: INTERNAL MEDICINE

## 2023-07-18 PROCEDURE — 3288F PR FALLS RISK ASSESSMENT DOCUMENTED: ICD-10-PCS | Mod: CPTII,S$GLB,, | Performed by: INTERNAL MEDICINE

## 2023-07-18 PROCEDURE — 1159F PR MEDICATION LIST DOCUMENTED IN MEDICAL RECORD: ICD-10-PCS | Mod: CPTII,S$GLB,, | Performed by: INTERNAL MEDICINE

## 2023-07-18 PROCEDURE — 3066F NEPHROPATHY DOC TX: CPT | Mod: CPTII,S$GLB,, | Performed by: INTERNAL MEDICINE

## 2023-07-18 PROCEDURE — 3060F PR POS MICROALBUMINURIA RESULT DOCUMENTED/REVIEW: ICD-10-PCS | Mod: CPTII,S$GLB,, | Performed by: INTERNAL MEDICINE

## 2023-07-18 PROCEDURE — 3288F FALL RISK ASSESSMENT DOCD: CPT | Mod: CPTII,S$GLB,, | Performed by: INTERNAL MEDICINE

## 2023-07-18 PROCEDURE — 99999 PR PBB SHADOW E&M-EST. PATIENT-LVL V: ICD-10-PCS | Mod: PBBFAC,,, | Performed by: INTERNAL MEDICINE

## 2023-07-18 PROCEDURE — 99999 PR PBB SHADOW E&M-EST. PATIENT-LVL V: CPT | Mod: PBBFAC,,, | Performed by: INTERNAL MEDICINE

## 2023-07-18 PROCEDURE — 3078F PR MOST RECENT DIASTOLIC BLOOD PRESSURE < 80 MM HG: ICD-10-PCS | Mod: CPTII,S$GLB,, | Performed by: INTERNAL MEDICINE

## 2023-07-18 PROCEDURE — 1101F PT FALLS ASSESS-DOCD LE1/YR: CPT | Mod: CPTII,S$GLB,, | Performed by: INTERNAL MEDICINE

## 2023-07-18 PROCEDURE — 96413 CHEMO IV INFUSION 1 HR: CPT

## 2023-07-18 PROCEDURE — 99215 OFFICE O/P EST HI 40 MIN: CPT | Mod: S$GLB,,, | Performed by: INTERNAL MEDICINE

## 2023-07-18 PROCEDURE — 1160F RVW MEDS BY RX/DR IN RCRD: CPT | Mod: CPTII,S$GLB,, | Performed by: INTERNAL MEDICINE

## 2023-07-18 PROCEDURE — 3066F PR DOCUMENTATION OF TREATMENT FOR NEPHROPATHY: ICD-10-PCS | Mod: CPTII,S$GLB,, | Performed by: INTERNAL MEDICINE

## 2023-07-18 PROCEDURE — 63600175 PHARM REV CODE 636 W HCPCS: Mod: JZ,JG | Performed by: INTERNAL MEDICINE

## 2023-07-18 PROCEDURE — 1160F PR REVIEW ALL MEDS BY PRESCRIBER/CLIN PHARMACIST DOCUMENTED: ICD-10-PCS | Mod: CPTII,S$GLB,, | Performed by: INTERNAL MEDICINE

## 2023-07-18 PROCEDURE — 3074F PR MOST RECENT SYSTOLIC BLOOD PRESSURE < 130 MM HG: ICD-10-PCS | Mod: CPTII,S$GLB,, | Performed by: INTERNAL MEDICINE

## 2023-07-18 RX ORDER — DIPHENHYDRAMINE HYDROCHLORIDE 50 MG/ML
50 INJECTION INTRAMUSCULAR; INTRAVENOUS ONCE AS NEEDED
Status: DISCONTINUED | OUTPATIENT
Start: 2023-07-18 | End: 2023-07-18 | Stop reason: HOSPADM

## 2023-07-18 RX ORDER — HEPARIN 100 UNIT/ML
500 SYRINGE INTRAVENOUS
Status: DISCONTINUED | OUTPATIENT
Start: 2023-07-18 | End: 2023-07-18 | Stop reason: HOSPADM

## 2023-07-18 RX ORDER — EPINEPHRINE 0.3 MG/.3ML
0.3 INJECTION SUBCUTANEOUS ONCE AS NEEDED
Status: DISCONTINUED | OUTPATIENT
Start: 2023-07-18 | End: 2023-07-18 | Stop reason: HOSPADM

## 2023-07-18 RX ORDER — MIRTAZAPINE 7.5 MG/1
7.5 TABLET, FILM COATED ORAL NIGHTLY
COMMUNITY
End: 2023-09-29 | Stop reason: SDUPTHER

## 2023-07-18 RX ORDER — SODIUM CHLORIDE 0.9 % (FLUSH) 0.9 %
10 SYRINGE (ML) INJECTION
Status: DISCONTINUED | OUTPATIENT
Start: 2023-07-18 | End: 2023-07-18 | Stop reason: HOSPADM

## 2023-07-18 RX ADMIN — DENOSUMAB 120 MG: 120 INJECTION SUBCUTANEOUS at 11:07

## 2023-07-18 RX ADMIN — SODIUM CHLORIDE 200 MG: 9 INJECTION, SOLUTION INTRAVENOUS at 11:07

## 2023-07-18 RX ADMIN — SODIUM CHLORIDE: 9 INJECTION, SOLUTION INTRAVENOUS at 11:07

## 2023-07-22 ENCOUNTER — HOSPITAL ENCOUNTER (EMERGENCY)
Facility: HOSPITAL | Age: 63
Discharge: HOME | End: 2023-07-22
Attending: EMERGENCY MEDICINE
Payer: MEDICARE

## 2023-07-22 VITALS
DIASTOLIC BLOOD PRESSURE: 65 MMHG | HEIGHT: 67 IN | WEIGHT: 144 LBS | OXYGEN SATURATION: 95 % | BODY MASS INDEX: 22.6 KG/M2 | RESPIRATION RATE: 20 BRPM | TEMPERATURE: 97 F | SYSTOLIC BLOOD PRESSURE: 97 MMHG | HEART RATE: 95 BPM

## 2023-07-22 DIAGNOSIS — M54.6 ACUTE RIGHT-SIDED THORACIC BACK PAIN: ICD-10-CM

## 2023-07-22 DIAGNOSIS — J06.9 UPPER RESPIRATORY TRACT INFECTION, UNSPECIFIED TYPE: Primary | ICD-10-CM

## 2023-07-22 LAB
ALBUMIN SERPL-MCNC: 3.5 G/DL (ref 3.4–4.8)
ALBUMIN/GLOB SERPL: 0.9 RATIO (ref 1.1–2)
ALP SERPL-CCNC: 114 UNIT/L (ref 40–150)
ALT SERPL-CCNC: 9 UNIT/L (ref 0–55)
AST SERPL-CCNC: 14 UNIT/L (ref 5–34)
BASOPHILS # BLD AUTO: 0.04 X10(3)/MCL
BASOPHILS NFR BLD AUTO: 0.9 %
BILIRUBIN DIRECT+TOT PNL SERPL-MCNC: 0.4 MG/DL
BUN SERPL-MCNC: 8 MG/DL (ref 9.8–20.1)
CALCIUM SERPL-MCNC: 8.3 MG/DL (ref 8.4–10.2)
CHLORIDE SERPL-SCNC: 107 MMOL/L (ref 98–107)
CO2 SERPL-SCNC: 27 MMOL/L (ref 23–31)
CREAT SERPL-MCNC: 0.85 MG/DL (ref 0.55–1.02)
D DIMER PPP IA.FEU-MCNC: 0.8 UG/ML FEU (ref 0–0.5)
EOSINOPHIL # BLD AUTO: 0.29 X10(3)/MCL (ref 0–0.9)
EOSINOPHIL NFR BLD AUTO: 6.3 %
ERYTHROCYTE [DISTWIDTH] IN BLOOD BY AUTOMATED COUNT: 14.8 % (ref 11.5–17)
GFR SERPLBLD CREATININE-BSD FMLA CKD-EPI: >60 MLS/MIN/1.73/M2
GLOBULIN SER-MCNC: 4.1 GM/DL (ref 2.4–3.5)
GLUCOSE SERPL-MCNC: 89 MG/DL (ref 82–115)
HCT VFR BLD AUTO: 35.8 % (ref 37–47)
HGB BLD-MCNC: 10.8 G/DL (ref 12–16)
IMM GRANULOCYTES # BLD AUTO: 0.02 X10(3)/MCL (ref 0–0.04)
IMM GRANULOCYTES NFR BLD AUTO: 0.4 %
LACTATE SERPL-SCNC: 0.8 MMOL/L (ref 0.5–2.2)
LYMPHOCYTES # BLD AUTO: 1.31 X10(3)/MCL (ref 0.6–4.6)
LYMPHOCYTES NFR BLD AUTO: 28.5 %
MCH RBC QN AUTO: 27.6 PG (ref 27–31)
MCHC RBC AUTO-ENTMCNC: 30.2 G/DL (ref 33–36)
MCV RBC AUTO: 91.6 FL (ref 80–94)
MONOCYTES # BLD AUTO: 0.41 X10(3)/MCL (ref 0.1–1.3)
MONOCYTES NFR BLD AUTO: 8.9 %
NEUTROPHILS # BLD AUTO: 2.52 X10(3)/MCL (ref 2.1–9.2)
NEUTROPHILS NFR BLD AUTO: 55 %
PLATELET # BLD AUTO: 286 X10(3)/MCL (ref 130–400)
PMV BLD AUTO: 9.5 FL (ref 7.4–10.4)
POTASSIUM SERPL-SCNC: 4.4 MMOL/L (ref 3.5–5.1)
PROT SERPL-MCNC: 7.6 GM/DL (ref 5.8–7.6)
RBC # BLD AUTO: 3.91 X10(6)/MCL (ref 4.2–5.4)
SODIUM SERPL-SCNC: 139 MMOL/L (ref 136–145)
WBC # SPEC AUTO: 4.59 X10(3)/MCL (ref 4.5–11.5)

## 2023-07-22 PROCEDURE — 87040 BLOOD CULTURE FOR BACTERIA: CPT | Performed by: EMERGENCY MEDICINE

## 2023-07-22 PROCEDURE — 25500020 PHARM REV CODE 255: Performed by: EMERGENCY MEDICINE

## 2023-07-22 PROCEDURE — 80053 COMPREHEN METABOLIC PANEL: CPT | Performed by: EMERGENCY MEDICINE

## 2023-07-22 PROCEDURE — 85025 COMPLETE CBC W/AUTO DIFF WBC: CPT | Performed by: EMERGENCY MEDICINE

## 2023-07-22 PROCEDURE — 85379 FIBRIN DEGRADATION QUANT: CPT | Performed by: EMERGENCY MEDICINE

## 2023-07-22 PROCEDURE — 99285 EMERGENCY DEPT VISIT HI MDM: CPT | Mod: 25

## 2023-07-22 PROCEDURE — 83605 ASSAY OF LACTIC ACID: CPT | Performed by: EMERGENCY MEDICINE

## 2023-07-22 PROCEDURE — 25000003 PHARM REV CODE 250: Performed by: EMERGENCY MEDICINE

## 2023-07-22 RX ORDER — HYDROCODONE BITARTRATE AND ACETAMINOPHEN 5; 325 MG/1; MG/1
1 TABLET ORAL EVERY 6 HOURS PRN
Qty: 20 TABLET | Refills: 0 | Status: SHIPPED | OUTPATIENT
Start: 2023-07-22 | End: 2023-10-18 | Stop reason: SDUPTHER

## 2023-07-22 RX ORDER — AZITHROMYCIN 250 MG/1
250 TABLET, FILM COATED ORAL DAILY
Qty: 6 TABLET | Refills: 0 | Status: ON HOLD | OUTPATIENT
Start: 2023-07-22 | End: 2023-12-04 | Stop reason: ALTCHOICE

## 2023-07-22 RX ORDER — HYDROCODONE BITARTRATE AND ACETAMINOPHEN 5; 325 MG/1; MG/1
1 TABLET ORAL
Status: COMPLETED | OUTPATIENT
Start: 2023-07-22 | End: 2023-07-22

## 2023-07-22 RX ADMIN — HYDROCODONE BITARTRATE AND ACETAMINOPHEN 1 TABLET: 5; 325 TABLET ORAL at 08:07

## 2023-07-22 RX ADMIN — IOPAMIDOL 100 ML: 755 INJECTION, SOLUTION INTRAVENOUS at 10:07

## 2023-07-22 NOTE — ED PROVIDER NOTES
Encounter Date: 7/22/2023       History     Chief Complaint   Patient presents with    Back Pain     Pt c/o pain to rt upper back with cough since yesterday. Reports had a lobectomy in December and pain is in th same area as the surgical site.     HPI  63-year-old female history of lung cancer with previous right-sided lobectomy 12/2022, COPD, HLD, HTN now with complaints of one-week history of productive cough of yellow sputum and 2 day history of right upper mid scapular back pain exacerbated by coughing.  Patient denies associated shortness of breath, substernal chest pain, headache, fever, abdominal pain, increasing leg edema or calf pain.  Patient denies history of trauma.  Patient describes the pain as 4/10, increases with coughing and movement, relieved by rest.  Patient did take 1 Norco last night for her pain with partial relief.  Review of patient's allergies indicates:  No Known Allergies  Past Medical History:   Diagnosis Date    ASTHMA     COPD (chronic obstructive pulmonary disease)     SEVERE    High cholesterol     HLD (hyperlipidemia)     HTN (hypertension)     Lung cancer 12/08/2022    invasive adenocarcinoma, poorly differentiated with solid morphology, focal tumor involvement of visceral pleura and focal tumor vascular invasion (vein), with 2 of 3 posterior mediastinal lymph nodes positive for metastatic carcinoma    Stage 2 chronic kidney disease     TIA (transient ischemic attack)     Tobacco abuse     Unspecified osteoarthritis, unspecified site     Uterine cancer      Past Surgical History:   Procedure Laterality Date    HYSTERECTOMY  Unknowing    lipoma multiple sites      LUNG LOBECTOMY Right 12/08/2022    Procedure: LOBECTOMY, LUNG;  Surgeon: Jass Browne IV, MD;  Location: [facility] OR;  Service: Cardiothoracic;  Laterality: Right;    SURGICAL REMOVAL OF LYMPH NODE  12/08/2022    Procedure: EXCISION, LYMPH NODE;  Surgeon: Jass Browne IV, MD;  Location: [facility] OR;  Service:  Cardiothoracic;;    THORACOTOMY Right 2022    Procedure: THORACOTOMY;  Surgeon: Jass Browne IV, MD;  Location: [facility] OR;  Service: Cardiothoracic;  Laterality: Right;  Right Lower Lobectomy with Lymph Node Dissection    TOTAL ABDOMINAL HYSTERECTOMY W/ BILATERAL SALPINGOOPHORECTOMY       Family History   Problem Relation Age of Onset    Hypertension Mother     Hepatitis Mother     Ovarian cancer Mother     Liver cancer Mother     Heart failure Father     Prostate cancer Father     Fibroids Sister     Asthma Brother     Asthma Brother     Cancer Maternal Grandfather      Social History     Tobacco Use    Smoking status: Former     Packs/day: 0.50     Years: 50.00     Pack years: 25.00     Types: Cigarettes     Quit date: 2022     Years since quittin.6    Smokeless tobacco: Never   Substance Use Topics    Alcohol use: Not Currently     Comment: quit 25years ago    Drug use: Never     Review of Systems   All other systems reviewed and are negative.    Physical Exam     Initial Vitals [23 0840]   BP Pulse Resp Temp SpO2   116/77 101 18 97.4 °F (36.3 °C) 96 %      MAP       --         Physical Exam    Constitutional: She appears well-developed and well-nourished.   HENT:   Head: Normocephalic and atraumatic.   Eyes: Conjunctivae and EOM are normal. Pupils are equal, round, and reactive to light.   Neck: Neck supple.   Normal range of motion.  Cardiovascular:  Normal rate, regular rhythm, normal heart sounds and intact distal pulses.           Pulmonary/Chest: Breath sounds normal. No respiratory distress. She has no wheezes. She has no rhonchi. She has no rales. She exhibits no tenderness.   Abdominal: Abdomen is soft. Bowel sounds are normal.   Musculoskeletal:      Cervical back: Normal range of motion and neck supple.      Comments: Large scar over right mid scapular area with no overlying erythema, increased warmth.  Tenderness to palpation over right mid scapular area with no bony crepitus or  subcu air.     Neurological: She is alert and oriented to person, place, and time. She has normal strength.   Skin: Skin is warm and dry. Capillary refill takes less than 2 seconds.   Psychiatric: She has a normal mood and affect. Her behavior is normal. Judgment and thought content normal.       ED Course   Procedures  Labs Reviewed   COMPREHENSIVE METABOLIC PANEL - Abnormal; Notable for the following components:       Result Value    Blood Urea Nitrogen 8.0 (*)     Calcium Level Total 8.3 (*)     Globulin 4.1 (*)     Albumin/Globulin Ratio 0.9 (*)     All other components within normal limits   D DIMER, QUANTITATIVE - Abnormal; Notable for the following components:    D-Dimer 0.80 (*)     All other components within normal limits   CBC WITH DIFFERENTIAL - Abnormal; Notable for the following components:    RBC 3.91 (*)     Hgb 10.8 (*)     Hct 35.8 (*)     MCHC 30.2 (*)     All other components within normal limits   LACTIC ACID, PLASMA - Normal   BLOOD CULTURE OLG   BLOOD CULTURE OLG   CBC W/ AUTO DIFFERENTIAL    Narrative:     The following orders were created for panel order CBC auto differential.  Procedure                               Abnormality         Status                     ---------                               -----------         ------                     CBC with Differential[[id number]]        Abnormal            Final result                 Please view results for these tests on the individual orders.          Imaging Results              CTA Chest Non-Coronary (PE Studies) (Final result)  Result time 07/22/23 10:52:24      Final result by Elbert Dotson MD (07/22/23 10:52:24)                   Impression:      Postop changes from right lower lobectomy.    No CT evidence for pulmonary embolus    No acute intrathoracic process.    Small right pleural effusion.      Electronically signed by: Elbert Dotson MD  Date:    07/22/2023  Time:    10:52               Narrative:    EXAMINATION:  CTA  chest    CLINICAL HISTORY:  History of right lower lobectomy, right upper chest and back pain    Technique lower    TECHNIQUE:  Routine CT angiogram of the chest was performed intravenous contrast.  3D MIP images are obtained    Total DLP: 141  mGy.cm    Automatic exposure control was utilized to reduce the patient's dose    COMPARISON:  None.  Correlation with PET-CT dated 06/05/2023    FINDINGS:  There are postop changes from right lower lobectomy.  There is elevation of the right hemidiaphragm.  No intraluminal filling defects within the pulmonary arteries to suggest pulmonary embolus.  The thoracic aorta normal caliber with mild atherosclerotic calcifications.  The heart is normal in size.  No pericardial effusion.  Small right pleural effusion noted.  No axillary, hilar, mediastinal adenopathy.  Moderate emphysematous changes noted.  No confluent airspace disease or consolidation.  No osseous destructive process.  The visualized upper abdomen appears unremarkable.                                       X-Ray Chest AP Portable (Final result)  Result time 07/22/23 09:14:50      Final result by Elbert Dotson MD (07/22/23 09:14:50)                   Impression:      Stable elevated right hemidiaphragm with adjacent right basilar atelectatic changes.      Electronically signed by: Elbert Dotson MD  Date:    07/22/2023  Time:    09:14               Narrative:    EXAMINATION:  Chest single view    CLINICAL HISTORY:  Cough    COMPARISON:  03/06/2023    FINDINGS:  Cardiac silhouette is normal in size.  There is stable elevated right hemidiaphragm with adjacent atelectatic changes.  No visible pneumothorax or definite pleural effusion.  No interval consolidation.                                       Medications   HYDROcodone-acetaminophen 5-325 mg per tablet 1 tablet (1 tablet Oral Given 7/22/23 0535)   iopamidoL (ISOVUE-370) injection 100 mL (100 mLs Intravenous Given 7/22/23 1019)     Medical Decision Making:    Clinical Tests:   Lab Tests: Ordered and Reviewed  Radiological Study: Ordered and Reviewed            63-year-old female history of lung cancer with previous lobectomy now with one-week history of productive cough of yellow sputum and right upper back pain x2 days.  Patient denies any associated substernal chest pain or shortness of breath or fever.  Vital signs stable in ED, afebrile, O2 sat 96% on room air.  Labs essentially WNL except for a slightly elevated D-dimer 0.8.  Chest x-ray with no acute findings.  CT of chest with small right pleural effusion, no PE, no other acute findings.  Patient feeling much better after [place] 1 p.o. here in ED.  Will discharge home with Norco Rx, Zithromax Rx, close follow up with PCP in 1-2 days for recheck, return for worsening symptoms.            Clinical Impression:   Final diagnoses:  [J06.9] Upper respiratory tract infection, unspecified type (Primary)  [M54.6] Acute right-sided thoracic back pain        ED Disposition Condition    Discharge Stable          ED Prescriptions       Medication Sig Dispense Start Date End Date Auth. Provider    HYDROcodone-acetaminophen (NORCO) 5-325 mg per tablet Take 1 tablet by mouth every 6 (six) hours as needed for Pain. 20 tablet 7/22/2023 -- Ziyad Sidhu MD    azithromycin (Z-JACEY) 250 MG tablet Take 1 tablet (250 mg total) by mouth once daily. Take first 2 tablets together, then 1 every day until finished. 6 tablet 7/22/2023 -- Ziyad Sidhu MD          Follow-up Information       Follow up With Specialties Details Why Contact Info    Jennifer Dacosta, [facility] Schedule an appointment as soon as possible for a visit in 1 day  [address] [facility] [address]  [phone]               Ziyad Sidhu MD  07/23/23 0741

## 2023-07-25 ENCOUNTER — INFUSION (OUTPATIENT)
Dept: INFUSION THERAPY | Facility: HOSPITAL | Age: 63
End: 2023-07-25
Attending: NURSE PRACTITIONER
Payer: MEDICARE

## 2023-07-25 ENCOUNTER — OFFICE VISIT (OUTPATIENT)
Dept: HEMATOLOGY/ONCOLOGY | Facility: CLINIC | Age: 63
End: 2023-07-25
Payer: MEDICARE

## 2023-07-25 VITALS
TEMPERATURE: 98 F | WEIGHT: 143.75 LBS | BODY MASS INDEX: 22.56 KG/M2 | HEART RATE: 109 BPM | RESPIRATION RATE: 18 BRPM | SYSTOLIC BLOOD PRESSURE: 120 MMHG | OXYGEN SATURATION: 100 % | DIASTOLIC BLOOD PRESSURE: 81 MMHG | HEIGHT: 67 IN

## 2023-07-25 DIAGNOSIS — C34.91 NSCLC OF RIGHT LUNG: Primary | ICD-10-CM

## 2023-07-25 DIAGNOSIS — K59.00 CONSTIPATION, UNSPECIFIED CONSTIPATION TYPE: ICD-10-CM

## 2023-07-25 DIAGNOSIS — E83.39 HYPOPHOSPHATEMIA: ICD-10-CM

## 2023-07-25 PROCEDURE — 1101F PR PT FALLS ASSESS DOC 0-1 FALLS W/OUT INJ PAST YR: ICD-10-PCS | Mod: CPTII,S$GLB,, | Performed by: NURSE PRACTITIONER

## 2023-07-25 PROCEDURE — 1159F MED LIST DOCD IN RCRD: CPT | Mod: CPTII,S$GLB,, | Performed by: NURSE PRACTITIONER

## 2023-07-25 PROCEDURE — 3066F PR DOCUMENTATION OF TREATMENT FOR NEPHROPATHY: ICD-10-PCS | Mod: CPTII,S$GLB,, | Performed by: NURSE PRACTITIONER

## 2023-07-25 PROCEDURE — 1101F PT FALLS ASSESS-DOCD LE1/YR: CPT | Mod: CPTII,S$GLB,, | Performed by: NURSE PRACTITIONER

## 2023-07-25 PROCEDURE — 3008F BODY MASS INDEX DOCD: CPT | Mod: CPTII,S$GLB,, | Performed by: NURSE PRACTITIONER

## 2023-07-25 PROCEDURE — 3079F DIAST BP 80-89 MM HG: CPT | Mod: CPTII,S$GLB,, | Performed by: NURSE PRACTITIONER

## 2023-07-25 PROCEDURE — 3074F PR MOST RECENT SYSTOLIC BLOOD PRESSURE < 130 MM HG: ICD-10-PCS | Mod: CPTII,S$GLB,, | Performed by: NURSE PRACTITIONER

## 2023-07-25 PROCEDURE — 3288F PR FALLS RISK ASSESSMENT DOCUMENTED: ICD-10-PCS | Mod: CPTII,S$GLB,, | Performed by: NURSE PRACTITIONER

## 2023-07-25 PROCEDURE — 1160F PR REVIEW ALL MEDS BY PRESCRIBER/CLIN PHARMACIST DOCUMENTED: ICD-10-PCS | Mod: CPTII,S$GLB,, | Performed by: NURSE PRACTITIONER

## 2023-07-25 PROCEDURE — 1160F RVW MEDS BY RX/DR IN RCRD: CPT | Mod: CPTII,S$GLB,, | Performed by: NURSE PRACTITIONER

## 2023-07-25 PROCEDURE — 3079F PR MOST RECENT DIASTOLIC BLOOD PRESSURE 80-89 MM HG: ICD-10-PCS | Mod: CPTII,S$GLB,, | Performed by: NURSE PRACTITIONER

## 2023-07-25 PROCEDURE — 1159F PR MEDICATION LIST DOCUMENTED IN MEDICAL RECORD: ICD-10-PCS | Mod: CPTII,S$GLB,, | Performed by: NURSE PRACTITIONER

## 2023-07-25 PROCEDURE — 96366 THER/PROPH/DIAG IV INF ADDON: CPT

## 2023-07-25 PROCEDURE — 99214 PR OFFICE/OUTPT VISIT, EST, LEVL IV, 30-39 MIN: ICD-10-PCS | Mod: S$GLB,,, | Performed by: NURSE PRACTITIONER

## 2023-07-25 PROCEDURE — 99999 PR PBB SHADOW E&M-EST. PATIENT-LVL V: CPT | Mod: PBBFAC,,, | Performed by: NURSE PRACTITIONER

## 2023-07-25 PROCEDURE — 96365 THER/PROPH/DIAG IV INF INIT: CPT

## 2023-07-25 PROCEDURE — 3008F PR BODY MASS INDEX (BMI) DOCUMENTED: ICD-10-PCS | Mod: CPTII,S$GLB,, | Performed by: NURSE PRACTITIONER

## 2023-07-25 PROCEDURE — 3074F SYST BP LT 130 MM HG: CPT | Mod: CPTII,S$GLB,, | Performed by: NURSE PRACTITIONER

## 2023-07-25 PROCEDURE — 3060F POS MICROALBUMINURIA REV: CPT | Mod: CPTII,S$GLB,, | Performed by: NURSE PRACTITIONER

## 2023-07-25 PROCEDURE — 99999 PR PBB SHADOW E&M-EST. PATIENT-LVL V: ICD-10-PCS | Mod: PBBFAC,,, | Performed by: NURSE PRACTITIONER

## 2023-07-25 PROCEDURE — 3288F FALL RISK ASSESSMENT DOCD: CPT | Mod: CPTII,S$GLB,, | Performed by: NURSE PRACTITIONER

## 2023-07-25 PROCEDURE — 3066F NEPHROPATHY DOC TX: CPT | Mod: CPTII,S$GLB,, | Performed by: NURSE PRACTITIONER

## 2023-07-25 PROCEDURE — 99214 OFFICE O/P EST MOD 30 MIN: CPT | Mod: S$GLB,,, | Performed by: NURSE PRACTITIONER

## 2023-07-25 PROCEDURE — 25000003 PHARM REV CODE 250: Performed by: NURSE PRACTITIONER

## 2023-07-25 PROCEDURE — 3060F PR POS MICROALBUMINURIA RESULT DOCUMENTED/REVIEW: ICD-10-PCS | Mod: CPTII,S$GLB,, | Performed by: NURSE PRACTITIONER

## 2023-07-25 RX ORDER — LACTULOSE 10 G/15ML
20 SOLUTION ORAL 2 TIMES DAILY PRN
Qty: 240 ML | Refills: 2 | Status: SHIPPED | OUTPATIENT
Start: 2023-07-25 | End: 2023-10-26

## 2023-07-25 RX ORDER — HEPARIN 100 UNIT/ML
500 SYRINGE INTRAVENOUS
Status: CANCELLED | OUTPATIENT
Start: 2023-07-25

## 2023-07-25 RX ORDER — SODIUM CHLORIDE 0.9 % (FLUSH) 0.9 %
10 SYRINGE (ML) INJECTION
Status: CANCELLED | OUTPATIENT
Start: 2023-07-25

## 2023-07-25 RX ORDER — HEPARIN 100 UNIT/ML
500 SYRINGE INTRAVENOUS
Status: DISCONTINUED | OUTPATIENT
Start: 2023-07-25 | End: 2023-07-25 | Stop reason: HOSPADM

## 2023-07-25 RX ORDER — SODIUM CHLORIDE 0.9 % (FLUSH) 0.9 %
10 SYRINGE (ML) INJECTION
Status: DISCONTINUED | OUTPATIENT
Start: 2023-07-25 | End: 2023-07-25 | Stop reason: HOSPADM

## 2023-07-25 RX ORDER — MEGESTROL ACETATE 40 MG/1
40 TABLET ORAL 4 TIMES DAILY
Qty: 120 TABLET | Refills: 2 | Status: SHIPPED | OUTPATIENT
Start: 2023-07-25 | End: 2024-01-23 | Stop reason: SDUPTHER

## 2023-07-25 RX ORDER — SODIUM,POTASSIUM PHOSPHATES 280-250MG
1 POWDER IN PACKET (EA) ORAL 4 TIMES DAILY
Qty: 28 PACKET | Refills: 0 | Status: SHIPPED | OUTPATIENT
Start: 2023-07-25 | End: 2023-09-12 | Stop reason: SDUPTHER

## 2023-07-25 RX ADMIN — ASCORBIC ACID, VITAMIN A PALMITATE, CHOLECALCIFEROL, THIAMINE HYDROCHLORIDE, RIBOFLAVIN-5 PHOSPHATE SODIUM, PYRIDOXINE HYDROCHLORIDE, NIACINAMIDE, DEXPANTHENOL, ALPHA-TOCOPHEROL ACETATE, VITAMIN K1, FOLIC ACID, BIOTIN, CYANOCOBALAMIN: 200; 3300; 200; 6; 3.6; 6; 40; 15; 10; 150; 600; 60; 5 INJECTION, SOLUTION INTRAVENOUS at 09:07

## 2023-07-25 NOTE — PROGRESS NOTES
HEMATOLOGY/ONCOLOGY OFFICE CLINIC VISIT  KAYDEN Christelle    ONCOLOGICAL HISTORY:     Diagnosis:  - Stage IV NSCLC with bone mets.  - NSCLC / Adenocarcinoma Stage IIIA pT2a pN2--dx 12/2022  - History Uterine cancer - Dx 1989    Current Treatment:   Keytruda + Xgeva 7/18/2023-present    Treatment History:  - 1989 s/p total hysterectomy   - 12/08/2022: Right lower and middle bilobectomy with mediastinal lymph node dissection.   - Carboplatin + Pemetrexed  3/15/2023-5/1/2023  - Palliative RT   - Carboplatin + Pemetrexed started 3/15/2023    Plan of care: palliative systemic therapy      IMAGING:   - 08/10/2022 PET: The elongated subpleural right lower lobe nodule demonstrates fairly intense FDG activity.  Infectious, inflammatory and neoplastic etiologies remain possible. No suspicious PET findings elsewhere.  - 12/08/2022 CT HEAD without contrast: No acute intracranial abnormality identified.  Findings of mild microvascular ischemic disease.  - 12/2022: U/S Upper extremity: Thrombophlebitis of the left cephalic vein. No deep venous thrombosis in the left upper extremity.  - 01/03/2023 MRI Brain : GARY   - 01/12/2023 NM PET CT 1. Postsurgical changes of recent right lower and middle lobectomies.  There is a small right pleural effusion.  There is nonspecific peripheral uptake along the pleura at the right lung base and perihilar region which may be related to healing response in the setting of recent surgery.  Similarly borderline mediastinal uptake is nonspecific in the recent postoperative setting and could be reactive.  Continued attention recommended on follow-up. 2. Focal uptake at the medial right acetabular roof without appreciable focal osseous lesion by CT evaluation.  This is new compared to previous exam.  Metastasis is a consideration  -PET 6/5/2023: Previously visualized moderately FDG avid opacities towards the right lung base improved.  Pleural effusion also improved.  No new suspicious PET findings in the  lungs.  Severe emphysema. Some left adrenal thickening is similar to prior but there is now moderate associated FDG activity, max SUV is 3.6. FDG activity is again seen associated with the right superior acetabulum.  FDG avid area appears slightly larger today with slightly increased sclerosis.  SUV is 9.7, previously 6.4.  There is a newly evident subcentimeter FDG avid lesion left pedicle T10. Additional subcentimeter FDG avid lesion T4 vertebral body SUV 3.4.suspicious for metastatic disease.  -MRI T spine 6/15/2023:   1. Foci of abnormal signal intensity and enhancement at the inferior endplate of T3 and at body and left pedicle of T10.  Metastatic neoplastic etiology must be considered.  2. Thoracic spondylosis  3. Mild encroachment into the right neural foramina at the T3-4 and T4-5 secondary to mild posterior disc bulge and posterior osteophyte formation  4. Multilevel mild posterior disc bulge which does not result in significant central spinal stenosis.    PATHOLOGY:  - 12/08/2022: Invasive solid adenocarcinoma (3.1 CM); G3, poorly differentiated. Visceral Pleura Invasion present without definite serosal surface involvement. Lymphovascular invasion (Vein) present. All margins negative for invasive carcinoma, distance 2.5 cm. LN - 4/14 (10R: Hilar, Posterior mediastinal (RACHEAL), 4R Lower paratracheal, 9R Pulmonary ligament, 10R). pT2a pN2    - 02/22/2023: RIGHT ACETABULAR LESION, CT - GUIDE CORE NEEDLE BIOPSY : FIBRINOPURULENT EXUDATE ADMIXED WITH ATYPICAL EPITHELIOID GROUPS,   NORMAL    MARROW ELEMENTS AND ABUNDANT BLOOD CLOT    Subjective:     HPI  Mansi Jin  63 y.o.  female with past medical history significant for HTN, HLD, uterine cancer status post total hysterectomy in 1989,  CKD Stage 2, COPD Stage 3, referred for management of NSCLC s/p RLL / RML lobectomy with Mediastinal Lymph Node Dissection on 12/8/2022.     She was noted to have RLL lung mass and underwent CT guided biopsy which was non  diagnostic. PET scan demonstrated significant avidity within the lesion. Given high risk for malignancy, she underwent right lower lobectomy and right middle lobectomy on 12/08/2022. Pathology came back with invasive adenocarcinoma, poorly differentiated with solid morphology, focal tumor involvement of visceral pleura and focal tumor vascular invasion (vein), with mediastinal lymph nodes positive for metastatic carcinoma.      PET on 01/12/2023 showed focal uptake at the medial right acetabular roof but the biopsy was negative for malignancy. Discussed with radiology, they recommended adjuvant chemotherapy and then they will reassess later for radiation therapy.     Chief Complaint: Lung Cancer (Pt reports bilateral upper back pain as well as left lower back pain. Pt also reports constipation. Pt has not had BM the last 7 days)      Interval History:     7/25/23: Ms. Jin presents today for follow-up. She is s/p cycle 1 of Keytruda started on 7/18/23. She is with her daughter and complains of weakness and fatigue. She was seen in the ER 7/22/23 for right upper mid scapular back pain exacerbated by coughing.CTA and CXR were negative for acute processes. Her pain is undercontrol with the use of Norco at this time. She complains of constipation x 1 week and low appetite despite taking remeron.     7/18/23: Ms. Jin presents today for initial Chemo with her daughter.  Pt has lab results as well. Patient was in the hospital on 6/21/23 for leg pain and discharged.  Seems like patient with bone metastases.  She bagan palliative radiation treatment with Dr. Mireles on 6/28/23, 10 treatments over two weeks. She is doing well today overall. She reports she is no longer coughing and has stopped tessalon pearls and Pepcid. She has intermittent right sided, sharp pains that have been present since surgery that are improving, she does not use any pain medication and it is manageable.  She manages constipation with colace.  She denies any shortness of breath, chest pain, fever, chills, vomiting, diarrhea, neuropathy, or recent hospitalizations.       Past Medical History:   Diagnosis Date    ASTHMA     COPD (chronic obstructive pulmonary disease)     SEVERE    High cholesterol     HLD (hyperlipidemia)     HTN (hypertension)     Lung cancer 2022    invasive adenocarcinoma, poorly differentiated with solid morphology, focal tumor involvement of visceral pleura and focal tumor vascular invasion (vein), with 2 of 3 posterior mediastinal lymph nodes positive for metastatic carcinoma    Stage 2 chronic kidney disease     TIA (transient ischemic attack)     Tobacco abuse     Unspecified osteoarthritis, unspecified site     Uterine cancer       Past Surgical History:   Procedure Laterality Date    HYSTERECTOMY  Unknowing    lipoma multiple sites      LUNG LOBECTOMY Right 2022    Procedure: LOBECTOMY, LUNG;  Surgeon: Jass Browne IV, MD;  Location: [facility] OR;  Service: Cardiothoracic;  Laterality: Right;    SURGICAL REMOVAL OF LYMPH NODE  2022    Procedure: EXCISION, LYMPH NODE;  Surgeon: Jass Browne IV, MD;  Location: [facility] OR;  Service: Cardiothoracic;;    THORACOTOMY Right 2022    Procedure: THORACOTOMY;  Surgeon: Jass Browne IV, MD;  Location: [facility] OR;  Service: Cardiothoracic;  Laterality: Right;  Right Lower Lobectomy with Lymph Node Dissection    TOTAL ABDOMINAL HYSTERECTOMY W/ BILATERAL SALPINGOOPHORECTOMY       Social History     Socioeconomic History    Marital status: Single    Number of children: 5   Tobacco Use    Smoking status: Former     Packs/day: 0.50     Years: 50.00     Pack years: 25.00     Types: Cigarettes     Quit date: 2022     Years since quittin.6    Smokeless tobacco: Never   Substance and Sexual Activity    Alcohol use: Not Currently     Comment: quit 25years ago    Drug use: Never    Sexual activity: Yes     Partners: Male      Family History   Problem Relation Age of  Onset    Hypertension Mother     Hepatitis Mother     Ovarian cancer Mother     Liver cancer Mother     Heart failure Father     Prostate cancer Father     Fibroids Sister     Asthma Brother     Asthma Brother     Cancer Maternal Grandfather       Review of patient's allergies indicates:  No Known Allergies     Review of Systems   Constitutional:  Positive for fatigue. Negative for activity change, appetite change, chills, fever and unexpected weight change.   HENT: Negative.  Negative for mouth dryness, mouth sores, nosebleeds, sore throat and trouble swallowing.    Eyes: Negative.  Negative for visual disturbance.   Respiratory:  Positive for shortness of breath (on exertion, chronic). Negative for cough.    Cardiovascular:  Negative for chest pain, palpitations and leg swelling.   Gastrointestinal:  Negative for abdominal distention, abdominal pain, blood in stool, change in bowel habit, constipation, diarrhea, nausea, vomiting and change in bowel habit.   Endocrine: Negative.    Genitourinary: Negative.  Negative for dysuria, frequency, hematuria and urgency.   Musculoskeletal:  Negative for arthralgias, back pain, leg pain, myalgias and neck pain.   Integumentary:  Negative for rash, breast mass, breast discharge and breast tenderness. Negative.   Allergic/Immunologic: Negative.    Neurological:  Negative for dizziness, tremors, syncope, speech difficulty, weakness, light-headedness, numbness, headaches and memory loss.   Hematological: Negative.  Does not bruise/bleed easily.   Psychiatric/Behavioral: Negative.  Negative for confusion and suicidal ideas.    Breast: Negative for mass and tenderness      Objective:        Vitals:    07/25/23 0821   BP: 120/81   Pulse: 109   Resp: 18   Temp: 97.9 °F (36.6 °C)         Wt Readings from Last 6 Encounters:   07/25/23 65.2 kg (143 lb 11.8 oz)   07/22/23 65.3 kg (144 lb)   07/18/23 66.8 kg (147 lb 4.3 oz)   07/05/23 67.1 kg (148 lb)   06/27/23 68 kg (150 lb)   06/22/23  68.9 kg (152 lb)     Body mass index is 22.51 kg/m².  Body surface area is 1.76 meters squared.       Physical Exam  Vitals and nursing note reviewed.   Constitutional:       Appearance: Normal appearance.   HENT:      Head: Normocephalic and atraumatic.   Eyes:      General: No scleral icterus.     Extraocular Movements: Extraocular movements intact.      Conjunctiva/sclera: Conjunctivae normal.   Neck:      Vascular: No JVD.   Cardiovascular:      Rate and Rhythm: Normal rate and regular rhythm.      Heart sounds: No murmur heard.  Pulmonary:      Effort: Pulmonary effort is normal.      Breath sounds: Decreased breath sounds present. No wheezing or rhonchi.   Abdominal:      General: Bowel sounds are normal. There is no distension.      Palpations: Abdomen is soft.      Tenderness: There is no abdominal tenderness.   Musculoskeletal:      Cervical back: Neck supple.   Lymphadenopathy:      Head:      Right side of head: No submental or submandibular adenopathy.      Left side of head: No submental or submandibular adenopathy.      Cervical: No cervical adenopathy.      Upper Body:      Right upper body: No supraclavicular or axillary adenopathy.      Left upper body: No supraclavicular or axillary adenopathy.      Lower Body: No right inguinal adenopathy. No left inguinal adenopathy.      Comments: No adenopathy noted bilaterally   Skin:     General: Skin is warm.      Coloration: Skin is not jaundiced.      Findings: No lesion or rash.   Neurological:      General: No focal deficit present.      Mental Status: She is alert and oriented to person, place, and time.      Cranial Nerves: Cranial nerves 2-12 are intact.      Gait: Gait normal.   Psychiatric:         Attention and Perception: Attention normal.         Speech: Speech normal.         Behavior: Behavior is cooperative.         Cognition and Memory: Cognition normal.         Judgment: Judgment normal.     LABS      Lab Visit on 07/25/2023   Component Date  Value    Thyroid Stimulating Horm* 07/25/2023 1.473     Lactate Dehydrogenase 07/25/2023 277 (H)     Sodium Level 07/25/2023 135 (L)     Potassium Level 07/25/2023 4.4     Chloride 07/25/2023 104     Carbon Dioxide 07/25/2023 25     Glucose Level 07/25/2023 121 (H)     Blood Urea Nitrogen 07/25/2023 9.0 (L)     Creatinine 07/25/2023 0.85     Calcium Level Total 07/25/2023 9.0     Protein Total 07/25/2023 8.0 (H)     Albumin Level 07/25/2023 3.5     Globulin 07/25/2023 4.5 (H)     Albumin/Globulin Ratio 07/25/2023 0.8 (L)     Bilirubin Total 07/25/2023 0.4     Alkaline Phosphatase 07/25/2023 124     Alanine Aminotransferase 07/25/2023 8     Aspartate Aminotransfera* 07/25/2023 16     eGFR 07/25/2023 >60     Magnesium Level 07/25/2023 2.50     Phosphorus Level 07/25/2023 1.2 (L)     WBC 07/25/2023 7.57     RBC 07/25/2023 4.14 (L)     Hgb 07/25/2023 11.5 (L)     Hct 07/25/2023 36.9 (L)     MCV 07/25/2023 89.1     MCH 07/25/2023 27.8     MCHC 07/25/2023 31.2 (L)     RDW 07/25/2023 14.6     Platelet 07/25/2023 306     MPV 07/25/2023 9.4     Neut % 07/25/2023 68.0     Lymph % 07/25/2023 20.2     Mono % 07/25/2023 8.1     Eos % 07/25/2023 3.2     Basophil % 07/25/2023 0.4     Lymph # 07/25/2023 1.53     Neut # 07/25/2023 5.15     Mono # 07/25/2023 0.61     Eos # 07/25/2023 0.24     Baso # 07/25/2023 0.03     IG# 07/25/2023 0.01     IG% 07/25/2023 0.1          Assessment:   ECOG Performance status: 2 - 3.     1. NSCLC of right lung    2. Constipation, unspecified constipation type    3. Hypophosphatemia      Stage IV NSCLC with bone mets.    Originally: NSCLC / Adenocarcinoma Stage IIIA pT2a pN2  - 12/08/2022: Right lower and middle bilobectomy with mediastinal lymph node dissection. Path: Invasive adenocarcinoma, poorly differentiated, focal tumor involvement of visceral pleura and focal tumor vascular invasion (vein), with mediastinal lymph nodes positive for metastatic carcinoma   - TMB - 5, MSI-S, MET amplification,  ROS1.  No mutations in EGFR, BRAF, ALK, ERBB2, KRAS, RET,    - 1% tumor cells are positive for PD-L1   - patient evaluated by Radiation Oncology with recommendation to treat with chemotherapy 1st and will re-evaluate the patient by the end of chemotherapy for radiation therapy  - 01/12/2023 PET: Focal uptake at the medial right acetabular roof. Metastasis is a consideration  - 02/22/2023: RIGHT ACETABULAR LESION, CT - GUIDE CORE NEEDLE BIOPSY : FIBRINOPURULENT EXUDATE ADMIXED WITH ATYPICAL EPITHELIOID GROUPS,   NORMAL    MARROW ELEMENTS AND ABUNDANT BLOOD CLOT  PET scan showed a suspicious finding in the right acetabular roof and patient then had a CT-guided biopsy no evidence of malignancy. Started with adjuvant chemotherapy with Carboplatin and pemetrexed x 4 cycles (3/15/23-5/18/23)   Patient has been seen by Rad/Onc (Dr Mireles) since the completion of her chemotherapy and plan was to pursue radiation x 5-6 weeks.  She discussed with the patient that they have showed no overall survival benefit in the setting of postoperative radiation therapy but data supports a larger benefit inpatient with extracapsular extension.  If she tolerated chemotherapy well and continue with good performance status then plan was for PORT   Restaging PET-CT after completion of chemotherapy and prior to radiation therapy showed progression at the right acetabulum.  Patient with progression of disease now with bone metastases.  She bagan palliative radiation treatment with Dr. Mireles on 6/28/23, 10 treatments over two weeks.  Started  Keytruda 7/18/23.   Weakness/hypophosphatemia: We will give her 1L NS today with multi-vitamins. We will replace her phosphate with phosnak x 1 week.                      Plan:     PET CT wth progression Right superior acetabulum.  FDG avid area appears slightly larger with slightly increased sclerosis.  SUV is 9.7, previously 6.4.  She had palliative radiation..    RTC 8/8/23 for cycle 2 of Keytruda    XGEVA Q 4 weeks   -1L NS + multi vitamin  -Phos nak x 1 week.   -CT CAP in 3 months   - cbc, cmp, mag, phos,- 1 hr prior @ [facility]    The patient was seen, interviewed and examined. Pertinent lab and radiology studies were reviewed.   The patient was given ample opportunity to ask questions, and to the best of my abilities, all questions answered to satisfaction; patient demonstrated understanding of what we discussed and agreeable to the plan. Pt instructed to call should develop concerning signs/symptoms or have further questions.

## 2023-07-27 LAB
BACTERIA BLD CULT: NORMAL
BACTERIA BLD CULT: NORMAL

## 2023-08-07 DIAGNOSIS — C34.91 NSCLC OF RIGHT LUNG: Primary | ICD-10-CM

## 2023-08-08 ENCOUNTER — INFUSION (OUTPATIENT)
Dept: INFUSION THERAPY | Facility: HOSPITAL | Age: 63
End: 2023-08-08
Attending: NURSE PRACTITIONER
Payer: MEDICARE

## 2023-08-08 ENCOUNTER — OFFICE VISIT (OUTPATIENT)
Dept: HEMATOLOGY/ONCOLOGY | Facility: CLINIC | Age: 63
End: 2023-08-08
Payer: MEDICARE

## 2023-08-08 VITALS
SYSTOLIC BLOOD PRESSURE: 122 MMHG | RESPIRATION RATE: 18 BRPM | BODY MASS INDEX: 22.52 KG/M2 | WEIGHT: 143.5 LBS | HEIGHT: 67 IN | TEMPERATURE: 99 F | HEART RATE: 97 BPM | DIASTOLIC BLOOD PRESSURE: 78 MMHG | OXYGEN SATURATION: 100 %

## 2023-08-08 VITALS
BODY MASS INDEX: 22.52 KG/M2 | HEART RATE: 97 BPM | WEIGHT: 143.5 LBS | SYSTOLIC BLOOD PRESSURE: 122 MMHG | HEIGHT: 67 IN | TEMPERATURE: 99 F | DIASTOLIC BLOOD PRESSURE: 78 MMHG | RESPIRATION RATE: 18 BRPM | OXYGEN SATURATION: 100 %

## 2023-08-08 DIAGNOSIS — C34.91 NSCLC OF RIGHT LUNG: Primary | ICD-10-CM

## 2023-08-08 PROCEDURE — 1160F PR REVIEW ALL MEDS BY PRESCRIBER/CLIN PHARMACIST DOCUMENTED: ICD-10-PCS | Mod: CPTII,S$GLB,, | Performed by: NURSE PRACTITIONER

## 2023-08-08 PROCEDURE — 1159F PR MEDICATION LIST DOCUMENTED IN MEDICAL RECORD: ICD-10-PCS | Mod: CPTII,S$GLB,, | Performed by: NURSE PRACTITIONER

## 2023-08-08 PROCEDURE — 63600175 PHARM REV CODE 636 W HCPCS: Mod: JZ,JG | Performed by: NURSE PRACTITIONER

## 2023-08-08 PROCEDURE — 3008F BODY MASS INDEX DOCD: CPT | Mod: CPTII,S$GLB,, | Performed by: NURSE PRACTITIONER

## 2023-08-08 PROCEDURE — 3078F DIAST BP <80 MM HG: CPT | Mod: CPTII,S$GLB,, | Performed by: NURSE PRACTITIONER

## 2023-08-08 PROCEDURE — 1101F PT FALLS ASSESS-DOCD LE1/YR: CPT | Mod: CPTII,S$GLB,, | Performed by: NURSE PRACTITIONER

## 2023-08-08 PROCEDURE — 99999 PR PBB SHADOW E&M-EST. PATIENT-LVL III: ICD-10-PCS | Mod: PBBFAC,,, | Performed by: NURSE PRACTITIONER

## 2023-08-08 PROCEDURE — 96413 CHEMO IV INFUSION 1 HR: CPT

## 2023-08-08 PROCEDURE — 3288F FALL RISK ASSESSMENT DOCD: CPT | Mod: CPTII,S$GLB,, | Performed by: NURSE PRACTITIONER

## 2023-08-08 PROCEDURE — 99214 PR OFFICE/OUTPT VISIT, EST, LEVL IV, 30-39 MIN: ICD-10-PCS | Mod: S$GLB,,, | Performed by: NURSE PRACTITIONER

## 2023-08-08 PROCEDURE — 3074F SYST BP LT 130 MM HG: CPT | Mod: CPTII,S$GLB,, | Performed by: NURSE PRACTITIONER

## 2023-08-08 PROCEDURE — 3008F PR BODY MASS INDEX (BMI) DOCUMENTED: ICD-10-PCS | Mod: CPTII,S$GLB,, | Performed by: NURSE PRACTITIONER

## 2023-08-08 PROCEDURE — 1160F RVW MEDS BY RX/DR IN RCRD: CPT | Mod: CPTII,S$GLB,, | Performed by: NURSE PRACTITIONER

## 2023-08-08 PROCEDURE — 1101F PR PT FALLS ASSESS DOC 0-1 FALLS W/OUT INJ PAST YR: ICD-10-PCS | Mod: CPTII,S$GLB,, | Performed by: NURSE PRACTITIONER

## 2023-08-08 PROCEDURE — 99214 OFFICE O/P EST MOD 30 MIN: CPT | Mod: S$GLB,,, | Performed by: NURSE PRACTITIONER

## 2023-08-08 PROCEDURE — 3078F PR MOST RECENT DIASTOLIC BLOOD PRESSURE < 80 MM HG: ICD-10-PCS | Mod: CPTII,S$GLB,, | Performed by: NURSE PRACTITIONER

## 2023-08-08 PROCEDURE — 1159F MED LIST DOCD IN RCRD: CPT | Mod: CPTII,S$GLB,, | Performed by: NURSE PRACTITIONER

## 2023-08-08 PROCEDURE — 3074F PR MOST RECENT SYSTOLIC BLOOD PRESSURE < 130 MM HG: ICD-10-PCS | Mod: CPTII,S$GLB,, | Performed by: NURSE PRACTITIONER

## 2023-08-08 PROCEDURE — 99999 PR PBB SHADOW E&M-EST. PATIENT-LVL III: CPT | Mod: PBBFAC,,, | Performed by: NURSE PRACTITIONER

## 2023-08-08 PROCEDURE — 3060F PR POS MICROALBUMINURIA RESULT DOCUMENTED/REVIEW: ICD-10-PCS | Mod: CPTII,S$GLB,, | Performed by: NURSE PRACTITIONER

## 2023-08-08 PROCEDURE — 3066F NEPHROPATHY DOC TX: CPT | Mod: CPTII,S$GLB,, | Performed by: NURSE PRACTITIONER

## 2023-08-08 PROCEDURE — 25000003 PHARM REV CODE 250: Performed by: NURSE PRACTITIONER

## 2023-08-08 PROCEDURE — 3066F PR DOCUMENTATION OF TREATMENT FOR NEPHROPATHY: ICD-10-PCS | Mod: CPTII,S$GLB,, | Performed by: NURSE PRACTITIONER

## 2023-08-08 PROCEDURE — 3060F POS MICROALBUMINURIA REV: CPT | Mod: CPTII,S$GLB,, | Performed by: NURSE PRACTITIONER

## 2023-08-08 PROCEDURE — 3288F PR FALLS RISK ASSESSMENT DOCUMENTED: ICD-10-PCS | Mod: CPTII,S$GLB,, | Performed by: NURSE PRACTITIONER

## 2023-08-08 RX ORDER — HEPARIN 100 UNIT/ML
500 SYRINGE INTRAVENOUS
Status: CANCELLED | OUTPATIENT
Start: 2023-08-08

## 2023-08-08 RX ORDER — HEPARIN 100 UNIT/ML
500 SYRINGE INTRAVENOUS
Status: DISCONTINUED | OUTPATIENT
Start: 2023-08-08 | End: 2023-08-08 | Stop reason: HOSPADM

## 2023-08-08 RX ORDER — SODIUM CHLORIDE 0.9 % (FLUSH) 0.9 %
10 SYRINGE (ML) INJECTION
Status: DISCONTINUED | OUTPATIENT
Start: 2023-08-08 | End: 2023-08-08 | Stop reason: HOSPADM

## 2023-08-08 RX ORDER — DIPHENHYDRAMINE HYDROCHLORIDE 50 MG/ML
50 INJECTION INTRAMUSCULAR; INTRAVENOUS ONCE AS NEEDED
Status: CANCELLED | OUTPATIENT
Start: 2023-08-08

## 2023-08-08 RX ORDER — EPINEPHRINE 0.3 MG/.3ML
0.3 INJECTION SUBCUTANEOUS ONCE AS NEEDED
Status: CANCELLED | OUTPATIENT
Start: 2023-08-08

## 2023-08-08 RX ORDER — SODIUM CHLORIDE 0.9 % (FLUSH) 0.9 %
10 SYRINGE (ML) INJECTION
Status: CANCELLED | OUTPATIENT
Start: 2023-08-08

## 2023-08-08 RX ADMIN — SODIUM CHLORIDE: 9 INJECTION, SOLUTION INTRAVENOUS at 11:08

## 2023-08-08 RX ADMIN — SODIUM CHLORIDE 200 MG: 9 INJECTION, SOLUTION INTRAVENOUS at 10:08

## 2023-08-08 NOTE — PROGRESS NOTES
HEMATOLOGY/ONCOLOGY OFFICE CLINIC VISIT  KAYDEN Christelle    ONCOLOGICAL HISTORY:     Diagnosis:  - Stage IV NSCLC with bone mets.  - NSCLC / Adenocarcinoma Stage IIIA pT2a pN2--dx 12/2022  - History Uterine cancer - Dx 1989    Current Treatment:   Keytruda + Xgeva 7/18/2023-present    Treatment History:  - 1989 s/p total hysterectomy   - 12/08/2022: Right lower and middle bilobectomy with mediastinal lymph node dissection.   - Carboplatin + Pemetrexed  3/15/2023-5/1/2023  - Palliative RT   - Carboplatin + Pemetrexed started 3/15/2023    Plan of care: palliative systemic therapy      IMAGING:   - 08/10/2022 PET: The elongated subpleural right lower lobe nodule demonstrates fairly intense FDG activity.  Infectious, inflammatory and neoplastic etiologies remain possible. No suspicious PET findings elsewhere.  - 12/08/2022 CT HEAD without contrast: No acute intracranial abnormality identified.  Findings of mild microvascular ischemic disease.  - 12/2022: U/S Upper extremity: Thrombophlebitis of the left cephalic vein. No deep venous thrombosis in the left upper extremity.  - 01/03/2023 MRI Brain : GARY   - 01/12/2023 NM PET CT 1. Postsurgical changes of recent right lower and middle lobectomies.  There is a small right pleural effusion.  There is nonspecific peripheral uptake along the pleura at the right lung base and perihilar region which may be related to healing response in the setting of recent surgery.  Similarly borderline mediastinal uptake is nonspecific in the recent postoperative setting and could be reactive.  Continued attention recommended on follow-up. 2. Focal uptake at the medial right acetabular roof without appreciable focal osseous lesion by CT evaluation.  This is new compared to previous exam.  Metastasis is a consideration  -PET 6/5/2023: Previously visualized moderately FDG avid opacities towards the right lung base improved.  Pleural effusion also improved.  No new suspicious PET findings in the  lungs.  Severe emphysema. Some left adrenal thickening is similar to prior but there is now moderate associated FDG activity, max SUV is 3.6. FDG activity is again seen associated with the right superior acetabulum.  FDG avid area appears slightly larger today with slightly increased sclerosis.  SUV is 9.7, previously 6.4.  There is a newly evident subcentimeter FDG avid lesion left pedicle T10. Additional subcentimeter FDG avid lesion T4 vertebral body SUV 3.4.suspicious for metastatic disease.  -MRI T spine 6/15/2023:   1. Foci of abnormal signal intensity and enhancement at the inferior endplate of T3 and at body and left pedicle of T10.  Metastatic neoplastic etiology must be considered.  2. Thoracic spondylosis  3. Mild encroachment into the right neural foramina at the T3-4 and T4-5 secondary to mild posterior disc bulge and posterior osteophyte formation  4. Multilevel mild posterior disc bulge which does not result in significant central spinal stenosis.    PATHOLOGY:  - 12/08/2022: Invasive solid adenocarcinoma (3.1 CM); G3, poorly differentiated. Visceral Pleura Invasion present without definite serosal surface involvement. Lymphovascular invasion (Vein) present. All margins negative for invasive carcinoma, distance 2.5 cm. LN - 4/14 (10R: Hilar, Posterior mediastinal (RACHEAL), 4R Lower paratracheal, 9R Pulmonary ligament, 10R). pT2a pN2    - 02/22/2023: RIGHT ACETABULAR LESION, CT - GUIDE CORE NEEDLE BIOPSY : FIBRINOPURULENT EXUDATE ADMIXED WITH ATYPICAL EPITHELIOID GROUPS,   NORMAL    MARROW ELEMENTS AND ABUNDANT BLOOD CLOT    Subjective:     HPI  Mansi Jin  63 y.o.  female with past medical history significant for HTN, HLD, uterine cancer status post total hysterectomy in 1989,  CKD Stage 2, COPD Stage 3, referred for management of NSCLC s/p RLL / RML lobectomy with Mediastinal Lymph Node Dissection on 12/8/2022.     She was noted to have RLL lung mass and underwent CT guided biopsy which was non  diagnostic. PET scan demonstrated significant avidity within the lesion. Given high risk for malignancy, she underwent right lower lobectomy and right middle lobectomy on 12/08/2022. Pathology came back with invasive adenocarcinoma, poorly differentiated with solid morphology, focal tumor involvement of visceral pleura and focal tumor vascular invasion (vein), with mediastinal lymph nodes positive for metastatic carcinoma.      PET on 01/12/2023 showed focal uptake at the medial right acetabular roof but the biopsy was negative for malignancy. Discussed with radiology, they recommended adjuvant chemotherapy and then they will reassess later for radiation therapy.     Chief Complaint: Lung Cancer (Pt reports no issues or concerns today)      Interval History:     8/8/23: Ms. Jin presents today for cycle 2 of Keytruda. She reports feeling much better than her last visit. Her appetite has improved with the use of megace, her energy has improved. She received IVF  and completed phosphorus replacement which she believes was helpful. Her constipation is relieved with lactulose.     7/25/23: Ms. Jin presents today for follow-up. She is s/p cycle 1 of Keytruda started on 7/18/23. She is with her daughter and complains of weakness and fatigue. She was seen in the ER 7/22/23 for right upper mid scapular back pain exacerbated by coughing.CTA and CXR were negative for acute processes. Her pain is undercontrol with the use of Norco at this time. She complains of constipation x 1 week and low appetite despite taking remeron.     7/18/23: Ms. Jin presents today for initial Chemo with her daughter.  Pt has lab results as well. Patient was in the hospital on 6/21/23 for leg pain and discharged.  Seems like patient with bone metastases.  She bagan palliative radiation treatment with Dr. Mireles on 6/28/23, 10 treatments over two weeks. She is doing well today overall. She reports she is no longer coughing and has stopped  tessalon pearls and Pepcid. She has intermittent right sided, sharp pains that have been present since surgery that are improving, she does not use any pain medication and it is manageable.  She manages constipation with colace. She denies any shortness of breath, chest pain, fever, chills, vomiting, diarrhea, neuropathy, or recent hospitalizations.       Past Medical History:   Diagnosis Date    ASTHMA     COPD (chronic obstructive pulmonary disease)     SEVERE    High cholesterol     HLD (hyperlipidemia)     HTN (hypertension)     Lung cancer 12/08/2022    invasive adenocarcinoma, poorly differentiated with solid morphology, focal tumor involvement of visceral pleura and focal tumor vascular invasion (vein), with 2 of 3 posterior mediastinal lymph nodes positive for metastatic carcinoma    Stage 2 chronic kidney disease     TIA (transient ischemic attack)     Tobacco abuse     Unspecified osteoarthritis, unspecified site     Uterine cancer       Past Surgical History:   Procedure Laterality Date    HYSTERECTOMY  Unknowing    lipoma multiple sites      LUNG LOBECTOMY Right 12/08/2022    Procedure: LOBECTOMY, LUNG;  Surgeon: Jass Browne IV, MD;  Location: [facility] OR;  Service: Cardiothoracic;  Laterality: Right;    SURGICAL REMOVAL OF LYMPH NODE  12/08/2022    Procedure: EXCISION, LYMPH NODE;  Surgeon: Jass Browne IV, MD;  Location: [facility] OR;  Service: Cardiothoracic;;    THORACOTOMY Right 12/08/2022    Procedure: THORACOTOMY;  Surgeon: Jass Browne IV, MD;  Location: [facility] OR;  Service: Cardiothoracic;  Laterality: Right;  Right Lower Lobectomy with Lymph Node Dissection    TOTAL ABDOMINAL HYSTERECTOMY W/ BILATERAL SALPINGOOPHORECTOMY       Social History     Socioeconomic History    Marital status: Single    Number of children: 5   Tobacco Use    Smoking status: Former     Current packs/day: 0.00     Average packs/day: 0.5 packs/day for 50.0 years (25.0 ttl pk-yrs)     Types: Cigarettes     Start date:  1972     Quit date: 2022     Years since quittin.6    Smokeless tobacco: Never   Substance and Sexual Activity    Alcohol use: Not Currently     Comment: quit 25years ago    Drug use: Never    Sexual activity: Yes     Partners: Male      Family History   Problem Relation Age of Onset    Hypertension Mother     Hepatitis Mother     Ovarian cancer Mother     Liver cancer Mother     Heart failure Father     Prostate cancer Father     Fibroids Sister     Asthma Brother     Asthma Brother     Cancer Maternal Grandfather       Review of patient's allergies indicates:  No Known Allergies     Review of Systems   Constitutional:  Positive for fatigue. Negative for activity change, appetite change, chills, fever and unexpected weight change.   HENT: Negative.  Negative for mouth dryness, mouth sores, nosebleeds, sore throat and trouble swallowing.    Eyes: Negative.  Negative for visual disturbance.   Respiratory:  Positive for shortness of breath (on exertion, chronic). Negative for cough.    Cardiovascular:  Negative for chest pain, palpitations and leg swelling.   Gastrointestinal:  Negative for abdominal distention, abdominal pain, blood in stool, change in bowel habit, constipation, diarrhea, nausea, vomiting and change in bowel habit.   Endocrine: Negative.    Genitourinary: Negative.  Negative for dysuria, frequency, hematuria and urgency.   Musculoskeletal:  Negative for arthralgias, back pain, leg pain, myalgias and neck pain.   Integumentary:  Negative for rash, breast mass, breast discharge and breast tenderness. Negative.   Allergic/Immunologic: Negative.    Neurological:  Negative for dizziness, tremors, syncope, speech difficulty, weakness, light-headedness, numbness, headaches and memory loss.   Hematological: Negative.  Does not bruise/bleed easily.   Psychiatric/Behavioral: Negative.  Negative for confusion and suicidal ideas.    Breast: Negative for mass and tenderness        Objective:         Vitals:    08/08/23 0947   BP: 122/78   Pulse: 97   Resp: 18   Temp: 98.6 °F (37 °C)         Wt Readings from Last 6 Encounters:   08/08/23 65.1 kg (143 lb 8.3 oz)   08/08/23 65.1 kg (143 lb 8.3 oz)   07/25/23 65.2 kg (143 lb 11.8 oz)   07/22/23 65.3 kg (144 lb)   07/18/23 66.8 kg (147 lb 4.3 oz)   07/05/23 67.1 kg (148 lb)     Body mass index is 22.48 kg/m².  Body surface area is 1.75 meters squared.       Physical Exam  Vitals and nursing note reviewed.   Constitutional:       Appearance: Normal appearance.   HENT:      Head: Normocephalic and atraumatic.   Eyes:      General: No scleral icterus.     Extraocular Movements: Extraocular movements intact.      Conjunctiva/sclera: Conjunctivae normal.   Neck:      Vascular: No JVD.   Cardiovascular:      Rate and Rhythm: Normal rate and regular rhythm.      Heart sounds: No murmur heard.  Pulmonary:      Effort: Pulmonary effort is normal.      Breath sounds: Decreased breath sounds present. No wheezing or rhonchi.   Abdominal:      General: Bowel sounds are normal. There is no distension.      Palpations: Abdomen is soft.      Tenderness: There is no abdominal tenderness.   Musculoskeletal:      Cervical back: Neck supple.   Lymphadenopathy:      Head:      Right side of head: No submental or submandibular adenopathy.      Left side of head: No submental or submandibular adenopathy.      Cervical: No cervical adenopathy.      Upper Body:      Right upper body: No supraclavicular or axillary adenopathy.      Left upper body: No supraclavicular or axillary adenopathy.      Lower Body: No right inguinal adenopathy. No left inguinal adenopathy.      Comments: No adenopathy noted bilaterally   Skin:     General: Skin is warm.      Coloration: Skin is not jaundiced.      Findings: No lesion or rash.   Neurological:      General: No focal deficit present.      Mental Status: She is alert and oriented to person, place, and time.      Cranial Nerves: Cranial nerves 2-12  are intact.      Gait: Gait normal.   Psychiatric:         Attention and Perception: Attention normal.         Speech: Speech normal.         Behavior: Behavior is cooperative.         Cognition and Memory: Cognition normal.         Judgment: Judgment normal.     LABS      Lab Visit on 08/08/2023   Component Date Value    Sodium Level 08/08/2023 139     Potassium Level 08/08/2023 3.9     Chloride 08/08/2023 108 (H)     Carbon Dioxide 08/08/2023 24     Glucose Level 08/08/2023 122 (H)     Blood Urea Nitrogen 08/08/2023 10.0     Creatinine 08/08/2023 0.87     Calcium Level Total 08/08/2023 9.3     Protein Total 08/08/2023 7.5     Albumin Level 08/08/2023 3.2 (L)     Globulin 08/08/2023 4.3 (H)     Albumin/Globulin Ratio 08/08/2023 0.7 (L)     Bilirubin Total 08/08/2023 0.2     Alkaline Phosphatase 08/08/2023 104     Alanine Aminotransferase 08/08/2023 7     Aspartate Aminotransfera* 08/08/2023 13     eGFR 08/08/2023 >60     Magnesium Level 08/08/2023 2.10     Phosphorus Level 08/08/2023 2.6     Ferritin Level 08/08/2023 128.63     Iron Binding Capacity Un* 08/08/2023 207     Iron Level 08/08/2023 36 (L)     Iron Binding Capacity To* 08/08/2023 243 (L)     Iron Saturation 08/08/2023 15 (L)     WBC 08/08/2023 5.57     RBC 08/08/2023 3.80 (L)     Hgb 08/08/2023 10.3 (L)     Hct 08/08/2023 34.5 (L)     MCV 08/08/2023 90.8     MCH 08/08/2023 27.1     MCHC 08/08/2023 29.9 (L)     RDW 08/08/2023 14.1     Platelet 08/08/2023 414 (H)     MPV 08/08/2023 9.1     Neut % 08/08/2023 63.8     Lymph % 08/08/2023 24.8     Mono % 08/08/2023 5.7     Eos % 08/08/2023 4.8     Basophil % 08/08/2023 0.5     Lymph # 08/08/2023 1.38     Neut # 08/08/2023 3.55     Mono # 08/08/2023 0.32     Eos # 08/08/2023 0.27     Baso # 08/08/2023 0.03     IG# 08/08/2023 0.02     IG% 08/08/2023 0.4          Assessment:   ECOG Performance status: 2 - 3.     1. NSCLC of right lung      Stage IV NSCLC with bone mets.    Originally: NSCLC / Adenocarcinoma Stage  IIIA pT2a pN2  - 12/08/2022: Right lower and middle bilobectomy with mediastinal lymph node dissection. Path: Invasive adenocarcinoma, poorly differentiated, focal tumor involvement of visceral pleura and focal tumor vascular invasion (vein), with mediastinal lymph nodes positive for metastatic carcinoma   - TMB - 5, MSI-S, MET amplification, ROS1.  No mutations in EGFR, BRAF, ALK, ERBB2, KRAS, RET,    - 1% tumor cells are positive for PD-L1   - patient evaluated by Radiation Oncology with recommendation to treat with chemotherapy 1st and will re-evaluate the patient by the end of chemotherapy for radiation therapy  - 01/12/2023 PET: Focal uptake at the medial right acetabular roof. Metastasis is a consideration  - 02/22/2023: RIGHT ACETABULAR LESION, CT - GUIDE CORE NEEDLE BIOPSY : FIBRINOPURULENT EXUDATE ADMIXED WITH ATYPICAL EPITHELIOID GROUPS,   NORMAL    MARROW ELEMENTS AND ABUNDANT BLOOD CLOT  PET scan showed a suspicious finding in the right acetabular roof and patient then had a CT-guided biopsy no evidence of malignancy. Started with adjuvant chemotherapy with Carboplatin and pemetrexed x 4 cycles (3/15/23-5/18/23)   Patient has been seen by Rad/Onc (Dr Mireles) since the completion of her chemotherapy and plan was to pursue radiation x 5-6 weeks.  She discussed with the patient that they have showed no overall survival benefit in the setting of postoperative radiation therapy but data supports a larger benefit inpatient with extracapsular extension.  If she tolerated chemotherapy well and continue with good performance status then plan was for PORT   Restaging PET-CT after completion of chemotherapy and prior to radiation therapy showed progression at the right acetabulum.  Patient with progression of disease now with bone metastases.  She bagan palliative radiation treatment with Dr. Mireles on 6/28/23, 10 treatments over two weeks.  Started  Keytruda 7/18/23. Tolerating well.   Weakness/hypophosphatemia:  Resolved  Iron deficiency Anemia- Start 1 tab PO iron daily with Vitamin C.                   Plan:     PET CT 6/5/23 with progression Right superior acetabulum.  FDG avid area appears slightly larger with slightly increased sclerosis.  SUV is 9.7, previously 6.4.  She had palliative radiation. Started on Keytruda 7/18/23.    Proceed with cycle 2 of Keytruda today.   XGEVA Q 4 weeks   -CT CAP in 3 months (September 2023)   -Start oral iron   - cbc, cmp, mag, phos,- 1 hr prior @ [facility]    The patient was seen, interviewed and examined. Pertinent lab and radiology studies were reviewed.   The patient was given ample opportunity to ask questions, and to the best of my abilities, all questions answered to satisfaction; patient demonstrated understanding of what we discussed and agreeable to the plan. Pt instructed to call should develop concerning signs/symptoms or have further questions.

## 2023-08-15 ENCOUNTER — INFUSION (OUTPATIENT)
Dept: INFUSION THERAPY | Facility: HOSPITAL | Age: 63
End: 2023-08-15
Attending: NURSE PRACTITIONER
Payer: MEDICARE

## 2023-08-15 VITALS
HEART RATE: 109 BPM | DIASTOLIC BLOOD PRESSURE: 80 MMHG | OXYGEN SATURATION: 99 % | TEMPERATURE: 98 F | BODY MASS INDEX: 22.6 KG/M2 | RESPIRATION RATE: 18 BRPM | WEIGHT: 144 LBS | HEIGHT: 67 IN | SYSTOLIC BLOOD PRESSURE: 122 MMHG

## 2023-08-15 DIAGNOSIS — C79.51 METASTASIS TO BONE: Primary | ICD-10-CM

## 2023-08-15 PROCEDURE — 63600175 PHARM REV CODE 636 W HCPCS: Mod: JZ,JG | Performed by: NURSE PRACTITIONER

## 2023-08-15 PROCEDURE — 96372 THER/PROPH/DIAG INJ SC/IM: CPT

## 2023-08-15 RX ADMIN — DENOSUMAB 120 MG: 120 INJECTION SUBCUTANEOUS at 12:08

## 2023-08-16 LAB
INR PPP: 1.1
PROTHROMBIN TIME: 13.5 SECONDS (ref 11.4–14)

## 2023-08-29 ENCOUNTER — INFUSION (OUTPATIENT)
Dept: INFUSION THERAPY | Facility: HOSPITAL | Age: 63
End: 2023-08-29
Attending: NURSE PRACTITIONER
Payer: MEDICARE

## 2023-08-29 ENCOUNTER — TELEPHONE (OUTPATIENT)
Dept: HEMATOLOGY/ONCOLOGY | Facility: CLINIC | Age: 63
End: 2023-08-29

## 2023-08-29 ENCOUNTER — OFFICE VISIT (OUTPATIENT)
Dept: HEMATOLOGY/ONCOLOGY | Facility: CLINIC | Age: 63
End: 2023-08-29
Payer: MEDICARE

## 2023-08-29 VITALS
WEIGHT: 143.38 LBS | BODY MASS INDEX: 22.51 KG/M2 | OXYGEN SATURATION: 98 % | HEIGHT: 67 IN | TEMPERATURE: 98 F | SYSTOLIC BLOOD PRESSURE: 112 MMHG | DIASTOLIC BLOOD PRESSURE: 73 MMHG | HEART RATE: 110 BPM | RESPIRATION RATE: 18 BRPM

## 2023-08-29 VITALS
SYSTOLIC BLOOD PRESSURE: 112 MMHG | HEART RATE: 110 BPM | RESPIRATION RATE: 18 BRPM | OXYGEN SATURATION: 98 % | WEIGHT: 143.38 LBS | TEMPERATURE: 98 F | DIASTOLIC BLOOD PRESSURE: 73 MMHG | BODY MASS INDEX: 22.51 KG/M2 | HEIGHT: 67 IN

## 2023-08-29 DIAGNOSIS — C34.91 NSCLC OF RIGHT LUNG: Primary | ICD-10-CM

## 2023-08-29 DIAGNOSIS — E83.39 HYPOPHOSPHATEMIA: ICD-10-CM

## 2023-08-29 DIAGNOSIS — Z29.89 IMMUNOTHERAPY: ICD-10-CM

## 2023-08-29 DIAGNOSIS — Z79.899 ON ANTINEOPLASTIC CHEMOTHERAPY: ICD-10-CM

## 2023-08-29 DIAGNOSIS — K59.00 CONSTIPATION, UNSPECIFIED CONSTIPATION TYPE: ICD-10-CM

## 2023-08-29 DIAGNOSIS — J44.9 STAGE 3 SEVERE COPD BY GOLD CLASSIFICATION: ICD-10-CM

## 2023-08-29 DIAGNOSIS — R63.0 APPETITE IMPAIRED: ICD-10-CM

## 2023-08-29 DIAGNOSIS — C79.51 METASTASIS TO BONE: ICD-10-CM

## 2023-08-29 PROCEDURE — 63600175 PHARM REV CODE 636 W HCPCS: Mod: JZ,JG

## 2023-08-29 PROCEDURE — 3008F PR BODY MASS INDEX (BMI) DOCUMENTED: ICD-10-PCS | Mod: CPTII,S$GLB,,

## 2023-08-29 PROCEDURE — 99215 PR OFFICE/OUTPT VISIT, EST, LEVL V, 40-54 MIN: ICD-10-PCS | Mod: S$GLB,,,

## 2023-08-29 PROCEDURE — 3066F PR DOCUMENTATION OF TREATMENT FOR NEPHROPATHY: ICD-10-PCS | Mod: CPTII,S$GLB,,

## 2023-08-29 PROCEDURE — 99215 OFFICE O/P EST HI 40 MIN: CPT | Mod: S$GLB,,,

## 2023-08-29 PROCEDURE — 99999 PR PBB SHADOW E&M-EST. PATIENT-LVL V: CPT | Mod: PBBFAC,,,

## 2023-08-29 PROCEDURE — 3060F PR POS MICROALBUMINURIA RESULT DOCUMENTED/REVIEW: ICD-10-PCS | Mod: CPTII,S$GLB,,

## 2023-08-29 PROCEDURE — 1101F PR PT FALLS ASSESS DOC 0-1 FALLS W/OUT INJ PAST YR: ICD-10-PCS | Mod: CPTII,S$GLB,,

## 2023-08-29 PROCEDURE — 3288F PR FALLS RISK ASSESSMENT DOCUMENTED: ICD-10-PCS | Mod: CPTII,S$GLB,,

## 2023-08-29 PROCEDURE — 99999 PR PBB SHADOW E&M-EST. PATIENT-LVL V: ICD-10-PCS | Mod: PBBFAC,,,

## 2023-08-29 PROCEDURE — 3074F PR MOST RECENT SYSTOLIC BLOOD PRESSURE < 130 MM HG: ICD-10-PCS | Mod: CPTII,S$GLB,,

## 2023-08-29 PROCEDURE — 3074F SYST BP LT 130 MM HG: CPT | Mod: CPTII,S$GLB,,

## 2023-08-29 PROCEDURE — 96413 CHEMO IV INFUSION 1 HR: CPT

## 2023-08-29 PROCEDURE — 3288F FALL RISK ASSESSMENT DOCD: CPT | Mod: CPTII,S$GLB,,

## 2023-08-29 PROCEDURE — 3060F POS MICROALBUMINURIA REV: CPT | Mod: CPTII,S$GLB,,

## 2023-08-29 PROCEDURE — 3078F DIAST BP <80 MM HG: CPT | Mod: CPTII,S$GLB,,

## 2023-08-29 PROCEDURE — 3078F PR MOST RECENT DIASTOLIC BLOOD PRESSURE < 80 MM HG: ICD-10-PCS | Mod: CPTII,S$GLB,,

## 2023-08-29 PROCEDURE — 3008F BODY MASS INDEX DOCD: CPT | Mod: CPTII,S$GLB,,

## 2023-08-29 PROCEDURE — 3066F NEPHROPATHY DOC TX: CPT | Mod: CPTII,S$GLB,,

## 2023-08-29 PROCEDURE — 25000003 PHARM REV CODE 250

## 2023-08-29 PROCEDURE — 1101F PT FALLS ASSESS-DOCD LE1/YR: CPT | Mod: CPTII,S$GLB,,

## 2023-08-29 RX ORDER — IRON POLYSACCH/IRON HEME POLYP 28 MG
1 TABLET ORAL EVERY OTHER DAY
COMMUNITY

## 2023-08-29 RX ORDER — DIPHENHYDRAMINE HYDROCHLORIDE 50 MG/ML
50 INJECTION INTRAMUSCULAR; INTRAVENOUS ONCE AS NEEDED
Status: CANCELLED | OUTPATIENT
Start: 2023-08-29

## 2023-08-29 RX ORDER — SODIUM CHLORIDE 0.9 % (FLUSH) 0.9 %
10 SYRINGE (ML) INJECTION
Status: DISCONTINUED | OUTPATIENT
Start: 2023-08-29 | End: 2023-08-29 | Stop reason: HOSPADM

## 2023-08-29 RX ORDER — EPINEPHRINE 0.3 MG/.3ML
0.3 INJECTION SUBCUTANEOUS ONCE AS NEEDED
Status: CANCELLED | OUTPATIENT
Start: 2023-08-29

## 2023-08-29 RX ORDER — HEPARIN 100 UNIT/ML
500 SYRINGE INTRAVENOUS
Status: CANCELLED | OUTPATIENT
Start: 2023-08-29

## 2023-08-29 RX ORDER — HEPARIN 100 UNIT/ML
500 SYRINGE INTRAVENOUS
Status: DISCONTINUED | OUTPATIENT
Start: 2023-08-29 | End: 2023-08-29 | Stop reason: HOSPADM

## 2023-08-29 RX ORDER — SODIUM CHLORIDE 0.9 % (FLUSH) 0.9 %
10 SYRINGE (ML) INJECTION
Status: CANCELLED | OUTPATIENT
Start: 2023-08-29

## 2023-08-29 RX ADMIN — SODIUM CHLORIDE 200 MG: 9 INJECTION, SOLUTION INTRAVENOUS at 10:08

## 2023-08-29 RX ADMIN — SODIUM CHLORIDE: 9 INJECTION, SOLUTION INTRAVENOUS at 10:08

## 2023-08-29 NOTE — PROGRESS NOTES
HEMATOLOGY/ONCOLOGY OFFICE CLINIC VISIT  KAYDEN Christelle    ONCOLOGICAL HISTORY:     Diagnosis:  - Stage IV NSCLC with bone mets.  - NSCLC / Adenocarcinoma Stage IIIA pT2a pN2--dx 12/2022  - History Uterine cancer - Dx 1989    Current Treatment:   Keytruda + Xgeva 7/18/2023-present    Treatment History:  - 1989 s/p total hysterectomy   - 12/08/2022: Right lower and middle bilobectomy with mediastinal lymph node dissection.   - Carboplatin + Pemetrexed  3/15/2023-5/1/2023  - Palliative RT   - Carboplatin + Pemetrexed started 3/15/2023    Plan of care: palliative systemic therapy      IMAGING:   - 08/10/2022 PET: The elongated subpleural right lower lobe nodule demonstrates fairly intense FDG activity.  Infectious, inflammatory and neoplastic etiologies remain possible. No suspicious PET findings elsewhere.  - 12/08/2022 CT HEAD without contrast: No acute intracranial abnormality identified.  Findings of mild microvascular ischemic disease.  - 12/2022: U/S Upper extremity: Thrombophlebitis of the left cephalic vein. No deep venous thrombosis in the left upper extremity.  - 01/03/2023 MRI Brain : GARY   - 01/12/2023 NM PET CT 1. Postsurgical changes of recent right lower and middle lobectomies.  There is a small right pleural effusion.  There is nonspecific peripheral uptake along the pleura at the right lung base and perihilar region which may be related to healing response in the setting of recent surgery.  Similarly borderline mediastinal uptake is nonspecific in the recent postoperative setting and could be reactive.  Continued attention recommended on follow-up. 2. Focal uptake at the medial right acetabular roof without appreciable focal osseous lesion by CT evaluation.  This is new compared to previous exam.  Metastasis is a consideration  -PET 6/5/2023: Previously visualized moderately FDG avid opacities towards the right lung base improved.  Pleural effusion also improved.  No new suspicious PET findings in the  lungs.  Severe emphysema. Some left adrenal thickening is similar to prior but there is now moderate associated FDG activity, max SUV is 3.6. FDG activity is again seen associated with the right superior acetabulum.  FDG avid area appears slightly larger today with slightly increased sclerosis.  SUV is 9.7, previously 6.4.  There is a newly evident subcentimeter FDG avid lesion left pedicle T10. Additional subcentimeter FDG avid lesion T4 vertebral body SUV 3.4.suspicious for metastatic disease.  -MRI T spine 6/15/2023:   1. Foci of abnormal signal intensity and enhancement at the inferior endplate of T3 and at body and left pedicle of T10.  Metastatic neoplastic etiology must be considered.  2. Thoracic spondylosis  3. Mild encroachment into the right neural foramina at the T3-4 and T4-5 secondary to mild posterior disc bulge and posterior osteophyte formation  4. Multilevel mild posterior disc bulge which does not result in significant central spinal stenosis.    PATHOLOGY:  - 12/08/2022: Invasive solid adenocarcinoma (3.1 CM); G3, poorly differentiated. Visceral Pleura Invasion present without definite serosal surface involvement. Lymphovascular invasion (Vein) present. All margins negative for invasive carcinoma, distance 2.5 cm. LN - 4/14 (10R: Hilar, Posterior mediastinal (RACHEAL), 4R Lower paratracheal, 9R Pulmonary ligament, 10R). pT2a pN2    - 02/22/2023: RIGHT ACETABULAR LESION, CT - GUIDE CORE NEEDLE BIOPSY : FIBRINOPURULENT EXUDATE ADMIXED WITH ATYPICAL EPITHELIOID GROUPS,   NORMAL    MARROW ELEMENTS AND ABUNDANT BLOOD CLOT    Subjective:     HPI  Mansi Jin  63 y.o.  female with past medical history significant for HTN, HLD, uterine cancer status post total hysterectomy in 1989,  CKD Stage 2, COPD Stage 3, referred for management of NSCLC s/p RLL / RML lobectomy with Mediastinal Lymph Node Dissection on 12/8/2022.     She was noted to have RLL lung mass and underwent CT guided biopsy which was non  diagnostic. PET scan demonstrated significant avidity within the lesion. Given high risk for malignancy, she underwent right lower lobectomy and right middle lobectomy on 12/08/2022. Pathology came back with invasive adenocarcinoma, poorly differentiated with solid morphology, focal tumor involvement of visceral pleura and focal tumor vascular invasion (vein), with mediastinal lymph nodes positive for metastatic carcinoma.      PET on 01/12/2023 showed focal uptake at the medial right acetabular roof but the biopsy was negative for malignancy. Discussed with radiology, they recommended adjuvant chemotherapy and then they will reassess later for radiation therapy.     Chief Complaint: Lung Cancer (No complaints.)      Interval History:     8/29/2023: She returns to the clinic today for a three week treatment follow-up and for treatment clearance for C3 of keytruda. She reports feeling well today overall, without any concerns or complaints. Her appetite continues to improve with megace. Constipation  is stable with the addition of lactulose as needed.  Shortness of breath is stable and not worsening.  She denies any chest pain, fever, chills, nausea, vomiting, diarrhea, recent hospitalizations or infections.    8/8/23: Ms. Jin presents today for cycle 2 of Keytruda. She reports feeling much better than her last visit. Her appetite has improved with the use of megace, her energy has improved. She received IVF  and completed phosphorus replacement which she believes was helpful. Her constipation is relieved with lactulose.         Past Medical History:   Diagnosis Date    ASTHMA     COPD (chronic obstructive pulmonary disease)     SEVERE    High cholesterol     HLD (hyperlipidemia)     HTN (hypertension)     Lung cancer 12/08/2022    invasive adenocarcinoma, poorly differentiated with solid morphology, focal tumor involvement of visceral pleura and focal tumor vascular invasion (vein), with 2 of 3 posterior  mediastinal lymph nodes positive for metastatic carcinoma    Stage 2 chronic kidney disease     TIA (transient ischemic attack)     Tobacco abuse     Unspecified osteoarthritis, unspecified site     Uterine cancer       Past Surgical History:   Procedure Laterality Date    HYSTERECTOMY  Unknowing    lipoma multiple sites      LUNG LOBECTOMY Right 2022    Procedure: LOBECTOMY, LUNG;  Surgeon: Jass Browne IV, MD;  Location: [facility] OR;  Service: Cardiothoracic;  Laterality: Right;    SURGICAL REMOVAL OF LYMPH NODE  2022    Procedure: EXCISION, LYMPH NODE;  Surgeon: Jass Browne IV, MD;  Location: [facility] OR;  Service: Cardiothoracic;;    THORACOTOMY Right 2022    Procedure: THORACOTOMY;  Surgeon: Jass Browne IV, MD;  Location: [facility] OR;  Service: Cardiothoracic;  Laterality: Right;  Right Lower Lobectomy with Lymph Node Dissection    TOTAL ABDOMINAL HYSTERECTOMY W/ BILATERAL SALPINGOOPHORECTOMY       Social History     Socioeconomic History    Marital status: Single    Number of children: 5   Tobacco Use    Smoking status: Former     Current packs/day: 0.00     Average packs/day: 0.5 packs/day for 50.0 years (25.0 ttl pk-yrs)     Types: Cigarettes     Start date: 1972     Quit date: 2022     Years since quittin.7    Smokeless tobacco: Never   Substance and Sexual Activity    Alcohol use: Not Currently     Comment: quit 25years ago    Drug use: Never    Sexual activity: Yes     Partners: Male      Family History   Problem Relation Age of Onset    Hypertension Mother     Hepatitis Mother     Ovarian cancer Mother     Liver cancer Mother     Heart failure Father     Prostate cancer Father     Fibroids Sister     Asthma Brother     Asthma Brother     Cancer Maternal Grandfather       Review of patient's allergies indicates:  No Known Allergies     Review of Systems   Constitutional:  Positive for fatigue. Negative for activity change, appetite change, chills, fever and unexpected  weight change.   HENT: Negative.  Negative for mouth dryness, mouth sores, nosebleeds, sore throat and trouble swallowing.    Eyes: Negative.  Negative for visual disturbance.   Respiratory:  Positive for shortness of breath (on exertion, chronic). Negative for cough.    Cardiovascular:  Negative for chest pain, palpitations and leg swelling.   Gastrointestinal:  Negative for abdominal distention, abdominal pain, blood in stool, change in bowel habit, constipation, diarrhea, nausea, vomiting and change in bowel habit.   Endocrine: Negative.    Genitourinary: Negative.  Negative for dysuria, frequency, hematuria and urgency.   Musculoskeletal:  Negative for arthralgias, back pain, leg pain, myalgias and neck pain.   Integumentary:  Negative for rash, breast mass, breast discharge and breast tenderness. Negative.   Allergic/Immunologic: Negative.    Neurological:  Negative for dizziness, tremors, syncope, speech difficulty, weakness, light-headedness, numbness, headaches and memory loss.   Hematological: Negative.  Does not bruise/bleed easily.   Psychiatric/Behavioral: Negative.  Negative for confusion and suicidal ideas.    Breast: Negative for mass and tenderness        Objective:        Vitals:    08/29/23 0954   BP: 112/73   Pulse: 110   Resp: 18   Temp: 97.8 °F (36.6 °C)         Wt Readings from Last 6 Encounters:   08/29/23 65 kg (143 lb 6.4 oz)   08/29/23 65 kg (143 lb 6.4 oz)   08/15/23 65.3 kg (144 lb)   08/08/23 65.1 kg (143 lb 8.3 oz)   08/08/23 65.1 kg (143 lb 8.3 oz)   07/25/23 65.2 kg (143 lb 11.8 oz)     Body mass index is 22.46 kg/m².  Body surface area is 1.75 meters squared.       Physical Exam  Vitals and nursing note reviewed.   Constitutional:       Appearance: Normal appearance.   HENT:      Head: Normocephalic and atraumatic.   Eyes:      General: No scleral icterus.     Extraocular Movements: Extraocular movements intact.      Conjunctiva/sclera: Conjunctivae normal.   Neck:      Vascular: No  JVD.   Cardiovascular:      Rate and Rhythm: Normal rate and regular rhythm.      Heart sounds: No murmur heard.  Pulmonary:      Effort: Pulmonary effort is normal.      Breath sounds: Decreased breath sounds present. No wheezing or rhonchi.   Abdominal:      General: Bowel sounds are normal. There is no distension.      Palpations: Abdomen is soft.      Tenderness: There is no abdominal tenderness.   Musculoskeletal:      Cervical back: Neck supple.   Lymphadenopathy:      Head:      Right side of head: No submental or submandibular adenopathy.      Left side of head: No submental or submandibular adenopathy.      Cervical: No cervical adenopathy.      Upper Body:      Right upper body: No supraclavicular or axillary adenopathy.      Left upper body: No supraclavicular or axillary adenopathy.      Lower Body: No right inguinal adenopathy. No left inguinal adenopathy.      Comments: No adenopathy noted bilaterally   Skin:     General: Skin is warm.      Coloration: Skin is not jaundiced.      Findings: No lesion or rash.   Neurological:      General: No focal deficit present.      Mental Status: She is alert and oriented to person, place, and time.      Cranial Nerves: Cranial nerves 2-12 are intact.      Gait: Gait normal.   Psychiatric:         Attention and Perception: Attention normal.         Speech: Speech normal.         Behavior: Behavior is cooperative.         Cognition and Memory: Cognition normal.         Judgment: Judgment normal.       LABS      Lab Visit on 08/29/2023   Component Date Value    Phosphorus Level 08/29/2023 2.1 (L)     Sodium Level 08/29/2023 139     Potassium Level 08/29/2023 3.7     Chloride 08/29/2023 108 (H)     Carbon Dioxide 08/29/2023 24     Glucose Level 08/29/2023 87     Blood Urea Nitrogen 08/29/2023 9.0 (L)     Creatinine 08/29/2023 0.86     Calcium Level Total 08/29/2023 9.4     Protein Total 08/29/2023 7.8 (H)     Albumin Level 08/29/2023 3.3 (L)     Globulin 08/29/2023 4.5  (H)     Albumin/Globulin Ratio 08/29/2023 0.7 (L)     Bilirubin Total 08/29/2023 0.3     Alkaline Phosphatase 08/29/2023 92     Alanine Aminotransferase 08/29/2023 8     Aspartate Aminotransfera* 08/29/2023 13     eGFR 08/29/2023 >60     Magnesium Level 08/29/2023 2.20     WBC 08/29/2023 6.22     RBC 08/29/2023 4.22     Hgb 08/29/2023 11.7 (L)     Hct 08/29/2023 37.3     MCV 08/29/2023 88.4     MCH 08/29/2023 27.7     MCHC 08/29/2023 31.4 (L)     RDW 08/29/2023 14.0     Platelet 08/29/2023 387     MPV 08/29/2023 9.5     Neut % 08/29/2023 60.6     Lymph % 08/29/2023 27.7     Mono % 08/29/2023 8.8     Eos % 08/29/2023 2.1     Basophil % 08/29/2023 0.5     Lymph # 08/29/2023 1.72     Neut # 08/29/2023 3.77     Mono # 08/29/2023 0.55     Eos # 08/29/2023 0.13     Baso # 08/29/2023 0.03     IG# 08/29/2023 0.02     IG% 08/29/2023 0.3          Assessment:   ECOG Performance status: 2 - 3.     1. NSCLC of right lung    2. Immunotherapy    3. On antineoplastic chemotherapy      Stage IV NSCLC with bone mets.    Originally: NSCLC / Adenocarcinoma Stage IIIA pT2a pN2  - 12/08/2022: Right lower and middle bilobectomy with mediastinal lymph node dissection. Path: Invasive adenocarcinoma, poorly differentiated, focal tumor involvement of visceral pleura and focal tumor vascular invasion (vein), with mediastinal lymph nodes positive for metastatic carcinoma   - TMB - 5, MSI-S, MET amplification, ROS1.  No mutations in EGFR, BRAF, ALK, ERBB2, KRAS, RET,    - 1% tumor cells are positive for PD-L1   - patient evaluated by Radiation Oncology with recommendation to treat with chemotherapy 1st and will re-evaluate the patient by the end of chemotherapy for radiation therapy  - 01/12/2023 PET: Focal uptake at the medial right acetabular roof. Metastasis is a consideration  - 02/22/2023: RIGHT ACETABULAR LESION, CT - GUIDE CORE NEEDLE BIOPSY : FIBRINOPURULENT EXUDATE ADMIXED WITH ATYPICAL EPITHELIOID GROUPS,   NORMAL    MARROW ELEMENTS  AND ABUNDANT BLOOD CLOT  PET scan showed a suspicious finding in the right acetabular roof and patient then had a CT-guided biopsy no evidence of malignancy. Started with adjuvant chemotherapy with Carboplatin and pemetrexed x 4 cycles (3/15/23-5/18/23)   Patient has been seen by Rad/Onc (Dr Mireles) since the completion of her chemotherapy and plan was to pursue radiation x 5-6 weeks.  She discussed with the patient that they have showed no overall survival benefit in the setting of postoperative radiation therapy but data supports a larger benefit inpatient with extracapsular extension.  If she tolerated chemotherapy well and continue with good performance status then plan was for PORT   Restaging PET-CT after completion of chemotherapy and prior to radiation therapy showed progression at the right acetabulum.  Patient with progression of disease now with bone metastases.  She bagan palliative radiation treatment with Dr. Mireles on 6/28/23, 10 treatments over two weeks.  PET CT 6/5/23 with progression Right superior acetabulum.  FDG avid area appears slightly larger with slightly increased sclerosis.  SUV is 9.7, previously 6.4.  She had palliative radiation. Started on Keytruda 7/18/23.  Started  Keytruda 7/18/23. Tolerating well.   Bone mets   Xgeva q4w    Weakness/hypophosphatemia  Stable   Iron deficiency Anemia  Start 1 tab PO iron QOD with Vitamin C.                   Plan:   Labs overall stable and reviewed with patient in detail.    Continue with cycle 3 Keytruda today.    Continue with Xgeva q4w  CT CPAP scheduled for 09/27/2023.    Continue oral iron every other day.    Return to clinic in 3 weeks with NP for follow up/ labs/treatment.  Will need MD office visit after scan for review.  CBC, CMP, Mag, phos, TSH    The patient was seen, interviewed and examined. Pertinent lab and radiology studies were reviewed.   The patient was given ample opportunity to ask questions, and to the best of my abilities,  all questions answered to satisfaction; patient demonstrated understanding of what we discussed and agreeable to the plan. Pt instructed to call should develop concerning signs/symptoms or have further questions.

## 2023-08-29 NOTE — TELEPHONE ENCOUNTER
----- Message from Aman Baca RN sent at 6/21/2023  1:04 PM CDT -----  Regarding: IR biopsy referral  Good afternoon! Dr Osei reviewed the referral for the thoracic biopsy. After reviewing the images he said he is unable to biopsy anything at this time safely. Just wanted to let y'all know. I also let Dr. Mireles's office know since they also had sent a referral to us for the biopsy. If you have any questions, please let me know.

## 2023-09-12 ENCOUNTER — INFUSION (OUTPATIENT)
Dept: INFUSION THERAPY | Facility: HOSPITAL | Age: 63
End: 2023-09-12
Attending: NURSE PRACTITIONER
Payer: MEDICARE

## 2023-09-12 ENCOUNTER — DOCUMENTATION ONLY (OUTPATIENT)
Dept: HEMATOLOGY/ONCOLOGY | Facility: CLINIC | Age: 63
End: 2023-09-12
Payer: MEDICARE

## 2023-09-12 VITALS
SYSTOLIC BLOOD PRESSURE: 118 MMHG | HEART RATE: 107 BPM | BODY MASS INDEX: 22.16 KG/M2 | OXYGEN SATURATION: 95 % | WEIGHT: 141.19 LBS | DIASTOLIC BLOOD PRESSURE: 78 MMHG | HEIGHT: 67 IN | TEMPERATURE: 98 F

## 2023-09-12 DIAGNOSIS — D84.821 IMMUNODEFICIENCY DUE TO CHEMOTHERAPY: ICD-10-CM

## 2023-09-12 DIAGNOSIS — C79.51 METASTASIS TO BONE: Primary | ICD-10-CM

## 2023-09-12 DIAGNOSIS — E83.39 HYPOPHOSPHATEMIA: ICD-10-CM

## 2023-09-12 DIAGNOSIS — R79.89 LOW TSH LEVEL: Primary | ICD-10-CM

## 2023-09-12 DIAGNOSIS — C34.91 NSCLC OF RIGHT LUNG: ICD-10-CM

## 2023-09-12 DIAGNOSIS — K59.00 CONSTIPATION, UNSPECIFIED CONSTIPATION TYPE: ICD-10-CM

## 2023-09-12 DIAGNOSIS — Z79.899 IMMUNODEFICIENCY DUE TO CHEMOTHERAPY: ICD-10-CM

## 2023-09-12 DIAGNOSIS — T45.1X5A IMMUNODEFICIENCY DUE TO CHEMOTHERAPY: ICD-10-CM

## 2023-09-12 PROCEDURE — 96372 THER/PROPH/DIAG INJ SC/IM: CPT

## 2023-09-12 PROCEDURE — 63600175 PHARM REV CODE 636 W HCPCS: Mod: JZ,JG

## 2023-09-12 RX ORDER — SODIUM,POTASSIUM PHOSPHATES 280-250MG
1 POWDER IN PACKET (EA) ORAL 4 TIMES DAILY
Qty: 28 PACKET | Refills: 0 | Status: SHIPPED | OUTPATIENT
Start: 2023-09-12 | End: 2023-09-19

## 2023-09-12 RX ADMIN — DENOSUMAB 120 MG: 120 INJECTION SUBCUTANEOUS at 10:09

## 2023-09-12 NOTE — PROGRESS NOTES
Patient notified that the NP reviewed her lab work and reordered her a phosphorus supplement due to her phosphorus level being low today. Patient also notified that her TSH level was low as well. Patient instructed to redraw labs before her 09/19/23 appt. Patient voiced understanding.

## 2023-09-18 ENCOUNTER — LAB VISIT (OUTPATIENT)
Dept: LAB | Facility: HOSPITAL | Age: 63
End: 2023-09-18
Payer: MEDICARE

## 2023-09-18 DIAGNOSIS — T45.1X5A IMMUNODEFICIENCY DUE TO CHEMOTHERAPY: ICD-10-CM

## 2023-09-18 DIAGNOSIS — E83.39 HYPOPHOSPHATEMIA: ICD-10-CM

## 2023-09-18 DIAGNOSIS — R79.89 LOW TSH LEVEL: ICD-10-CM

## 2023-09-18 DIAGNOSIS — Z79.899 IMMUNODEFICIENCY DUE TO CHEMOTHERAPY: ICD-10-CM

## 2023-09-18 DIAGNOSIS — C34.91 NSCLC OF RIGHT LUNG: ICD-10-CM

## 2023-09-18 DIAGNOSIS — D84.821 IMMUNODEFICIENCY DUE TO CHEMOTHERAPY: ICD-10-CM

## 2023-09-18 LAB
ALBUMIN SERPL-MCNC: 3.3 G/DL (ref 3.4–4.8)
ALBUMIN/GLOB SERPL: 0.7 RATIO (ref 1.1–2)
ALP SERPL-CCNC: 84 UNIT/L (ref 40–150)
ALT SERPL-CCNC: 13 UNIT/L (ref 0–55)
AST SERPL-CCNC: 15 UNIT/L (ref 5–34)
BASOPHILS # BLD AUTO: 0.02 X10(3)/MCL
BASOPHILS NFR BLD AUTO: 0.3 %
BILIRUB SERPL-MCNC: 0.4 MG/DL
BUN SERPL-MCNC: 10.2 MG/DL (ref 9.8–20.1)
CALCIUM SERPL-MCNC: 9.4 MG/DL (ref 8.4–10.2)
CHLORIDE SERPL-SCNC: 106 MMOL/L (ref 98–107)
CO2 SERPL-SCNC: 22 MMOL/L (ref 23–31)
CREAT SERPL-MCNC: 0.94 MG/DL (ref 0.55–1.02)
EOSINOPHIL # BLD AUTO: 0.1 X10(3)/MCL (ref 0–0.9)
EOSINOPHIL NFR BLD AUTO: 1.7 %
ERYTHROCYTE [DISTWIDTH] IN BLOOD BY AUTOMATED COUNT: 14.6 % (ref 11.5–17)
GFR SERPLBLD CREATININE-BSD FMLA CKD-EPI: >60 MLS/MIN/1.73/M2
GLOBULIN SER-MCNC: 4.5 GM/DL (ref 2.4–3.5)
GLUCOSE SERPL-MCNC: 142 MG/DL (ref 82–115)
HCT VFR BLD AUTO: 38.4 % (ref 37–47)
HGB BLD-MCNC: 11.8 G/DL (ref 12–16)
IMM GRANULOCYTES # BLD AUTO: 0.02 X10(3)/MCL (ref 0–0.04)
IMM GRANULOCYTES NFR BLD AUTO: 0.3 %
LYMPHOCYTES # BLD AUTO: 1.43 X10(3)/MCL (ref 0.6–4.6)
LYMPHOCYTES NFR BLD AUTO: 24.8 %
MAGNESIUM SERPL-MCNC: 2.1 MG/DL (ref 1.6–2.6)
MCH RBC QN AUTO: 26 PG (ref 27–31)
MCHC RBC AUTO-ENTMCNC: 30.7 G/DL (ref 33–36)
MCV RBC AUTO: 84.6 FL (ref 80–94)
MONOCYTES # BLD AUTO: 0.18 X10(3)/MCL (ref 0.1–1.3)
MONOCYTES NFR BLD AUTO: 3.1 %
NEUTROPHILS # BLD AUTO: 4.01 X10(3)/MCL (ref 2.1–9.2)
NEUTROPHILS NFR BLD AUTO: 69.8 %
PHOSPHATE SERPL-MCNC: 1.6 MG/DL (ref 2.3–4.7)
PLATELET # BLD AUTO: 415 X10(3)/MCL (ref 130–400)
PMV BLD AUTO: 10 FL (ref 7.4–10.4)
POTASSIUM SERPL-SCNC: 3.6 MMOL/L (ref 3.5–5.1)
PROT SERPL-MCNC: 7.8 GM/DL (ref 5.8–7.6)
PTH-INTACT SERPL-MCNC: 222.3 PG/ML (ref 8.7–77)
RBC # BLD AUTO: 4.54 X10(6)/MCL (ref 4.2–5.4)
SODIUM SERPL-SCNC: 137 MMOL/L (ref 136–145)
T4 FREE SERPL-MCNC: 1.3 NG/DL (ref 0.7–1.48)
TSH SERPL-ACNC: 0.03 UIU/ML (ref 0.35–4.94)
WBC # SPEC AUTO: 5.76 X10(3)/MCL (ref 4.5–11.5)

## 2023-09-18 PROCEDURE — 84480 ASSAY TRIIODOTHYRONINE (T3): CPT

## 2023-09-18 PROCEDURE — 80053 COMPREHEN METABOLIC PANEL: CPT

## 2023-09-18 PROCEDURE — 36415 COLL VENOUS BLD VENIPUNCTURE: CPT

## 2023-09-18 PROCEDURE — 85025 COMPLETE CBC W/AUTO DIFF WBC: CPT

## 2023-09-18 PROCEDURE — 83970 ASSAY OF PARATHORMONE: CPT

## 2023-09-18 PROCEDURE — 83735 ASSAY OF MAGNESIUM: CPT

## 2023-09-18 PROCEDURE — 84439 ASSAY OF FREE THYROXINE: CPT

## 2023-09-18 PROCEDURE — 82024 ASSAY OF ACTH: CPT

## 2023-09-18 PROCEDURE — 84443 ASSAY THYROID STIM HORMONE: CPT

## 2023-09-18 PROCEDURE — 84100 ASSAY OF PHOSPHORUS: CPT

## 2023-09-19 ENCOUNTER — OFFICE VISIT (OUTPATIENT)
Dept: HEMATOLOGY/ONCOLOGY | Facility: CLINIC | Age: 63
End: 2023-09-19
Payer: MEDICARE

## 2023-09-19 VITALS
WEIGHT: 141.19 LBS | RESPIRATION RATE: 19 BRPM | BODY MASS INDEX: 22.16 KG/M2 | OXYGEN SATURATION: 99 % | SYSTOLIC BLOOD PRESSURE: 112 MMHG | HEIGHT: 67 IN | TEMPERATURE: 97 F | DIASTOLIC BLOOD PRESSURE: 76 MMHG | HEART RATE: 109 BPM

## 2023-09-19 DIAGNOSIS — K59.00 CONSTIPATION, UNSPECIFIED CONSTIPATION TYPE: ICD-10-CM

## 2023-09-19 DIAGNOSIS — Z29.89 IMMUNOTHERAPY: ICD-10-CM

## 2023-09-19 DIAGNOSIS — Z79.899 ON ANTINEOPLASTIC CHEMOTHERAPY: ICD-10-CM

## 2023-09-19 DIAGNOSIS — Z79.899 IMMUNODEFICIENCY DUE TO CHEMOTHERAPY: ICD-10-CM

## 2023-09-19 DIAGNOSIS — C34.91 NSCLC OF RIGHT LUNG: Primary | ICD-10-CM

## 2023-09-19 DIAGNOSIS — E21.3 HYPERPARATHYROIDISM: ICD-10-CM

## 2023-09-19 DIAGNOSIS — E83.39 HYPOPHOSPHATEMIA: ICD-10-CM

## 2023-09-19 DIAGNOSIS — C79.51 METASTASIS TO BONE: ICD-10-CM

## 2023-09-19 DIAGNOSIS — R79.89 LOW TSH LEVEL: ICD-10-CM

## 2023-09-19 DIAGNOSIS — R63.0 APPETITE IMPAIRED: ICD-10-CM

## 2023-09-19 DIAGNOSIS — T45.1X5A IMMUNODEFICIENCY DUE TO CHEMOTHERAPY: ICD-10-CM

## 2023-09-19 DIAGNOSIS — D84.821 IMMUNODEFICIENCY DUE TO CHEMOTHERAPY: ICD-10-CM

## 2023-09-19 LAB
ACTH PLAS-MCNC: 8.3 PG/ML
T3 SERPL IA-MCNC: 217 NG/DL (ref 80–200)

## 2023-09-19 PROCEDURE — 3074F SYST BP LT 130 MM HG: CPT | Mod: CPTII,S$GLB,,

## 2023-09-19 PROCEDURE — 3078F PR MOST RECENT DIASTOLIC BLOOD PRESSURE < 80 MM HG: ICD-10-PCS | Mod: CPTII,S$GLB,,

## 2023-09-19 PROCEDURE — 3288F FALL RISK ASSESSMENT DOCD: CPT | Mod: CPTII,S$GLB,,

## 2023-09-19 PROCEDURE — 3078F DIAST BP <80 MM HG: CPT | Mod: CPTII,S$GLB,,

## 2023-09-19 PROCEDURE — 1160F RVW MEDS BY RX/DR IN RCRD: CPT | Mod: CPTII,S$GLB,,

## 2023-09-19 PROCEDURE — 3060F POS MICROALBUMINURIA REV: CPT | Mod: CPTII,S$GLB,,

## 2023-09-19 PROCEDURE — 1101F PR PT FALLS ASSESS DOC 0-1 FALLS W/OUT INJ PAST YR: ICD-10-PCS | Mod: CPTII,S$GLB,,

## 2023-09-19 PROCEDURE — 1159F MED LIST DOCD IN RCRD: CPT | Mod: CPTII,S$GLB,,

## 2023-09-19 PROCEDURE — 3066F PR DOCUMENTATION OF TREATMENT FOR NEPHROPATHY: ICD-10-PCS | Mod: CPTII,S$GLB,,

## 2023-09-19 PROCEDURE — 3008F PR BODY MASS INDEX (BMI) DOCUMENTED: ICD-10-PCS | Mod: CPTII,S$GLB,,

## 2023-09-19 PROCEDURE — 1160F PR REVIEW ALL MEDS BY PRESCRIBER/CLIN PHARMACIST DOCUMENTED: ICD-10-PCS | Mod: CPTII,S$GLB,,

## 2023-09-19 PROCEDURE — 3288F PR FALLS RISK ASSESSMENT DOCUMENTED: ICD-10-PCS | Mod: CPTII,S$GLB,,

## 2023-09-19 PROCEDURE — 3008F BODY MASS INDEX DOCD: CPT | Mod: CPTII,S$GLB,,

## 2023-09-19 PROCEDURE — 1159F PR MEDICATION LIST DOCUMENTED IN MEDICAL RECORD: ICD-10-PCS | Mod: CPTII,S$GLB,,

## 2023-09-19 PROCEDURE — 3074F PR MOST RECENT SYSTOLIC BLOOD PRESSURE < 130 MM HG: ICD-10-PCS | Mod: CPTII,S$GLB,,

## 2023-09-19 PROCEDURE — 99999 PR PBB SHADOW E&M-EST. PATIENT-LVL IV: ICD-10-PCS | Mod: PBBFAC,,,

## 2023-09-19 PROCEDURE — 99215 OFFICE O/P EST HI 40 MIN: CPT | Mod: S$GLB,,,

## 2023-09-19 PROCEDURE — 99999 PR PBB SHADOW E&M-EST. PATIENT-LVL IV: CPT | Mod: PBBFAC,,,

## 2023-09-19 PROCEDURE — 99215 PR OFFICE/OUTPT VISIT, EST, LEVL V, 40-54 MIN: ICD-10-PCS | Mod: S$GLB,,,

## 2023-09-19 PROCEDURE — 3060F PR POS MICROALBUMINURIA RESULT DOCUMENTED/REVIEW: ICD-10-PCS | Mod: CPTII,S$GLB,,

## 2023-09-19 PROCEDURE — 3066F NEPHROPATHY DOC TX: CPT | Mod: CPTII,S$GLB,,

## 2023-09-19 PROCEDURE — 1101F PT FALLS ASSESS-DOCD LE1/YR: CPT | Mod: CPTII,S$GLB,,

## 2023-09-19 NOTE — PROGRESS NOTES
HEMATOLOGY/ONCOLOGY OFFICE CLINIC VISIT  KAYDEN Christelle    ONCOLOGICAL HISTORY:     Diagnosis:  - Stage IV NSCLC with bone mets.  - NSCLC / Adenocarcinoma Stage IIIA pT2a pN2--dx 12/2022  - History Uterine cancer - Dx 1989    Current Treatment:   Keytruda + Xgeva 7/18/2023-present    Treatment History:  - 1989 s/p total hysterectomy   - 12/08/2022: Right lower and middle bilobectomy with mediastinal lymph node dissection.   - Carboplatin + Pemetrexed  3/15/2023-5/1/2023  - Palliative RT   - Carboplatin + Pemetrexed started 3/15/2023    Plan of care: palliative systemic therapy      IMAGING:   - 08/10/2022 PET: The elongated subpleural right lower lobe nodule demonstrates fairly intense FDG activity.  Infectious, inflammatory and neoplastic etiologies remain possible. No suspicious PET findings elsewhere.  - 12/08/2022 CT HEAD without contrast: No acute intracranial abnormality identified.  Findings of mild microvascular ischemic disease.  - 12/2022: U/S Upper extremity: Thrombophlebitis of the left cephalic vein. No deep venous thrombosis in the left upper extremity.  - 01/03/2023 MRI Brain : GARY   - 01/12/2023 NM PET CT 1. Postsurgical changes of recent right lower and middle lobectomies.  There is a small right pleural effusion.  There is nonspecific peripheral uptake along the pleura at the right lung base and perihilar region which may be related to healing response in the setting of recent surgery.  Similarly borderline mediastinal uptake is nonspecific in the recent postoperative setting and could be reactive.  Continued attention recommended on follow-up. 2. Focal uptake at the medial right acetabular roof without appreciable focal osseous lesion by CT evaluation.  This is new compared to previous exam.  Metastasis is a consideration  -PET 6/5/2023: Previously visualized moderately FDG avid opacities towards the right lung base improved.  Pleural effusion also improved.  No new suspicious PET findings in the  lungs.  Severe emphysema. Some left adrenal thickening is similar to prior but there is now moderate associated FDG activity, max SUV is 3.6. FDG activity is again seen associated with the right superior acetabulum.  FDG avid area appears slightly larger today with slightly increased sclerosis.  SUV is 9.7, previously 6.4.  There is a newly evident subcentimeter FDG avid lesion left pedicle T10. Additional subcentimeter FDG avid lesion T4 vertebral body SUV 3.4.suspicious for metastatic disease.  -MRI T spine 6/15/2023:   1. Foci of abnormal signal intensity and enhancement at the inferior endplate of T3 and at body and left pedicle of T10.  Metastatic neoplastic etiology must be considered.  2. Thoracic spondylosis  3. Mild encroachment into the right neural foramina at the T3-4 and T4-5 secondary to mild posterior disc bulge and posterior osteophyte formation  4. Multilevel mild posterior disc bulge which does not result in significant central spinal stenosis.    PATHOLOGY:  - 12/08/2022: Invasive solid adenocarcinoma (3.1 CM); G3, poorly differentiated. Visceral Pleura Invasion present without definite serosal surface involvement. Lymphovascular invasion (Vein) present. All margins negative for invasive carcinoma, distance 2.5 cm. LN - 4/14 (10R: Hilar, Posterior mediastinal (RACHEAL), 4R Lower paratracheal, 9R Pulmonary ligament, 10R). pT2a pN2    - 02/22/2023: RIGHT ACETABULAR LESION, CT - GUIDE CORE NEEDLE BIOPSY : FIBRINOPURULENT EXUDATE ADMIXED WITH ATYPICAL EPITHELIOID GROUPS,   NORMAL    MARROW ELEMENTS AND ABUNDANT BLOOD CLOT    Subjective:     HPI  Mansi Jin  63 y.o.  female with past medical history significant for HTN, HLD, uterine cancer status post total hysterectomy in 1989,  CKD Stage 2, COPD Stage 3, referred for management of NSCLC s/p RLL / RML lobectomy with Mediastinal Lymph Node Dissection on 12/8/2022.     She was noted to have RLL lung mass and underwent CT guided biopsy which was non  diagnostic. PET scan demonstrated significant avidity within the lesion. Given high risk for malignancy, she underwent right lower lobectomy and right middle lobectomy on 12/08/2022. Pathology came back with invasive adenocarcinoma, poorly differentiated with solid morphology, focal tumor involvement of visceral pleura and focal tumor vascular invasion (vein), with mediastinal lymph nodes positive for metastatic carcinoma.      PET on 01/12/2023 showed focal uptake at the medial right acetabular roof but the biopsy was negative for malignancy. Discussed with radiology, they recommended adjuvant chemotherapy and then they will reassess later for radiation therapy.     Chief Complaint: Other Misc (Pt reports no new concerns today.)      Interval History:   She returns to clinic today for a three-week follow-up and for treatment clearance for cycle 4 of Keytruda.  She reports feeling well today.  Her appetite has improved with the addition of Megace, but she continues to lose weight.  She does note slight increase in heart rate when walking.  Shortness of breath is stable and not worsening.  Constipation continues to be stable with the addition of lactulose as needed. She ran out of her PhosNaK prescription and states the pharmacy does not have anymore. She denies any palpitations, chest pain, fever, chills, nausea, vomiting, diarrhea, recent hospitalizations or infections.      Past Medical History:   Diagnosis Date    ASTHMA     COPD (chronic obstructive pulmonary disease)     SEVERE    High cholesterol     HLD (hyperlipidemia)     HTN (hypertension)     Lung cancer 12/08/2022    invasive adenocarcinoma, poorly differentiated with solid morphology, focal tumor involvement of visceral pleura and focal tumor vascular invasion (vein), with 2 of 3 posterior mediastinal lymph nodes positive for metastatic carcinoma    Stage 2 chronic kidney disease     TIA (transient ischemic attack)     Tobacco abuse     Unspecified  osteoarthritis, unspecified site     Uterine cancer       Past Surgical History:   Procedure Laterality Date    HYSTERECTOMY  Unknowing    lipoma multiple sites      LUNG LOBECTOMY Right 2022    Procedure: LOBECTOMY, LUNG;  Surgeon: Jass Browne IV, MD;  Location: [facility] OR;  Service: Cardiothoracic;  Laterality: Right;    SURGICAL REMOVAL OF LYMPH NODE  2022    Procedure: EXCISION, LYMPH NODE;  Surgeon: Jass Browne IV, MD;  Location: [facility] OR;  Service: Cardiothoracic;;    THORACOTOMY Right 2022    Procedure: THORACOTOMY;  Surgeon: Jass Browne IV, MD;  Location: [facility] OR;  Service: Cardiothoracic;  Laterality: Right;  Right Lower Lobectomy with Lymph Node Dissection    TOTAL ABDOMINAL HYSTERECTOMY W/ BILATERAL SALPINGOOPHORECTOMY       Social History     Socioeconomic History    Marital status: Single    Number of children: 5   Tobacco Use    Smoking status: Former     Current packs/day: 0.00     Average packs/day: 0.5 packs/day for 50.0 years (25.0 ttl pk-yrs)     Types: Cigarettes     Start date: 1972     Quit date: 2022     Years since quittin.7    Smokeless tobacco: Never   Substance and Sexual Activity    Alcohol use: Not Currently     Comment: quit 25years ago    Drug use: Never    Sexual activity: Yes     Partners: Male      Family History   Problem Relation Age of Onset    Hypertension Mother     Hepatitis Mother     Ovarian cancer Mother     Liver cancer Mother     Heart failure Father     Prostate cancer Father     Fibroids Sister     Asthma Brother     Asthma Brother     Cancer Maternal Grandfather       Review of patient's allergies indicates:  No Known Allergies     Review of Systems   Constitutional:  Positive for fatigue. Negative for activity change, appetite change, chills, fever and unexpected weight change.   HENT: Negative.  Negative for mouth dryness, mouth sores, nosebleeds, sore throat and trouble swallowing.    Eyes: Negative.  Negative for visual  disturbance.   Respiratory:  Positive for shortness of breath (on exertion, chronic). Negative for cough.    Cardiovascular:  Negative for chest pain, palpitations and leg swelling.   Gastrointestinal:  Negative for abdominal distention, abdominal pain, blood in stool, change in bowel habit, constipation, diarrhea, nausea, vomiting and change in bowel habit.   Endocrine: Negative.    Genitourinary: Negative.  Negative for dysuria, frequency, hematuria and urgency.   Musculoskeletal:  Negative for arthralgias, back pain, leg pain, myalgias and neck pain.   Integumentary:  Negative for rash, breast mass, breast discharge and breast tenderness. Negative.   Allergic/Immunologic: Negative.    Neurological:  Negative for dizziness, tremors, syncope, speech difficulty, weakness, light-headedness, numbness, headaches and memory loss.   Hematological: Negative.  Does not bruise/bleed easily.   Psychiatric/Behavioral: Negative.  Negative for confusion and suicidal ideas.    Breast: Negative for mass and tenderness        Objective:        Vitals:    09/19/23 1015   BP: 112/76   Pulse: 109   Resp: 19   Temp: 97.4 °F (36.3 °C)         Wt Readings from Last 6 Encounters:   09/19/23 64 kg (141 lb 3.2 oz)   09/12/23 64 kg (141 lb 3.2 oz)   08/29/23 65 kg (143 lb 6.4 oz)   08/29/23 65 kg (143 lb 6.4 oz)   08/15/23 65.3 kg (144 lb)   08/08/23 65.1 kg (143 lb 8.3 oz)     Body mass index is 22.12 kg/m².  Body surface area is 1.74 meters squared.       Physical Exam  Vitals and nursing note reviewed.   Constitutional:       Appearance: Normal appearance.   HENT:      Head: Normocephalic and atraumatic.   Eyes:      General: No scleral icterus.     Extraocular Movements: Extraocular movements intact.      Conjunctiva/sclera: Conjunctivae normal.   Neck:      Vascular: No JVD.   Cardiovascular:      Rate and Rhythm: Normal rate and regular rhythm.      Heart sounds: No murmur heard.  Pulmonary:      Effort: Pulmonary effort is normal.       Breath sounds: Decreased breath sounds present. No wheezing or rhonchi.   Abdominal:      General: Bowel sounds are normal. There is no distension.      Palpations: Abdomen is soft.      Tenderness: There is no abdominal tenderness.   Musculoskeletal:      Cervical back: Neck supple.   Lymphadenopathy:      Head:      Right side of head: No submental or submandibular adenopathy.      Left side of head: No submental or submandibular adenopathy.      Cervical: No cervical adenopathy.      Upper Body:      Right upper body: No supraclavicular or axillary adenopathy.      Left upper body: No supraclavicular or axillary adenopathy.      Lower Body: No right inguinal adenopathy. No left inguinal adenopathy.      Comments: No adenopathy noted bilaterally   Skin:     General: Skin is warm.      Coloration: Skin is not jaundiced.      Findings: No lesion or rash.   Neurological:      General: No focal deficit present.      Mental Status: She is alert and oriented to person, place, and time.      Cranial Nerves: Cranial nerves 2-12 are intact.      Gait: Gait normal.   Psychiatric:         Attention and Perception: Attention normal.         Speech: Speech normal.         Behavior: Behavior is cooperative.         Cognition and Memory: Cognition normal.         Judgment: Judgment normal.       LABS      No visits with results within 1 Day(s) from this visit.   Latest known visit with results is:   Lab Visit on 09/18/2023   Component Date Value    Sodium Level 09/18/2023 137     Potassium Level 09/18/2023 3.6     Chloride 09/18/2023 106     Carbon Dioxide 09/18/2023 22 (L)     Glucose Level 09/18/2023 142 (H)     Blood Urea Nitrogen 09/18/2023 10.2     Creatinine 09/18/2023 0.94     Calcium Level Total 09/18/2023 9.4     Protein Total 09/18/2023 7.8 (H)     Albumin Level 09/18/2023 3.3 (L)     Globulin 09/18/2023 4.5 (H)     Albumin/Globulin Ratio 09/18/2023 0.7 (L)     Bilirubin Total 09/18/2023 0.4     Alkaline Phosphatase  09/18/2023 84     Alanine Aminotransferase 09/18/2023 13     Aspartate Aminotransfera* 09/18/2023 15     eGFR 09/18/2023 >60     Magnesium Level 09/18/2023 2.10     Phosphorus Level 09/18/2023 1.6 (L)     Thyroid Stimulating Horm* 09/18/2023 0.034 (L)     Thyroxine Free 09/18/2023 1.30     Parathyroid Hormone Inta* 09/18/2023 222.3 (H)     WBC 09/18/2023 5.76     RBC 09/18/2023 4.54     Hgb 09/18/2023 11.8 (L)     Hct 09/18/2023 38.4     MCV 09/18/2023 84.6     MCH 09/18/2023 26.0 (L)     MCHC 09/18/2023 30.7 (L)     RDW 09/18/2023 14.6     Platelet 09/18/2023 415 (H)     MPV 09/18/2023 10.0     Neut % 09/18/2023 69.8     Lymph % 09/18/2023 24.8     Mono % 09/18/2023 3.1     Eos % 09/18/2023 1.7     Basophil % 09/18/2023 0.3     Lymph # 09/18/2023 1.43     Neut # 09/18/2023 4.01     Mono # 09/18/2023 0.18     Eos # 09/18/2023 0.10     Baso # 09/18/2023 0.02     IG# 09/18/2023 0.02     IG% 09/18/2023 0.3          Assessment:   ECOG Performance status: 2 - 3.     1. NSCLC of right lung    2. Immunotherapy    3. Hyperparathyroidism      Stage IV NSCLC with bone mets.    Originally: NSCLC / Adenocarcinoma Stage IIIA pT2a pN2  - 12/08/2022: Right lower and middle bilobectomy with mediastinal lymph node dissection. Path: Invasive adenocarcinoma, poorly differentiated, focal tumor involvement of visceral pleura and focal tumor vascular invasion (vein), with mediastinal lymph nodes positive for metastatic carcinoma   - TMB - 5, MSI-S, MET amplification, ROS1.  No mutations in EGFR, BRAF, ALK, ERBB2, KRAS, RET,    - 1% tumor cells are positive for PD-L1   - patient evaluated by Radiation Oncology with recommendation to treat with chemotherapy 1st and will re-evaluate the patient by the end of chemotherapy for radiation therapy  - 01/12/2023 PET: Focal uptake at the medial right acetabular roof. Metastasis is a consideration  - 02/22/2023: RIGHT ACETABULAR LESION, CT - GUIDE CORE NEEDLE BIOPSY : FIBRINOPURULENT EXUDATE  ADMIXED WITH ATYPICAL EPITHELIOID GROUPS,   NORMAL    MARROW ELEMENTS AND ABUNDANT BLOOD CLOT  PET scan showed a suspicious finding in the right acetabular roof and patient then had a CT-guided biopsy no evidence of malignancy. Started with adjuvant chemotherapy with Carboplatin and pemetrexed x 4 cycles (3/15/23-5/18/23)   Patient has been seen by Rad/Onc (Dr Mireles) since the completion of her chemotherapy and plan was to pursue radiation x 5-6 weeks.  She discussed with the patient that they have showed no overall survival benefit in the setting of postoperative radiation therapy but data supports a larger benefit inpatient with extracapsular extension.  If she tolerated chemotherapy well and continue with good performance status then plan was for PORT   Restaging PET-CT after completion of chemotherapy and prior to radiation therapy showed progression at the right acetabulum.  Patient with progression of disease now with bone metastases.  She bagan palliative radiation treatment with Dr. Mireles on 6/28/23, 10 treatments over two weeks.  PET CT 6/5/23 with progression Right superior acetabulum.  FDG avid area appears slightly larger with slightly increased sclerosis.  SUV is 9.7, previously 6.4.  She had palliative radiation. Started on Keytruda 7/18/23.  Bone mets   Xgeva q4w  Weakness/hypophosphatemia  Pharmacy does not have phosnak packet.  Prescription sent in K-phos-neutral BID x 30 days  Iron deficiency Anemia  Start 1 tab PO iron QOD with Vitamin C.   Low TSH/elevated PTH  Refer to endocrinology to rule out graves vs autoimmune thyroiditis   Will order TPOab and thyroid hormone receptor ab today and schedule for a thyroid uptake scan.                   Plan:   Labs stable with the exception of thyroid function test and parathyroid.  Will hold pembro today.  Refer to endo for further eval.  TPOab and thyroid hormone receptor ab today  Thyroid uptake scan ordered.   Continue with the Xgeva q4w  CT CPAP  scheduled for 09/27/2023.    Continue oral iron every other day  K-phos-neutral BID sent to pharmacy.   RTC as scheduled in three weeks with MD.     The patient was seen, interviewed and examined. Pertinent lab and radiology studies were reviewed.   The patient was given ample opportunity to ask questions, and to the best of my abilities, all questions answered to satisfaction; patient demonstrated understanding of what we discussed and agreeable to the plan. Pt instructed to call should develop concerning signs/symptoms or have further questions.             DOUGIE MartinezP-C  Oncology/Hematology   [facility]

## 2023-09-22 ENCOUNTER — OFFICE VISIT (OUTPATIENT)
Dept: INTERNAL MEDICINE | Facility: CLINIC | Age: 63
End: 2023-09-22
Payer: MEDICARE

## 2023-09-22 DIAGNOSIS — C34.91 NSCLC OF RIGHT LUNG: ICD-10-CM

## 2023-09-22 DIAGNOSIS — I10 PRIMARY HYPERTENSION: Primary | ICD-10-CM

## 2023-09-22 DIAGNOSIS — E78.00 HYPERCHOLESTEROLEMIA: Chronic | ICD-10-CM

## 2023-09-22 DIAGNOSIS — C79.51 METASTASIS TO BONE: ICD-10-CM

## 2023-09-22 DIAGNOSIS — J44.9 STAGE 3 SEVERE COPD BY GOLD CLASSIFICATION: Chronic | ICD-10-CM

## 2023-09-22 PROBLEM — Z87.891 FORMER TOBACCO USE: Chronic | Status: ACTIVE | Noted: 2022-05-31

## 2023-09-22 PROCEDURE — 1101F PR PT FALLS ASSESS DOC 0-1 FALLS W/OUT INJ PAST YR: ICD-10-PCS | Mod: CPTII,95,, | Performed by: NURSE PRACTITIONER

## 2023-09-22 PROCEDURE — 3060F POS MICROALBUMINURIA REV: CPT | Mod: CPTII,95,, | Performed by: NURSE PRACTITIONER

## 2023-09-22 PROCEDURE — 1160F PR REVIEW ALL MEDS BY PRESCRIBER/CLIN PHARMACIST DOCUMENTED: ICD-10-PCS | Mod: CPTII,95,, | Performed by: NURSE PRACTITIONER

## 2023-09-22 PROCEDURE — 3288F PR FALLS RISK ASSESSMENT DOCUMENTED: ICD-10-PCS | Mod: CPTII,95,, | Performed by: NURSE PRACTITIONER

## 2023-09-22 PROCEDURE — 1160F RVW MEDS BY RX/DR IN RCRD: CPT | Mod: CPTII,95,, | Performed by: NURSE PRACTITIONER

## 2023-09-22 PROCEDURE — 3066F NEPHROPATHY DOC TX: CPT | Mod: CPTII,95,, | Performed by: NURSE PRACTITIONER

## 2023-09-22 PROCEDURE — 1101F PT FALLS ASSESS-DOCD LE1/YR: CPT | Mod: CPTII,95,, | Performed by: NURSE PRACTITIONER

## 2023-09-22 PROCEDURE — 1159F PR MEDICATION LIST DOCUMENTED IN MEDICAL RECORD: ICD-10-PCS | Mod: CPTII,95,, | Performed by: NURSE PRACTITIONER

## 2023-09-22 PROCEDURE — 3066F PR DOCUMENTATION OF TREATMENT FOR NEPHROPATHY: ICD-10-PCS | Mod: CPTII,95,, | Performed by: NURSE PRACTITIONER

## 2023-09-22 PROCEDURE — 99214 OFFICE O/P EST MOD 30 MIN: CPT | Mod: FQ,95,, | Performed by: NURSE PRACTITIONER

## 2023-09-22 PROCEDURE — 99214 PR OFFICE/OUTPT VISIT, EST, LEVL IV, 30-39 MIN: ICD-10-PCS | Mod: FQ,95,, | Performed by: NURSE PRACTITIONER

## 2023-09-22 PROCEDURE — 1159F MED LIST DOCD IN RCRD: CPT | Mod: CPTII,95,, | Performed by: NURSE PRACTITIONER

## 2023-09-22 PROCEDURE — 3060F PR POS MICROALBUMINURIA RESULT DOCUMENTED/REVIEW: ICD-10-PCS | Mod: CPTII,95,, | Performed by: NURSE PRACTITIONER

## 2023-09-22 PROCEDURE — 3288F FALL RISK ASSESSMENT DOCD: CPT | Mod: CPTII,95,, | Performed by: NURSE PRACTITIONER

## 2023-09-22 RX ORDER — ASCORBIC ACID 500 MG
1000 TABLET ORAL DAILY
COMMUNITY

## 2023-09-22 RX ORDER — ATORVASTATIN CALCIUM 40 MG/1
40 TABLET, FILM COATED ORAL NIGHTLY
Qty: 90 TABLET | Refills: 2 | Status: SHIPPED | OUTPATIENT
Start: 2023-09-22 | End: 2024-03-21

## 2023-09-22 NOTE — PROGRESS NOTES
Patient ID: [id number]     Chief Complaint: Follow-up and Weight Loss (States started megace  appetite getting better./Audio visit-cannot connect)    HPI:     Audio Only Telehealth Visit     The patient location is: home  The chief complaint leading to consultation is: follow up  Visit type: Virtual visit with audio only (telephone)  Total time spent with patient: 20 minutes     The reason for the audio only service rather than synchronous audio and video virtual visit was related to technical difficulties or patient preference/necessity.     Each patient to whom I provide medical services by telemedicine is:  (1) informed of the relationship between the physician and patient and the respective role of any other health care provider with respect to management of the patient; and (2) notified that they may decline to receive medical services by telemedicine and may withdraw from such care at any time. Patient verbally consented to receive this service via voice-only telephone call.    This service was not originating from a related E/M service provided within the previous 7 days nor will  to an E/M service or procedure within the next 24 hours or my soonest available appointment.  Prevailing standard of care was able to be met in this audio-only visit.       Mansi Jin is a 63 y.o. female with diagnosis of HTN, HLD, CKD 2, COPD 3, Right Lower and Middle Bilobectomy with Mediastinal Lymph Node Dissection S/T Lung Mass (12/18/2022), Tobacco Use. Patient seen today for follow up. Patient last seen in clinic on 3/22/2023.   Today, patient states doing well. States was started on new treatment for her Lung Cancer, appetite decreased however she was started on Megace and appetite improved. Patient also states her thyroid levels decreased which is a side effect of her cancer treatment, so she was referred to endocrinology, unsure where, states waiting for appointment.   Patient had lumbar X-ray completed on  11/22/2022; indicated degenerative listhesis related to facet arthropathy in the lower lumbar spine, similar to prior. Patient referred to physical therapy, patient states she never received an appointment, referral was denied due to clinic not accepting her insurance. Patient states right leg pain, back pain improved. Patient states she is doing well, denies any acute complaints.   Patient states she does user her Trelegy Ellipta and Combivent as prescribed for COPD.   Patient's B/P medications discontinued due to hypotension after surgery for lung mass. Patient states she B/P controlled at home. Last B/P was 112/76 on 09/19/2023 with Oncology Clinic. Patient denies SOB, chest pain.   Patient denies any acute complaints.     Patient followed by Oncology Clinic. Last appointment on 09/19/2023. Stage IV NSCLC with bone met; Originally: NSCLC / Adenocarcinoma Stage IIIA pT2a pN2: Restaging PET-CT after completion of chemotherapy and prior to radiation therapy showed progression at the right acetabulum. Patient with progression of disease now with bone metastases. She bagan palliative radiation treatment with Dr. Mireles on 6/28/23, 10 treatments over two weeks. Bone mets: Xgeva q4w. Weakness/hypophosphatemia: Pharmacy does not have phosnak packet. Prescription sent in K-phos-neutral BID x 30 days. Iron deficiency Anemia: Start 1 tab PO iron QOD with Vitamin C. Low TSH/elevated PTH: Refer to endocrinology to rule out graves vs autoimmune thyroiditis. Will order TPOab and thyroid hormone receptor ab today and schedule for a thyroid uptake scan. Patient has follow up appointment scheduled for 10/10/2023.     Patient followed by Pulmonology Clinic. Last appointment on 05/16/2023. Right lung mass status post right middle lobe and right lower lobe lobectomy on 12/08/2022 -- Pathology came back with invasive adenocarcinoma, poorly differentiated with solid morphology, focal tumor involvement of visceral pleura and focal  tumor vascular invasion (vein), with 2 of 3 posterior mediastinal lymph nodes positive for metastatic carcinom, stage III A T2a N2. Stage III COPD. Hypoxia. Left upper extremity edema. Plan: NSCLC adenocarcenoma stage 3a/T2a/N2 s/p RLL lobectomy with possible metastasis however bx negative now receiving chemotherapy with plans for adjuvant radiation therapy; pending completion of chemotherapy course; Continue trelegy; currently sating well ORA walking >1 mile daily without SOB. Patient has follow up appointment scheduled for 05/14/2024.      Patient followed by CV Surgeon, Dr. Browne. Last appointment on 01/10/2023. Patient returns now 1 month out from a right lower bilobectomy. Her pathology returned positive for a 3.1 cm poorly differentiated adenocarcinoma with 2 posterior mediastinal lymph nodes positive. Patient was doing well for the 1st several weeks after surgery. She was walking without any shortness of breath or pain. However over the last 3 days she is suddenly developed a spasm and shooting pain in the right thigh. She is been to the emergency room for evaluation and she did not have a DVT and all x-rays were negative. She was told she has frequent muscle spasms. She is really getting not much relief from this. Her incision has healed nicely. She is already lined up with oncology for chemotherapy. She will follow up with them and with her primary care for further evaluation of this right leg problem. We will see her back p.r.n.      Patient followed by Radiation Oncologist, Dr. Dinorah Mireles. Last appointment on 01/04/2023. The patient has a stage III non-small cell lung cancer status post resection. We discussed the controversy does around postoperative radiotherapy in the setting of positive mediastinal nodes. She does have a high risk factor of extranodal extension. Explained to the patient and her daughter that at this time the most important component of her treatment is the chemotherapy. Will see  her back towards the end of chemotherapy and we will rediscuss the role of postoperative radiation therapy. Recent meta analyses have shown no overall survival benefit in the setting of postoperative radiation therapy but there is data support a larger benefit in patients with extracapsular extension. If the patient tolerates chemotherapy well and has a good performance status, I will be more likely to recommend PORT. Also if the patient only receives 1-2 cycles of chemotherapy, I will be more inclined to recommend radiotherapy. If the patient is able to complete most of her recommended chemotherapy but has a limited performance status after completing chemotherapy, we will likely not proceed with radiation therapy. The patient and her daughter understand the plan and are happy that we will reassess after completing chemotherapy.    Review of patient's allergies indicates:  No Known Allergies    Breast Cancer Screening: MMG negative on 01/23/2023   Cervical Cancer Screening: Hysterectomy  Colorectal Cancer Screening: Cologuard negative on 04/11/2022  Diabetic Eye Exam: N/A  Diabetic Foot Exam: N/A  Lung Cancer Screening: Followed by Oncology/Pulmonology for Lung Cancer  Prostate Cancer Screening: N/A  AAA Screening: N/A  Osteoporosis Screening: ordered  Medicare Wellness: N/A  Immunizations:   Immunization History   Administered Date(s) Administered    COVID-19, MRNA, LN-S, PF (MODERNA FULL 0.5 ML DOSE) 03/02/2021, 03/30/2021, 11/05/2021    Influenza 10/19/2017    Influenza - Quadrivalent 12/01/2020, 03/17/2022    Influenza - Quadrivalent - PF (6-35 months) 10/19/2017, 11/12/2018    Influenza - Quadrivalent - PF *Preferred* (6 months and older) 11/22/2022    Influenza - Trivalent - PF (ADULT) 11/15/2011    Pneumococcal Polysaccharide - 23 Valent 03/17/2022    Tdap 10/19/2017    Zoster 01/28/2021, 07/21/2021    Zoster Recombinant 01/28/2021, 07/21/2021     Past Surgical History:   Procedure Laterality Date     HYSTERECTOMY  Unknowing    lipoma multiple sites      LUNG LOBECTOMY Right 2022    Procedure: LOBECTOMY, LUNG;  Surgeon: Jass Browne IV, MD;  Location: [facility] OR;  Service: Cardiothoracic;  Laterality: Right;    SURGICAL REMOVAL OF LYMPH NODE  2022    Procedure: EXCISION, LYMPH NODE;  Surgeon: Jass Browne IV, MD;  Location: [facility] OR;  Service: Cardiothoracic;;    THORACOTOMY Right 2022    Procedure: THORACOTOMY;  Surgeon: Jass Browne IV, MD;  Location: [facility];  Service: Cardiothoracic;  Laterality: Right;  Right Lower Lobectomy with Lymph Node Dissection    TOTAL ABDOMINAL HYSTERECTOMY W/ BILATERAL SALPINGOOPHORECTOMY       family history includes Asthma in her brother and brother; Cancer in her maternal grandfather; Fibroids in her sister; Heart failure in her father; Hepatitis in her mother; Hypertension in her mother; Liver cancer in her mother; Ovarian cancer in her mother; Prostate cancer in her father.    Social History     Socioeconomic History    Marital status: Single    Number of children: 5   Tobacco Use    Smoking status: Former     Current packs/day: 0.00     Average packs/day: 0.5 packs/day for 50.0 years (25.0 ttl pk-yrs)     Types: Cigarettes     Start date: 1972     Quit date: 2022     Years since quittin.7    Smokeless tobacco: Never   Substance and Sexual Activity    Alcohol use: Not Currently     Comment: quit 25years ago    Drug use: Never    Sexual activity: Yes     Partners: Male     Social Determinants of Health     Financial Resource Strain: Low Risk  (2023)    Overall Financial Resource Strain (CARDIA)     Difficulty of Paying Living Expenses: Not very hard   Food Insecurity: No Food Insecurity (2023)    Hunger Vital Sign     Worried About Running Out of Food in the Last Year: Never true     Ran Out of Food in the Last Year: Never true   Transportation Needs: No Transportation Needs (2023)    PRAPARE - Transportation     Lack of  Transportation (Medical): No     Lack of Transportation (Non-Medical): No   Physical Activity: Inactive (9/22/2023)    Exercise Vital Sign     Days of Exercise per Week: 0 days     Minutes of Exercise per Session: 0 min   Stress: No Stress Concern Present (9/22/2023)    [employer] - Occupational Stress Questionnaire     Feeling of Stress : Only a little   Social Connections: Socially Integrated (9/22/2023)    Social Connection and Isolation Panel [NHANES]     Frequency of Communication with Friends and Family: More than three times a week     Frequency of Social Gatherings with Friends and Family: Three times a week     Attends Jewish Services: More than 4 times per year     Active Member of Clubs or Organizations: Yes     Attends Club or Organization Meetings: 1 to 4 times per year     Marital Status: Living with partner   Housing Stability: Low Risk  (9/22/2023)    Housing Stability Vital Sign     Unable to Pay for Housing in the Last Year: No     Number of Places Lived in the Last Year: 1     Unstable Housing in the Last Year: No     Current Outpatient Medications   Medication Instructions    acetaminophen (TYLENOL) 1,000 mg, Oral, Every 6 hours PRN    ascorbic acid (vitamin C) (VITAMIN C) 500 mg, Oral, Daily    aspirin (ECOTRIN) 81 mg, Oral, Daily    atorvastatin (LIPITOR) 40 mg, Oral, Nightly    azithromycin (Z-JACEY) 250 mg, Oral, Daily, Take first 2 tablets together, then 1 every day until finished.    benzonatate (TESSALON) 200 mg, Oral, 3 times daily PRN    dexAMETHasone (DECADRON) 8 mg, Oral, Daily, Take 8mg on the day prior to chemotherapy and on days 2, 3, and 4 following chemotherapy    docusate sodium (COLACE) 50 MG capsule Oral, Daily    famotidine (PEPCID) 40 MG tablet TAKE 1 TABLET(40 MG) BY MOUTH EVERY EVENING    fluticasone propionate (FLONASE) 50 mcg, Each Nostril, Daily    fluticasone-umeclidin-vilanter (TRELEGY ELLIPTA) 100-62.5-25 mcg DsDv 1 puff, Inhalation,  Daily    HYDROcodone-acetaminophen (NORCO) 5-325 mg per tablet 1 tablet, Oral, Every 6 hours PRN    ibuprofen (ADVIL,MOTRIN) 400 mg, Oral, Every 6 hours PRN    ipratropium/albuterol sulfate (COMBIVENT INHL) Inhalation, As needed (PRN)    iron polysac-iron heme polypep (FEOSOL BIFERA) 28 mg Tab 1 tablet, Oral, Daily    k phos di & mono-sod phos mono (K-PHOS-NEUTRAL) 250 mg Tab 1 tablet, Oral, 2 times daily    lactulose (CHRONULAC) 20 g, Oral, 2 times daily PRN    loratadine (CLARITIN) 10 mg, Oral, Daily    megestroL (MEGACE) 40 mg, Oral, 4 times daily, For low appetite.    mirtazapine (REMERON) 7.5 mg, Oral, Nightly       Subjective:     Review of Systems   Constitutional:  Positive for appetite change.   HENT: Negative.     Eyes: Negative.    Respiratory: Negative.     Cardiovascular: Negative.    Gastrointestinal: Negative.    Endocrine: Negative.    Genitourinary: Negative.    Musculoskeletal: Negative.    Skin: Negative.    Allergic/Immunologic: Negative.    Neurological: Negative.    Hematological: Negative.    Psychiatric/Behavioral: Negative.         Objective:     There were no vitals taken for this visit.      Physical Exam  Neurological:      Mental Status: She is alert and oriented to person, place, and time.   Psychiatric:         Mood and Affect: Mood normal.       Labs Reviewed:     Hematology:  Lab Results   Component Value Date    WBC 5.76 09/18/2023    HGB 11.8 (L) 09/18/2023    HCT 38.4 09/18/2023     (H) 09/18/2023     Chemistry:  Lab Results   Component Value Date     09/18/2023    K 3.6 09/18/2023    CHLORIDE 106 09/18/2023    BUN 10.2 09/18/2023    CREATININE 0.94 09/18/2023    EGFRNORACEVR >60 09/18/2023    GLUCOSE 142 (H) 09/18/2023    CALCIUM 9.4 09/18/2023    ALKPHOS 84 09/18/2023    LABPROT 7.8 (H) 09/18/2023    LABPROT 14.2 02/22/2023    ALBUMIN 3.3 (L) 09/18/2023    BILIDIR 0.2 07/19/2021    IBILI 0.30 07/19/2021    AST 15 09/18/2023    ALT 13 09/18/2023    MG 2.10 09/18/2023     PHOS 1.6 (L) 09/18/2023     Lab Results   Component Value Date    HGBA1C 6.0 05/09/2019     Lipid Panel:  Lab Results   Component Value Date    CHOL 182 03/22/2023    HDL 45 03/22/2023    .00 03/22/2023    TRIG 110 03/22/2023    TOTALCHOLEST 4 03/22/2023     Thyroid:  Lab Results   Component Value Date    TSH 0.034 (L) 09/18/2023     Urine:  Lab Results   Component Value Date    COLORUA Yellow 03/22/2023    APPEARANCEUA Clear 03/22/2023    SGUA 1.020 03/22/2023    PHUA 6.5 03/22/2023    PROTEINUA 1+ (A) 03/22/2023    GLUCOSEUA Trace (A) 03/22/2023    KETONESUA Negative 03/22/2023    BLOODUA Negative 03/22/2023    NITRITESUA Negative 03/22/2023    LEUKOCYTESUR Negative 03/22/2023    RBCUA 0-5 03/22/2023    WBCUA 0-5 03/22/2023    BACTERIA None Seen 03/22/2023    SQEPUA Few (A) 03/22/2023    HYALINECASTS 0-2 (A) 03/22/2023    CREATRANDUR 188.0 (H) 03/22/2023        Assessment:       ICD-10-CM ICD-9-CM   1. Primary hypertension  I10 401.9   2. Hypercholesterolemia  E78.00 272.0   3. Stage 3 severe COPD by GOLD classification  J44.9 496   4. NSCLC of right lung  C34.91 162.9   5. Metastasis to bone  C79.51 198.5       Plan:     1. Primary hypertension  There were no vitals filed for this visit.   Results for orders placed or performed in visit on 03/22/23   Microalbumin/Creatinine Ratio, Urine   Result Value Ref Range    Urine Microalbumin 169.2 (H) <=30.0 ug/ml    Urine Creatinine 188.0 (H) 47.0 - 110.0 mg/dL    Microalbumin Creatinine Ratio 90.0 (H) 0.0 - 30.0 mg/gm Cr     Results for orders placed or performed during the hospital encounter of 12/22/22   EKG 12-lead    Collection Time: 12/22/22  9:19 AM    Narrative    Test Reason : R53.1,    Vent. Rate : 097 BPM     Atrial Rate : 097 BPM     P-R Int : 174 ms          QRS Dur : 066 ms      QT Int : 356 ms       P-R-T Axes : 065 053 032 degrees     QTc Int : 452 ms    Normal sinus rhythm  Possible Left atrial enlargement  Low voltage QRS  Borderline Abnormal  ECG  When compared with ECG of 06-DEC-2022 09:03,  No significant change was found  Confirmed by Franky Quintero MD ([id number]) on 12/22/2022 10:20:09 PM    Referred By: [email or web address]   SELF           Confirmed By:Franky Quintero MD   No medications, HTN resolved after lung mass surgery. Patient denies SOB, chest pain, HTN at home  DASH diet  Encouraged home blood pressure monitoring    2. Hypercholesterolemia  Continue Atorvastatin 40 mg daily  Weight loss encouraged  Low fat/high fiber diet  Increase physical activity  Cholesterol Total   Date Value Ref Range Status   03/22/2023 182 <=200 mg/dL Final     HDL Cholesterol   Date Value Ref Range Status   03/22/2023 45 35 - 60 mg/dL Final     Triglyceride   Date Value Ref Range Status   03/22/2023 110 37 - 140 mg/dL Final     LDL Cholesterol   Date Value Ref Range Status   03/22/2023 115.00 50.00 - 140.00 mg/dL Final   - Lipid Panel; Future    3. Stage 3 severe COPD by GOLD classification  Followed by Pulmonology  Continue Trelegy Ellipta 100-62.5-25 mcg daily, Combivent Inh prn    4. NSCLC of right lung  Followed by Oncology and Pulmonology Clinic  Patient denies SOB, chest pain    5. Metastasis to bone  Followed by Oncology Clinic, scheduled for palliative radiation      Follow up in about 6 months (around 3/22/2024) for Labs. In addition to their scheduled follow up, the patient has also been instructed to follow up on as needed basis.     Jennifer Dacosta, PAULINE

## 2023-09-26 ENCOUNTER — HOSPITAL ENCOUNTER (OUTPATIENT)
Dept: RADIOLOGY | Facility: HOSPITAL | Age: 63
Discharge: HOME | End: 2023-09-26
Payer: MEDICARE

## 2023-09-26 DIAGNOSIS — C34.91 NSCLC OF RIGHT LUNG: ICD-10-CM

## 2023-09-26 DIAGNOSIS — E21.3 HYPERPARATHYROIDISM: ICD-10-CM

## 2023-09-26 DIAGNOSIS — Z29.89 IMMUNOTHERAPY: ICD-10-CM

## 2023-09-26 PROCEDURE — 78014 THYROID IMAGING W/BLOOD FLOW: CPT | Mod: TC

## 2023-09-27 ENCOUNTER — HOSPITAL ENCOUNTER (OUTPATIENT)
Dept: RADIOLOGY | Facility: HOSPITAL | Age: 63
Discharge: HOME | End: 2023-09-27
Payer: MEDICARE

## 2023-09-27 ENCOUNTER — HOSPITAL ENCOUNTER (OUTPATIENT)
Dept: RADIOLOGY | Facility: HOSPITAL | Age: 63
Discharge: HOME | End: 2023-09-27
Attending: INTERNAL MEDICINE
Payer: MEDICARE

## 2023-09-27 DIAGNOSIS — C34.91 NSCLC OF RIGHT LUNG: ICD-10-CM

## 2023-09-27 DIAGNOSIS — R93.7 ABNORMAL MRI, THORACIC SPINE: ICD-10-CM

## 2023-09-27 PROCEDURE — 71270 CT THORAX DX C-/C+: CPT | Mod: TC

## 2023-09-27 PROCEDURE — 25500020 PHARM REV CODE 255: Performed by: INTERNAL MEDICINE

## 2023-09-27 RX ADMIN — IOPAMIDOL 100 ML: 755 INJECTION, SOLUTION INTRAVENOUS at 10:09

## 2023-09-27 RX ADMIN — DIATRIZOATE MEGLUMINE AND DIATRIZOATE SODIUM 30 ML: 660; 100 LIQUID ORAL; RECTAL at 10:09

## 2023-09-29 DIAGNOSIS — C34.91 NSCLC OF RIGHT LUNG: Primary | ICD-10-CM

## 2023-09-29 DIAGNOSIS — R05.9 COUGH, UNSPECIFIED TYPE: ICD-10-CM

## 2023-09-29 RX ORDER — BENZONATATE 200 MG/1
200 CAPSULE ORAL
Qty: 90 CAPSULE | Refills: 1 | Status: SHIPPED | OUTPATIENT
Start: 2023-09-29 | End: 2023-12-06

## 2023-10-02 DIAGNOSIS — E21.3 HYPERPARATHYROIDISM: ICD-10-CM

## 2023-10-02 DIAGNOSIS — C34.90 MALIGNANT NEOPLASM OF UNSPECIFIED PART OF UNSPECIFIED BRONCHUS OR LUNG: Primary | ICD-10-CM

## 2023-10-02 DIAGNOSIS — C34.91 NSCLC OF RIGHT LUNG: Primary | ICD-10-CM

## 2023-10-02 DIAGNOSIS — R79.89 LOW TSH LEVEL: ICD-10-CM

## 2023-10-02 DIAGNOSIS — R53.1 WEAKNESS: ICD-10-CM

## 2023-10-02 RX ORDER — MIRTAZAPINE 7.5 MG/1
7.5 TABLET, FILM COATED ORAL NIGHTLY
Qty: 30 TABLET | Refills: 3 | Status: SHIPPED | OUTPATIENT
Start: 2023-10-02 | End: 2023-10-24 | Stop reason: SDUPTHER

## 2023-10-03 ENCOUNTER — TELEPHONE (OUTPATIENT)
Dept: HEMATOLOGY/ONCOLOGY | Facility: CLINIC | Age: 63
End: 2023-10-03
Payer: MEDICARE

## 2023-10-03 NOTE — TELEPHONE ENCOUNTER
----- Message from Latanya Webb sent at 10/3/2023 10:12 AM CDT -----  Regarding: RE: pet  PET/CT scheduled 10/06/23    ----- Message -----  From: Clau Hodges LPN  Sent: 10/2/2023   4:53 PM CDT  To: Latanya Webb  Subject: pet                                              Pt needs PET before RTC on 10/10 please. Her CT showed progression

## 2023-10-06 ENCOUNTER — HOSPITAL ENCOUNTER (OUTPATIENT)
Dept: RADIOLOGY | Facility: HOSPITAL | Age: 63
Discharge: HOME | End: 2023-10-06
Attending: INTERNAL MEDICINE
Payer: MEDICARE

## 2023-10-06 DIAGNOSIS — C34.90 MALIGNANT NEOPLASM OF UNSPECIFIED PART OF UNSPECIFIED BRONCHUS OR LUNG: ICD-10-CM

## 2023-10-06 PROCEDURE — A9552 F18 FDG: HCPCS

## 2023-10-09 ENCOUNTER — DOCUMENTATION ONLY (OUTPATIENT)
Dept: HEMATOLOGY/ONCOLOGY | Facility: CLINIC | Age: 63
End: 2023-10-09
Payer: MEDICARE

## 2023-10-09 NOTE — PROGRESS NOTES
HEMATOLOGY/ONCOLOGY OFFICE CLINIC VISIT  [facility]    ONCOLOGICAL HISTORY:     Diagnosis:  - Stage IV NSCLC with bone mets.  - NSCLC / Adenocarcinoma Stage IIIA pT2a pN2--dx 12/2022  - PD-L1 1%  - History Uterine cancer - Dx 1989    Current Treatment:   Keytruda + Xgeva 7/18/2023-present    Treatment History:  - 1989 s/p total hysterectomy   - 12/08/2022: Right lower and middle bilobectomy with mediastinal lymph node dissection.   - Carboplatin + Pemetrexed  3/15/2023-5/1/2023  - Palliative RT   - Carboplatin + Pemetrexed started 3/15/2023  - Keytruda stopped 9/19/23 r/t elevated TSH and PET/CT progression, last dose given 8/29/23    Plan of care: palliative systemic therapy      IMAGING:   - 08/10/2022 PET: The elongated subpleural right lower lobe nodule demonstrates fairly intense FDG activity.  Infectious, inflammatory and neoplastic etiologies remain possible. No suspicious PET findings elsewhere.  - 12/08/2022 CT HEAD without contrast: No acute intracranial abnormality identified.  Findings of mild microvascular ischemic disease.  - 12/2022: U/S Upper extremity: Thrombophlebitis of the left cephalic vein. No deep venous thrombosis in the left upper extremity.  - 01/03/2023 MRI Brain : GARY   - 01/12/2023 NM PET CT 1. Postsurgical changes of recent right lower and middle lobectomies.  There is a small right pleural effusion.  There is nonspecific peripheral uptake along the pleura at the right lung base and perihilar region which may be related to healing response in the setting of recent surgery.  Similarly borderline mediastinal uptake is nonspecific in the recent postoperative setting and could be reactive.  Continued attention recommended on follow-up. 2. Focal Uptake at the medial right acetabular roof has a max SUV of 6.4. This is new compared to previous exam. Metastasis is a consideration  -PET 6/5/2023: Previously visualized moderately FDG avid opacities towards the right lung base improved.  Pleural  effusion also improved.  No new suspicious PET findings in the lungs.  Severe emphysema. Some left adrenal thickening is similar to prior but there is now moderate associated FDG activity, max SUV is 3.6. FDG activity is again seen associated with the right superior acetabulum.  FDG avid area appears slightly larger today with slightly increased sclerosis.  SUV is 9.7, previously 6.4.  There is a newly evident subcentimeter FDG avid lesion left pedicle T10. Additional subcentimeter FDG avid lesion T4 vertebral body SUV 3.4.suspicious for metastatic disease.  -MRI T spine 6/15/2023:   1. Foci of abnormal signal intensity and enhancement at the inferior endplate of T3 and at body and left pedicle of T10.  Metastatic neoplastic etiology must be considered.  2. Thoracic spondylosis  3. Mild encroachment into the right neural foramina at the T3-4 and T4-5 secondary to mild posterior disc bulge and posterior osteophyte formation  4. Multilevel mild posterior disc bulge which does not result in significant central spinal stenosis.  9/27/23 Thyroid U/S:  Very minimal to no significant uptake of radiotracer isotope within the thyroid lobes bilaterally.  Clinical correlation is indicated  CT CAP 9/27/23:  1. Interim increasing nodular soft tissue densities/nodes/mass is at the mediastinum and right hilum measuring up the 3.0 cm.  A neoplastic etiology must be considered  2. Interim development of a 2.5 cm low-attenuation mass at the left adrenal gland.  A metastatic etiology must be considered  3. Interim development of a sclerotic foci at T3 and T10.  A metastatic etiology must be considered  4. Small right pleural effusion with associated infiltrate and atelectasis at the right lung base suspect  5. Suspect small cyst at the left kidney as described  6. Mild ill-defined soft tissue fullness at the cervical vaginal region  7. Borderline cardiomegaly  10/6/23 PET/CT:  1. Disease progression with new FDG avid metastatic lymph  nodes in the chest and right hilum.  2. Bilateral axillary and inguinal lymph nodes with low level uptake are indeterminate but concerning for metastasis given the additional findings.  3. Worsening and new FDG avid osseous disease involving both the axial and appendicular skeleton.  4. Enlarging left adrenal metastasis.    PATHOLOGY:  - 12/08/2022: Invasive solid adenocarcinoma (3.1 CM); G3, poorly differentiated. Visceral Pleura Invasion present without definite serosal surface involvement. Lymphovascular invasion (Vein) present. All margins negative for invasive carcinoma, distance 2.5 cm. LN - 4/14 (10R: Hilar, Posterior mediastinal (RACHEAL), 4R Lower paratracheal, 9R Pulmonary ligament, 10R). pT2a pN2    - 02/22/2023: RIGHT ACETABULAR LESION, CT - GUIDE CORE NEEDLE BIOPSY : FIBRINOPURULENT EXUDATE ADMIXED WITH ATYPICAL EPITHELIOID GROUPS,   NORMAL    MARROW ELEMENTS AND ABUNDANT BLOOD CLOT      Subjective:     HPI  Mansi Jin  63 y.o.  female with past medical history significant for HTN, HLD, uterine cancer status post total hysterectomy in 1989,  CKD Stage 2, COPD Stage 3, referred for management of NSCLC s/p RLL / RML lobectomy with Mediastinal Lymph Node Dissection on 12/8/2022.     She was noted to have RLL lung mass and underwent CT guided biopsy which was non diagnostic. PET scan demonstrated significant avidity within the lesion. Given high risk for malignancy, she underwent right lower lobectomy and right middle lobectomy on 12/08/2022. Pathology came back with invasive adenocarcinoma, poorly differentiated with solid morphology, focal tumor involvement of visceral pleura and focal tumor vascular invasion (vein), with mediastinal lymph nodes positive for metastatic carcinoma.      PET on 01/12/2023 showed focal uptake at the medial right acetabular roof but the biopsy was negative for malignancy. Discussed with radiology, they recommended adjuvant chemotherapy and then they will reassess later for  radiation therapy.     Chief Complaint: Back Pain (Pt reports consistent pain in upper back are on left side. Pain also states pain in returning in right leg.)      Interval History:   She returns to clinic today for a three-week follow-up and for treatment clearance for cycle 4 of Keytruda.  She reports left upper back pain. She had PET/CT on 10/6/23.  It shows progression of her disease.  This was discussed with the patient and her daughter.  Explained to the patient her daughter that the cancer has spread, in other words metastasized, therefore, is not curable but can be treated.  Palliative treatment may include chemotherapy, immunotherapy, radiation therapy, etc and is focused in help to shrink the cancer, improve symptoms caused by the cancer and prolong life.     Her appetite has improved with the addition of Megace, but she continues to lose weight. Shortness of breath is stable and not worsening. Constipation continues to be stable with the addition of lactulose as needed. She denies any palpitations, chest pain, fever, chills, nausea, vomiting, diarrhea, recent hospitalizations or infections. She has initial appointment with Dr. Pelayo, endocrinologist on 10/17/23. She follows up with rad/onc on 1018/23.      Past Medical History:   Diagnosis Date    ASTHMA     COPD (chronic obstructive pulmonary disease)     SEVERE    High cholesterol     HLD (hyperlipidemia)     HTN (hypertension)     Lung cancer 12/08/2022    invasive adenocarcinoma, poorly differentiated with solid morphology, focal tumor involvement of visceral pleura and focal tumor vascular invasion (vein), with 2 of 3 posterior mediastinal lymph nodes positive for metastatic carcinoma    Stage 2 chronic kidney disease     TIA (transient ischemic attack)     Tobacco abuse     Unspecified osteoarthritis, unspecified site     Uterine cancer       Past Surgical History:   Procedure Laterality Date    HYSTERECTOMY  Unknowing    lipoma multiple sites       LUNG LOBECTOMY Right 2022    Procedure: LOBECTOMY, LUNG;  Surgeon: Jass Browne IV, MD;  Location: [facility] OR;  Service: Cardiothoracic;  Laterality: Right;    SURGICAL REMOVAL OF LYMPH NODE  2022    Procedure: EXCISION, LYMPH NODE;  Surgeon: Jass Browne IV, MD;  Location: [facility] OR;  Service: Cardiothoracic;;    THORACOTOMY Right 2022    Procedure: THORACOTOMY;  Surgeon: Jass Browne IV, MD;  Location: [facility];  Service: Cardiothoracic;  Laterality: Right;  Right Lower Lobectomy with Lymph Node Dissection    TOTAL ABDOMINAL HYSTERECTOMY W/ BILATERAL SALPINGOOPHORECTOMY       Social History     Socioeconomic History    Marital status: Single    Number of children: 5   Tobacco Use    Smoking status: Former     Current packs/day: 0.00     Average packs/day: 0.5 packs/day for 50.0 years (25.0 ttl pk-yrs)     Types: Cigarettes     Start date: 1972     Quit date: 2022     Years since quittin.8    Smokeless tobacco: Never   Substance and Sexual Activity    Alcohol use: Not Currently     Comment: quit 25years ago    Drug use: Never    Sexual activity: Yes     Partners: Male     Social Determinants of Health     Financial Resource Strain: Low Risk  (2023)    Overall Financial Resource Strain (CARDIA)     Difficulty of Paying Living Expenses: Not very hard   Food Insecurity: No Food Insecurity (2023)    Hunger Vital Sign     Worried About Running Out of Food in the Last Year: Never true     Ran Out of Food in the Last Year: Never true   Transportation Needs: No Transportation Needs (2023)    PRAPARE - Transportation     Lack of Transportation (Medical): No     Lack of Transportation (Non-Medical): No   Physical Activity: Inactive (2023)    Exercise Vital Sign     Days of Exercise per Week: 0 days     Minutes of Exercise per Session: 0 min   Stress: No Stress Concern Present (2023)    [employer] - Occupational Stress Questionnaire      Feeling of Stress : Only a little   Social Connections: Socially Integrated (9/22/2023)    Social Connection and Isolation Panel [NHANES]     Frequency of Communication with Friends and Family: More than three times a week     Frequency of Social Gatherings with Friends and Family: Three times a week     Attends Protestant Services: More than 4 times per year     Active Member of Clubs or Organizations: Yes     Attends Club or Organization Meetings: 1 to 4 times per year     Marital Status: Living with partner   Housing Stability: Low Risk  (9/22/2023)    Housing Stability Vital Sign     Unable to Pay for Housing in the Last Year: No     Number of Places Lived in the Last Year: 1     Unstable Housing in the Last Year: No      Family History   Problem Relation Age of Onset    Hypertension Mother     Hepatitis Mother     Ovarian cancer Mother     Liver cancer Mother     Heart failure Father     Prostate cancer Father     Fibroids Sister     Asthma Brother     Asthma Brother     Cancer Maternal Grandfather       Review of patient's allergies indicates:  No Known Allergies     Review of Systems   Constitutional:  Positive for fatigue. Negative for activity change, appetite change, chills, fever and unexpected weight change.   HENT: Negative.  Negative for mouth dryness, mouth sores, nosebleeds, sore throat and trouble swallowing.    Eyes: Negative.  Negative for visual disturbance.   Respiratory:  Positive for shortness of breath (on exertion, chronic). Negative for cough.    Cardiovascular:  Negative for chest pain, palpitations and leg swelling.   Gastrointestinal:  Negative for abdominal distention, abdominal pain, blood in stool, change in bowel habit, constipation, diarrhea, nausea and vomiting.   Endocrine: Negative.    Genitourinary: Negative.  Negative for dysuria, frequency, hematuria and urgency.   Musculoskeletal:  Negative for arthralgias, back pain, leg pain, myalgias and neck pain.   Integumentary:   Negative for rash, breast mass, breast discharge and breast tenderness. Negative.   Allergic/Immunologic: Negative.    Neurological:  Negative for dizziness, tremors, syncope, speech difficulty, weakness, light-headedness, numbness, headaches and memory loss.   Hematological: Negative.  Does not bruise/bleed easily.   Psychiatric/Behavioral: Negative.  Negative for confusion and suicidal ideas.    Breast: Negative for mass and tenderness        Objective:        Vitals:    10/10/23 1017   BP: 103/71   Pulse: 72   Resp: 18   Temp: 98 °F (36.7 °C)           Wt Readings from Last 6 Encounters:   10/10/23 65.1 kg (143 lb 9.6 oz)   09/19/23 64 kg (141 lb 3.2 oz)   09/12/23 64 kg (141 lb 3.2 oz)   08/29/23 65 kg (143 lb 6.4 oz)   08/29/23 65 kg (143 lb 6.4 oz)   08/15/23 65.3 kg (144 lb)     Body mass index is 22.49 kg/m².  Body surface area is 1.75 meters squared.       Physical Exam  Vitals and nursing note reviewed.   Constitutional:       Appearance: Normal appearance.   HENT:      Head: Normocephalic and atraumatic.   Eyes:      General: No scleral icterus.     Extraocular Movements: Extraocular movements intact.      Conjunctiva/sclera: Conjunctivae normal.   Neck:      Vascular: No JVD.   Cardiovascular:      Rate and Rhythm: Normal rate and regular rhythm.      Heart sounds: No murmur heard.  Pulmonary:      Effort: Pulmonary effort is normal.      Breath sounds: Decreased breath sounds present. No wheezing or rhonchi.   Abdominal:      General: Bowel sounds are normal. There is no distension.      Palpations: Abdomen is soft.      Tenderness: There is no abdominal tenderness.   Musculoskeletal:      Cervical back: Neck supple.   Lymphadenopathy:      Head:      Right side of head: No submental or submandibular adenopathy.      Left side of head: No submental or submandibular adenopathy.      Cervical: No cervical adenopathy.      Upper Body:      Right upper body: No supraclavicular or axillary adenopathy.       Left upper body: No supraclavicular or axillary adenopathy.      Lower Body: No right inguinal adenopathy. No left inguinal adenopathy.      Comments: No adenopathy noted bilaterally   Skin:     General: Skin is warm.      Coloration: Skin is not jaundiced.      Findings: No lesion or rash.   Neurological:      General: No focal deficit present.      Mental Status: She is alert and oriented to person, place, and time.      Cranial Nerves: Cranial nerves 2-12 are intact.      Gait: Gait normal.   Psychiatric:         Attention and Perception: Attention normal.         Speech: Speech normal.         Behavior: Behavior is cooperative.         Cognition and Memory: Cognition normal.         Judgment: Judgment normal.       LABS      Lab Visit on 10/10/2023   Component Date Value    Sodium Level 10/10/2023 140     Potassium Level 10/10/2023 3.5     Chloride 10/10/2023 108 (H)     Carbon Dioxide 10/10/2023 25     Glucose Level 10/10/2023 98     Blood Urea Nitrogen 10/10/2023 12.0     Creatinine 10/10/2023 0.86     Calcium Level Total 10/10/2023 9.5     Protein Total 10/10/2023 7.7 (H)     Albumin Level 10/10/2023 3.2 (L)     Globulin 10/10/2023 4.5 (H)     Albumin/Globulin Ratio 10/10/2023 0.7 (L)     Bilirubin Total 10/10/2023 0.3     Alkaline Phosphatase 10/10/2023 90     Alanine Aminotransferase 10/10/2023 14     Aspartate Aminotransfera* 10/10/2023 17     eGFR 10/10/2023 >60     Magnesium Level 10/10/2023 2.10     Phosphorus Level 10/10/2023 2.0 (L)     Thyroxine Free 10/10/2023 0.67 (L)     TSH 10/10/2023 12.151 (H)     WBC 10/10/2023 5.65     RBC 10/10/2023 4.56     Hgb 10/10/2023 11.9 (L)     Hct 10/10/2023 37.8     MCV 10/10/2023 82.9     MCH 10/10/2023 26.1 (L)     MCHC 10/10/2023 31.5 (L)     RDW 10/10/2023 15.3     Platelet 10/10/2023 401 (H)     MPV 10/10/2023 9.1     Neut % 10/10/2023 70.2     Lymph % 10/10/2023 18.8     Mono % 10/10/2023 7.3     Eos % 10/10/2023 2.8     Basophil % 10/10/2023 0.5     Lymph #  10/10/2023 1.06     Neut # 10/10/2023 3.97     Mono # 10/10/2023 0.41     Eos # 10/10/2023 0.16     Baso # 10/10/2023 0.03     IG# 10/10/2023 0.02     IG% 10/10/2023 0.4          Assessment:   ECOG Performance status: 2 - 3.     No diagnosis found.    Stage IV NSCLC with bone mets.    Originally: NSCLC / Adenocarcinoma Stage IIIA pT2a pN2  - 12/08/2022: Right lower and middle bilobectomy with mediastinal lymph node dissection. Path: Invasive adenocarcinoma, poorly differentiated, focal tumor involvement of visceral pleura and focal tumor vascular invasion (vein), with mediastinal lymph nodes positive for metastatic carcinoma   - TMB - 5, MSI-S, MET amplification, ROS1.  No mutations in EGFR, BRAF, ALK, ERBB2, KRAS, RET,    - 1% tumor cells are positive for PD-L1   - patient evaluated by Radiation Oncology with recommendation to treat with chemotherapy 1st and will re-evaluate the patient by the end of chemotherapy for radiation therapy  - 01/12/2023 PET: Focal uptake at the medial right acetabular roof. Metastasis is a consideration  - 02/22/2023: RIGHT ACETABULAR LESION, CT - GUIDE CORE NEEDLE BIOPSY : FIBRINOPURULENT EXUDATE ADMIXED WITH ATYPICAL EPITHELIOID GROUPS,   NORMAL    MARROW ELEMENTS AND ABUNDANT BLOOD CLOT  PET scan showed a suspicious finding in the right acetabular roof and patient then had a CT-guided biopsy no evidence of malignancy. Started with adjuvant chemotherapy with Carboplatin and pemetrexed x 4 cycles (3/15/23-5/18/23)   Patient has been seen by Rad/Onc (Dr Mireles) since the completion of her chemotherapy and plan was to pursue radiation x 5-6 weeks.  She discussed with the patient that they have showed no overall survival benefit in the setting of postoperative radiation therapy but data supports a larger benefit inpatient with extracapsular extension.  If she tolerated chemotherapy well and continue with good performance status then plan was for PORT   Restaging PET-CT after completion  of chemotherapy and prior to radiation therapy showed progression at the right acetabulum.  Patient with progression of disease now with bone metastases.  She bagan palliative radiation treatment with Dr. Mireles on 6/28/23, 10 treatments over two weeks.  PET CT 6/5/23 with progression Right superior acetabulum.  FDG avid area appears slightly larger with slightly increased sclerosis.  SUV is 9.7, previously 6.4.  She had palliative radiation. Started on Keytruda 7/18/23.  Bone mets   Xgeva q4w  Weakness/hypophosphatemia  Pharmacy does not have phosnak packet.  Prescription sent in K-phos-neutral BID x 30 days  Iron deficiency Anemia  Start 1 tab PO iron QOD with Vitamin C.   Low TSH/elevated PTH  Refer to endocrinology to rule out graves vs autoimmune thyroiditis   Will order TPOab and thyroid hormone receptor ab today and schedule for a thyroid uptake scan.                   Plan:   - Stop Keytruda r/t disease progression  - Plan for Taxotere q 3 weeks   - Will need chemo education and dietary counseling    - Keep Endo appt 10/17- Dr. Pelayo  - Keep rad/onc appt 10/18  - Will order Synthroid 50 mcg daily r/t hypothyroidism   - Continue with the Xgeva q4w   - Continue oral iron every other day  - RTC in 2 with MD with labs/treat   - cbc, cmp, mag, phos, tsh- 1 hr prior @ [facility]    The patient was seen, interviewed and examined. Pertinent lab and radiology studies were reviewed.   The patient was given ample opportunity to ask questions, and to the best of my abilities, all questions answered to satisfaction; patient demonstrated understanding of what we discussed and agreeable to the plan. Pt instructed to call should develop concerning signs/symptoms or have further questions.     Phoebe Gomez MD  Hematology/Oncology  [facility]           Professional Services   I, Clau Hodges LPN, acted solely as a scribe for and in the presence of Dr. Phoebe Gomez, who performed these services.

## 2023-10-09 NOTE — PROGRESS NOTES
Patient has an appointment schedule with Dr. Pierce Parker (Endocrinologist) for Oct 17, at 0900.

## 2023-10-10 ENCOUNTER — OFFICE VISIT (OUTPATIENT)
Dept: HEMATOLOGY/ONCOLOGY | Facility: CLINIC | Age: 63
End: 2023-10-10
Payer: MEDICARE

## 2023-10-10 ENCOUNTER — INFUSION (OUTPATIENT)
Dept: INFUSION THERAPY | Facility: HOSPITAL | Age: 63
End: 2023-10-10
Attending: NURSE PRACTITIONER
Payer: MEDICARE

## 2023-10-10 VITALS
DIASTOLIC BLOOD PRESSURE: 71 MMHG | HEIGHT: 67 IN | BODY MASS INDEX: 22.54 KG/M2 | HEART RATE: 72 BPM | SYSTOLIC BLOOD PRESSURE: 103 MMHG | RESPIRATION RATE: 18 BRPM | TEMPERATURE: 98 F | WEIGHT: 143.63 LBS | OXYGEN SATURATION: 98 %

## 2023-10-10 VITALS
OXYGEN SATURATION: 98 % | HEIGHT: 67 IN | RESPIRATION RATE: 18 BRPM | WEIGHT: 143.63 LBS | BODY MASS INDEX: 22.54 KG/M2 | TEMPERATURE: 98 F | HEART RATE: 72 BPM | DIASTOLIC BLOOD PRESSURE: 71 MMHG | SYSTOLIC BLOOD PRESSURE: 103 MMHG

## 2023-10-10 DIAGNOSIS — C34.91 NSCLC OF RIGHT LUNG: Primary | ICD-10-CM

## 2023-10-10 DIAGNOSIS — C79.51 METASTASIS TO BONE: ICD-10-CM

## 2023-10-10 DIAGNOSIS — R79.89 LOW TSH LEVEL: Primary | ICD-10-CM

## 2023-10-10 DIAGNOSIS — Z51.12 MAINTENANCE ANTINEOPLASTIC IMMUNOTHERAPY: ICD-10-CM

## 2023-10-10 PROCEDURE — 3288F FALL RISK ASSESSMENT DOCD: CPT | Mod: CPTII,S$GLB,, | Performed by: INTERNAL MEDICINE

## 2023-10-10 PROCEDURE — 3078F PR MOST RECENT DIASTOLIC BLOOD PRESSURE < 80 MM HG: ICD-10-PCS | Mod: CPTII,S$GLB,, | Performed by: INTERNAL MEDICINE

## 2023-10-10 PROCEDURE — 1160F PR REVIEW ALL MEDS BY PRESCRIBER/CLIN PHARMACIST DOCUMENTED: ICD-10-PCS | Mod: CPTII,S$GLB,, | Performed by: INTERNAL MEDICINE

## 2023-10-10 PROCEDURE — 1159F MED LIST DOCD IN RCRD: CPT | Mod: CPTII,S$GLB,, | Performed by: INTERNAL MEDICINE

## 2023-10-10 PROCEDURE — 99999 PR PBB SHADOW E&M-EST. PATIENT-LVL V: CPT | Mod: PBBFAC,,, | Performed by: INTERNAL MEDICINE

## 2023-10-10 PROCEDURE — 1101F PT FALLS ASSESS-DOCD LE1/YR: CPT | Mod: CPTII,S$GLB,, | Performed by: INTERNAL MEDICINE

## 2023-10-10 PROCEDURE — 63600175 PHARM REV CODE 636 W HCPCS: Mod: JZ,JG | Performed by: INTERNAL MEDICINE

## 2023-10-10 PROCEDURE — 3008F PR BODY MASS INDEX (BMI) DOCUMENTED: ICD-10-PCS | Mod: CPTII,S$GLB,, | Performed by: INTERNAL MEDICINE

## 2023-10-10 PROCEDURE — 1159F PR MEDICATION LIST DOCUMENTED IN MEDICAL RECORD: ICD-10-PCS | Mod: CPTII,S$GLB,, | Performed by: INTERNAL MEDICINE

## 2023-10-10 PROCEDURE — 1160F RVW MEDS BY RX/DR IN RCRD: CPT | Mod: CPTII,S$GLB,, | Performed by: INTERNAL MEDICINE

## 2023-10-10 PROCEDURE — 3078F DIAST BP <80 MM HG: CPT | Mod: CPTII,S$GLB,, | Performed by: INTERNAL MEDICINE

## 2023-10-10 PROCEDURE — 3008F BODY MASS INDEX DOCD: CPT | Mod: CPTII,S$GLB,, | Performed by: INTERNAL MEDICINE

## 2023-10-10 PROCEDURE — 3066F PR DOCUMENTATION OF TREATMENT FOR NEPHROPATHY: ICD-10-PCS | Mod: CPTII,S$GLB,, | Performed by: INTERNAL MEDICINE

## 2023-10-10 PROCEDURE — 96372 THER/PROPH/DIAG INJ SC/IM: CPT

## 2023-10-10 PROCEDURE — 1101F PR PT FALLS ASSESS DOC 0-1 FALLS W/OUT INJ PAST YR: ICD-10-PCS | Mod: CPTII,S$GLB,, | Performed by: INTERNAL MEDICINE

## 2023-10-10 PROCEDURE — 99215 PR OFFICE/OUTPT VISIT, EST, LEVL V, 40-54 MIN: ICD-10-PCS | Mod: S$GLB,,, | Performed by: INTERNAL MEDICINE

## 2023-10-10 PROCEDURE — 99999 PR PBB SHADOW E&M-EST. PATIENT-LVL V: ICD-10-PCS | Mod: PBBFAC,,, | Performed by: INTERNAL MEDICINE

## 2023-10-10 PROCEDURE — 99215 OFFICE O/P EST HI 40 MIN: CPT | Mod: S$GLB,,, | Performed by: INTERNAL MEDICINE

## 2023-10-10 PROCEDURE — 3074F PR MOST RECENT SYSTOLIC BLOOD PRESSURE < 130 MM HG: ICD-10-PCS | Mod: CPTII,S$GLB,, | Performed by: INTERNAL MEDICINE

## 2023-10-10 PROCEDURE — 3066F NEPHROPATHY DOC TX: CPT | Mod: CPTII,S$GLB,, | Performed by: INTERNAL MEDICINE

## 2023-10-10 PROCEDURE — 3074F SYST BP LT 130 MM HG: CPT | Mod: CPTII,S$GLB,, | Performed by: INTERNAL MEDICINE

## 2023-10-10 PROCEDURE — 3060F PR POS MICROALBUMINURIA RESULT DOCUMENTED/REVIEW: ICD-10-PCS | Mod: CPTII,S$GLB,, | Performed by: INTERNAL MEDICINE

## 2023-10-10 PROCEDURE — 3060F POS MICROALBUMINURIA REV: CPT | Mod: CPTII,S$GLB,, | Performed by: INTERNAL MEDICINE

## 2023-10-10 PROCEDURE — 3288F PR FALLS RISK ASSESSMENT DOCUMENTED: ICD-10-PCS | Mod: CPTII,S$GLB,, | Performed by: INTERNAL MEDICINE

## 2023-10-10 RX ORDER — HEPARIN 100 UNIT/ML
500 SYRINGE INTRAVENOUS
Status: CANCELLED | OUTPATIENT
Start: 2023-10-24

## 2023-10-10 RX ORDER — SODIUM CHLORIDE 0.9 % (FLUSH) 0.9 %
10 SYRINGE (ML) INJECTION
Status: CANCELLED | OUTPATIENT
Start: 2023-10-24

## 2023-10-10 RX ORDER — LEVOTHYROXINE SODIUM 50 UG/1
50 TABLET ORAL
Qty: 30 TABLET | Refills: 2 | Status: SHIPPED | OUTPATIENT
Start: 2023-10-10 | End: 2023-10-30

## 2023-10-10 RX ORDER — DIPHENHYDRAMINE HYDROCHLORIDE 50 MG/ML
25 INJECTION INTRAMUSCULAR; INTRAVENOUS
Status: CANCELLED
Start: 2023-10-24

## 2023-10-10 RX ORDER — ONDANSETRON 2 MG/ML
8 INJECTION INTRAMUSCULAR; INTRAVENOUS ONCE
Status: CANCELLED
Start: 2023-10-24 | End: 2023-10-24

## 2023-10-10 RX ADMIN — DENOSUMAB 120 MG: 120 INJECTION SUBCUTANEOUS at 11:10

## 2023-10-11 DIAGNOSIS — C79.51 METASTASIS TO BONE: ICD-10-CM

## 2023-10-11 DIAGNOSIS — E07.9 DISORDER OF THYROID, UNSPECIFIED: ICD-10-CM

## 2023-10-11 DIAGNOSIS — C34.91 NSCLC OF RIGHT LUNG: Primary | ICD-10-CM

## 2023-10-18 DIAGNOSIS — C79.51 METASTASIS TO BONE: ICD-10-CM

## 2023-10-18 DIAGNOSIS — C34.91 NSCLC OF RIGHT LUNG: Primary | ICD-10-CM

## 2023-10-18 RX ORDER — HYDROCODONE BITARTRATE AND ACETAMINOPHEN 5; 325 MG/1; MG/1
1 TABLET ORAL EVERY 6 HOURS PRN
Qty: 60 TABLET | Refills: 0 | Status: ON HOLD | OUTPATIENT
Start: 2023-10-18 | End: 2023-12-04

## 2023-10-19 ENCOUNTER — OFFICE VISIT (OUTPATIENT)
Dept: HEMATOLOGY/ONCOLOGY | Facility: CLINIC | Age: 63
End: 2023-10-19
Payer: MEDICARE

## 2023-10-19 ENCOUNTER — CLINICAL SUPPORT (OUTPATIENT)
Dept: HEMATOLOGY/ONCOLOGY | Facility: CLINIC | Age: 63
End: 2023-10-19
Payer: MEDICARE

## 2023-10-19 VITALS
OXYGEN SATURATION: 98 % | HEART RATE: 109 BPM | BODY MASS INDEX: 22.29 KG/M2 | WEIGHT: 142 LBS | TEMPERATURE: 98 F | DIASTOLIC BLOOD PRESSURE: 78 MMHG | SYSTOLIC BLOOD PRESSURE: 116 MMHG | HEIGHT: 67 IN

## 2023-10-19 DIAGNOSIS — C34.91 NSCLC OF RIGHT LUNG: Primary | ICD-10-CM

## 2023-10-19 PROCEDURE — 3288F PR FALLS RISK ASSESSMENT DOCUMENTED: ICD-10-PCS | Mod: CPTII,S$GLB,,

## 2023-10-19 PROCEDURE — 3074F SYST BP LT 130 MM HG: CPT | Mod: CPTII,S$GLB,,

## 2023-10-19 PROCEDURE — 99999 PR PBB SHADOW E&M-EST. PATIENT-LVL V: CPT | Mod: PBBFAC,,,

## 2023-10-19 PROCEDURE — 3078F PR MOST RECENT DIASTOLIC BLOOD PRESSURE < 80 MM HG: ICD-10-PCS | Mod: CPTII,S$GLB,,

## 2023-10-19 PROCEDURE — 1159F MED LIST DOCD IN RCRD: CPT | Mod: CPTII,S$GLB,,

## 2023-10-19 PROCEDURE — 3288F FALL RISK ASSESSMENT DOCD: CPT | Mod: CPTII,S$GLB,,

## 2023-10-19 PROCEDURE — 1101F PT FALLS ASSESS-DOCD LE1/YR: CPT | Mod: CPTII,S$GLB,,

## 2023-10-19 PROCEDURE — 99215 OFFICE O/P EST HI 40 MIN: CPT | Mod: S$GLB,,,

## 2023-10-19 PROCEDURE — 1159F PR MEDICATION LIST DOCUMENTED IN MEDICAL RECORD: ICD-10-PCS | Mod: CPTII,S$GLB,,

## 2023-10-19 PROCEDURE — 3066F NEPHROPATHY DOC TX: CPT | Mod: CPTII,S$GLB,,

## 2023-10-19 PROCEDURE — 3066F PR DOCUMENTATION OF TREATMENT FOR NEPHROPATHY: ICD-10-PCS | Mod: CPTII,S$GLB,,

## 2023-10-19 PROCEDURE — 3008F BODY MASS INDEX DOCD: CPT | Mod: CPTII,S$GLB,,

## 2023-10-19 PROCEDURE — 99999 PR PBB SHADOW E&M-EST. PATIENT-LVL V: ICD-10-PCS | Mod: PBBFAC,,,

## 2023-10-19 PROCEDURE — 3008F PR BODY MASS INDEX (BMI) DOCUMENTED: ICD-10-PCS | Mod: CPTII,S$GLB,,

## 2023-10-19 PROCEDURE — 1101F PR PT FALLS ASSESS DOC 0-1 FALLS W/OUT INJ PAST YR: ICD-10-PCS | Mod: CPTII,S$GLB,,

## 2023-10-19 PROCEDURE — 3078F DIAST BP <80 MM HG: CPT | Mod: CPTII,S$GLB,,

## 2023-10-19 PROCEDURE — 3074F PR MOST RECENT SYSTOLIC BLOOD PRESSURE < 130 MM HG: ICD-10-PCS | Mod: CPTII,S$GLB,,

## 2023-10-19 PROCEDURE — 99215 PR OFFICE/OUTPT VISIT, EST, LEVL V, 40-54 MIN: ICD-10-PCS | Mod: S$GLB,,,

## 2023-10-19 PROCEDURE — 3060F POS MICROALBUMINURIA REV: CPT | Mod: CPTII,S$GLB,,

## 2023-10-19 PROCEDURE — 3060F PR POS MICROALBUMINURIA RESULT DOCUMENTED/REVIEW: ICD-10-PCS | Mod: CPTII,S$GLB,,

## 2023-10-19 NOTE — LETTER
October 19, 2023    Mansi Jin  [address] [address]             [facility]  Hematology and Oncology  [address], [address]  [place] [address]  Phone: [phone]   October 19, 2023     Patient: Mansi Jin   YOB: 1960   Date of Visit: 10/19/2023       To Whom it May Concern:    Mansi Jin was seen in my clinic on 10/19/2023.    Please excuse her daughter Apoorva Hernandez from work missed on 10/19/23.    If you have any questions or concerns, please don't hesitate to call.    Sincerely,         Shilpi Stewart, PAULINE

## 2023-10-19 NOTE — PROGRESS NOTES
Oncology Nutrition Evaluation      Mansi Jin   1960    Oncology Provider:   Phoebe Gomez MD    Reason for Visit:  Change in Treatment Education    Oncology/Hematology Diagnosis:   Stage IV NSCLC with bone mets    Treatment Plan:  Taxotere q 3 weeks     Nutrition Recommendations:  1. Regular diet as tolerated  2. Megace as rx    Nutrition Assessment    10/19/23: This is a 63 y.o.female with a medical diagnosis of stg IV NSCLC. She is here today for change in treatment education. She reports good appetite since starting Megace recently. Her wt has been stable for the past two months. She did have ~10# wt loss earlier this year. Currently no c/o n/v/c/d and she is tolerating a regular diet.    Nutrition Factors Affecting Intake  none identified    PMHx: COPD, HLD, high chol, HTN, TIA    Allergies: Patient has no known allergies.    Current Medications:    Current Outpatient Medications:     acetaminophen (TYLENOL) 500 MG tablet, Take 1,000 mg by mouth every 6 (six) hours as needed., Disp: , Rfl:     ascorbic acid, vitamin C, (VITAMIN C) 500 MG tablet, Take 500 mg by mouth once daily., Disp: , Rfl:     aspirin (ECOTRIN) 81 MG EC tablet, Take 1 tablet (81 mg total) by mouth once daily., Disp: 90 tablet, Rfl: 2    atorvastatin (LIPITOR) 40 MG tablet, Take 1 tablet (40 mg total) by mouth every evening., Disp: 90 tablet, Rfl: 2    azithromycin (Z-JACEY) 250 MG tablet, Take 1 tablet (250 mg total) by mouth once daily. Take first 2 tablets together, then 1 every day until finished., Disp: 6 tablet, Rfl: 0    benzonatate (TESSALON) 200 MG capsule, TAKE 1 CAPSULE(200 MG) BY MOUTH THREE TIMES DAILY AS NEEDED FOR COUGH, Disp: 90 capsule, Rfl: 1    dexAMETHasone (DECADRON) 4 MG Tab, Take 2 tablets (8 mg total) by mouth once daily. Take 8mg on the day prior to chemotherapy and on days 2, 3, and 4 following chemotherapy, Disp: 8 tablet, Rfl: 4    docusate sodium (COLACE) 50 MG capsule, Take by mouth Daily., Disp: ,  Rfl:     famotidine (PEPCID) 40 MG tablet, TAKE 1 TABLET(40 MG) BY MOUTH EVERY EVENING (Patient taking differently: Take 20 mg by mouth as needed.), Disp: 90 tablet, Rfl: 1    fluticasone propionate (FLONASE) 50 mcg/actuation nasal spray, 1 spray (50 mcg total) by Each Nostril route once daily., Disp: 15.8 mL, Rfl: 2    fluticasone-umeclidin-vilanter (TRELEGY ELLIPTA) 100-62.5-25 mcg DsDv, Inhale 1 puff into the lungs once daily., Disp: 60 each, Rfl: 5    HYDROcodone-acetaminophen (NORCO) 5-325 mg per tablet, Take 1 tablet by mouth every 6 (six) hours as needed for Pain., Disp: 60 tablet, Rfl: 0    ibuprofen (ADVIL,MOTRIN) 200 MG tablet, Take 400 mg by mouth every 6 (six) hours as needed., Disp: , Rfl:     ipratropium/albuterol sulfate (COMBIVENT INHL), Inhale into the lungs as needed., Disp: , Rfl:     iron polysac-iron heme polypep (FEOSOL BIFERA) 28 mg Tab, Take 1 tablet by mouth once daily., Disp: , Rfl:     k phos di & mono-sod phos mono (K-PHOS-NEUTRAL) 250 mg Tab, Take 1 tablet by mouth 2 (two) times a day., Disp: 60 tablet, Rfl: 0    lactulose (CHRONULAC) 20 gram/30 mL Soln, Take 30 mLs (20 g total) by mouth 2 (two) times daily as needed (constipation)., Disp: 240 mL, Rfl: 2    levothyroxine (SYNTHROID) 50 MCG tablet, Take 1 tablet (50 mcg total) by mouth before breakfast., Disp: 30 tablet, Rfl: 2    loratadine (CLARITIN) 10 mg tablet, Take 1 tablet (10 mg total) by mouth once daily., Disp: 90 tablet, Rfl: 2    megestroL (MEGACE) 40 MG Tab, Take 1 tablet (40 mg total) by mouth 4 (four) times daily For low appetite.., Disp: 120 tablet, Rfl: 2    mirtazapine (REMERON) 7.5 MG Tab, Take 1 tablet (7.5 mg total) by mouth nightly., Disp: 30 tablet, Rfl: 3    Current Facility-Administered Medications:     alteplase injection 2 mg, 2 mg, Intra-Catheter, PRN, Phoebe Gomez MD    alteplase injection 2 mg, 2 mg, Intra-Catheter, PRN, Shelbi Johansen, DOUGIEP    alteplase injection 2 mg, 2 mg, Intra-Catheter, PRN,  "Phoebe Gomez MD    heparin, porcine (PF) 100 unit/mL injection flush 500 Units, 500 Units, Intravenous, PRN, Phoebe Gomez MD, 500 Units at 04/27/23 1039    heparin, porcine (PF) 100 unit/mL injection flush 500 Units, 500 Units, Intravenous, PRN, Shelbi Johansen, FNP    heparin, porcine (PF) 100 unit/mL injection flush 500 Units, 500 Units, Intravenous, FOX, Phoebe Gomez MD    sodium chloride 0.9% 250 mL flush bag, , Intravenous, 1 time in Clinic/HOD, Phoebe Gomez MD    sodium chloride 0.9% flush 10 mL, 10 mL, Intravenous, PRN, Phoebe Gomez MD    sodium chloride 0.9% flush 10 mL, 10 mL, Intravenous, PRN, Shelbi Johansen, FNP    sodium chloride 0.9% flush 10 mL, 10 mL, Intravenous, PRN, Phoebe Gomez MD    Labs: no new    Anthropometrics    Height:   Ht Readings from Last 1 Encounters:   10/19/23 5' 7" (1.702 m)      Weight:   Wt Readings from Last 1 Encounters:   10/19/23 64.4 kg (142 lb)        Usual Body Weight: 64.4 kg (142 lb)  % Weight Change: 0    BMI: 22.2 (normal)    Ideal Weight: 61.3 kg (135 lb)  % Ideal Weight: 105%      Nutrition Diagnosis    No nutrition diagnosis at this time.    Nutrition Risk  low    Nutrition Intervention    Interventions(treatment strategy):  general/healthful diet      Nutrition Monitoring and Evaluation    Ongoing monitoring not warranted at this time. Please consult [facility] walln.        Sarah Willett MS, RD, , LDN                                                                                                                                                                                                                                                                                  "

## 2023-10-19 NOTE — PROGRESS NOTES
THERAPY EDUCATION: DOCETAXEL     ONCOLOGICAL HISTORY:     Diagnosis:  - Stage IV NSCLC with bone mets.  - NSCLC / Adenocarcinoma Stage IIIA pT2a pN2--dx 12/2022  - PD-L1 1%  - History Uterine cancer - Dx 1989    Current Treatment: Docetaxel to start on 10/24/23      Treatment History:  - 1989 s/p total hysterectomy   - 12/08/2022: Right lower and middle bilobectomy with mediastinal lymph node dissection.   - Carboplatin + Pemetrexed  3/15/2023-5/1/2023  - Palliative RT   - Carboplatin + Pemetrexed started 3/15/2023  - Keytruda stopped 9/19/23 r/t elevated TSH and PET/CT progression, last dose given 8/29/23      IMAGING:   - 08/10/2022 PET: The elongated subpleural right lower lobe nodule demonstrates fairly intense FDG activity.  Infectious, inflammatory and neoplastic etiologies remain possible. No suspicious PET findings elsewhere.  - 12/08/2022 CT HEAD without contrast: No acute intracranial abnormality identified.  Findings of mild microvascular ischemic disease.  - 12/2022: U/S Upper extremity: Thrombophlebitis of the left cephalic vein. No deep venous thrombosis in the left upper extremity.  - 01/03/2023 MRI Brain : GARY   - 01/12/2023 NM PET CT 1. Postsurgical changes of recent right lower and middle lobectomies.  There is a small right pleural effusion.  There is nonspecific peripheral uptake along the pleura at the right lung base and perihilar region which may be related to healing response in the setting of recent surgery.  Similarly borderline mediastinal uptake is nonspecific in the recent postoperative setting and could be reactive.  Continued attention recommended on follow-up. 2. Focal Uptake at the medial right acetabular roof has a max SUV of 6.4. This is new compared to previous exam. Metastasis is a consideration  -PET 6/5/2023: Previously visualized moderately FDG avid opacities towards the right lung base improved.  Pleural effusion also improved.  No new suspicious PET findings in the lungs.   Severe emphysema. Some left adrenal thickening is similar to prior but there is now moderate associated FDG activity, max SUV is 3.6. FDG activity is again seen associated with the right superior acetabulum.  FDG avid area appears slightly larger today with slightly increased sclerosis.  SUV is 9.7, previously 6.4.  There is a newly evident subcentimeter FDG avid lesion left pedicle T10. Additional subcentimeter FDG avid lesion T4 vertebral body SUV 3.4.suspicious for metastatic disease.  -MRI T spine 6/15/2023:   1. Foci of abnormal signal intensity and enhancement at the inferior endplate of T3 and at body and left pedicle of T10.  Metastatic neoplastic etiology must be considered.  2. Thoracic spondylosis  3. Mild encroachment into the right neural foramina at the T3-4 and T4-5 secondary to mild posterior disc bulge and posterior osteophyte formation  4. Multilevel mild posterior disc bulge which does not result in significant central spinal stenosis.  9/27/23 Thyroid U/S:  Very minimal to no significant uptake of radiotracer isotope within the thyroid lobes bilaterally.  Clinical correlation is indicated  CT CAP 9/27/23:  1. Interim increasing nodular soft tissue densities/nodes/mass is at the mediastinum and right hilum measuring up the 3.0 cm.  A neoplastic etiology must be considered  2. Interim development of a 2.5 cm low-attenuation mass at the left adrenal gland.  A metastatic etiology must be considered  3. Interim development of a sclerotic foci at T3 and T10.  A metastatic etiology must be considered  4. Small right pleural effusion with associated infiltrate and atelectasis at the right lung base suspect  5. Suspect small cyst at the left kidney as described  6. Mild ill-defined soft tissue fullness at the cervical vaginal region  7. Borderline cardiomegaly  10/6/23 PET/CT:  1. Disease progression with new FDG avid metastatic lymph nodes in the chest and right hilum.  2. Bilateral axillary and inguinal  lymph nodes with low level uptake are indeterminate but concerning for metastasis given the additional findings.  3. Worsening and new FDG avid osseous disease involving both the axial and appendicular skeleton.  4. Enlarging left adrenal metastasis.    PATHOLOGY:  - 12/08/2022: Invasive solid adenocarcinoma (3.1 CM); G3, poorly differentiated. Visceral Pleura Invasion present without definite serosal surface involvement. Lymphovascular invasion (Vein) present. All margins negative for invasive carcinoma, distance 2.5 cm. LN - 4/14 (10R: Hilar, Posterior mediastinal (RACHEAL), 4R Lower paratracheal, 9R Pulmonary ligament, 10R). pT2a pN2    - 02/22/2023: RIGHT ACETABULAR LESION, CT - GUIDE CORE NEEDLE BIOPSY : FIBRINOPURULENT EXUDATE ADMIXED WITH ATYPICAL EPITHELIOID GROUPS,   NORMAL    MARROW ELEMENTS AND ABUNDANT BLOOD CLOT      Subjective:     HPI  Mansi Jin  63 y.o.  female with past medical history significant for HTN, HLD, uterine cancer status post total hysterectomy in 1989,  CKD Stage 2, COPD Stage 3, referred for management of NSCLC s/p RLL / RML lobectomy with Mediastinal Lymph Node Dissection on 12/8/2022.     She was noted to have RLL lung mass and underwent CT guided biopsy which was non diagnostic. PET scan demonstrated significant avidity within the lesion. Given high risk for malignancy, she underwent right lower lobectomy and right middle lobectomy on 12/08/2022. Pathology came back with invasive adenocarcinoma, poorly differentiated with solid morphology, focal tumor involvement of visceral pleura and focal tumor vascular invasion (vein), with mediastinal lymph nodes positive for metastatic carcinoma.      PET on 01/12/2023 showed focal uptake at the medial right acetabular roof but the biopsy was negative for malignancy. Discussed with radiology, they recommended adjuvant chemotherapy and then they will reassess later for radiation therapy.     Chief Complaint: Therapy Education       Interval  History:   10/19/23: Ms. Jin presents to the clinic accompanied by her sister for therapy education. Patient reports no major complaints at today's visit. SOB continues to stay stable. Denies fever, chills,  N/V/D, constipation, recent infection, chest pain or unexplained bleeding or bruising.      10/10/23:She returns to clinic today for a three-week follow-up and for treatment clearance for cycle 4 of Keytruda.  She reports left upper back pain. She had PET/CT on 10/6/23.  It shows progression of her disease.  This was discussed with the patient and her daughter.  Explained to the patient her daughter that the cancer has spread, in other words metastasized, therefore, is not curable but can be treated.  Palliative treatment may include chemotherapy, immunotherapy, radiation therapy, etc and is focused in help to shrink the cancer, improve symptoms caused by the cancer and prolong life.     Her appetite has improved with the addition of Megace, but she continues to lose weight. Shortness of breath is stable and not worsening. Constipation continues to be stable with the addition of lactulose as needed. She denies any palpitations, chest pain, fever, chills, nausea, vomiting, diarrhea, recent hospitalizations or infections. She has initial appointment with Dr. Pelayo, endocrinologist on 10/17/23. She follows up with rad/onc on 10/18/23.      Past Medical History:   Diagnosis Date    ASTHMA     COPD (chronic obstructive pulmonary disease)     SEVERE    High cholesterol     HLD (hyperlipidemia)     HTN (hypertension)     Lung cancer 12/08/2022    invasive adenocarcinoma, poorly differentiated with solid morphology, focal tumor involvement of visceral pleura and focal tumor vascular invasion (vein), with 2 of 3 posterior mediastinal lymph nodes positive for metastatic carcinoma    Stage 2 chronic kidney disease     TIA (transient ischemic attack)     Tobacco abuse     Unspecified osteoarthritis, unspecified site      Uterine cancer       Past Surgical History:   Procedure Laterality Date    HYSTERECTOMY  Unknowing    lipoma multiple sites      LUNG LOBECTOMY Right 2022    Procedure: LOBECTOMY, LUNG;  Surgeon: Jass Browne IV, MD;  Location: [facility] OR;  Service: Cardiothoracic;  Laterality: Right;    SURGICAL REMOVAL OF LYMPH NODE  2022    Procedure: EXCISION, LYMPH NODE;  Surgeon: Jass Browne IV, MD;  Location: [facility] OR;  Service: Cardiothoracic;;    THORACOTOMY Right 2022    Procedure: THORACOTOMY;  Surgeon: Jass Browne IV, MD;  Location: [facility] OR;  Service: Cardiothoracic;  Laterality: Right;  Right Lower Lobectomy with Lymph Node Dissection    TOTAL ABDOMINAL HYSTERECTOMY W/ BILATERAL SALPINGOOPHORECTOMY       Social History     Socioeconomic History    Marital status: Single    Number of children: 5   Tobacco Use    Smoking status: Former     Current packs/day: 0.00     Average packs/day: 0.5 packs/day for 50.0 years (25.0 ttl pk-yrs)     Types: Cigarettes     Start date: 1972     Quit date: 2022     Years since quittin.8    Smokeless tobacco: Never   Substance and Sexual Activity    Alcohol use: Not Currently     Comment: quit 25years ago    Drug use: Never    Sexual activity: Yes     Partners: Male     Social Determinants of Health     Financial Resource Strain: Low Risk  (2023)    Overall Financial Resource Strain (CARDIA)     Difficulty of Paying Living Expenses: Not very hard   Food Insecurity: No Food Insecurity (2023)    Hunger Vital Sign     Worried About Running Out of Food in the Last Year: Never true     Ran Out of Food in the Last Year: Never true   Transportation Needs: No Transportation Needs (2023)    PRAPARE - Transportation     Lack of Transportation (Medical): No     Lack of Transportation (Non-Medical): No   Physical Activity: Inactive (2023)    Exercise Vital Sign     Days of Exercise per Week: 0 days     Minutes of Exercise per Session: 0 min    Stress: No Stress Concern Present (9/22/2023)    [employer] [place] [employer] - Occupational Stress Questionnaire     Feeling of Stress : Only a little   Social Connections: Socially Integrated (9/22/2023)    Social Connection and Isolation Panel [NHANES]     Frequency of Communication with Friends and Family: More than three times a week     Frequency of Social Gatherings with Friends and Family: Three times a week     Attends Orthodoxy Services: More than 4 times per year     Active Member of Clubs or Organizations: Yes     Attends Club or Organization Meetings: 1 to 4 times per year     Marital Status: Living with partner   Housing Stability: Low Risk  (9/22/2023)    Housing Stability Vital Sign     Unable to Pay for Housing in the Last Year: No     Number of Places Lived in the Last Year: 1     Unstable Housing in the Last Year: No      Family History   Problem Relation Age of Onset    Hypertension Mother     Hepatitis Mother     Ovarian cancer Mother     Liver cancer Mother     Heart failure Father     Prostate cancer Father     Fibroids Sister     Asthma Brother     Asthma Brother     Cancer Maternal Grandfather       Review of patient's allergies indicates:  No Known Allergies     Review of Systems   Constitutional:  Positive for fatigue. Negative for activity change, appetite change, chills, fever and unexpected weight change.   HENT: Negative.  Negative for mouth dryness, mouth sores, nosebleeds, sore throat and trouble swallowing.    Eyes: Negative.  Negative for visual disturbance.   Respiratory:  Positive for shortness of breath (on exertion, chronic). Negative for cough.    Cardiovascular:  Negative for chest pain, palpitations and leg swelling.   Gastrointestinal:  Negative for abdominal distention, abdominal pain, blood in stool, change in bowel habit, constipation, diarrhea, nausea and vomiting.   Endocrine: Negative.    Genitourinary: Negative.  Negative for dysuria, frequency,  hematuria and urgency.   Musculoskeletal:  Negative for arthralgias, back pain, leg pain, myalgias and neck pain.   Integumentary:  Negative for rash, breast mass, breast discharge and breast tenderness. Negative.   Allergic/Immunologic: Negative.    Neurological:  Negative for dizziness, tremors, syncope, speech difficulty, weakness, light-headedness, numbness, headaches and memory loss.   Hematological: Negative.  Does not bruise/bleed easily.   Psychiatric/Behavioral: Negative.  Negative for confusion and suicidal ideas.    Breast: Negative for mass and tenderness      Objective:       Vitals:    10/19/23 0903   BP: 116/78   Pulse: 109   Temp: 97.7 °F (36.5 °C)       Wt Readings from Last 6 Encounters:   10/10/23 65.1 kg (143 lb 9.6 oz)   10/10/23 65.1 kg (143 lb 9.6 oz)   09/19/23 64 kg (141 lb 3.2 oz)   09/12/23 64 kg (141 lb 3.2 oz)   08/29/23 65 kg (143 lb 6.4 oz)   08/29/23 65 kg (143 lb 6.4 oz)     Body mass index is 22.24 kg/m².  Body surface area is 1.74 meters squared.         LABS      No visits with results within 1 Day(s) from this visit.   Latest known visit with results is:   Lab Visit on 10/10/2023   Component Date Value    Sodium Level 10/10/2023 140     Potassium Level 10/10/2023 3.5     Chloride 10/10/2023 108 (H)     Carbon Dioxide 10/10/2023 25     Glucose Level 10/10/2023 98     Blood Urea Nitrogen 10/10/2023 12.0     Creatinine 10/10/2023 0.86     Calcium Level Total 10/10/2023 9.5     Protein Total 10/10/2023 7.7 (H)     Albumin Level 10/10/2023 3.2 (L)     Globulin 10/10/2023 4.5 (H)     Albumin/Globulin Ratio 10/10/2023 0.7 (L)     Bilirubin Total 10/10/2023 0.3     Alkaline Phosphatase 10/10/2023 90     Alanine Aminotransferase 10/10/2023 14     Aspartate Aminotransfera* 10/10/2023 17     eGFR 10/10/2023 >60     Magnesium Level 10/10/2023 2.10     Phosphorus Level 10/10/2023 2.0 (L)     Thyroxine Free 10/10/2023 0.67 (L)     TSH 10/10/2023 12.151 (H)     WBC 10/10/2023 5.65     RBC  10/10/2023 4.56     Hgb 10/10/2023 11.9 (L)     Hct 10/10/2023 37.8     MCV 10/10/2023 82.9     MCH 10/10/2023 26.1 (L)     MCHC 10/10/2023 31.5 (L)     RDW 10/10/2023 15.3     Platelet 10/10/2023 401 (H)     MPV 10/10/2023 9.1     Neut % 10/10/2023 70.2     Lymph % 10/10/2023 18.8     Mono % 10/10/2023 7.3     Eos % 10/10/2023 2.8     Basophil % 10/10/2023 0.5     Lymph # 10/10/2023 1.06     Neut # 10/10/2023 3.97     Mono # 10/10/2023 0.41     Eos # 10/10/2023 0.16     Baso # 10/10/2023 0.03     IG# 10/10/2023 0.02     IG% 10/10/2023 0.4          Assessment:   Stage IV NSCLC with bone mets.  Weakness/hypophosphatemia  Iron deficiency Anemia  Low TSH/elevated PTH  Plan:   -Therapy education completed on 10/19/23.  -Plans to start Taxotere tentatively on 10/24/23. Patient understood that she will receive premedications given 30 minutes prior to each treatment to help prevent possible allergic reaction and/or nausea. Patient verbalized understanding that she will receive Neulasta On body Injector after each treatment to help maintain adequate WBCs  -Zofran PRN prescribed for at home with instructions to be taken by mouth every 8 hours as needed for nausea. If not working, Compazine can be prescribed as 2nd choice.    -OTC Imodium AD recommended for diarrhea (4-6 BMs a day). Take 2 tablets after the first loose bowel movement, and 1 tablet after each loose bowel movement after the first dose has been taken. No more than 4 tablets should be taken in any 24-hour period. If not working, Lomotil can be prescribed as 2nd choice.    -OTC Claritin for bone pain.  -Mouth sore prevention with 1-quart warm water with 1 tsp of baking soda and salt and alcohol-free mouthwash.   -Emphasized adequate hand-hygiene and limited contact with people who are sick.   -Monitor and notify any bleeding in urine, stool, or sputum.  As well as unusual bleeding or bruising and stomach pain.   - Emphasized hydration with 4 16 oz bottles of water  a day.    -Importance of moisturizing with fragrance free lotion to prevent skin rash and/or hand-foot syndrome.  -Continue Synthroid 1 tablet (50 mcg total) by mouth before breakfast.  - Continue oral iron every other day  - Continue with the Xgeva q4w   -Call clinic if fever >100.4, shakes or chills, shortness of breath, chest pain, uncontrolled vomiting or diarrhea, pain and tingling in the chest or arm, or just not feeling well.    -Plans to  RTC on 10/24/23 for same day labs and TD with MD prior to treat.    DISCUSSION:    1.  A total of 60 minutes were spent in counseling today, in which 100% were face-to-face.  At today's therapy teaching session, we discussed the patient's cancer diagnosis as well as planned therapy regimen, protocol, side effects and toxicities.  A handout of each therapeutic agent in the regimen was provided and reviewed in detail.    2.  The following side effects were discussed but not limited to:    Docetaxel Side Effects:  Allergic Reactions  Breathing Problems  Bruising  Chills  Constipation  Cough  Diarrhea  Fever  Hair Loss  Infection  Itching  Low Blood Counts  Mouth Sores  Muscle Pain  Nail Changes  Nausea  Nosebleeds  Numbness  Pain  Rash  Swelling  Tingling  Vomiting                a.  Discussed the risk of infection while on therapy related to pancytopenia, specifically a decrease in their white blood cell count.  Instructed to contact our office for temperature >100.4 F, chills, sudden onset cough or shortness of breath, symptoms of a urinary tract infection.                b.  Discussed the risk of anemia. Instructed to contact our office for dizziness, heart palpitations, or extreme or sudden changes in weakness.                c.  Discussed the risk of thrombocytopenia, which increases the risk of bruising or bleeding.  Instructed the patient to contact our office for spontaneous signs of bleeding, including nose bleeds, bleeding from the gums or mouth, blood in sputum,  urine or stool and unusual or excessive bruising or rash.                d.  Discussed GI side effects including weight changes, changes in appetite, altered sense of taste, stomatitis, nausea, vomiting, diarrhea, constipation, and heartburn.                e.  Discussed  side effects including painful urination, changes in the amount of urination, possible urine color changes.  Discussed fertility issues and to prevent  pregnancy if of child bearing age.                f.  Discussed neurological side effects including the risk of peripheral neuropathy, either temporary or permanent.                g.  Discussed the potential for skin, hair, and nail changes.       3.  Instructed to contact our office for discussion of medication changes, the addition of vitamin and/or herbal supplementation as they may interact with some chemotherapy agents.    4.  Discussed dietary modifications and the need to maintain adequate caloric intake and proper oral hydration.  Recommended 64 ounces of fluid per day.    5.  Discussed anti-emetic protocol and bowel regimen protocol.    6.  Office contact information given including after hours number.  Discussed there is an oncologist on call 24/7, 365 days including weekends.  Provided primary nurse's information .    7.  In summary, the patient is in agreement with the plan of care.  Questions appeared to be answered to their satisfaction. Consented the patient to the treatment plan and the patient was educated on the planned duration of the treatment and schedule of the treatment administration. Copy to be scanned into the chart.    All questions answered to the satisfaction of the patient and family.     Follow up appointments given to patient.      ANA VILLALTA  PATIENT EDUCATOR  [facility]

## 2023-10-23 NOTE — PROGRESS NOTES
HEMATOLOGY/ONCOLOGY OFFICE CLINIC VISIT  NANDO Bourgeois    ONCOLOGICAL HISTORY:     Diagnosis:  - Stage IV NSCLC with bone mets.  - NSCLC / Adenocarcinoma Stage IIIA pT2a pN2--dx 12/2022  - PD-L1 1%  - History Uterine cancer - Dx 1989    Current Treatment:   Taxotere + Xgeva- 10/24/23-->    Treatment History:  - 1989 s/p total hysterectomy   - 12/08/2022: Right lower and middle bilobectomy with mediastinal lymph node dissection.   - Carboplatin + Pemetrexed  3/15/2023-5/1/2023  - Palliative RT   - Carboplatin + Pemetrexed started 3/15/2023  - Keytruda stopped 9/19/23 r/t elevated TSH and PET/CT progression, last dose given 8/29/23  - Xgeva- 7/18/2023-->    Plan of care: palliative systemic therapy      IMAGING:   - 08/10/2022 PET: The elongated subpleural right lower lobe nodule demonstrates fairly intense FDG activity.  Infectious, inflammatory and neoplastic etiologies remain possible. No suspicious PET findings elsewhere.  - 12/08/2022 CT HEAD without contrast: No acute intracranial abnormality identified.  Findings of mild microvascular ischemic disease.  - 12/2022: U/S Upper extremity: Thrombophlebitis of the left cephalic vein. No deep venous thrombosis in the left upper extremity.  - 01/03/2023 MRI Brain : GARY   - 01/12/2023 NM PET CT 1. Postsurgical changes of recent right lower and middle lobectomies.  There is a small right pleural effusion.  There is nonspecific peripheral uptake along the pleura at the right lung base and perihilar region which may be related to healing response in the setting of recent surgery.  Similarly borderline mediastinal uptake is nonspecific in the recent postoperative setting and could be reactive.  Continued attention recommended on follow-up. 2. Focal Uptake at the medial right acetabular roof has a max SUV of 6.4. This is new compared to previous exam. Metastasis is a consideration  -PET 6/5/2023: Previously visualized moderately FDG avid opacities towards the right lung base  improved.  Pleural effusion also improved.  No new suspicious PET findings in the lungs.  Severe emphysema. Some left adrenal thickening is similar to prior but there is now moderate associated FDG activity, max SUV is 3.6. FDG activity is again seen associated with the right superior acetabulum.  FDG avid area appears slightly larger today with slightly increased sclerosis.  SUV is 9.7, previously 6.4.  There is a newly evident subcentimeter FDG avid lesion left pedicle T10. Additional subcentimeter FDG avid lesion T4 vertebral body SUV 3.4.suspicious for metastatic disease.  -MRI T spine 6/15/2023:   1. Foci of abnormal signal intensity and enhancement at the inferior endplate of T3 and at body and left pedicle of T10.  Metastatic neoplastic etiology must be considered.  2. Thoracic spondylosis  3. Mild encroachment into the right neural foramina at the T3-4 and T4-5 secondary to mild posterior disc bulge and posterior osteophyte formation  4. Multilevel mild posterior disc bulge which does not result in significant central spinal stenosis.  9/27/23 Thyroid U/S:  Very minimal to no significant uptake of radiotracer isotope within the thyroid lobes bilaterally.  Clinical correlation is indicated  CT CAP 9/27/23:  1. Interim increasing nodular soft tissue densities/nodes/mass is at the mediastinum and right hilum measuring up the 3.0 cm.  A neoplastic etiology must be considered  2. Interim development of a 2.5 cm low-attenuation mass at the left adrenal gland.  A metastatic etiology must be considered  3. Interim development of a sclerotic foci at T3 and T10.  A metastatic etiology must be considered  4. Small right pleural effusion with associated infiltrate and atelectasis at the right lung base suspect  5. Suspect small cyst at the left kidney as described  6. Mild ill-defined soft tissue fullness at the cervical vaginal region  7. Borderline cardiomegaly  10/6/23 PET/CT:  1. Disease progression with new FDG  avid metastatic lymph nodes in the chest and right hilum.  2. Bilateral axillary and inguinal lymph nodes with low level uptake are indeterminate but concerning for metastasis given the additional findings.  3. Worsening and new FDG avid osseous disease involving both the axial and appendicular skeleton.  4. Enlarging left adrenal metastasis.    PATHOLOGY:  - 12/08/2022: Invasive solid adenocarcinoma (3.1 CM); G3, poorly differentiated. Visceral Pleura Invasion present without definite serosal surface involvement. Lymphovascular invasion (Vein) present. All margins negative for invasive carcinoma, distance 2.5 cm. LN - 4/14 (10R: Hilar, Posterior mediastinal (RACHEAL), 4R Lower paratracheal, 9R Pulmonary ligament, 10R). pT2a pN2  - 02/22/2023: RIGHT ACETABULAR LESION, CT - GUIDE CORE NEEDLE BIOPSY : FIBRINOPURULENT EXUDATE ADMIXED WITH ATYPICAL EPITHELIOID GROUPS,   NORMAL    MARROW ELEMENTS AND ABUNDANT BLOOD CLOT      Subjective:     HPI  Mansi Jin  63 y.o.  female with past medical history significant for HTN, HLD, uterine cancer status post total hysterectomy in 1989,  CKD Stage 2, COPD Stage 3, referred for management of NSCLC s/p RLL / RML lobectomy with Mediastinal Lymph Node Dissection on 12/8/2022.     She was noted to have RLL lung mass and underwent CT guided biopsy which was non diagnostic. PET scan demonstrated significant avidity within the lesion. Given high risk for malignancy, she underwent right lower lobectomy and right middle lobectomy on 12/08/2022. Pathology came back with invasive adenocarcinoma, poorly differentiated with solid morphology, focal tumor involvement of visceral pleura and focal tumor vascular invasion (vein), with mediastinal lymph nodes positive for metastatic carcinoma.      PET on 01/12/2023 showed focal uptake at the medial right acetabular roof but the biopsy was negative for malignancy. Discussed with radiology, they recommended adjuvant chemotherapy and then they will  reassess later for radiation therapy.     Chief Complaint: Back Pain (Pt reports consistent pain in her left side.)      Interval History:   She returns to clinic today for a follow-up and for treatment clearance for cycle 1 of Taxotere.  She reports left upper back pain. Her appetite has improved with the addition of Megace, but she continues to lose weight. Shortness of breath is stable and not worsening. Constipation continues to be stable with the addition of lactulose as needed. She denies any palpitations, chest pain, fever, chills, nausea, vomiting, diarrhea, recent hospitalizations or infections. She was seen by Dr. Mireles on 10/18 and will be seen back by them in 6-8 weeks after starting chemotherapy to see if radiation therapy is needed at that time.     We discussed again recent PT CT that showed progression and CT from 9/27/2023 that showed progression and also Mild ill-defined soft tissue fullness at the cervical vaginal region. Because of her history of uterine cancer, eventhough remote, We discuss evaluation with GYN ONC and she agreed.  Will not hold chemotherapy as she is symptomatic due to progression.     Past Medical History:   Diagnosis Date    ASTHMA     COPD (chronic obstructive pulmonary disease)     SEVERE    High cholesterol     HLD (hyperlipidemia)     HTN (hypertension)     Lung cancer 12/08/2022    invasive adenocarcinoma, poorly differentiated with solid morphology, focal tumor involvement of visceral pleura and focal tumor vascular invasion (vein), with 2 of 3 posterior mediastinal lymph nodes positive for metastatic carcinoma    Stage 2 chronic kidney disease     TIA (transient ischemic attack)     Tobacco abuse     Unspecified osteoarthritis, unspecified site     Uterine cancer       Past Surgical History:   Procedure Laterality Date    HYSTERECTOMY  Unknowing    lipoma multiple sites      LUNG LOBECTOMY Right 12/08/2022    Procedure: LOBECTOMY, LUNG;  Surgeon: Jass Browne IV,  MD;  Location: [facility] OR;  Service: Cardiothoracic;  Laterality: Right;    SURGICAL REMOVAL OF LYMPH NODE  2022    Procedure: EXCISION, LYMPH NODE;  Surgeon: Jass Browne IV, MD;  Location: [facility] OR;  Service: Cardiothoracic;;    THORACOTOMY Right 2022    Procedure: THORACOTOMY;  Surgeon: Jass Browne IV, MD;  Location: [facility] OR;  Service: Cardiothoracic;  Laterality: Right;  Right Lower Lobectomy with Lymph Node Dissection    TOTAL ABDOMINAL HYSTERECTOMY W/ BILATERAL SALPINGOOPHORECTOMY       Social History     Socioeconomic History    Marital status: Single    Number of children: 5   Tobacco Use    Smoking status: Former     Current packs/day: 0.00     Average packs/day: 0.5 packs/day for 50.0 years (25.0 ttl pk-yrs)     Types: Cigarettes     Start date: 1972     Quit date: 2022     Years since quittin.8    Smokeless tobacco: Never   Substance and Sexual Activity    Alcohol use: Not Currently     Comment: quit 25years ago    Drug use: Never    Sexual activity: Yes     Partners: Male     Social Determinants of Health     Financial Resource Strain: Low Risk  (2023)    Overall Financial Resource Strain (CARDIA)     Difficulty of Paying Living Expenses: Not very hard   Food Insecurity: No Food Insecurity (2023)    Hunger Vital Sign     Worried About Running Out of Food in the Last Year: Never true     Ran Out of Food in the Last Year: Never true   Transportation Needs: No Transportation Needs (2023)    PRAPARE - Transportation     Lack of Transportation (Medical): No     Lack of Transportation (Non-Medical): No   Physical Activity: Inactive (2023)    Exercise Vital Sign     Days of Exercise per Week: 0 days     Minutes of Exercise per Session: 0 min   Stress: No Stress Concern Present (2023)    [employer] - Occupational Stress Questionnaire     Feeling of Stress : Only a little   Social Connections: Socially Integrated (2023)     Social Connection and Isolation Panel [NHANES]     Frequency of Communication with Friends and Family: More than three times a week     Frequency of Social Gatherings with Friends and Family: Three times a week     Attends Denominational Services: More than 4 times per year     Active Member of Clubs or Organizations: Yes     Attends Club or Organization Meetings: 1 to 4 times per year     Marital Status: Living with partner   Housing Stability: Low Risk  (9/22/2023)    Housing Stability Vital Sign     Unable to Pay for Housing in the Last Year: No     Number of Places Lived in the Last Year: 1     Unstable Housing in the Last Year: No      Family History   Problem Relation Age of Onset    Hypertension Mother     Hepatitis Mother     Ovarian cancer Mother     Liver cancer Mother     Heart failure Father     Prostate cancer Father     Fibroids Sister     Asthma Brother     Asthma Brother     Cancer Maternal Grandfather       Review of patient's allergies indicates:  No Known Allergies     Review of Systems   Constitutional:  Positive for fatigue. Negative for activity change, appetite change, chills, fever and unexpected weight change.   HENT: Negative.  Negative for mouth dryness, mouth sores, nosebleeds, sore throat and trouble swallowing.    Eyes: Negative.  Negative for visual disturbance.   Respiratory:  Positive for shortness of breath (on exertion, chronic). Negative for cough.    Cardiovascular:  Negative for chest pain, palpitations and leg swelling.   Gastrointestinal:  Negative for abdominal distention, abdominal pain, blood in stool, change in bowel habit, constipation, diarrhea, nausea and vomiting.   Endocrine: Negative.    Genitourinary: Negative.  Negative for dysuria, frequency, hematuria and urgency.   Musculoskeletal:  Negative for arthralgias, back pain, leg pain, myalgias and neck pain.   Integumentary:  Negative for rash, breast mass, breast discharge and breast tenderness. Negative.    Allergic/Immunologic: Negative.    Neurological:  Negative for dizziness, tremors, syncope, speech difficulty, weakness, light-headedness, numbness, headaches and memory loss.   Hematological: Negative.  Does not bruise/bleed easily.   Psychiatric/Behavioral: Negative.  Negative for confusion and suicidal ideas.    Breast: Negative for mass and tenderness        Objective:        Vitals:    10/24/23 0954   BP: 117/74   Pulse: (!) 117   Resp: 15   Temp: 98.9 °F (37.2 °C)       Wt Readings from Last 6 Encounters:   10/24/23 64.9 kg (143 lb)   10/24/23 64.9 kg (143 lb)   10/19/23 64.4 kg (142 lb)   10/10/23 65.1 kg (143 lb 9.6 oz)   10/10/23 65.1 kg (143 lb 9.6 oz)   09/19/23 64 kg (141 lb 3.2 oz)     Body mass index is 22.4 kg/m².  Body surface area is 1.75 meters squared.       Physical Exam  Vitals and nursing note reviewed.   Constitutional:       Appearance: Normal appearance.   HENT:      Head: Normocephalic and atraumatic.   Eyes:      General: No scleral icterus.     Extraocular Movements: Extraocular movements intact.      Conjunctiva/sclera: Conjunctivae normal.   Neck:      Vascular: No JVD.   Cardiovascular:      Rate and Rhythm: Normal rate and regular rhythm.      Heart sounds: No murmur heard.  Pulmonary:      Effort: Pulmonary effort is normal.      Breath sounds: Decreased breath sounds present. No wheezing or rhonchi.   Abdominal:      General: Bowel sounds are normal. There is no distension.      Palpations: Abdomen is soft.      Tenderness: There is no abdominal tenderness.   Musculoskeletal:      Cervical back: Neck supple.   Lymphadenopathy:      Head:      Right side of head: No submental or submandibular adenopathy.      Left side of head: No submental or submandibular adenopathy.      Cervical: No cervical adenopathy.      Upper Body:      Right upper body: No supraclavicular or axillary adenopathy.      Left upper body: No supraclavicular or axillary adenopathy.      Lower Body: No right  inguinal adenopathy. No left inguinal adenopathy.      Comments: No adenopathy noted bilaterally   Skin:     General: Skin is warm.      Coloration: Skin is not jaundiced.      Findings: No lesion or rash.   Neurological:      General: No focal deficit present.      Mental Status: She is alert and oriented to person, place, and time.      Cranial Nerves: Cranial nerves 2-12 are intact.      Gait: Gait normal.   Psychiatric:         Attention and Perception: Attention normal.         Speech: Speech normal.         Behavior: Behavior is cooperative.         Cognition and Memory: Cognition normal.         Judgment: Judgment normal.       LABS      Lab Visit on 10/24/2023   Component Date Value    Sodium Level 10/24/2023 140     Potassium Level 10/24/2023 3.6     Chloride 10/24/2023 108 (H)     Carbon Dioxide 10/24/2023 24     Glucose Level 10/24/2023 119 (H)     Blood Urea Nitrogen 10/24/2023 13.0     Creatinine 10/24/2023 0.88     Calcium Level Total 10/24/2023 9.4     Protein Total 10/24/2023 7.6     Albumin Level 10/24/2023 3.2 (L)     Globulin 10/24/2023 4.4 (H)     Albumin/Globulin Ratio 10/24/2023 0.7 (L)     Bilirubin Total 10/24/2023 0.3     Alkaline Phosphatase 10/24/2023 103     Alanine Aminotransferase 10/24/2023 15     Aspartate Aminotransfera* 10/24/2023 18     eGFR 10/24/2023 >60     Phosphorus Level 10/24/2023 2.3     TSH 10/24/2023 16.914 (H)     WBC 10/24/2023 6.41     RBC 10/24/2023 4.50     Hgb 10/24/2023 11.3 (L)     Hct 10/24/2023 37.3     MCV 10/24/2023 82.9     MCH 10/24/2023 25.1 (L)     MCHC 10/24/2023 30.3 (L)     RDW 10/24/2023 15.5     Platelet 10/24/2023 424 (H)     MPV 10/24/2023 9.1     Neut % 10/24/2023 77.8     Lymph % 10/24/2023 16.1     Mono % 10/24/2023 3.9     Eos % 10/24/2023 1.6     Basophil % 10/24/2023 0.3     Lymph # 10/24/2023 1.03     Neut # 10/24/2023 4.99     Mono # 10/24/2023 0.25     Eos # 10/24/2023 0.10     Baso # 10/24/2023 0.02     IG# 10/24/2023 0.02     IG%  10/24/2023 0.3     Magnesium Level 10/24/2023 1.80          Assessment:   ECOG Performance status: 2 - 3.     1. NSCLC of right lung    2. Metastasis to bone    3. On antineoplastic chemotherapy    4. History of uterine cancer        Stage IV NSCLC with bone mets.    Originally: NSCLC / Adenocarcinoma Stage IIIA pT2a pN2  - 12/08/2022: Right lower and middle bilobectomy with mediastinal lymph node dissection. Path: Invasive adenocarcinoma, poorly differentiated, focal tumor involvement of visceral pleura and focal tumor vascular invasion (vein), with mediastinal lymph nodes positive for metastatic carcinoma   - TMB - 5, MSI-S, MET amplification, ROS1.  No mutations in EGFR, BRAF, ALK, ERBB2, KRAS, RET,    - 1% tumor cells are positive for PD-L1   - patient evaluated by Radiation Oncology with recommendation to treat with chemotherapy 1st and will re-evaluate the patient by the end of chemotherapy for radiation therapy  - 01/12/2023 PET: Focal uptake at the medial right acetabular roof. Metastasis is a consideration  - 02/22/2023: RIGHT ACETABULAR LESION, CT - GUIDE CORE NEEDLE BIOPSY : FIBRINOPURULENT EXUDATE ADMIXED WITH ATYPICAL EPITHELIOID GROUPS,   NORMAL    MARROW ELEMENTS AND ABUNDANT BLOOD CLOT  PET scan showed a suspicious finding in the right acetabular roof and patient then had a CT-guided biopsy no evidence of malignancy. Started with adjuvant chemotherapy with Carboplatin and pemetrexed x 4 cycles (3/15/23-5/18/23)   Patient has been seen by Rad/Onc (Dr Mireles) since the completion of her chemotherapy and plan was to pursue radiation x 5-6 weeks.  She discussed with the patient that they have showed no overall survival benefit in the setting of postoperative radiation therapy but data supports a larger benefit inpatient with extracapsular extension.  If she tolerated chemotherapy well and continue with good performance status then plan was for PORT   Restaging PET-CT after completion of chemotherapy and  prior to radiation therapy showed progression at the right acetabulum.  Patient with progression of disease now with bone metastases.  She bagan palliative radiation treatment with Dr. Mireles on 6/28/23, 10 treatments over two weeks.  PET CT 6/5/23 with progression Right superior acetabulum.  FDG avid area appears slightly larger with slightly increased sclerosis.  SUV is 9.7, previously 6.4.  She had palliative radiation. Started on Keytruda 7/18/23.  Bone mets   Xgeva q4w  Weakness/hypophosphatemia  Pharmacy does not have phosnak packet.  Prescription sent in K-phos-neutral BID x 30 days  Iron deficiency Anemia  Start 1 tab PO iron QOD with Vitamin C.   Low TSH/elevated PTH  Refer to endocrinology to rule out graves vs autoimmune thyroiditis   Will order TPOab and thyroid hormone receptor ab today and schedule for a thyroid uptake scan.                   Plan:   Patient with progression of disease. She has significant bone pain and symptoms assocated with progression. She was seen by Rad/Onc for palliative RT but recommendations were to start chemotherapy and re eval after 2 cycles. She is willing to see GYN ONC for further eval of vaginal fullness on CT.     - Labs adequate to start cycle 1 Taxotere today   - Will refer to Dr. Fernández, Gyn/Onc for further veal of vaginal fullness  - Continue Synthroid 50 mcg daily   - Continue with the Xgeva q4w   - Continue oral iron every other day  - RTC in 1 with NP with labs/tox check  - cbc, cmp, mag, phos, tsh, ca 125- 1 hr prior @ [facility], [place]    The patient was seen, interviewed and examined. Pertinent lab and radiology studies were reviewed.   The patient was given ample opportunity to ask questions, and to the best of my abilities, all questions answered to satisfaction; patient demonstrated understanding of what we discussed and agreeable to the plan. Pt instructed to call should develop concerning signs/symptoms or have further questions.     Phoebe Gomez,  MD  Hematology/Oncology  [facility]           Professional Services   I, Clau Hodges LPN, acted solely as a scribe for and in the presence of Dr. Phoebe Gomez, who performed these services.

## 2023-10-24 ENCOUNTER — INFUSION (OUTPATIENT)
Dept: INFUSION THERAPY | Facility: HOSPITAL | Age: 63
End: 2023-10-24
Attending: NURSE PRACTITIONER
Payer: MEDICARE

## 2023-10-24 ENCOUNTER — OFFICE VISIT (OUTPATIENT)
Dept: HEMATOLOGY/ONCOLOGY | Facility: CLINIC | Age: 63
End: 2023-10-24
Payer: MEDICARE

## 2023-10-24 VITALS
WEIGHT: 143 LBS | HEART RATE: 117 BPM | DIASTOLIC BLOOD PRESSURE: 74 MMHG | RESPIRATION RATE: 15 BRPM | OXYGEN SATURATION: 98 % | HEIGHT: 67 IN | BODY MASS INDEX: 22.44 KG/M2 | SYSTOLIC BLOOD PRESSURE: 117 MMHG | TEMPERATURE: 99 F

## 2023-10-24 VITALS
DIASTOLIC BLOOD PRESSURE: 74 MMHG | OXYGEN SATURATION: 95 % | WEIGHT: 143 LBS | TEMPERATURE: 99 F | HEART RATE: 117 BPM | RESPIRATION RATE: 15 BRPM | HEIGHT: 67 IN | SYSTOLIC BLOOD PRESSURE: 117 MMHG | BODY MASS INDEX: 22.44 KG/M2

## 2023-10-24 DIAGNOSIS — Z85.42 HISTORY OF UTERINE CANCER: Primary | ICD-10-CM

## 2023-10-24 DIAGNOSIS — E83.39 HYPOPHOSPHATEMIA: ICD-10-CM

## 2023-10-24 DIAGNOSIS — C34.91 NSCLC OF RIGHT LUNG: Primary | ICD-10-CM

## 2023-10-24 DIAGNOSIS — Z85.42 HISTORY OF UTERINE CANCER: ICD-10-CM

## 2023-10-24 DIAGNOSIS — R53.82 CHRONIC FATIGUE, UNSPECIFIED: ICD-10-CM

## 2023-10-24 DIAGNOSIS — E21.3 HYPERPARATHYROIDISM: ICD-10-CM

## 2023-10-24 DIAGNOSIS — C34.91 NSCLC OF RIGHT LUNG: ICD-10-CM

## 2023-10-24 DIAGNOSIS — Z79.899 ON ANTINEOPLASTIC CHEMOTHERAPY: ICD-10-CM

## 2023-10-24 DIAGNOSIS — C79.51 METASTASIS TO BONE: ICD-10-CM

## 2023-10-24 LAB — CANCER AG125 SERPL-ACNC: 8.4 UNIT/ML (ref 0–35)

## 2023-10-24 PROCEDURE — 99215 PR OFFICE/OUTPT VISIT, EST, LEVL V, 40-54 MIN: ICD-10-PCS | Mod: S$GLB,,, | Performed by: INTERNAL MEDICINE

## 2023-10-24 PROCEDURE — 3060F POS MICROALBUMINURIA REV: CPT | Mod: CPTII,S$GLB,, | Performed by: INTERNAL MEDICINE

## 2023-10-24 PROCEDURE — 3078F PR MOST RECENT DIASTOLIC BLOOD PRESSURE < 80 MM HG: ICD-10-PCS | Mod: CPTII,S$GLB,, | Performed by: INTERNAL MEDICINE

## 2023-10-24 PROCEDURE — 3078F DIAST BP <80 MM HG: CPT | Mod: CPTII,S$GLB,, | Performed by: INTERNAL MEDICINE

## 2023-10-24 PROCEDURE — 96413 CHEMO IV INFUSION 1 HR: CPT

## 2023-10-24 PROCEDURE — 3288F FALL RISK ASSESSMENT DOCD: CPT | Mod: CPTII,S$GLB,, | Performed by: INTERNAL MEDICINE

## 2023-10-24 PROCEDURE — 99999 PR PBB SHADOW E&M-EST. PATIENT-LVL V: CPT | Mod: PBBFAC,,, | Performed by: INTERNAL MEDICINE

## 2023-10-24 PROCEDURE — 96375 TX/PRO/DX INJ NEW DRUG ADDON: CPT

## 2023-10-24 PROCEDURE — 3008F PR BODY MASS INDEX (BMI) DOCUMENTED: ICD-10-PCS | Mod: CPTII,S$GLB,, | Performed by: INTERNAL MEDICINE

## 2023-10-24 PROCEDURE — 25000003 PHARM REV CODE 250: Performed by: INTERNAL MEDICINE

## 2023-10-24 PROCEDURE — 3066F PR DOCUMENTATION OF TREATMENT FOR NEPHROPATHY: ICD-10-PCS | Mod: CPTII,S$GLB,, | Performed by: INTERNAL MEDICINE

## 2023-10-24 PROCEDURE — 3008F BODY MASS INDEX DOCD: CPT | Mod: CPTII,S$GLB,, | Performed by: INTERNAL MEDICINE

## 2023-10-24 PROCEDURE — 3074F SYST BP LT 130 MM HG: CPT | Mod: CPTII,S$GLB,, | Performed by: INTERNAL MEDICINE

## 2023-10-24 PROCEDURE — 63600175 PHARM REV CODE 636 W HCPCS: Performed by: INTERNAL MEDICINE

## 2023-10-24 PROCEDURE — 3060F PR POS MICROALBUMINURIA RESULT DOCUMENTED/REVIEW: ICD-10-PCS | Mod: CPTII,S$GLB,, | Performed by: INTERNAL MEDICINE

## 2023-10-24 PROCEDURE — 1101F PR PT FALLS ASSESS DOC 0-1 FALLS W/OUT INJ PAST YR: ICD-10-PCS | Mod: CPTII,S$GLB,, | Performed by: INTERNAL MEDICINE

## 2023-10-24 PROCEDURE — 99999 PR PBB SHADOW E&M-EST. PATIENT-LVL V: ICD-10-PCS | Mod: PBBFAC,,, | Performed by: INTERNAL MEDICINE

## 2023-10-24 PROCEDURE — 1101F PT FALLS ASSESS-DOCD LE1/YR: CPT | Mod: CPTII,S$GLB,, | Performed by: INTERNAL MEDICINE

## 2023-10-24 PROCEDURE — 3288F PR FALLS RISK ASSESSMENT DOCUMENTED: ICD-10-PCS | Mod: CPTII,S$GLB,, | Performed by: INTERNAL MEDICINE

## 2023-10-24 PROCEDURE — 96367 TX/PROPH/DG ADDL SEQ IV INF: CPT

## 2023-10-24 PROCEDURE — 86304 IMMUNOASSAY TUMOR CA 125: CPT | Performed by: INTERNAL MEDICINE

## 2023-10-24 PROCEDURE — 3074F PR MOST RECENT SYSTOLIC BLOOD PRESSURE < 130 MM HG: ICD-10-PCS | Mod: CPTII,S$GLB,, | Performed by: INTERNAL MEDICINE

## 2023-10-24 PROCEDURE — 3066F NEPHROPATHY DOC TX: CPT | Mod: CPTII,S$GLB,, | Performed by: INTERNAL MEDICINE

## 2023-10-24 PROCEDURE — 99215 OFFICE O/P EST HI 40 MIN: CPT | Mod: S$GLB,,, | Performed by: INTERNAL MEDICINE

## 2023-10-24 RX ORDER — ONDANSETRON 2 MG/ML
8 INJECTION INTRAMUSCULAR; INTRAVENOUS ONCE
Status: COMPLETED | OUTPATIENT
Start: 2023-10-24 | End: 2023-10-24

## 2023-10-24 RX ORDER — DIPHENHYDRAMINE HYDROCHLORIDE 50 MG/ML
25 INJECTION INTRAMUSCULAR; INTRAVENOUS
Status: COMPLETED | OUTPATIENT
Start: 2023-10-24 | End: 2023-10-24

## 2023-10-24 RX ORDER — MIRTAZAPINE 7.5 MG/1
7.5 TABLET, FILM COATED ORAL NIGHTLY
Qty: 30 TABLET | Refills: 3 | Status: SHIPPED | OUTPATIENT
Start: 2023-10-24 | End: 2023-12-28

## 2023-10-24 RX ADMIN — DEXAMETHASONE SODIUM PHOSPHATE 20 MG: 4 INJECTION, SOLUTION INTRA-ARTICULAR; INTRALESIONAL; INTRAMUSCULAR; INTRAVENOUS; SOFT TISSUE at 11:10

## 2023-10-24 RX ADMIN — DOCETAXEL 130 MG: 20 INJECTION, SOLUTION INTRAVENOUS at 12:10

## 2023-10-24 RX ADMIN — DIPHENHYDRAMINE HYDROCHLORIDE 25 MG: 50 INJECTION, SOLUTION INTRAMUSCULAR; INTRAVENOUS at 10:10

## 2023-10-24 RX ADMIN — ONDANSETRON 8 MG: 2 INJECTION INTRAMUSCULAR; INTRAVENOUS at 11:10

## 2023-10-24 NOTE — PLAN OF CARE
Patient will have no S&S of nausea and instructed understanding of how to take antiemetics. Patient will receive infusion in a timely manor. Patient completed infusion with out difficulty

## 2023-10-26 DIAGNOSIS — K59.00 CONSTIPATION, UNSPECIFIED CONSTIPATION TYPE: ICD-10-CM

## 2023-10-26 RX ORDER — LACTULOSE 10 G/15ML
20 SOLUTION ORAL; RECTAL 2 TIMES DAILY
Qty: 240 ML | Refills: 2 | Status: SHIPPED | OUTPATIENT
Start: 2023-10-26 | End: 2024-03-11

## 2023-10-30 ENCOUNTER — OFFICE VISIT (OUTPATIENT)
Dept: HEMATOLOGY/ONCOLOGY | Facility: CLINIC | Age: 63
End: 2023-10-30
Payer: MEDICARE

## 2023-10-30 VITALS
WEIGHT: 141.19 LBS | OXYGEN SATURATION: 100 % | DIASTOLIC BLOOD PRESSURE: 71 MMHG | HEART RATE: 58 BPM | TEMPERATURE: 99 F | RESPIRATION RATE: 20 BRPM | HEIGHT: 67 IN | SYSTOLIC BLOOD PRESSURE: 103 MMHG | BODY MASS INDEX: 22.16 KG/M2

## 2023-10-30 DIAGNOSIS — Z85.42 HISTORY OF UTERINE CANCER: ICD-10-CM

## 2023-10-30 DIAGNOSIS — C34.91 NSCLC OF RIGHT LUNG: Primary | ICD-10-CM

## 2023-10-30 DIAGNOSIS — T45.1X5A IMMUNODEFICIENCY DUE TO CHEMOTHERAPY: ICD-10-CM

## 2023-10-30 DIAGNOSIS — Z79.899 IMMUNODEFICIENCY DUE TO CHEMOTHERAPY: ICD-10-CM

## 2023-10-30 DIAGNOSIS — Z79.899 ON ANTINEOPLASTIC CHEMOTHERAPY: ICD-10-CM

## 2023-10-30 DIAGNOSIS — D84.821 IMMUNODEFICIENCY DUE TO CHEMOTHERAPY: ICD-10-CM

## 2023-10-30 DIAGNOSIS — Z29.89 IMMUNOTHERAPY: ICD-10-CM

## 2023-10-30 DIAGNOSIS — C79.51 METASTASIS TO BONE: ICD-10-CM

## 2023-10-30 DIAGNOSIS — R53.82 CHRONIC FATIGUE, UNSPECIFIED: ICD-10-CM

## 2023-10-30 PROCEDURE — 99999 PR PBB SHADOW E&M-EST. PATIENT-LVL V: CPT | Mod: PBBFAC,,,

## 2023-10-30 PROCEDURE — 3060F PR POS MICROALBUMINURIA RESULT DOCUMENTED/REVIEW: ICD-10-PCS | Mod: CPTII,S$GLB,,

## 2023-10-30 PROCEDURE — 3008F BODY MASS INDEX DOCD: CPT | Mod: CPTII,S$GLB,,

## 2023-10-30 PROCEDURE — 3060F POS MICROALBUMINURIA REV: CPT | Mod: CPTII,S$GLB,,

## 2023-10-30 PROCEDURE — 3078F PR MOST RECENT DIASTOLIC BLOOD PRESSURE < 80 MM HG: ICD-10-PCS | Mod: CPTII,S$GLB,,

## 2023-10-30 PROCEDURE — 3066F NEPHROPATHY DOC TX: CPT | Mod: CPTII,S$GLB,,

## 2023-10-30 PROCEDURE — 3078F DIAST BP <80 MM HG: CPT | Mod: CPTII,S$GLB,,

## 2023-10-30 PROCEDURE — 1160F PR REVIEW ALL MEDS BY PRESCRIBER/CLIN PHARMACIST DOCUMENTED: ICD-10-PCS | Mod: CPTII,S$GLB,,

## 2023-10-30 PROCEDURE — 3074F PR MOST RECENT SYSTOLIC BLOOD PRESSURE < 130 MM HG: ICD-10-PCS | Mod: CPTII,S$GLB,,

## 2023-10-30 PROCEDURE — 1160F RVW MEDS BY RX/DR IN RCRD: CPT | Mod: CPTII,S$GLB,,

## 2023-10-30 PROCEDURE — 3008F PR BODY MASS INDEX (BMI) DOCUMENTED: ICD-10-PCS | Mod: CPTII,S$GLB,,

## 2023-10-30 PROCEDURE — 3074F SYST BP LT 130 MM HG: CPT | Mod: CPTII,S$GLB,,

## 2023-10-30 PROCEDURE — 1159F PR MEDICATION LIST DOCUMENTED IN MEDICAL RECORD: ICD-10-PCS | Mod: CPTII,S$GLB,,

## 2023-10-30 PROCEDURE — 99215 OFFICE O/P EST HI 40 MIN: CPT | Mod: S$GLB,,,

## 2023-10-30 PROCEDURE — 99215 PR OFFICE/OUTPT VISIT, EST, LEVL V, 40-54 MIN: ICD-10-PCS | Mod: S$GLB,,,

## 2023-10-30 PROCEDURE — 99999 PR PBB SHADOW E&M-EST. PATIENT-LVL V: ICD-10-PCS | Mod: PBBFAC,,,

## 2023-10-30 PROCEDURE — 1159F MED LIST DOCD IN RCRD: CPT | Mod: CPTII,S$GLB,,

## 2023-10-30 PROCEDURE — 3066F PR DOCUMENTATION OF TREATMENT FOR NEPHROPATHY: ICD-10-PCS | Mod: CPTII,S$GLB,,

## 2023-10-30 RX ORDER — ERGOCALCIFEROL 1.25 MG/1
50000 CAPSULE ORAL
Status: ON HOLD | COMMUNITY
End: 2023-12-04 | Stop reason: SDUPTHER

## 2023-10-30 RX ORDER — LEVOTHYROXINE SODIUM 75 UG/1
75 TABLET ORAL
COMMUNITY
Start: 2023-10-27

## 2023-10-30 RX ORDER — DEXAMETHASONE 4 MG/1
8 TABLET ORAL 2 TIMES DAILY
Qty: 12 TABLET | Refills: 6 | Status: SHIPPED | OUTPATIENT
Start: 2023-10-30 | End: 2023-11-02

## 2023-10-30 NOTE — PROGRESS NOTES
HEMATOLOGY/ONCOLOGY OFFICE CLINIC VISIT  NANDO Bourgeois    ONCOLOGICAL HISTORY:     Diagnosis:  - Stage IV NSCLC with bone mets.  - NSCLC / Adenocarcinoma Stage IIIA pT2a pN2--dx 12/2022  - PD-L1 1%  - History Uterine cancer - Dx 1989    Current Treatment:   Taxotere + Xgeva- 10/24/23-->    Treatment History:  - 1989 s/p total hysterectomy   - 12/08/2022: Right lower and middle bilobectomy with mediastinal lymph node dissection.   - Carboplatin + Pemetrexed  3/15/2023-5/1/2023  - Palliative RT   - Carboplatin + Pemetrexed started 3/15/2023  - Keytruda stopped 9/19/23 r/t elevated TSH and PET/CT progression, last dose given 8/29/23  - Xgeva- 7/18/2023-->    Plan of care: palliative systemic therapy      IMAGING:   - 08/10/2022 PET: The elongated subpleural right lower lobe nodule demonstrates fairly intense FDG activity.  Infectious, inflammatory and neoplastic etiologies remain possible. No suspicious PET findings elsewhere.  - 12/08/2022 CT HEAD without contrast: No acute intracranial abnormality identified.  Findings of mild microvascular ischemic disease.  - 12/2022: U/S Upper extremity: Thrombophlebitis of the left cephalic vein. No deep venous thrombosis in the left upper extremity.  - 01/03/2023 MRI Brain : G[facility]   - 01/12/2023 NM PET CT 1. Postsurgical changes of recent right lower and middle lobectomies.  There is a small right pleural effusion.  There is nonspecific peripheral uptake along the pleura at the right lung base and perihilar region which may be related to healing response in the setting of recent surgery.  Similarly borderline mediastinal uptake is nonspecific in the recent postoperative setting and could be reactive.  Continued attention recommended on follow-up. 2. Focal Uptake at the medial right acetabular roof has a max SUV of 6.4. This is new compared to previous exam. Metastasis is a consideration  -PET 6/5/2023: Previously visualized moderately FDG avid opacities towards the right lung base  improved.  Pleural effusion also improved.  No new suspicious PET findings in the lungs.  Severe emphysema. Some left adrenal thickening is similar to prior but there is now moderate associated FDG activity, max SUV is 3.6. FDG activity is again seen associated with the right superior acetabulum.  FDG avid area appears slightly larger today with slightly increased sclerosis.  SUV is 9.7, previously 6.4.  There is a newly evident subcentimeter FDG avid lesion left pedicle T10. Additional subcentimeter FDG avid lesion T4 vertebral body SUV 3.4.suspicious for metastatic disease.  -MRI T spine 6/15/2023:   1. Foci of abnormal signal intensity and enhancement at the inferior endplate of T3 and at body and left pedicle of T10.  Metastatic neoplastic etiology must be considered.  2. Thoracic spondylosis  3. Mild encroachment into the right neural foramina at the T3-4 and T4-5 secondary to mild posterior disc bulge and posterior osteophyte formation  4. Multilevel mild posterior disc bulge which does not result in significant central spinal stenosis.  9/27/23 Thyroid U/S:  Very minimal to no significant uptake of radiotracer isotope within the thyroid lobes bilaterally.  Clinical correlation is indicated  CT CAP 9/27/23:  1. Interim increasing nodular soft tissue densities/nodes/mass is at the mediastinum and right hilum measuring up the 3.0 cm.  A neoplastic etiology must be considered  2. Interim development of a 2.5 cm low-attenuation mass at the left adrenal gland.  A metastatic etiology must be considered  3. Interim development of a sclerotic foci at T3 and T10.  A metastatic etiology must be considered  4. Small right pleural effusion with associated infiltrate and atelectasis at the right lung base suspect  5. Suspect small cyst at the left kidney as described  6. Mild ill-defined soft tissue fullness at the cervical vaginal region  7. Borderline cardiomegaly  10/6/23 PET/CT:  1. Disease progression with new FDG  avid metastatic lymph nodes in the chest and right hilum.  2. Bilateral axillary and inguinal lymph nodes with low level uptake are indeterminate but concerning for metastasis given the additional findings.  3. Worsening and new FDG avid osseous disease involving both the axial and appendicular skeleton.  4. Enlarging left adrenal metastasis.    PATHOLOGY:  - 12/08/2022: Invasive solid adenocarcinoma (3.1 CM); G3, poorly differentiated. Visceral Pleura Invasion present without definite serosal surface involvement. Lymphovascular invasion (Vein) present. All margins negative for invasive carcinoma, distance 2.5 cm. LN - 4/14 (10R: Hilar, Posterior mediastinal (RACHEAL), 4R Lower paratracheal, 9R Pulmonary ligament, 10R). pT2a pN2  - 02/22/2023: RIGHT ACETABULAR LESION, CT - GUIDE CORE NEEDLE BIOPSY : FIBRINOPURULENT EXUDATE ADMIXED WITH ATYPICAL EPITHELIOID GROUPS,   NORMAL    MARROW ELEMENTS AND ABUNDANT BLOOD CLOT      Subjective:     HPI  Mansi Jin  63 y.o.  female with past medical history significant for HTN, HLD, uterine cancer status post total hysterectomy in 1989,  CKD Stage 2, COPD Stage 3, referred for management of NSCLC s/p RLL / RML lobectomy with Mediastinal Lymph Node Dissection on 12/8/2022.     She was noted to have RLL lung mass and underwent CT guided biopsy which was non diagnostic. PET scan demonstrated significant avidity within the lesion. Given high risk for malignancy, she underwent right lower lobectomy and right middle lobectomy on 12/08/2022. Pathology came back with invasive adenocarcinoma, poorly differentiated with solid morphology, focal tumor involvement of visceral pleura and focal tumor vascular invasion (vein), with mediastinal lymph nodes positive for metastatic carcinoma.      PET on 01/12/2023 showed focal uptake at the medial right acetabular roof but the biopsy was negative for malignancy. Discussed with radiology, they recommended adjuvant chemotherapy and then they will  reassess later for radiation therapy.     Chief Complaint: Lung Cancer      Interval History:   She returns to clinic today for a one-week toxicity check following cycle 1 of Taxotere given on 10/24/2023.  She reports increased fatigue and redness on bilateral hands with occasional itching. She reports that she did not take steroid with treatment as she did not have prescription. Will send in today. Labs reviewed in detail and are stable. She has not received a call regarding a GYN appt. Shortness of breath is stable and not worsening. Constipation continues to be stable with the addition of lactulose as needed. She denies any palpitations, chest pain, fever, chills, nausea, vomiting, diarrhea, recent hospitalizations or infections.       Past Medical History:   Diagnosis Date    ASTHMA     COPD (chronic obstructive pulmonary disease)     SEVERE    High cholesterol     HLD (hyperlipidemia)     HTN (hypertension)     Lung cancer 12/08/2022    invasive adenocarcinoma, poorly differentiated with solid morphology, focal tumor involvement of visceral pleura and focal tumor vascular invasion (vein), with 2 of 3 posterior mediastinal lymph nodes positive for metastatic carcinoma    Stage 2 chronic kidney disease     TIA (transient ischemic attack)     Tobacco abuse     Unspecified osteoarthritis, unspecified site     Uterine cancer       Past Surgical History:   Procedure Laterality Date    HYSTERECTOMY  Unknowing    lipoma multiple sites      LUNG LOBECTOMY Right 12/08/2022    Procedure: LOBECTOMY, LUNG;  Surgeon: Jass Browne IV, MD;  Location: [facility] OR;  Service: Cardiothoracic;  Laterality: Right;    SURGICAL REMOVAL OF LYMPH NODE  12/08/2022    Procedure: EXCISION, LYMPH NODE;  Surgeon: Jass Browne IV, MD;  Location: [facility] OR;  Service: Cardiothoracic;;    THORACOTOMY Right 12/08/2022    Procedure: THORACOTOMY;  Surgeon: Jass Browne IV, MD;  Location: [facility] OR;  Service: Cardiothoracic;  Laterality: Right;   Right Lower Lobectomy with Lymph Node Dissection    TOTAL ABDOMINAL HYSTERECTOMY W/ BILATERAL SALPINGOOPHORECTOMY       Social History     Socioeconomic History    Marital status: Single    Number of children: 5   Tobacco Use    Smoking status: Former     Current packs/day: 0.00     Average packs/day: 0.5 packs/day for 50.0 years (25.0 ttl pk-yrs)     Types: Cigarettes     Start date: 1972     Quit date: 2022     Years since quittin.8    Smokeless tobacco: Never   Substance and Sexual Activity    Alcohol use: Not Currently     Comment: quit 25years ago    Drug use: Never    Sexual activity: Yes     Partners: Male     Social Determinants of Health     Financial Resource Strain: Low Risk  (2023)    Overall Financial Resource Strain (CARDIA)     Difficulty of Paying Living Expenses: Not very hard   Food Insecurity: No Food Insecurity (2023)    Hunger Vital Sign     Worried About Running Out of Food in the Last Year: Never true     Ran Out of Food in the Last Year: Never true   Transportation Needs: No Transportation Needs (2023)    PRAPARE - Transportation     Lack of Transportation (Medical): No     Lack of Transportation (Non-Medical): No   Physical Activity: Inactive (2023)    Exercise Vital Sign     Days of Exercise per Week: 0 days     Minutes of Exercise per Session: 0 min   Stress: No Stress Concern Present (2023)    [place] [employer] - Occupational Stress Questionnaire     Feeling of Stress : Only a little   Social Connections: Socially Integrated (2023)    Social Connection and Isolation Panel [NHANES]     Frequency of Communication with Friends and Family: More than three times a week     Frequency of Social Gatherings with Friends and Family: Three times a week     Attends [employer] Services: More than 4 times per year     Active Member of Clubs or Organizations: Yes     Attends Club or Organization Meetings: 1 to 4 times per year     Marital  Status: Living with partner   Housing Stability: Low Risk  (9/22/2023)    Housing Stability Vital Sign     Unable to Pay for Housing in the Last Year: No     Number of Places Lived in the Last Year: 1     Unstable Housing in the Last Year: No      Family History   Problem Relation Age of Onset    Hypertension Mother     Hepatitis Mother     Ovarian cancer Mother     Liver cancer Mother     Heart failure Father     Prostate cancer Father     Fibroids Sister     Asthma Brother     Asthma Brother     Cancer Maternal Grandfather       Review of patient's allergies indicates:  No Known Allergies     Review of Systems   Constitutional:  Positive for fatigue. Negative for activity change, appetite change, chills, fever and unexpected weight change.   HENT: Negative.  Negative for mouth dryness, mouth sores, nosebleeds, sore throat and trouble swallowing.    Eyes: Negative.  Negative for visual disturbance.   Respiratory:  Positive for shortness of breath (on exertion, chronic). Negative for cough.    Cardiovascular:  Negative for chest pain, palpitations and leg swelling.   Gastrointestinal:  Negative for abdominal distention, abdominal pain, blood in stool, change in bowel habit, constipation, diarrhea, nausea and vomiting.   Endocrine: Negative.    Genitourinary: Negative.  Negative for dysuria, frequency, hematuria and urgency.   Musculoskeletal:  Negative for arthralgias, back pain, leg pain, myalgias and neck pain.   Integumentary:  Negative for rash, breast mass, breast discharge and breast tenderness. Negative.   Allergic/Immunologic: Negative.    Neurological:  Negative for dizziness, tremors, syncope, speech difficulty, weakness, light-headedness, numbness, headaches and memory loss.   Hematological: Negative.  Does not bruise/bleed easily.   Psychiatric/Behavioral: Negative.  Negative for confusion and suicidal ideas.    Breast: Negative for mass and tenderness        Objective:        Vitals:    10/30/23 1312    BP: 103/71   Pulse: (!) 58   Resp: 20   Temp: 98.5 °F (36.9 °C)       Wt Readings from Last 6 Encounters:   10/30/23 64 kg (141 lb 3.2 oz)   10/24/23 64.9 kg (143 lb)   10/24/23 64.9 kg (143 lb)   10/19/23 64.4 kg (142 lb)   10/10/23 65.1 kg (143 lb 9.6 oz)   10/10/23 65.1 kg (143 lb 9.6 oz)     Body mass index is 22.11 kg/m².  Body surface area is 1.74 meters squared.       Physical Exam  Vitals and nursing note reviewed.   Constitutional:       Appearance: Normal appearance.   HENT:      Head: Normocephalic and atraumatic.   Eyes:      General: No scleral icterus.     Extraocular Movements: Extraocular movements intact.      Conjunctiva/sclera: Conjunctivae normal.   Neck:      Vascular: No JVD.   Cardiovascular:      Rate and Rhythm: Normal rate and regular rhythm.      Heart sounds: No murmur heard.  Pulmonary:      Effort: Pulmonary effort is normal.      Breath sounds: Decreased breath sounds present. No wheezing or rhonchi.   Abdominal:      General: Bowel sounds are normal. There is no distension.      Palpations: Abdomen is soft.      Tenderness: There is no abdominal tenderness.   Musculoskeletal:      Cervical back: Neck supple.   Lymphadenopathy:      Head:      Right side of head: No submental or submandibular adenopathy.      Left side of head: No submental or submandibular adenopathy.      Cervical: No cervical adenopathy.      Upper Body:      Right upper body: No supraclavicular or axillary adenopathy.      Left upper body: No supraclavicular or axillary adenopathy.      Lower Body: No right inguinal adenopathy. No left inguinal adenopathy.      Comments: No adenopathy noted bilaterally   Skin:     General: Skin is warm.      Coloration: Skin is not jaundiced.      Findings: No lesion or rash.   Neurological:      General: No focal deficit present.      Mental Status: She is alert and oriented to person, place, and time.      Cranial Nerves: Cranial nerves 2-12 are intact.      Gait: Gait normal.    Psychiatric:         Attention and Perception: Attention normal.         Speech: Speech normal.         Behavior: Behavior is cooperative.         Cognition and Memory: Cognition normal.         Judgment: Judgment normal.       LABS      Lab Visit on 10/30/2023   Component Date Value    Sodium Level 10/30/2023 138     Potassium Level 10/30/2023 4.2     Chloride 10/30/2023 107     Carbon Dioxide 10/30/2023 24     Glucose Level 10/30/2023 90     Blood Urea Nitrogen 10/30/2023 14.0     Creatinine 10/30/2023 1.07 (H)     Calcium Level Total 10/30/2023 9.4     Protein Total 10/30/2023 7.7 (H)     Albumin Level 10/30/2023 3.5     Globulin 10/30/2023 4.2 (H)     Albumin/Globulin Ratio 10/30/2023 0.8 (L)     Bilirubin Total 10/30/2023 0.5     Alkaline Phosphatase 10/30/2023 88     Alanine Aminotransferase 10/30/2023 16     Aspartate Aminotransfera* 10/30/2023 18     eGFR 10/30/2023 58     Magnesium Level 10/30/2023 2.20     Phosphorus Level 10/30/2023 2.1 (L)     TSH 10/30/2023 6.520 (H)     WBC 10/30/2023 2.40 (L)     RBC 10/30/2023 4.69     Hgb 10/30/2023 11.8 (L)     Hct 10/30/2023 37.9     MCV 10/30/2023 80.8     MCH 10/30/2023 25.2 (L)     MCHC 10/30/2023 31.1 (L)     RDW 10/30/2023 15.1     Platelet 10/30/2023 415 (H)     MPV 10/30/2023 9.7     Neut % 10/30/2023 46.7     Lymph % 10/30/2023 45.0     Mono % 10/30/2023 1.7     Eos % 10/30/2023 0.8     Basophil % 10/30/2023 2.5     Lymph # 10/30/2023 1.08     Neut # 10/30/2023 1.12 (L)     Mono # 10/30/2023 0.04 (L)     Eos # 10/30/2023 0.02     Baso # 10/30/2023 0.06     IG# 10/30/2023 0.08 (H)     IG% 10/30/2023 3.3          Assessment:   ECOG Performance status: 2 - 3.     1. NSCLC of right lung    2. History of uterine cancer    3. Immunotherapy        Stage IV NSCLC with bone mets.    Originally: NSCLC / Adenocarcinoma Stage IIIA pT2a pN2  - 12/08/2022: Right lower and middle bilobectomy with mediastinal lymph node dissection. Path: Invasive adenocarcinoma, poorly  differentiated, focal tumor involvement of visceral pleura and focal tumor vascular invasion (vein), with mediastinal lymph nodes positive for metastatic carcinoma   - TMB - 5, MSI-S, MET amplification, ROS1.  No mutations in EGFR, BRAF, ALK, ERBB2, KRAS, RET,    - 1% tumor cells are positive for PD-L1   - patient evaluated by Radiation Oncology with recommendation to treat with chemotherapy 1st and will re-evaluate the patient by the end of chemotherapy for radiation therapy  - 01/12/2023 PET: Focal uptake at the medial right acetabular roof. Metastasis is a consideration  - 02/22/2023: RIGHT ACETABULAR LESION, CT - GUIDE CORE NEEDLE BIOPSY : FIBRINOPURULENT EXUDATE ADMIXED WITH ATYPICAL EPITHELIOID GROUPS,   NORMAL    MARROW ELEMENTS AND ABUNDANT BLOOD CLOT  PET scan showed a suspicious finding in the right acetabular roof and patient then had a CT-guided biopsy no evidence of malignancy. Started with adjuvant chemotherapy with Carboplatin and pemetrexed x 4 cycles (3/15/23-5/18/23)   Patient has been seen by Rad/Onc (Dr Mireles) since the completion of her chemotherapy and plan was to pursue radiation x 5-6 weeks.  She discussed with the patient that they have showed no overall survival benefit in the setting of postoperative radiation therapy but data supports a larger benefit inpatient with extracapsular extension.  If she tolerated chemotherapy well and continue with good performance status then plan was for PORT   Restaging PET-CT after completion of chemotherapy and prior to radiation therapy showed progression at the right acetabulum.  Patient with progression of disease now with bone metastases.  She bagan palliative radiation treatment with Dr. Mireles on 6/28/23, 10 treatments over two weeks.  PET CT 6/5/23 with progression Right superior acetabulum.  FDG avid area appears slightly larger with slightly increased sclerosis.  SUV is 9.7, previously 6.4.  She had palliative radiation. Started on Keytruda  7/18/23.   Bone mets   Xgeva q4w  She was seen by Rad/Onc for palliative RT but recommendations were to start chemotherapy and re eval after 2 cycles  Weakness/hypophosphatemia  Pharmacy does not have phosnak packet.  Prescription sent in K-phos-neutral BID x 30 days. Unable to  due to costs. Will continue to monitor at this time.   Iron deficiency Anemia  Start 1 tab PO iron QOD with Vitamin C.   Hypothyroid   Followed by endocrinology Dr. Parker                  Plan:   Labs stable.  Lab only in one week.  Moisturize bilateral hands.   Dex sent to begin with next chemo.  Continue with the Xgeva q4w   Continue oral iron every other day  Pending referral to Dr. Fernández, Gyn/Onc for further veal of vaginal fullness  RTC in 2 weeks with NP for FU/lab/treat    The patient was seen, interviewed and examined. Pertinent lab and radiology studies were reviewed.   The patient was given ample opportunity to ask questions, and to the best of my abilities, all questions answered to satisfaction; patient demonstrated understanding of what we discussed and agreeable to the plan. Pt instructed to call should develop concerning signs/symptoms or have further questions.     Shelbi Johansen, DOUGIEP-C  Oncology/Hematology   [facility]

## 2023-11-01 ENCOUNTER — TELEPHONE (OUTPATIENT)
Dept: GYNECOLOGIC ONCOLOGY | Facility: CLINIC | Age: 63
End: 2023-11-01
Payer: MEDICARE

## 2023-11-01 NOTE — TELEPHONE ENCOUNTER
New referral received from Dr Gomez for extensive oncology history who needs further evaluation for vaginal fullness noted on recent imaging. Pt called and name and  verified. Explained my role as nurse navigator and direct number provided. Options for GYN/ONC providers, including [facility], all clinic locations and soonest availability discussed with pt. Pt reports inability to come to [facility] or [place] for sooner appointments. Navigator will obtain Dr Saldana insight on the best date for an initial appointment and contact pt back for scheduling. Patient encouraged to call for any questions or concerns at any time. Pt verbalized understanding.

## 2023-11-08 ENCOUNTER — LAB VISIT (OUTPATIENT)
Dept: LAB | Facility: HOSPITAL | Age: 63
End: 2023-11-08
Payer: MEDICARE

## 2023-11-08 ENCOUNTER — TELEPHONE (OUTPATIENT)
Dept: HEMATOLOGY/ONCOLOGY | Facility: CLINIC | Age: 63
End: 2023-11-08
Payer: MEDICARE

## 2023-11-08 DIAGNOSIS — C34.91 NSCLC OF RIGHT LUNG: ICD-10-CM

## 2023-11-08 DIAGNOSIS — Z85.42 HISTORY OF UTERINE CANCER: ICD-10-CM

## 2023-11-08 LAB
ALBUMIN SERPL-MCNC: 3.7 G/DL (ref 3.4–4.8)
ALBUMIN/GLOB SERPL: 1 RATIO (ref 1.1–2)
ALP SERPL-CCNC: 98 UNIT/L (ref 40–150)
ALT SERPL-CCNC: 14 UNIT/L (ref 0–55)
AST SERPL-CCNC: 16 UNIT/L (ref 5–34)
BASOPHILS # BLD AUTO: 0.02 X10(3)/MCL
BASOPHILS NFR BLD AUTO: 0.5 %
BILIRUB SERPL-MCNC: 0.4 MG/DL
BUN SERPL-MCNC: 10 MG/DL (ref 9.8–20.1)
CALCIUM SERPL-MCNC: 9 MG/DL (ref 8.4–10.2)
CHLORIDE SERPL-SCNC: 110 MMOL/L (ref 98–107)
CO2 SERPL-SCNC: 22 MMOL/L (ref 23–31)
CREAT SERPL-MCNC: 0.97 MG/DL (ref 0.55–1.02)
EOSINOPHIL # BLD AUTO: 0.01 X10(3)/MCL (ref 0–0.9)
EOSINOPHIL NFR BLD AUTO: 0.3 %
ERYTHROCYTE [DISTWIDTH] IN BLOOD BY AUTOMATED COUNT: 16.8 % (ref 11.5–17)
GFR SERPLBLD CREATININE-BSD FMLA CKD-EPI: >60 MLS/MIN/1.73/M2
GLOBULIN SER-MCNC: 3.7 GM/DL (ref 2.4–3.5)
GLUCOSE SERPL-MCNC: 87 MG/DL (ref 82–115)
HCT VFR BLD AUTO: 38.1 % (ref 37–47)
HGB BLD-MCNC: 11.8 G/DL (ref 12–16)
IMM GRANULOCYTES # BLD AUTO: 0.01 X10(3)/MCL (ref 0–0.04)
IMM GRANULOCYTES NFR BLD AUTO: 0.3 %
LYMPHOCYTES # BLD AUTO: 1.31 X10(3)/MCL (ref 0.6–4.6)
LYMPHOCYTES NFR BLD AUTO: 33.9 %
MAGNESIUM SERPL-MCNC: 2.2 MG/DL (ref 1.6–2.6)
MCH RBC QN AUTO: 25.4 PG (ref 27–31)
MCHC RBC AUTO-ENTMCNC: 31 G/DL (ref 33–36)
MCV RBC AUTO: 82.1 FL (ref 80–94)
MONOCYTES # BLD AUTO: 0.57 X10(3)/MCL (ref 0.1–1.3)
MONOCYTES NFR BLD AUTO: 14.7 %
NEUTROPHILS # BLD AUTO: 1.95 X10(3)/MCL (ref 2.1–9.2)
NEUTROPHILS NFR BLD AUTO: 50.3 %
PHOSPHATE SERPL-MCNC: 1.5 MG/DL (ref 2.3–4.7)
PLATELET # BLD AUTO: 355 X10(3)/MCL (ref 130–400)
PMV BLD AUTO: 9.7 FL (ref 7.4–10.4)
POTASSIUM SERPL-SCNC: 3.5 MMOL/L (ref 3.5–5.1)
PROT SERPL-MCNC: 7.4 GM/DL (ref 5.8–7.6)
RBC # BLD AUTO: 4.64 X10(6)/MCL (ref 4.2–5.4)
SODIUM SERPL-SCNC: 140 MMOL/L (ref 136–145)
WBC # SPEC AUTO: 3.87 X10(3)/MCL (ref 4.5–11.5)

## 2023-11-08 PROCEDURE — 85025 COMPLETE CBC W/AUTO DIFF WBC: CPT

## 2023-11-08 PROCEDURE — 80053 COMPREHEN METABOLIC PANEL: CPT

## 2023-11-08 PROCEDURE — 83735 ASSAY OF MAGNESIUM: CPT

## 2023-11-08 PROCEDURE — 36415 COLL VENOUS BLD VENIPUNCTURE: CPT

## 2023-11-08 PROCEDURE — 84100 ASSAY OF PHOSPHORUS: CPT

## 2023-11-08 NOTE — TELEPHONE ENCOUNTER
Can you check and see if patient is taking her synthroid and send TSH results to her PCP to address.

## 2023-11-14 ENCOUNTER — INFUSION (OUTPATIENT)
Dept: INFUSION THERAPY | Facility: HOSPITAL | Age: 63
End: 2023-11-14
Attending: NURSE PRACTITIONER
Payer: MEDICARE

## 2023-11-14 ENCOUNTER — OFFICE VISIT (OUTPATIENT)
Dept: HEMATOLOGY/ONCOLOGY | Facility: CLINIC | Age: 63
End: 2023-11-14
Payer: MEDICARE

## 2023-11-14 VITALS
HEIGHT: 67 IN | TEMPERATURE: 98 F | WEIGHT: 140.63 LBS | SYSTOLIC BLOOD PRESSURE: 112 MMHG | HEART RATE: 100 BPM | BODY MASS INDEX: 22.07 KG/M2 | RESPIRATION RATE: 18 BRPM | OXYGEN SATURATION: 99 % | DIASTOLIC BLOOD PRESSURE: 73 MMHG

## 2023-11-14 VITALS
SYSTOLIC BLOOD PRESSURE: 112 MMHG | BODY MASS INDEX: 22.07 KG/M2 | RESPIRATION RATE: 18 BRPM | HEIGHT: 67 IN | OXYGEN SATURATION: 99 % | WEIGHT: 140.63 LBS | DIASTOLIC BLOOD PRESSURE: 73 MMHG | TEMPERATURE: 98 F | HEART RATE: 100 BPM

## 2023-11-14 DIAGNOSIS — D84.821 IMMUNODEFICIENCY DUE TO CHEMOTHERAPY: ICD-10-CM

## 2023-11-14 DIAGNOSIS — Z79.899 ON ANTINEOPLASTIC CHEMOTHERAPY: ICD-10-CM

## 2023-11-14 DIAGNOSIS — E83.39 HYPOPHOSPHATEMIA: ICD-10-CM

## 2023-11-14 DIAGNOSIS — D50.9 IRON DEFICIENCY ANEMIA, UNSPECIFIED IRON DEFICIENCY ANEMIA TYPE: ICD-10-CM

## 2023-11-14 DIAGNOSIS — C79.51 METASTASIS TO BONE: Primary | ICD-10-CM

## 2023-11-14 DIAGNOSIS — C34.91 NSCLC OF RIGHT LUNG: ICD-10-CM

## 2023-11-14 DIAGNOSIS — C34.91 NSCLC OF RIGHT LUNG: Primary | ICD-10-CM

## 2023-11-14 DIAGNOSIS — R53.82 CHRONIC FATIGUE, UNSPECIFIED: ICD-10-CM

## 2023-11-14 DIAGNOSIS — Z29.89 IMMUNOTHERAPY: ICD-10-CM

## 2023-11-14 DIAGNOSIS — Z79.899 IMMUNODEFICIENCY DUE TO CHEMOTHERAPY: ICD-10-CM

## 2023-11-14 DIAGNOSIS — T45.1X5A IMMUNODEFICIENCY DUE TO CHEMOTHERAPY: ICD-10-CM

## 2023-11-14 PROCEDURE — 3008F BODY MASS INDEX DOCD: CPT | Mod: CPTII,S$GLB,,

## 2023-11-14 PROCEDURE — 3078F PR MOST RECENT DIASTOLIC BLOOD PRESSURE < 80 MM HG: ICD-10-PCS | Mod: CPTII,S$GLB,,

## 2023-11-14 PROCEDURE — 3066F NEPHROPATHY DOC TX: CPT | Mod: CPTII,S$GLB,,

## 2023-11-14 PROCEDURE — 99215 OFFICE O/P EST HI 40 MIN: CPT | Mod: S$GLB,,,

## 2023-11-14 PROCEDURE — 96401 CHEMO ANTI-NEOPL SQ/IM: CPT

## 2023-11-14 PROCEDURE — 1101F PT FALLS ASSESS-DOCD LE1/YR: CPT | Mod: CPTII,S$GLB,,

## 2023-11-14 PROCEDURE — 1159F PR MEDICATION LIST DOCUMENTED IN MEDICAL RECORD: ICD-10-PCS | Mod: CPTII,S$GLB,,

## 2023-11-14 PROCEDURE — 99999 PR PBB SHADOW E&M-EST. PATIENT-LVL V: ICD-10-PCS | Mod: PBBFAC,,,

## 2023-11-14 PROCEDURE — 96413 CHEMO IV INFUSION 1 HR: CPT

## 2023-11-14 PROCEDURE — 96367 TX/PROPH/DG ADDL SEQ IV INF: CPT

## 2023-11-14 PROCEDURE — 96375 TX/PRO/DX INJ NEW DRUG ADDON: CPT

## 2023-11-14 PROCEDURE — 3008F PR BODY MASS INDEX (BMI) DOCUMENTED: ICD-10-PCS | Mod: CPTII,S$GLB,,

## 2023-11-14 PROCEDURE — 1160F PR REVIEW ALL MEDS BY PRESCRIBER/CLIN PHARMACIST DOCUMENTED: ICD-10-PCS | Mod: CPTII,S$GLB,,

## 2023-11-14 PROCEDURE — 3074F SYST BP LT 130 MM HG: CPT | Mod: CPTII,S$GLB,,

## 2023-11-14 PROCEDURE — 1101F PR PT FALLS ASSESS DOC 0-1 FALLS W/OUT INJ PAST YR: ICD-10-PCS | Mod: CPTII,S$GLB,,

## 2023-11-14 PROCEDURE — 99215 PR OFFICE/OUTPT VISIT, EST, LEVL V, 40-54 MIN: ICD-10-PCS | Mod: S$GLB,,,

## 2023-11-14 PROCEDURE — 25000003 PHARM REV CODE 250

## 2023-11-14 PROCEDURE — 3288F PR FALLS RISK ASSESSMENT DOCUMENTED: ICD-10-PCS | Mod: CPTII,S$GLB,,

## 2023-11-14 PROCEDURE — 99999 PR PBB SHADOW E&M-EST. PATIENT-LVL V: CPT | Mod: PBBFAC,,,

## 2023-11-14 PROCEDURE — 1160F RVW MEDS BY RX/DR IN RCRD: CPT | Mod: CPTII,S$GLB,,

## 2023-11-14 PROCEDURE — 3074F PR MOST RECENT SYSTOLIC BLOOD PRESSURE < 130 MM HG: ICD-10-PCS | Mod: CPTII,S$GLB,,

## 2023-11-14 PROCEDURE — 3060F POS MICROALBUMINURIA REV: CPT | Mod: CPTII,S$GLB,,

## 2023-11-14 PROCEDURE — 3288F FALL RISK ASSESSMENT DOCD: CPT | Mod: CPTII,S$GLB,,

## 2023-11-14 PROCEDURE — 3066F PR DOCUMENTATION OF TREATMENT FOR NEPHROPATHY: ICD-10-PCS | Mod: CPTII,S$GLB,,

## 2023-11-14 PROCEDURE — 1159F MED LIST DOCD IN RCRD: CPT | Mod: CPTII,S$GLB,,

## 2023-11-14 PROCEDURE — 63600175 PHARM REV CODE 636 W HCPCS

## 2023-11-14 PROCEDURE — 3078F DIAST BP <80 MM HG: CPT | Mod: CPTII,S$GLB,,

## 2023-11-14 PROCEDURE — 3060F PR POS MICROALBUMINURIA RESULT DOCUMENTED/REVIEW: ICD-10-PCS | Mod: CPTII,S$GLB,,

## 2023-11-14 RX ORDER — ASPIRIN 325 MG
50000 TABLET, DELAYED RELEASE (ENTERIC COATED) ORAL
COMMUNITY
Start: 2023-10-27

## 2023-11-14 RX ORDER — DIPHENHYDRAMINE HYDROCHLORIDE 50 MG/ML
25 INJECTION INTRAMUSCULAR; INTRAVENOUS
Status: CANCELLED
Start: 2023-11-14

## 2023-11-14 RX ORDER — HEPARIN 100 UNIT/ML
500 SYRINGE INTRAVENOUS
Status: CANCELLED | OUTPATIENT
Start: 2023-11-14

## 2023-11-14 RX ORDER — DIPHENHYDRAMINE HYDROCHLORIDE 50 MG/ML
25 INJECTION INTRAMUSCULAR; INTRAVENOUS
Status: COMPLETED | OUTPATIENT
Start: 2023-11-14 | End: 2023-11-14

## 2023-11-14 RX ORDER — SODIUM CHLORIDE 0.9 % (FLUSH) 0.9 %
10 SYRINGE (ML) INJECTION
Status: CANCELLED | OUTPATIENT
Start: 2023-11-14

## 2023-11-14 RX ORDER — ONDANSETRON 2 MG/ML
8 INJECTION INTRAMUSCULAR; INTRAVENOUS ONCE
Status: CANCELLED
Start: 2023-11-14 | End: 2023-11-14

## 2023-11-14 RX ORDER — ONDANSETRON 2 MG/ML
8 INJECTION INTRAMUSCULAR; INTRAVENOUS ONCE
Status: COMPLETED | OUTPATIENT
Start: 2023-11-14 | End: 2023-11-14

## 2023-11-14 RX ADMIN — SODIUM CHLORIDE: 9 INJECTION, SOLUTION INTRAVENOUS at 02:11

## 2023-11-14 RX ADMIN — DENOSUMAB 120 MG: 120 INJECTION SUBCUTANEOUS at 02:11

## 2023-11-14 RX ADMIN — ONDANSETRON 8 MG: 2 INJECTION INTRAMUSCULAR; INTRAVENOUS at 02:11

## 2023-11-14 RX ADMIN — DOCETAXEL 118 MG: 20 INJECTION, SOLUTION INTRAVENOUS at 02:11

## 2023-11-14 RX ADMIN — DIPHENHYDRAMINE HYDROCHLORIDE 25 MG: 50 INJECTION, SOLUTION INTRAMUSCULAR; INTRAVENOUS at 02:11

## 2023-11-14 RX ADMIN — DEXAMETHASONE SODIUM PHOSPHATE 20 MG: 4 INJECTION, SOLUTION INTRA-ARTICULAR; INTRALESIONAL; INTRAMUSCULAR; INTRAVENOUS; SOFT TISSUE at 02:11

## 2023-11-14 NOTE — PROGRESS NOTES
HEMATOLOGY/ONCOLOGY OFFICE CLINIC VISIT  NANDO Bourgeois    ONCOLOGICAL HISTORY:     Diagnosis:  - Stage IV NSCLC with bone mets.  - NSCLC / Adenocarcinoma Stage IIIA pT2a pN2--dx 12/2022  - PD-L1 1%  - History Uterine cancer - Dx 1989    Current Treatment:   Taxotere + Xgeva- 10/24/23-->    Treatment History:  - 1989 s/p total hysterectomy   - 12/08/2022: Right lower and middle bilobectomy with mediastinal lymph node dissection.   - Carboplatin + Pemetrexed  3/15/2023-5/1/2023  - Palliative RT   - Carboplatin + Pemetrexed started 3/15/2023  - Keytruda stopped 9/19/23 r/t elevated TSH and PET/CT progression, last dose given 8/29/23  - Xgeva- 7/18/2023-->    Plan of care: palliative systemic therapy      IMAGING:   - 08/10/2022 PET: The elongated subpleural right lower lobe nodule demonstrates fairly intense FDG activity.  Infectious, inflammatory and neoplastic etiologies remain possible. No suspicious PET findings elsewhere.  - 12/08/2022 CT HEAD without contrast: No acute intracranial abnormality identified.  Findings of mild microvascular ischemic disease.  - 12/2022: U/S Upper extremity: Thrombophlebitis of the left cephalic vein. No deep venous thrombosis in the left upper extremity.  - 01/03/2023 MRI Brain : GARY   - 01/12/2023 NM PET CT 1. Postsurgical changes of recent right lower and middle lobectomies.  There is a small right pleural effusion.  There is nonspecific peripheral uptake along the pleura at the right lung base and perihilar region which may be related to healing response in the setting of recent surgery.  Similarly borderline mediastinal uptake is nonspecific in the recent postoperative setting and could be reactive.  Continued attention recommended on follow-up. 2. Focal Uptake at the medial right acetabular roof has a max SUV of 6.4. This is new compared to previous exam. Metastasis is a consideration  -PET 6/5/2023: Previously visualized moderately FDG avid opacities towards the right lung base  improved.  Pleural effusion also improved.  No new suspicious PET findings in the lungs.  Severe emphysema. Some left adrenal thickening is similar to prior but there is now moderate associated FDG activity, max SUV is 3.6. FDG activity is again seen associated with the right superior acetabulum.  FDG avid area appears slightly larger today with slightly increased sclerosis.  SUV is 9.7, previously 6.4.  There is a newly evident subcentimeter FDG avid lesion left pedicle T10. Additional subcentimeter FDG avid lesion T4 vertebral body SUV 3.4.suspicious for metastatic disease.  -MRI T spine 6/15/2023:   1. Foci of abnormal signal intensity and enhancement at the inferior endplate of T3 and at body and left pedicle of T10.  Metastatic neoplastic etiology must be considered.  2. Thoracic spondylosis  3. Mild encroachment into the right neural foramina at the T3-4 and T4-5 secondary to mild posterior disc bulge and posterior osteophyte formation  4. Multilevel mild posterior disc bulge which does not result in significant central spinal stenosis.  9/27/23 Thyroid U/S:  Very minimal to no significant uptake of radiotracer isotope within the thyroid lobes bilaterally.  Clinical correlation is indicated  CT CAP 9/27/23:  1. Interim increasing nodular soft tissue densities/nodes/mass is at the mediastinum and right hilum measuring up the 3.0 cm.  A neoplastic etiology must be considered  2. Interim development of a 2.5 cm low-attenuation mass at the left adrenal gland.  A metastatic etiology must be considered  3. Interim development of a sclerotic foci at T3 and T10.  A metastatic etiology must be considered  4. Small right pleural effusion with associated infiltrate and atelectasis at the right lung base suspect  5. Suspect small cyst at the left kidney as described  6. Mild ill-defined soft tissue fullness at the cervical vaginal region  7. Borderline cardiomegaly  10/6/23 PET/CT:  1. Disease progression with new FDG  avid metastatic lymph nodes in the chest and right hilum.  2. Bilateral axillary and inguinal lymph nodes with low level uptake are indeterminate but concerning for metastasis given the additional findings.  3. Worsening and new FDG avid osseous disease involving both the axial and appendicular skeleton.  4. Enlarging left adrenal metastasis.    PATHOLOGY:  - 12/08/2022: Invasive solid adenocarcinoma (3.1 CM); G3, poorly differentiated. Visceral Pleura Invasion present without definite serosal surface involvement. Lymphovascular invasion (Vein) present. All margins negative for invasive carcinoma, distance 2.5 cm. LN - 4/14 (10R: Hilar, Posterior mediastinal (RACHEAL), 4R Lower paratracheal, 9R Pulmonary ligament, 10R). pT2a pN2  - 02/22/2023: RIGHT ACETABULAR LESION, CT - GUIDE CORE NEEDLE BIOPSY : FIBRINOPURULENT EXUDATE ADMIXED WITH ATYPICAL EPITHELIOID GROUPS,   NORMAL    MARROW ELEMENTS AND ABUNDANT BLOOD CLOT      Subjective:     HPI  Mansi Jin  63 y.o.  female with past medical history significant for HTN, HLD, uterine cancer status post total hysterectomy in 1989,  CKD Stage 2, COPD Stage 3, referred for management of NSCLC s/p RLL / RML lobectomy with Mediastinal Lymph Node Dissection on 12/8/2022.     She was noted to have RLL lung mass and underwent CT guided biopsy which was non diagnostic. PET scan demonstrated significant avidity within the lesion. Given high risk for malignancy, she underwent right lower lobectomy and right middle lobectomy on 12/08/2022. Pathology came back with invasive adenocarcinoma, poorly differentiated with solid morphology, focal tumor involvement of visceral pleura and focal tumor vascular invasion (vein), with mediastinal lymph nodes positive for metastatic carcinoma.      PET on 01/12/2023 showed focal uptake at the medial right acetabular roof but the biopsy was negative for malignancy. Discussed with radiology, they recommended adjuvant chemotherapy and then they will  reassess later for radiation therapy.     Chief Complaint: Lung Cancer (Pt complaining of lt side upper back pain x 2 weeks)      Interval History:   She returns to clinic today for treatment clearance for cycle 2 of Taxotere. She continues to have dryness to bilateral hands, but itching and redness have improved. She is moisturizing her hands daily. She also has color changes to her fingertips that presents following her last cycle of Keytruda. She continues to report fatigue. Labs reviewed in detail and are stable. GYN is currently pending, but will be approximately 12/5/2023. Constipation continues to be stable with the addition of lactulose as needed. She denies any palpitations, chest pain, fever, chills, nausea, vomiting, diarrhea, recent hospitalizations or infections.       Past Medical History:   Diagnosis Date    ASTHMA     COPD (chronic obstructive pulmonary disease)     SEVERE    High cholesterol     HLD (hyperlipidemia)     HTN (hypertension)     Lung cancer 12/08/2022    invasive adenocarcinoma, poorly differentiated with solid morphology, focal tumor involvement of visceral pleura and focal tumor vascular invasion (vein), with 2 of 3 posterior mediastinal lymph nodes positive for metastatic carcinoma    Stage 2 chronic kidney disease     TIA (transient ischemic attack)     Tobacco abuse     Unspecified osteoarthritis, unspecified site     Uterine cancer       Past Surgical History:   Procedure Laterality Date    HYSTERECTOMY  Unknowing    lipoma multiple sites      LUNG LOBECTOMY Right 12/08/2022    Procedure: LOBECTOMY, LUNG;  Surgeon: Jass Browne IV, MD;  Location: [facility];  Service: Cardiothoracic;  Laterality: Right;    SURGICAL REMOVAL OF LYMPH NODE  12/08/2022    Procedure: EXCISION, LYMPH NODE;  Surgeon: Jass Browne IV, MD;  Location: [facility] OR;  Service: Cardiothoracic;;    THORACOTOMY Right 12/08/2022    Procedure: THORACOTOMY;  Surgeon: Jass Browne IV, MD;  Location: [facility] OR;   Service: Cardiothoracic;  Laterality: Right;  Right Lower Lobectomy with Lymph Node Dissection    TOTAL ABDOMINAL HYSTERECTOMY W/ BILATERAL SALPINGOOPHORECTOMY       Social History     Socioeconomic History    Marital status: Single    Number of children: 5   Tobacco Use    Smoking status: Former     Current packs/day: 0.00     Average packs/day: 0.5 packs/day for 50.0 years (25.0 ttl pk-yrs)     Types: Cigarettes     Start date: 1972     Quit date: 2022     Years since quittin.9    Smokeless tobacco: Never   Substance and Sexual Activity    Alcohol use: Not Currently     Comment: quit 25years ago    Drug use: Never    Sexual activity: Yes     Partners: Male     Social Determinants of Health     Financial Resource Strain: Low Risk  (2023)    Overall Financial Resource Strain (CARDIA)     Difficulty of Paying Living Expenses: Not very hard   Food Insecurity: No Food Insecurity (2023)    Hunger Vital Sign     Worried About Running Out of Food in the Last Year: Never true     Ran Out of Food in the Last Year: Never true   Transportation Needs: No Transportation Needs (2023)    PRAPARE - Transportation     Lack of Transportation (Medical): No     Lack of Transportation (Non-Medical): No   Physical Activity: Inactive (2023)    Exercise Vital Sign     Days of Exercise per Week: 0 days     Minutes of Exercise per Session: 0 min   Stress: No Stress Concern Present (2023)    [employer] - Occupational Stress Questionnaire     Feeling of Stress : Only a little   Social Connections: Socially Integrated (2023)    Social Connection and Isolation Panel [NHANES]     Frequency of Communication with Friends and Family: More than three times a week     Frequency of Social Gatherings with Friends and Family: Three times a week     Attends Spiritism Services: More than 4 times per year     Active Member of Clubs or Organizations: Yes     Attends Club or  Organization Meetings: 1 to 4 times per year     Marital Status: Living with partner   Housing Stability: Low Risk  (9/22/2023)    Housing Stability Vital Sign     Unable to Pay for Housing in the Last Year: No     Number of Places Lived in the Last Year: 1     Unstable Housing in the Last Year: No      Family History   Problem Relation Age of Onset    Hypertension Mother     Hepatitis Mother     Ovarian cancer Mother     Liver cancer Mother     Heart failure Father     Prostate cancer Father     Fibroids Sister     Asthma Brother     Asthma Brother     Cancer Maternal Grandfather       Review of patient's allergies indicates:  No Known Allergies     Review of Systems   Constitutional:  Positive for fatigue. Negative for activity change, appetite change, chills, fever and unexpected weight change.   HENT: Negative.  Negative for mouth dryness, mouth sores, nosebleeds, sore throat and trouble swallowing.    Eyes: Negative.  Negative for visual disturbance.   Respiratory:  Positive for shortness of breath (on exertion, chronic). Negative for cough.    Cardiovascular:  Negative for chest pain, palpitations and leg swelling.   Gastrointestinal:  Negative for abdominal distention, abdominal pain, blood in stool, change in bowel habit, constipation, diarrhea, nausea and vomiting.   Endocrine: Negative.    Genitourinary: Negative.  Negative for dysuria, frequency, hematuria and urgency.   Musculoskeletal:  Negative for arthralgias, back pain, leg pain, myalgias and neck pain.   Integumentary:  Negative for rash, breast mass, breast discharge and breast tenderness. Negative.   Allergic/Immunologic: Negative.    Neurological:  Negative for dizziness, tremors, syncope, speech difficulty, weakness, light-headedness, numbness, headaches and memory loss.   Hematological: Negative.  Does not bruise/bleed easily.   Psychiatric/Behavioral: Negative.  Negative for confusion and suicidal ideas.    Breast: Negative for mass and  tenderness        Objective:        Vitals:    11/14/23 1230   BP: 112/73   Pulse: 100   Resp: 18   Temp: 97.8 °F (36.6 °C)       Wt Readings from Last 6 Encounters:   11/14/23 63.8 kg (140 lb 9.6 oz)   11/14/23 63.8 kg (140 lb 9.6 oz)   10/30/23 64 kg (141 lb 3.2 oz)   10/24/23 64.9 kg (143 lb)   10/24/23 64.9 kg (143 lb)   10/19/23 64.4 kg (142 lb)     Body mass index is 22.02 kg/m².  Body surface area is 1.74 meters squared.       Physical Exam  Vitals and nursing note reviewed.   Constitutional:       Appearance: Normal appearance.   HENT:      Head: Normocephalic and atraumatic.   Eyes:      General: No scleral icterus.     Extraocular Movements: Extraocular movements intact.      Conjunctiva/sclera: Conjunctivae normal.   Neck:      Vascular: No JVD.   Cardiovascular:      Rate and Rhythm: Normal rate and regular rhythm.      Heart sounds: No murmur heard.  Pulmonary:      Effort: Pulmonary effort is normal.      Breath sounds: Decreased breath sounds present. No wheezing or rhonchi.   Abdominal:      General: Bowel sounds are normal. There is no distension.      Palpations: Abdomen is soft.      Tenderness: There is no abdominal tenderness.   Musculoskeletal:      Cervical back: Neck supple.   Lymphadenopathy:      Head:      Right side of head: No submental or submandibular adenopathy.      Left side of head: No submental or submandibular adenopathy.      Cervical: No cervical adenopathy.      Upper Body:      Right upper body: No supraclavicular or axillary adenopathy.      Left upper body: No supraclavicular or axillary adenopathy.      Lower Body: No right inguinal adenopathy. No left inguinal adenopathy.      Comments: No adenopathy noted bilaterally   Skin:     General: Skin is warm.      Coloration: Skin is not jaundiced.      Findings: No lesion or rash.   Neurological:      General: No focal deficit present.      Mental Status: She is alert and oriented to person, place, and time.      Cranial  Nerves: Cranial nerves 2-12 are intact.      Gait: Gait normal.   Psychiatric:         Attention and Perception: Attention normal.         Speech: Speech normal.         Behavior: Behavior is cooperative.         Cognition and Memory: Cognition normal.         Judgment: Judgment normal.       LABS      Lab Visit on 11/14/2023   Component Date Value    Sodium Level 11/14/2023 141     Potassium Level 11/14/2023 3.8     Chloride 11/14/2023 108 (H)     Carbon Dioxide 11/14/2023 26     Glucose Level 11/14/2023 108     Blood Urea Nitrogen 11/14/2023 9.0 (L)     Creatinine 11/14/2023 0.79     Calcium Level Total 11/14/2023 9.2     Protein Total 11/14/2023 7.2     Albumin Level 11/14/2023 3.4     Globulin 11/14/2023 3.8 (H)     Albumin/Globulin Ratio 11/14/2023 0.9 (L)     Bilirubin Total 11/14/2023 0.4     Alkaline Phosphatase 11/14/2023 101     Alanine Aminotransferase 11/14/2023 8     Aspartate Aminotransfera* 11/14/2023 14     eGFR 11/14/2023 >60     Magnesium Level 11/14/2023 2.10     Phosphorus Level 11/14/2023 1.7 (L)     WBC 11/14/2023 7.10     RBC 11/14/2023 4.34     Hgb 11/14/2023 11.1 (L)     Hct 11/14/2023 36.0 (L)     MCV 11/14/2023 82.9     MCH 11/14/2023 25.6 (L)     MCHC 11/14/2023 30.8 (L)     RDW 11/14/2023 16.9     Platelet 11/14/2023 304     MPV 11/14/2023 9.9     Neut % 11/14/2023 77.1     Lymph % 11/14/2023 17.6     Mono % 11/14/2023 4.6     Eos % 11/14/2023 0.3     Basophil % 11/14/2023 0.1     Lymph # 11/14/2023 1.25     Neut # 11/14/2023 5.47     Mono # 11/14/2023 0.33     Eos # 11/14/2023 0.02     Baso # 11/14/2023 0.01     IG# 11/14/2023 0.02     IG% 11/14/2023 0.3          Assessment:   ECOG Performance status: 2 - 3.     1. NSCLC of right lung    2. Immunodeficiency due to chemotherapy    3. Immunotherapy    4. Chronic fatigue, unspecified    5. On antineoplastic chemotherapy    6. Hypophosphatemia        Stage IV NSCLC with bone mets.    Originally: NSCLC / Adenocarcinoma Stage IIIA pT2a pN2  -  12/08/2022: Right lower and middle bilobectomy with mediastinal lymph node dissection. Path: Invasive adenocarcinoma, poorly differentiated, focal tumor involvement of visceral pleura and focal tumor vascular invasion (vein), with mediastinal lymph nodes positive for metastatic carcinoma   - TMB - 5, MSI-S, MET amplification, ROS1.  No mutations in EGFR, BRAF, ALK, ERBB2, KRAS, RET,    - 1% tumor cells are positive for PD-L1   - patient evaluated by Radiation Oncology with recommendation to treat with chemotherapy 1st and will re-evaluate the patient by the end of chemotherapy for radiation therapy  - 01/12/2023 PET: Focal uptake at the medial right acetabular roof. Metastasis is a consideration  - 02/22/2023: RIGHT ACETABULAR LESION, CT - GUIDE CORE NEEDLE BIOPSY : FIBRINOPURULENT EXUDATE ADMIXED WITH ATYPICAL EPITHELIOID GROUPS,   NORMAL    MARROW ELEMENTS AND ABUNDANT BLOOD CLOT  PET scan showed a suspicious finding in the right acetabular roof and patient then had a CT-guided biopsy no evidence of malignancy. Started with adjuvant chemotherapy with Carboplatin and pemetrexed x 4 cycles (3/15/23-5/18/23)   Patient has been seen by Rad/Onc (Dr Mireles) since the completion of her chemotherapy and plan was to pursue radiation x 5-6 weeks.  She discussed with the patient that they have showed no overall survival benefit in the setting of postoperative radiation therapy but data supports a larger benefit inpatient with extracapsular extension.  If she tolerated chemotherapy well and continue with good performance status then plan was for PORT   Restaging PET-CT after completion of chemotherapy and prior to radiation therapy showed progression at the right acetabulum.  Patient with progression of disease now with bone metastases.  She bagan palliative radiation treatment with Dr. Mireles on 6/28/23, 10 treatments over two weeks.  PET CT 6/5/23 with progression Right superior acetabulum.  FDG avid area appears slightly  larger with slightly increased sclerosis.  SUV is 9.7, previously 6.4.  She had palliative radiation. Started on Keytruda 7/18/23. Now on Taxotere + Xgeva- 10/24/23 due to progression.  Bone mets   Xgeva q4w  She was seen by Rad/Onc for palliative RT but recommendations were to start chemotherapy and re eval after 2 cycles  Weakness/hypophosphatemia  Pharmacy does not have phosnak packet.  Prescription sent in K-phos-neutral BID x 30 days. Unable to  due to costs. Will continue to monitor at this time.   Iron deficiency Anemia  Continue with 1 tab PO iron QOD with Vitamin C.   Hypothyroid   Followed by endocrinology Dr. Parker                  Plan:   Labs stable.  Continue with C2 Doxetaxel   Moisturize bilateral hands.  Refer for mediport placement   Continue with the Xgeva q4w   Continue oral iron every other day  Pending referral to Dr. Fernández, Gyn/Onc for further veal of vaginal fullness (12/5/2023)  RTC in 3 weeks with NP for FU/lab/treat  Scan due 1/6/2023    The patient was seen, interviewed and examined. Pertinent lab and radiology studies were reviewed.   The patient was given ample opportunity to ask questions, and to the best of my abilities, all questions answered to satisfaction; patient demonstrated understanding of what we discussed and agreeable to the plan. Pt instructed to call should develop concerning signs/symptoms or have further questions.     Shelbi Johansen, FNP-C  Oncology/Hematology   [facility]

## 2023-11-14 NOTE — PLAN OF CARE
Patient will have no s/s of Infusion reaction. Patient premedicated with Benadryl, zofran  and decadron

## 2023-11-22 ENCOUNTER — HOSPITAL ENCOUNTER (OUTPATIENT)
Facility: HOSPITAL | Age: 63
Discharge: HOME | End: 2023-11-23
Attending: INTERNAL MEDICINE | Admitting: EMERGENCY MEDICINE
Payer: MEDICARE

## 2023-11-22 DIAGNOSIS — R10.13 DYSPEPSIA AND DISORDER OF FUNCTION OF STOMACH: ICD-10-CM

## 2023-11-22 DIAGNOSIS — D72.819 LEUKOPENIA, UNSPECIFIED TYPE: ICD-10-CM

## 2023-11-22 DIAGNOSIS — K92.2 GASTROINTESTINAL HEMORRHAGE, UNSPECIFIED GASTROINTESTINAL HEMORRHAGE TYPE: Primary | ICD-10-CM

## 2023-11-22 DIAGNOSIS — R05.9 COUGH: ICD-10-CM

## 2023-11-22 DIAGNOSIS — K31.9 DYSPEPSIA AND DISORDER OF FUNCTION OF STOMACH: ICD-10-CM

## 2023-11-22 DIAGNOSIS — E87.6 HYPOKALEMIA: ICD-10-CM

## 2023-11-22 DIAGNOSIS — C34.91 NSCLC OF RIGHT LUNG: ICD-10-CM

## 2023-11-22 LAB
ALBUMIN SERPL-MCNC: 3.4 G/DL (ref 3.4–4.8)
ALBUMIN/GLOB SERPL: 1.1 RATIO (ref 1.1–2)
ALP SERPL-CCNC: 73 UNIT/L (ref 40–150)
ALT SERPL-CCNC: 10 UNIT/L (ref 0–55)
APPEARANCE UR: CLEAR
AST SERPL-CCNC: 15 UNIT/L (ref 5–34)
BASOPHILS # BLD AUTO: 0.02 X10(3)/MCL
BASOPHILS NFR BLD AUTO: 0.8 %
BILIRUB SERPL-MCNC: 0.3 MG/DL
BILIRUB UR QL STRIP.AUTO: NEGATIVE
BUN SERPL-MCNC: 10 MG/DL (ref 9.8–20.1)
CALCIUM SERPL-MCNC: 8.6 MG/DL (ref 8.4–10.2)
CHLORIDE SERPL-SCNC: 106 MMOL/L (ref 98–107)
CO2 SERPL-SCNC: 23 MMOL/L (ref 23–31)
COLOR UR AUTO: YELLOW
CREAT SERPL-MCNC: 0.8 MG/DL (ref 0.55–1.02)
EOSINOPHIL # BLD AUTO: 0.02 X10(3)/MCL (ref 0–0.9)
EOSINOPHIL NFR BLD AUTO: 0.8 %
ERYTHROCYTE [DISTWIDTH] IN BLOOD BY AUTOMATED COUNT: 16.5 % (ref 11.5–17)
FLUAV AG UPPER RESP QL IA.RAPID: NOT DETECTED
FLUBV AG UPPER RESP QL IA.RAPID: NOT DETECTED
GFR SERPLBLD CREATININE-BSD FMLA CKD-EPI: >60 MLS/MIN/1.73/M2
GLOBULIN SER-MCNC: 3.2 GM/DL (ref 2.4–3.5)
GLUCOSE SERPL-MCNC: 102 MG/DL (ref 82–115)
GLUCOSE UR QL STRIP.AUTO: NEGATIVE
GROUP & RH: NORMAL
HCT VFR BLD AUTO: 31.6 % (ref 37–47)
HCT VFR BLD AUTO: 35.3 % (ref 37–47)
HEMOCCULT SP1 STL QL: POSITIVE
HEMOCCULT SP2 STL QL: POSITIVE
HGB BLD-MCNC: 11.1 G/DL (ref 12–16)
HGB BLD-MCNC: 9.7 G/DL (ref 12–16)
IMM GRANULOCYTES # BLD AUTO: 0.02 X10(3)/MCL (ref 0–0.04)
IMM GRANULOCYTES NFR BLD AUTO: 0.8 %
INDIRECT COOMBS GEL: NORMAL
KETONES UR QL STRIP.AUTO: NEGATIVE
LEUKOCYTE ESTERASE UR QL STRIP.AUTO: NEGATIVE
LYMPHOCYTES # BLD AUTO: 0.83 X10(3)/MCL (ref 0.6–4.6)
LYMPHOCYTES NFR BLD AUTO: 33.9 %
MCH RBC QN AUTO: 25.8 PG (ref 27–31)
MCHC RBC AUTO-ENTMCNC: 31.4 G/DL (ref 33–36)
MCV RBC AUTO: 82.1 FL (ref 80–94)
MONOCYTES # BLD AUTO: 0.2 X10(3)/MCL (ref 0.1–1.3)
MONOCYTES NFR BLD AUTO: 8.2 %
NEUTROPHILS # BLD AUTO: 1.36 X10(3)/MCL (ref 2.1–9.2)
NEUTROPHILS NFR BLD AUTO: 55.5 %
NITRITE UR QL STRIP.AUTO: NEGATIVE
PH UR STRIP.AUTO: 7 [PH]
PLATELET # BLD AUTO: 378 X10(3)/MCL (ref 130–400)
PMV BLD AUTO: 10 FL (ref 7.4–10.4)
POTASSIUM SERPL-SCNC: 3.3 MMOL/L (ref 3.5–5.1)
PROT SERPL-MCNC: 6.6 GM/DL (ref 5.8–7.6)
PROT UR QL STRIP.AUTO: NEGATIVE
RBC # BLD AUTO: 4.3 X10(6)/MCL (ref 4.2–5.4)
RBC UR QL AUTO: NEGATIVE
RSV A 5' UTR RNA NPH QL NAA+PROBE: NOT DETECTED
SARS-COV-2 RNA RESP QL NAA+PROBE: NOT DETECTED
SODIUM SERPL-SCNC: 137 MMOL/L (ref 136–145)
SP GR UR STRIP.AUTO: 1.01 (ref 1–1.03)
SPECIMEN OUTDATE: NORMAL
UROBILINOGEN UR STRIP-ACNC: 0.2
WBC # SPEC AUTO: 2.45 X10(3)/MCL (ref 4.5–11.5)

## 2023-11-22 PROCEDURE — 96376 TX/PRO/DX INJ SAME DRUG ADON: CPT

## 2023-11-22 PROCEDURE — G0378 HOSPITAL OBSERVATION PER HR: HCPCS

## 2023-11-22 PROCEDURE — 25000003 PHARM REV CODE 250: Performed by: INTERNAL MEDICINE

## 2023-11-22 PROCEDURE — C9113 INJ PANTOPRAZOLE SODIUM, VIA: HCPCS | Performed by: INTERNAL MEDICINE

## 2023-11-22 PROCEDURE — 81003 URINALYSIS AUTO W/O SCOPE: CPT | Performed by: INTERNAL MEDICINE

## 2023-11-22 PROCEDURE — 82272 OCCULT BLD FECES 1-3 TESTS: CPT | Mod: 91 | Performed by: INTERNAL MEDICINE

## 2023-11-22 PROCEDURE — 96374 THER/PROPH/DIAG INJ IV PUSH: CPT

## 2023-11-22 PROCEDURE — 96361 HYDRATE IV INFUSION ADD-ON: CPT

## 2023-11-22 PROCEDURE — 25000242 PHARM REV CODE 250 ALT 637 W/ HCPCS: Performed by: EMERGENCY MEDICINE

## 2023-11-22 PROCEDURE — 63600175 PHARM REV CODE 636 W HCPCS: Performed by: INTERNAL MEDICINE

## 2023-11-22 PROCEDURE — 82272 OCCULT BLD FECES 1-3 TESTS: CPT | Performed by: INTERNAL MEDICINE

## 2023-11-22 PROCEDURE — 86850 RBC ANTIBODY SCREEN: CPT | Performed by: INTERNAL MEDICINE

## 2023-11-22 PROCEDURE — 99285 EMERGENCY DEPT VISIT HI MDM: CPT | Mod: 25

## 2023-11-22 PROCEDURE — 96375 TX/PRO/DX INJ NEW DRUG ADDON: CPT

## 2023-11-22 PROCEDURE — A4216 STERILE WATER/SALINE, 10 ML: HCPCS | Performed by: INTERNAL MEDICINE

## 2023-11-22 PROCEDURE — 25000003 PHARM REV CODE 250: Performed by: EMERGENCY MEDICINE

## 2023-11-22 PROCEDURE — 63600175 PHARM REV CODE 636 W HCPCS: Performed by: EMERGENCY MEDICINE

## 2023-11-22 PROCEDURE — 94761 N-INVAS EAR/PLS OXIMETRY MLT: CPT

## 2023-11-22 PROCEDURE — C9113 INJ PANTOPRAZOLE SODIUM, VIA: HCPCS | Performed by: EMERGENCY MEDICINE

## 2023-11-22 PROCEDURE — 80053 COMPREHEN METABOLIC PANEL: CPT | Performed by: INTERNAL MEDICINE

## 2023-11-22 PROCEDURE — 85014 HEMATOCRIT: CPT | Mod: 91 | Performed by: INTERNAL MEDICINE

## 2023-11-22 PROCEDURE — 85025 COMPLETE CBC W/AUTO DIFF WBC: CPT | Performed by: INTERNAL MEDICINE

## 2023-11-22 PROCEDURE — 0241U COVID/RSV/FLU A&B PCR: CPT | Performed by: INTERNAL MEDICINE

## 2023-11-22 PROCEDURE — 94640 AIRWAY INHALATION TREATMENT: CPT

## 2023-11-22 RX ORDER — SODIUM CHLORIDE AND POTASSIUM CHLORIDE 150; 900 MG/100ML; MG/100ML
INJECTION, SOLUTION INTRAVENOUS
Status: COMPLETED | OUTPATIENT
Start: 2023-11-22 | End: 2023-11-22

## 2023-11-22 RX ORDER — DOCUSATE SODIUM 50 MG/5ML
50 LIQUID ORAL DAILY
Status: DISCONTINUED | OUTPATIENT
Start: 2023-11-22 | End: 2023-11-23 | Stop reason: HOSPADM

## 2023-11-22 RX ORDER — PANTOPRAZOLE SODIUM 40 MG/10ML
40 INJECTION, POWDER, LYOPHILIZED, FOR SOLUTION INTRAVENOUS EVERY 12 HOURS
Status: DISCONTINUED | OUTPATIENT
Start: 2023-11-22 | End: 2023-11-23 | Stop reason: HOSPADM

## 2023-11-22 RX ORDER — ACETAMINOPHEN 325 MG/1
650 TABLET ORAL EVERY 8 HOURS PRN
Status: DISCONTINUED | OUTPATIENT
Start: 2023-11-22 | End: 2023-11-23 | Stop reason: HOSPADM

## 2023-11-22 RX ORDER — PROCHLORPERAZINE EDISYLATE 5 MG/ML
10 INJECTION INTRAMUSCULAR; INTRAVENOUS
Status: COMPLETED | OUTPATIENT
Start: 2023-11-22 | End: 2023-11-22

## 2023-11-22 RX ORDER — MIRTAZAPINE 7.5 MG/1
7.5 TABLET, FILM COATED ORAL NIGHTLY
Status: DISCONTINUED | OUTPATIENT
Start: 2023-11-22 | End: 2023-11-23 | Stop reason: HOSPADM

## 2023-11-22 RX ORDER — ACETAMINOPHEN 500 MG
1000 TABLET ORAL EVERY 6 HOURS PRN
Status: DISCONTINUED | OUTPATIENT
Start: 2023-11-22 | End: 2023-11-23 | Stop reason: HOSPADM

## 2023-11-22 RX ORDER — SODIUM CHLORIDE 0.9 % (FLUSH) 0.9 %
10 SYRINGE (ML) INJECTION EVERY 8 HOURS
Status: DISCONTINUED | OUTPATIENT
Start: 2023-11-22 | End: 2023-11-23 | Stop reason: HOSPADM

## 2023-11-22 RX ORDER — PANTOPRAZOLE SODIUM 40 MG/10ML
40 INJECTION, POWDER, LYOPHILIZED, FOR SOLUTION INTRAVENOUS DAILY
Status: DISCONTINUED | OUTPATIENT
Start: 2023-11-22 | End: 2023-11-22

## 2023-11-22 RX ORDER — SODIUM CHLORIDE 0.9 % (FLUSH) 0.9 %
10 SYRINGE (ML) INJECTION
Status: DISCONTINUED | OUTPATIENT
Start: 2023-11-22 | End: 2023-11-23 | Stop reason: HOSPADM

## 2023-11-22 RX ORDER — ATORVASTATIN CALCIUM 40 MG/1
40 TABLET, FILM COATED ORAL NIGHTLY
Status: DISCONTINUED | OUTPATIENT
Start: 2023-11-22 | End: 2023-11-23 | Stop reason: HOSPADM

## 2023-11-22 RX ORDER — HYDROCODONE BITARTRATE AND ACETAMINOPHEN 5; 325 MG/1; MG/1
1 TABLET ORAL EVERY 6 HOURS PRN
Status: DISCONTINUED | OUTPATIENT
Start: 2023-11-22 | End: 2023-11-23 | Stop reason: HOSPADM

## 2023-11-22 RX ORDER — IPRATROPIUM BROMIDE AND ALBUTEROL SULFATE 2.5; .5 MG/3ML; MG/3ML
3 SOLUTION RESPIRATORY (INHALATION)
Status: DISCONTINUED | OUTPATIENT
Start: 2023-11-22 | End: 2023-11-23 | Stop reason: HOSPADM

## 2023-11-22 RX ORDER — BENZONATATE 100 MG/1
200 CAPSULE ORAL EVERY 4 HOURS PRN
Status: DISCONTINUED | OUTPATIENT
Start: 2023-11-22 | End: 2023-11-23 | Stop reason: HOSPADM

## 2023-11-22 RX ORDER — LEVOTHYROXINE SODIUM 75 UG/1
75 TABLET ORAL
Status: DISCONTINUED | OUTPATIENT
Start: 2023-11-23 | End: 2023-11-23 | Stop reason: HOSPADM

## 2023-11-22 RX ADMIN — IPRATROPIUM BROMIDE AND ALBUTEROL SULFATE 3 ML: .5; 3 SOLUTION RESPIRATORY (INHALATION) at 03:11

## 2023-11-22 RX ADMIN — ATORVASTATIN CALCIUM 40 MG: 40 TABLET, FILM COATED ORAL at 11:11

## 2023-11-22 RX ADMIN — MIRTAZAPINE 7.5 MG: 7.5 TABLET, FILM COATED ORAL at 11:11

## 2023-11-22 RX ADMIN — PANTOPRAZOLE SODIUM 40 MG: 40 INJECTION, POWDER, FOR SOLUTION INTRAVENOUS at 10:11

## 2023-11-22 RX ADMIN — PROCHLORPERAZINE EDISYLATE 10 MG: 5 INJECTION INTRAMUSCULAR; INTRAVENOUS at 08:11

## 2023-11-22 RX ADMIN — SODIUM CHLORIDE, PRESERVATIVE FREE 10 ML: 5 INJECTION INTRAVENOUS at 04:11

## 2023-11-22 RX ADMIN — IPRATROPIUM BROMIDE AND ALBUTEROL SULFATE 3 ML: .5; 3 SOLUTION RESPIRATORY (INHALATION) at 07:11

## 2023-11-22 RX ADMIN — SODIUM CHLORIDE AND POTASSIUM CHLORIDE: .9; .15 SOLUTION INTRAVENOUS at 12:11

## 2023-11-22 RX ADMIN — SODIUM CHLORIDE, POTASSIUM CHLORIDE, SODIUM LACTATE AND CALCIUM CHLORIDE 1000 ML: 600; 310; 30; 20 INJECTION, SOLUTION INTRAVENOUS at 08:11

## 2023-11-22 RX ADMIN — SODIUM CHLORIDE, PRESERVATIVE FREE 10 ML: 5 INJECTION INTRAVENOUS at 10:11

## 2023-11-22 RX ADMIN — PANTOPRAZOLE SODIUM 40 MG: 40 INJECTION, POWDER, FOR SOLUTION INTRAVENOUS at 09:11

## 2023-11-22 NOTE — ED PROVIDER NOTES
Encounter Date: 11/22/2023  History from patient     History     Chief Complaint   Patient presents with    Nausea     Nausea and black stools for 3 days     HPI    Mansi Jin is 63 y.o. female who  has a past medical history of ASTHMA, COPD (chronic obstructive pulmonary disease), High cholesterol, HLD (hyperlipidemia), HTN (hypertension), Lung cancer (12/08/2022), Stage 2 chronic kidney disease, TIA (transient ischemic attack), Tobacco abuse, Unspecified osteoarthritis, unspecified site, and Uterine cancer. arrives in ER with c/o Nausea (Nausea and black stools for 3 days)    Review of patient's allergies indicates:  No Known Allergies  Past Medical History:   Diagnosis Date    ASTHMA     COPD (chronic obstructive pulmonary disease)     SEVERE    High cholesterol     HLD (hyperlipidemia)     HTN (hypertension)     Lung cancer 12/08/2022    invasive adenocarcinoma, poorly differentiated with solid morphology, focal tumor involvement of visceral pleura and focal tumor vascular invasion (vein), with 2 of 3 posterior mediastinal lymph nodes positive for metastatic carcinoma    Stage 2 chronic kidney disease     TIA (transient ischemic attack)     Tobacco abuse     Unspecified osteoarthritis, unspecified site     Uterine cancer      Past Surgical History:   Procedure Laterality Date    HYSTERECTOMY  Unknowing    lipoma multiple sites      LUNG LOBECTOMY Right 12/08/2022    Procedure: LOBECTOMY, LUNG;  Surgeon: Jass Browne IV, MD;  Location: [facility] OR;  Service: Cardiothoracic;  Laterality: Right;    SURGICAL REMOVAL OF LYMPH NODE  12/08/2022    Procedure: EXCISION, LYMPH NODE;  Surgeon: Jass Browne IV, MD;  Location: [facility] OR;  Service: Cardiothoracic;;    THORACOTOMY Right 12/08/2022    Procedure: THORACOTOMY;  Surgeon: Jass Browne IV, MD;  Location: [facility] OR;  Service: Cardiothoracic;  Laterality: Right;  Right Lower Lobectomy with Lymph Node Dissection    TOTAL ABDOMINAL HYSTERECTOMY W/ BILATERAL  SALPINGOOPHORECTOMY       Family History   Problem Relation Age of Onset    Hypertension Mother     Hepatitis Mother     Ovarian cancer Mother     Liver cancer Mother     Heart failure Father     Prostate cancer Father     Fibroids Sister     Asthma Brother     Asthma Brother     Cancer Maternal Grandfather      Social History     Tobacco Use    Smoking status: Former     Current packs/day: 0.00     Average packs/day: 0.5 packs/day for 50.0 years (25.0 ttl pk-yrs)     Types: Cigarettes     Start date: 1972     Quit date: 2022     Years since quittin.9    Smokeless tobacco: Never   Substance Use Topics    Alcohol use: Not Currently     Comment: quit 25years ago    Drug use: Never     Review of Systems   Constitutional:  Positive for fatigue.   HENT:  Negative for trouble swallowing and voice change.    Eyes:  Negative for visual disturbance.   Respiratory:  Negative for cough and shortness of breath.    Cardiovascular:  Negative for chest pain.   Gastrointestinal:  Positive for blood in stool and nausea. Negative for abdominal pain, diarrhea and vomiting.   Genitourinary:  Negative for dysuria and hematuria.   Musculoskeletal:  Negative for gait problem.        No Pain.   Skin:  Negative for color change and rash.   Neurological:  Positive for weakness. Negative for headaches.   Psychiatric/Behavioral:  Negative for behavioral problems and sleep disturbance.    All other systems reviewed and are negative.    Physical Exam     Initial Vitals [23 0757]   BP Pulse Resp Temp SpO2   94/65 (!) 113 18 97.7 °F (36.5 °C) 98 %      MAP       --         Physical Exam    Nursing note and vitals reviewed.  Constitutional: She appears well-developed. No distress.   HENT:   Head: Atraumatic.   Eyes: EOM are normal. Pupils are equal, round, and reactive to light.   Cardiovascular:  Regular rhythm.           No murmur heard.  Tachycardia   Pulmonary/Chest: Breath sounds normal. No respiratory distress. She has no  wheezes.   Abdominal: Abdomen is soft. Bowel sounds are normal. She exhibits no distension. There is no abdominal tenderness. There is no rebound and no guarding.   Genitourinary: Rectum:      Guaiac result positive.      No rectal mass, anal fissure, tenderness, external hemorrhoid, internal hemorrhoid or abnormal anal tone.   Guaiac positive stool. : Acceptable.  Musculoskeletal:         General: Normal range of motion.     Neurological: She is alert and oriented to person, place, and time.   Skin: Skin is warm and dry.   Psychiatric: She has a normal mood and affect.   Cooperative, anxious         ED Course   Procedures    Orders Placed This Encounter   Procedures    X-Ray Chest PA And Lateral    CBC auto differential    Comprehensive metabolic panel    COVID/RSV/FLU A&B PCR    Urinalysis, Reflex to Urine Culture    Occult Blood, Stool, Diagnostic (1-3)    CBC with Differential    Occult Blood, Stool 1st Specimen    Occult Blood, Stool 2nd Specimen    Type & Screen    Insert Saline lock IV    Place in Observation     Medications   pantoprazole injection 40 mg (has no administration in time range)   lactated ringers bolus 1,000 mL (0 mLs Intravenous Stopped 11/22/23 1012)   prochlorperazine injection Soln 10 mg (10 mg Intravenous Given 11/22/23 0825)     Admission on 11/22/2023   Component Date Value Ref Range Status    Sodium Level 11/22/2023 137  136 - 145 mmol/L Final    Potassium Level 11/22/2023 3.3 (L)  3.5 - 5.1 mmol/L Final    Chloride 11/22/2023 106  98 - 107 mmol/L Final    Carbon Dioxide 11/22/2023 23  23 - 31 mmol/L Final    Glucose Level 11/22/2023 102  82 - 115 mg/dL Final    Blood Urea Nitrogen 11/22/2023 10.0  9.8 - 20.1 mg/dL Final    Creatinine 11/22/2023 0.80  0.55 - 1.02 mg/dL Final    Calcium Level Total 11/22/2023 8.6  8.4 - 10.2 mg/dL Final    Protein Total 11/22/2023 6.6  5.8 - 7.6 gm/dL Final    Albumin Level 11/22/2023 3.4  3.4 - 4.8 g/dL Final    Globulin 11/22/2023 3.2   2.4 - 3.5 gm/dL Final    Albumin/Globulin Ratio 11/22/2023 1.1  1.1 - 2.0 ratio Final    Bilirubin Total 11/22/2023 0.3  <=1.5 mg/dL Final    Alkaline Phosphatase 11/22/2023 73  40 - 150 unit/L Final    Alanine Aminotransferase 11/22/2023 10  0 - 55 unit/L Final    Aspartate Aminotransferase 11/22/2023 15  5 - 34 unit/L Final    eGFR 11/22/2023 >60  mls/min/1.73/m2 Final    Influenza A PCR 11/22/2023 Not Detected  Not Detected Final    Influenza B PCR 11/22/2023 Not Detected  Not Detected Final    Respiratory Syncytial Virus PCR 11/22/2023 Not Detected  Not Detected Final    SARS-CoV-2 PCR 11/22/2023 Not Detected  Not Detected, Negative, Invalid Final    Color, UA 11/22/2023 Yellow  Yellow, Light-Yellow, Dark Yellow, [place], Straw Final    Appearance, UA 11/22/2023 Clear  Clear Final    Specific Gravity, UA 11/22/2023 1.010  1.005 - 1.030 Final    pH, UA 11/22/2023 7.0  5.0 - 8.5 Final    Protein, UA 11/22/2023 Negative  Negative Final    Glucose, UA 11/22/2023 Negative  Negative, Normal Final    Ketones, UA 11/22/2023 Negative  Negative Final    Blood, UA 11/22/2023 Negative  Negative Final    Bilirubin, UA 11/22/2023 Negative  Negative Final    Urobilinogen, UA 11/22/2023 0.2  0.2, 1.0, Normal Final    Nitrites, UA 11/22/2023 Negative  Negative Final    Leukocyte Esterase, UA 11/22/2023 Negative  Negative Final    WBC 11/22/2023 2.45 (L)  4.50 - 11.50 x10(3)/mcL Final    RBC 11/22/2023 4.30  4.20 - 5.40 x10(6)/mcL Final    Hgb 11/22/2023 11.1 (L)  12.0 - 16.0 g/dL Final    Hct 11/22/2023 35.3 (L)  37.0 - 47.0 % Final    MCV 11/22/2023 82.1  80.0 - 94.0 fL Final    MCH 11/22/2023 25.8 (L)  27.0 - 31.0 pg Final    MCHC 11/22/2023 31.4 (L)  33.0 - 36.0 g/dL Final    RDW 11/22/2023 16.5  11.5 - 17.0 % Final    Platelet 11/22/2023 378  130 - 400 x10(3)/mcL Final    MPV 11/22/2023 10.0  7.4 - 10.4 fL Final    Neut % 11/22/2023 55.5  % Final    Lymph % 11/22/2023 33.9  % Final    Mono % 11/22/2023 8.2  % Final    Eos %  11/22/2023 0.8  % Final    Basophil % 11/22/2023 0.8  % Final    Lymph # 11/22/2023 0.83  0.6 - 4.6 x10(3)/mcL Final    Neut # 11/22/2023 1.36 (L)  2.1 - 9.2 x10(3)/mcL Final    Mono # 11/22/2023 0.20  0.1 - 1.3 x10(3)/mcL Final    Eos # 11/22/2023 0.02  0 - 0.9 x10(3)/mcL Final    Baso # 11/22/2023 0.02  <=0.2 x10(3)/mcL Final    IG# 11/22/2023 0.02  0 - 0.04 x10(3)/mcL Final    IG% 11/22/2023 0.8  % Final    Occult Blood Stool 1 11/22/2023 Positive (A)  Negative Final       Labs Reviewed   COMPREHENSIVE METABOLIC PANEL - Abnormal; Notable for the following components:       Result Value    Potassium Level 3.3 (*)     All other components within normal limits   CBC WITH DIFFERENTIAL - Abnormal; Notable for the following components:    WBC 2.45 (*)     Hgb 11.1 (*)     Hct 35.3 (*)     MCH 25.8 (*)     MCHC 31.4 (*)     Neut # 1.36 (*)     All other components within normal limits   OCCULT BLOOD, STOOL 1ST SPECIMEN - Abnormal; Notable for the following components:    Occult Blood Stool 1 Positive (*)     All other components within normal limits   COVID/RSV/FLU A&B PCR - Normal    Narrative:     The Xpert Xpress SARS-CoV-2/FLU/RSV plus is a rapid, multiplexed real-time PCR test intended for the simultaneous qualitative detection and differentiation of SARS-CoV-2, Influenza A, Influenza B, and respiratory syncytial virus (RSV) viral RNA in either nasopharyngeal swab or nasal swab specimens.         URINALYSIS, REFLEX TO URINE CULTURE - Normal   CBC W/ AUTO DIFFERENTIAL    Narrative:     The following orders were created for panel order CBC auto differential.  Procedure                               Abnormality         Status                     ---------                               -----------         ------                     CBC with Differential[[id number]]       Abnormal            Final result                 Please view results for these tests on the individual orders.   OCCULT BLOOD,STOOL,DIAGNOSTIC (1-3)     Narrative:     The following orders were created for panel order Occult Blood, Stool, Diagnostic (1-3).  Procedure                               Abnormality         Status                     ---------                               -----------         ------                     Occult Blood, Stool 1st...[[id number]]  Abnormal            Final result               Occult Blood, Stool 2nd...[[id number]]                                                   Please view results for these tests on the individual orders.   OCCULT BLOOD, STOOL 2ND SPECIMEN   TYPE & SCREEN          Imaging Results              X-Ray Chest PA And Lateral (Final result)  Result time 11/22/23 09:58:22      Final result by Simon Westfall MD (11/22/23 09:58:22)                   Impression:      1. Persistent small right basilar pleural reaction, infiltrate, and atelectasis with mild elevation of the right hemidiaphragm  2. Thoracic spondylosis      Electronically signed by: Simon Westfall  Date:    11/22/2023  Time:    09:58               Narrative:    EXAMINATION:  XR CHEST PA AND LATERAL    CLINICAL HISTORY:  , Cough, unspecified.    COMPARISON:  07/22/2023    FINDINGS:  PA/AP and lateral views reveal heart to be normal in size.  The trachea is mostly midline.  There is mild elevation of the right hemidiaphragm.  A persistent small right basilar infiltrate, atelectasis, and pleural reaction is present.  Degenerative changes are again noted to the thoracic spine.                                       Medications   pantoprazole injection 40 mg (has no administration in time range)   lactated ringers bolus 1,000 mL (0 mLs Intravenous Stopped 11/22/23 1012)   prochlorperazine injection Soln 10 mg (10 mg Intravenous Given 11/22/23 0825)     Medical Decision Making    Mansi Jin is 63 y.o. female who  has a past medical history of ASTHMA, COPD (chronic obstructive pulmonary disease), High cholesterol, HLD (hyperlipidemia), HTN  (hypertension), Lung cancer (12/08/2022), Stage 2 chronic kidney disease, TIA (transient ischemic attack), Tobacco abuse, Unspecified osteoarthritis, unspecified site, and Uterine cancer. arrives in ER with c/o Nausea (Nausea and black stools for 3 days)    Patient has the chemotherapy about 7 days back, comes today with complaint of feeling nauseous, she is taking the medicine prescribed for nausea for her, and also she started passing some black stools 3 days back, she is on iron pills, she also has some cough.    Her oxygen saturations maintained 98% on room air, she does have some tachycardia, decided to give her some IV fluids, and get stool occult blood and blood work done on her and will decide further.        Amount and/or Complexity of Data Reviewed  Labs: ordered.  Radiology: ordered.    Risk  Prescription drug management.               ED Course as of 11/22/23 1017   Wed Nov 22, 2023   0920 Patient's stool occult blood is positive, her H&H is in safe range at this time, I will admit her in the hospital repeat H&H and monitor her for GI bleed. [GQ]   0940 Occult Blood(!): Positive [GQ]   0940 Hemoglobin(!): 11.1 [GQ]   0940 Hematocrit(!): 35.3 [GQ]   1008 I talked to Dr. Kadeem Bradshaw, and is okay with admission I will put her in observation. [GQ]   1015 I am going to give her a dose of Protonix in the emergency room and put her on Protonix 40 IV push q.d., and admit her for observation. [GQ]      ED Course User Index  [GQ] Cleo Kumar MD                        Clinical Impression:  Final diagnoses:  [R05.9] Cough  [K92.2] Gastrointestinal hemorrhage, unspecified gastrointestinal hemorrhage type (Primary)  [D72.819] Leukopenia, unspecified type  [E87.6] Hypokalemia          ED Disposition Condition    Observation Stable                Cleo Kumar MD  11/22/23 1017

## 2023-11-22 NOTE — H&P
[facility]    History & Physical      Patient Name: Mansi Jin  MRN: [id number]  Admission Date: 11/22/2023  Attending Physician: Ward Bradshaw MD   Primary Care Provider: Jennifer Dacosta FNP         Patient information was obtained from patient, relative(s), and ER records.     Subjective:     Principal Problem:Dyspepsia and disorder of function of stomach    Chief Complaint:   Chief Complaint   Patient presents with    Nausea     Nausea and black stools for 3 days        HPI:   Ms. Davin DELGADO 3-year-old female with a history of COPD, hypertension, metastatic non-small cell lung cancer currently undergoing chemotherapy, CKD, stage II, and TIA developed nausea and queasiness of her stomach over the past 2 days.  In the ED she did report having black stools however she takes supplemental iron intermittently and notes that when she is taking iron her stool will turn black.  Hemoccult was performed in the ED and was positive for occult blood.  Other evaluation in the ED included routine labs showed hemoglobin to be 11, WBC 2.45.  She was also mildly hypokalemic.  On my seeing her following her arrival to the Med/surg floor she was feeling better and was asking for something to eat.  She has been placed on clear liquid diet.  Past Medical History:   Diagnosis Date    ASTHMA     COPD (chronic obstructive pulmonary disease)     SEVERE    High cholesterol     HLD (hyperlipidemia)     HTN (hypertension)     Lung cancer 12/08/2022    invasive adenocarcinoma, poorly differentiated with solid morphology, focal tumor involvement of visceral pleura and focal tumor vascular invasion (vein), with 2 of 3 posterior mediastinal lymph nodes positive for metastatic carcinoma    Stage 2 chronic kidney disease     TIA (transient ischemic attack)     Tobacco abuse     Unspecified osteoarthritis, unspecified site     Uterine cancer        Past Surgical History:   Procedure Laterality Date     HYSTERECTOMY  Unknowing    lipoma multiple sites      LUNG LOBECTOMY Right 12/08/2022    Procedure: LOBECTOMY, LUNG;  Surgeon: Jass Browne IV, MD;  Location: [facility] OR;  Service: Cardiothoracic;  Laterality: Right;    SURGICAL REMOVAL OF LYMPH NODE  12/08/2022    Procedure: EXCISION, LYMPH NODE;  Surgeon: Jass Browne IV, MD;  Location: [facility] OR;  Service: Cardiothoracic;;    THORACOTOMY Right 12/08/2022    Procedure: THORACOTOMY;  Surgeon: Jass Browne IV, MD;  Location: [facility] OR;  Service: Cardiothoracic;  Laterality: Right;  Right Lower Lobectomy with Lymph Node Dissection    TOTAL ABDOMINAL HYSTERECTOMY W/ BILATERAL SALPINGOOPHORECTOMY         Review of patient's allergies indicates:  No Known Allergies    No current facility-administered medications on file prior to encounter.     Current Outpatient Medications on File Prior to Encounter   Medication Sig    acetaminophen (TYLENOL) 500 MG tablet Take 1,000 mg by mouth every 6 (six) hours as needed.    ascorbic acid, vitamin C, (VITAMIN C) 500 MG tablet Take 500 mg by mouth once daily.    atorvastatin (LIPITOR) 40 MG tablet Take 1 tablet (40 mg total) by mouth every evening.    azithromycin (Z-JACEY) 250 MG tablet Take 1 tablet (250 mg total) by mouth once daily. Take first 2 tablets together, then 1 every day until finished.    benzonatate (TESSALON) 200 MG capsule TAKE 1 CAPSULE(200 MG) BY MOUTH THREE TIMES DAILY AS NEEDED FOR COUGH    cholecalciferol, vitamin D3, 1,250 mcg (50,000 unit) capsule Take 50,000 Units by mouth every 7 days.    docusate sodium (COLACE) 50 MG capsule Take by mouth Daily.    ergocalciferol (ERGOCALCIFEROL) 50,000 unit Cap Take 50,000 Units by mouth every 7 days.    famotidine (PEPCID) 40 MG tablet TAKE 1 TABLET(40 MG) BY MOUTH EVERY EVENING (Patient taking differently: Take 20 mg by mouth as needed.)    fluticasone propionate (FLONASE) 50 mcg/actuation nasal spray 1 spray (50 mcg total) by Each Nostril route once daily.     fluticasone-umeclidin-vilanter (TRELEGY ELLIPTA) 100-62.5-25 mcg DsDv Inhale 1 puff into the lungs once daily.    HYDROcodone-acetaminophen (NORCO) 5-325 mg per tablet Take 1 tablet by mouth every 6 (six) hours as needed for Pain.    ibuprofen (ADVIL,MOTRIN) 200 MG tablet Take 400 mg by mouth every 6 (six) hours as needed.    ipratropium/albuterol sulfate (COMBIVENT INHL) Inhale into the lungs as needed.    iron polysac-iron heme polypep (FEOSOL BIFERA) 28 mg Tab Take 1 tablet by mouth once daily.    k phos di & mono-sod phos mono (K-PHOS-NEUTRAL) 250 mg Tab Take 1 tablet by mouth 2 (two) times a day.    lactulose (CHRONULAC) 10 gram/15 mL solution Take 30 mLs (20 g total) by mouth 2 (two) times daily.    levothyroxine (SYNTHROID) 75 MCG tablet     loratadine (CLARITIN) 10 mg tablet Take 1 tablet (10 mg total) by mouth once daily.    megestroL (MEGACE) 40 MG Tab Take 1 tablet (40 mg total) by mouth 4 (four) times daily For low appetite..    mirtazapine (REMERON) 7.5 MG Tab Take 1 tablet (7.5 mg total) by mouth nightly.    [DISCONTINUED] aspirin (ECOTRIN) 81 MG EC tablet Take 1 tablet (81 mg total) by mouth once daily.     Family History       Problem Relation (Age of Onset)    Asthma Brother, Brother    Cancer Maternal Grandfather    Fibroids Sister    Heart failure Father    Hepatitis Mother    Hypertension Mother    Liver cancer Mother    Ovarian cancer Mother    Prostate cancer Father          Tobacco Use    Smoking status: Former     Current packs/day: 0.00     Average packs/day: 0.5 packs/day for 50.0 years (25.0 ttl pk-yrs)     Types: Cigarettes     Start date: 1972     Quit date: 2022     Years since quittin.9    Smokeless tobacco: Never   Substance and Sexual Activity    Alcohol use: Not Currently     Comment: quit 25years ago    Drug use: Never    Sexual activity: Yes     Partners: Male     Review of Systems   Constitutional:  Positive for appetite change.   HENT: Negative.     Eyes: Negative.     Respiratory: Negative.     Cardiovascular: Negative.    Gastrointestinal:  Positive for abdominal pain, constipation and nausea. Negative for abdominal distention, blood in stool and vomiting.   Endocrine: Negative.    Genitourinary: Negative.    Musculoskeletal: Negative.    Neurological: Negative.    Psychiatric/Behavioral: Negative.       Objective:     Vital Signs (Most Recent):  Temp: 98.2 °F (36.8 °C) (11/22/23 1229)  Pulse: 102 (11/22/23 1229)  Resp: 20 (11/22/23 1016)  BP: 103/63 (11/22/23 1229)  SpO2: 99 % (11/22/23 1229) Vital Signs (24h Range):  Temp:  [97.7 °F (36.5 °C)-98.2 °F (36.8 °C)] 98.2 °F (36.8 °C)  Pulse:  [] 102  Resp:  [18-29] 20  SpO2:  [96 %-100 %] 99 %  BP: ()/(63-79) 103/63     Weight: 62.1 kg (137 lb)  Body mass index is 22.11 kg/m².    Physical exam  Constitution-well nourished, normally developed female in NAD  Eyes-PERRLA, EOMI  HENT-normocephalic, atraumatic; external ears appear normal; moist mucous membranes  Neck-supple  Respiratory-normal respirations; CTA with good air movement  Heart-RRR; normal S1 and S2; no murmurs, gallops  Abdomen-soft, nontender, nondistended; active bowel sounds  Genitourinary-deferred  Musculoskeletal-no joint abnormalities, normal ROM throughout  Skin-warm, dry; no rashes  Neurologic-alert and oriented x3; moving all extremities       Significant Labs: All pertinent labs within the past 24 hours have been reviewed.  Recent Lab Results         11/22/23  1402   11/22/23  0823   11/22/23  0820   11/22/23  0810        RSV Ag by Molecular Method       Not Detected       Influenza A, Molecular       Not Detected       Influenza B, Molecular       Not Detected       Albumin/Globulin Ratio     1.1         Albumin     3.4         ALP     73         ALT     10         Appearance, UA   Clear           AST     15         Baso #     0.02         Basophil %     0.8         BILIRUBIN TOTAL     0.3         Bilirubin, UA   Negative           BUN     10.0          Calcium     8.6         Chloride     106         CO2     23         Color, UA   Yellow           Creatinine     0.80         eGFR     >60         Eos #     0.02         Eosinophil %     0.8         Globulin, Total     3.2         Glucose     102         Glucose, UA   Negative           Group & Rh     B POS         Hematocrit 31.6     35.3         Hemoglobin 9.7     11.1         Immature Grans (Abs)     0.02         Immature Granulocytes     0.8         Indirect José GEL     NEG         Ketones, UA   Negative           Leukocytes, UA   Negative           Lymph #     0.83         LYMPH %     33.9         MCH     25.8         MCHC     31.4         MCV     82.1         Mono #     0.20         Mono %     8.2         MPV     10.0         Neut #     1.36         Neut %     55.5         NITRITE UA   Negative           Occult Blood       Positive       Occult Blood UA   Negative           pH, UA   7.0           Platelet Count     378         Potassium     3.3         PROTEIN TOTAL     6.6         Protein, UA   Negative           RBC     4.30         RDW     16.5         SARS-CoV2 (COVID-19) Qualitative PCR       Not Detected       Sodium     137         Specific Gravity,UA   1.010           Specimen Outdate     11/25/2023 23:59         Urobilinogen, UA   0.2           WBC     2.45                 Significant Imaging: I have reviewed all pertinent imaging results/findings within the past 24 hours.  CXR  Impression:   1. Persistent small right basilar pleural reaction, infiltrate, and atelectasis with mild elevation of the right hemidiaphragm  2. Thoracic spondylosis  Assessment/Plan:   Dyspepsia  Patient admitted due to concern for GI bleed as FOBT was positive in the ED  Vital signs have been stable, she has no orthostatic symptoms  H&H thus far stable  Monitor overnight, recheck H&H in a.m.  Diet advanced to clear liquids  Keep patient on IV Protonix every 12 hours     NSCLC, metastatic  Patient on her 2nd course of  chemotherapy last effusion about 1 week ago    Hypertension  Continue current medication    Dyslipidemia  Continue statin    CKD, stage II  Indices normal    Hypokalemia  Supplement as necessary  Active Diagnoses:    Diagnosis Date Noted POA    PRINCIPAL PROBLEM:  Dyspepsia and disorder of function of stomach [K31.9, R10.13] 11/22/2023 Yes      Problems Resolved During this Admission:     VTE Risk Mitigation (From admission, onward)           Ordered     IP VTE HIGH RISK PATIENT  Once         11/22/23 1225     Place sequential compression device  Until discontinued         11/22/23 1225                Social determinants of health limiting diagnosis/treatment   none        Ward Bradshaw MD  Department of Hospital Medicine   [facility]

## 2023-11-22 NOTE — PLAN OF CARE
Problem: Adult Inpatient Plan of Care  Goal: Plan of Care Review  Outcome: Ongoing, Progressing  Goal: Patient-Specific Goal (Individualized)  Outcome: Ongoing, Progressing  Goal: Absence of Hospital-Acquired Illness or Injury  Outcome: Ongoing, Progressing  Goal: Optimal Comfort and Wellbeing  Outcome: Ongoing, Progressing  Goal: Readiness for Transition of Care  Outcome: Ongoing, Progressing     Problem: Adjustment to Illness (Gastrointestinal Bleeding)  Goal: Optimal Coping with Acute Illness  Outcome: Ongoing, Progressing     Problem: Bleeding (Gastrointestinal Bleeding)  Goal: Hemostasis  Outcome: Ongoing, Progressing     Problem: Fall Injury Risk  Goal: Absence of Fall and Fall-Related Injury  Outcome: Ongoing, Progressing     Problem: COPD (Chronic Obstructive Pulmonary Disease) Comorbidity  Goal: Maintenance of COPD Symptom Control  Outcome: Ongoing, Progressing     Problem: Hypertension Comorbidity  Goal: Blood Pressure in Desired Range  Outcome: Ongoing, Progressing

## 2023-11-23 VITALS
WEIGHT: 137 LBS | OXYGEN SATURATION: 98 % | TEMPERATURE: 99 F | BODY MASS INDEX: 22.02 KG/M2 | HEART RATE: 119 BPM | HEIGHT: 66 IN | RESPIRATION RATE: 20 BRPM | DIASTOLIC BLOOD PRESSURE: 65 MMHG | SYSTOLIC BLOOD PRESSURE: 97 MMHG

## 2023-11-23 PROBLEM — R10.13 DYSPEPSIA AND DISORDER OF FUNCTION OF STOMACH: Status: RESOLVED | Noted: 2023-11-22 | Resolved: 2023-11-23

## 2023-11-23 PROBLEM — K31.9 DYSPEPSIA AND DISORDER OF FUNCTION OF STOMACH: Status: RESOLVED | Noted: 2023-11-22 | Resolved: 2023-11-23

## 2023-11-23 LAB
ALBUMIN SERPL-MCNC: 2.9 G/DL (ref 3.4–4.8)
ALBUMIN/GLOB SERPL: 1.1 RATIO (ref 1.1–2)
ALP SERPL-CCNC: 70 UNIT/L (ref 40–150)
ALT SERPL-CCNC: 6 UNIT/L (ref 0–55)
AST SERPL-CCNC: 13 UNIT/L (ref 5–34)
BASOPHILS # BLD AUTO: 0.02 X10(3)/MCL
BASOPHILS NFR BLD AUTO: 0.7 %
BILIRUB SERPL-MCNC: 0.4 MG/DL
BUN SERPL-MCNC: 5 MG/DL (ref 9.8–20.1)
CALCIUM SERPL-MCNC: 7.8 MG/DL (ref 8.4–10.2)
CHLORIDE SERPL-SCNC: 112 MMOL/L (ref 98–107)
CO2 SERPL-SCNC: 21 MMOL/L (ref 23–31)
COLOR STL: ABNORMAL
CONSISTENCY STL: ABNORMAL
CREAT SERPL-MCNC: 0.72 MG/DL (ref 0.55–1.02)
EOSINOPHIL # BLD AUTO: 0.01 X10(3)/MCL (ref 0–0.9)
EOSINOPHIL NFR BLD AUTO: 0.4 %
ERYTHROCYTE [DISTWIDTH] IN BLOOD BY AUTOMATED COUNT: 16.7 % (ref 11.5–17)
GFR SERPLBLD CREATININE-BSD FMLA CKD-EPI: >60 MLS/MIN/1.73/M2
GLOBULIN SER-MCNC: 2.7 GM/DL (ref 2.4–3.5)
GLUCOSE SERPL-MCNC: 75 MG/DL (ref 82–115)
HCT VFR BLD AUTO: 31.3 % (ref 37–47)
HEMOCCULT SP3 STL QL: POSITIVE
HGB BLD-MCNC: 9.6 G/DL (ref 12–16)
IMM GRANULOCYTES # BLD AUTO: 0.01 X10(3)/MCL (ref 0–0.04)
IMM GRANULOCYTES NFR BLD AUTO: 0.4 %
LYMPHOCYTES # BLD AUTO: 0.86 X10(3)/MCL (ref 0.6–4.6)
LYMPHOCYTES NFR BLD AUTO: 32 %
MAGNESIUM SERPL-MCNC: 2.2 MG/DL (ref 1.6–2.6)
MCH RBC QN AUTO: 25.3 PG (ref 27–31)
MCHC RBC AUTO-ENTMCNC: 30.7 G/DL (ref 33–36)
MCV RBC AUTO: 82.6 FL (ref 80–94)
MONOCYTES # BLD AUTO: 0.39 X10(3)/MCL (ref 0.1–1.3)
MONOCYTES NFR BLD AUTO: 14.5 %
NEUTROPHILS # BLD AUTO: 1.4 X10(3)/MCL (ref 2.1–9.2)
NEUTROPHILS NFR BLD AUTO: 52 %
PHOSPHATE SERPL-MCNC: 1.4 MG/DL (ref 2.3–4.7)
PLATELET # BLD AUTO: 335 X10(3)/MCL (ref 130–400)
PMV BLD AUTO: 10.1 FL (ref 7.4–10.4)
POTASSIUM SERPL-SCNC: 3.6 MMOL/L (ref 3.5–5.1)
PROT SERPL-MCNC: 5.6 GM/DL (ref 5.8–7.6)
RBC # BLD AUTO: 3.79 X10(6)/MCL (ref 4.2–5.4)
SODIUM SERPL-SCNC: 141 MMOL/L (ref 136–145)
WBC # SPEC AUTO: 2.69 X10(3)/MCL (ref 4.5–11.5)

## 2023-11-23 PROCEDURE — 94640 AIRWAY INHALATION TREATMENT: CPT

## 2023-11-23 PROCEDURE — 85025 COMPLETE CBC W/AUTO DIFF WBC: CPT | Performed by: INTERNAL MEDICINE

## 2023-11-23 PROCEDURE — C9113 INJ PANTOPRAZOLE SODIUM, VIA: HCPCS | Performed by: EMERGENCY MEDICINE

## 2023-11-23 PROCEDURE — 25000003 PHARM REV CODE 250: Performed by: EMERGENCY MEDICINE

## 2023-11-23 PROCEDURE — 94761 N-INVAS EAR/PLS OXIMETRY MLT: CPT

## 2023-11-23 PROCEDURE — 25000003 PHARM REV CODE 250: Performed by: INTERNAL MEDICINE

## 2023-11-23 PROCEDURE — 82272 OCCULT BLD FECES 1-3 TESTS: CPT | Performed by: INTERNAL MEDICINE

## 2023-11-23 PROCEDURE — G0378 HOSPITAL OBSERVATION PER HR: HCPCS

## 2023-11-23 PROCEDURE — 84100 ASSAY OF PHOSPHORUS: CPT | Performed by: EMERGENCY MEDICINE

## 2023-11-23 PROCEDURE — 96376 TX/PRO/DX INJ SAME DRUG ADON: CPT

## 2023-11-23 PROCEDURE — 80053 COMPREHEN METABOLIC PANEL: CPT | Performed by: INTERNAL MEDICINE

## 2023-11-23 PROCEDURE — 83735 ASSAY OF MAGNESIUM: CPT | Performed by: EMERGENCY MEDICINE

## 2023-11-23 PROCEDURE — 25000242 PHARM REV CODE 250 ALT 637 W/ HCPCS: Performed by: EMERGENCY MEDICINE

## 2023-11-23 PROCEDURE — A4216 STERILE WATER/SALINE, 10 ML: HCPCS | Performed by: INTERNAL MEDICINE

## 2023-11-23 PROCEDURE — 63600175 PHARM REV CODE 636 W HCPCS: Performed by: EMERGENCY MEDICINE

## 2023-11-23 RX ORDER — SODIUM,POTASSIUM PHOSPHATES 280-250MG
1 POWDER IN PACKET (EA) ORAL 3 TIMES DAILY
Status: DISCONTINUED | OUTPATIENT
Start: 2023-11-23 | End: 2023-11-23 | Stop reason: HOSPADM

## 2023-11-23 RX ORDER — PANTOPRAZOLE SODIUM 40 MG/1
40 TABLET, DELAYED RELEASE ORAL DAILY
Qty: 30 TABLET | Refills: 11 | Status: SHIPPED | OUTPATIENT
Start: 2023-11-23 | End: 2024-11-22

## 2023-11-23 RX ADMIN — SODIUM CHLORIDE, PRESERVATIVE FREE 10 ML: 5 INJECTION INTRAVENOUS at 06:11

## 2023-11-23 RX ADMIN — POTASSIUM & SODIUM PHOSPHATES POWDER PACK 280-160-250 MG 1 PACKET: 280-160-250 PACK at 08:11

## 2023-11-23 RX ADMIN — IPRATROPIUM BROMIDE AND ALBUTEROL SULFATE 3 ML: .5; 3 SOLUTION RESPIRATORY (INHALATION) at 07:11

## 2023-11-23 RX ADMIN — PANTOPRAZOLE SODIUM 40 MG: 40 INJECTION, POWDER, FOR SOLUTION INTRAVENOUS at 08:11

## 2023-11-23 RX ADMIN — LEVOTHYROXINE SODIUM 75 MCG: 75 TABLET ORAL at 07:11

## 2023-11-23 NOTE — DISCHARGE SUMMARY
[facility]  Hospital Medicine  Discharge Summary      Patient Name: Mansi Jin  MRN: [id number]  Admission Date: 11/22/2023  Hospital Length of Stay: 0 days  Discharge Date and Time:  11/23/2023 9:26 AM  Attending Physician: Ward Bradshaw MD   Discharging Provider: Ward Bradshaw MD  Discharge Provider Team: Networked reference to record PCT   Primary Care Provider: Jennifer Dacosta FNP        HPI:   Ms. Davin DELGADO 3-year-old female with a history of COPD, hypertension, metastatic non-small cell lung cancer currently undergoing chemotherapy, CKD, stage II, and TIA developed nausea and queasiness of her stomach over the past 2 days.  In the ED she did report having black stools however she takes supplemental iron intermittently and notes that when she is taking iron her stool will turn black.  Hemoccult was performed in the ED and was positive for occult blood.  Other evaluation in the ED included routine labs showed hemoglobin to be 11, WBC 2.45.  She was also mildly hypokalemic.  On my seeing her following her arrival to the Med/surg floor she was feeling better and was asking for something to eat.  She has been placed on clear liquid diet.    * No surgery found *      Hospital Course:   11/23-there were no events overnight; when seen on rounds today had no complaints; diet will be advanced to regular; H&H did decline slightly, likely due to hemodilution related to IV hydration    ---    Dyspepsia  Patient admitted due to concern for GI bleed as FOBT was positive in the ED  Vital signs have been stable, she has had no orthostatic symptoms  H&H declined slightly overnight, likely due to hemodilution  Protonix 40 mg daily to her medical regimen; she can continue taking famotidine nightly  Discussed avoidance NSAIDs  She will need referral for endoscopy to further evaluate positive FOBT    NSCLC, metastatic  Patient on her 2nd course of chemotherapy w/ last infusion about 1  week ago     Hypertension  Continue current medication     Dyslipidemia  Continue statin     CKD, stage II  Indices normal     Hypokalemia  Supplemented    Physical exam  Constitution-well nourished, normally developed female in NAD  Eyes-PERRLA, EOMI  HENT-normocephalic, atraumatic  Neck-supple  Respiratory-normal respirations  Heart-RRR; normal S1 and S2; no murmurs, gallops  Abdomen-soft, nontender, nondistended; active bowel sounds  Genitourinary-deferred  Musculoskeletal-no joint abnormalities, normal ROM throughout  Skin-warm, dry; no rashes  Neurologic-alert and oriented x3; moving all extremities     Final Active Diagnoses:      Problems Resolved During this Admission:    Diagnosis Date Noted Date Resolved POA    PRINCIPAL PROBLEM:  Dyspepsia and disorder of function of stomach [K31.9, R10.13] 11/22/2023 11/23/2023 Yes      Discharged Condition: stable    Disposition: Home or Self Care    Follow Up:   Follow-up Information       Jennifer Dacosta FNP Follow up.    Specialty: Family Medicine  Why: Keep scheduled appointment  Contact information:  [address] [place] [id number] [phone]                           Patient Instructions:   No discharge procedures on file.  Medications:  Reconciled Home Medications:      Medication List        START taking these medications      pantoprazole 40 MG tablet  Commonly known as: PROTONIX  Take 1 tablet (40 mg total) by mouth once daily.            CHANGE how you take these medications      famotidine 40 MG tablet  Commonly known as: PEPCID  TAKE 1 TABLET(40 MG) BY MOUTH EVERY EVENING  What changed:   how much to take  when to take this  reasons to take this            CONTINUE taking these medications      acetaminophen 500 MG tablet  Commonly known as: TYLENOL  Take 1,000 mg by mouth every 6 (six) hours as needed.     atorvastatin 40 MG tablet  Commonly known as: LIPITOR  Take 1 tablet (40 mg total) by mouth every evening.     azithromycin 250 MG  tablet  Commonly known as: Z-JACEY  Take 1 tablet (250 mg total) by mouth once daily. Take first 2 tablets together, then 1 every day until finished.     benzonatate 200 MG capsule  Commonly known as: TESSALON  TAKE 1 CAPSULE(200 MG) BY MOUTH THREE TIMES DAILY AS NEEDED FOR COUGH     cholecalciferol (vitamin D3) 1,250 mcg (50,000 unit) capsule  Take 50,000 Units by mouth every 7 days.     COMBIVENT INHL  Inhale into the lungs as needed.     docusate sodium 50 MG capsule  Commonly known as: COLACE  Take by mouth Daily.     ergocalciferol 50,000 unit Cap  Commonly known as: ERGOCALCIFEROL  Take 50,000 Units by mouth every 7 days.     FEOSOL BIFERA 28 mg Tab  Generic drug: iron polysac-iron heme polypep  Take 1 tablet by mouth once daily.     fluticasone propionate 50 mcg/actuation nasal spray  Commonly known as: FLONASE  1 spray (50 mcg total) by Each Nostril route once daily.     fluticasone-umeclidin-vilanter 100-62.5-25 mcg Dsdv  Commonly known as: TRELEGY ELLIPTA  Inhale 1 puff into the lungs once daily.     HYDROcodone-acetaminophen 5-325 mg per tablet  Commonly known as: NORCO  Take 1 tablet by mouth every 6 (six) hours as needed for Pain.     ibuprofen 200 MG tablet  Commonly known as: ADVIL,MOTRIN  Take 400 mg by mouth every 6 (six) hours as needed.     k phos di & mono-sod phos mono 250 mg Tab  Commonly known as: K-Phos-NeutraL  Take 1 tablet by mouth 2 (two) times a day.     lactulose 10 gram/15 mL solution  Commonly known as: CHRONULAC  Take 30 mLs (20 g total) by mouth 2 (two) times daily.     levothyroxine 75 MCG tablet  Commonly known as: SYNTHROID     loratadine 10 mg tablet  Commonly known as: CLARITIN  Take 1 tablet (10 mg total) by mouth once daily.     megestroL 40 MG Tab  Commonly known as: MEGACE  Take 1 tablet (40 mg total) by mouth 4 (four) times daily For low appetite..     mirtazapine 7.5 MG Tab  Commonly known as: REMERON  Take 1 tablet (7.5 mg total) by mouth nightly.     VITAMIN C 500 MG  tablet  Generic drug: ascorbic acid (vitamin C)  Take 500 mg by mouth once daily.              Significant Diagnostic Studies: Labs: BMP:   Recent Labs   Lab 11/22/23  0820 11/23/23 0312    141   K 3.3* 3.6   CO2 23 21*   BUN 10.0 5.0*   CREATININE 0.80 0.72   CALCIUM 8.6 7.8*   MG  --  2.20    and CMP   Recent Labs   Lab 11/22/23  0820 11/23/23 0312    141   K 3.3* 3.6   CO2 23 21*   BUN 10.0 5.0*   CREATININE 0.80 0.72   CALCIUM 8.6 7.8*   ALBUMIN 3.4 2.9*   BILITOT 0.3 0.4   ALKPHOS 73 70   AST 15 13   ALT 10 6       Pending Diagnostic Studies:       Procedure Component Value Units Date/Time    Occult Blood, Stool 3rd Specimen [[id number]]     Order Status: Sent Lab Status: No result     Specimen: Stool     Occult Blood, Stool, Diagnostic (1-3) [[id number]]  (Abnormal) Collected: 11/22/23 0810    Order Status: Sent Lab Status: In process Updated: 11/22/23 2215    Specimen: Stool     Narrative:      The following orders were created for panel order Occult Blood, Stool, Diagnostic (1-3).  Procedure                               Abnormality         Status                     ---------                               -----------         ------                     Occult Blood, Stool 1st...[[id number]]  Abnormal            Final result               Occult Blood, Stool 2nd...[[id number]]  Abnormal            Final result               Occult Blood, Stool 3rd...[[id number]]                                                   Please view results for these tests on the individual orders.          Indwelling Lines/Drains at time of discharge:   Lines/Drains/Airways       None                   Time spent on the discharge of patient:  25 minutes         Ward Bradshaw MD  Department of Hospital Medicine  [facility]

## 2023-11-24 ENCOUNTER — PATIENT OUTREACH (OUTPATIENT)
Dept: ADMINISTRATIVE | Facility: CLINIC | Age: 63
End: 2023-11-24
Payer: MEDICARE

## 2023-11-30 DIAGNOSIS — C34.91 NSCLC OF RIGHT LUNG: Primary | ICD-10-CM

## 2023-12-01 ENCOUNTER — CLINICAL SUPPORT (OUTPATIENT)
Dept: RESPIRATORY THERAPY | Facility: HOSPITAL | Age: 63
End: 2023-12-01
Attending: SURGERY
Payer: MEDICARE

## 2023-12-01 DIAGNOSIS — Z01.818 PREOPERATIVE EXAMINATION, UNSPECIFIED: ICD-10-CM

## 2023-12-01 DIAGNOSIS — C34.91 PRIMARY LUNG CANCER WITH METASTASIS FROM LUNG TO OTHER SITE, RIGHT: Primary | ICD-10-CM

## 2023-12-01 DIAGNOSIS — Z85.42 HISTORY OF UTERINE CANCER: Primary | ICD-10-CM

## 2023-12-01 PROCEDURE — 93005 ELECTROCARDIOGRAM TRACING: CPT

## 2023-12-01 PROCEDURE — 93010 ELECTROCARDIOGRAM REPORT: CPT | Mod: ,,, | Performed by: INTERNAL MEDICINE

## 2023-12-01 PROCEDURE — 93010 EKG 12-LEAD: ICD-10-PCS | Mod: ,,, | Performed by: INTERNAL MEDICINE

## 2023-12-01 RX ORDER — MUPIROCIN 20 MG/G
OINTMENT TOPICAL
Status: CANCELLED | OUTPATIENT
Start: 2023-12-01

## 2023-12-01 RX ORDER — SODIUM CHLORIDE 9 MG/ML
INJECTION, SOLUTION INTRAVENOUS CONTINUOUS
Status: CANCELLED | OUTPATIENT
Start: 2023-12-01

## 2023-12-01 NOTE — PROGRESS NOTES
Referral received for mediport placement. Plan of care discussed with patient. She wishes to proceed. Preoperative imaging ordered to be done with upcoming labs. Surgery scheduled for 12/4/23.

## 2023-12-01 NOTE — TELEPHONE ENCOUNTER
Spoke with Dr Gomez's nurse, Mony, about referral and pt care. Dr Saldana fully reviewed pt records and does not recommend a full clinic visit with him. He will remain available for guidance in pt care directly to Dr Gomez.

## 2023-12-01 NOTE — TELEPHONE ENCOUNTER
Pt contacted and updated that Dr Saldana will provide insight and guidance on her care directly to Dr Gomez and that an in person appointment would not be needed. Pt verbalized understanding.

## 2023-12-01 NOTE — DISCHARGE INSTRUCTIONS
Mediport/Portacath Discharge Instructions     About this topic   A portacath or port is a type of central line. A central line catheter is a very long intravenous (IV) line. A port has two main parts: A round disc and long tube called a catheter. The port looks like a small bump under your skin. It is most often in the chest area, but may be in your upper arm or lower abdomen. The long catheter tube goes under the skin and into a large vein near your heart. Once you have a port, you do not need to have IV needles stuck into your arms to have lab tests drawn or fluids given.    Once this port is in place, the doctor or nurse will use a special needle to get into the disc part. This is called accessing the port. The needle can stay in for a week at a time and will be covered with a clear bandage and taped in place. When the needle is not in the port, you will not have any holes in your skin because the port is under the skin. It can stay in place for months or years until your treatment is no longer needed. Sometimes, this kind of IV is used to draw blood for tests.  Your doctor can give you any kind of fluids, drugs, or blood products through your port. They will not bother your veins because the port goes into such a large vein in your body. You can also have all kinds of blood draws through your port. If your port is not accessed, you will just see a small bump under your skin. There will be nothing that is sticking out from your skin.           When you are at home, there may be times when your port is still accessed. This means it still has a needle in place.  Do not get your port wet while the needle is in place. Take a sponge bath or cover the area with plastic wrap before you shower.  Make sure your dressing stays clean and dry. This will help keep the needle secure and in place.  If your port is not accessed, it will just look like a small raised area under the skin.  You can shower.  You may take a bath or  swim in a pool (once incision is healed).  You do not need to have a dressing over your port.  Wear clothes that do not rub on your port.  What care is needed at home?   Wash your hands before and after touching your dressing and incision. You may take a shower. Wash your incision with soap and water, pat dry, then leave open to air. The skin glue (dermabond) will peel off within 1-2 weeks. Do not peel or pick at it.  Check your incision daily for signs and symptoms of infection: redness, drainage or pus, foul odor, warmth, and swelling.   No lifting anything over 10 pounds (4.5 kg) until the doctor say otherwise.  Gradually go back to your normal activities like work, driving, or sex.  You may use an ice pack for comfort and swelling. Place ice pack over incision with a towel between your skin and the ice pack. Leave on 15 minutes and take off. Repeat 3-4 times during the day.   How to care for your port.  Wash your hands with soap and water for at least 30 seconds before touching the needle or port. Do not allow anyone to touch your needle or port with dirty hands or if they have not had special training.  If there is no needle in it, the port will be fully under your skin and you will be able to do your normal activities.  Your port will need to be flushed so a clot does not form at the end. A nurse will flush the port with saline and then heparin to avoid having a clot form inside or at the end of the tube. The nurse will do this each time you are finished getting fluid or drugs, and when the it's time to pull the needle out of your skin. If you are allergic to heparin or had a bad side effect from it, talk to your doctor.  You will get an ID card that will tell other people that you have an implanted port. It is very important to keep this card with you at all times to let doctors and nurses know what kind of port you have.  Will physical activity be limited?   You may have to limit your activity when you have  a port. Avoid activities that make you tired. Avoid sports where you might get hit in the port. Talk to your doctor about the right amount of activity for you.  Avoid activities that pull on your arms or chest.  No pulling with arm on the side of the mediport and not picking up heavy objects with the arm on the side of the port.  What problems could happen?   Infection  Bruising  Blood clot  Scar inside the large blood vessel if the port is in for a long time  When do I need to call the doctor?   Signs of infection. These include a fever of 100.4°F (38°C) or higher, chills.  Signs of wound infection. These include swelling, redness, warmth around the wound; too much pain when touched; yellowish, greenish, or bloody discharge; foul smell coming from the cut site; cut site opens up.  Pain, irritation, discomfort, or swelling at the port site or other areas of your chest when using the port.  If you hear a rushing sound in your ear when you flush your port.  You have concerns about your port.

## 2023-12-04 ENCOUNTER — HOSPITAL ENCOUNTER (OUTPATIENT)
Facility: HOSPITAL | Age: 63
Discharge: HOME | End: 2023-12-04
Attending: SURGERY | Admitting: SURGERY
Payer: MEDICARE

## 2023-12-04 ENCOUNTER — ANESTHESIA EVENT (OUTPATIENT)
Dept: SURGERY | Facility: HOSPITAL | Age: 63
End: 2023-12-04
Payer: MEDICARE

## 2023-12-04 ENCOUNTER — ANESTHESIA (OUTPATIENT)
Dept: SURGERY | Facility: HOSPITAL | Age: 63
End: 2023-12-04
Payer: MEDICARE

## 2023-12-04 ENCOUNTER — DOCUMENTATION ONLY (OUTPATIENT)
Dept: HEMATOLOGY/ONCOLOGY | Facility: CLINIC | Age: 63
End: 2023-12-04
Payer: MEDICARE

## 2023-12-04 DIAGNOSIS — Z29.89 IMMUNOTHERAPY: ICD-10-CM

## 2023-12-04 DIAGNOSIS — Z01.818 PREOPERATIVE EXAMINATION, UNSPECIFIED: Primary | ICD-10-CM

## 2023-12-04 DIAGNOSIS — C34.91 PRIMARY LUNG CANCER WITH METASTASIS FROM LUNG TO OTHER SITE, RIGHT: ICD-10-CM

## 2023-12-04 DIAGNOSIS — C34.91 NSCLC OF RIGHT LUNG: ICD-10-CM

## 2023-12-04 PROCEDURE — D9220A PRA ANESTHESIA: Mod: ANES,,, | Performed by: ANESTHESIOLOGY

## 2023-12-04 PROCEDURE — C1894 INTRO/SHEATH, NON-LASER: HCPCS | Performed by: SURGERY

## 2023-12-04 PROCEDURE — 36000707: Performed by: SURGERY

## 2023-12-04 PROCEDURE — 71000015 HC POSTOP RECOV 1ST HR: Performed by: SURGERY

## 2023-12-04 PROCEDURE — 37000008 HC ANESTHESIA 1ST 15 MINUTES: Performed by: SURGERY

## 2023-12-04 PROCEDURE — 25000003 PHARM REV CODE 250: Performed by: SURGERY

## 2023-12-04 PROCEDURE — 63600175 PHARM REV CODE 636 W HCPCS: Performed by: SURGERY

## 2023-12-04 PROCEDURE — 77001 FLUOROGUIDE FOR VEIN DEVICE: CPT | Mod: 26,,, | Performed by: SURGERY

## 2023-12-04 PROCEDURE — 25000242 PHARM REV CODE 250 ALT 637 W/ HCPCS: Performed by: ANESTHESIOLOGY

## 2023-12-04 PROCEDURE — 36561 INSERT TUNNELED CV CATH: CPT | Mod: RT,,, | Performed by: SURGERY

## 2023-12-04 PROCEDURE — 77001 CHG FLUOROGUIDE CNTRL VEN ACCESS,PLACE,REPLACE,REMOVE: ICD-10-PCS | Mod: 26,,, | Performed by: SURGERY

## 2023-12-04 PROCEDURE — 37000009 HC ANESTHESIA EA ADD 15 MINS: Performed by: SURGERY

## 2023-12-04 PROCEDURE — 63600175 PHARM REV CODE 636 W HCPCS: Performed by: ANESTHESIOLOGY

## 2023-12-04 PROCEDURE — D9220A PRA ANESTHESIA: ICD-10-PCS | Mod: ANES,,, | Performed by: ANESTHESIOLOGY

## 2023-12-04 PROCEDURE — D9220A PRA ANESTHESIA: Mod: CRNA,,, | Performed by: NURSE ANESTHETIST, CERTIFIED REGISTERED

## 2023-12-04 PROCEDURE — 25000003 PHARM REV CODE 250: Performed by: NURSE ANESTHETIST, CERTIFIED REGISTERED

## 2023-12-04 PROCEDURE — 63600175 PHARM REV CODE 636 W HCPCS: Performed by: NURSE ANESTHETIST, CERTIFIED REGISTERED

## 2023-12-04 PROCEDURE — 36561 PR INSERT TUNNELED CV CATH WITH PORT: ICD-10-PCS | Mod: RT,,, | Performed by: SURGERY

## 2023-12-04 PROCEDURE — D9220A PRA ANESTHESIA: ICD-10-PCS | Mod: CRNA,,, | Performed by: NURSE ANESTHETIST, CERTIFIED REGISTERED

## 2023-12-04 PROCEDURE — C1788 PORT, INDWELLING, IMP: HCPCS | Performed by: SURGERY

## 2023-12-04 PROCEDURE — 36000706: Performed by: SURGERY

## 2023-12-04 DEVICE — PORT POWER CLEAR VIEW: Type: IMPLANTABLE DEVICE | Site: CHEST | Status: FUNCTIONAL

## 2023-12-04 RX ORDER — HYDROMORPHONE HYDROCHLORIDE 2 MG/ML
0.4 INJECTION, SOLUTION INTRAMUSCULAR; INTRAVENOUS; SUBCUTANEOUS EVERY 5 MIN PRN
Status: CANCELLED | OUTPATIENT
Start: 2023-12-04

## 2023-12-04 RX ORDER — SODIUM CHLORIDE, SODIUM LACTATE, POTASSIUM CHLORIDE, CALCIUM CHLORIDE 600; 310; 30; 20 MG/100ML; MG/100ML; MG/100ML; MG/100ML
INJECTION, SOLUTION INTRAVENOUS CONTINUOUS
Status: DISCONTINUED | OUTPATIENT
Start: 2023-12-04 | End: 2023-12-04 | Stop reason: HOSPADM

## 2023-12-04 RX ORDER — FENTANYL CITRATE 50 UG/ML
INJECTION, SOLUTION INTRAMUSCULAR; INTRAVENOUS
Status: DISCONTINUED | OUTPATIENT
Start: 2023-12-04 | End: 2023-12-04

## 2023-12-04 RX ORDER — SODIUM CHLORIDE 9 MG/ML
INJECTION, SOLUTION INTRAVENOUS CONTINUOUS
Status: DISCONTINUED | OUTPATIENT
Start: 2023-12-04 | End: 2023-12-04 | Stop reason: HOSPADM

## 2023-12-04 RX ORDER — MIDAZOLAM HYDROCHLORIDE 1 MG/ML
2 INJECTION INTRAMUSCULAR; INTRAVENOUS ONCE AS NEEDED
Status: COMPLETED | OUTPATIENT
Start: 2023-12-04 | End: 2023-12-04

## 2023-12-04 RX ORDER — LIDOCAINE HYDROCHLORIDE AND EPINEPHRINE 10; 10 MG/ML; UG/ML
INJECTION, SOLUTION INFILTRATION; PERINEURAL
Status: DISCONTINUED | OUTPATIENT
Start: 2023-12-04 | End: 2023-12-04 | Stop reason: HOSPADM

## 2023-12-04 RX ORDER — BUPIVACAINE HYDROCHLORIDE 2.5 MG/ML
INJECTION, SOLUTION EPIDURAL; INFILTRATION; INTRACAUDAL
Status: DISCONTINUED | OUTPATIENT
Start: 2023-12-04 | End: 2023-12-04 | Stop reason: HOSPADM

## 2023-12-04 RX ORDER — SODIUM CHLORIDE, SODIUM LACTATE, POTASSIUM CHLORIDE, CALCIUM CHLORIDE 600; 310; 30; 20 MG/100ML; MG/100ML; MG/100ML; MG/100ML
INJECTION, SOLUTION INTRAVENOUS CONTINUOUS
Status: DISCONTINUED | OUTPATIENT
Start: 2023-12-04 | End: 2023-12-04

## 2023-12-04 RX ORDER — ALBUTEROL SULFATE 5 MG/ML
2.5 SOLUTION RESPIRATORY (INHALATION) 3 TIMES DAILY PRN
COMMUNITY

## 2023-12-04 RX ORDER — HEPARIN SODIUM 5000 [USP'U]/ML
INJECTION, SOLUTION INTRAVENOUS; SUBCUTANEOUS
Status: DISCONTINUED | OUTPATIENT
Start: 2023-12-04 | End: 2023-12-04 | Stop reason: HOSPADM

## 2023-12-04 RX ORDER — LIDOCAINE HYDROCHLORIDE AND EPINEPHRINE 10; 10 MG/ML; UG/ML
INJECTION, SOLUTION INFILTRATION; PERINEURAL
Status: DISCONTINUED
Start: 2023-12-04 | End: 2023-12-04 | Stop reason: HOSPADM

## 2023-12-04 RX ORDER — DEXAMETHASONE 4 MG/1
TABLET ORAL
Qty: 8 TABLET | Refills: 4 | Status: SHIPPED | OUTPATIENT
Start: 2023-12-04

## 2023-12-04 RX ORDER — MEPERIDINE HYDROCHLORIDE 25 MG/ML
6.25 INJECTION INTRAMUSCULAR; INTRAVENOUS; SUBCUTANEOUS ONCE AS NEEDED
Status: CANCELLED | OUTPATIENT
Start: 2023-12-04 | End: 2023-12-05

## 2023-12-04 RX ORDER — ONDANSETRON 2 MG/ML
INJECTION INTRAMUSCULAR; INTRAVENOUS
Status: DISCONTINUED | OUTPATIENT
Start: 2023-12-04 | End: 2023-12-04

## 2023-12-04 RX ORDER — SODIUM CHLORIDE, SODIUM GLUCONATE, SODIUM ACETATE, POTASSIUM CHLORIDE AND MAGNESIUM CHLORIDE 30; 37; 368; 526; 502 MG/100ML; MG/100ML; MG/100ML; MG/100ML; MG/100ML
INJECTION, SOLUTION INTRAVENOUS CONTINUOUS
Status: DISCONTINUED | OUTPATIENT
Start: 2023-12-04 | End: 2023-12-04 | Stop reason: HOSPADM

## 2023-12-04 RX ORDER — ONDANSETRON 2 MG/ML
4 INJECTION INTRAMUSCULAR; INTRAVENOUS DAILY PRN
Status: CANCELLED | OUTPATIENT
Start: 2023-12-04

## 2023-12-04 RX ORDER — PROCHLORPERAZINE EDISYLATE 5 MG/ML
5 INJECTION INTRAMUSCULAR; INTRAVENOUS EVERY 30 MIN PRN
Status: CANCELLED | OUTPATIENT
Start: 2023-12-04

## 2023-12-04 RX ORDER — CEFAZOLIN SODIUM 2 G/50ML
2 SOLUTION INTRAVENOUS
Status: COMPLETED | OUTPATIENT
Start: 2023-12-04 | End: 2023-12-04

## 2023-12-04 RX ORDER — BUPIVACAINE HYDROCHLORIDE 2.5 MG/ML
INJECTION, SOLUTION EPIDURAL; INFILTRATION; INTRACAUDAL
Status: DISCONTINUED
Start: 2023-12-04 | End: 2023-12-04 | Stop reason: HOSPADM

## 2023-12-04 RX ORDER — HYDROCODONE BITARTRATE AND ACETAMINOPHEN 5; 325 MG/1; MG/1
1 TABLET ORAL
Status: DISCONTINUED | OUTPATIENT
Start: 2023-12-04 | End: 2023-12-04 | Stop reason: HOSPADM

## 2023-12-04 RX ORDER — PROPOFOL 10 MG/ML
VIAL (ML) INTRAVENOUS CONTINUOUS PRN
Status: DISCONTINUED | OUTPATIENT
Start: 2023-12-04 | End: 2023-12-04

## 2023-12-04 RX ORDER — OXYCODONE HYDROCHLORIDE 5 MG/1
5 TABLET ORAL EVERY 4 HOURS PRN
Status: DISCONTINUED | OUTPATIENT
Start: 2023-12-04 | End: 2023-12-04 | Stop reason: HOSPADM

## 2023-12-04 RX ORDER — LIDOCAINE HYDROCHLORIDE 10 MG/ML
INJECTION, SOLUTION EPIDURAL; INFILTRATION; INTRACAUDAL; PERINEURAL
Status: DISCONTINUED | OUTPATIENT
Start: 2023-12-04 | End: 2023-12-04

## 2023-12-04 RX ORDER — HEPARIN SODIUM 5000 [USP'U]/ML
INJECTION, SOLUTION INTRAVENOUS; SUBCUTANEOUS
Status: DISCONTINUED
Start: 2023-12-04 | End: 2023-12-04 | Stop reason: HOSPADM

## 2023-12-04 RX ORDER — MUPIROCIN 20 MG/G
OINTMENT TOPICAL
Status: DISCONTINUED | OUTPATIENT
Start: 2023-12-04 | End: 2023-12-04 | Stop reason: HOSPADM

## 2023-12-04 RX ORDER — IPRATROPIUM BROMIDE AND ALBUTEROL SULFATE 2.5; .5 MG/3ML; MG/3ML
3 SOLUTION RESPIRATORY (INHALATION) ONCE
Status: COMPLETED | OUTPATIENT
Start: 2023-12-04 | End: 2023-12-04

## 2023-12-04 RX ADMIN — FENTANYL CITRATE 25 MCG: 50 INJECTION, SOLUTION INTRAMUSCULAR; INTRAVENOUS at 07:12

## 2023-12-04 RX ADMIN — MUPIROCIN: 20 OINTMENT TOPICAL at 06:12

## 2023-12-04 RX ADMIN — MIDAZOLAM HYDROCHLORIDE 2 MG: 1 INJECTION, SOLUTION INTRAMUSCULAR; INTRAVENOUS at 06:12

## 2023-12-04 RX ADMIN — LIDOCAINE HYDROCHLORIDE 20 MG: 10 INJECTION, SOLUTION EPIDURAL; INFILTRATION; INTRACAUDAL; PERINEURAL at 07:12

## 2023-12-04 RX ADMIN — IPRATROPIUM BROMIDE AND ALBUTEROL SULFATE 3 ML: .5; 3 SOLUTION RESPIRATORY (INHALATION) at 06:12

## 2023-12-04 RX ADMIN — PROPOFOL 50 MCG/KG/MIN: 10 INJECTION, EMULSION INTRAVENOUS at 07:12

## 2023-12-04 RX ADMIN — SODIUM CHLORIDE, POTASSIUM CHLORIDE, SODIUM LACTATE AND CALCIUM CHLORIDE: 600; 310; 30; 20 INJECTION, SOLUTION INTRAVENOUS at 06:12

## 2023-12-04 RX ADMIN — ONDANSETRON 4 MG: 2 INJECTION INTRAMUSCULAR; INTRAVENOUS at 07:12

## 2023-12-04 RX ADMIN — CEFAZOLIN SODIUM 2 G: 2 SOLUTION INTRAVENOUS at 06:12

## 2023-12-04 NOTE — DISCHARGE SUMMARY
[facility] Surgical - Periop Services  Discharge Note  Short Stay    Procedure(s) (LRB):  [id number]-CATH (N/A)      OUTCOME: Patient tolerated treatment/procedure well without complication and is now ready for discharge.    DISPOSITION: Home or Self Care    FINAL DIAGNOSIS:  <principal problem not specified>    FOLLOWUP: None    DISCHARGE INSTRUCTIONS:    Discharge Procedure Orders   EKG 12-lead   Standing Status: Future Number of Occurrences: 1 Standing Exp. Date: 12/01/24        TIME SPENT ON DISCHARGE:  minutes

## 2023-12-04 NOTE — ANESTHESIA POSTPROCEDURE EVALUATION
Anesthesia Post Evaluation    Patient: Mansi Jin    Procedure(s) Performed: Procedure(s) (LRB):  [id number]-S-CATH (N/A)    Final Anesthesia Type: general      Patient location during evaluation: OPS  Patient participation: Yes- Able to Participate  Level of consciousness: awake and alert and oriented  Post-procedure vital signs: reviewed and stable  Pain management: adequate  Airway patency: patent    PONV status at discharge: No PONV  Anesthetic complications: no      Cardiovascular status: blood pressure returned to baseline and hemodynamically stable  Respiratory status: unassisted, spontaneous ventilation and room air  Hydration status: euvolemic  Follow-up not needed.              Vitals Value Taken Time   /67 12/04/23 0730   Temp 36.8 °C (98.2 °F) 12/04/23 0730   Pulse 115 12/04/23 0730   Resp 16 12/04/23 0730   SpO2 96 % 12/04/23 0730         No case tracking events are documented in the log.      Pain/Estela Score: No data recorded

## 2023-12-04 NOTE — OP NOTE
[facility]  Operative Note        Surgery Date:  12/04/2023     Pre-op Diagnosis:    Primary lung cancer with metastasis from lung to other site, right [C34.91]     Post-op Diagnosis:  Same     Procedure(s):  Right IJ MediPort placement using ultrasound guidance and fluoroscopy    Indication for procedure: Mansi Jin is a 63 y.o. female who has a new diagnosis of non-small-cell lung cancer and needs access for chemotherapy    Surgeon:  Peter Dial MD    Assistant:  Circulator: Luiz Orr RN; Kassandra Mcgraw RN  Radiology Technologist: Kadeem Ashraf, RT  Scrub Person: Jess Mayo ST     Anesthesia:  Local MAC     Findings: Ultrasound evaluation of the right IJ noted it to patent.  Fluoroscopy noted the catheter tip to be the atriocaval junction and no kinking of the catheter.      Complications: None     Estimated Blood Loss: 5 mL         Specimens: None    Implants:  Implant Name Type Inv. Item Serial No.  Lot No. LRB No. Used Action   PORT POWER CLEAR VIEW - [id number]  PORT POWER CLEAR VIEW  [employer] [id number] Right 1 Implanted       Drains: None    Procedure in detail: After informed consent was obtained, and risks and benefits discussed with the patient, she was brought to the operating room and placed supine.  After adequate sedation was obtained, she was prepped and draped in a standard sterile fashion.  Local anesthetic was infused in the skin overlying the right IJ and it was accessed with a micropuncture needle under ultrasound guidance.  I advanced the wire down into the superior vena cava.  A tunnel was then marked on the skin and a pocket marked as well for the port.  Local anesthetic was infused in this area.  An incision was made and a pocket dissected free for the port.  I then tunneled between the port site and the vein access site and passed the catheter.  A microcatheter was then advanced over the wire.  The wire and dilator removed and an  035 wire advanced down into the SVC.  The dilator and peel-away sheath were advanced over the wire under fluoroscopy.  The wire and dilator were then removed.  The catheter was advanced down into the right ventricle.  The peel-away sheath was cracked and removed.  The catheter was then withdrawn under fluoroscopy until it was at the atriocaval junction.  The catheter was cut to length and connected to the port mechanism.  The port was then sutured to the clavipectoral fascia using 3-0 Vicryl sutures in 2 locations.  The port was able to be aspirated and flushed well.  The incision was then closed using interrupted 3-0 Vicryl sutures for the deep layer and 4-0 Monocryl subcuticular for the skin.  The vein access site was closed with a 4-0 Monocryl subcuticular stitch as well.  Heparinized saline was infused into the port and catheter.  Dermabond was placed over the incisions.  The patient was brought to recovery in stable condition.      Condition: Good

## 2023-12-04 NOTE — TRANSFER OF CARE
"Anesthesia Transfer of Care Note    Patient: Mansi Jin    Procedure(s) Performed: Procedure(s) (LRB):  [id number]-B-CATH (N/A)    Patient location: OPS    Anesthesia Type: MAC    Transport from OR: Transported from OR on room air with adequate spontaneous ventilation    Post pain: adequate analgesia    Post assessment: no apparent anesthetic complications and tolerated procedure well    Post vital signs: stable    Level of consciousness: awake, alert and oriented    Nausea/Vomiting: no nausea/vomiting    Complications: none    Transfer of care protocol was followed      Last vitals: Visit Vitals  /67 (BP Location: Right arm, Patient Position: Lying)   Pulse (!) 115   Temp 36.8 °C (98.2 °F) (Tympanic)   Resp 16   Ht 5' 7" (1.702 m)   Wt 62.8 kg (138 lb 6.4 oz)   SpO2 96%   Breastfeeding No   BMI 21.68 kg/m²     "

## 2023-12-04 NOTE — H&P
[facility] Surgical - Periop Services  History & Physical  Vascular Surgery    SUBJECTIVE:     Chief Complaint/Reason for Visit: No chief complaint on file.       History of Present Illness:  Mansi Jin is a 63 y.o. female who presents for port placement.  She has a new diagnosis of non-small-cell lung cancer and needs access for chemotherapy.    Review of patient's allergies indicates:  No Known Allergies    Past Medical History:   Diagnosis Date    ASTHMA     COPD (chronic obstructive pulmonary disease)     SEVERE    GERD (gastroesophageal reflux disease)     High cholesterol     HLD (hyperlipidemia)     Hypothyroidism, unspecified     Lung cancer 12/08/2022    invasive adenocarcinoma, poorly differentiated with solid morphology, focal tumor involvement of visceral pleura and focal tumor vascular invasion (vein), with 2 of 3 posterior mediastinal lymph nodes positive for metastatic carcinoma    Stage 2 chronic kidney disease     TIA (transient ischemic attack)     no deficits    Tobacco abuse     Unspecified osteoarthritis, unspecified site     Uterine cancer      Past Surgical History:   Procedure Laterality Date    lipoma multiple sites      LUNG LOBECTOMY Right 12/08/2022    Procedure: LOBECTOMY, LUNG;  Surgeon: Jass Browne IV, MD;  Location: [facility] OR;  Service: Cardiothoracic;  Laterality: Right;    SURGICAL REMOVAL OF LYMPH NODE  12/08/2022    Procedure: EXCISION, LYMPH NODE;  Surgeon: Jass Browne IV, MD;  Location: [facility] OR;  Service: Cardiothoracic;;    THORACOTOMY Right 12/08/2022    Procedure: THORACOTOMY;  Surgeon: Jass Browne IV, MD;  Location: [facility] OR;  Service: Cardiothoracic;  Laterality: Right;  Right Lower Lobectomy with Lymph Node Dissection    TOTAL ABDOMINAL HYSTERECTOMY W/ BILATERAL SALPINGOOPHORECTOMY       Family History   Problem Relation Age of Onset    Hypertension Mother     Hepatitis Mother     Ovarian cancer Mother     Liver cancer Mother     Heart failure  Father     Prostate cancer Father     Fibroids Sister     Asthma Brother     Asthma Brother     Cancer Maternal Grandfather      Social History     Tobacco Use    Smoking status: Former     Current packs/day: 0.00     Average packs/day: 0.5 packs/day for 50.0 years (25.0 ttl pk-yrs)     Types: Cigarettes     Start date: 1972     Quit date: 2022     Years since quittin.9    Smokeless tobacco: Never   Substance Use Topics    Alcohol use: Not Currently     Comment: quit 25years ago    Drug use: Never        Prior to Admission medications    Medication Sig Start Date End Date Taking? Authorizing Provider   acetaminophen (TYLENOL) 500 MG tablet Take 1,000 mg by mouth every 6 (six) hours as needed for Pain.   Yes Provider, Historical   albuterol (PROVENTIL) 5 mg/mL nebulizer solution Take 2.5 mg by nebulization 3 (three) times daily as needed for Wheezing. rescue   Yes Provider, Historical   ascorbic acid, vitamin C, (VITAMIN C) 500 MG tablet Take 500 mg by mouth once daily.   Yes Provider, Historical   atorvastatin (LIPITOR) 40 MG tablet Take 1 tablet (40 mg total) by mouth every evening. 23  Yes Jennifer Dacosta FNP   cholecalciferol, vitamin D3, 1,250 mcg (50,000 unit) capsule Take 50,000 Units by mouth every 7 days. 10/27/23  Yes Provider, Historical   docusate sodium (COLACE) 50 MG capsule Take 50 mg by mouth Daily.   Yes Provider, Historical   fluticasone propionate (FLONASE) 50 mcg/actuation nasal spray 1 spray (50 mcg total) by Each Nostril route once daily.  Patient taking differently: 1 spray by Each Nostril route once daily. Twice a day prn 22  Yes Jennifer Dacosta FNP   fluticasone-umeclidin-vilanter (TRELEGY ELLIPTA) 100-62.5-25 mcg DsDv Inhale 1 puff into the lungs once daily. 23  Yes Christine Razo, DO   iron polysac-iron heme polypep (FEOSOL BIFERA) 28 mg Tab Take 1 tablet by mouth every other day. Takes 3 times a week   Yes Provider, Historical   k phos di & mono-sod phos  mono (K-PHOS-NEUTRAL) 250 mg Tab Take 1 tablet by mouth 2 (two) times a day. 10/24/23 10/23/24 Yes Phoebe Gomez MD   lactulose (CHRONULAC) 10 gram/15 mL solution Take 30 mLs (20 g total) by mouth 2 (two) times daily.  Patient taking differently: Take 20 g by mouth 2 (two) times daily as needed. 10/26/23  Yes Shelbi Johansen FNP   levothyroxine (SYNTHROID) 75 MCG tablet  10/27/23  Yes Provider, Historical   loratadine (CLARITIN) 10 mg tablet Take 1 tablet (10 mg total) by mouth once daily. 3/22/23  Yes Jennifer Dacosta FNP   megestroL (MEGACE) 40 MG Tab Take 1 tablet (40 mg total) by mouth 4 (four) times daily For low appetite.. 7/25/23 7/24/24 Yes Myrtle Duvall NP   pantoprazole (PROTONIX) 40 MG tablet Take 1 tablet (40 mg total) by mouth once daily. 11/23/23 11/22/24 Yes Ward Bradshaw MD   ergocalciferol (ERGOCALCIFEROL) 50,000 unit Cap Take 50,000 Units by mouth every 7 days.  12/4/23 Yes Provider, Historical   ibuprofen (ADVIL,MOTRIN) 200 MG tablet Take 400 mg by mouth every 6 (six) hours as needed.  12/4/23 Yes Provider, Historical   benzonatate (TESSALON) 200 MG capsule TAKE 1 CAPSULE(200 MG) BY MOUTH THREE TIMES DAILY AS NEEDED FOR COUGH  Patient taking differently: Take 200 mg by mouth 2 (two) times daily as needed for Cough. Needs refill 9/29/23   Shelbi Johansen FNP   ipratropium/albuterol sulfate (COMBIVENT INHL) Inhale 2 puffs into the lungs as needed (wheezing).    Provider, Historical   mirtazapine (REMERON) 7.5 MG Tab Take 1 tablet (7.5 mg total) by mouth nightly.  Patient not taking: Reported on 12/4/2023 10/24/23   Phoebe Gomez MD   azithromycin (Z-JACEY) 250 MG tablet Take 1 tablet (250 mg total) by mouth once daily. Take first 2 tablets together, then 1 every day until finished. 7/22/23 12/4/23  Ziyad Sidhu MD   famotidine (PEPCID) 40 MG tablet TAKE 1 TABLET(40 MG) BY MOUTH EVERY EVENING  Patient taking differently: Take 20 mg by mouth as needed. 4/4/23 12/4/23   Shelbi Johansen FNP   HYDROcodone-acetaminophen (NORCO) 5-325 mg per tablet Take 1 tablet by mouth every 6 (six) hours as needed for Pain. 10/18/23 12/4/23  Shelbi Johansen FNP       Review of Systems:  Negative except as in HPI  OBJECTIVE:     Vital Signs (Most Recent):  Temp: 98.4 °F (36.9 °C) (12/04/23 0541)  Pulse: 104 (12/04/23 0633)  Resp: (!) 22 (12/04/23 0633)  BP: 108/72 (12/04/23 0541)  SpO2: 97 % (12/04/23 0633)    Admission: Weight: 62.6 kg (138 lb) (12/01/23 1106)   Most Recent: Weight: 62.8 kg (138 lb 6.4 oz) (12/04/23 0541)    Physical Exam:  Vascular Physical Exam  Vitals and nursing note reviewed.   Constitutional:       General: She is awake.   Cardiovascular:      Rate and Rhythm: Normal rate and regular rhythm.      Pulses:           Radial pulses are 2+ on the right side and 2+ on the left side.        Brachial pulses are 2+ on the right side and 2+ on the left side.  Pulmonary:      Effort: Pulmonary effort is normal.      Breath sounds: Normal breath sounds and air entry.   Musculoskeletal:      Neck: Neck supple.   Neurological:      Mental Status: She is alert and oriented to person, place, and time.      Cranial Nerves: No cranial nerve deficit.      Sensory: Sensation is intact. No sensory deficit.      Motor: Motor function is intact. No weakness.      Upper extremity:  score is 5/5 on the right side and 5/5 on the left side.                ASSESSMENT/PLAN:     63-year-old female with non-small-cell lung cancer   Plan   I have discussed MediPort placement with the patient.  Discussed the risks and benefits of the procedure including infection, bleeding, scar, port thrombosis, and port rotation.  She does agree that she would like to proceed.

## 2023-12-04 NOTE — ANESTHESIA PREPROCEDURE EVALUATION
12/04/2023  Mansi Jin is a 63 y.o., female with ----------------------------  ASTHMA  COPD (chronic obstructive pulmonary disease)      Comment:  SEVERE  GERD (gastroesophageal reflux disease)  High cholesterol  HLD (hyperlipidemia)  Hypothyroidism, unspecified  Lung cancer      Comment:  invasive adenocarcinoma, poorly differentiated with                solid morphology, focal tumor involvement of visceral                pleura and focal tumor vascular invasion (vein), with 2                of 3 posterior mediastinal lymph nodes positive for                metastatic carcinoma  Stage 2 chronic kidney disease  TIA (transient ischemic attack)      Comment:  no deficits  Tobacco abuse  Unspecified osteoarthritis, unspecified site  Uterine cancer    And ----------------------------  Lipoma multiple sites  Lung lobectomy      Comment:  Procedure: LOBECTOMY, LUNG;  Surgeon: Jass Browne IV, MD;  Location: [facility] OR;  Service: Cardiothoracic;                 Laterality: Right;  Surgical removal of lymph node      Comment:  Procedure: EXCISION, LYMPH NODE;  Surgeon: Jass Browne IV, MD;  Location: [facility] OR;  Service:                Cardiothoracic;;  Thoracotomy      Comment:  Procedure: THORACOTOMY;  Surgeon: Jass Browne IV, MD;  Location: [facility] OR;  Service: Cardiothoracic;                 Laterality: Right;  Right Lower Lobectomy with Lymph Node               Dissection  Total abdominal hysterectomy w/ bilateral salpingoophorectomy    Presents for mediport placement    Pt is 1 year s/p right lobectomy procedure.  She uses albuterol prn and has chronic cough - last neb was yesterday at noon so will do one pre-procedure today\  Chemo planned for Wednesday  No previous anesthetic issues    Pre-op Assessment    I have reviewed the NPO Status.      Review of  Systems      Physical Exam  General: Well nourished, Cooperative, Alert and Oriented    Airway:  Mallampati: II   Mouth Opening: Normal  TM Distance: Normal  Tongue: Normal  Neck ROM: Normal ROM    Dental:  Partial Dentures  Removed   Chest/Lungs:  Normal Respiratory Rate, Rhonchi  +cough scattered ronchi  Heart:  Rate: Normal  Rhythm: Regular Rhythm       Latest Reference Range & Units 11/23/23 03:12   WBC 4.50 - 11.50 x10(3)/mcL 2.69 (L)   Hemoglobin 12.0 - 16.0 g/dL 9.6 (L)   Hematocrit 37.0 - 47.0 % 31.3 (L)   Platelet Count 130 - 400 x10(3)/mcL 335   Sodium 136 - 145 mmol/L 141   Potassium 3.5 - 5.1 mmol/L 3.6   Chloride 98 - 107 mmol/L 112 (H)   CO2 23 - 31 mmol/L 21 (L)   BUN 9.8 - 20.1 mg/dL 5.0 (L)   Creatinine 0.55 - 1.02 mg/dL 0.72   eGFR mls/min/1.73/m2 >60   Glucose 82 - 115 mg/dL 75 (L)   (L): Data is abnormally low  (H): Data is abnormally high    Anesthesia Plan  Type of Anesthesia, risks & benefits discussed:    Anesthesia Type: Gen Natural Airway  Intra-op Monitoring Plan: Standard ASA Monitors  Post Op Pain Control Plan: IV/PO Opioids PRN  Induction:  IV  Airway Plan: Direct  Informed Consent: Informed consent signed with the Patient and all parties understand the risks and agree with anesthesia plan.  All questions answered. Patient consented to blood products? No  ASA Score: 3  Day of Surgery Review of History & Physical: H&P Update referred to the surgeon/provider.  Anesthesia Plan Notes: Preop versed and duoneb in holding - will also do neb treatement in recovery room/phase 2 if needed   Nasal cannula vs facemask supplemental oxygenation   For patients with HERMINIO/obesity, may consider SuperNoval Nasal CPAP      Ready For Surgery From Anesthesia Perspective.     .

## 2023-12-06 ENCOUNTER — HOSPITAL ENCOUNTER (OUTPATIENT)
Dept: RADIOLOGY | Facility: HOSPITAL | Age: 63
Discharge: HOME | End: 2023-12-06
Payer: MEDICARE

## 2023-12-06 ENCOUNTER — OFFICE VISIT (OUTPATIENT)
Dept: HEMATOLOGY/ONCOLOGY | Facility: CLINIC | Age: 63
End: 2023-12-06
Payer: MEDICARE

## 2023-12-06 VITALS
DIASTOLIC BLOOD PRESSURE: 72 MMHG | SYSTOLIC BLOOD PRESSURE: 107 MMHG | OXYGEN SATURATION: 95 % | WEIGHT: 138.38 LBS | BODY MASS INDEX: 21.72 KG/M2 | HEART RATE: 103 BPM | RESPIRATION RATE: 16 BRPM | HEIGHT: 67 IN | TEMPERATURE: 98 F

## 2023-12-06 VITALS
HEART RATE: 118 BPM | DIASTOLIC BLOOD PRESSURE: 77 MMHG | WEIGHT: 139.81 LBS | RESPIRATION RATE: 18 BRPM | BODY MASS INDEX: 22.47 KG/M2 | HEIGHT: 66 IN | OXYGEN SATURATION: 99 % | SYSTOLIC BLOOD PRESSURE: 117 MMHG

## 2023-12-06 DIAGNOSIS — R07.9 CHEST PAIN, UNSPECIFIED TYPE: ICD-10-CM

## 2023-12-06 DIAGNOSIS — Z29.89 IMMUNOTHERAPY: ICD-10-CM

## 2023-12-06 DIAGNOSIS — T45.1X5A IMMUNODEFICIENCY DUE TO CHEMOTHERAPY: ICD-10-CM

## 2023-12-06 DIAGNOSIS — Z79.899 ON ANTINEOPLASTIC CHEMOTHERAPY: ICD-10-CM

## 2023-12-06 DIAGNOSIS — L81.9 DISCOLORATION OF SKIN: ICD-10-CM

## 2023-12-06 DIAGNOSIS — C34.91 NSCLC OF RIGHT LUNG: Primary | ICD-10-CM

## 2023-12-06 DIAGNOSIS — I26.99 PULMONARY EMBOLISM, UNSPECIFIED CHRONICITY, UNSPECIFIED PULMONARY EMBOLISM TYPE, UNSPECIFIED WHETHER ACUTE COR PULMONALE PRESENT: ICD-10-CM

## 2023-12-06 DIAGNOSIS — Z79.899 IMMUNODEFICIENCY DUE TO CHEMOTHERAPY: ICD-10-CM

## 2023-12-06 DIAGNOSIS — R05.9 COUGH, UNSPECIFIED TYPE: ICD-10-CM

## 2023-12-06 DIAGNOSIS — R06.02 SOB (SHORTNESS OF BREATH) ON EXERTION: ICD-10-CM

## 2023-12-06 DIAGNOSIS — R19.5 POSITIVE OCCULT STOOL BLOOD TEST: ICD-10-CM

## 2023-12-06 DIAGNOSIS — D50.9 IRON DEFICIENCY ANEMIA, UNSPECIFIED IRON DEFICIENCY ANEMIA TYPE: ICD-10-CM

## 2023-12-06 DIAGNOSIS — R53.82 CHRONIC FATIGUE, UNSPECIFIED: ICD-10-CM

## 2023-12-06 DIAGNOSIS — D84.821 IMMUNODEFICIENCY DUE TO CHEMOTHERAPY: ICD-10-CM

## 2023-12-06 PROCEDURE — 99999 PR PBB SHADOW E&M-EST. PATIENT-LVL IV: CPT | Mod: PBBFAC,,,

## 2023-12-06 PROCEDURE — 3066F PR DOCUMENTATION OF TREATMENT FOR NEPHROPATHY: ICD-10-PCS | Mod: CPTII,S$GLB,,

## 2023-12-06 PROCEDURE — 3060F POS MICROALBUMINURIA REV: CPT | Mod: CPTII,S$GLB,,

## 2023-12-06 PROCEDURE — 3060F PR POS MICROALBUMINURIA RESULT DOCUMENTED/REVIEW: ICD-10-PCS | Mod: CPTII,S$GLB,,

## 2023-12-06 PROCEDURE — 1160F PR REVIEW ALL MEDS BY PRESCRIBER/CLIN PHARMACIST DOCUMENTED: ICD-10-PCS | Mod: CPTII,S$GLB,,

## 2023-12-06 PROCEDURE — 1159F PR MEDICATION LIST DOCUMENTED IN MEDICAL RECORD: ICD-10-PCS | Mod: CPTII,S$GLB,,

## 2023-12-06 PROCEDURE — 99999 PR PBB SHADOW E&M-EST. PATIENT-LVL IV: ICD-10-PCS | Mod: PBBFAC,,,

## 2023-12-06 PROCEDURE — 1159F MED LIST DOCD IN RCRD: CPT | Mod: CPTII,S$GLB,,

## 2023-12-06 PROCEDURE — 25500020 PHARM REV CODE 255

## 2023-12-06 PROCEDURE — 1101F PR PT FALLS ASSESS DOC 0-1 FALLS W/OUT INJ PAST YR: ICD-10-PCS | Mod: CPTII,S$GLB,,

## 2023-12-06 PROCEDURE — 99215 OFFICE O/P EST HI 40 MIN: CPT | Mod: S$GLB,,,

## 2023-12-06 PROCEDURE — 99215 PR OFFICE/OUTPT VISIT, EST, LEVL V, 40-54 MIN: ICD-10-PCS | Mod: S$GLB,,,

## 2023-12-06 PROCEDURE — 3008F PR BODY MASS INDEX (BMI) DOCUMENTED: ICD-10-PCS | Mod: CPTII,S$GLB,,

## 2023-12-06 PROCEDURE — 1101F PT FALLS ASSESS-DOCD LE1/YR: CPT | Mod: CPTII,S$GLB,,

## 2023-12-06 PROCEDURE — 3008F BODY MASS INDEX DOCD: CPT | Mod: CPTII,S$GLB,,

## 2023-12-06 PROCEDURE — 3288F PR FALLS RISK ASSESSMENT DOCUMENTED: ICD-10-PCS | Mod: CPTII,S$GLB,,

## 2023-12-06 PROCEDURE — 3066F NEPHROPATHY DOC TX: CPT | Mod: CPTII,S$GLB,,

## 2023-12-06 PROCEDURE — 3078F DIAST BP <80 MM HG: CPT | Mod: CPTII,S$GLB,,

## 2023-12-06 PROCEDURE — 3074F PR MOST RECENT SYSTOLIC BLOOD PRESSURE < 130 MM HG: ICD-10-PCS | Mod: CPTII,S$GLB,,

## 2023-12-06 PROCEDURE — 1160F RVW MEDS BY RX/DR IN RCRD: CPT | Mod: CPTII,S$GLB,,

## 2023-12-06 PROCEDURE — 71275 CT ANGIOGRAPHY CHEST: CPT | Mod: TC

## 2023-12-06 PROCEDURE — 3078F PR MOST RECENT DIASTOLIC BLOOD PRESSURE < 80 MM HG: ICD-10-PCS | Mod: CPTII,S$GLB,,

## 2023-12-06 PROCEDURE — 3074F SYST BP LT 130 MM HG: CPT | Mod: CPTII,S$GLB,,

## 2023-12-06 PROCEDURE — 3288F FALL RISK ASSESSMENT DOCD: CPT | Mod: CPTII,S$GLB,,

## 2023-12-06 RX ORDER — BENZONATATE 200 MG/1
200 CAPSULE ORAL 3 TIMES DAILY PRN
Qty: 90 CAPSULE | Refills: 2 | Status: SHIPPED | OUTPATIENT
Start: 2023-12-06 | End: 2023-12-16

## 2023-12-06 RX ORDER — AMOXICILLIN AND CLAVULANATE POTASSIUM 875; 125 MG/1; MG/1
1 TABLET, FILM COATED ORAL EVERY 12 HOURS
Qty: 20 TABLET | Refills: 0 | Status: SHIPPED | OUTPATIENT
Start: 2023-12-06 | End: 2023-12-16

## 2023-12-06 RX ADMIN — IOPAMIDOL 100 ML: 755 INJECTION, SOLUTION INTRAVENOUS at 09:12

## 2023-12-06 NOTE — PROGRESS NOTES
HEMATOLOGY/ONCOLOGY OFFICE CLINIC VISIT  NANDO Bourgeois    ONCOLOGICAL HISTORY:     Diagnosis:  - Stage IV NSCLC with bone mets.  - NSCLC / Adenocarcinoma Stage IIIA pT2a pN2--dx 12/2022  - PD-L1 1%  - History Uterine cancer - Dx 1989    Current Treatment:   Taxotere + Xgeva- 10/24/23-->    Treatment History:  - 1989 s/p total hysterectomy   - 12/08/2022: Right lower and middle bilobectomy with mediastinal lymph node dissection.   - Carboplatin + Pemetrexed  3/15/2023-5/1/2023  - Palliative RT   - Carboplatin + Pemetrexed started 3/15/2023  - Keytruda stopped 9/19/23 r/t elevated TSH and PET/CT progression, last dose given 8/29/23  - Xgeva- 7/18/2023-->    Plan of care: palliative systemic therapy      IMAGING:   - 08/10/2022 PET: The elongated subpleural right lower lobe nodule demonstrates fairly intense FDG activity.  Infectious, inflammatory and neoplastic etiologies remain possible. No suspicious PET findings elsewhere.  - 12/08/2022 CT HEAD without contrast: No acute intracranial abnormality identified.  Findings of mild microvascular ischemic disease.  - 12/2022: U/S Upper extremity: Thrombophlebitis of the left cephalic vein. No deep venous thrombosis in the left upper extremity.  - 01/03/2023 MRI Brain : GARY   - 01/12/2023 NM PET CT 1. Postsurgical changes of recent right lower and middle lobectomies.  There is a small right pleural effusion.  There is nonspecific peripheral uptake along the pleura at the right lung base and perihilar region which may be related to healing response in the setting of recent surgery.  Similarly borderline mediastinal uptake is nonspecific in the recent postoperative setting and could be reactive.  Continued attention recommended on follow-up. 2. Focal Uptake at the medial right acetabular roof has a max SUV of 6.4. This is new compared to previous exam. Metastasis is a consideration  -PET 6/5/2023: Previously visualized moderately FDG avid opacities towards the right lung base  improved.  Pleural effusion also improved.  No new suspicious PET findings in the lungs.  Severe emphysema. Some left adrenal thickening is similar to prior but there is now moderate associated FDG activity, max SUV is 3.6. FDG activity is again seen associated with the right superior acetabulum.  FDG avid area appears slightly larger today with slightly increased sclerosis.  SUV is 9.7, previously 6.4.  There is a newly evident subcentimeter FDG avid lesion left pedicle T10. Additional subcentimeter FDG avid lesion T4 vertebral body SUV 3.4.suspicious for metastatic disease.  -MRI T spine 6/15/2023:   1. Foci of abnormal signal intensity and enhancement at the inferior endplate of T3 and at body and left pedicle of T10.  Metastatic neoplastic etiology must be considered.  2. Thoracic spondylosis  3. Mild encroachment into the right neural foramina at the T3-4 and T4-5 secondary to mild posterior disc bulge and posterior osteophyte formation  4. Multilevel mild posterior disc bulge which does not result in significant central spinal stenosis.  9/27/23 Thyroid U/S:  Very minimal to no significant uptake of radiotracer isotope within the thyroid lobes bilaterally.  Clinical correlation is indicated  CT CAP 9/27/23:  1. Interim increasing nodular soft tissue densities/nodes/mass is at the mediastinum and right hilum measuring up the 3.0 cm.  A neoplastic etiology must be considered  2. Interim development of a 2.5 cm low-attenuation mass at the left adrenal gland.  A metastatic etiology must be considered  3. Interim development of a sclerotic foci at T3 and T10.  A metastatic etiology must be considered  4. Small right pleural effusion with associated infiltrate and atelectasis at the right lung base suspect  5. Suspect small cyst at the left kidney as described  6. Mild ill-defined soft tissue fullness at the cervical vaginal region  7. Borderline cardiomegaly  10/6/23 PET/CT:  1. Disease progression with new FDG  avid metastatic lymph nodes in the chest and right hilum.  2. Bilateral axillary and inguinal lymph nodes with low level uptake are indeterminate but concerning for metastasis given the additional findings.  3. Worsening and new FDG avid osseous disease involving both the axial and appendicular skeleton.  4. Enlarging left adrenal metastasis.    PATHOLOGY:  - 12/08/2022: Invasive solid adenocarcinoma (3.1 CM); G3, poorly differentiated. Visceral Pleura Invasion present without definite serosal surface involvement. Lymphovascular invasion (Vein) present. All margins negative for invasive carcinoma, distance 2.5 cm. LN - 4/14 (10R: Hilar, Posterior mediastinal (RACHEAL), 4R Lower paratracheal, 9R Pulmonary ligament, 10R). pT2a pN2  - 02/22/2023: RIGHT ACETABULAR LESION, CT - GUIDE CORE NEEDLE BIOPSY : FIBRINOPURULENT EXUDATE ADMIXED WITH ATYPICAL EPITHELIOID GROUPS,   NORMAL    MARROW ELEMENTS AND ABUNDANT BLOOD CLOT      Subjective:     HPI  Mansi Jin  63 y.o.  female with past medical history significant for HTN, HLD, uterine cancer status post total hysterectomy in 1989,  CKD Stage 2, COPD Stage 3, referred for management of NSCLC s/p RLL / RML lobectomy with Mediastinal Lymph Node Dissection on 12/8/2022.     She was noted to have RLL lung mass and underwent CT guided biopsy which was non diagnostic. PET scan demonstrated significant avidity within the lesion. Given high risk for malignancy, she underwent right lower lobectomy and right middle lobectomy on 12/08/2022. Pathology came back with invasive adenocarcinoma, poorly differentiated with solid morphology, focal tumor involvement of visceral pleura and focal tumor vascular invasion (vein), with mediastinal lymph nodes positive for metastatic carcinoma.      PET on 01/12/2023 showed focal uptake at the medial right acetabular roof but the biopsy was negative for malignancy. Discussed with radiology, they recommended adjuvant chemotherapy and then they will  reassess later for radiation therapy.     Chief Complaint: Lung Cancer (Pt reports a mucus cough. Pt reports mucus is green/yellow. )      Interval History:   She returns to clinic today for treatment clearance for cycle 3 of Taxotere. She continues to have dryness to bilateral hands, but itching and redness have improved. She is moisturizing her hands daily. She also has color changes to her fingertips that presents following her last cycle of Keytruda, not currently worsening. SOB has slightly worsened, with elevation of HR that has been continuous over the past week. She does have worsened cough as well. She continues to report fatigue. Labs reviewed in detail and are stable. Constipation continues to be stable with the addition of lactulose as needed. She denies any palpitations, chest pain, fever, chills, nausea, vomiting, diarrhea, recent hospitalizations or infections.       Past Medical History:   Diagnosis Date    ASTHMA     COPD (chronic obstructive pulmonary disease)     SEVERE    GERD (gastroesophageal reflux disease)     High cholesterol     HLD (hyperlipidemia)     Hypothyroidism, unspecified     Lung cancer 12/08/2022    invasive adenocarcinoma, poorly differentiated with solid morphology, focal tumor involvement of visceral pleura and focal tumor vascular invasion (vein), with 2 of 3 posterior mediastinal lymph nodes positive for metastatic carcinoma    Stage 2 chronic kidney disease     TIA (transient ischemic attack)     no deficits    Tobacco abuse     Unspecified osteoarthritis, unspecified site     Uterine cancer       Past Surgical History:   Procedure Laterality Date    INSERTION OF TUNNELED CENTRAL VENOUS CATHETER (CVC) WITH SUBCUTANEOUS PORT N/A 12/4/2023    Procedure: [id number];  Surgeon: Peter Dial MD;  Location: [facility];  Service: Peripheral Vascular;  Laterality: N/A;    lipoma multiple sites      LUNG LOBECTOMY Right 12/08/2022    Procedure: LOBECTOMY, LUNG;   Surgeon: Jass Browne IV, MD;  Location: [facility] OR;  Service: Cardiothoracic;  Laterality: Right;    SURGICAL REMOVAL OF LYMPH NODE  2022    Procedure: EXCISION, LYMPH NODE;  Surgeon: Jass Browne IV, MD;  Location: [facility] OR;  Service: Cardiothoracic;;    THORACOTOMY Right 2022    Procedure: THORACOTOMY;  Surgeon: Jass Browne IV, MD;  Location: [facility] OR;  Service: Cardiothoracic;  Laterality: Right;  Right Lower Lobectomy with Lymph Node Dissection    TOTAL ABDOMINAL HYSTERECTOMY W/ BILATERAL SALPINGOOPHORECTOMY       Social History     Socioeconomic History    Marital status: Single    Number of children: 5   Tobacco Use    Smoking status: Former     Current packs/day: 0.00     Average packs/day: 0.5 packs/day for 50.0 years (25.0 ttl pk-yrs)     Types: Cigarettes     Start date: 1972     Quit date: 2022     Years since quittin.9    Smokeless tobacco: Never   Substance and Sexual Activity    Alcohol use: Not Currently     Comment: quit 25years ago    Drug use: Never    Sexual activity: Yes     Partners: Male     Social Determinants of Health     Financial Resource Strain: Low Risk  (2023)    Overall Financial Resource Strain (CARDIA)     Difficulty of Paying Living Expenses: Not very hard   Food Insecurity: No Food Insecurity (2023)    Hunger Vital Sign     Worried About Running Out of Food in the Last Year: Never true     Ran Out of Food in the Last Year: Never true   Transportation Needs: No Transportation Needs (2023)    PRAPARE - Transportation     Lack of Transportation (Medical): No     Lack of Transportation (Non-Medical): No   Physical Activity: Inactive (2023)    Exercise Vital Sign     Days of Exercise per Week: 0 days     Minutes of Exercise per Session: 0 min   Stress: No Stress Concern Present (2023)    Citizen of [place] [employer] Occupational [employer] - Occupational Stress Questionnaire     Feeling of Stress : Only a little   Social Connections:  Socially Integrated (9/22/2023)    Social Connection and Isolation Panel [NHANES]     Frequency of Communication with Friends and Family: More than three times a week     Frequency of Social Gatherings with Friends and Family: Three times a week     Attends [employer] Services: More than 4 times per year     Active Member of Clubs or Organizations: Yes     Attends Club or Organization Meetings: 1 to 4 times per year     Marital Status: Living with partner   Housing Stability: Low Risk  (9/22/2023)    Housing Stability Vital Sign     Unable to Pay for Housing in the Last Year: No     Number of Places Lived in the Last Year: 1     Unstable Housing in the Last Year: No      Family History   Problem Relation Age of Onset    Hypertension Mother     Hepatitis Mother     Ovarian cancer Mother     Liver cancer Mother     Heart failure Father     Prostate cancer Father     Fibroids Sister     Asthma Brother     Asthma Brother     Cancer Maternal Grandfather       Review of patient's allergies indicates:  No Known Allergies     Review of Systems   Constitutional:  Positive for fatigue. Negative for activity change, appetite change, chills, fever and unexpected weight change.   HENT:  Positive for sinus pressure/congestion. Negative for mouth dryness, mouth sores, nosebleeds, sore throat and trouble swallowing.    Eyes: Negative.  Negative for visual disturbance.   Respiratory:  Positive for cough and shortness of breath (on exertion, chronic).    Cardiovascular:  Negative for chest pain, palpitations and leg swelling.   Gastrointestinal:  Negative for abdominal distention, abdominal pain, blood in stool, change in bowel habit, constipation, diarrhea, nausea and vomiting.   Endocrine: Negative.    Genitourinary: Negative.  Negative for dysuria, frequency, hematuria and urgency.   Musculoskeletal:  Negative for arthralgias, back pain, leg pain, myalgias and neck pain.   Integumentary:  Negative for rash, breast mass, breast  discharge and breast tenderness. Negative.   Allergic/Immunologic: Negative.    Neurological:  Negative for dizziness, tremors, syncope, speech difficulty, weakness, light-headedness, numbness, headaches and memory loss.   Hematological: Negative.  Does not bruise/bleed easily.   Psychiatric/Behavioral: Negative.  Negative for confusion and suicidal ideas.    Breast: Negative for mass and tenderness        Objective:        Vitals:    12/06/23 0827   BP: 117/77   Pulse: (!) 118   Resp: 18       Wt Readings from Last 6 Encounters:   12/06/23 63.4 kg (139 lb 12.8 oz)   12/04/23 62.8 kg (138 lb 6.4 oz)   11/22/23 62.1 kg (137 lb)   11/14/23 63.8 kg (140 lb 9.6 oz)   11/14/23 63.8 kg (140 lb 9.6 oz)   10/30/23 64 kg (141 lb 3.2 oz)     Body mass index is 22.58 kg/m².  Body surface area is 1.72 meters squared.       Physical Exam  Vitals and nursing note reviewed.   Constitutional:       Appearance: Normal appearance.   HENT:      Head: Normocephalic and atraumatic.      Nose: Congestion present.   Eyes:      General: No scleral icterus.     Extraocular Movements: Extraocular movements intact.      Conjunctiva/sclera: Conjunctivae normal.   Neck:      Vascular: No JVD.   Cardiovascular:      Rate and Rhythm: Normal rate and regular rhythm.      Heart sounds: No murmur heard.  Pulmonary:      Effort: Pulmonary effort is normal.      Breath sounds: Decreased breath sounds present. No wheezing or rhonchi.   Abdominal:      General: Bowel sounds are normal. There is no distension.      Palpations: Abdomen is soft.      Tenderness: There is no abdominal tenderness.   Musculoskeletal:      Cervical back: Neck supple.   Lymphadenopathy:      Head:      Right side of head: No submental or submandibular adenopathy.      Left side of head: No submental or submandibular adenopathy.      Cervical: No cervical adenopathy.      Upper Body:      Right upper body: No supraclavicular or axillary adenopathy.      Left upper body: No  supraclavicular or axillary adenopathy.      Lower Body: No right inguinal adenopathy. No left inguinal adenopathy.      Comments: No adenopathy noted bilaterally   Skin:     General: Skin is warm.      Coloration: Skin is not jaundiced.      Findings: No lesion or rash.      Comments: Change of skin color to bilateral fingertips   Neurological:      General: No focal deficit present.      Mental Status: She is alert and oriented to person, place, and time.      Cranial Nerves: Cranial nerves 2-12 are intact.      Gait: Gait normal.   Psychiatric:         Attention and Perception: Attention normal.         Speech: Speech normal.         Behavior: Behavior is cooperative.         Cognition and Memory: Cognition normal.         Judgment: Judgment normal.       LABS      Lab Visit on 12/06/2023   Component Date Value    Sodium Level 12/06/2023 142     Potassium Level 12/06/2023 3.4 (L)     Chloride 12/06/2023 110 (H)     Carbon Dioxide 12/06/2023 24     Glucose Level 12/06/2023 79 (L)     Blood Urea Nitrogen 12/06/2023 15.0     Creatinine 12/06/2023 0.81     Calcium Level Total 12/06/2023 8.9     Protein Total 12/06/2023 6.7     Albumin Level 12/06/2023 3.1 (L)     Globulin 12/06/2023 3.6 (H)     Albumin/Globulin Ratio 12/06/2023 0.9 (L)     Bilirubin Total 12/06/2023 0.5     Alkaline Phosphatase 12/06/2023 95     Alanine Aminotransferase 12/06/2023 6     Aspartate Aminotransfera* 12/06/2023 12     eGFR 12/06/2023 >60     Magnesium Level 12/06/2023 1.90     Phosphorus Level 12/06/2023 2.0 (L)     Iron Binding Capacity Un* 12/06/2023 186     Iron Level 12/06/2023 28 (L)     Iron Binding Capacity To* 12/06/2023 214 (L)     Iron Saturation 12/06/2023 13 (L)     Ferritin Level 12/06/2023 228.57 (H)     WBC 12/06/2023 8.11     RBC 12/06/2023 4.21     Hgb 12/06/2023 10.7 (L)     Hct 12/06/2023 35.0 (L)     MCV 12/06/2023 83.1     MCH 12/06/2023 25.4 (L)     MCHC 12/06/2023 30.6 (L)     RDW 12/06/2023 18.3 (H)     Platelet  12/06/2023 321     MPV 12/06/2023 9.5     Neut % 12/06/2023 76.0     Lymph % 12/06/2023 16.2     Mono % 12/06/2023 6.8     Eos % 12/06/2023 0.4     Basophil % 12/06/2023 0.2     Lymph # 12/06/2023 1.31     Neut # 12/06/2023 6.17     Mono # 12/06/2023 0.55     Eos # 12/06/2023 0.03     Baso # 12/06/2023 0.02     IG# 12/06/2023 0.03     IG% 12/06/2023 0.4          Assessment:   ECOG Performance status: 2 - 3.     1. NSCLC of right lung    2. Discoloration of skin    3. Positive occult stool blood test    4. Chest pain, unspecified type    5. SOB (shortness of breath) on exertion    6. Cough, unspecified type    7. Pulmonary embolism, unspecified chronicity, unspecified pulmonary embolism type, unspecified whether acute cor pulmonale present    8. Immunodeficiency due to chemotherapy    9. Immunotherapy    10. Chronic fatigue, unspecified    11. On antineoplastic chemotherapy    12. Iron deficiency anemia, unspecified iron deficiency anemia type        Stage IV NSCLC with bone mets.    Originally: NSCLC / Adenocarcinoma Stage IIIA pT2a pN2  - 12/08/2022: Right lower and middle bilobectomy with mediastinal lymph node dissection. Path: Invasive adenocarcinoma, poorly differentiated, focal tumor involvement of visceral pleura and focal tumor vascular invasion (vein), with mediastinal lymph nodes positive for metastatic carcinoma   - TMB - 5, MSI-S, MET amplification, ROS1.  No mutations in EGFR, BRAF, ALK, ERBB2, KRAS, RET,    - 1% tumor cells are positive for PD-L1   - patient evaluated by Radiation Oncology with recommendation to treat with chemotherapy 1st and will re-evaluate the patient by the end of chemotherapy for radiation therapy  - 01/12/2023 PET: Focal uptake at the medial right acetabular roof. Metastasis is a consideration  - 02/22/2023: RIGHT ACETABULAR LESION, CT - GUIDE CORE NEEDLE BIOPSY : FIBRINOPURULENT EXUDATE ADMIXED WITH ATYPICAL EPITHELIOID GROUPS,   NORMAL    MARROW ELEMENTS AND ABUNDANT BLOOD  CLOT  PET scan showed a suspicious finding in the right acetabular roof and patient then had a CT-guided biopsy no evidence of malignancy. Started with adjuvant chemotherapy with Carboplatin and pemetrexed x 4 cycles (3/15/23-5/18/23)   Patient has been seen by Rad/Onc (Dr Mireles) since the completion of her chemotherapy and plan was to pursue radiation x 5-6 weeks.  She discussed with the patient that they have showed no overall survival benefit in the setting of postoperative radiation therapy but data supports a larger benefit inpatient with extracapsular extension.  If she tolerated chemotherapy well and continue with good performance status then plan was for PORT   Restaging PET-CT after completion of chemotherapy and prior to radiation therapy showed progression at the right acetabulum.  Patient with progression of disease now with bone metastases.  She bagan palliative radiation treatment with Dr. Mireles on 6/28/23, 10 treatments over two weeks.  PET CT 6/5/23 with progression Right superior acetabulum.  FDG avid area appears slightly larger with slightly increased sclerosis.  SUV is 9.7, previously 6.4.  She had palliative radiation. Started on Keytruda 7/18/23. Now on Taxotere + Xgeva- 10/24/23 due to progression.  Bone mets   Xgeva q4w  She was seen by Rad/Onc for palliative RT but recommendations were to start chemotherapy and re eval after 2 cycles  Iron deficiency Anemia  Continue with 1 tab PO iron QOD with Vitamin C.   Will refer to GI for scope for positive occult stool.  Hypothyroid   Followed by endocrinology Dr. Parker  Referral to Dr. Saldana  Refused. Will discuss with Dr. Gomez need for further investigation at next OV.   PE  Eliquis sent to pharmacy. Strict ED precautions given.                   Plan:   Labs stable.  CTA chest completed today showed PE. Will hold treatment today and start on eliquis. Discussed in detail with Dr. Gomez   Moisturize bilateral hands.  Continue with the Xgeva  q4w   Continue oral iron every other day  Refer to GI and derm.   Augmentin sent for congestion/ cough as well as tessalon perles.   RTC in one week with MD/NP for FU/lab/possible treat.   Scan due 1/6/2023. Ordered today.     The patient was seen, interviewed and examined. Pertinent lab and radiology studies were reviewed.   The patient was given ample opportunity to ask questions, and to the best of my abilities, all questions answered to satisfaction; patient demonstrated understanding of what we discussed and agreeable to the plan. Pt instructed to call should develop concerning signs/symptoms or have further questions.     DOUGIE MartinezP-C  Oncology/Hematology   [facility]

## 2023-12-11 DIAGNOSIS — L81.9 DISCOLORATION OF SKIN: ICD-10-CM

## 2023-12-11 DIAGNOSIS — R19.5 POSITIVE OCCULT STOOL BLOOD TEST: Primary | ICD-10-CM

## 2023-12-11 NOTE — PROGRESS NOTES
HEMATOLOGY/ONCOLOGY OFFICE CLINIC VISIT  [facility]    ONCOLOGICAL HISTORY:     Diagnosis:  - Stage IV NSCLC with bone mets.  - NSCLC / Adenocarcinoma Stage IIIA pT2a pN2--dx 12/2022  - PD-L1 1%  - History Uterine cancer - Dx 1989    Current Treatment:   Taxotere + Xgeva- 10/24/23-->  Held cycle 3 12/6/23 r/t bilateral PE, started on Eliquis      Treatment History:  - 1989 s/p total hysterectomy   - 12/08/2022: Right lower and middle bilobectomy with mediastinal lymph node dissection.   - Carboplatin + Pemetrexed  3/15/2023-5/1/2023  - Palliative RT   - Carboplatin + Pemetrexed started 3/15/2023  - Keytruda stopped 9/19/23 r/t elevated TSH and PET/CT progression, last dose given 8/29/23  - Xgeva- 7/18/2023-->    Plan of care: palliative systemic therapy      IMAGING:   - 08/10/2022 PET: The elongated subpleural right lower lobe nodule demonstrates fairly intense FDG activity.  Infectious, inflammatory and neoplastic etiologies remain possible. No suspicious PET findings elsewhere.  - 12/08/2022 CT HEAD without contrast: No acute intracranial abnormality identified.  Findings of mild microvascular ischemic disease.  - 12/2022: U/S Upper extremity: Thrombophlebitis of the left cephalic vein. No deep venous thrombosis in the left upper extremity.  - 01/03/2023 MRI Brain : GARY   - 01/12/2023 NM PET CT 1. Postsurgical changes of recent right lower and middle lobectomies.  There is a small right pleural effusion.  There is nonspecific peripheral uptake along the pleura at the right lung base and perihilar region which may be related to healing response in the setting of recent surgery.  Similarly borderline mediastinal uptake is nonspecific in the recent postoperative setting and could be reactive.  Continued attention recommended on follow-up. 2. Focal Uptake at the medial right acetabular roof has a max SUV of 6.4. This is new compared to previous exam. Metastasis is a consideration  -PET 6/5/2023: Previously  visualized moderately FDG avid opacities towards the right lung base improved.  Pleural effusion also improved.  No new suspicious PET findings in the lungs.  Severe emphysema. Some left adrenal thickening is similar to prior but there is now moderate associated FDG activity, max SUV is 3.6. FDG activity is again seen associated with the right superior acetabulum.  FDG avid area appears slightly larger today with slightly increased sclerosis.  SUV is 9.7, previously 6.4.  There is a newly evident subcentimeter FDG avid lesion left pedicle T10. Additional subcentimeter FDG avid lesion T4 vertebral body SUV 3.4.suspicious for metastatic disease.  -MRI T spine 6/15/2023:   1. Foci of abnormal signal intensity and enhancement at the inferior endplate of T3 and at body and left pedicle of T10.  Metastatic neoplastic etiology must be considered.  2. Thoracic spondylosis  3. Mild encroachment into the right neural foramina at the T3-4 and T4-5 secondary to mild posterior disc bulge and posterior osteophyte formation  4. Multilevel mild posterior disc bulge which does not result in significant central spinal stenosis.  9/27/23 Thyroid U/S:  Very minimal to no significant uptake of radiotracer isotope within the thyroid lobes bilaterally.  Clinical correlation is indicated  CT CAP 9/27/23:  1. Interim increasing nodular soft tissue densities/nodes/mass is at the mediastinum and right hilum measuring up the 3.0 cm.  A neoplastic etiology must be considered  2. Interim development of a 2.5 cm low-attenuation mass at the left adrenal gland.  A metastatic etiology must be considered  3. Interim development of a sclerotic foci at T3 and T10.  A metastatic etiology must be considered  4. Small right pleural effusion with associated infiltrate and atelectasis at the right lung base suspect  5. Suspect small cyst at the left kidney as described  6. Mild ill-defined soft tissue fullness at the cervical vaginal region  7. Borderline  cardiomegaly  10/6/23 PET/CT:  1. Disease progression with new FDG avid metastatic lymph nodes in the chest and right hilum.  2. Bilateral axillary and inguinal lymph nodes with low level uptake are indeterminate but concerning for metastasis given the additional findings.  3. Worsening and new FDG avid osseous disease involving both the axial and appendicular skeleton.  4. Enlarging left adrenal metastasis.  12/6/23 CTA:  1. Soft tissue masslike density again evident at the right hilum causing mass effect on the right main pulmonary artery  2. Interim development of filling defects/pulmonary emboli at the diminutive posterior right lower lobe pulmonary arteries.  A few small defects/pulmonary emboli have developed at the pulmonary arteries at the posterior left lower lobe since the prior exam.  3. Postsurgical changes right hilum with volume loss at the right lung and elevation of the right hemidiaphragm with associated posterior right basilar pleural reaction, infiltrate, and atelectasis  4. Advanced emphysematous changes with the right side greater than the left  5. Thoracic spondylosis with the sclerotic foci at the T3 and T10  6. The cancer center was notified of the findings on 12/06/2023 at 10:00    PATHOLOGY:  - 12/08/2022: Invasive solid adenocarcinoma (3.1 CM); G3, poorly differentiated. Visceral Pleura Invasion present without definite serosal surface involvement. Lymphovascular invasion (Vein) present. All margins negative for invasive carcinoma, distance 2.5 cm. LN - 4/14 (10R: Hilar, Posterior mediastinal (RACHEAL), 4R Lower paratracheal, 9R Pulmonary ligament, 10R). pT2a pN2  - 02/22/2023: RIGHT ACETABULAR LESION, CT - GUIDE CORE NEEDLE BIOPSY : FIBRINOPURULENT EXUDATE ADMIXED WITH ATYPICAL EPITHELIOID GROUPS,   NORMAL    MARROW ELEMENTS AND ABUNDANT BLOOD CLOT      Subjective:     HPI  Mansi Bennett Davin  63 y.o.  female with past medical history significant for HTN, HLD, uterine cancer status post total  hysterectomy in 1989,  CKD Stage 2, COPD Stage 3, referred for management of NSCLC s/p RLL / RML lobectomy with Mediastinal Lymph Node Dissection on 12/8/2022.     She was noted to have RLL lung mass and underwent CT guided biopsy which was non diagnostic. PET scan demonstrated significant avidity within the lesion. Given high risk for malignancy, she underwent right lower lobectomy and right middle lobectomy on 12/08/2022. Pathology came back with invasive adenocarcinoma, poorly differentiated with solid morphology, focal tumor involvement of visceral pleura and focal tumor vascular invasion (vein), with mediastinal lymph nodes positive for metastatic carcinoma.      PET on 01/12/2023 showed focal uptake at the medial right acetabular roof but the biopsy was negative for malignancy. Discussed with radiology, they recommended adjuvant chemotherapy and then they will reassess later for radiation therapy.     Chief Complaint: Lung Cancer (No complaints. )      Interval History:   She returns to clinic today for treatment clearance for cycle 3 of Taxotere. She is currently taking Eliquis 5 mg BID for bilateral PE. Hert rate continues to be elevated at 113 today but better, it was 130's when diagnosed with PE. She continues to have dryness to bilateral hands, but itching and redness have improved. She is moisturizing her hands daily. She also has color changes to her fingertips that presents following her last cycle of Keytruda, not currently worsening. She continues to report fatigue. Labs reviewed in detail and are stable. Constipation continues to be stable with the addition of lactulose as needed. SOB only on exertion. She denies any palpitations, chest pain, SOB, fever, chills, nausea, vomiting, diarrhea, recent hospitalizations or infections.       Past Medical History:   Diagnosis Date    ASTHMA     COPD (chronic obstructive pulmonary disease)     SEVERE    GERD (gastroesophageal reflux disease)     High  cholesterol     HLD (hyperlipidemia)     Hypothyroidism, unspecified     Lung cancer 2022    invasive adenocarcinoma, poorly differentiated with solid morphology, focal tumor involvement of visceral pleura and focal tumor vascular invasion (vein), with 2 of 3 posterior mediastinal lymph nodes positive for metastatic carcinoma    Stage 2 chronic kidney disease     TIA (transient ischemic attack)     no deficits    Tobacco abuse     Unspecified osteoarthritis, unspecified site     Uterine cancer       Past Surgical History:   Procedure Laterality Date    INSERTION OF TUNNELED CENTRAL VENOUS CATHETER (CVC) WITH SUBCUTANEOUS PORT N/A 2023    Procedure: [id number];  Surgeon: Peter Dial MD;  Location: [facility] OR;  Service: Peripheral Vascular;  Laterality: N/A;    lipoma multiple sites      LUNG LOBECTOMY Right 2022    Procedure: LOBECTOMY, LUNG;  Surgeon: Jass Browne IV, MD;  Location: [facility] OR;  Service: Cardiothoracic;  Laterality: Right;    SURGICAL REMOVAL OF LYMPH NODE  2022    Procedure: EXCISION, LYMPH NODE;  Surgeon: Jass Browne IV, MD;  Location: [facility] OR;  Service: Cardiothoracic;;    THORACOTOMY Right 2022    Procedure: THORACOTOMY;  Surgeon: Jass Browne IV, MD;  Location: [facility] OR;  Service: Cardiothoracic;  Laterality: Right;  Right Lower Lobectomy with Lymph Node Dissection    TOTAL ABDOMINAL HYSTERECTOMY W/ BILATERAL SALPINGOOPHORECTOMY       Social History     Socioeconomic History    Marital status: Single    Number of children: 5   Tobacco Use    Smoking status: Former     Current packs/day: 0.00     Average packs/day: 0.5 packs/day for 50.0 years (25.0 ttl pk-yrs)     Types: Cigarettes     Start date: 1972     Quit date: 2022     Years since quittin.0    Smokeless tobacco: Never   Substance and Sexual Activity    Alcohol use: Not Currently     Comment: quit 25years ago    Drug use: Never    Sexual activity: Yes     Partners: Male      Social Determinants of Health     Financial Resource Strain: Low Risk  (9/22/2023)    Overall Financial Resource Strain (CARDIA)     Difficulty of Paying Living Expenses: Not very hard   Food Insecurity: No Food Insecurity (9/22/2023)    Hunger Vital Sign     Worried About Running Out of Food in the Last Year: Never true     Ran Out of Food in the Last Year: Never true   Transportation Needs: No Transportation Needs (9/22/2023)    PRAPARE - Transportation     Lack of Transportation (Medical): No     Lack of Transportation (Non-Medical): No   Physical Activity: Inactive (9/22/2023)    Exercise Vital Sign     Days of Exercise per Week: 0 days     Minutes of Exercise per Session: 0 min   Stress: No Stress Concern Present (9/22/2023)    [employer] [place] [employer] - Occupational Stress Questionnaire     Feeling of Stress : Only a little   Social Connections: Socially Integrated (9/22/2023)    Social Connection and Isolation Panel [NHANES]     Frequency of Communication with Friends and Family: More than three times a week     Frequency of Social Gatherings with Friends and Family: Three times a week     Attends [employer] Services: More than 4 times per year     Active Member of Clubs or Organizations: Yes     Attends Club or Organization Meetings: 1 to 4 times per year     Marital Status: Living with partner   Housing Stability: Low Risk  (9/22/2023)    Housing Stability Vital Sign     Unable to Pay for Housing in the Last Year: No     Number of Places Lived in the Last Year: 1     Unstable Housing in the Last Year: No      Family History   Problem Relation Age of Onset    Hypertension Mother     Hepatitis Mother     Ovarian cancer Mother     Liver cancer Mother     Heart failure Father     Prostate cancer Father     Fibroids Sister     Asthma Brother     Asthma Brother     Cancer Maternal Grandfather       Review of patient's allergies indicates:  No Known Allergies     Review of Systems    Constitutional:  Positive for fatigue. Negative for activity change, appetite change, chills, fever and unexpected weight change.   HENT:  Positive for sinus pressure/congestion. Negative for mouth dryness, mouth sores, nosebleeds, sore throat and trouble swallowing.    Eyes: Negative.  Negative for visual disturbance.   Respiratory:  Positive for cough and shortness of breath (on exertion, chronic).    Cardiovascular:  Negative for chest pain, palpitations and leg swelling.   Gastrointestinal:  Negative for abdominal distention, abdominal pain, blood in stool, change in bowel habit, constipation, diarrhea, nausea and vomiting.   Endocrine: Negative.    Genitourinary: Negative.  Negative for dysuria, frequency, hematuria and urgency.   Musculoskeletal:  Negative for arthralgias, back pain, leg pain, myalgias and neck pain.   Integumentary:  Negative for rash, breast mass, breast discharge and breast tenderness. Negative.   Allergic/Immunologic: Negative.    Neurological:  Negative for dizziness, tremors, syncope, speech difficulty, weakness, light-headedness, numbness, headaches and memory loss.   Hematological: Negative.  Does not bruise/bleed easily.   Psychiatric/Behavioral: Negative.  Negative for confusion and suicidal ideas.    Breast: Negative for mass and tenderness        Objective:        Vitals:    12/12/23 1045   BP: 111/76   Pulse: (!) 113   Resp: 18   Temp: 97.6 °F (36.4 °C)         Wt Readings from Last 6 Encounters:   12/12/23 63 kg (139 lb)   12/12/23 63 kg (139 lb)   12/06/23 63.4 kg (139 lb 12.8 oz)   12/04/23 62.8 kg (138 lb 6.4 oz)   11/22/23 62.1 kg (137 lb)   11/14/23 63.8 kg (140 lb 9.6 oz)     Body mass index is 22.45 kg/m².  Body surface area is 1.71 meters squared.       Physical Exam  Vitals and nursing note reviewed.   Constitutional:       Appearance: Normal appearance.   HENT:      Head: Normocephalic and atraumatic.      Nose: Congestion present.   Eyes:      General: No scleral  icterus.     Extraocular Movements: Extraocular movements intact.      Conjunctiva/sclera: Conjunctivae normal.   Neck:      Vascular: No JVD.   Cardiovascular:      Rate and Rhythm: Normal rate and regular rhythm.      Heart sounds: No murmur heard.  Pulmonary:      Effort: Pulmonary effort is normal.      Breath sounds: Decreased breath sounds present. No wheezing or rhonchi.   Abdominal:      General: Bowel sounds are normal. There is no distension.      Palpations: Abdomen is soft.      Tenderness: There is no abdominal tenderness.   Musculoskeletal:      Cervical back: Neck supple.   Lymphadenopathy:      Head:      Right side of head: No submental or submandibular adenopathy.      Left side of head: No submental or submandibular adenopathy.      Cervical: No cervical adenopathy.      Upper Body:      Right upper body: No supraclavicular or axillary adenopathy.      Left upper body: No supraclavicular or axillary adenopathy.      Lower Body: No right inguinal adenopathy. No left inguinal adenopathy.      Comments: No adenopathy noted bilaterally   Skin:     General: Skin is warm.      Coloration: Skin is not jaundiced.      Findings: No lesion or rash.      Comments: Change of skin color to bilateral fingertips   Neurological:      General: No focal deficit present.      Mental Status: She is alert and oriented to person, place, and time.      Cranial Nerves: Cranial nerves 2-12 are intact.      Gait: Gait normal.   Psychiatric:         Attention and Perception: Attention normal.         Speech: Speech normal.         Behavior: Behavior is cooperative.         Cognition and Memory: Cognition normal.         Judgment: Judgment normal.       LABS      Lab Visit on 12/12/2023   Component Date Value    Sodium Level 12/12/2023 141     Potassium Level 12/12/2023 3.3 (L)     Chloride 12/12/2023 109 (H)     Carbon Dioxide 12/12/2023 23     Glucose Level 12/12/2023 106     Blood Urea Nitrogen 12/12/2023 7.0 (L)      Creatinine 12/12/2023 0.85     Calcium Level Total 12/12/2023 9.4     Protein Total 12/12/2023 7.6     Albumin Level 12/12/2023 3.3 (L)     Globulin 12/12/2023 4.3 (H)     Albumin/Globulin Ratio 12/12/2023 0.8 (L)     Bilirubin Total 12/12/2023 0.4     Alkaline Phosphatase 12/12/2023 92     Alanine Aminotransferase 12/12/2023 7     Aspartate Aminotransfera* 12/12/2023 13     eGFR 12/12/2023 >60     Magnesium Level 12/12/2023 2.10     Phosphorus Level 12/12/2023 1.6 (L)     WBC 12/12/2023 6.10     RBC 12/12/2023 4.41     Hgb 12/12/2023 11.2 (L)     Hct 12/12/2023 36.9 (L)     MCV 12/12/2023 83.7     MCH 12/12/2023 25.4 (L)     MCHC 12/12/2023 30.4 (L)     RDW 12/12/2023 17.9 (H)     Platelet 12/12/2023 452 (H)     MPV 12/12/2023 9.6     Neut % 12/12/2023 70.5     Lymph % 12/12/2023 19.0     Mono % 12/12/2023 8.9     Eos % 12/12/2023 1.1     Basophil % 12/12/2023 0.2     Lymph # 12/12/2023 1.16     Neut # 12/12/2023 4.30     Mono # 12/12/2023 0.54     Eos # 12/12/2023 0.07     Baso # 12/12/2023 0.01     IG# 12/12/2023 0.02     IG% 12/12/2023 0.3          Assessment:   ECOG Performance status: 2 - 3.     1. NSCLC of right lung    2. Pulmonary embolism, unspecified chronicity, unspecified pulmonary embolism type, unspecified whether acute cor pulmonale present    3. On antineoplastic chemotherapy    4. Metastasis to bone          Stage IV NSCLC with bone mets.    Originally: NSCLC / Adenocarcinoma Stage IIIA pT2a pN2  - 12/08/2022: Right lower and middle bilobectomy with mediastinal lymph node dissection. Path: Invasive adenocarcinoma, poorly differentiated, focal tumor involvement of visceral pleura and focal tumor vascular invasion (vein), with mediastinal lymph nodes positive for metastatic carcinoma   - TMB - 5, MSI-S, MET amplification, ROS1.  No mutations in EGFR, BRAF, ALK, ERBB2, KRAS, RET,    - 1% tumor cells are positive for PD-L1   - patient evaluated by Radiation Oncology with recommendation to treat with  chemotherapy 1st and will re-evaluate the patient by the end of chemotherapy for radiation therapy  - 01/12/2023 PET: Focal uptake at the medial right acetabular roof. Metastasis is a consideration  - 02/22/2023: RIGHT ACETABULAR LESION, CT - GUIDE CORE NEEDLE BIOPSY : FIBRINOPURULENT EXUDATE ADMIXED WITH ATYPICAL EPITHELIOID GROUPS,   NORMAL    MARROW ELEMENTS AND ABUNDANT BLOOD CLOT  PET scan showed a suspicious finding in the right acetabular roof and patient then had a CT-guided biopsy no evidence of malignancy. Started with adjuvant chemotherapy with Carboplatin and pemetrexed x 4 cycles (3/15/23-5/18/23)   Patient has been seen by Rad/Onc (Dr Mireles) since the completion of her chemotherapy and plan was to pursue radiation x 5-6 weeks.  She discussed with the patient that they have showed no overall survival benefit in the setting of postoperative radiation therapy but data supports a larger benefit inpatient with extracapsular extension.  If she tolerated chemotherapy well and continue with good performance status then plan was for PORT   Restaging PET-CT after completion of chemotherapy and prior to radiation therapy showed progression at the right acetabulum.  Patient with progression of disease now with bone metastases.  She bagan palliative radiation treatment with Dr. Mireles on 6/28/23, 10 treatments over two weeks.  PET CT 6/5/23 with progression Right superior acetabulum.  FDG avid area appears slightly larger with slightly increased sclerosis.  SUV is 9.7, previously 6.4.  She had palliative radiation. Started on Keytruda 7/18/23. Now on Taxotere + Xgeva- 10/24/23 due to progression.  Cycle 3 of Taxotere held 12/6/23 r/t bilateral PE  Bone mets   Xgeva q4w  She was seen by Rad/Onc for palliative RT but recommendations were to start chemotherapy and re eval after 2 cycles  Iron deficiency Anemia  Continue with 1 tab PO iron QOD with Vitamin C.   Will refer to GI for scope for positive occult  stool.  Hypothyroid   Followed by endocrinology Dr. Parker  Referral to Dr. Saldana  Refused. Will discuss with Dr. Gomez need for further investigation at next OV.   PE  Eliquis sent to pharmacy. Strict ED precautions given.                   Plan:   Labs stable.  Continue cycle 3 Taxotere as scheduled  Continue Eliquis 5 mg BID  Moisturize bilateral hands.  Continue with the Xgeva q4w   Continue oral iron every other day  Refer to GI and derm.   RTC in 3 weeks with NP for FU/lab/treat  Cbc, cmp, mag, phos- 1 hr prior @ [facility]      The patient was seen, interviewed and examined. Pertinent lab and radiology studies were reviewed.   The patient was given ample opportunity to ask questions, and to the best of my abilities, all questions answered to satisfaction; patient demonstrated understanding of what we discussed and agreeable to the plan. Pt instructed to call should develop concerning signs/symptoms or have further questions.       Phoebe Gomez MD  Hematology/Oncology  [facility]       Professional Services   I, Clau Hodges LPN, acted solely as a scribe for and in the presence of Dr. Phoebe Gomez, who performed these services.

## 2023-12-12 ENCOUNTER — INFUSION (OUTPATIENT)
Dept: INFUSION THERAPY | Facility: HOSPITAL | Age: 63
End: 2023-12-12
Attending: NURSE PRACTITIONER
Payer: MEDICARE

## 2023-12-12 ENCOUNTER — OFFICE VISIT (OUTPATIENT)
Dept: HEMATOLOGY/ONCOLOGY | Facility: CLINIC | Age: 63
End: 2023-12-12
Payer: MEDICARE

## 2023-12-12 VITALS
SYSTOLIC BLOOD PRESSURE: 111 MMHG | WEIGHT: 139 LBS | DIASTOLIC BLOOD PRESSURE: 76 MMHG | HEART RATE: 113 BPM | TEMPERATURE: 98 F | HEIGHT: 66 IN | BODY MASS INDEX: 22.34 KG/M2 | OXYGEN SATURATION: 98 % | RESPIRATION RATE: 18 BRPM

## 2023-12-12 VITALS
RESPIRATION RATE: 18 BRPM | HEIGHT: 66 IN | SYSTOLIC BLOOD PRESSURE: 111 MMHG | BODY MASS INDEX: 22.34 KG/M2 | HEART RATE: 103 BPM | TEMPERATURE: 98 F | WEIGHT: 139 LBS | DIASTOLIC BLOOD PRESSURE: 76 MMHG | OXYGEN SATURATION: 98 %

## 2023-12-12 DIAGNOSIS — C34.91 NSCLC OF RIGHT LUNG: ICD-10-CM

## 2023-12-12 DIAGNOSIS — I26.99 PULMONARY EMBOLISM, UNSPECIFIED CHRONICITY, UNSPECIFIED PULMONARY EMBOLISM TYPE, UNSPECIFIED WHETHER ACUTE COR PULMONALE PRESENT: ICD-10-CM

## 2023-12-12 DIAGNOSIS — C79.51 METASTASIS TO BONE: Primary | ICD-10-CM

## 2023-12-12 DIAGNOSIS — C34.91 NSCLC OF RIGHT LUNG: Primary | ICD-10-CM

## 2023-12-12 DIAGNOSIS — C79.51 METASTASIS TO BONE: ICD-10-CM

## 2023-12-12 DIAGNOSIS — R19.5 POSITIVE OCCULT STOOL BLOOD TEST: Primary | ICD-10-CM

## 2023-12-12 DIAGNOSIS — Z79.899 ON ANTINEOPLASTIC CHEMOTHERAPY: ICD-10-CM

## 2023-12-12 PROCEDURE — 3078F DIAST BP <80 MM HG: CPT | Mod: CPTII,S$GLB,, | Performed by: INTERNAL MEDICINE

## 2023-12-12 PROCEDURE — 3060F POS MICROALBUMINURIA REV: CPT | Mod: CPTII,S$GLB,, | Performed by: INTERNAL MEDICINE

## 2023-12-12 PROCEDURE — 3066F PR DOCUMENTATION OF TREATMENT FOR NEPHROPATHY: ICD-10-PCS | Mod: CPTII,S$GLB,, | Performed by: INTERNAL MEDICINE

## 2023-12-12 PROCEDURE — 1101F PT FALLS ASSESS-DOCD LE1/YR: CPT | Mod: CPTII,S$GLB,, | Performed by: INTERNAL MEDICINE

## 2023-12-12 PROCEDURE — 96372 THER/PROPH/DIAG INJ SC/IM: CPT | Mod: 59

## 2023-12-12 PROCEDURE — A4216 STERILE WATER/SALINE, 10 ML: HCPCS | Performed by: INTERNAL MEDICINE

## 2023-12-12 PROCEDURE — 96413 CHEMO IV INFUSION 1 HR: CPT

## 2023-12-12 PROCEDURE — 99215 PR OFFICE/OUTPT VISIT, EST, LEVL V, 40-54 MIN: ICD-10-PCS | Mod: S$GLB,,, | Performed by: INTERNAL MEDICINE

## 2023-12-12 PROCEDURE — 3008F PR BODY MASS INDEX (BMI) DOCUMENTED: ICD-10-PCS | Mod: CPTII,S$GLB,, | Performed by: INTERNAL MEDICINE

## 2023-12-12 PROCEDURE — 3288F PR FALLS RISK ASSESSMENT DOCUMENTED: ICD-10-PCS | Mod: CPTII,S$GLB,, | Performed by: INTERNAL MEDICINE

## 2023-12-12 PROCEDURE — 99999 PR PBB SHADOW E&M-EST. PATIENT-LVL V: ICD-10-PCS | Mod: PBBFAC,,, | Performed by: INTERNAL MEDICINE

## 2023-12-12 PROCEDURE — 99215 OFFICE O/P EST HI 40 MIN: CPT | Mod: S$GLB,,, | Performed by: INTERNAL MEDICINE

## 2023-12-12 PROCEDURE — 3074F SYST BP LT 130 MM HG: CPT | Mod: CPTII,S$GLB,, | Performed by: INTERNAL MEDICINE

## 2023-12-12 PROCEDURE — 1101F PR PT FALLS ASSESS DOC 0-1 FALLS W/OUT INJ PAST YR: ICD-10-PCS | Mod: CPTII,S$GLB,, | Performed by: INTERNAL MEDICINE

## 2023-12-12 PROCEDURE — 3066F NEPHROPATHY DOC TX: CPT | Mod: CPTII,S$GLB,, | Performed by: INTERNAL MEDICINE

## 2023-12-12 PROCEDURE — 63600175 PHARM REV CODE 636 W HCPCS: Performed by: INTERNAL MEDICINE

## 2023-12-12 PROCEDURE — 25000003 PHARM REV CODE 250: Performed by: INTERNAL MEDICINE

## 2023-12-12 PROCEDURE — 99999 PR PBB SHADOW E&M-EST. PATIENT-LVL V: CPT | Mod: PBBFAC,,, | Performed by: INTERNAL MEDICINE

## 2023-12-12 PROCEDURE — 3074F PR MOST RECENT SYSTOLIC BLOOD PRESSURE < 130 MM HG: ICD-10-PCS | Mod: CPTII,S$GLB,, | Performed by: INTERNAL MEDICINE

## 2023-12-12 PROCEDURE — 3060F PR POS MICROALBUMINURIA RESULT DOCUMENTED/REVIEW: ICD-10-PCS | Mod: CPTII,S$GLB,, | Performed by: INTERNAL MEDICINE

## 2023-12-12 PROCEDURE — 3288F FALL RISK ASSESSMENT DOCD: CPT | Mod: CPTII,S$GLB,, | Performed by: INTERNAL MEDICINE

## 2023-12-12 PROCEDURE — 3008F BODY MASS INDEX DOCD: CPT | Mod: CPTII,S$GLB,, | Performed by: INTERNAL MEDICINE

## 2023-12-12 PROCEDURE — 96367 TX/PROPH/DG ADDL SEQ IV INF: CPT

## 2023-12-12 PROCEDURE — 3078F PR MOST RECENT DIASTOLIC BLOOD PRESSURE < 80 MM HG: ICD-10-PCS | Mod: CPTII,S$GLB,, | Performed by: INTERNAL MEDICINE

## 2023-12-12 PROCEDURE — 96375 TX/PRO/DX INJ NEW DRUG ADDON: CPT

## 2023-12-12 RX ORDER — SODIUM CHLORIDE 0.9 % (FLUSH) 0.9 %
10 SYRINGE (ML) INJECTION
Status: DISCONTINUED | OUTPATIENT
Start: 2023-12-12 | End: 2023-12-12 | Stop reason: HOSPADM

## 2023-12-12 RX ORDER — DIPHENHYDRAMINE HYDROCHLORIDE 50 MG/ML
25 INJECTION INTRAMUSCULAR; INTRAVENOUS
Status: CANCELLED
Start: 2023-12-12

## 2023-12-12 RX ORDER — SODIUM CHLORIDE 0.9 % (FLUSH) 0.9 %
10 SYRINGE (ML) INJECTION
Status: CANCELLED | OUTPATIENT
Start: 2023-12-12

## 2023-12-12 RX ORDER — DIPHENHYDRAMINE HYDROCHLORIDE 50 MG/ML
25 INJECTION INTRAMUSCULAR; INTRAVENOUS
Status: COMPLETED | OUTPATIENT
Start: 2023-12-12 | End: 2023-12-12

## 2023-12-12 RX ORDER — HEPARIN 100 UNIT/ML
500 SYRINGE INTRAVENOUS
Status: DISCONTINUED | OUTPATIENT
Start: 2023-12-12 | End: 2023-12-12 | Stop reason: HOSPADM

## 2023-12-12 RX ORDER — HEPARIN 100 UNIT/ML
500 SYRINGE INTRAVENOUS
Status: CANCELLED | OUTPATIENT
Start: 2023-12-12

## 2023-12-12 RX ORDER — ONDANSETRON 2 MG/ML
8 INJECTION INTRAMUSCULAR; INTRAVENOUS ONCE
Status: COMPLETED | OUTPATIENT
Start: 2023-12-12 | End: 2023-12-12

## 2023-12-12 RX ORDER — ONDANSETRON 2 MG/ML
8 INJECTION INTRAMUSCULAR; INTRAVENOUS ONCE
Status: CANCELLED
Start: 2023-12-12 | End: 2023-12-12

## 2023-12-12 RX ADMIN — SODIUM CHLORIDE: 9 INJECTION, SOLUTION INTRAVENOUS at 12:12

## 2023-12-12 RX ADMIN — DENOSUMAB 120 MG: 120 INJECTION SUBCUTANEOUS at 12:12

## 2023-12-12 RX ADMIN — DIPHENHYDRAMINE HYDROCHLORIDE 25 MG: 50 INJECTION, SOLUTION INTRAMUSCULAR; INTRAVENOUS at 12:12

## 2023-12-12 RX ADMIN — DOCETAXEL 118 MG: 20 INJECTION, SOLUTION INTRAVENOUS at 12:12

## 2023-12-12 RX ADMIN — ONDANSETRON 8 MG: 2 INJECTION INTRAMUSCULAR; INTRAVENOUS at 12:12

## 2023-12-12 RX ADMIN — HEPARIN 500 UNITS: 100 SYRINGE at 01:12

## 2023-12-12 RX ADMIN — SODIUM CHLORIDE, PRESERVATIVE FREE 10 ML: 5 INJECTION INTRAVENOUS at 01:12

## 2023-12-12 RX ADMIN — DEXAMETHASONE SODIUM PHOSPHATE 20 MG: 10 INJECTION, SOLUTION INTRAMUSCULAR; INTRAVENOUS at 12:12

## 2023-12-18 DIAGNOSIS — R19.5 POSITIVE OCCULT STOOL BLOOD TEST: Primary | ICD-10-CM

## 2023-12-27 NOTE — PROGRESS NOTES
Chief Complaint:   Well Woman     History of Present Illness:  Mansi Jin is a 63 y.o. year old  with PMH of uterine cancer, dx , presents for her well woman exam status post CECE BSO. No vaginal bleeding since hysterectomy. States last pap smear was around . Denies a history of abnormal pap smear.    PMH of small cell lung cancer, currently undergoing chemo with Dr. Gomez.       MENOPAUSAL:  Age at menarche: 9  Age at menopause: BSO  Hysterectomy:  Yes, CECE BSO, h/o uterine ca   Last pap:  WNL per patient  H/o abnl pap: no   Postcoital Bleeding: No  Sexually Active: Yes    Dyspareunia: No  H/o STI: No   HRT: No  MMG: 10/2023 WNL per patient  H/o abnl MMG: No   Colonoscopy: No history      Review of Systems:  General/Constitutional: Chills denies. Fatigue/weakness denies. Fever denies. Night sweats denies. Hot flashes denies    Respiratory: Cough denies. Hemoptysis denies. SOB denies. Sputum production denies. Wheezing denies .   Cardiovascular: Chest pain denies. Dizziness denies. Palpitations denies. Swelling in hands/feet denies.                Breast: Dimpling denies. Breast mass denies. Breast pain/tenderness denies. Nipple discharge denies. Puckering denies.  Gastrointestinal: Abdominal pain denies. Blood in stool denies. Constipation denies. Diarrhea denies. Heartburn denies. Nausea denies. Vomiting denies    Genitourinary: Incontinence denies. Blood in urine denies. Frequent urination denies. Painful urination denies. Urinary urgency denies. Nocturia denies    Gynecologic: Irregular menses denies. Heavy bleeding denies. Painful menses denies. Vaginal discharge denies. Vaginal odor denies. Vaginal itching denies. Vaginal lesion denies. Pelvic pain denies. Decreased libido denies. Vulvar lesion denies. Prolapse of genital organs denies. Painful intercourse denies. Postcoital bleeding denies    Psychiatric: Depression denies. Anxiety denies.     OB History    Para Term   AB Living   5 4 4 0 1 5   SAB IAB Ectopic Multiple Live Births   0 0 0 1 5      # 1 - Date: 72, Sex: Male, Weight: 3.827 kg (8 lb 7 oz), GA: None, Delivery: Vaginal, Spontaneous, Apgar1: None, Apgar5: None, Living: Living, Birth Comments: None    # 2 - Date: , Sex: None, Weight: None, GA: 6w0d, Delivery: Spontaneous , Apgar1: None, Apgar5: None, Living: Fetal Demise, Birth Comments: None    # 3A - Date: 76, Sex: Female, Weight: 1.814 kg (4 lb), GA: None, Delivery: Vaginal, Spontaneous, Apgar1: None, Apgar5: None, Living: Living, Birth Comments: None  # 3B - Date: 76, Sex: Female, Weight: 1.814 kg (4 lb), GA: None, Delivery: Vaginal, Spontaneous, Apgar1: None, Apgar5: None, Living: Living, Birth Comments: None    # 4 - Date: 79, Sex: Female, Weight: 3.629 kg (8 lb), GA: None, Delivery: Vaginal, Spontaneous, Apgar1: None, Apgar5: None, Living: Living, Birth Comments: None    # 5 - Date: 83, Sex: Female, Weight: 3.629 kg (8 lb), GA: None, Delivery: Vaginal, Spontaneous, Apgar1: None, Apgar5: None, Living: Living, Birth Comments: None      Past Medical History:   Diagnosis Date    ASTHMA     COPD (chronic obstructive pulmonary disease)     SEVERE    GERD (gastroesophageal reflux disease)     High cholesterol     HLD (hyperlipidemia)     Hypothyroidism, unspecified     Lung cancer 2022    invasive adenocarcinoma, poorly differentiated with solid morphology, focal tumor involvement of visceral pleura and focal tumor vascular invasion (vein), with 2 of 3 posterior mediastinal lymph nodes positive for metastatic carcinoma    Stage 2 chronic kidney disease     TIA (transient ischemic attack)     no deficits    Tobacco abuse     Unspecified osteoarthritis, unspecified site     Uterine cancer        Current Outpatient Medications:     acetaminophen (TYLENOL) 500 MG tablet, Take 1,000 mg by mouth every 6 (six) hours as needed for Pain., Disp: , Rfl:     albuterol  (PROVENTIL) 5 mg/mL nebulizer solution, Take 2.5 mg by nebulization 3 (three) times daily as needed for Wheezing. rescue, Disp: , Rfl:     apixaban (ELIQUIS) 5 mg Tab, Take 2 tablets (10 mg total) by mouth 2 (two) times daily for 7 days, THEN 1 tablet (5 mg total) 2 (two) times daily., Disp: 88 tablet, Rfl: 0    ascorbic acid, vitamin C, (VITAMIN C) 500 MG tablet, Take 500 mg by mouth once daily., Disp: , Rfl:     atorvastatin (LIPITOR) 40 MG tablet, Take 1 tablet (40 mg total) by mouth every evening., Disp: 90 tablet, Rfl: 2    cholecalciferol, vitamin D3, 1,250 mcg (50,000 unit) capsule, Take 50,000 Units by mouth every 7 days., Disp: , Rfl:     dexAMETHasone (DECADRON) 4 MG Tab, TAKE 2 TABLETS BY MOUTH DAY PRIOR TO CHEMPTHERAPY AND ON DAY 2, 3 AND 4 FOLLOW CHEMOTHERAPY, Disp: 8 tablet, Rfl: 4    docusate sodium (COLACE) 50 MG capsule, Take 50 mg by mouth Daily., Disp: , Rfl:     fluticasone propionate (FLONASE) 50 mcg/actuation nasal spray, 1 spray (50 mcg total) by Each Nostril route once daily. (Patient taking differently: 1 spray by Each Nostril route once daily. Twice a day prn), Disp: 15.8 mL, Rfl: 2    fluticasone-umeclidin-vilanter (TRELEGY ELLIPTA) 100-62.5-25 mcg DsDv, Inhale 1 puff into the lungs once daily., Disp: 60 each, Rfl: 5    ipratropium/albuterol sulfate (COMBIVENT INHL), Inhale 2 puffs into the lungs as needed (wheezing)., Disp: , Rfl:     iron polysac-iron heme polypep (FEOSOL BIFERA) 28 mg Tab, Take 1 tablet by mouth every other day. Takes 3 times a week, Disp: , Rfl:     k phos di & mono-sod phos mono (K-PHOS-NEUTRAL) 250 mg Tab, Take 1 tablet by mouth 2 (two) times a day., Disp: 60 tablet, Rfl: 0    lactulose (CHRONULAC) 10 gram/15 mL solution, Take 30 mLs (20 g total) by mouth 2 (two) times daily. (Patient taking differently: Take 20 g by mouth 2 (two) times daily as needed.), Disp: 240 mL, Rfl: 2    levothyroxine (SYNTHROID) 75 MCG tablet, , Disp: , Rfl:     loratadine (CLARITIN) 10 mg  tablet, Take 1 tablet (10 mg total) by mouth once daily., Disp: 90 tablet, Rfl: 2    megestroL (MEGACE) 40 MG Tab, Take 1 tablet (40 mg total) by mouth 4 (four) times daily For low appetite.., Disp: 120 tablet, Rfl: 2    pantoprazole (PROTONIX) 40 MG tablet, Take 1 tablet (40 mg total) by mouth once daily., Disp: 30 tablet, Rfl: 11    Current Facility-Administered Medications:     alteplase injection 2 mg, 2 mg, Intra-Catheter, PRN, Phoebe Gomez MD    alteplase injection 2 mg, 2 mg, Intra-Catheter, PRN, Shelbi Johansen, FNP    alteplase injection 2 mg, 2 mg, Intra-Catheter, PRN, Phoebe Gomez MD    heparin, porcine (PF) 100 unit/mL injection flush 500 Units, 500 Units, Intravenous, PRN, Phoebe Gomez MD, 500 Units at 04/27/23 1039    heparin, porcine (PF) 100 unit/mL injection flush 500 Units, 500 Units, Intravenous, PRN, Shelbi Johansen, DOUGIEP    heparin, porcine (PF) 100 unit/mL injection flush 500 Units, 500 Units, Intravenous, PRN, Phoebe Gomez MD    sodium chloride 0.9% 250 mL flush bag, , Intravenous, 1 time in Clinic/HOD, Phoebe Gomez MD    sodium chloride 0.9% flush 10 mL, 10 mL, Intravenous, PRN, Phoebe Gomez MD    sodium chloride 0.9% flush 10 mL, 10 mL, Intravenous, PRN, Shelbi Johansen, PAULINE    sodium chloride 0.9% flush 10 mL, 10 mL, Intravenous, PRN, Phoebe Gomez MD    Review of patient's allergies indicates:  No Known Allergies    Past Surgical History:   Procedure Laterality Date    INSERTION OF TUNNELED CENTRAL VENOUS CATHETER (CVC) WITH SUBCUTANEOUS PORT N/A 12/04/2023    Procedure: [id number];  Surgeon: Peter Dial MD;  Location: [facility];  Service: Peripheral Vascular;  Laterality: N/A;    lipoma multiple sites      LUNG LOBECTOMY Right 12/08/2022    Procedure: LOBECTOMY, LUNG;  Surgeon: Jass Browne IV, MD;  Location: [facility];  Service: Cardiothoracic;  Laterality: Right;    SURGICAL REMOVAL OF LYMPH NODE  12/08/2022     Procedure: EXCISION, LYMPH NODE;  Surgeon: Jass Browne IV, MD;  Location: [facility] OR;  Service: Cardiothoracic;;    THORACOTOMY Right 2022    Procedure: THORACOTOMY;  Surgeon: Jass Browne IV, MD;  Location: [facility] OR;  Service: Cardiothoracic;  Laterality: Right;  Right Lower Lobectomy with Lymph Node Dissection    TOTAL ABDOMINAL HYSTERECTOMY W/ BILATERAL SALPINGOOPHORECTOMY       Family History   Problem Relation Age of Onset    Cancer Maternal Grandfather     Heart failure Father     Prostate cancer Father     Hypertension Mother     Hepatitis Mother     Ovarian cancer Mother     Liver cancer Mother     Asthma Brother     Asthma Brother     Fibroids Sister     Skin cancer Sister      Social History     Socioeconomic History    Marital status: Single    Number of children: 5   Tobacco Use    Smoking status: Former     Current packs/day: 0.00     Average packs/day: 0.5 packs/day for 50.0 years (25.0 ttl pk-yrs)     Types: Cigarettes     Start date: 1972     Quit date: 2022     Years since quittin.0    Smokeless tobacco: Never   Substance and Sexual Activity    Alcohol use: Not Currently     Comment: quit 25years ago    Drug use: Never    Sexual activity: Yes     Partners: Male     Birth control/protection: See Surgical Hx     Social Determinants of Health     Financial Resource Strain: Low Risk  (2023)    Overall Financial Resource Strain (CARDIA)     Difficulty of Paying Living Expenses: Not very hard   Food Insecurity: No Food Insecurity (2023)    Hunger Vital Sign     Worried About Running Out of Food in the Last Year: Never true     Ran Out of Food in the Last Year: Never true   Transportation Needs: No Transportation Needs (2023)    PRAPARE - Transportation     Lack of Transportation (Medical): No     Lack of Transportation (Non-Medical): No   Physical Activity: Inactive (2023)    Exercise Vital Sign     Days of Exercise per Week: 0 days     Minutes of Exercise  "per Session: 0 min   Stress: No Stress Concern Present (9/22/2023)    [employer] - Occupational Stress Questionnaire     Feeling of Stress : Only a little   Social Connections: Socially Integrated (9/22/2023)    Social Connection and Isolation Panel [NHANES]     Frequency of Communication with Friends and Family: More than three times a week     Frequency of Social Gatherings with Friends and Family: Three times a week     Attends Evangelical Services: More than 4 times per year     Active Member of Clubs or Organizations: Yes     Attends Club or Organization Meetings: 1 to 4 times per year     Marital Status: Living with partner   Housing Stability: Low Risk  (9/22/2023)    Housing Stability Vital Sign     Unable to Pay for Housing in the Last Year: No     Number of Places Lived in the Last Year: 1     Unstable Housing in the Last Year: No         Physical Exam:  /74   Temp 97.2 °F (36.2 °C)   Ht 5' 5.98" (1.676 m)   Wt 61.8 kg (136 lb 3.2 oz)   BMI 22.00 kg/m²     Chaperone: present.       General appearance: healthy, well-nourished and well-developed     Psychiatric: Orientation to time, place and person. Normal mood and affect and active, alert     Skin: Appearance: no rashes or lesions.     Neck:   Neck: supple, FROM, trachea midline. and no masses   Thyroid: no enlargement or nodules and non-tender.       Cardiovascular:   Auscultation: RRR and no murmur.   Peripheral Vascular: no varicosities, LLE edema, RLE edema, calf tenderness, and palpable cord and pedal pulses intact.     Lungs:   Respiratory effort: no intercostal retractions or accessory muscle usage.   Auscultation: no wheezing, rales/crackles, or rhonchi and clear to auscultation.     Breast:   Inspection/Palpation: no tenderness, discrete/distinct masses, skin changes, or abnormal secretions. Nipple appearance normal.     Abdomen:   Auscultation/Inspection/Palpation: no hepatomegaly, splenomegaly, masses, " tenderness or CVA tenderness and soft, non-distended bowel sounds preset.    Hernia: no palpable hernias.     Female Genitalia:    Vulva: no masses, tenderness or lesions    Bladder/Urethra: no urethral discharge or mass, normal meatus, bladder non-distended.    Vagina: no tenderness, erythema, cystocele, rectocele, abnormal vaginal discharge or vesicle(s) or ulcers    Cuff intact, no masses, NT   Adnexa/Parametria: no parametrial tenderness or mass, no adnexal tenderness or ovarian mass.     Lymph Nodes:   Palpation: non tender submandibular nodes, axillary nodes, or inguinal nodes.     Rectal Exam:   Rectum: normal perianal skin.       Assessment/Plan  1. Well woman exam  Pap today, s/p hyst, no h/o abnl pap   Advised patient if she notices any changes to her breasts, including a lump, mass, dimpling, discharge, rash or tenderness, to should contact us immediately   Healthy diet, exercise   MMG  Multivitamin   Seat belt   Sunscreen use   Safe sex/ STI education   Annual labs: w/ PCP, Jennifer Dacosta FNP  C-scope: awaiting call from Dr. Gomez     2. History of uterine cancer  Educated       3. NSCLC of right lung  Managed by Dr. Gomez

## 2023-12-28 ENCOUNTER — OFFICE VISIT (OUTPATIENT)
Dept: OBSTETRICS AND GYNECOLOGY | Facility: CLINIC | Age: 63
End: 2023-12-28
Payer: MEDICARE

## 2023-12-28 VITALS
HEIGHT: 66 IN | SYSTOLIC BLOOD PRESSURE: 118 MMHG | DIASTOLIC BLOOD PRESSURE: 74 MMHG | TEMPERATURE: 97 F | WEIGHT: 136.19 LBS | BODY MASS INDEX: 21.89 KG/M2

## 2023-12-28 DIAGNOSIS — Z85.42 HISTORY OF UTERINE CANCER: ICD-10-CM

## 2023-12-28 DIAGNOSIS — C34.91 NSCLC OF RIGHT LUNG: ICD-10-CM

## 2023-12-28 DIAGNOSIS — Z01.419 WELL WOMAN EXAM: Primary | ICD-10-CM

## 2023-12-28 DIAGNOSIS — Z12.72 SCREENING FOR VAGINAL CANCER: ICD-10-CM

## 2023-12-28 PROCEDURE — 99387 PR PREVENTIVE VISIT,NEW,65 & OVER: ICD-10-PCS | Mod: ,,, | Performed by: OBSTETRICS & GYNECOLOGY

## 2023-12-28 PROCEDURE — 3066F PR DOCUMENTATION OF TREATMENT FOR NEPHROPATHY: ICD-10-PCS | Mod: ,,, | Performed by: OBSTETRICS & GYNECOLOGY

## 2023-12-28 PROCEDURE — 3288F FALL RISK ASSESSMENT DOCD: CPT | Mod: ,,, | Performed by: OBSTETRICS & GYNECOLOGY

## 2023-12-28 PROCEDURE — 3008F BODY MASS INDEX DOCD: CPT | Mod: ,,, | Performed by: OBSTETRICS & GYNECOLOGY

## 2023-12-28 PROCEDURE — 99387 INIT PM E/M NEW PAT 65+ YRS: CPT | Mod: ,,, | Performed by: OBSTETRICS & GYNECOLOGY

## 2023-12-28 PROCEDURE — 1159F PR MEDICATION LIST DOCUMENTED IN MEDICAL RECORD: ICD-10-PCS | Mod: ,,, | Performed by: OBSTETRICS & GYNECOLOGY

## 2023-12-28 PROCEDURE — 3060F PR POS MICROALBUMINURIA RESULT DOCUMENTED/REVIEW: ICD-10-PCS | Mod: ,,, | Performed by: OBSTETRICS & GYNECOLOGY

## 2023-12-28 PROCEDURE — 3066F NEPHROPATHY DOC TX: CPT | Mod: ,,, | Performed by: OBSTETRICS & GYNECOLOGY

## 2023-12-28 PROCEDURE — 3288F PR FALLS RISK ASSESSMENT DOCUMENTED: ICD-10-PCS | Mod: ,,, | Performed by: OBSTETRICS & GYNECOLOGY

## 2023-12-28 PROCEDURE — 3060F POS MICROALBUMINURIA REV: CPT | Mod: ,,, | Performed by: OBSTETRICS & GYNECOLOGY

## 2023-12-28 PROCEDURE — 1159F MED LIST DOCD IN RCRD: CPT | Mod: ,,, | Performed by: OBSTETRICS & GYNECOLOGY

## 2023-12-28 PROCEDURE — 3074F SYST BP LT 130 MM HG: CPT | Mod: ,,, | Performed by: OBSTETRICS & GYNECOLOGY

## 2023-12-28 PROCEDURE — 1101F PR PT FALLS ASSESS DOC 0-1 FALLS W/OUT INJ PAST YR: ICD-10-PCS | Mod: ,,, | Performed by: OBSTETRICS & GYNECOLOGY

## 2023-12-28 PROCEDURE — 3078F DIAST BP <80 MM HG: CPT | Mod: ,,, | Performed by: OBSTETRICS & GYNECOLOGY

## 2023-12-28 PROCEDURE — 1101F PT FALLS ASSESS-DOCD LE1/YR: CPT | Mod: ,,, | Performed by: OBSTETRICS & GYNECOLOGY

## 2023-12-28 PROCEDURE — 3078F PR MOST RECENT DIASTOLIC BLOOD PRESSURE < 80 MM HG: ICD-10-PCS | Mod: ,,, | Performed by: OBSTETRICS & GYNECOLOGY

## 2023-12-28 PROCEDURE — 3008F PR BODY MASS INDEX (BMI) DOCUMENTED: ICD-10-PCS | Mod: ,,, | Performed by: OBSTETRICS & GYNECOLOGY

## 2023-12-28 PROCEDURE — 3074F PR MOST RECENT SYSTOLIC BLOOD PRESSURE < 130 MM HG: ICD-10-PCS | Mod: ,,, | Performed by: OBSTETRICS & GYNECOLOGY

## 2024-01-02 ENCOUNTER — INFUSION (OUTPATIENT)
Dept: INFUSION THERAPY | Facility: HOSPITAL | Age: 64
End: 2024-01-02
Attending: NURSE PRACTITIONER
Payer: MEDICARE

## 2024-01-02 ENCOUNTER — OFFICE VISIT (OUTPATIENT)
Dept: HEMATOLOGY/ONCOLOGY | Facility: CLINIC | Age: 64
End: 2024-01-02
Payer: MEDICARE

## 2024-01-02 VITALS
HEIGHT: 66 IN | TEMPERATURE: 98 F | SYSTOLIC BLOOD PRESSURE: 111 MMHG | RESPIRATION RATE: 18 BRPM | DIASTOLIC BLOOD PRESSURE: 76 MMHG | WEIGHT: 140.19 LBS | OXYGEN SATURATION: 99 % | BODY MASS INDEX: 22.53 KG/M2 | HEART RATE: 96 BPM

## 2024-01-02 VITALS
DIASTOLIC BLOOD PRESSURE: 76 MMHG | SYSTOLIC BLOOD PRESSURE: 111 MMHG | OXYGEN SATURATION: 99 % | WEIGHT: 140.19 LBS | BODY MASS INDEX: 22.53 KG/M2 | TEMPERATURE: 98 F | HEART RATE: 96 BPM | RESPIRATION RATE: 18 BRPM | HEIGHT: 66 IN

## 2024-01-02 DIAGNOSIS — D84.821 IMMUNODEFICIENCY DUE TO CHEMOTHERAPY: ICD-10-CM

## 2024-01-02 DIAGNOSIS — L81.9 DISCOLORATION OF SKIN: ICD-10-CM

## 2024-01-02 DIAGNOSIS — T45.1X5A IMMUNODEFICIENCY DUE TO CHEMOTHERAPY: ICD-10-CM

## 2024-01-02 DIAGNOSIS — I26.99 PULMONARY EMBOLISM, UNSPECIFIED CHRONICITY, UNSPECIFIED PULMONARY EMBOLISM TYPE, UNSPECIFIED WHETHER ACUTE COR PULMONALE PRESENT: ICD-10-CM

## 2024-01-02 DIAGNOSIS — C34.91 NSCLC OF RIGHT LUNG: Primary | ICD-10-CM

## 2024-01-02 DIAGNOSIS — C79.51 METASTASIS TO BONE: ICD-10-CM

## 2024-01-02 DIAGNOSIS — Z29.89 IMMUNOTHERAPY: ICD-10-CM

## 2024-01-02 DIAGNOSIS — Z79.899 ON ANTINEOPLASTIC CHEMOTHERAPY: ICD-10-CM

## 2024-01-02 DIAGNOSIS — R06.02 SOB (SHORTNESS OF BREATH) ON EXERTION: ICD-10-CM

## 2024-01-02 DIAGNOSIS — D50.9 IRON DEFICIENCY ANEMIA, UNSPECIFIED IRON DEFICIENCY ANEMIA TYPE: ICD-10-CM

## 2024-01-02 DIAGNOSIS — R05.9 COUGH, UNSPECIFIED TYPE: ICD-10-CM

## 2024-01-02 DIAGNOSIS — Z79.899 IMMUNODEFICIENCY DUE TO CHEMOTHERAPY: ICD-10-CM

## 2024-01-02 PROCEDURE — 3074F SYST BP LT 130 MM HG: CPT | Mod: CPTII,S$GLB,,

## 2024-01-02 PROCEDURE — 96375 TX/PRO/DX INJ NEW DRUG ADDON: CPT

## 2024-01-02 PROCEDURE — 25000003 PHARM REV CODE 250

## 2024-01-02 PROCEDURE — 3078F DIAST BP <80 MM HG: CPT | Mod: CPTII,S$GLB,,

## 2024-01-02 PROCEDURE — 63600175 PHARM REV CODE 636 W HCPCS

## 2024-01-02 PROCEDURE — 3008F BODY MASS INDEX DOCD: CPT | Mod: CPTII,S$GLB,,

## 2024-01-02 PROCEDURE — 3288F FALL RISK ASSESSMENT DOCD: CPT | Mod: CPTII,S$GLB,,

## 2024-01-02 PROCEDURE — A4216 STERILE WATER/SALINE, 10 ML: HCPCS

## 2024-01-02 PROCEDURE — 1125F AMNT PAIN NOTED PAIN PRSNT: CPT | Mod: CPTII,S$GLB,,

## 2024-01-02 PROCEDURE — 1101F PT FALLS ASSESS-DOCD LE1/YR: CPT | Mod: CPTII,S$GLB,,

## 2024-01-02 PROCEDURE — 96367 TX/PROPH/DG ADDL SEQ IV INF: CPT

## 2024-01-02 PROCEDURE — 99999 PR PBB SHADOW E&M-EST. PATIENT-LVL III: CPT | Mod: PBBFAC,,,

## 2024-01-02 PROCEDURE — 99215 OFFICE O/P EST HI 40 MIN: CPT | Mod: S$GLB,,,

## 2024-01-02 PROCEDURE — 96413 CHEMO IV INFUSION 1 HR: CPT

## 2024-01-02 RX ORDER — SODIUM CHLORIDE 0.9 % (FLUSH) 0.9 %
10 SYRINGE (ML) INJECTION
Status: DISCONTINUED | OUTPATIENT
Start: 2024-01-02 | End: 2024-01-02 | Stop reason: HOSPADM

## 2024-01-02 RX ORDER — HEPARIN 100 UNIT/ML
500 SYRINGE INTRAVENOUS
Status: DISCONTINUED | OUTPATIENT
Start: 2024-01-02 | End: 2024-01-02 | Stop reason: HOSPADM

## 2024-01-02 RX ORDER — DIPHENHYDRAMINE HYDROCHLORIDE 50 MG/ML
25 INJECTION, SOLUTION INTRAMUSCULAR; INTRAVENOUS
Status: CANCELLED
Start: 2024-01-02

## 2024-01-02 RX ORDER — ONDANSETRON 2 MG/ML
8 INJECTION INTRAMUSCULAR; INTRAVENOUS ONCE
Status: COMPLETED | OUTPATIENT
Start: 2024-01-02 | End: 2024-01-02

## 2024-01-02 RX ORDER — DIPHENHYDRAMINE HYDROCHLORIDE 50 MG/ML
25 INJECTION, SOLUTION INTRAMUSCULAR; INTRAVENOUS
Status: COMPLETED | OUTPATIENT
Start: 2024-01-02 | End: 2024-01-02

## 2024-01-02 RX ORDER — ONDANSETRON 2 MG/ML
8 INJECTION INTRAMUSCULAR; INTRAVENOUS ONCE
Status: CANCELLED
Start: 2024-01-02 | End: 2024-01-02

## 2024-01-02 RX ORDER — HEPARIN 100 UNIT/ML
500 SYRINGE INTRAVENOUS
Status: CANCELLED | OUTPATIENT
Start: 2024-01-02

## 2024-01-02 RX ORDER — SODIUM CHLORIDE 0.9 % (FLUSH) 0.9 %
10 SYRINGE (ML) INJECTION
Status: CANCELLED | OUTPATIENT
Start: 2024-01-02

## 2024-01-02 RX ORDER — CLINDAMYCIN HYDROCHLORIDE 300 MG/1
300 CAPSULE ORAL 3 TIMES DAILY
Qty: 21 CAPSULE | Refills: 0 | Status: SHIPPED | OUTPATIENT
Start: 2024-01-02 | End: 2024-01-09

## 2024-01-02 RX ADMIN — DEXAMETHASONE SODIUM PHOSPHATE 20 MG: 4 INJECTION, SOLUTION INTRA-ARTICULAR; INTRALESIONAL; INTRAMUSCULAR; INTRAVENOUS; SOFT TISSUE at 11:01

## 2024-01-02 RX ADMIN — HEPARIN 500 UNITS: 100 SYRINGE at 02:01

## 2024-01-02 RX ADMIN — SODIUM CHLORIDE: 9 INJECTION, SOLUTION INTRAVENOUS at 12:01

## 2024-01-02 RX ADMIN — DOCETAXEL 118 MG: 20 INJECTION, SOLUTION, CONCENTRATE INTRAVENOUS at 12:01

## 2024-01-02 RX ADMIN — SODIUM CHLORIDE, PRESERVATIVE FREE 10 ML: 5 INJECTION INTRAVENOUS at 02:01

## 2024-01-02 RX ADMIN — DIPHENHYDRAMINE HYDROCHLORIDE 25 MG: 50 INJECTION, SOLUTION INTRAMUSCULAR; INTRAVENOUS at 11:01

## 2024-01-02 RX ADMIN — ONDANSETRON 8 MG: 2 INJECTION INTRAMUSCULAR; INTRAVENOUS at 11:01

## 2024-01-02 NOTE — PROGRESS NOTES
HEMATOLOGY/ONCOLOGY OFFICE CLINIC VISIT  [facility]    ONCOLOGICAL HISTORY:     Diagnosis:  - Stage IV NSCLC with bone mets.  - NSCLC / Adenocarcinoma Stage IIIA pT2a pN2--dx 12/2022  - PD-L1 1%  - History Uterine cancer - Dx 1989    Current Treatment:   Taxotere + Xgeva- 10/24/23-->  Held cycle 3 12/6/23 r/t bilateral PE, started on Eliquis      Treatment History:  - 1989 s/p total hysterectomy   - 12/08/2022: Right lower and middle bilobectomy with mediastinal lymph node dissection.   - Carboplatin + Pemetrexed  3/15/2023-5/1/2023  - Palliative RT   - Carboplatin + Pemetrexed started 3/15/2023  - Keytruda stopped 9/19/23 r/t elevated TSH and PET/CT progression, last dose given 8/29/23  - Xgeva- 7/18/2023-->    Plan of care: palliative systemic therapy      IMAGING:   - 08/10/2022 PET: The elongated subpleural right lower lobe nodule demonstrates fairly intense FDG activity.  Infectious, inflammatory and neoplastic etiologies remain possible. No suspicious PET findings elsewhere.  - 12/08/2022 CT HEAD without contrast: No acute intracranial abnormality identified.  Findings of mild microvascular ischemic disease.  - 12/2022: U/S Upper extremity: Thrombophlebitis of the left cephalic vein. No deep venous thrombosis in the left upper extremity.  - 01/03/2023 MRI Brain : GARY   - 01/12/2023 NM PET CT 1. Postsurgical changes of recent right lower and middle lobectomies.  There is a small right pleural effusion.  There is nonspecific peripheral uptake along the pleura at the right lung base and perihilar region which may be related to healing response in the setting of recent surgery.  Similarly borderline mediastinal uptake is nonspecific in the recent postoperative setting and could be reactive.  Continued attention recommended on follow-up. 2. Focal Uptake at the medial right acetabular roof has a max SUV of 6.4. This is new compared to previous exam. Metastasis is a consideration  -PET 6/5/2023: Previously  visualized moderately FDG avid opacities towards the right lung base improved.  Pleural effusion also improved.  No new suspicious PET findings in the lungs.  Severe emphysema. Some left adrenal thickening is similar to prior but there is now moderate associated FDG activity, max SUV is 3.6. FDG activity is again seen associated with the right superior acetabulum.  FDG avid area appears slightly larger today with slightly increased sclerosis.  SUV is 9.7, previously 6.4.  There is a newly evident subcentimeter FDG avid lesion left pedicle T10. Additional subcentimeter FDG avid lesion T4 vertebral body SUV 3.4.suspicious for metastatic disease.  -MRI T spine 6/15/2023:   1. Foci of abnormal signal intensity and enhancement at the inferior endplate of T3 and at body and left pedicle of T10.  Metastatic neoplastic etiology must be considered.  2. Thoracic spondylosis  3. Mild encroachment into the right neural foramina at the T3-4 and T4-5 secondary to mild posterior disc bulge and posterior osteophyte formation  4. Multilevel mild posterior disc bulge which does not result in significant central spinal stenosis.  9/27/23 Thyroid U/S:  Very minimal to no significant uptake of radiotracer isotope within the thyroid lobes bilaterally.  Clinical correlation is indicated  CT CAP 9/27/23:  1. Interim increasing nodular soft tissue densities/nodes/mass is at the mediastinum and right hilum measuring up the 3.0 cm.  A neoplastic etiology must be considered  2. Interim development of a 2.5 cm low-attenuation mass at the left adrenal gland.  A metastatic etiology must be considered  3. Interim development of a sclerotic foci at T3 and T10.  A metastatic etiology must be considered  4. Small right pleural effusion with associated infiltrate and atelectasis at the right lung base suspect  5. Suspect small cyst at the left kidney as described  6. Mild ill-defined soft tissue fullness at the cervical vaginal region  7. Borderline  cardiomegaly  10/6/23 PET/CT:  1. Disease progression with new FDG avid metastatic lymph nodes in the chest and right hilum.  2. Bilateral axillary and inguinal lymph nodes with low level uptake are indeterminate but concerning for metastasis given the additional findings.  3. Worsening and new FDG avid osseous disease involving both the axial and appendicular skeleton.  4. Enlarging left adrenal metastasis.  12/6/23 CTA:  1. Soft tissue masslike density again evident at the right hilum causing mass effect on the right main pulmonary artery  2. Interim development of filling defects/pulmonary emboli at the diminutive posterior right lower lobe pulmonary arteries.  A few small defects/pulmonary emboli have developed at the pulmonary arteries at the posterior left lower lobe since the prior exam.  3. Postsurgical changes right hilum with volume loss at the right lung and elevation of the right hemidiaphragm with associated posterior right basilar pleural reaction, infiltrate, and atelectasis  4. Advanced emphysematous changes with the right side greater than the left  5. Thoracic spondylosis with the sclerotic foci at the T3 and T10  6. The cancer center was notified of the findings on 12/06/2023 at 10:00    PATHOLOGY:  - 12/08/2022: Invasive solid adenocarcinoma (3.1 CM); G3, poorly differentiated. Visceral Pleura Invasion present without definite serosal surface involvement. Lymphovascular invasion (Vein) present. All margins negative for invasive carcinoma, distance 2.5 cm. LN - 4/14 (10R: Hilar, Posterior mediastinal (RACHEAL), 4R Lower paratracheal, 9R Pulmonary ligament, 10R). pT2a pN2  - 02/22/2023: RIGHT ACETABULAR LESION, CT - GUIDE CORE NEEDLE BIOPSY : FIBRINOPURULENT EXUDATE ADMIXED WITH ATYPICAL EPITHELIOID GROUPS,   NORMAL    MARROW ELEMENTS AND ABUNDANT BLOOD CLOT      Subjective:     HPI  Mansi Bennett Davin  63 y.o.  female with past medical history significant for HTN, HLD, uterine cancer status post total  hysterectomy in 1989,  CKD Stage 2, COPD Stage 3, referred for management of NSCLC s/p RLL / RML lobectomy with Mediastinal Lymph Node Dissection on 12/8/2022.     She was noted to have RLL lung mass and underwent CT guided biopsy which was non diagnostic. PET scan demonstrated significant avidity within the lesion. Given high risk for malignancy, she underwent right lower lobectomy and right middle lobectomy on 12/08/2022. Pathology came back with invasive adenocarcinoma, poorly differentiated with solid morphology, focal tumor involvement of visceral pleura and focal tumor vascular invasion (vein), with mediastinal lymph nodes positive for metastatic carcinoma.      PET on 01/12/2023 showed focal uptake at the medial right acetabular roof but the biopsy was negative for malignancy. Discussed with radiology, they recommended adjuvant chemotherapy and then they will reassess later for radiation therapy.     Chief Complaint: Lung Cancer (Pt c/o of SOB and back pain that started 3-4 days ago and is worse when taking deep breathes.)      Interval History:   She returns to clinic today for treatment clearance for cycle 4 of Taxotere. She does have a cough. She is currently taking Eliquis 5 mg BID for bilateral PE.  She continues to have dryness to bilateral hands, but itching and redness have improved. She is moisturizing her hands daily. She also has color changes to her fingertips that presents following her last cycle of Keytruda, not currently worsening. She continues to report fatigue. Labs reviewed in detail and are stable. Constipation continues to be stable with the addition of lactulose as needed. SOB only on exertion. She denies any palpitations, chest pain, SOB, fever, chills, nausea, vomiting, diarrhea, recent hospitalizations or infections.       Past Medical History:   Diagnosis Date    ASTHMA     COPD (chronic obstructive pulmonary disease)     SEVERE    GERD (gastroesophageal reflux disease)     High  cholesterol     HLD (hyperlipidemia)     Hypothyroidism, unspecified     Lung cancer 2022    invasive adenocarcinoma, poorly differentiated with solid morphology, focal tumor involvement of visceral pleura and focal tumor vascular invasion (vein), with 2 of 3 posterior mediastinal lymph nodes positive for metastatic carcinoma    Stage 2 chronic kidney disease     TIA (transient ischemic attack)     no deficits    Tobacco abuse     Unspecified osteoarthritis, unspecified site     Uterine cancer       Past Surgical History:   Procedure Laterality Date    INSERTION OF TUNNELED CENTRAL VENOUS CATHETER (CVC) WITH SUBCUTANEOUS PORT N/A 2023    Procedure: [id number];  Surgeon: Peter Dial MD;  Location: [facility] OR;  Service: Peripheral Vascular;  Laterality: N/A;    lipoma multiple sites      LUNG LOBECTOMY Right 2022    Procedure: LOBECTOMY, LUNG;  Surgeon: Jass Browne IV, MD;  Location: [facility] OR;  Service: Cardiothoracic;  Laterality: Right;    SURGICAL REMOVAL OF LYMPH NODE  2022    Procedure: EXCISION, LYMPH NODE;  Surgeon: Jass Browne IV, MD;  Location: [facility] OR;  Service: Cardiothoracic;;    THORACOTOMY Right 2022    Procedure: THORACOTOMY;  Surgeon: Jass Browne IV, MD;  Location: [facility] OR;  Service: Cardiothoracic;  Laterality: Right;  Right Lower Lobectomy with Lymph Node Dissection    TOTAL ABDOMINAL HYSTERECTOMY W/ BILATERAL SALPINGOOPHORECTOMY       Social History     Socioeconomic History    Marital status: Single    Number of children: 5   Tobacco Use    Smoking status: Former     Current packs/day: 0.00     Average packs/day: 0.5 packs/day for 50.0 years (25.0 ttl pk-yrs)     Types: Cigarettes     Start date: 1972     Quit date: 2022     Years since quittin.0    Smokeless tobacco: Never   Substance and Sexual Activity    Alcohol use: Not Currently     Comment: quit 25years ago    Drug use: Never    Sexual activity: Yes      Partners: Male     Birth control/protection: See Surgical Hx     Social Determinants of Health     Financial Resource Strain: Low Risk  (9/22/2023)    Overall Financial Resource Strain (CARDIA)     Difficulty of Paying Living Expenses: Not very hard   Food Insecurity: No Food Insecurity (9/22/2023)    Hunger Vital Sign     Worried About Running Out of Food in the Last Year: Never true     Ran Out of Food in the Last Year: Never true   Transportation Needs: No Transportation Needs (9/22/2023)    PRAPARE - Transportation     Lack of Transportation (Medical): No     Lack of Transportation (Non-Medical): No   Physical Activity: Inactive (9/22/2023)    Exercise Vital Sign     Days of Exercise per Week: 0 days     Minutes of Exercise per Session: 0 min   Stress: No Stress Concern Present (9/22/2023)    [facility] - Occupational Stress Questionnaire     Feeling of Stress : Only a little   Social Connections: Socially Integrated (9/22/2023)    Social Connection and Isolation Panel [NHANES]     Frequency of Communication with Friends and Family: More than three times a week     Frequency of Social Gatherings with Friends and Family: Three times a week     Attends Zoroastrian Services: More than 4 times per year     Active Member of Clubs or Organizations: Yes     Attends Club or Organization Meetings: 1 to 4 times per year     Marital Status: Living with partner   Housing Stability: Low Risk  (9/22/2023)    Housing Stability Vital Sign     Unable to Pay for Housing in the Last Year: No     Number of Places Lived in the Last Year: 1     Unstable Housing in the Last Year: No      Family History   Problem Relation Age of Onset    Cancer Maternal Grandfather     Heart failure Father     Prostate cancer Father     Hypertension Mother     Hepatitis Mother     Ovarian cancer Mother     Liver cancer Mother     Asthma Brother     Asthma Brother     Fibroids Sister     Skin cancer Sister       Review of  patient's allergies indicates:  No Known Allergies     Review of Systems   Constitutional:  Positive for fatigue. Negative for activity change, appetite change, chills, fever and unexpected weight change.   HENT:  Positive for sinus pressure/congestion. Negative for mouth dryness, mouth sores, nosebleeds, sore throat and trouble swallowing.    Eyes: Negative.  Negative for visual disturbance.   Respiratory:  Positive for cough and shortness of breath (on exertion, chronic).    Cardiovascular:  Negative for chest pain, palpitations and leg swelling.   Gastrointestinal:  Negative for abdominal distention, abdominal pain, blood in stool, change in bowel habit, constipation, diarrhea, nausea and vomiting.   Endocrine: Negative.    Genitourinary: Negative.  Negative for dysuria, frequency, hematuria and urgency.   Musculoskeletal:  Negative for arthralgias, back pain, leg pain, myalgias and neck pain.   Integumentary:  Negative for rash, breast mass, breast discharge and breast tenderness. Negative.   Allergic/Immunologic: Negative.    Neurological:  Negative for dizziness, tremors, syncope, speech difficulty, weakness, light-headedness, numbness, headaches and memory loss.   Hematological: Negative.  Does not bruise/bleed easily.   Psychiatric/Behavioral: Negative.  Negative for confusion and suicidal ideas.    Breast: Negative for mass and tenderness        Objective:        Vitals:    01/02/24 1009   BP: 111/76   Pulse: 96   Resp: 18   Temp: 97.9 °F (36.6 °C)         Wt Readings from Last 6 Encounters:   01/02/24 63.6 kg (140 lb 3.2 oz)   01/02/24 63.6 kg (140 lb 3.2 oz)   12/28/23 61.8 kg (136 lb 3.2 oz)   12/12/23 63 kg (139 lb)   12/12/23 63 kg (139 lb)   12/06/23 63.4 kg (139 lb 12.8 oz)     Body mass index is 22.64 kg/m².  Body surface area is 1.72 meters squared.       Physical Exam  Vitals and nursing note reviewed.   Constitutional:       Appearance: Normal appearance.   HENT:      Head: Normocephalic and  atraumatic.      Nose: Congestion present.   Eyes:      General: No scleral icterus.     Extraocular Movements: Extraocular movements intact.      Conjunctiva/sclera: Conjunctivae normal.   Neck:      Vascular: No JVD.   Cardiovascular:      Rate and Rhythm: Normal rate and regular rhythm.      Heart sounds: No murmur heard.  Pulmonary:      Effort: Pulmonary effort is normal.      Breath sounds: Decreased breath sounds, wheezing and rhonchi present.   Abdominal:      General: Bowel sounds are normal. There is no distension.      Palpations: Abdomen is soft.      Tenderness: There is no abdominal tenderness.   Musculoskeletal:      Cervical back: Neck supple.   Lymphadenopathy:      Head:      Right side of head: No submental or submandibular adenopathy.      Left side of head: No submental or submandibular adenopathy.      Cervical: No cervical adenopathy.      Upper Body:      Right upper body: No supraclavicular or axillary adenopathy.      Left upper body: No supraclavicular or axillary adenopathy.      Lower Body: No right inguinal adenopathy. No left inguinal adenopathy.      Comments: No adenopathy noted bilaterally   Skin:     General: Skin is warm.      Coloration: Skin is not jaundiced.      Findings: No lesion or rash.      Comments: Change of skin color to bilateral fingertips   Neurological:      General: No focal deficit present.      Mental Status: She is alert and oriented to person, place, and time.      Cranial Nerves: Cranial nerves 2-12 are intact.      Gait: Gait normal.   Psychiatric:         Attention and Perception: Attention normal.         Speech: Speech normal.         Behavior: Behavior is cooperative.         Cognition and Memory: Cognition normal.         Judgment: Judgment normal.       LABS      Lab Visit on 01/02/2024   Component Date Value    Sodium Level 01/02/2024 140     Potassium Level 01/02/2024 3.6     Chloride 01/02/2024 108 (H)     Carbon Dioxide 01/02/2024 26     Glucose  Level 01/02/2024 85     Blood Urea Nitrogen 01/02/2024 14.0     Creatinine 01/02/2024 0.72     Calcium Level Total 01/02/2024 9.3     Protein Total 01/02/2024 6.9     Albumin Level 01/02/2024 3.0 (L)     Globulin 01/02/2024 3.9 (H)     Albumin/Globulin Ratio 01/02/2024 0.8 (L)     Bilirubin Total 01/02/2024 0.3     Alkaline Phosphatase 01/02/2024 76     Alanine Aminotransferase 01/02/2024 7     Aspartate Aminotransfera* 01/02/2024 14     eGFR 01/02/2024 >60     Magnesium Level 01/02/2024 1.90     Phosphorus Level 01/02/2024 1.9 (L)     WBC 01/02/2024 9.22     RBC 01/02/2024 4.13 (L)     Hgb 01/02/2024 10.5 (L)     Hct 01/02/2024 35.0 (L)     MCV 01/02/2024 84.7     MCH 01/02/2024 25.4 (L)     MCHC 01/02/2024 30.0 (L)     RDW 01/02/2024 17.9 (H)     Platelet 01/02/2024 320     MPV 01/02/2024 9.3     Neut % 01/02/2024 80.6     Lymph % 01/02/2024 11.5     Mono % 01/02/2024 7.2     Eos % 01/02/2024 0.1     Basophil % 01/02/2024 0.1     Lymph # 01/02/2024 1.06     Neut # 01/02/2024 7.43     Mono # 01/02/2024 0.66     Eos # 01/02/2024 0.01     Baso # 01/02/2024 0.01     IG# 01/02/2024 0.05 (H)     IG% 01/02/2024 0.5          Assessment:   ECOG Performance status: 2 - 3.     1. NSCLC of right lung    2. Cough, unspecified type    3. Pulmonary embolism, unspecified chronicity, unspecified pulmonary embolism type, unspecified whether acute cor pulmonale present    4. On antineoplastic chemotherapy    5. Metastasis to bone    6. Discoloration of skin    7. SOB (shortness of breath) on exertion    8. Immunodeficiency due to chemotherapy    9. Immunotherapy    10. Iron deficiency anemia, unspecified iron deficiency anemia type          Stage IV NSCLC with bone mets.    Originally: NSCLC / Adenocarcinoma Stage IIIA pT2a pN2  - 12/08/2022: Right lower and middle bilobectomy with mediastinal lymph node dissection. Path: Invasive adenocarcinoma, poorly differentiated, focal tumor involvement of visceral pleura and focal tumor  vascular invasion (vein), with mediastinal lymph nodes positive for metastatic carcinoma   - TMB - 5, MSI-S, MET amplification, ROS1.  No mutations in EGFR, BRAF, ALK, ERBB2, KRAS, RET,    - 1% tumor cells are positive for PD-L1   - patient evaluated by Radiation Oncology with recommendation to treat with chemotherapy 1st and will re-evaluate the patient by the end of chemotherapy for radiation therapy  - 01/12/2023 PET: Focal uptake at the medial right acetabular roof. Metastasis is a consideration  - 02/22/2023: RIGHT ACETABULAR LESION, CT - GUIDE CORE NEEDLE BIOPSY : FIBRINOPURULENT EXUDATE ADMIXED WITH ATYPICAL EPITHELIOID GROUPS,   NORMAL    MARROW ELEMENTS AND ABUNDANT BLOOD CLOT  PET scan showed a suspicious finding in the right acetabular roof and patient then had a CT-guided biopsy no evidence of malignancy. Started with adjuvant chemotherapy with Carboplatin and pemetrexed x 4 cycles (3/15/23-5/18/23)   Patient has been seen by Rad/Onc (Dr Mireles) since the completion of her chemotherapy and plan was to pursue radiation x 5-6 weeks.  She discussed with the patient that they have showed no overall survival benefit in the setting of postoperative radiation therapy but data supports a larger benefit inpatient with extracapsular extension.  If she tolerated chemotherapy well and continue with good performance status then plan was for PORT   Restaging PET-CT after completion of chemotherapy and prior to radiation therapy showed progression at the right acetabulum.  Patient with progression of disease now with bone metastases.  She bagan palliative radiation treatment with Dr. Mireles on 6/28/23, 10 treatments over two weeks.  PET CT 6/5/23 with progression Right superior acetabulum.  FDG avid area appears slightly larger with slightly increased sclerosis.  SUV is 9.7, previously 6.4.  She had palliative radiation. Started on Keytruda 7/18/23. Now on Taxotere + Xgeva- 10/24/23 due to progression.  Cycle 3 of  Taxotere held 12/6/23 r/t bilateral PE  Bone mets   Xgeva q4w  She was seen by Rad/Onc for palliative RT but recommendations were to start chemotherapy and re eval after 2 cycles  Iron deficiency Anemia  Continue with 1 tab PO iron QOD with Vitamin C.   Currently scheduled for colonoscopy 6/2024 with Dr. Charles  Hypothyroid   Followed by endocrinology Dr. Parker  GYN  Followed by Dr. Rutledge. Current pap results pending.  PE  Eliquis sent to pharmacy. Strict ED precautions given.                   Plan:   Labs stable.  Continue cycle 4 Taxotere as scheduled  Clindamycin ordered for cough. Follow-up with PCP for neb refills.   Continue Eliquis 5 mg BID  Moisturize bilateral hands.  Continue with the Xgeva q4w   Continue oral iron every other day  PET currently scheduled for 1/10/2024.  RTC in 3 weeks with MD for FU/lab/treat/scan review   Cbc, cmp, mag, phos- 1 hr prior @ [facility]      The patient was seen, interviewed and examined. Pertinent lab and radiology studies were reviewed.   The patient was given ample opportunity to ask questions, and to the best of my abilities, all questions answered to satisfaction; patient demonstrated understanding of what we discussed and agreeable to the plan. Pt instructed to call should develop concerning signs/symptoms or have further questions.       Shelbi Johansen, DOUGIEP-C  Oncology/Hematology   [facility]

## 2024-01-02 NOTE — PLAN OF CARE
Patient will have no S&S of nausea and instructed understanding of how to take antiemetics.Patient will receive infusion in a timely manor. Patient completed infusion with out difficulty

## 2024-01-04 LAB — PSYCHE PATHOLOGY RESULT: NORMAL

## 2024-01-04 NOTE — PROGRESS NOTES
Please let her know that her pap smear returned abnormal.  She will need a colposcopy. If she has never had a colpo before, schedule an appt to discuss these findings and a separate appointment for the colpo itself. If has had a colpo before can come in for discussion or schedule a colpo. If scheduling a colpo advise 600mg of Ibuprofen PO prior. Thanks!

## 2024-01-04 NOTE — PROGRESS NOTES
Patient notified of Cytology results done 12/28/23 ~ASCUS w/ +HPV, -16,18,45.  No history of abnormal pap smear, therefore, Discussion scheduled with Dr. Rutledge and Vandana scheduled afterwards.  Patient voiced understanding and agrees with plan of care.

## 2024-01-08 DIAGNOSIS — C34.91 NSCLC OF RIGHT LUNG: ICD-10-CM

## 2024-01-08 DIAGNOSIS — K59.00 CONSTIPATION, UNSPECIFIED CONSTIPATION TYPE: ICD-10-CM

## 2024-01-08 DIAGNOSIS — E83.39 HYPOPHOSPHATEMIA: ICD-10-CM

## 2024-01-08 DIAGNOSIS — R19.5 POSITIVE OCCULT STOOL BLOOD TEST: Primary | ICD-10-CM

## 2024-01-08 DIAGNOSIS — I26.99 PULMONARY EMBOLISM, UNSPECIFIED CHRONICITY, UNSPECIFIED PULMONARY EMBOLISM TYPE, UNSPECIFIED WHETHER ACUTE COR PULMONALE PRESENT: ICD-10-CM

## 2024-01-09 ENCOUNTER — INFUSION (OUTPATIENT)
Dept: INFUSION THERAPY | Facility: HOSPITAL | Age: 64
End: 2024-01-09
Attending: NURSE PRACTITIONER
Payer: MEDICARE

## 2024-01-09 VITALS
HEIGHT: 65 IN | HEART RATE: 125 BPM | WEIGHT: 137.63 LBS | OXYGEN SATURATION: 95 % | SYSTOLIC BLOOD PRESSURE: 105 MMHG | DIASTOLIC BLOOD PRESSURE: 65 MMHG | BODY MASS INDEX: 22.93 KG/M2 | RESPIRATION RATE: 18 BRPM | TEMPERATURE: 99 F

## 2024-01-09 DIAGNOSIS — C79.51 METASTASIS TO BONE: ICD-10-CM

## 2024-01-09 DIAGNOSIS — I26.99 PULMONARY EMBOLISM, UNSPECIFIED CHRONICITY, UNSPECIFIED PULMONARY EMBOLISM TYPE, UNSPECIFIED WHETHER ACUTE COR PULMONALE PRESENT: Primary | ICD-10-CM

## 2024-01-09 DIAGNOSIS — C79.51 METASTASIS TO BONE: Primary | ICD-10-CM

## 2024-01-09 DIAGNOSIS — C34.91 NSCLC OF RIGHT LUNG: Primary | ICD-10-CM

## 2024-01-09 PROCEDURE — 96372 THER/PROPH/DIAG INJ SC/IM: CPT

## 2024-01-09 PROCEDURE — 63600175 PHARM REV CODE 636 W HCPCS: Mod: JZ,JG

## 2024-01-09 RX ORDER — APIXABAN 5 MG/1
TABLET, FILM COATED ORAL
Qty: 88 TABLET | Refills: 0 | Status: SHIPPED | OUTPATIENT
Start: 2024-01-09 | End: 2024-01-09

## 2024-01-09 RX ADMIN — DENOSUMAB 120 MG: 120 INJECTION SUBCUTANEOUS at 01:01

## 2024-01-10 NOTE — PROGRESS NOTES
Chief Complaint:  discuss abnormal pap smear    History of Present Illness:   Mansi Jin is a 63 y.o.  with PMH of uterine cancer  s/p FEDERICO DAVIDSON presents to discuss abnormal pap smear. Her most recent Pap smear was on 23 and showed ASCUS with POSITIVE high risk HPV negative 16, 18/45. Patient does not have a history of abnormal pap smears. Last pap prior to current . She has not had a colposcopy before. She has not had the Gardasil vaccine. Currently sexually active. No LMP recorded. Patient has had a hysterectomy. Denies history of abnormal AUB, VD or pelvic pain.      MENOPAUSAL:  Age at menarche: 9  Age at menopause: , age 28  Hysterectomy:  Yes, CECE DAVIDSON, h/o uterine ca   Last pap: 23 ASCUS + HPV - 16/18/45  H/o abnl pap: not prior to current  Postcoital bleeding: no  Sexually active: Yes  Dyspareunia: No  H/o STI: No  HRT: No  MMG: 10/2023 NIL per patient  H/o abnl MMG: No   Colonoscopy: No history      Review of Systems  General/Constitutional: Chills denies. Fatigue/weakness denies. Fever denies. Night sweats denies. Hot flashes denies  Gastrointestinal: Abdominal pain denies. Blood in stool denies. Constipation denies. Diarrhea denies. Heartburn denies. Nausea denies. Vomiting denies   Genitourinary: Incontinence denies. Blood in urine denies. Frequent urination denies. Urgency denies. Painful urination denies. Nocturia denies   Gynecologic: Irregular menses denies. Heavy bleeding  denies. Painful menses denies. Vaginal discharge denies.Vaginal odor denies. Vaginal itching/Irritation denies. Vaginal lesion denies.  Pelvic pain denies. Decreased libido denies. Vulvar lesion denies. Prolapse of genital organs denies. Painful intercourse denies. Postcoital bleeding denies   Psychiatric: Mood lability denies. Depressed mood denies. Suicidal thoughts denies. Anxiety denies. Overwhelmed denies. Appetite normal. Energy level normal     OB History    Para Term   AB Living   5 4 4 0 1 5   SAB IAB Ectopic Multiple Live Births   0 0 0 1 5      # 1 - Date: 72, Sex: Male, Weight: 3.827 kg (8 lb 7 oz), GA: None, Delivery: Vaginal, Spontaneous, Apgar1: None, Apgar5: None, Living: Living, Birth Comments: None    # 2 - Date: , Sex: None, Weight: None, GA: 6w0d, Delivery: Spontaneous , Apgar1: None, Apgar5: None, Living: Fetal Demise, Birth Comments: None    # 3A - Date: 76, Sex: Female, Weight: 1.814 kg (4 lb), GA: None, Delivery: Vaginal, Spontaneous, Apgar1: None, Apgar5: None, Living: Living, Birth Comments: None  # 3B - Date: 76, Sex: Female, Weight: 1.814 kg (4 lb), GA: None, Delivery: Vaginal, Spontaneous, Apgar1: None, Apgar5: None, Living: Living, Birth Comments: None    # 4 - Date: 79, Sex: Female, Weight: 3.629 kg (8 lb), GA: None, Delivery: Vaginal, Spontaneous, Apgar1: None, Apgar5: None, Living: Living, Birth Comments: None    # 5 - Date: 83, Sex: Female, Weight: 3.629 kg (8 lb), GA: None, Delivery: Vaginal, Spontaneous, Apgar1: None, Apgar5: None, Living: Living, Birth Comments: None      Past Surgical History:   Procedure Laterality Date    INSERTION OF TUNNELED CENTRAL VENOUS CATHETER (CVC) WITH SUBCUTANEOUS PORT N/A 2023    Procedure: [id number];  Surgeon: Peter Dial MD;  Location: [facility];  Service: Peripheral Vascular;  Laterality: N/A;    lipoma multiple sites      LUNG LOBECTOMY Right 2022    Procedure: LOBECTOMY, LUNG;  Surgeon: Jass Browne IV, MD;  Location: [facility];  Service: Cardiothoracic;  Laterality: Right;    SURGICAL REMOVAL OF LYMPH NODE  2022    Procedure: EXCISION, LYMPH NODE;  Surgeon: Jass Browne IV, MD;  Location: [facility] OR;  Service: Cardiothoracic;;    THORACOTOMY Right 2022    Procedure: THORACOTOMY;  Surgeon: Jass Browne IV, MD;  Location: [facility] OR;  Service: Cardiothoracic;  Laterality: Right;  Right Lower Lobectomy with Lymph Node Dissection     TOTAL ABDOMINAL HYSTERECTOMY W/ BILATERAL SALPINGOOPHORECTOMY       Social History     Socioeconomic History    Marital status: Single    Number of children: 5   Tobacco Use    Smoking status: Former     Current packs/day: 0.00     Average packs/day: 0.5 packs/day for 50.0 years (25.0 ttl pk-yrs)     Types: Cigarettes     Start date: 1972     Quit date: 2022     Years since quittin.0    Smokeless tobacco: Never   Substance and Sexual Activity    Alcohol use: Not Currently     Comment: quit 25years ago    Drug use: Never    Sexual activity: Yes     Partners: Male     Birth control/protection: See Surgical Hx     Social Determinants of Health     Financial Resource Strain: Low Risk  (2023)    Overall Financial Resource Strain (CARDIA)     Difficulty of Paying Living Expenses: Not very hard   Food Insecurity: No Food Insecurity (2023)    Hunger Vital Sign     Worried About Running Out of Food in the Last Year: Never true     Ran Out of Food in the Last Year: Never true   Transportation Needs: No Transportation Needs (2023)    PRAPARE - Transportation     Lack of Transportation (Medical): No     Lack of Transportation (Non-Medical): No   Physical Activity: Inactive (2023)    Exercise Vital Sign     Days of Exercise per Week: 0 days     Minutes of Exercise per Session: 0 min   Stress: No Stress Concern Present (2023)    [place] [employer] - Occupational Stress Questionnaire     Feeling of Stress : Only a little   Social Connections: Socially Integrated (2023)    Social Connection and Isolation Panel [NHANES]     Frequency of Communication with Friends and Family: More than three times a week     Frequency of Social Gatherings with Friends and Family: Three times a week     Attends [employer] Services: More than 4 times per year     Active Member of Clubs or Organizations: Yes     Attends Club or Organization Meetings: 1 to 4 times per year     Marital  "Status: Living with partner   Housing Stability: Low Risk  (9/22/2023)    Housing Stability Vital Sign     Unable to Pay for Housing in the Last Year: No     Number of Places Lived in the Last Year: 1     Unstable Housing in the Last Year: No       Physical Exam:  BP (!) 98/58   Ht 5' 5" (1.651 m)   Wt 63 kg (138 lb 14.2 oz)   BMI 23.11 kg/m²     Constitutional: General appearance: healthy, well-nourished and well-developed   Psychiatric:  Orientation to time, place and person. Normal mood and affect and active, alert       Assessment/Plan:  1. ASCUS with positive high risk HPV cervical  Discussed the spectrum of abnormal pap smears, the relationship to HPV infection, high and low risk HPV types, cervical dysplasia, cervical cancer, and genital warts. Reviewed HPV as a sexually transmitted disease, the natural course of most infections and risk factors for infection.       Reviewed the difference between ASCUS, LGSIL and HGSIL, and management strategies for each.       Colposcopy recommended.     Patient desires colposcopy     Gardasil: 0/3  Smoking status: former      "

## 2024-01-11 ENCOUNTER — OFFICE VISIT (OUTPATIENT)
Dept: OBSTETRICS AND GYNECOLOGY | Facility: CLINIC | Age: 64
End: 2024-01-11
Payer: MEDICARE

## 2024-01-11 VITALS
BODY MASS INDEX: 23.14 KG/M2 | WEIGHT: 138.88 LBS | HEIGHT: 65 IN | DIASTOLIC BLOOD PRESSURE: 58 MMHG | SYSTOLIC BLOOD PRESSURE: 98 MMHG

## 2024-01-11 DIAGNOSIS — R87.610 ASCUS WITH POSITIVE HIGH RISK HPV CERVICAL: Primary | ICD-10-CM

## 2024-01-11 DIAGNOSIS — R87.810 ASCUS WITH POSITIVE HIGH RISK HPV CERVICAL: Primary | ICD-10-CM

## 2024-01-11 PROCEDURE — 3074F SYST BP LT 130 MM HG: CPT | Mod: ,,, | Performed by: OBSTETRICS & GYNECOLOGY

## 2024-01-11 PROCEDURE — 3008F BODY MASS INDEX DOCD: CPT | Mod: ,,, | Performed by: OBSTETRICS & GYNECOLOGY

## 2024-01-11 PROCEDURE — 99213 OFFICE O/P EST LOW 20 MIN: CPT | Mod: ,,, | Performed by: OBSTETRICS & GYNECOLOGY

## 2024-01-11 PROCEDURE — 3078F DIAST BP <80 MM HG: CPT | Mod: ,,, | Performed by: OBSTETRICS & GYNECOLOGY

## 2024-01-11 PROCEDURE — 1159F MED LIST DOCD IN RCRD: CPT | Mod: ,,, | Performed by: OBSTETRICS & GYNECOLOGY

## 2024-01-12 RX ORDER — LORATADINE 10 MG/1
10 TABLET ORAL DAILY
Qty: 90 TABLET | Refills: 2 | Status: SHIPPED | OUTPATIENT
Start: 2024-01-12

## 2024-01-16 ENCOUNTER — HOSPITAL ENCOUNTER (OUTPATIENT)
Dept: RADIOLOGY | Facility: HOSPITAL | Age: 64
Discharge: HOME | End: 2024-01-16
Payer: MEDICARE

## 2024-01-16 DIAGNOSIS — C34.91 NSCLC OF RIGHT LUNG: ICD-10-CM

## 2024-01-16 DIAGNOSIS — C79.51 METASTASIS TO BONE: ICD-10-CM

## 2024-01-16 PROCEDURE — 25500020 PHARM REV CODE 255

## 2024-01-16 PROCEDURE — A9503 TC99M MEDRONATE: HCPCS

## 2024-01-16 PROCEDURE — 74177 CT ABD & PELVIS W/CONTRAST: CPT | Mod: TC

## 2024-01-16 RX ADMIN — DIATRIZOATE MEGLUMINE AND DIATRIZOATE SODIUM 30 ML: 660; 100 LIQUID ORAL; RECTAL at 11:01

## 2024-01-16 RX ADMIN — IOPAMIDOL 85 ML: 755 INJECTION, SOLUTION INTRAVENOUS at 11:01

## 2024-01-18 NOTE — PROGRESS NOTES
Chief Complaint:  Colposcopy    Indication for Procedure:  Mansi Jin is a 63 y.o.  s/p CECE BSO due to uterine cancer  presents for colpo secondary to an abnormal pap smear, 23 ASCUS +HR HPV, neg 16, 18/45. Last pap prior to current . She has not had a colposcopy before.      Denies history of abnormal pap smears. Denies any abnormal vaginal bleeding, vaginal discharge, pelvic or abdominal pain.       MENOPAUSAL:  Age at menarche: 9  Age at menopause: , age 28  Hysterectomy: Yes, CECE BSO, h/o uterine ca   Last pap: 23 ASCUS + HPV - 16/18/45  H/o abnl pap: yes  Postcoital bleeding: no  Sexually active: Yes  Dyspareunia: No  H/o STI: No  HRT: No  MMG: 10/2023 NIL per pt  H/o abnl MMG: No  Colonoscopy: No history      Review of Systems:   Patient reports no abdominal pain. She reports no hematuria, no abnormal bleeding, no flank pain, no trouble urinating, no incontinence, no rash, no lesion, no discharge, no vaginal odor, and no vaginal itching.     OB History    Para Term  AB Living   5 4 4 0 1 5   SAB IAB Ectopic Multiple Live Births   0 0 0 1 5      # 1 - Date: 72, Sex: Male, Weight: 3.827 kg (8 lb 7 oz), GA: None, Delivery: Vaginal, Spontaneous, Apgar1: None, Apgar5: None, Living: Living, Birth Comments: None    # 2 - Date: , Sex: None, Weight: None, GA: 6w0d, Delivery: Spontaneous , Apgar1: None, Apgar5: None, Living: Fetal Demise, Birth Comments: None    # 3A - Date: 76, Sex: Female, Weight: 1.814 kg (4 lb), GA: None, Delivery: Vaginal, Spontaneous, Apgar1: None, Apgar5: None, Living: Living, Birth Comments: None  # 3B - Date: 76, Sex: Female, Weight: 1.814 kg (4 lb), GA: None, Delivery: Vaginal, Spontaneous, Apgar1: None, Apgar5: None, Living: Living, Birth Comments: None    # 4 - Date: 79, Sex: Female, Weight: 3.629 kg (8 lb), GA: None, Delivery: Vaginal, Spontaneous, Apgar1: None, Apgar5: None, Living: Living, Birth  "Comments: None    # 5 - Date: [birth year], Sex: Female, Weight: 3.629 kg (8 lb), GA: None, Delivery: Vaginal, Spontaneous, Apgar1: None, Apgar5: None, Living: Living, Birth Comments: None      Physical Exam  BP 96/69 (BP Location: Right arm, Patient Position: Sitting, BP Method: Medium (Automatic))   Pulse (!) 112   Temp 97.7 °F (36.5 °C) (Temporal)   Resp 20   Ht 5' 5" (1.651 m)   Wt 62.9 kg (138 lb 9.6 oz)   BMI 23.06 kg/m²     Constitutional: General appearance: healthy, well-nourished and well-developed  Psychiatric: Orientation to time, place and person. Normal mood and affect and active, alert  Abdomen: Auscultation/Inspection/Palpation: deferred  Female Genitalia:  Vulva: no masses, atrophy or lesions  Bladder/Urethra: no urethral discharge or mass, normal meatus, bladder   non-distended.  Vagina: no tenderness, erythema, cystocele, rectocele, abnormal  vaginal discharge, or vesicle(s) or ulcers   Cuff intact, no masses, NT  Adnexa/Parametria: no parametrial tenderness or mass, no adnexal tenderness  or ovarian mass.      Procedure:   Colposcopy procedure fully reviewed. Patient questions regarding procedure and diagnosis answered. Patient draped and prepped. A speculum was placed into the vagina. The vagina was painted with acetic acid solution.     Chaperone present    Acetowhite epithelium absent  Mosaicism absent  Punctation absent  Lesion(s) absent  Abnormal vessels absent  Leukoplakia absent        Assessment/Plan:  1. ASCUS with positive high risk HPV cervical       Discussed the spectrum of abnormal pap smears, the relationship to HPV infection, high and low risk HPV types, cervical dysplasia, cervical cancer, and genital warts. Reviewed HPV as a sexually transmitted disease, the natural course of most infections and risk factors for infection.       Reviewed the difference between ASCUS, LGSIL and HGSIL, and management strategies for each.       Colposcopy recommended.     Patient desires " colposcopy     - Patient signed consents       - Current pap: ASCUS +HR HPV    - Colpo: satisfactory        - ECC:  s/p hysterectomy     - Biopsy: not performed      - IMPRESSION:  no lesion    - Patient tolerated procedure well.       - Advised that mild vaginal discharge may occur for 24hrs and spotting for 48hrs.       - Patient will report passage of clots, onset of profuse bleeding, foul vaginal odor, fever and/or pelvic pain.       - RTC 2 weeks for results. Patient understands importance of follow up     - re-pap in 1 year       Pap/path history:  Pap 12/28/23 ASCUS +HR HPV, neg 16, 18/45  Pap 2000      Gardasil: 0/3  Smoking status: former

## 2024-01-19 ENCOUNTER — OFFICE VISIT (OUTPATIENT)
Dept: OBSTETRICS AND GYNECOLOGY | Facility: CLINIC | Age: 64
End: 2024-01-19
Payer: MEDICARE

## 2024-01-19 VITALS
HEIGHT: 65 IN | DIASTOLIC BLOOD PRESSURE: 69 MMHG | BODY MASS INDEX: 23.1 KG/M2 | TEMPERATURE: 98 F | RESPIRATION RATE: 20 BRPM | SYSTOLIC BLOOD PRESSURE: 96 MMHG | WEIGHT: 138.63 LBS | HEART RATE: 112 BPM

## 2024-01-19 DIAGNOSIS — R87.810 ASCUS WITH POSITIVE HIGH RISK HPV CERVICAL: Primary | ICD-10-CM

## 2024-01-19 DIAGNOSIS — R87.610 ASCUS WITH POSITIVE HIGH RISK HPV CERVICAL: Primary | ICD-10-CM

## 2024-01-19 PROCEDURE — 99499 UNLISTED E&M SERVICE: CPT | Mod: ,,, | Performed by: OBSTETRICS & GYNECOLOGY

## 2024-01-19 PROCEDURE — 57420 EXAM OF VAGINA W/SCOPE: CPT | Mod: ,,, | Performed by: OBSTETRICS & GYNECOLOGY

## 2024-01-19 RX ORDER — TRIAMCINOLONE ACETONIDE 1 MG/G
CREAM TOPICAL
COMMUNITY
Start: 2024-01-11

## 2024-01-22 NOTE — PROGRESS NOTES
HEMATOLOGY/ONCOLOGY OFFICE CLINIC VISIT  [facility] (formerly [facility]) [place]    ONCOLOGICAL HISTORY:     Diagnosis:  - Stage IV NSCLC with bone mets.  - NSCLC / Adenocarcinoma Stage IIIA pT2a pN2--dx 12/2022  - PD-L1 1%  - History Uterine cancer - Dx 1989    Current Treatment:   Taxotere + Xgeva- 10/24/23-->  Held cycle 3 12/6/23 r/t bilateral PE, started on Eliquis      Treatment History:  - 1989 s/p total hysterectomy   - 12/08/2022: Right lower and middle bilobectomy with mediastinal lymph node dissection.   - Carboplatin + Pemetrexed  3/15/2023-5/1/2023  - Palliative RT   - Carboplatin + Pemetrexed started 3/15/2023  - Keytruda stopped 9/19/23 r/t elevated TSH and PET/CT progression, last dose given 8/29/23  - Xgeva- 7/18/2023-->    Plan of care: palliative systemic therapy      IMAGING:   - 08/10/2022 PET: The elongated subpleural right lower lobe nodule demonstrates fairly intense FDG activity.  Infectious, inflammatory and neoplastic etiologies remain possible. No suspicious PET findings elsewhere.  - 12/08/2022 CT HEAD without contrast: No acute intracranial abnormality identified.  Findings of mild microvascular ischemic disease.  - 12/2022: U/S Upper extremity: Thrombophlebitis of the left cephalic vein. No deep venous thrombosis in the left upper extremity.  - 01/03/2023 MRI Brain : GARY   - 01/12/2023 NM PET CT 1. Postsurgical changes of recent right lower and middle lobectomies.  There is a small right pleural effusion.  There is nonspecific peripheral uptake along the pleura at the right lung base and perihilar region which may be related to healing response in the setting of recent surgery.  Similarly borderline mediastinal uptake is nonspecific in the recent postoperative setting and could be reactive.  Continued attention recommended on follow-up. 2. Focal Uptake at the medial right acetabular roof has a max SUV of 6.4. This is new compared to previous exam. Metastasis is a consideration  -PET 6/5/2023: Previously  visualized moderately FDG avid opacities towards the right lung base improved.  Pleural effusion also improved.  No new suspicious PET findings in the lungs.  Severe emphysema. Some left adrenal thickening is similar to prior but there is now moderate associated FDG activity, max SUV is 3.6. FDG activity is again seen associated with the right superior acetabulum.  FDG avid area appears slightly larger today with slightly increased sclerosis.  SUV is 9.7, previously 6.4.  There is a newly evident subcentimeter FDG avid lesion left pedicle T10. Additional subcentimeter FDG avid lesion T4 vertebral body SUV 3.4.suspicious for metastatic disease.  -MRI T spine 6/15/2023:   1. Foci of abnormal signal intensity and enhancement at the inferior endplate of T3 and at body and left pedicle of T10.  Metastatic neoplastic etiology must be considered.  2. Thoracic spondylosis  3. Mild encroachment into the right neural foramina at the T3-4 and T4-5 secondary to mild posterior disc bulge and posterior osteophyte formation  4. Multilevel mild posterior disc bulge which does not result in significant central spinal stenosis.  9/27/23 Thyroid U/S:  Very minimal to no significant uptake of radiotracer isotope within the thyroid lobes bilaterally.  Clinical correlation is indicated  CT CAP 9/27/23:  1. Interim increasing nodular soft tissue densities/nodes/mass is at the mediastinum and right hilum measuring up the 3.0 cm.  A neoplastic etiology must be considered  2. Interim development of a 2.5 cm low-attenuation mass at the left adrenal gland.  A metastatic etiology must be considered  3. Interim development of a sclerotic foci at T3 and T10.  A metastatic etiology must be considered  4. Small right pleural effusion with associated infiltrate and atelectasis at the right lung base suspect  5. Suspect small cyst at the left kidney as described  6. Mild ill-defined soft tissue fullness at the cervical vaginal region  7. Borderline  cardiomegaly  10/6/23 PET/CT:  1. Disease progression with new FDG avid metastatic lymph nodes in the chest and right hilum.  2. Bilateral axillary and inguinal lymph nodes with low level uptake are indeterminate but concerning for metastasis given the additional findings.  3. Worsening and new FDG avid osseous disease involving both the axial and appendicular skeleton.  4. Enlarging left adrenal metastasis.  12/6/23 CTA:  1. Soft tissue masslike density again evident at the right hilum causing mass effect on the right main pulmonary artery  2. Interim development of filling defects/pulmonary emboli at the diminutive posterior right lower lobe pulmonary arteries.  A few small defects/pulmonary emboli have developed at the pulmonary arteries at the posterior left lower lobe since the prior exam.  3. Postsurgical changes right hilum with volume loss at the right lung and elevation of the right hemidiaphragm with associated posterior right basilar pleural reaction, infiltrate, and atelectasis  4. Advanced emphysematous changes with the right side greater than the left  5. Thoracic spondylosis with the sclerotic foci at the T3 and T10  6. The cancer center was notified of the findings on 12/06/2023 at 10:00  1/16/24 CT CAP:  1. Nodular opacities again identified at the right hilum and mediastinum which have a similar appearance to the prior exam  2. Low-attenuation mass again evident at the left adrenal gland  3. Sclerotic lytic lesions evident at T3, T5, T10, and L5.  A metastatic neoplastic etiology must be considered  4. Advanced emphysematous changes with resolution of small right pleural effusion.  There is patchy infiltrate, atelectasis and or scarring at the posterior right lung base.  5. Findings consistent with cysts at the left kidney  6. Ill-defined soft tissue fullness again evident at the cervical vaginal region  7. Findings of mild constipation  1/16/24 NM Bone Scan:  1. Scattered focal activity at the  thoracic spine, lumbar spine, right hip/trochanteric region, left iliac bone, and left sacrum.  A metastatic neoplastic etiology must be considered  2. Bar like CT intense activity at T10 suggesting acute compression fracture  3. Mild asymmetric prominence of the right upper renal collecting system compared to the left    PATHOLOGY:  - 12/08/2022: Invasive solid adenocarcinoma (3.1 CM); G3, poorly differentiated. Visceral Pleura Invasion present without definite serosal surface involvement. Lymphovascular invasion (Vein) present. All margins negative for invasive carcinoma, distance 2.5 cm. LN - 4/14 (10R: Hilar, Posterior mediastinal (RACHEAL), 4R Lower paratracheal, 9R Pulmonary ligament, 10R). pT2a pN2  - 02/22/2023: RIGHT ACETABULAR LESION, CT - GUIDE CORE NEEDLE BIOPSY : FIBRINOPURULENT EXUDATE ADMIXED WITH ATYPICAL EPITHELIOID GROUPS,   NORMAL    MARROW ELEMENTS AND ABUNDANT BLOOD CLOT      Subjective:     HPI  Mansi Jin  63 y.o.  female with past medical history significant for HTN, HLD, uterine cancer status post total hysterectomy in 1989,  CKD Stage 2, COPD Stage 3, referred for management of NSCLC s/p RLL / RML lobectomy with Mediastinal Lymph Node Dissection on 12/8/2022.     She was noted to have RLL lung mass and underwent CT guided biopsy which was non diagnostic. PET scan demonstrated significant avidity within the lesion. Given high risk for malignancy, she underwent right lower lobectomy and right middle lobectomy on 12/08/2022. Pathology came back with invasive adenocarcinoma, poorly differentiated with solid morphology, focal tumor involvement of visceral pleura and focal tumor vascular invasion (vein), with mediastinal lymph nodes positive for metastatic carcinoma.      PET on 01/12/2023 showed focal uptake at the medial right acetabular roof but the biopsy was negative for malignancy. Discussed with radiology, they recommended adjuvant chemotherapy and then they will reassess later for  radiation therapy.     Chief Complaint: Lung Cancer (No complaints. )      Interval History:   She returns to clinic today for treatment clearance for cycle 5 of Taxotere and scan results. She states that her cough is getting better. She is currently taking Eliquis 5 mg BID for bilateral PE. She is taking her oral iron every other day and tolerating well. She continues to have dryness to bilateral hands, but itching and redness have improved. She is moisturizing her hands daily. She also has color changes to her fingertips that presents following her last cycle of Keytruda. Since then she was diagnosed with vitiligo. Labs reviewed in detail and are stable. Constipation continues to be stable with the addition of lactulose as needed. SOB only on exertion. She denies any palpitations, chest pain, SOB, fever, chills, nausea, vomiting, diarrhea, recent hospitalizations or infections.       Past Medical History:   Diagnosis Date    ASTHMA     COPD (chronic obstructive pulmonary disease)     SEVERE    GERD (gastroesophageal reflux disease)     High cholesterol     HLD (hyperlipidemia)     Hypothyroidism, unspecified     Lung cancer 12/08/2022    invasive adenocarcinoma, poorly differentiated with solid morphology, focal tumor involvement of visceral pleura and focal tumor vascular invasion (vein), with 2 of 3 posterior mediastinal lymph nodes positive for metastatic carcinoma    Stage 2 chronic kidney disease     TIA (transient ischemic attack)     no deficits    Tobacco abuse     Unspecified osteoarthritis, unspecified site     Uterine cancer 1989      Past Surgical History:   Procedure Laterality Date    INSERTION OF TUNNELED CENTRAL VENOUS CATHETER (CVC) WITH SUBCUTANEOUS PORT N/A 12/04/2023    Procedure: [id number];  Surgeon: Peter Dial MD;  [facility]: [facility];  Service: Peripheral Vascular;  Laterality: N/A;    lipoma multiple sites      LUNG LOBECTOMY Right 12/08/2022    Procedure: LOBECTOMY,  LUNG;  Surgeon: Jass Browne IV, MD;  Location: [facility];  Service: Cardiothoracic;  Laterality: Right;    SURGICAL REMOVAL OF LYMPH NODE  2022    Procedure: EXCISION, LYMPH NODE;  Surgeon: Jass Browne IV, MD;  Location: [facility];  Service: Cardiothoracic;;    THORACOTOMY Right 2022    Procedure: THORACOTOMY;  Surgeon: Jass Browne IV, MD;  Location: [facility];  Service: Cardiothoracic;  Laterality: Right;  Right Lower Lobectomy with Lymph Node Dissection    TOTAL ABDOMINAL HYSTERECTOMY W/ BILATERAL SALPINGOOPHORECTOMY       Social History     Socioeconomic History    Marital status: Single    Number of children: 5   Tobacco Use    Smoking status: Former     Current packs/day: 0.00     Average packs/day: 0.5 packs/day for 50.0 years (25.0 ttl pk-yrs)     Types: Cigarettes     Start date: 1972     Quit date: 2022     Years since quittin.1    Smokeless tobacco: Never   Substance and Sexual Activity    Alcohol use: Not Currently     Comment: quit 25years ago    Drug use: Never    Sexual activity: Yes     Partners: Male     Birth control/protection: See Surgical Hx     Social Determinants of Health     Financial Resource Strain: Low Risk  (2023)    Overall Financial Resource Strain (CARDIA)     Difficulty of Paying Living Expenses: Not very hard   Food Insecurity: No Food Insecurity (2023)    Hunger Vital Sign     Worried About Running Out of Food in the Last Year: Never true     Ran Out of Food in the Last Year: Never true   Transportation Needs: No Transportation Needs (2023)    PRAPARE - Transportation     Lack of Transportation (Medical): No     Lack of Transportation (Non-Medical): No   Physical Activity: Inactive (2023)    Exercise Vital Sign     Days of Exercise per Week: 0 days     Minutes of Exercise per Session: 0 min   Stress: No Stress Concern Present (2023)    [employer] - Occupational Stress Questionnaire      Feeling of Stress : Only a little   Social Connections: Socially Integrated (9/22/2023)    Social Connection and Isolation Panel [NHANES]     Frequency of Communication with Friends and Family: More than three times a week     Frequency of Social Gatherings with Friends and Family: Three times a week     Attends Shinto Services: More than 4 times per year     Active Member of Clubs or Organizations: Yes     Attends Club or Organization Meetings: 1 to 4 times per year     Marital Status: Living with partner   Housing Stability: Low Risk  (9/22/2023)    Housing Stability Vital Sign     Unable to Pay for Housing in the Last Year: No     Number of Places Lived in the Last Year: 1     Unstable Housing in the Last Year: No      Family History   Problem Relation Age of Onset    Cancer Maternal Grandfather     Heart failure Father     Prostate cancer Father     Hypertension Mother     Hepatitis Mother     Ovarian cancer Mother     Liver cancer Mother     Asthma Brother     Asthma Brother     Fibroids Sister     Skin cancer Sister       Review of patient's allergies indicates:  No Known Allergies     Review of Systems   Constitutional:  Positive for fatigue. Negative for activity change, appetite change, chills, fever and unexpected weight change.   HENT:  Negative for mouth dryness, mouth sores, nosebleeds, sinus pressure/congestion, sore throat and trouble swallowing.    Eyes: Negative.  Negative for visual disturbance.   Respiratory:  Positive for cough and shortness of breath (on exertion, chronic).    Cardiovascular:  Negative for chest pain, palpitations and leg swelling.   Gastrointestinal:  Negative for abdominal distention, abdominal pain, blood in stool, change in bowel habit, constipation, diarrhea, nausea and vomiting.   Endocrine: Negative.    Genitourinary: Negative.  Negative for dysuria, frequency, hematuria and urgency.   Musculoskeletal:  Negative for arthralgias, back pain, leg pain, myalgias and neck  pain.   Integumentary:  Negative for rash, breast mass, breast discharge and breast tenderness. Negative.   Allergic/Immunologic: Negative.    Neurological:  Negative for dizziness, tremors, syncope, speech difficulty, weakness, light-headedness, numbness, headaches and memory loss.   Hematological: Negative.  Does not bruise/bleed easily.   Psychiatric/Behavioral: Negative.  Negative for confusion and suicidal ideas.    Breast: Negative for mass and tenderness        Objective:        Vitals:    01/23/24 0842   BP: 113/76   Pulse: (!) 113   Resp: 18   Temp: 99.4 °F (37.4 °C)       Wt Readings from Last 6 Encounters:   01/23/24 62.8 kg (138 lb 6.4 oz)   01/19/24 62.9 kg (138 lb 9.6 oz)   01/11/24 63 kg (138 lb 14.2 oz)   01/09/24 62.4 kg (137 lb 9.6 oz)   01/02/24 63.6 kg (140 lb 3.2 oz)   01/02/24 63.6 kg (140 lb 3.2 oz)     Body mass index is 23.03 kg/m².  Body surface area is 1.7 meters squared.       Physical Exam  Vitals and nursing note reviewed.   Constitutional:       Appearance: Normal appearance.   HENT:      Head: Normocephalic and atraumatic.      Nose: No congestion.   Eyes:      General: No scleral icterus.     Extraocular Movements: Extraocular movements intact.      Conjunctiva/sclera: Conjunctivae normal.   Neck:      Vascular: No JVD.   Cardiovascular:      Rate and Rhythm: Normal rate and regular rhythm.      Heart sounds: No murmur heard.  Pulmonary:      Effort: Pulmonary effort is normal.      Breath sounds: Decreased breath sounds, wheezing and rhonchi present.   Abdominal:      General: Bowel sounds are normal. There is no distension.      Palpations: Abdomen is soft.      Tenderness: There is no abdominal tenderness.   Musculoskeletal:      Cervical back: Neck supple.   Lymphadenopathy:      Head:      Right side of head: No submental or submandibular adenopathy.      Left side of head: No submental or submandibular adenopathy.      Cervical: No cervical adenopathy.      Upper Body:       Right upper body: No supraclavicular or axillary adenopathy.      Left upper body: No supraclavicular or axillary adenopathy.      Lower Body: No right inguinal adenopathy. No left inguinal adenopathy.      Comments: No adenopathy noted bilaterally   Skin:     General: Skin is warm.      Coloration: Skin is not jaundiced.      Findings: No lesion or rash.      Comments: Change of skin color to bilateral fingertips   Neurological:      General: No focal deficit present.      Mental Status: She is alert and oriented to person, place, and time.      Cranial Nerves: Cranial nerves 2-12 are intact.      Gait: Gait normal.   Psychiatric:         Attention and Perception: Attention normal.         Speech: Speech normal.         Behavior: Behavior is cooperative.         Cognition and Memory: Cognition normal.         Judgment: Judgment normal.       ECOG SCORE    1 - Restricted in strenuous activity-ambulatory and able to carry out work of a light nature           LABS      Lab Visit on 01/23/2024   Component Date Value    Sodium Level 01/23/2024 139     Potassium Level 01/23/2024 3.4 (L)     Chloride 01/23/2024 106     Carbon Dioxide 01/23/2024 22 (L)     Glucose Level 01/23/2024 159 (H)     Blood Urea Nitrogen 01/23/2024 14.0     Creatinine 01/23/2024 0.90     Calcium Level Total 01/23/2024 9.5     Protein Total 01/23/2024 7.3     Albumin Level 01/23/2024 3.3 (L)     Globulin 01/23/2024 4.0 (H)     Albumin/Globulin Ratio 01/23/2024 0.8 (L)     Bilirubin Total 01/23/2024 0.2     Alkaline Phosphatase 01/23/2024 85     Alanine Aminotransferase 01/23/2024 11     Aspartate Aminotransfera* 01/23/2024 12     eGFR 01/23/2024 >60     Magnesium Level 01/23/2024 1.90     Phosphorus Level 01/23/2024 2.2 (L)     WBC 01/23/2024 16.58 (H)     RBC 01/23/2024 4.37     Hgb 01/23/2024 11.1 (L)     Hct 01/23/2024 36.2 (L)     MCV 01/23/2024 82.8     MCH 01/23/2024 25.4 (L)     MCHC 01/23/2024 30.7 (L)     RDW 01/23/2024 17.3 (H)      Platelet 01/23/2024 337     MPV 01/23/2024 9.6     Neut % 01/23/2024 88.5     Lymph % 01/23/2024 7.4     Mono % 01/23/2024 3.6     Eos % 01/23/2024 0.0     Basophil % 01/23/2024 0.1     Lymph # 01/23/2024 1.23     Neut # 01/23/2024 14.66 (H)     Mono # 01/23/2024 0.60     Eos # 01/23/2024 0.00     Baso # 01/23/2024 0.02     IG# 01/23/2024 0.07 (H)     IG% 01/23/2024 0.4          Assessment:       1. NSCLC of right lung    2. Metastasis to bone    3. On antineoplastic chemotherapy    4. Pulmonary embolism, unspecified chronicity, unspecified pulmonary embolism type, unspecified whether acute cor pulmonale present    5. On continuous oral anticoagulation            Stage IV NSCLC with bone mets.    Originally: NSCLC / Adenocarcinoma Stage IIIA pT2a pN2  - 12/08/2022: Right lower and middle bilobectomy with mediastinal lymph node dissection. Path: Invasive adenocarcinoma, poorly differentiated, focal tumor involvement of visceral pleura and focal tumor vascular invasion (vein), with mediastinal lymph nodes positive for metastatic carcinoma   - TMB - 5, MSI-S, MET amplification, ROS1.  No mutations in EGFR, BRAF, ALK, ERBB2, KRAS, RET,    - 1% tumor cells are positive for PD-L1   - patient evaluated by Radiation Oncology with recommendation to treat with chemotherapy 1st and will re-evaluate the patient by the end of chemotherapy for radiation therapy  - 01/12/2023 PET: Focal uptake at the medial right acetabular roof. Metastasis is a consideration  - 02/22/2023: RIGHT ACETABULAR LESION, CT - GUIDE CORE NEEDLE BIOPSY : FIBRINOPURULENT EXUDATE ADMIXED WITH ATYPICAL EPITHELIOID GROUPS,   NORMAL    MARROW ELEMENTS AND ABUNDANT BLOOD CLOT  PET scan showed a suspicious finding in the right acetabular roof and patient then had a CT-guided biopsy no evidence of malignancy. Started with adjuvant chemotherapy with Carboplatin and pemetrexed x 4 cycles (3/15/23-5/18/23)   Patient has been seen by Rad/Onc (Dr Mireles) since the  completion of her chemotherapy and plan was to pursue radiation x 5-6 weeks.  She discussed with the patient that they have showed no overall survival benefit in the setting of postoperative radiation therapy but data supports a larger benefit inpatient with extracapsular extension.  If she tolerated chemotherapy well and continue with good performance status then plan was for PORT   Restaging PET-CT after completion of chemotherapy and prior to radiation therapy showed progression at the right acetabulum.  Patient with progression of disease now with bone metastases.  She bagan palliative radiation treatment with Dr. Mireles on 6/28/23, 10 treatments over two weeks.  PET CT 6/5/23 with progression Right superior acetabulum.  FDG avid area appears slightly larger with slightly increased sclerosis.  SUV is 9.7, previously 6.4.  She had palliative radiation. Started on Keytruda 7/18/23. Developed skin changes with Keytruda and later on diagnosed with vitiligo. Now on Taxotere + Xgeva- 10/24/23 due to progression.  Cycle 3 of Taxotere held 12/6/23 r/t bilateral PE  Bone mets   Xgeva q4w  She was seen by Rad/Onc for palliative RT but recommendations were to start chemotherapy and re eval after 2 cycles  Iron deficiency Anemia  Continue with 1 tab PO iron QOD with Vitamin C.   Currently scheduled for colonoscopy 6/2024   Hypothyroid   Followed by endocrinology Dr. Elena Berman sent to pharmacy. Strict ED precautions given.                   Plan:   Labs stable.  Continue cycle 5 Taxotere as scheduled   Continue Eliquis 5 mg BID  Moisturize bilateral hands.  Continue with the Xgeva q4w   Continue oral iron every other day  RTC in 3 weeks with NP for FU/lab/treat  Cbc, cmp, mag, phos- 1 hr prior @ [facility]      The patient was seen, interviewed and examined. Pertinent lab and radiology studies were reviewed.   The patient was given ample opportunity to ask questions, and to the best of my abilities, all questions  answered to satisfaction; patient demonstrated understanding of what we discussed and agreeable to the plan. Pt instructed to call should develop concerning signs/symptoms or have further questions.               Professional Services   I, Clau Engel LPN, acted solely as a scribe for and in the presence of Dr. Pheobe Gomez, who performed these services.

## 2024-01-23 ENCOUNTER — INFUSION (OUTPATIENT)
Dept: INFUSION THERAPY | Facility: HOSPITAL | Age: 64
End: 2024-01-23
Attending: NURSE PRACTITIONER
Payer: MEDICARE

## 2024-01-23 ENCOUNTER — OFFICE VISIT (OUTPATIENT)
Dept: HEMATOLOGY/ONCOLOGY | Facility: CLINIC | Age: 64
End: 2024-01-23
Payer: MEDICARE

## 2024-01-23 VITALS
SYSTOLIC BLOOD PRESSURE: 113 MMHG | WEIGHT: 138.38 LBS | HEART RATE: 113 BPM | BODY MASS INDEX: 23.06 KG/M2 | RESPIRATION RATE: 18 BRPM | HEIGHT: 65 IN | DIASTOLIC BLOOD PRESSURE: 76 MMHG | OXYGEN SATURATION: 99 % | TEMPERATURE: 99 F

## 2024-01-23 VITALS
HEART RATE: 113 BPM | WEIGHT: 138.38 LBS | DIASTOLIC BLOOD PRESSURE: 76 MMHG | BODY MASS INDEX: 23.06 KG/M2 | TEMPERATURE: 99 F | SYSTOLIC BLOOD PRESSURE: 113 MMHG | HEIGHT: 65 IN | OXYGEN SATURATION: 99 % | RESPIRATION RATE: 18 BRPM

## 2024-01-23 DIAGNOSIS — Z79.01 ON CONTINUOUS ORAL ANTICOAGULATION: ICD-10-CM

## 2024-01-23 DIAGNOSIS — Z79.899 ON ANTINEOPLASTIC CHEMOTHERAPY: ICD-10-CM

## 2024-01-23 DIAGNOSIS — C34.91 NSCLC OF RIGHT LUNG: Primary | ICD-10-CM

## 2024-01-23 DIAGNOSIS — K59.00 CONSTIPATION, UNSPECIFIED CONSTIPATION TYPE: ICD-10-CM

## 2024-01-23 DIAGNOSIS — C34.91 NSCLC OF RIGHT LUNG: ICD-10-CM

## 2024-01-23 DIAGNOSIS — C79.51 METASTASIS TO BONE: ICD-10-CM

## 2024-01-23 DIAGNOSIS — E83.39 HYPOPHOSPHATEMIA: ICD-10-CM

## 2024-01-23 DIAGNOSIS — I26.99 PULMONARY EMBOLISM, UNSPECIFIED CHRONICITY, UNSPECIFIED PULMONARY EMBOLISM TYPE, UNSPECIFIED WHETHER ACUTE COR PULMONALE PRESENT: Primary | ICD-10-CM

## 2024-01-23 DIAGNOSIS — I26.99 PULMONARY EMBOLISM, UNSPECIFIED CHRONICITY, UNSPECIFIED PULMONARY EMBOLISM TYPE, UNSPECIFIED WHETHER ACUTE COR PULMONALE PRESENT: ICD-10-CM

## 2024-01-23 PROCEDURE — 99215 OFFICE O/P EST HI 40 MIN: CPT | Mod: S$GLB,,, | Performed by: INTERNAL MEDICINE

## 2024-01-23 PROCEDURE — 96367 TX/PROPH/DG ADDL SEQ IV INF: CPT

## 2024-01-23 PROCEDURE — 99999 PR PBB SHADOW E&M-EST. PATIENT-LVL III: CPT | Mod: PBBFAC,,, | Performed by: INTERNAL MEDICINE

## 2024-01-23 PROCEDURE — 96375 TX/PRO/DX INJ NEW DRUG ADDON: CPT

## 2024-01-23 PROCEDURE — 1126F AMNT PAIN NOTED NONE PRSNT: CPT | Mod: CPTII,S$GLB,, | Performed by: INTERNAL MEDICINE

## 2024-01-23 PROCEDURE — 63600175 PHARM REV CODE 636 W HCPCS: Performed by: INTERNAL MEDICINE

## 2024-01-23 PROCEDURE — 96413 CHEMO IV INFUSION 1 HR: CPT

## 2024-01-23 PROCEDURE — 3008F BODY MASS INDEX DOCD: CPT | Mod: CPTII,S$GLB,, | Performed by: INTERNAL MEDICINE

## 2024-01-23 PROCEDURE — 25000003 PHARM REV CODE 250: Performed by: INTERNAL MEDICINE

## 2024-01-23 PROCEDURE — 3074F SYST BP LT 130 MM HG: CPT | Mod: CPTII,S$GLB,, | Performed by: INTERNAL MEDICINE

## 2024-01-23 PROCEDURE — 3288F FALL RISK ASSESSMENT DOCD: CPT | Mod: CPTII,S$GLB,, | Performed by: INTERNAL MEDICINE

## 2024-01-23 PROCEDURE — 3078F DIAST BP <80 MM HG: CPT | Mod: CPTII,S$GLB,, | Performed by: INTERNAL MEDICINE

## 2024-01-23 PROCEDURE — 1159F MED LIST DOCD IN RCRD: CPT | Mod: CPTII,S$GLB,, | Performed by: INTERNAL MEDICINE

## 2024-01-23 PROCEDURE — 1160F RVW MEDS BY RX/DR IN RCRD: CPT | Mod: CPTII,S$GLB,, | Performed by: INTERNAL MEDICINE

## 2024-01-23 PROCEDURE — A4216 STERILE WATER/SALINE, 10 ML: HCPCS | Performed by: INTERNAL MEDICINE

## 2024-01-23 PROCEDURE — 1101F PT FALLS ASSESS-DOCD LE1/YR: CPT | Mod: CPTII,S$GLB,, | Performed by: INTERNAL MEDICINE

## 2024-01-23 RX ORDER — HEPARIN 100 UNIT/ML
500 SYRINGE INTRAVENOUS
Status: CANCELLED | OUTPATIENT
Start: 2024-01-23

## 2024-01-23 RX ORDER — ONDANSETRON HYDROCHLORIDE 2 MG/ML
8 INJECTION, SOLUTION INTRAVENOUS ONCE
Status: CANCELLED
Start: 2024-01-23 | End: 2024-01-23

## 2024-01-23 RX ORDER — SODIUM CHLORIDE 0.9 % (FLUSH) 0.9 %
10 SYRINGE (ML) INJECTION
Status: DISCONTINUED | OUTPATIENT
Start: 2024-01-23 | End: 2024-01-23 | Stop reason: HOSPADM

## 2024-01-23 RX ORDER — DIPHENHYDRAMINE HYDROCHLORIDE 50 MG/ML
25 INJECTION INTRAMUSCULAR; INTRAVENOUS
Status: COMPLETED | OUTPATIENT
Start: 2024-01-23 | End: 2024-01-23

## 2024-01-23 RX ORDER — DIPHENHYDRAMINE HYDROCHLORIDE 50 MG/ML
25 INJECTION INTRAMUSCULAR; INTRAVENOUS
Status: CANCELLED
Start: 2024-01-23

## 2024-01-23 RX ORDER — ONDANSETRON HYDROCHLORIDE 2 MG/ML
8 INJECTION, SOLUTION INTRAVENOUS ONCE
Status: COMPLETED | OUTPATIENT
Start: 2024-01-23 | End: 2024-01-23

## 2024-01-23 RX ORDER — MEGESTROL ACETATE 40 MG/1
TABLET ORAL
Refills: 2 | OUTPATIENT
Start: 2024-01-23

## 2024-01-23 RX ORDER — HEPARIN 100 UNIT/ML
500 SYRINGE INTRAVENOUS
Status: DISCONTINUED | OUTPATIENT
Start: 2024-01-23 | End: 2024-01-23 | Stop reason: HOSPADM

## 2024-01-23 RX ORDER — MEGESTROL ACETATE 40 MG/1
40 TABLET ORAL 4 TIMES DAILY
Qty: 120 TABLET | Refills: 2 | Status: SHIPPED | OUTPATIENT
Start: 2024-01-23 | End: 2024-05-24

## 2024-01-23 RX ORDER — SODIUM CHLORIDE 0.9 % (FLUSH) 0.9 %
10 SYRINGE (ML) INJECTION
Status: CANCELLED | OUTPATIENT
Start: 2024-01-23

## 2024-01-23 RX ADMIN — DEXAMETHASONE SODIUM PHOSPHATE 20 MG: 10 INJECTION, SOLUTION INTRAMUSCULAR; INTRAVENOUS at 10:01

## 2024-01-23 RX ADMIN — SODIUM CHLORIDE, PRESERVATIVE FREE 10 ML: 5 INJECTION INTRAVENOUS at 12:01

## 2024-01-23 RX ADMIN — ONDANSETRON 8 MG: 2 INJECTION INTRAMUSCULAR; INTRAVENOUS at 10:01

## 2024-01-23 RX ADMIN — SODIUM CHLORIDE: 9 INJECTION, SOLUTION INTRAVENOUS at 11:01

## 2024-01-23 RX ADMIN — HEPARIN 500 UNITS: 100 SYRINGE at 12:01

## 2024-01-23 RX ADMIN — DIPHENHYDRAMINE HYDROCHLORIDE 25 MG: 50 INJECTION, SOLUTION INTRAMUSCULAR; INTRAVENOUS at 10:01

## 2024-01-23 RX ADMIN — DOCETAXEL 118 MG: 20 INJECTION, SOLUTION, CONCENTRATE INTRAVENOUS at 11:01

## 2024-01-31 DIAGNOSIS — Z12.11 SCREEN FOR COLON CANCER: Primary | ICD-10-CM

## 2024-01-31 RX ORDER — POLYETHYLENE GLYCOL 3350, SODIUM SULFATE, SODIUM CHLORIDE, POTASSIUM CHLORIDE, SODIUM ASCORBATE, AND ASCORBIC ACID 7.5-2.691G
KIT ORAL
Qty: 1 KIT | Refills: 0 | OUTPATIENT
Start: 2024-01-31 | End: 2024-03-11

## 2024-02-06 ENCOUNTER — INFUSION (OUTPATIENT)
Dept: INFUSION THERAPY | Facility: HOSPITAL | Age: 64
End: 2024-02-06
Attending: NURSE PRACTITIONER
Payer: MEDICARE

## 2024-02-06 VITALS
HEART RATE: 110 BPM | DIASTOLIC BLOOD PRESSURE: 67 MMHG | WEIGHT: 142 LBS | OXYGEN SATURATION: 94 % | BODY MASS INDEX: 23.66 KG/M2 | RESPIRATION RATE: 18 BRPM | TEMPERATURE: 98 F | HEIGHT: 65 IN | SYSTOLIC BLOOD PRESSURE: 128 MMHG

## 2024-02-06 DIAGNOSIS — C79.51 METASTASIS TO BONE: Primary | ICD-10-CM

## 2024-02-06 PROCEDURE — 96372 THER/PROPH/DIAG INJ SC/IM: CPT

## 2024-02-06 PROCEDURE — 63600175 PHARM REV CODE 636 W HCPCS: Mod: JZ,JG

## 2024-02-06 RX ADMIN — DENOSUMAB 120 MG: 120 INJECTION SUBCUTANEOUS at 01:02

## 2024-02-12 ENCOUNTER — HOSPITAL ENCOUNTER (EMERGENCY)
Facility: HOSPITAL | Age: 64
Discharge: HOME | End: 2024-02-12
Attending: EMERGENCY MEDICINE
Payer: MEDICARE

## 2024-02-12 VITALS
HEIGHT: 65 IN | SYSTOLIC BLOOD PRESSURE: 107 MMHG | TEMPERATURE: 98 F | OXYGEN SATURATION: 97 % | HEART RATE: 95 BPM | BODY MASS INDEX: 24.16 KG/M2 | RESPIRATION RATE: 18 BRPM | DIASTOLIC BLOOD PRESSURE: 78 MMHG | WEIGHT: 145 LBS

## 2024-02-12 DIAGNOSIS — M54.14 THORACIC RADICULOPATHY: Primary | ICD-10-CM

## 2024-02-12 DIAGNOSIS — R07.9 CHEST PAIN: ICD-10-CM

## 2024-02-12 LAB
ALBUMIN SERPL-MCNC: 3.1 G/DL (ref 3.4–4.8)
ALBUMIN/GLOB SERPL: 0.9 RATIO (ref 1.1–2)
ALP SERPL-CCNC: 81 UNIT/L (ref 40–150)
ALT SERPL-CCNC: 8 UNIT/L (ref 0–55)
AST SERPL-CCNC: 14 UNIT/L (ref 5–34)
BASOPHILS # BLD AUTO: 0.01 X10(3)/MCL
BASOPHILS NFR BLD AUTO: 0.1 %
BILIRUB SERPL-MCNC: 0.2 MG/DL
BNP BLD-MCNC: 12.9 PG/ML
BUN SERPL-MCNC: 12 MG/DL (ref 9.8–20.1)
CALCIUM SERPL-MCNC: 9.2 MG/DL (ref 8.4–10.2)
CHLORIDE SERPL-SCNC: 109 MMOL/L (ref 98–107)
CO2 SERPL-SCNC: 24 MMOL/L (ref 23–31)
CREAT SERPL-MCNC: 0.71 MG/DL (ref 0.55–1.02)
EOSINOPHIL # BLD AUTO: 0.02 X10(3)/MCL (ref 0–0.9)
EOSINOPHIL NFR BLD AUTO: 0.2 %
ERYTHROCYTE [DISTWIDTH] IN BLOOD BY AUTOMATED COUNT: 18.2 % (ref 11.5–17)
GFR SERPLBLD CREATININE-BSD FMLA CKD-EPI: >60 MLS/MIN/1.73/M2
GLOBULIN SER-MCNC: 3.6 GM/DL (ref 2.4–3.5)
GLUCOSE SERPL-MCNC: 96 MG/DL (ref 82–115)
HCT VFR BLD AUTO: 35.9 % (ref 37–47)
HGB BLD-MCNC: 11 G/DL (ref 12–16)
IMM GRANULOCYTES # BLD AUTO: 0.04 X10(3)/MCL (ref 0–0.04)
IMM GRANULOCYTES NFR BLD AUTO: 0.5 %
LYMPHOCYTES # BLD AUTO: 1.54 X10(3)/MCL (ref 0.6–4.6)
LYMPHOCYTES NFR BLD AUTO: 17.4 %
MCH RBC QN AUTO: 25.9 PG (ref 27–31)
MCHC RBC AUTO-ENTMCNC: 30.6 G/DL (ref 33–36)
MCV RBC AUTO: 84.5 FL (ref 80–94)
MONOCYTES # BLD AUTO: 0.65 X10(3)/MCL (ref 0.1–1.3)
MONOCYTES NFR BLD AUTO: 7.4 %
NEUTROPHILS # BLD AUTO: 6.57 X10(3)/MCL (ref 2.1–9.2)
NEUTROPHILS NFR BLD AUTO: 74.4 %
OHS QRS DURATION: 66 MS
OHS QTC CALCULATION: 451 MS
PLATELET # BLD AUTO: 293 X10(3)/MCL (ref 130–400)
PMV BLD AUTO: 9.3 FL (ref 7.4–10.4)
POTASSIUM SERPL-SCNC: 3.9 MMOL/L (ref 3.5–5.1)
PROT SERPL-MCNC: 6.7 GM/DL (ref 5.8–7.6)
RBC # BLD AUTO: 4.25 X10(6)/MCL (ref 4.2–5.4)
SODIUM SERPL-SCNC: 141 MMOL/L (ref 136–145)
TROPONIN I SERPL-MCNC: <0.01 NG/ML (ref 0–0.04)
WBC # SPEC AUTO: 8.83 X10(3)/MCL (ref 4.5–11.5)

## 2024-02-12 PROCEDURE — 80053 COMPREHEN METABOLIC PANEL: CPT | Performed by: EMERGENCY MEDICINE

## 2024-02-12 PROCEDURE — 84484 ASSAY OF TROPONIN QUANT: CPT | Performed by: EMERGENCY MEDICINE

## 2024-02-12 PROCEDURE — 25500020 PHARM REV CODE 255: Performed by: EMERGENCY MEDICINE

## 2024-02-12 PROCEDURE — 99285 EMERGENCY DEPT VISIT HI MDM: CPT | Mod: 25

## 2024-02-12 PROCEDURE — 25000003 PHARM REV CODE 250: Performed by: EMERGENCY MEDICINE

## 2024-02-12 PROCEDURE — 96361 HYDRATE IV INFUSION ADD-ON: CPT

## 2024-02-12 PROCEDURE — 85025 COMPLETE CBC W/AUTO DIFF WBC: CPT | Performed by: EMERGENCY MEDICINE

## 2024-02-12 PROCEDURE — 96375 TX/PRO/DX INJ NEW DRUG ADDON: CPT | Mod: 59

## 2024-02-12 PROCEDURE — 93010 ELECTROCARDIOGRAM REPORT: CPT | Mod: ,,, | Performed by: INTERNAL MEDICINE

## 2024-02-12 PROCEDURE — 83880 ASSAY OF NATRIURETIC PEPTIDE: CPT | Performed by: EMERGENCY MEDICINE

## 2024-02-12 PROCEDURE — 63600175 PHARM REV CODE 636 W HCPCS: Performed by: EMERGENCY MEDICINE

## 2024-02-12 PROCEDURE — 93005 ELECTROCARDIOGRAM TRACING: CPT

## 2024-02-12 PROCEDURE — 96374 THER/PROPH/DIAG INJ IV PUSH: CPT | Mod: 59

## 2024-02-12 RX ORDER — KETOROLAC TROMETHAMINE 30 MG/ML
15 INJECTION, SOLUTION INTRAMUSCULAR; INTRAVENOUS
Status: COMPLETED | OUTPATIENT
Start: 2024-02-12 | End: 2024-02-12

## 2024-02-12 RX ORDER — GABAPENTIN 300 MG/1
300 CAPSULE ORAL 3 TIMES DAILY
Qty: 90 CAPSULE | Refills: 0 | Status: SHIPPED | OUTPATIENT
Start: 2024-02-12 | End: 2024-05-14

## 2024-02-12 RX ORDER — MORPHINE SULFATE 4 MG/ML
4 INJECTION, SOLUTION INTRAMUSCULAR; INTRAVENOUS
Status: COMPLETED | OUTPATIENT
Start: 2024-02-12 | End: 2024-02-12

## 2024-02-12 RX ADMIN — KETOROLAC TROMETHAMINE 15 MG: 30 INJECTION, SOLUTION INTRAMUSCULAR; INTRAVENOUS at 09:02

## 2024-02-12 RX ADMIN — IOPAMIDOL 100 ML: 755 INJECTION, SOLUTION INTRAVENOUS at 09:02

## 2024-02-12 RX ADMIN — SODIUM CHLORIDE 1000 ML: 9 INJECTION, SOLUTION INTRAVENOUS at 09:02

## 2024-02-12 RX ADMIN — MORPHINE SULFATE 4 MG: 4 INJECTION, SOLUTION INTRAMUSCULAR; INTRAVENOUS at 09:02

## 2024-02-12 NOTE — ED PROVIDER NOTES
Encounter Date: 2/12/2024       History     Chief Complaint   Patient presents with    Chest Pain     C/o chest pain radiating from her back to chest starting Saturday night.     The patient presents with left-sided chest pain, onset 2 days ago.  The pain is sharp stabbing.  It is worsened with deep breaths.  She has chronic shortness of breath and that her breathing is no worse today than it has been.  No cough.  No fever.  She does have a history of non-small cell lung cancer for which she is undergoing chemotherapy.  She was diagnosed with pulmonary embolism in December 2023 and has been compliant with her anticoagulation.  Symptoms are alleviated by nothing.  Symptoms are aggravated by deep breaths and movement.        Review of patient's allergies indicates:  No Known Allergies  Past Medical History:   Diagnosis Date    ASTHMA     COPD (chronic obstructive pulmonary disease)     SEVERE    GERD (gastroesophageal reflux disease)     High cholesterol     HLD (hyperlipidemia)     Hypothyroidism, unspecified     Lung cancer 12/08/2022    invasive adenocarcinoma, poorly differentiated with solid morphology, focal tumor involvement of visceral pleura and focal tumor vascular invasion (vein), with 2 of 3 posterior mediastinal lymph nodes positive for metastatic carcinoma    Stage 2 chronic kidney disease     TIA (transient ischemic attack)     no deficits    Tobacco abuse     Unspecified osteoarthritis, unspecified site     Uterine cancer 1989     Past Surgical History:   Procedure Laterality Date    INSERTION OF TUNNELED CENTRAL VENOUS CATHETER (CVC) WITH SUBCUTANEOUS PORT N/A 12/04/2023    Procedure: [id number]-[id number];  Surgeon: Peter Dial MD;  Location: [facility] OR;  Service: Peripheral Vascular;  Laterality: N/A;    lipoma multiple sites      LUNG LOBECTOMY Right 12/08/2022    Procedure: LOBECTOMY, LUNG;  Surgeon: Jass Browne IV, MD;  Location: [facility] OR;  Service: Cardiothoracic;  Laterality: Right;     SURGICAL REMOVAL OF LYMPH NODE  2022    Procedure: EXCISION, LYMPH NODE;  Surgeon: Jass Browne IV, MD;  Location: [facility] OR;  Service: Cardiothoracic;;    THORACOTOMY Right 2022    Procedure: THORACOTOMY;  Surgeon: Jass Browne IV, MD;  Location: [facility] OR;  Service: Cardiothoracic;  Laterality: Right;  Right Lower Lobectomy with Lymph Node Dissection    TOTAL ABDOMINAL HYSTERECTOMY W/ BILATERAL SALPINGOOPHORECTOMY       Family History   Problem Relation Age of Onset    Cancer Maternal Grandfather     Heart failure Father     Prostate cancer Father     Hypertension Mother     Hepatitis Mother     Ovarian cancer Mother     Liver cancer Mother     Asthma Brother     Asthma Brother     Fibroids Sister     Skin cancer Sister      Social History     Tobacco Use    Smoking status: Former     Current packs/day: 0.00     Average packs/day: 0.5 packs/day for 50.0 years (25.0 ttl pk-yrs)     Types: Cigarettes     Start date: 1972     Quit date: 2022     Years since quittin.1    Smokeless tobacco: Never   Substance Use Topics    Alcohol use: Not Currently     Comment: quit 25years ago    Drug use: Never     Review of Systems   All other systems reviewed and are negative.      Physical Exam     Initial Vitals [24 0855]   BP Pulse Resp Temp SpO2   120/79 (!) 128 20 98.5 °F (36.9 °C) 96 %      MAP       --         Physical Exam    Constitutional:   Vital signs reviewed.  Nursing note reviewed.    General:  The patient is awake and alert, appropriate and interactive.  Uncomfortable, appears to be in pain, especially when breathing or moving.  Eyes: Conjunctiva are clear.  Corneas appear normal.  EOMI.  ENT: Face, head, external nose, and ears are normal-appearing.  The oropharynx appears normal.  The uvula is midline.  The posterior pharyngeal elements are symmetric.  Voice is normal.  Neck: The neck is nontender and supple with normal range of motion.  Back: The back appears normal with  full range of motion.  Respiratory: The respiratory effort is normal.  The lungs are clear to auscultation.  Cardiovascular: Heart sounds are normal.  No murmur or rub.  The rate is tachycardic.  The rhythm is normal.  Peripheral pulses are normal and equal bilaterally.  No edema is present.  Capillary refill is less than 3 seconds.  GI: The abdomen is soft, nondistended, without mass.  There is no tenderness to palpation.  No rebound or guarding.  Bowel sounds are normal.  The liver and spleen are nonpalpable.  Musculoskeletal: Range of motion of all joints appears normal without pain or tenderness.  Skin: There is no rash.  Neurologic: No focal deficits are noted.           ED Course   Procedures  Labs Reviewed   COMPREHENSIVE METABOLIC PANEL - Abnormal; Notable for the following components:       Result Value    Chloride 109 (*)     Albumin Level 3.1 (*)     Globulin 3.6 (*)     Albumin/Globulin Ratio 0.9 (*)     All other components within normal limits   CBC WITH DIFFERENTIAL - Abnormal; Notable for the following components:    Hgb 11.0 (*)     Hct 35.9 (*)     MCH 25.9 (*)     MCHC 30.6 (*)     RDW 18.2 (*)     All other components within normal limits   B-TYPE NATRIURETIC PEPTIDE - Normal   TROPONIN I - Normal   CBC W/ AUTO DIFFERENTIAL    Narrative:     The following orders were created for panel order CBC auto differential.  Procedure                               Abnormality         Status                     ---------                               -----------         ------                     CBC with Differential[[id number]]       Abnormal            Final result                 Please view results for these tests on the individual orders.        Admission on 02/12/2024   Component Date Value Ref Range Status    Natriuretic Peptide 02/12/2024 12.9  <=100.0 pg/mL Final    Sodium Level 02/12/2024 141  136 - 145 mmol/L Final    Potassium Level 02/12/2024 3.9  3.5 - 5.1 mmol/L Final    Chloride 02/12/2024  109 (H)  98 - 107 mmol/L Final    Carbon Dioxide 02/12/2024 24  23 - 31 mmol/L Final    Glucose Level 02/12/2024 96  82 - 115 mg/dL Final    Blood Urea Nitrogen 02/12/2024 12.0  9.8 - 20.1 mg/dL Final    Creatinine 02/12/2024 0.71  0.55 - 1.02 mg/dL Final    Calcium Level Total 02/12/2024 9.2  8.4 - 10.2 mg/dL Final    Protein Total 02/12/2024 6.7  5.8 - 7.6 gm/dL Final    Albumin Level 02/12/2024 3.1 (L)  3.4 - 4.8 g/dL Final    Globulin 02/12/2024 3.6 (H)  2.4 - 3.5 gm/dL Final    Albumin/Globulin Ratio 02/12/2024 0.9 (L)  1.1 - 2.0 ratio Final    Bilirubin Total 02/12/2024 0.2  <=1.5 mg/dL Final    Alkaline Phosphatase 02/12/2024 81  40 - 150 unit/L Final    Alanine Aminotransferase 02/12/2024 8  0 - 55 unit/L Final    Aspartate Aminotransferase 02/12/2024 14  5 - 34 unit/L Final    eGFR 02/12/2024 >60  mls/min/1.73/m2 Final    Troponin-I 02/12/2024 <0.010  0.000 - 0.045 ng/mL Final    QRS Duration 02/12/2024 66  ms In process    [facility] QTC Calculation 02/12/2024 451  ms In process    WBC 02/12/2024 8.83  4.50 - 11.50 x10(3)/mcL Final    RBC 02/12/2024 4.25  4.20 - 5.40 x10(6)/mcL Final    Hgb 02/12/2024 11.0 (L)  12.0 - 16.0 g/dL Final    Hct 02/12/2024 35.9 (L)  37.0 - 47.0 % Final    MCV 02/12/2024 84.5  80.0 - 94.0 fL Final    MCH 02/12/2024 25.9 (L)  27.0 - 31.0 pg Final    MCHC 02/12/2024 30.6 (L)  33.0 - 36.0 g/dL Final    RDW 02/12/2024 18.2 (H)  11.5 - 17.0 % Final    Platelet 02/12/2024 293  130 - 400 x10(3)/mcL Final    MPV 02/12/2024 9.3  7.4 - 10.4 fL Final    Neut % 02/12/2024 74.4  % Final    Lymph % 02/12/2024 17.4  % Final    Mono % 02/12/2024 7.4  % Final    Eos % 02/12/2024 0.2  % Final    Basophil % 02/12/2024 0.1  % Final    Lymph # 02/12/2024 1.54  0.6 - 4.6 x10(3)/mcL Final    Neut # 02/12/2024 6.57  2.1 - 9.2 x10(3)/mcL Final    Mono # 02/12/2024 0.65  0.1 - 1.3 x10(3)/mcL Final    Eos # 02/12/2024 0.02  0 - 0.9 x10(3)/mcL Final    Baso # 02/12/2024 0.01  <=0.2 x10(3)/mcL Final    IG#  02/12/2024 0.04  0 - 0.04 x10(3)/mcL Final    IG% 02/12/2024 0.5  % Final       ECG Results              EKG 12-lead (In process)        Collection Time Result Time QRS Duration [facility] QTC Calculation    02/12/24 08:57:13 02/12/24 09:20:25 66 451                     In process by Interface, Lab In [facility] (02/12/24 09:20:33)                   Narrative:    Test Reason : R07.9,    Vent. Rate : 117 BPM     Atrial Rate : 117 BPM     P-R Int : 152 ms          QRS Dur : 066 ms      QT Int : 324 ms       P-R-T Axes : 070 062 062 degrees     QTc Int : 451 ms    Sinus tachycardia  Otherwise normal ECG  When compared with ECG of 01-DEC-2023 12:29,  No significant change was found    Referred By: [email or web address]   SELF           Confirmed By:                                   Imaging Results              CTA Chest Non-Coronary (PE Studies) (In process)                      Medications   sodium chloride 0.9% bolus 1,000 mL 1,000 mL (0 mLs Intravenous Stopped 2/12/24 1030)   ketorolac injection 15 mg (15 mg Intravenous Given 2/12/24 0924)   morphine injection 4 mg (4 mg Intravenous Given 2/12/24 0924)   iopamidoL (ISOVUE-370) injection 100 mL (100 mLs Intravenous Given 2/12/24 0935)     Medical Decision Making  Decision to admit/transfer/discharge:  After my evaluation of the patient and interpretation of all relevant information, the decision has been made to discharge the patient.    I independently reviewed and interpreted any laboratory tests, radiographic images, EKGs, rhythm strips, and other diagnostic tests that were obtained during this Emergency Department visit.    Drug/medication management:  Parenteral controlled substances and other dangerous medications, if administered, can be found in the order section of this chart.  I reviewed the patient's home medications and made changes/adjustments as medically indicated.  Any new medications are documented under the prescription section of this chart.    EKG: Sinus tachycardia  with a rate of 117 beats per minute.  Normal axis.  Normal intervals.  No ST segment or T-wave changes.      1050: Patient resting comfortably.  Her pain has resolved.  I received a verbal report from Dr. Westfall regarding the CTA that showed no acute pulmonary embolism and no real changes from previous CTA.  I suspect her pain is radiculopathic in nature and related to her cancer.  She does have hydrocodone 5 mg and I have instructed her that she can take 2 of these at a time if needed.  I will start her on some gabapentin.  She does have follow up with her oncologist Thursday and I have urged her to discuss this pain with her oncologist at that time.    Based on the history, physical and diagnostic studies, I think the patient is low risk for life threatening cardiopulmonary events. I have considered ACS, pulmonary embolism, dissection, pneumothorax, pneumonia, esophageal pathology, chest wall syndrome, GERD, pleurisy.  The patient and/or caregiver understand the need to return to the ED immediately for any worsening symptoms, recurrent chest pain, difficulty breathing, passing out or other concerns. They are to return to the ED if they cannot see a physician and have other concerns.  The patient's condition is stable and appropriate for discharge. The patient will pursue further outpatient evaluation with the primary care physician as indicated in the discharge instructions.    Amount and/or Complexity of Data Reviewed  Labs: ordered.  Radiology: ordered.    Risk  Prescription drug management.                                      Clinical Impression:  Final diagnoses:  [R07.9] Chest pain  [M54.14] Thoracic radiculopathy (Primary)                 George Carreon MD  02/12/24 [id number]

## 2024-02-14 ENCOUNTER — OFFICE VISIT (OUTPATIENT)
Dept: HEMATOLOGY/ONCOLOGY | Facility: CLINIC | Age: 64
End: 2024-02-14
Payer: MEDICARE

## 2024-02-14 ENCOUNTER — INFUSION (OUTPATIENT)
Dept: INFUSION THERAPY | Facility: HOSPITAL | Age: 64
End: 2024-02-14
Attending: NURSE PRACTITIONER
Payer: MEDICARE

## 2024-02-14 VITALS
DIASTOLIC BLOOD PRESSURE: 78 MMHG | WEIGHT: 143.19 LBS | SYSTOLIC BLOOD PRESSURE: 115 MMHG | RESPIRATION RATE: 18 BRPM | BODY MASS INDEX: 23.86 KG/M2 | OXYGEN SATURATION: 97 % | TEMPERATURE: 98 F | HEIGHT: 65 IN | HEART RATE: 112 BPM

## 2024-02-14 DIAGNOSIS — I26.99 PULMONARY EMBOLISM, UNSPECIFIED CHRONICITY, UNSPECIFIED PULMONARY EMBOLISM TYPE, UNSPECIFIED WHETHER ACUTE COR PULMONALE PRESENT: ICD-10-CM

## 2024-02-14 DIAGNOSIS — Z79.01 ON CONTINUOUS ORAL ANTICOAGULATION: ICD-10-CM

## 2024-02-14 DIAGNOSIS — D50.9 IRON DEFICIENCY ANEMIA, UNSPECIFIED IRON DEFICIENCY ANEMIA TYPE: ICD-10-CM

## 2024-02-14 DIAGNOSIS — C34.91 NSCLC OF RIGHT LUNG: Primary | ICD-10-CM

## 2024-02-14 DIAGNOSIS — D84.821 IMMUNODEFICIENCY DUE TO CHEMOTHERAPY: ICD-10-CM

## 2024-02-14 DIAGNOSIS — Z79.899 ON ANTINEOPLASTIC CHEMOTHERAPY: ICD-10-CM

## 2024-02-14 DIAGNOSIS — G89.3 CANCER RELATED PAIN: ICD-10-CM

## 2024-02-14 DIAGNOSIS — R06.02 SOB (SHORTNESS OF BREATH) ON EXERTION: ICD-10-CM

## 2024-02-14 DIAGNOSIS — T45.1X5A IMMUNODEFICIENCY DUE TO CHEMOTHERAPY: ICD-10-CM

## 2024-02-14 DIAGNOSIS — Z79.899 IMMUNODEFICIENCY DUE TO CHEMOTHERAPY: ICD-10-CM

## 2024-02-14 DIAGNOSIS — R05.9 COUGH, UNSPECIFIED TYPE: ICD-10-CM

## 2024-02-14 DIAGNOSIS — C79.51 METASTASIS TO BONE: ICD-10-CM

## 2024-02-14 PROCEDURE — 25000003 PHARM REV CODE 250

## 2024-02-14 PROCEDURE — 96367 TX/PROPH/DG ADDL SEQ IV INF: CPT

## 2024-02-14 PROCEDURE — 96375 TX/PRO/DX INJ NEW DRUG ADDON: CPT

## 2024-02-14 PROCEDURE — 1159F MED LIST DOCD IN RCRD: CPT | Mod: CPTII,S$GLB,,

## 2024-02-14 PROCEDURE — 63600175 PHARM REV CODE 636 W HCPCS

## 2024-02-14 PROCEDURE — 1126F AMNT PAIN NOTED NONE PRSNT: CPT | Mod: CPTII,S$GLB,,

## 2024-02-14 PROCEDURE — 3288F FALL RISK ASSESSMENT DOCD: CPT | Mod: CPTII,S$GLB,,

## 2024-02-14 PROCEDURE — 99999 PR PBB SHADOW E&M-EST. PATIENT-LVL V: CPT | Mod: PBBFAC,,,

## 2024-02-14 PROCEDURE — 3078F DIAST BP <80 MM HG: CPT | Mod: CPTII,S$GLB,,

## 2024-02-14 PROCEDURE — 1101F PT FALLS ASSESS-DOCD LE1/YR: CPT | Mod: CPTII,S$GLB,,

## 2024-02-14 PROCEDURE — 3008F BODY MASS INDEX DOCD: CPT | Mod: CPTII,S$GLB,,

## 2024-02-14 PROCEDURE — 99215 OFFICE O/P EST HI 40 MIN: CPT | Mod: S$GLB,,,

## 2024-02-14 PROCEDURE — 96413 CHEMO IV INFUSION 1 HR: CPT

## 2024-02-14 PROCEDURE — 3074F SYST BP LT 130 MM HG: CPT | Mod: CPTII,S$GLB,,

## 2024-02-14 PROCEDURE — 1160F RVW MEDS BY RX/DR IN RCRD: CPT | Mod: CPTII,S$GLB,,

## 2024-02-14 RX ORDER — ONDANSETRON HYDROCHLORIDE 2 MG/ML
8 INJECTION, SOLUTION INTRAVENOUS ONCE
Status: CANCELLED
Start: 2024-02-14 | End: 2024-02-14

## 2024-02-14 RX ORDER — ONDANSETRON HYDROCHLORIDE 2 MG/ML
8 INJECTION, SOLUTION INTRAVENOUS ONCE
Status: COMPLETED | OUTPATIENT
Start: 2024-02-14 | End: 2024-02-14

## 2024-02-14 RX ORDER — HYDROCODONE BITARTRATE AND ACETAMINOPHEN 10; 325 MG/1; MG/1
1 TABLET ORAL EVERY 6 HOURS PRN
Qty: 90 TABLET | Refills: 0 | Status: SHIPPED | OUTPATIENT
Start: 2024-02-14 | End: 2024-05-13 | Stop reason: SDUPTHER

## 2024-02-14 RX ORDER — SODIUM CHLORIDE 0.9 % (FLUSH) 0.9 %
10 SYRINGE (ML) INJECTION
Status: DISCONTINUED | OUTPATIENT
Start: 2024-02-14 | End: 2024-02-14 | Stop reason: HOSPADM

## 2024-02-14 RX ORDER — DIPHENHYDRAMINE HYDROCHLORIDE 50 MG/ML
25 INJECTION INTRAMUSCULAR; INTRAVENOUS
Status: COMPLETED | OUTPATIENT
Start: 2024-02-14 | End: 2024-02-14

## 2024-02-14 RX ORDER — DIPHENHYDRAMINE HYDROCHLORIDE 50 MG/ML
25 INJECTION INTRAMUSCULAR; INTRAVENOUS
Status: CANCELLED
Start: 2024-02-14

## 2024-02-14 RX ORDER — HEPARIN 100 UNIT/ML
500 SYRINGE INTRAVENOUS
Status: DISCONTINUED | OUTPATIENT
Start: 2024-02-14 | End: 2024-02-14 | Stop reason: HOSPADM

## 2024-02-14 RX ORDER — SODIUM CHLORIDE 0.9 % (FLUSH) 0.9 %
10 SYRINGE (ML) INJECTION
Status: CANCELLED | OUTPATIENT
Start: 2024-02-14

## 2024-02-14 RX ORDER — HEPARIN 100 UNIT/ML
500 SYRINGE INTRAVENOUS
Status: CANCELLED | OUTPATIENT
Start: 2024-02-14

## 2024-02-14 RX ADMIN — DIPHENHYDRAMINE HYDROCHLORIDE 25 MG: 50 INJECTION, SOLUTION INTRAMUSCULAR; INTRAVENOUS at 10:02

## 2024-02-14 RX ADMIN — SODIUM CHLORIDE: 9 INJECTION, SOLUTION INTRAVENOUS at 10:02

## 2024-02-14 RX ADMIN — ONDANSETRON 8 MG: 2 INJECTION INTRAMUSCULAR; INTRAVENOUS at 10:02

## 2024-02-14 RX ADMIN — HEPARIN 500 UNITS: 100 SYRINGE at 12:02

## 2024-02-14 RX ADMIN — DEXAMETHASONE SODIUM PHOSPHATE 20 MG: 10 INJECTION, SOLUTION INTRAMUSCULAR; INTRAVENOUS at 10:02

## 2024-02-14 RX ADMIN — DOCETAXEL 118 MG: 20 INJECTION, SOLUTION, CONCENTRATE INTRAVENOUS at 10:02

## 2024-02-14 NOTE — PROGRESS NOTES
HEMATOLOGY/ONCOLOGY OFFICE CLINIC VISIT  [facility], [place]    ONCOLOGICAL HISTORY:     Diagnosis:  - Stage IV NSCLC with bone mets.  - NSCLC / Adenocarcinoma Stage IIIA pT2a pN2--dx 12/2022  - PD-L1 1%  - History Uterine cancer - Dx 1989    Current Treatment:   Taxotere + Xgeva- 10/24/23-->  Held cycle 3 12/6/23 r/t bilateral PE, started on Eliquis      Treatment History:  - 1989 s/p total hysterectomy   - 12/08/2022: Right lower and middle bilobectomy with mediastinal lymph node dissection.   - Carboplatin + Pemetrexed  3/15/2023-5/1/2023  - Palliative RT   - Carboplatin + Pemetrexed started 3/15/2023  - Keytruda stopped 9/19/23 r/t elevated TSH and PET/CT progression, last dose given 8/29/23  - Xgeva- 7/18/2023-->    Plan of care: palliative systemic therapy      IMAGING:   - 08/10/2022 PET: The elongated subpleural right lower lobe nodule demonstrates fairly intense FDG activity.  Infectious, inflammatory and neoplastic etiologies remain possible. No suspicious PET findings elsewhere.  - 12/08/2022 CT HEAD without contrast: No acute intracranial abnormality identified.  Findings of mild microvascular ischemic disease.  - 12/2022: U/S Upper extremity: Thrombophlebitis of the left cephalic vein. No deep venous thrombosis in the left upper extremity.  - 01/03/2023 MRI Brain : GARY   - 01/12/2023 NM PET CT 1. Postsurgical changes of recent right lower and middle lobectomies.  There is a small right pleural effusion.  There is nonspecific peripheral uptake along the pleura at the right lung base and perihilar region which may be related to healing response in the setting of recent surgery.  Similarly borderline mediastinal uptake is nonspecific in the recent postoperative setting and could be reactive.  Continued attention recommended on follow-up. 2. Focal Uptake at the medial right acetabular roof has a max SUV of 6.4. This is new compared to previous exam. Metastasis is a consideration  -PET 6/5/2023: Previously  visualized moderately FDG avid opacities towards the right lung base improved.  Pleural effusion also improved.  No new suspicious PET findings in the lungs.  Severe emphysema. Some left adrenal thickening is similar to prior but there is now moderate associated FDG activity, max SUV is 3.6. FDG activity is again seen associated with the right superior acetabulum.  FDG avid area appears slightly larger today with slightly increased sclerosis.  SUV is 9.7, previously 6.4.  There is a newly evident subcentimeter FDG avid lesion left pedicle T10. Additional subcentimeter FDG avid lesion T4 vertebral body SUV 3.4.suspicious for metastatic disease.  -MRI T spine 6/15/2023:   1. Foci of abnormal signal intensity and enhancement at the inferior endplate of T3 and at body and left pedicle of T10.  Metastatic neoplastic etiology must be considered.  2. Thoracic spondylosis  3. Mild encroachment into the right neural foramina at the T3-4 and T4-5 secondary to mild posterior disc bulge and posterior osteophyte formation  4. Multilevel mild posterior disc bulge which does not result in significant central spinal stenosis.  9/27/23 Thyroid U/S:  Very minimal to no significant uptake of radiotracer isotope within the thyroid lobes bilaterally.  Clinical correlation is indicated  CT CAP 9/27/23:  1. Interim increasing nodular soft tissue densities/nodes/mass is at the mediastinum and right hilum measuring up the 3.0 cm.  A neoplastic etiology must be considered  2. Interim development of a 2.5 cm low-attenuation mass at the left adrenal gland.  A metastatic etiology must be considered  3. Interim development of a sclerotic foci at T3 and T10.  A metastatic etiology must be considered  4. Small right pleural effusion with associated infiltrate and atelectasis at the right lung base suspect  5. Suspect small cyst at the left kidney as described  6. Mild ill-defined soft tissue fullness at the cervical vaginal region  7. Borderline  cardiomegaly  10/6/23 PET/CT:  1. Disease progression with new FDG avid metastatic lymph nodes in the chest and right hilum.  2. Bilateral axillary and inguinal lymph nodes with low level uptake are indeterminate but concerning for metastasis given the additional findings.  3. Worsening and new FDG avid osseous disease involving both the axial and appendicular skeleton.  4. Enlarging left adrenal metastasis.  12/6/23 CTA:  1. Soft tissue masslike density again evident at the right hilum causing mass effect on the right main pulmonary artery  2. Interim development of filling defects/pulmonary emboli at the diminutive posterior right lower lobe pulmonary arteries.  A few small defects/pulmonary emboli have developed at the pulmonary arteries at the posterior left lower lobe since the prior exam.  3. Postsurgical changes right hilum with volume loss at the right lung and elevation of the right hemidiaphragm with associated posterior right basilar pleural reaction, infiltrate, and atelectasis  4. Advanced emphysematous changes with the right side greater than the left  5. Thoracic spondylosis with the sclerotic foci at the T3 and T10  6. The cancer center was notified of the findings on 12/06/2023 at 10:00  1/16/24 CT CAP:  1. Nodular opacities again identified at the right hilum and mediastinum which have a similar appearance to the prior exam  2. Low-attenuation mass again evident at the left adrenal gland  3. Sclerotic lytic lesions evident at T3, T5, T10, and L5.  A metastatic neoplastic etiology must be considered  4. Advanced emphysematous changes with resolution of small right pleural effusion.  There is patchy infiltrate, atelectasis and or scarring at the posterior right lung base.  5. Findings consistent with cysts at the left kidney  6. Ill-defined soft tissue fullness again evident at the cervical vaginal region  7. Findings of mild constipation  1/16/24 NM Bone Scan:  1. Scattered focal activity at the  thoracic spine, lumbar spine, right hip/trochanteric region, left iliac bone, and left sacrum.  A metastatic neoplastic etiology must be considered  2. Bar like CT intense activity at T10 suggesting acute compression fracture  3. Mild asymmetric prominence of the right upper renal collecting system compared to the left    PATHOLOGY:  - 12/08/2022: Invasive solid adenocarcinoma (3.1 CM); G3, poorly differentiated. Visceral Pleura Invasion present without definite serosal surface involvement. Lymphovascular invasion (Vein) present. All margins negative for invasive carcinoma, distance 2.5 cm. LN - 4/14 (10R: Hilar, Posterior mediastinal (RACHEAL), 4R Lower paratracheal, 9R Pulmonary ligament, 10R). pT2a pN2  - 02/22/2023: RIGHT ACETABULAR LESION, CT - GUIDE CORE NEEDLE BIOPSY : FIBRINOPURULENT EXUDATE ADMIXED WITH ATYPICAL EPITHELIOID GROUPS,   NORMAL    MARROW ELEMENTS AND ABUNDANT BLOOD CLOT      Subjective:     HPI  Mansi Jin  63 y.o.  female with past medical history significant for HTN, HLD, uterine cancer status post total hysterectomy in 1989,  CKD Stage 2, COPD Stage 3, referred for management of NSCLC s/p RLL / RML lobectomy with Mediastinal Lymph Node Dissection on 12/8/2022.     She was noted to have RLL lung mass and underwent CT guided biopsy which was non diagnostic. PET scan demonstrated significant avidity within the lesion. Given high risk for malignancy, she underwent right lower lobectomy and right middle lobectomy on 12/08/2022. Pathology came back with invasive adenocarcinoma, poorly differentiated with solid morphology, focal tumor involvement of visceral pleura and focal tumor vascular invasion (vein), with mediastinal lymph nodes positive for metastatic carcinoma.      PET on 01/12/2023 showed focal uptake at the medial right acetabular roof but the biopsy was negative for malignancy. Discussed with radiology, they recommended adjuvant chemotherapy and then they will reassess later for  radiation therapy.     Chief Complaint: Lung Cancer (Pt reports no new complaints)      Interval History:   She returns to clinic today for treatment clearance for cycle 6 of Taxotere. She feel well today. She was seen recently in ED for thoracic radiculopathy and was prescribed gabapentin, currently not taking. Pain is stable with Nocro 10 as needed.  She continues taking her Eliquis 5 mg BID for bilateral PE. She is taking her oral iron every other day and tolerating well. Now seeing dermatology for vitiligo to bilateral fingertips.  Labs reviewed in detail and are stable. Constipation continues to be stable with the addition of lactulose as needed. SOB only on exertion. She denies any palpitations, chest pain, SOB, fever, chills, nausea, vomiting, diarrhea, recent hospitalizations or infections.       Past Medical History:   Diagnosis Date    ASTHMA     COPD (chronic obstructive pulmonary disease)     SEVERE    GERD (gastroesophageal reflux disease)     High cholesterol     HLD (hyperlipidemia)     Hypothyroidism, unspecified     Lung cancer 12/08/2022    invasive adenocarcinoma, poorly differentiated with solid morphology, focal tumor involvement of visceral pleura and focal tumor vascular invasion (vein), with 2 of 3 posterior mediastinal lymph nodes positive for metastatic carcinoma    Stage 2 chronic kidney disease     TIA (transient ischemic attack)     no deficits    Tobacco abuse     Unspecified osteoarthritis, unspecified site     Uterine cancer 1989      Past Surgical History:   Procedure Laterality Date    INSERTION OF TUNNELED CENTRAL VENOUS CATHETER (CVC) WITH SUBCUTANEOUS PORT N/A 12/04/2023    Procedure: [id number];  Surgeon: Peter Dial MD;  Location: [facility] OR;  Service: Peripheral Vascular;  Laterality: N/A;    lipoma multiple sites      LUNG LOBECTOMY Right 12/08/2022    Procedure: LOBECTOMY, LUNG;  Surgeon: Jass Browne IV, MD;  Location: [facility] OR;  Service: Cardiothoracic;   Laterality: Right;    SURGICAL REMOVAL OF LYMPH NODE  2022    Procedure: EXCISION, LYMPH NODE;  Surgeon: Jass Browne IV, MD;  Location: [facility] OR;  Service: Cardiothoracic;;    THORACOTOMY Right 2022    Procedure: THORACOTOMY;  Surgeon: Jass Browne IV, MD;  Location: [facility] OR;  Service: Cardiothoracic;  Laterality: Right;  Right Lower Lobectomy with Lymph Node Dissection    TOTAL ABDOMINAL HYSTERECTOMY W/ BILATERAL SALPINGOOPHORECTOMY       Social History     Socioeconomic History    Marital status: Single    Number of children: 5   Tobacco Use    Smoking status: Former     Current packs/day: 0.00     Average packs/day: 0.5 packs/day for 50.0 years (25.0 ttl pk-yrs)     Types: Cigarettes     Start date: 1972     Quit date: 2022     Years since quittin.1    Smokeless tobacco: Never   Substance and Sexual Activity    Alcohol use: Not Currently     Comment: quit 25years ago    Drug use: Never    Sexual activity: Yes     Partners: Male     Birth control/protection: See Surgical Hx     Social Determinants of Health     Financial Resource Strain: Low Risk  (2023)    Overall Financial Resource Strain (CARDIA)     Difficulty of Paying Living Expenses: Not very hard   Food Insecurity: No Food Insecurity (2023)    Hunger Vital Sign     Worried About Running Out of Food in the Last Year: Never true     Ran Out of Food in the Last Year: Never true   Transportation Needs: No Transportation Needs (2023)    PRAPARE - Transportation     Lack of Transportation (Medical): No     Lack of Transportation (Non-Medical): No   Physical Activity: Inactive (2023)    Exercise Vital Sign     Days of Exercise per Week: 0 days     Minutes of Exercise per Session: 0 min   Stress: No Stress Concern Present (2023)    [employer] - Occupational Stress Questionnaire     Feeling of Stress : Only a little   Social Connections: Socially Integrated (2023)     Social Connection and Isolation Panel [NHANES]     Frequency of Communication with Friends and Family: More than three times a week     Frequency of Social Gatherings with Friends and Family: Three times a week     Attends Islam Services: More than 4 times per year     Active Member of Clubs or Organizations: Yes     Attends Club or Organization Meetings: 1 to 4 times per year     Marital Status: Living with partner   Housing Stability: Low Risk  (9/22/2023)    Housing Stability Vital Sign     Unable to Pay for Housing in the Last Year: No     Number of Places Lived in the Last Year: 1     Unstable Housing in the Last Year: No      Family History   Problem Relation Age of Onset    Cancer Maternal Grandfather     Heart failure Father     Prostate cancer Father     Hypertension Mother     Hepatitis Mother     Ovarian cancer Mother     Liver cancer Mother     Asthma Brother     Asthma Brother     Fibroids Sister     Skin cancer Sister       Review of patient's allergies indicates:  No Known Allergies     Review of Systems   Constitutional:  Positive for fatigue. Negative for activity change, appetite change, chills, fever and unexpected weight change.   HENT:  Negative for mouth dryness, mouth sores, nosebleeds, sinus pressure/congestion, sore throat and trouble swallowing.    Eyes: Negative.  Negative for visual disturbance.   Respiratory:  Positive for cough and shortness of breath (on exertion, chronic).    Cardiovascular:  Negative for chest pain, palpitations and leg swelling.   Gastrointestinal:  Negative for abdominal distention, abdominal pain, blood in stool, change in bowel habit, constipation, diarrhea, nausea and vomiting.   Endocrine: Negative.    Genitourinary: Negative.  Negative for dysuria, frequency, hematuria and urgency.   Musculoskeletal:  Negative for arthralgias, back pain, leg pain, myalgias and neck pain.   Integumentary:  Negative for rash, breast mass, breast discharge and breast  tenderness. Negative.   Allergic/Immunologic: Negative.    Neurological:  Negative for dizziness, tremors, syncope, speech difficulty, weakness, light-headedness, numbness, headaches and memory loss.   Hematological: Negative.  Does not bruise/bleed easily.   Psychiatric/Behavioral: Negative.  Negative for confusion and suicidal ideas.    Breast: Negative for mass and tenderness        Objective:        Vitals:    02/14/24 0854   BP: 115/78   Pulse: (!) 112   Resp: 18   Temp: 98 °F (36.7 °C)       Wt Readings from Last 6 Encounters:   02/14/24 65 kg (143 lb 3.2 oz)   02/12/24 65.8 kg (145 lb)   02/06/24 64.4 kg (142 lb)   01/23/24 62.8 kg (138 lb 6.4 oz)   01/23/24 62.8 kg (138 lb 6.4 oz)   01/19/24 62.9 kg (138 lb 9.6 oz)     Body mass index is 23.83 kg/m².  Body surface area is 1.73 meters squared.       Physical Exam  Vitals and nursing note reviewed.   Constitutional:       Appearance: Normal appearance.   HENT:      Head: Normocephalic and atraumatic.      Nose: No congestion.   Eyes:      General: No scleral icterus.     Extraocular Movements: Extraocular movements intact.      Conjunctiva/sclera: Conjunctivae normal.   Neck:      Vascular: No JVD.   Cardiovascular:      Rate and Rhythm: Normal rate and regular rhythm.      Heart sounds: No murmur heard.  Pulmonary:      Effort: Pulmonary effort is normal.      Breath sounds: Decreased breath sounds, wheezing and rhonchi present.   Abdominal:      General: Bowel sounds are normal. There is no distension.      Palpations: Abdomen is soft.      Tenderness: There is no abdominal tenderness.   Musculoskeletal:      Cervical back: Neck supple.   Lymphadenopathy:      Head:      Right side of head: No submental or submandibular adenopathy.      Left side of head: No submental or submandibular adenopathy.      Cervical: No cervical adenopathy.      Upper Body:      Right upper body: No supraclavicular or axillary adenopathy.      Left upper body: No supraclavicular  or axillary adenopathy.      Lower Body: No right inguinal adenopathy. No left inguinal adenopathy.      Comments: No adenopathy noted bilaterally   Skin:     General: Skin is warm.      Coloration: Skin is not jaundiced.      Findings: No lesion or rash.      Comments: Change of skin color to bilateral fingertips   Neurological:      General: No focal deficit present.      Mental Status: She is alert and oriented to person, place, and time.      Cranial Nerves: Cranial nerves 2-12 are intact.      Gait: Gait normal.   Psychiatric:         Attention and Perception: Attention normal.         Speech: Speech normal.         Behavior: Behavior is cooperative.         Cognition and Memory: Cognition normal.         Judgment: Judgment normal.       ECOG SCORE    1 - Restricted in strenuous activity-ambulatory and able to carry out work of a light nature           LABS      Lab Visit on 02/14/2024   Component Date Value    Sodium Level 02/14/2024 138     Potassium Level 02/14/2024 4.4     Chloride 02/14/2024 110 (H)     Carbon Dioxide 02/14/2024 22 (L)     Glucose Level 02/14/2024 112     Blood Urea Nitrogen 02/14/2024 12.0     Creatinine 02/14/2024 0.80     Calcium Level Total 02/14/2024 8.6     Protein Total 02/14/2024 6.9     Albumin Level 02/14/2024 2.9 (L)     Globulin 02/14/2024 4.0 (H)     Albumin/Globulin Ratio 02/14/2024 0.7 (L)     Bilirubin Total 02/14/2024 0.2     Alkaline Phosphatase 02/14/2024 73     Alanine Aminotransferase 02/14/2024 11     Aspartate Aminotransfera* 02/14/2024 17     eGFR 02/14/2024 >60     Magnesium Level 02/14/2024 2.00     Phosphorus Level 02/14/2024 1.7 (L)     WBC 02/14/2024 9.24     RBC 02/14/2024 4.14 (L)     Hgb 02/14/2024 10.6 (L)     Hct 02/14/2024 36.0 (L)     MCV 02/14/2024 87.0     MCH 02/14/2024 25.6 (L)     MCHC 02/14/2024 29.4 (L)     RDW 02/14/2024 18.1 (H)     Platelet 02/14/2024 360     MPV 02/14/2024 10.0     Neut % 02/14/2024 83.8     Lymph % 02/14/2024 11.4     Mono %  02/14/2024 4.1     Eos % 02/14/2024 0.2     Basophil % 02/14/2024 0.1     Lymph # 02/14/2024 1.05     Neut # 02/14/2024 7.74     Mono # 02/14/2024 0.38     Eos # 02/14/2024 0.02     Baso # 02/14/2024 0.01     IG# 02/14/2024 0.04     IG% 02/14/2024 0.4          Assessment:       1. NSCLC of right lung    2. Cancer related pain    3. Pulmonary embolism, unspecified chronicity, unspecified pulmonary embolism type, unspecified whether acute cor pulmonale present    4. Metastasis to bone    5. On antineoplastic chemotherapy    6. On continuous oral anticoagulation    7. Cough, unspecified type    8. SOB (shortness of breath) on exertion    9. Immunodeficiency due to chemotherapy    10. Iron deficiency anemia, unspecified iron deficiency anemia type              Stage IV NSCLC with bone mets.    Originally: NSCLC / Adenocarcinoma Stage IIIA pT2a pN2  - 12/08/2022: Right lower and middle bilobectomy with mediastinal lymph node dissection. Path: Invasive adenocarcinoma, poorly differentiated, focal tumor involvement of visceral pleura and focal tumor vascular invasion (vein), with mediastinal lymph nodes positive for metastatic carcinoma   - TMB - 5, MSI-S, MET amplification, ROS1.  No mutations in EGFR, BRAF, ALK, ERBB2, KRAS, RET,    - 1% tumor cells are positive for PD-L1   - patient evaluated by Radiation Oncology with recommendation to treat with chemotherapy 1st and will re-evaluate the patient by the end of chemotherapy for radiation therapy  - 01/12/2023 PET: Focal uptake at the medial right acetabular roof. Metastasis is a consideration  - 02/22/2023: RIGHT ACETABULAR LESION, CT - GUIDE CORE NEEDLE BIOPSY : FIBRINOPURULENT EXUDATE ADMIXED WITH ATYPICAL EPITHELIOID GROUPS,   NORMAL    MARROW ELEMENTS AND ABUNDANT BLOOD CLOT  PET scan showed a suspicious finding in the right acetabular roof and patient then had a CT-guided biopsy no evidence of malignancy. Started with adjuvant chemotherapy with Carboplatin and  pemetrexed x 4 cycles (3/15/23-5/18/23)   Patient has been seen by Rad/Onc (Dr Mireles) since the completion of her chemotherapy and plan was to pursue radiation x 5-6 weeks.  She discussed with the patient that they have showed no overall survival benefit in the setting of postoperative radiation therapy but data supports a larger benefit inpatient with extracapsular extension.  If she tolerated chemotherapy well and continue with good performance status then plan was for PORT   Restaging PET-CT after completion of chemotherapy and prior to radiation therapy showed progression at the right acetabulum.  Patient with progression of disease now with bone metastases.  She bagan palliative radiation treatment with Dr. Mireles on 6/28/23, 10 treatments over two weeks.  PET CT 6/5/23 with progression Right superior acetabulum.  FDG avid area appears slightly larger with slightly increased sclerosis.  SUV is 9.7, previously 6.4.  She had palliative radiation. Started on Keytruda 7/18/23. Developed skin changes with Keytruda and later on diagnosed with vitiligo. Now on Taxotere + Xgeva- 10/24/23 due to progression.  Cycle 3 of Taxotere held 12/6/23 r/t bilateral PE  Bone mets   Xgeva q4w  She was seen by Rad/Onc for palliative RT but recommendations were to start chemotherapy and re eval after 2 cycles  Iron deficiency Anemia  Continue with 1 tab PO iron QOD with Vitamin C.   Currently scheduled for colonoscopy 6/2024   Hypothyroid   Followed by endocrinology Dr. Elena LYN  Now on Eliquis 5 mg twice daily  Vitiligo to bilateral fingertips  Followed by Dermatology                  Plan:   Labs stable.  Continue cycle 6 Taxotere as scheduled   Continue Eliquis 5 mg BID  Moisturize bilateral hands.  Continue follow up with Dermatology  Continue with the Xgeva q4w   Continue oral iron every other day  Norco 10 sent to pharmacy  Scans due 4/16/2024. Will order closer to time.   RTC in 3 weeks with NP for FU/lab/treat  Cbc,  cmp, mag, phos- 1 hr prior @ [facility]      The patient was seen, interviewed and examined. Pertinent lab and radiology studies were reviewed.   The patient was given ample opportunity to ask questions, and to the best of my abilities, all questions answered to satisfaction; patient demonstrated understanding of what we discussed and agreeable to the plan. Pt instructed to call should develop concerning signs/symptoms or have further questions.           Shelbi Johansen, DOUGIEP-C  Oncology/Hematology   [facility]

## 2024-02-23 ENCOUNTER — HOSPITAL ENCOUNTER (EMERGENCY)
Facility: HOSPITAL | Age: 64
Discharge: HOME | End: 2024-02-23
Attending: FAMILY MEDICINE
Payer: MEDICARE

## 2024-02-23 VITALS
RESPIRATION RATE: 18 BRPM | BODY MASS INDEX: 22.82 KG/M2 | OXYGEN SATURATION: 97 % | HEIGHT: 65 IN | SYSTOLIC BLOOD PRESSURE: 121 MMHG | DIASTOLIC BLOOD PRESSURE: 80 MMHG | WEIGHT: 137 LBS | TEMPERATURE: 98 F | HEART RATE: 102 BPM

## 2024-02-23 DIAGNOSIS — K52.9 GASTROENTERITIS: Primary | ICD-10-CM

## 2024-02-23 DIAGNOSIS — E87.6 HYPOKALEMIA: ICD-10-CM

## 2024-02-23 LAB
ABS NEUT CALC (OHS): 2.03 X10(3)/MCL (ref 2.1–9.2)
ALBUMIN SERPL-MCNC: 2.6 G/DL (ref 3.4–4.8)
ALBUMIN/GLOB SERPL: 0.8 RATIO (ref 1.1–2)
ALP SERPL-CCNC: 56 UNIT/L (ref 40–150)
ALT SERPL-CCNC: 11 UNIT/L (ref 0–55)
ANISOCYTOSIS BLD QL SMEAR: SLIGHT
AST SERPL-CCNC: 10 UNIT/L (ref 5–34)
BILIRUB SERPL-MCNC: 0.3 MG/DL
BUN SERPL-MCNC: 8 MG/DL (ref 9.8–20.1)
BURR CELLS (OLG): SLIGHT
CALCIUM SERPL-MCNC: 8.2 MG/DL (ref 8.4–10.2)
CHLORIDE SERPL-SCNC: 106 MMOL/L (ref 98–107)
CO2 SERPL-SCNC: 19 MMOL/L (ref 23–31)
CREAT SERPL-MCNC: 0.8 MG/DL (ref 0.55–1.02)
ERYTHROCYTE [DISTWIDTH] IN BLOOD BY AUTOMATED COUNT: 17.4 % (ref 11.5–17)
FLUAV AG UPPER RESP QL IA.RAPID: NOT DETECTED
FLUBV AG UPPER RESP QL IA.RAPID: NOT DETECTED
GFR SERPLBLD CREATININE-BSD FMLA CKD-EPI: >60 MLS/MIN/1.73/M2
GLOBULIN SER-MCNC: 3.4 GM/DL (ref 2.4–3.5)
GLUCOSE SERPL-MCNC: 104 MG/DL (ref 82–115)
HCT VFR BLD AUTO: 36.5 % (ref 37–47)
HGB BLD-MCNC: 11.5 G/DL (ref 12–16)
LIPASE SERPL-CCNC: 24 U/L
LYMPHOCYTES NFR BLD MANUAL: 1.58 X10(3)/MCL
LYMPHOCYTES NFR BLD MANUAL: 35 % (ref 13–40)
MCH RBC QN AUTO: 25.5 PG (ref 27–31)
MCHC RBC AUTO-ENTMCNC: 31.5 G/DL (ref 33–36)
MCV RBC AUTO: 80.9 FL (ref 80–94)
METAMYELOCYTES NFR BLD MANUAL: 3 %
MONOCYTES NFR BLD MANUAL: 0.77 X10(3)/MCL (ref 0.1–1.3)
MONOCYTES NFR BLD MANUAL: 17 % (ref 2–11)
NEUTROPHILS NFR BLD MANUAL: 37 % (ref 47–80)
NEUTS BAND NFR BLD MANUAL: 8 % (ref 0–11)
OVALOCYTES (OLG): ABNORMAL
PLATELET # BLD AUTO: 474 X10(3)/MCL (ref 130–400)
PLATELET # BLD EST: ABNORMAL 10*3/UL
PMV BLD AUTO: 9.7 FL (ref 7.4–10.4)
POIKILOCYTOSIS BLD QL SMEAR: ABNORMAL
POTASSIUM SERPL-SCNC: 3 MMOL/L (ref 3.5–5.1)
PROT SERPL-MCNC: 6 GM/DL (ref 5.8–7.6)
RBC # BLD AUTO: 4.51 X10(6)/MCL (ref 4.2–5.4)
RBC MORPH BLD: ABNORMAL
RSV A 5' UTR RNA NPH QL NAA+PROBE: NOT DETECTED
SARS-COV-2 RNA RESP QL NAA+PROBE: NOT DETECTED
SODIUM SERPL-SCNC: 134 MMOL/L (ref 136–145)
WBC # SPEC AUTO: 4.52 X10(3)/MCL (ref 4.5–11.5)

## 2024-02-23 PROCEDURE — 96374 THER/PROPH/DIAG INJ IV PUSH: CPT

## 2024-02-23 PROCEDURE — 0241U COVID/RSV/FLU A&B PCR: CPT | Performed by: FAMILY MEDICINE

## 2024-02-23 PROCEDURE — 63600175 PHARM REV CODE 636 W HCPCS: Performed by: FAMILY MEDICINE

## 2024-02-23 PROCEDURE — 99284 EMERGENCY DEPT VISIT MOD MDM: CPT | Mod: 25

## 2024-02-23 PROCEDURE — 80053 COMPREHEN METABOLIC PANEL: CPT | Performed by: FAMILY MEDICINE

## 2024-02-23 PROCEDURE — 96361 HYDRATE IV INFUSION ADD-ON: CPT

## 2024-02-23 PROCEDURE — 96372 THER/PROPH/DIAG INJ SC/IM: CPT | Performed by: FAMILY MEDICINE

## 2024-02-23 PROCEDURE — 83690 ASSAY OF LIPASE: CPT | Performed by: FAMILY MEDICINE

## 2024-02-23 PROCEDURE — 85027 COMPLETE CBC AUTOMATED: CPT | Performed by: FAMILY MEDICINE

## 2024-02-23 PROCEDURE — 25000003 PHARM REV CODE 250: Performed by: FAMILY MEDICINE

## 2024-02-23 RX ORDER — ONDANSETRON HYDROCHLORIDE 2 MG/ML
4 INJECTION, SOLUTION INTRAVENOUS
Status: COMPLETED | OUTPATIENT
Start: 2024-02-23 | End: 2024-02-23

## 2024-02-23 RX ORDER — ONDANSETRON 4 MG/1
4 TABLET, FILM COATED ORAL EVERY 8 HOURS PRN
Qty: 21 TABLET | Refills: 0 | Status: SHIPPED | OUTPATIENT
Start: 2024-02-23 | End: 2024-03-01

## 2024-02-23 RX ORDER — DICYCLOMINE HYDROCHLORIDE 10 MG/1
10 CAPSULE ORAL 3 TIMES DAILY PRN
Qty: 30 CAPSULE | Refills: 0 | Status: SHIPPED | OUTPATIENT
Start: 2024-02-23 | End: 2024-03-24

## 2024-02-23 RX ORDER — DICYCLOMINE HYDROCHLORIDE 10 MG/ML
20 INJECTION INTRAMUSCULAR
Status: COMPLETED | OUTPATIENT
Start: 2024-02-23 | End: 2024-02-23

## 2024-02-23 RX ORDER — POTASSIUM CHLORIDE 20 MEQ/1
40 TABLET, EXTENDED RELEASE ORAL
Status: COMPLETED | OUTPATIENT
Start: 2024-02-23 | End: 2024-02-23

## 2024-02-23 RX ADMIN — DICYCLOMINE HYDROCHLORIDE 20 MG: 20 INJECTION, SOLUTION INTRAMUSCULAR at 07:02

## 2024-02-23 RX ADMIN — SODIUM CHLORIDE 1000 ML: 9 INJECTION, SOLUTION INTRAVENOUS at 07:02

## 2024-02-23 RX ADMIN — POTASSIUM CHLORIDE 40 MEQ: 1500 TABLET, EXTENDED RELEASE ORAL at 07:02

## 2024-02-23 RX ADMIN — ONDANSETRON 4 MG: 2 INJECTION INTRAMUSCULAR; INTRAVENOUS at 07:02

## 2024-02-23 NOTE — ED PROVIDER NOTES
Encounter Date: 2/23/2024       History     Chief Complaint   Patient presents with    Diarrhea     Diarrhea and feeling sick to her stomach since Monday     Pt w hx lung cancer, undergoing chemotherapy, c/o diarrhea for several days since last tx, no CP, no SOB, no fevers or chills, no URI c/o        Review of patient's allergies indicates:  No Known Allergies  Past Medical History:   Diagnosis Date    ASTHMA     COPD (chronic obstructive pulmonary disease)     SEVERE    GERD (gastroesophageal reflux disease)     High cholesterol     HLD (hyperlipidemia)     Hypothyroidism, unspecified     Lung cancer 12/08/2022    invasive adenocarcinoma, poorly differentiated with solid morphology, focal tumor involvement of visceral pleura and focal tumor vascular invasion (vein), with 2 of 3 posterior mediastinal lymph nodes positive for metastatic carcinoma    Stage 2 chronic kidney disease     TIA (transient ischemic attack)     no deficits    Tobacco abuse     Unspecified osteoarthritis, unspecified site     Uterine cancer 1989     Past Surgical History:   Procedure Laterality Date    INSERTION OF TUNNELED CENTRAL VENOUS CATHETER (CVC) WITH SUBCUTANEOUS PORT N/A 12/04/2023    Procedure: [id number]-T-CATH;  Surgeon: Peter Dial MD;  Location: [facility];  Service: Peripheral Vascular;  Laterality: N/A;    lipoma multiple sites      LUNG LOBECTOMY Right 12/08/2022    Procedure: LOBECTOMY, LUNG;  Surgeon: Jass rBowne IV, MD;  Location: [facility] OR;  Service: Cardiothoracic;  Laterality: Right;    SURGICAL REMOVAL OF LYMPH NODE  12/08/2022    Procedure: EXCISION, LYMPH NODE;  Surgeon: Jass Browne IV, MD;  Location: [facility] OR;  Service: Cardiothoracic;;    THORACOTOMY Right 12/08/2022    Procedure: THORACOTOMY;  Surgeon: Jass Browne IV, MD;  Location: [facility] OR;  Service: Cardiothoracic;  Laterality: Right;  Right Lower Lobectomy with Lymph Node Dissection    TOTAL ABDOMINAL HYSTERECTOMY W/ BILATERAL  SALPINGOOPHORECTOMY       Family History   Problem Relation Age of Onset    Cancer Maternal Grandfather     Heart failure Father     Prostate cancer Father     Hypertension Mother     Hepatitis Mother     Ovarian cancer Mother     Liver cancer Mother     Asthma Brother     Asthma Brother     Fibroids Sister     Skin cancer Sister      Social History     Tobacco Use    Smoking status: Former     Current packs/day: 0.00     Average packs/day: 0.5 packs/day for 50.0 years (25.0 ttl pk-yrs)     Types: Cigarettes     Start date: 1972     Quit date: 2022     Years since quittin.2    Smokeless tobacco: Never   Substance Use Topics    Alcohol use: Not Currently     Comment: quit 25years ago    Drug use: Never     Review of Systems   All other systems reviewed and are negative.      Physical Exam     Initial Vitals [24 0644]   BP Pulse Resp Temp SpO2   97/65 (!) 122 16 98.2 °F (36.8 °C) 98 %      MAP       --         Physical Exam    Nursing note and vitals reviewed.  Constitutional: She appears well-developed and well-nourished.   HENT:   Head: Normocephalic and atraumatic.   Neck: Neck supple.   Cardiovascular:  Normal rate and regular rhythm.           Pulmonary/Chest: Breath sounds normal.   Abdominal: Abdomen is soft.   Musculoskeletal:      Cervical back: Neck supple.     Neurological: She is alert and oriented to person, place, and time.   Skin: Skin is warm.   Psychiatric: She has a normal mood and affect. Thought content normal.         ED Course   Procedures  Labs Reviewed   COMPREHENSIVE METABOLIC PANEL - Abnormal; Notable for the following components:       Result Value    Sodium Level 134 (*)     Potassium Level 3.0 (*)     Carbon Dioxide 19 (*)     Blood Urea Nitrogen 8.0 (*)     Calcium Level Total 8.2 (*)     Albumin Level 2.6 (*)     Albumin/Globulin Ratio 0.8 (*)     All other components within normal limits   CBC WITH DIFFERENTIAL - Abnormal; Notable for the following components:     Hgb 11.5 (*)     Hct 36.5 (*)     MCH 25.5 (*)     MCHC 31.5 (*)     RDW 17.4 (*)     Platelet 474 (*)     All other components within normal limits   MANUAL DIFFERENTIAL - Abnormal; Notable for the following components:    Neutrophils % 37 (*)     Monocytes % 17 (*)     Neutrophils Abs Calc 2.034 (*)     Platelets Increased (*)     RBC Morph Abnormal (*)     Anisocytosis Slight (*)     Poikilocytosis 1+ (*)     Ovalocytes 1+ (*)     [place] Cells Slight (*)     All other components within normal limits   LIPASE - Normal   CBC W/ AUTO DIFFERENTIAL    Narrative:     The following orders were created for panel order CBC W/ AUTO DIFFERENTIAL.  Procedure                               Abnormality         Status                     ---------                               -----------         ------                     CBC with Differential[[id number]]       Abnormal            Final result               Manual Differential[[id number]]         Abnormal            Final result                 Please view results for these tests on the individual orders.   COVID/RSV/FLU A&B PCR          Imaging Results    None          Medications   sodium chloride 0.9% bolus 1,000 mL 1,000 mL (1,000 mLs Intravenous New Bag 2/23/24 0725)   ondansetron injection 4 mg (4 mg Intravenous Given 2/23/24 0724)   dicyclomine injection 20 mg (20 mg Intramuscular Given 2/23/24 0716)   potassium chloride SA CR tablet 40 mEq (40 mEq Oral Given 2/23/24 0742)     Medical Decision Making  Patient doing much better.  Resting comfortably.  Stable for discharge home.    Amount and/or Complexity of Data Reviewed  Labs: ordered.    Risk  Prescription drug management.                                      Clinical Impression:  Final diagnoses:  [K52.9] Gastroenteritis (Primary)  [E87.6] Hypokalemia          ED Disposition Condition    Discharge Stable          ED Prescriptions       Medication Sig Dispense Start Date End Date Auth. Provider    dicyclomine (BENTYL) 10  MG capsule Take 1 capsule (10 mg total) by mouth 3 (three) times daily as needed (abdominal cramps). 30 capsule 2/23/2024 3/24/2024 Eduard Ortiz Jr., MD    ondansetron (ZOFRAN) 4 MG tablet Take 1 tablet (4 mg total) by mouth every 8 (eight) hours as needed for Nausea. 21 tablet 2/23/2024 3/1/2024 Eduard Ortiz Jr., MD          Follow-up Information       Follow up With Specialties Details Why Contact Info    Jennifer Dacosta, [facility]   [address] [facility] [address]  [phone]               Eduard Ortiz Jr., MD  02/23/24 5248

## 2024-02-26 DIAGNOSIS — C34.91 NSCLC OF RIGHT LUNG: Primary | ICD-10-CM

## 2024-02-26 RX ORDER — DIPHENOXYLATE HYDROCHLORIDE AND ATROPINE SULFATE 2.5; .025 MG/1; MG/1
1 TABLET ORAL 4 TIMES DAILY PRN
Qty: 30 TABLET | Refills: 1 | OUTPATIENT
Start: 2024-02-26 | End: 2025-02-25

## 2024-03-06 ENCOUNTER — INFUSION (OUTPATIENT)
Dept: INFUSION THERAPY | Facility: HOSPITAL | Age: 64
End: 2024-03-06
Attending: NURSE PRACTITIONER
Payer: MEDICARE

## 2024-03-06 ENCOUNTER — OFFICE VISIT (OUTPATIENT)
Dept: HEMATOLOGY/ONCOLOGY | Facility: CLINIC | Age: 64
End: 2024-03-06
Payer: MEDICARE

## 2024-03-06 VITALS
TEMPERATURE: 98 F | HEART RATE: 109 BPM | SYSTOLIC BLOOD PRESSURE: 109 MMHG | HEIGHT: 65 IN | DIASTOLIC BLOOD PRESSURE: 70 MMHG | RESPIRATION RATE: 18 BRPM | OXYGEN SATURATION: 96 % | BODY MASS INDEX: 22.93 KG/M2 | WEIGHT: 137.63 LBS

## 2024-03-06 VITALS
TEMPERATURE: 98 F | HEIGHT: 65 IN | OXYGEN SATURATION: 96 % | RESPIRATION RATE: 20 BRPM | WEIGHT: 137.63 LBS | BODY MASS INDEX: 22.93 KG/M2 | SYSTOLIC BLOOD PRESSURE: 110 MMHG | DIASTOLIC BLOOD PRESSURE: 70 MMHG | HEART RATE: 99 BPM

## 2024-03-06 DIAGNOSIS — Z29.89 IMMUNOTHERAPY: ICD-10-CM

## 2024-03-06 DIAGNOSIS — C79.51 METASTASIS TO BONE: ICD-10-CM

## 2024-03-06 DIAGNOSIS — T45.1X5A IMMUNODEFICIENCY DUE TO CHEMOTHERAPY: ICD-10-CM

## 2024-03-06 DIAGNOSIS — R05.9 COUGH, UNSPECIFIED TYPE: ICD-10-CM

## 2024-03-06 DIAGNOSIS — I26.99 PULMONARY EMBOLISM, UNSPECIFIED CHRONICITY, UNSPECIFIED PULMONARY EMBOLISM TYPE, UNSPECIFIED WHETHER ACUTE COR PULMONALE PRESENT: ICD-10-CM

## 2024-03-06 DIAGNOSIS — Z79.899 IMMUNODEFICIENCY DUE TO CHEMOTHERAPY: ICD-10-CM

## 2024-03-06 DIAGNOSIS — Z79.01 ON CONTINUOUS ORAL ANTICOAGULATION: ICD-10-CM

## 2024-03-06 DIAGNOSIS — R06.02 SOB (SHORTNESS OF BREATH) ON EXERTION: ICD-10-CM

## 2024-03-06 DIAGNOSIS — D84.821 IMMUNODEFICIENCY DUE TO CHEMOTHERAPY: ICD-10-CM

## 2024-03-06 DIAGNOSIS — C34.91 NSCLC OF RIGHT LUNG: Primary | ICD-10-CM

## 2024-03-06 DIAGNOSIS — D50.9 IRON DEFICIENCY ANEMIA, UNSPECIFIED IRON DEFICIENCY ANEMIA TYPE: ICD-10-CM

## 2024-03-06 DIAGNOSIS — G89.3 CANCER RELATED PAIN: ICD-10-CM

## 2024-03-06 DIAGNOSIS — L81.9 DISCOLORATION OF SKIN: ICD-10-CM

## 2024-03-06 DIAGNOSIS — Z79.899 ON ANTINEOPLASTIC CHEMOTHERAPY: ICD-10-CM

## 2024-03-06 PROCEDURE — 1126F AMNT PAIN NOTED NONE PRSNT: CPT | Mod: CPTII,S$GLB,,

## 2024-03-06 PROCEDURE — 3074F SYST BP LT 130 MM HG: CPT | Mod: CPTII,S$GLB,,

## 2024-03-06 PROCEDURE — 96367 TX/PROPH/DG ADDL SEQ IV INF: CPT

## 2024-03-06 PROCEDURE — 63600175 PHARM REV CODE 636 W HCPCS

## 2024-03-06 PROCEDURE — 1159F MED LIST DOCD IN RCRD: CPT | Mod: CPTII,S$GLB,,

## 2024-03-06 PROCEDURE — 96375 TX/PRO/DX INJ NEW DRUG ADDON: CPT

## 2024-03-06 PROCEDURE — 99215 OFFICE O/P EST HI 40 MIN: CPT | Mod: S$GLB,,,

## 2024-03-06 PROCEDURE — 1160F RVW MEDS BY RX/DR IN RCRD: CPT | Mod: CPTII,S$GLB,,

## 2024-03-06 PROCEDURE — 1101F PT FALLS ASSESS-DOCD LE1/YR: CPT | Mod: CPTII,S$GLB,,

## 2024-03-06 PROCEDURE — 96372 THER/PROPH/DIAG INJ SC/IM: CPT | Mod: 59

## 2024-03-06 PROCEDURE — 99999 PR PBB SHADOW E&M-EST. PATIENT-LVL V: CPT | Mod: PBBFAC,,,

## 2024-03-06 PROCEDURE — A4216 STERILE WATER/SALINE, 10 ML: HCPCS

## 2024-03-06 PROCEDURE — 3288F FALL RISK ASSESSMENT DOCD: CPT | Mod: CPTII,S$GLB,,

## 2024-03-06 PROCEDURE — 96361 HYDRATE IV INFUSION ADD-ON: CPT

## 2024-03-06 PROCEDURE — 96413 CHEMO IV INFUSION 1 HR: CPT

## 2024-03-06 PROCEDURE — 25000003 PHARM REV CODE 250

## 2024-03-06 PROCEDURE — 3008F BODY MASS INDEX DOCD: CPT | Mod: CPTII,S$GLB,,

## 2024-03-06 PROCEDURE — 3078F DIAST BP <80 MM HG: CPT | Mod: CPTII,S$GLB,,

## 2024-03-06 RX ORDER — ONDANSETRON HYDROCHLORIDE 2 MG/ML
8 INJECTION, SOLUTION INTRAVENOUS ONCE
Status: CANCELLED
Start: 2024-03-06 | End: 2024-03-06

## 2024-03-06 RX ORDER — HEPARIN 100 UNIT/ML
500 SYRINGE INTRAVENOUS
Status: DISCONTINUED | OUTPATIENT
Start: 2024-03-06 | End: 2024-03-06 | Stop reason: HOSPADM

## 2024-03-06 RX ORDER — ONDANSETRON HYDROCHLORIDE 2 MG/ML
8 INJECTION, SOLUTION INTRAVENOUS ONCE
Status: COMPLETED | OUTPATIENT
Start: 2024-03-06 | End: 2024-03-06

## 2024-03-06 RX ORDER — SODIUM CHLORIDE 0.9 % (FLUSH) 0.9 %
10 SYRINGE (ML) INJECTION
Status: DISCONTINUED | OUTPATIENT
Start: 2024-03-06 | End: 2024-03-06 | Stop reason: HOSPADM

## 2024-03-06 RX ORDER — SODIUM CHLORIDE 0.9 % (FLUSH) 0.9 %
10 SYRINGE (ML) INJECTION
Status: CANCELLED | OUTPATIENT
Start: 2024-03-06

## 2024-03-06 RX ORDER — HEPARIN 100 UNIT/ML
500 SYRINGE INTRAVENOUS
Status: CANCELLED | OUTPATIENT
Start: 2024-03-06

## 2024-03-06 RX ORDER — DIPHENHYDRAMINE HYDROCHLORIDE 50 MG/ML
25 INJECTION INTRAMUSCULAR; INTRAVENOUS
Status: CANCELLED
Start: 2024-03-06

## 2024-03-06 RX ORDER — DIPHENHYDRAMINE HYDROCHLORIDE 50 MG/ML
25 INJECTION INTRAMUSCULAR; INTRAVENOUS
Status: COMPLETED | OUTPATIENT
Start: 2024-03-06 | End: 2024-03-06

## 2024-03-06 RX ORDER — DIPHENOXYLATE HYDROCHLORIDE AND ATROPINE SULFATE 2.5; .025 MG/1; MG/1
1 TABLET ORAL 4 TIMES DAILY PRN
COMMUNITY
Start: 2024-02-26 | End: 2024-03-11 | Stop reason: ALTCHOICE

## 2024-03-06 RX ADMIN — DOCETAXEL 118 MG: 20 INJECTION, SOLUTION, CONCENTRATE INTRAVENOUS at 11:03

## 2024-03-06 RX ADMIN — SODIUM CHLORIDE: 9 INJECTION, SOLUTION INTRAVENOUS at 10:03

## 2024-03-06 RX ADMIN — DENOSUMAB 120 MG: 120 INJECTION SUBCUTANEOUS at 01:03

## 2024-03-06 RX ADMIN — POTASSIUM CHLORIDE 500 ML/HR: 2 INJECTION, SOLUTION, CONCENTRATE INTRAVENOUS at 11:03

## 2024-03-06 RX ADMIN — Medication 500 UNITS: at 01:03

## 2024-03-06 RX ADMIN — DIPHENHYDRAMINE HYDROCHLORIDE 25 MG: 50 INJECTION, SOLUTION INTRAMUSCULAR; INTRAVENOUS at 10:03

## 2024-03-06 RX ADMIN — SODIUM CHLORIDE, PRESERVATIVE FREE 10 ML: 5 INJECTION INTRAVENOUS at 01:03

## 2024-03-06 RX ADMIN — DEXAMETHASONE SODIUM PHOSPHATE 20 MG: 10 INJECTION, SOLUTION INTRAMUSCULAR; INTRAVENOUS at 11:03

## 2024-03-06 RX ADMIN — ONDANSETRON 8 MG: 2 INJECTION INTRAMUSCULAR; INTRAVENOUS at 10:03

## 2024-03-06 NOTE — NURSING
Patient here for Taxotere/zgeva/ns 1000 with 20 meq of kcl.   Calcium today 8.8  For zgeva:  Patient denies CVA or MI in past year. Patient denies on any bisphospates taking currently. Patient states she is taking calcium  and vitamin D supplements daily. Patient has not had a invasive dental procedure in the past 3 months. Patient verbalizes understanding not to have invasive dental procedure in the next 3 months.

## 2024-03-06 NOTE — PROGRESS NOTES
HEMATOLOGY/ONCOLOGY OFFICE CLINIC VISIT  [facility]    ONCOLOGICAL HISTORY:     Diagnosis:  - Stage IV NSCLC with bone mets.  - NSCLC / Adenocarcinoma Stage IIIA pT2a pN2--dx 12/2022  - PD-L1 1%  - History Uterine cancer - Dx 1989    Current Treatment:   Taxotere + Xgeva- 10/24/23-->  Held cycle 3 12/6/23 r/t bilateral PE, started on Eliquis      Treatment History:  - 1989 s/p total hysterectomy   - 12/08/2022: Right lower and middle bilobectomy with mediastinal lymph node dissection.   - Carboplatin + Pemetrexed  3/15/2023-5/1/2023  - Palliative RT   - Carboplatin + Pemetrexed started 3/15/2023  - Keytruda stopped 9/19/23 r/t elevated TSH and PET/CT progression, last dose given 8/29/23  - Xgeva- 7/18/2023-->    Plan of care: palliative systemic therapy      IMAGING:   - 08/10/2022 PET: The elongated subpleural right lower lobe nodule demonstrates fairly intense FDG activity.  Infectious, inflammatory and neoplastic etiologies remain possible. No suspicious PET findings elsewhere.  - 12/08/2022 CT HEAD without contrast: No acute intracranial abnormality identified.  Findings of mild microvascular ischemic disease.  - 12/2022: U/S Upper extremity: Thrombophlebitis of the left cephalic vein. No deep venous thrombosis in the left upper extremity.  - 01/03/2023 MRI Brain : GARY   - 01/12/2023 NM PET CT 1. Postsurgical changes of recent right lower and middle lobectomies.  There is a small right pleural effusion.  There is nonspecific peripheral uptake along the pleura at the right lung base and perihilar region which may be related to healing response in the setting of recent surgery.  Similarly borderline mediastinal uptake is nonspecific in the recent postoperative setting and could be reactive.  Continued attention recommended on follow-up. 2. Focal Uptake at the medial right acetabular roof has a max SUV of 6.4. This is new compared to previous exam. Metastasis is a consideration  -PET 6/5/2023: Previously  visualized moderately FDG avid opacities towards the right lung base improved.  Pleural effusion also improved.  No new suspicious PET findings in the lungs.  Severe emphysema. Some left adrenal thickening is similar to prior but there is now moderate associated FDG activity, max SUV is 3.6. FDG activity is again seen associated with the right superior acetabulum.  FDG avid area appears slightly larger today with slightly increased sclerosis.  SUV is 9.7, previously 6.4.  There is a newly evident subcentimeter FDG avid lesion left pedicle T10. Additional subcentimeter FDG avid lesion T4 vertebral body SUV 3.4.suspicious for metastatic disease.  -MRI T spine 6/15/2023:   1. Foci of abnormal signal intensity and enhancement at the inferior endplate of T3 and at body and left pedicle of T10.  Metastatic neoplastic etiology must be considered.  2. Thoracic spondylosis  3. Mild encroachment into the right neural foramina at the T3-4 and T4-5 secondary to mild posterior disc bulge and posterior osteophyte formation  4. Multilevel mild posterior disc bulge which does not result in significant central spinal stenosis.  9/27/23 Thyroid U/S:  Very minimal to no significant uptake of radiotracer isotope within the thyroid lobes bilaterally.  Clinical correlation is indicated  CT CAP 9/27/23:  1. Interim increasing nodular soft tissue densities/nodes/mass is at the mediastinum and right hilum measuring up the 3.0 cm.  A neoplastic etiology must be considered  2. Interim development of a 2.5 cm low-attenuation mass at the left adrenal gland.  A metastatic etiology must be considered  3. Interim development of a sclerotic foci at T3 and T10.  A metastatic etiology must be considered  4. Small right pleural effusion with associated infiltrate and atelectasis at the right lung base suspect  5. Suspect small cyst at the left kidney as described  6. Mild ill-defined soft tissue fullness at the cervical vaginal region  7. Borderline  cardiomegaly  10/6/23 PET/CT:  1. Disease progression with new FDG avid metastatic lymph nodes in the chest and right hilum.  2. Bilateral axillary and inguinal lymph nodes with low level uptake are indeterminate but concerning for metastasis given the additional findings.  3. Worsening and new FDG avid osseous disease involving both the axial and appendicular skeleton.  4. Enlarging left adrenal metastasis.  12/6/23 CTA:  1. Soft tissue masslike density again evident at the right hilum causing mass effect on the right main pulmonary artery  2. Interim development of filling defects/pulmonary emboli at the diminutive posterior right lower lobe pulmonary arteries.  A few small defects/pulmonary emboli have developed at the pulmonary arteries at the posterior left lower lobe since the prior exam.  3. Postsurgical changes right hilum with volume loss at the right lung and elevation of the right hemidiaphragm with associated posterior right basilar pleural reaction, infiltrate, and atelectasis  4. Advanced emphysematous changes with the right side greater than the left  5. Thoracic spondylosis with the sclerotic foci at the T3 and T10  6. The cancer center was notified of the findings on 12/06/2023 at 10:00  1/16/24 CT CAP:  1. Nodular opacities again identified at the right hilum and mediastinum which have a similar appearance to the prior exam  2. Low-attenuation mass again evident at the left adrenal gland  3. Sclerotic lytic lesions evident at T3, T5, T10, and L5.  A metastatic neoplastic etiology must be considered  4. Advanced emphysematous changes with resolution of small right pleural effusion.  There is patchy infiltrate, atelectasis and or scarring at the posterior right lung base.  5. Findings consistent with cysts at the left kidney  6. Ill-defined soft tissue fullness again evident at the cervical vaginal region  7. Findings of mild constipation  1/16/24 NM Bone Scan:  1. Scattered focal activity at the  thoracic spine, lumbar spine, right hip/trochanteric region, left iliac bone, and left sacrum.  A metastatic neoplastic etiology must be considered  2. Bar like CT intense activity at T10 suggesting acute compression fracture  3. Mild asymmetric prominence of the right upper renal collecting system compared to the left    PATHOLOGY:  - 12/08/2022: Invasive solid adenocarcinoma (3.1 CM); G3, poorly differentiated. Visceral Pleura Invasion present without definite serosal surface involvement. Lymphovascular invasion (Vein) present. All margins negative for invasive carcinoma, distance 2.5 cm. LN - 4/14 (10R: Hilar, Posterior mediastinal (RACHEAL), 4R Lower paratracheal, 9R Pulmonary ligament, 10R). pT2a pN2  - 02/22/2023: RIGHT ACETABULAR LESION, CT - GUIDE CORE NEEDLE BIOPSY : FIBRINOPURULENT EXUDATE ADMIXED WITH ATYPICAL EPITHELIOID GROUPS,   NORMAL    MARROW ELEMENTS AND ABUNDANT BLOOD CLOT      Subjective:     HPI  Mansi Jin  64 y.o.  female with past medical history significant for HTN, HLD, uterine cancer status post total hysterectomy in 1989,  CKD Stage 2, COPD Stage 3, referred for management of NSCLC s/p RLL / RML lobectomy with Mediastinal Lymph Node Dissection on 12/8/2022.     She was noted to have RLL lung mass and underwent CT guided biopsy which was non diagnostic. PET scan demonstrated significant avidity within the lesion. Given high risk for malignancy, she underwent right lower lobectomy and right middle lobectomy on 12/08/2022. Pathology came back with invasive adenocarcinoma, poorly differentiated with solid morphology, focal tumor involvement of visceral pleura and focal tumor vascular invasion (vein), with mediastinal lymph nodes positive for metastatic carcinoma.      PET on 01/12/2023 showed focal uptake at the medial right acetabular roof but the biopsy was negative for malignancy. Discussed with radiology, they recommended adjuvant chemotherapy and then they will reassess later for  radiation therapy.     Chief Complaint: Lung Cancer (Pt reports no new complaints)      Interval History:   She returns to clinic today for treatment clearance for cycle 7 of Taxotere. She feel well today. She was seen recently in ED for gastroenteritis. This began one week after her last chemo. Her sister was also sick with something similar. Pain is stable with Nocro 10 as needed.  She continues taking her Eliquis 5 mg BID for bilateral PE. She is taking her oral iron every other day and tolerating well. Now seeing dermatology for vitiligo to bilateral fingertips.  Labs reviewed in detail and are stable.  SOB only on exertion. She denies any palpitations, chest pain, fever, chills, nausea, vomiting, diarrhea, recent hospitalizations or infections.       Past Medical History:   Diagnosis Date    ASTHMA     COPD (chronic obstructive pulmonary disease)     SEVERE    GERD (gastroesophageal reflux disease)     High cholesterol     HLD (hyperlipidemia)     Hypothyroidism, unspecified     Lung cancer 12/08/2022    invasive adenocarcinoma, poorly differentiated with solid morphology, focal tumor involvement of visceral pleura and focal tumor vascular invasion (vein), with 2 of 3 posterior mediastinal lymph nodes positive for metastatic carcinoma    Stage 2 chronic kidney disease     TIA (transient ischemic attack)     no deficits    Tobacco abuse     Unspecified osteoarthritis, unspecified site     Uterine cancer 1989      Past Surgical History:   Procedure Laterality Date    INSERTION OF TUNNELED CENTRAL VENOUS CATHETER (CVC) WITH SUBCUTANEOUS PORT N/A 12/04/2023    Procedure: UNVCBNISE-EPTT-N-CATH;  Surgeon: Peter Dial MD;  Location: [facility] OR;  Service: Peripheral Vascular;  Laterality: N/A;    lipoma multiple sites      LUNG LOBECTOMY Right 12/08/2022    Procedure: LOBECTOMY, LUNG;  Surgeon: Jass Browne IV, MD;  Location: [facility] OR;  Service: Cardiothoracic;  Laterality: Right;    SURGICAL REMOVAL OF LYMPH  NODE  2022    Procedure: EXCISION, LYMPH NODE;  Surgeon: Jass Browne IV, MD;  Location: [facility] OR;  Service: Cardiothoracic;;    THORACOTOMY Right 2022    Procedure: THORACOTOMY;  Surgeon: Jass Browne IV, MD;  Location: [facility] OR;  Service: Cardiothoracic;  Laterality: Right;  Right Lower Lobectomy with Lymph Node Dissection    TOTAL ABDOMINAL HYSTERECTOMY W/ BILATERAL SALPINGOOPHORECTOMY       Social History     Socioeconomic History    Marital status: Single    Number of children: 5   Tobacco Use    Smoking status: Former     Current packs/day: 0.00     Average packs/day: 0.5 packs/day for 50.0 years (25.0 ttl pk-yrs)     Types: Cigarettes     Start date: 1972     Quit date: 2022     Years since quittin.2    Smokeless tobacco: Never   Substance and Sexual Activity    Alcohol use: Not Currently     Comment: quit 25years ago    Drug use: Never    Sexual activity: Yes     Partners: Male     Birth control/protection: See Surgical Hx     Social Determinants of Health     Financial Resource Strain: Low Risk  (2023)    Overall Financial Resource Strain (CARDIA)     Difficulty of Paying Living Expenses: Not very hard   Food Insecurity: No Food Insecurity (2023)    Hunger Vital Sign     Worried About Running Out of Food in the Last Year: Never true     Ran Out of Food in the Last Year: Never true   Transportation Needs: No Transportation Needs (2023)    PRAPARE - Transportation     Lack of Transportation (Medical): No     Lack of Transportation (Non-Medical): No   Physical Activity: Inactive (2023)    Exercise Vital Sign     Days of Exercise per Week: 0 days     Minutes of Exercise per Session: 0 min   Stress: No Stress Concern Present (2023)    [employer] - Occupational Stress Questionnaire     Feeling of Stress : Only a little   Social Connections: Socially Integrated (2023)    Social Connection and Isolation Panel [NHANES]      Frequency of Communication with Friends and Family: More than three times a week     Frequency of Social Gatherings with Friends and Family: Three times a week     Attends Christian Services: More than 4 times per year     Active Member of Clubs or Organizations: Yes     Attends Club or Organization Meetings: 1 to 4 times per year     Marital Status: Living with partner   Housing Stability: Low Risk  (9/22/2023)    Housing Stability Vital Sign     Unable to Pay for Housing in the Last Year: No     Number of Places Lived in the Last Year: 1     Unstable Housing in the Last Year: No      Family History   Problem Relation Age of Onset    Cancer Maternal Grandfather     Heart failure Father     Prostate cancer Father     Hypertension Mother     Hepatitis Mother     Ovarian cancer Mother     Liver cancer Mother     Asthma Brother     Asthma Brother     Fibroids Sister     Skin cancer Sister       Review of patient's allergies indicates:  No Known Allergies     Review of Systems   Constitutional:  Positive for fatigue. Negative for activity change, appetite change, chills, fever and unexpected weight change.   HENT:  Negative for mouth dryness, mouth sores, nosebleeds, sinus pressure/congestion, sore throat and trouble swallowing.    Eyes: Negative.  Negative for visual disturbance.   Respiratory:  Positive for cough and shortness of breath (on exertion, chronic).    Cardiovascular:  Negative for chest pain, palpitations and leg swelling.   Gastrointestinal:  Negative for abdominal distention, abdominal pain, blood in stool, change in bowel habit, constipation, diarrhea, nausea and vomiting.   Endocrine: Negative.    Genitourinary: Negative.  Negative for dysuria, frequency, hematuria and urgency.   Musculoskeletal:  Negative for arthralgias, back pain, leg pain, myalgias and neck pain.   Integumentary:  Negative for rash, breast mass, breast discharge and breast tenderness. Negative.   Allergic/Immunologic: Negative.     Neurological:  Negative for dizziness, tremors, syncope, speech difficulty, weakness, light-headedness, numbness, headaches and memory loss.   Hematological: Negative.  Does not bruise/bleed easily.   Psychiatric/Behavioral: Negative.  Negative for confusion and suicidal ideas.    Breast: Negative for mass and tenderness        Objective:        Vitals:    03/06/24 0845   BP: 109/70   Pulse: 109   Resp: 18   Temp: 97.9 °F (36.6 °C)       Wt Readings from Last 6 Encounters:   03/06/24 62.4 kg (137 lb 9.6 oz)   03/06/24 62.4 kg (137 lb 9.6 oz)   02/23/24 62.1 kg (137 lb)   02/14/24 65 kg (143 lb 3.2 oz)   02/12/24 65.8 kg (145 lb)   02/06/24 64.4 kg (142 lb)     Body mass index is 22.9 kg/m².  Body surface area is 1.69 meters squared.       Physical Exam  Vitals and nursing note reviewed.   Constitutional:       Appearance: Normal appearance.   HENT:      Head: Normocephalic and atraumatic.      Nose: No congestion.   Eyes:      General: No scleral icterus.     Extraocular Movements: Extraocular movements intact.      Conjunctiva/sclera: Conjunctivae normal.   Neck:      Vascular: No JVD.   Cardiovascular:      Rate and Rhythm: Normal rate and regular rhythm.      Heart sounds: No murmur heard.  Pulmonary:      Effort: Pulmonary effort is normal.      Breath sounds: Decreased breath sounds present. No wheezing or rhonchi.   Abdominal:      General: Bowel sounds are normal. There is no distension.      Palpations: Abdomen is soft.      Tenderness: There is no abdominal tenderness.   Musculoskeletal:      Cervical back: Neck supple.   Lymphadenopathy:      Head:      Right side of head: No submental or submandibular adenopathy.      Left side of head: No submental or submandibular adenopathy.      Cervical: No cervical adenopathy.      Upper Body:      Right upper body: No supraclavicular or axillary adenopathy.      Left upper body: No supraclavicular or axillary adenopathy.      Lower Body: No right inguinal  adenopathy. No left inguinal adenopathy.      Comments: No adenopathy noted bilaterally   Skin:     General: Skin is warm.      Coloration: Skin is not jaundiced.      Findings: No lesion or rash.      Comments: Change of skin color to bilateral fingertips   Neurological:      General: No focal deficit present.      Mental Status: She is alert and oriented to person, place, and time.      Cranial Nerves: Cranial nerves 2-12 are intact.      Gait: Gait normal.   Psychiatric:         Attention and Perception: Attention normal.         Speech: Speech normal.         Behavior: Behavior is cooperative.         Cognition and Memory: Cognition normal.         Judgment: Judgment normal.       ECOG SCORE    1 - Restricted in strenuous activity-ambulatory and able to carry out work of a light nature           LABS      Lab Visit on 03/06/2024   Component Date Value    Sodium Level 03/06/2024 137     Potassium Level 03/06/2024 3.3 (L)     Chloride 03/06/2024 102     Carbon Dioxide 03/06/2024 25     Glucose Level 03/06/2024 166 (H)     Blood Urea Nitrogen 03/06/2024 10.0     Creatinine 03/06/2024 0.79     Calcium Level Total 03/06/2024 8.8     Protein Total 03/06/2024 6.0     Albumin Level 03/06/2024 2.6 (L)     Globulin 03/06/2024 3.4     Albumin/Globulin Ratio 03/06/2024 0.8 (L)     Bilirubin Total 03/06/2024 0.2     Alkaline Phosphatase 03/06/2024 64     Alanine Aminotransferase 03/06/2024 8     Aspartate Aminotransfera* 03/06/2024 13     eGFR 03/06/2024 >60     Magnesium Level 03/06/2024 1.60     Phosphorus Level 03/06/2024 2.3     WBC 03/06/2024 6.19     RBC 03/06/2024 4.12 (L)     Hgb 03/06/2024 10.5 (L)     Hct 03/06/2024 32.9 (L)     MCV 03/06/2024 79.9 (L)     MCH 03/06/2024 25.5 (L)     MCHC 03/06/2024 31.9 (L)     RDW 03/06/2024 17.5 (H)     Platelet 03/06/2024 353     MPV 03/06/2024 9.2     Neut % 03/06/2024 82.2     Lymph % 03/06/2024 9.5     Mono % 03/06/2024 6.3     Eos % 03/06/2024 0.0     Basophil % 03/06/2024  0.5     Lymph # 03/06/2024 0.59 (L)     Neut # 03/06/2024 5.09     Mono # 03/06/2024 0.39     Eos # 03/06/2024 0.00     Baso # 03/06/2024 0.03     IG# 03/06/2024 0.09 (H)     IG% 03/06/2024 1.5          Assessment:       1. NSCLC of right lung    2. Metastasis to bone    3. Cancer related pain    4. Pulmonary embolism, unspecified chronicity, unspecified pulmonary embolism type, unspecified whether acute cor pulmonale present    5. On antineoplastic chemotherapy    6. On continuous oral anticoagulation    7. Cough, unspecified type    8. SOB (shortness of breath) on exertion    9. Immunodeficiency due to chemotherapy    10. Iron deficiency anemia, unspecified iron deficiency anemia type    11. Discoloration of skin    12. Immunotherapy                Stage IV NSCLC with bone mets.    Originally: NSCLC / Adenocarcinoma Stage IIIA pT2a pN2  - 12/08/2022: Right lower and middle bilobectomy with mediastinal lymph node dissection. Path: Invasive adenocarcinoma, poorly differentiated, focal tumor involvement of visceral pleura and focal tumor vascular invasion (vein), with mediastinal lymph nodes positive for metastatic carcinoma   - TMB - 5, MSI-S, MET amplification, ROS1.  No mutations in EGFR, BRAF, ALK, ERBB2, KRAS, RET,    - 1% tumor cells are positive for PD-L1   - patient evaluated by Radiation Oncology with recommendation to treat with chemotherapy 1st and will re-evaluate the patient by the end of chemotherapy for radiation therapy  - 01/12/2023 PET: Focal uptake at the medial right acetabular roof. Metastasis is a consideration  - 02/22/2023: RIGHT ACETABULAR LESION, CT - GUIDE CORE NEEDLE BIOPSY : FIBRINOPURULENT EXUDATE ADMIXED WITH ATYPICAL EPITHELIOID GROUPS,   NORMAL    MARROW ELEMENTS AND ABUNDANT BLOOD CLOT  PET scan showed a suspicious finding in the right acetabular roof and patient then had a CT-guided biopsy no evidence of malignancy. Started with adjuvant chemotherapy with Carboplatin and pemetrexed  x 4 cycles (3/15/23-5/18/23)   Patient has been seen by Rad/Onc (Dr Mireles) since the completion of her chemotherapy and plan was to pursue radiation x 5-6 weeks.  She discussed with the patient that they have showed no overall survival benefit in the setting of postoperative radiation therapy but data supports a larger benefit inpatient with extracapsular extension.  If she tolerated chemotherapy well and continue with good performance status then plan was for PORT   Restaging PET-CT after completion of chemotherapy and prior to radiation therapy showed progression at the right acetabulum.  Patient with progression of disease now with bone metastases.  She bagan palliative radiation treatment with Dr. Mireles on 6/28/23, 10 treatments over two weeks.  PET CT 6/5/23 with progression Right superior acetabulum.  FDG avid area appears slightly larger with slightly increased sclerosis.  SUV is 9.7, previously 6.4.  She had palliative radiation. Started on Keytruda 7/18/23. Developed skin changes with Keytruda and later on diagnosed with vitiligo. Now on Taxotere + Xgeva- 10/24/23 due to progression.  Cycle 3 of Taxotere held 12/6/23 r/t bilateral PE  Bone mets   Xgeva q4w  She was seen by Rad/Onc for palliative RT but recommendations were to start chemotherapy and re eval after 2 cycles  Iron deficiency Anemia  Continue with 1 tab PO iron QOD with Vitamin C.   Currently scheduled for colonoscopy 6/2024   Hypothyroid   Followed by endocrinology Dr. Elena LYN  Now on Eliquis 5 mg twice daily  Vitiligo to bilateral fingertips  Followed by Dermatology                  Plan:   Labs stable.  Continue cycle 7 Taxotere as scheduled   Continue Eliquis 5 mg BID  Moisturize bilateral hands.  Continue follow up with Dermatology  Continue with the Xgeva q4w   Continue oral iron every other day  Norco 10 PRN  Scans due 4/16/2024. Ordered today.  RTC in 3 weeks with NP for FU/lab/treat  RTC in 6 weeks with MD for FU/lab/scan  review/treat   Cbc, cmp, mag, phos- 1 hr prior @ [facility]      The patient was seen, interviewed and examined. Pertinent lab and radiology studies were reviewed.   The patient was given ample opportunity to ask questions, and to the best of my abilities, all questions answered to satisfaction; patient demonstrated understanding of what we discussed and agreeable to the plan. Pt instructed to call should develop concerning signs/symptoms or have further questions.           Shelbi Johansen, DOUGIEP-C  Oncology/Hematology   [facility]

## 2024-03-11 ENCOUNTER — HOSPITAL ENCOUNTER (EMERGENCY)
Facility: HOSPITAL | Age: 64
Discharge: HOME | End: 2024-03-11
Attending: INTERNAL MEDICINE
Payer: MEDICARE

## 2024-03-11 VITALS
WEIGHT: 135 LBS | HEIGHT: 65 IN | RESPIRATION RATE: 18 BRPM | BODY MASS INDEX: 22.49 KG/M2 | HEART RATE: 122 BPM | DIASTOLIC BLOOD PRESSURE: 72 MMHG | OXYGEN SATURATION: 100 % | TEMPERATURE: 98 F | SYSTOLIC BLOOD PRESSURE: 116 MMHG

## 2024-03-11 DIAGNOSIS — R19.7 DIARRHEA, UNSPECIFIED TYPE: Primary | ICD-10-CM

## 2024-03-11 DIAGNOSIS — R00.0 TACHYCARDIA: ICD-10-CM

## 2024-03-11 DIAGNOSIS — A04.72 C. DIFFICILE DIARRHEA: ICD-10-CM

## 2024-03-11 LAB
ABS NEUT CALC (OHS): 0.38 X10(3)/MCL (ref 2.1–9.2)
ALBUMIN SERPL-MCNC: 2.5 G/DL (ref 3.4–4.8)
ALBUMIN/GLOB SERPL: 0.8 RATIO (ref 1.1–2)
ALP SERPL-CCNC: 47 UNIT/L (ref 40–150)
ALT SERPL-CCNC: 9 UNIT/L (ref 0–55)
AST SERPL-CCNC: 10 UNIT/L (ref 5–34)
BASOPHILS NFR BLD MANUAL: 0.03 X10(3)/MCL (ref 0–0.2)
BASOPHILS NFR BLD MANUAL: 3 % (ref 0–2)
BILIRUB SERPL-MCNC: 0.9 MG/DL
BUN SERPL-MCNC: 17 MG/DL (ref 9.8–20.1)
C DIFF TOX A+B STL QL IA: POSITIVE
CALCIUM SERPL-MCNC: 8.1 MG/DL (ref 8.4–10.2)
CHLORIDE SERPL-SCNC: 102 MMOL/L (ref 98–107)
CLOSTRIDIUM DIFFICILE GDH ANTIGEN (OHS): POSITIVE
CO2 SERPL-SCNC: 20 MMOL/L (ref 23–31)
CREAT SERPL-MCNC: 0.69 MG/DL (ref 0.55–1.02)
EOSINOPHIL NFR BLD MANUAL: 0.02 X10(3)/MCL (ref 0–0.9)
EOSINOPHIL NFR BLD MANUAL: 2 % (ref 0–8)
ERYTHROCYTE [DISTWIDTH] IN BLOOD BY AUTOMATED COUNT: 17.8 % (ref 11.5–17)
GFR SERPLBLD CREATININE-BSD FMLA CKD-EPI: >60 MLS/MIN/1.73/M2
GLOBULIN SER-MCNC: 3.3 GM/DL (ref 2.4–3.5)
GLUCOSE SERPL-MCNC: 92 MG/DL (ref 82–115)
HCT VFR BLD AUTO: 34.7 % (ref 37–47)
HGB BLD-MCNC: 11.1 G/DL (ref 12–16)
HYPOCHROMIA BLD QL SMEAR: ABNORMAL
LYMPHOCYTES NFR BLD MANUAL: 0.53 X10(3)/MCL
LYMPHOCYTES NFR BLD MANUAL: 52 % (ref 13–40)
MCH RBC QN AUTO: 25.4 PG (ref 27–31)
MCHC RBC AUTO-ENTMCNC: 32 G/DL (ref 33–36)
MCV RBC AUTO: 79.4 FL (ref 80–94)
MONOCYTES NFR BLD MANUAL: 0.05 X10(3)/MCL (ref 0.1–1.3)
MONOCYTES NFR BLD MANUAL: 5 % (ref 2–11)
NEUTROPHILS NFR BLD MANUAL: 38 % (ref 47–80)
OHS QRS DURATION: 64 MS
OHS QTC CALCULATION: 406 MS
PLATELET # BLD AUTO: 346 X10(3)/MCL (ref 130–400)
PLATELET # BLD EST: ADEQUATE 10*3/UL
PMV BLD AUTO: 9.8 FL (ref 7.4–10.4)
POTASSIUM SERPL-SCNC: 3.7 MMOL/L (ref 3.5–5.1)
PROT SERPL-MCNC: 5.8 GM/DL (ref 5.8–7.6)
RBC # BLD AUTO: 4.37 X10(6)/MCL (ref 4.2–5.4)
RBC MORPH BLD: NORMAL
SODIUM SERPL-SCNC: 133 MMOL/L (ref 136–145)
WBC # SPEC AUTO: 1.01 X10(3)/MCL (ref 4.5–11.5)

## 2024-03-11 PROCEDURE — 99284 EMERGENCY DEPT VISIT MOD MDM: CPT | Mod: 25

## 2024-03-11 PROCEDURE — 80053 COMPREHEN METABOLIC PANEL: CPT | Performed by: INTERNAL MEDICINE

## 2024-03-11 PROCEDURE — 93005 ELECTROCARDIOGRAM TRACING: CPT

## 2024-03-11 PROCEDURE — 96361 HYDRATE IV INFUSION ADD-ON: CPT

## 2024-03-11 PROCEDURE — 86318 IA INFECTIOUS AGENT ANTIBODY: CPT | Performed by: INTERNAL MEDICINE

## 2024-03-11 PROCEDURE — 96360 HYDRATION IV INFUSION INIT: CPT

## 2024-03-11 PROCEDURE — 85027 COMPLETE CBC AUTOMATED: CPT | Performed by: INTERNAL MEDICINE

## 2024-03-11 PROCEDURE — 63600175 PHARM REV CODE 636 W HCPCS: Performed by: INTERNAL MEDICINE

## 2024-03-11 RX ORDER — METRONIDAZOLE 500 MG/1
500 TABLET ORAL 3 TIMES DAILY
Qty: 21 TABLET | Refills: 0 | Status: SHIPPED | OUTPATIENT
Start: 2024-03-11 | End: 2024-03-21

## 2024-03-11 RX ADMIN — SODIUM CHLORIDE, POTASSIUM CHLORIDE, SODIUM LACTATE AND CALCIUM CHLORIDE 1000 ML: 600; 310; 30; 20 INJECTION, SOLUTION INTRAVENOUS at 12:03

## 2024-03-11 RX ADMIN — SODIUM CHLORIDE, POTASSIUM CHLORIDE, SODIUM LACTATE AND CALCIUM CHLORIDE 1000 ML: 600; 310; 30; 20 INJECTION, SOLUTION INTRAVENOUS at 10:03

## 2024-03-11 NOTE — ED PROVIDER NOTES
Encounter Date: 3/11/2024       History     Chief Complaint   Patient presents with    Diarrhea     C/o diarrhea x3-4 weeks. Denies pain.     HPI    Mansi Jin is 64 y.o. female who  has a past medical history of ASTHMA, COPD (chronic obstructive pulmonary disease), GERD (gastroesophageal reflux disease), High cholesterol, HLD (hyperlipidemia), Hypothyroidism, unspecified, Lung cancer (12/08/2022), Stage 2 chronic kidney disease, TIA (transient ischemic attack), Tobacco abuse, Unspecified osteoarthritis, unspecified site, and Uterine cancer (1989). arrives in ER with c/o Diarrhea (C/o diarrhea x3-4 weeks. Denies pain.)      Review of patient's allergies indicates:  No Known Allergies  Past Medical History:   Diagnosis Date    ASTHMA     COPD (chronic obstructive pulmonary disease)     SEVERE    GERD (gastroesophageal reflux disease)     High cholesterol     HLD (hyperlipidemia)     Hypothyroidism, unspecified     Lung cancer 12/08/2022    invasive adenocarcinoma, poorly differentiated with solid morphology, focal tumor involvement of visceral pleura and focal tumor vascular invasion (vein), with 2 of 3 posterior mediastinal lymph nodes positive for metastatic carcinoma    Stage 2 chronic kidney disease     TIA (transient ischemic attack)     no deficits    Tobacco abuse     Unspecified osteoarthritis, unspecified site     Uterine cancer 1989     Past Surgical History:   Procedure Laterality Date    INSERTION OF TUNNELED CENTRAL VENOUS CATHETER (CVC) WITH SUBCUTANEOUS PORT N/A 12/04/2023    Procedure: [id number];  Surgeon: Peter Dial MD;  Location: [facility];  Service: Peripheral Vascular;  Laterality: N/A;    lipoma multiple sites      LUNG LOBECTOMY Right 12/08/2022    Procedure: LOBECTOMY, LUNG;  Surgeon: Jass Browne IV, MD;  Location: [facility] OR;  Service: Cardiothoracic;  Laterality: Right;    SURGICAL REMOVAL OF LYMPH NODE  12/08/2022    Procedure: EXCISION, LYMPH NODE;  Surgeon:  Jass Browne IV, MD;  Location: [facility] OR;  Service: Cardiothoracic;;    THORACOTOMY Right 2022    Procedure: THORACOTOMY;  Surgeon: Jass Browne IV, MD;  Location: [facility] OR;  Service: Cardiothoracic;  Laterality: Right;  Right Lower Lobectomy with Lymph Node Dissection    TOTAL ABDOMINAL HYSTERECTOMY W/ BILATERAL SALPINGOOPHORECTOMY       Family History   Problem Relation Age of Onset    Cancer Maternal Grandfather     Heart failure Father     Prostate cancer Father     Hypertension Mother     Hepatitis Mother     Ovarian cancer Mother     Liver cancer Mother     Asthma Brother     Asthma Brother     Fibroids Sister     Skin cancer Sister      Social History     Tobacco Use    Smoking status: Former     Current packs/day: 0.00     Average packs/day: 0.5 packs/day for 50.0 years (25.0 ttl pk-yrs)     Types: Cigarettes     Start date: 1972     Quit date: 2022     Years since quittin.2    Smokeless tobacco: Never   Substance Use Topics    Alcohol use: Not Currently     Comment: quit 25years ago    Drug use: Never     Review of Systems   Constitutional:  Negative for fever.   HENT:  Negative for trouble swallowing and voice change.    Eyes:  Negative for visual disturbance.   Respiratory:  Negative for cough and shortness of breath.    Cardiovascular:  Negative for chest pain.   Gastrointestinal:  Positive for diarrhea. Negative for abdominal pain and vomiting.   Genitourinary:  Negative for dysuria and hematuria.   Musculoskeletal:  Negative for back pain and gait problem.   Skin:  Negative for color change and rash.   Neurological:  Negative for headaches.   Psychiatric/Behavioral:  Negative for behavioral problems and sleep disturbance.    All other systems reviewed and are negative.      Physical Exam     Initial Vitals [24 0959]   BP Pulse Resp Temp SpO2   93/64 (!) 148 20 98 °F (36.7 °C) 98 %      MAP       --         Physical Exam    Nursing note and vitals  reviewed.  Constitutional: She appears well-developed. No distress.   HENT:   Head: Atraumatic.   Eyes: EOM are normal.   Cardiovascular:  Normal rate and regular rhythm.           Pulmonary/Chest: Breath sounds normal. No respiratory distress.   Abdominal: Abdomen is soft. Bowel sounds are normal. She exhibits no distension. There is no abdominal tenderness. There is no rebound and no guarding.   Musculoskeletal:         General: Normal range of motion.     Neurological: She is alert and oriented to person, place, and time.   Skin: Skin is warm and dry.   Psychiatric: She has a normal mood and affect.         ED Course   Procedures    Orders Placed This Encounter   Procedures    Clostridium Diff Toxin, A & B, EIA    Comprehensive metabolic panel    CBC auto differential    CBC with Differential    Manual Differential    Special contact c diff isolation status    EKG 12-lead    Insert Saline lock IV     Medications   lactated ringers bolus 1,000 mL (0 mLs Intravenous Stopped 3/11/24 1210)   lactated ringers bolus 1,000 mL (0 mLs Intravenous Stopped 3/11/24 1319)     Admission on 03/11/2024, Discharged on 03/11/2024   Component Date Value Ref Range Status    QRS Duration 03/11/2024 64  ms Preliminary    OHS QTC Calculation 03/11/2024 406  ms Preliminary    Sodium Level 03/11/2024 133 (L)  136 - 145 mmol/L Final    Potassium Level 03/11/2024 3.7  3.5 - 5.1 mmol/L Final    Chloride 03/11/2024 102  98 - 107 mmol/L Final    Carbon Dioxide 03/11/2024 20 (L)  23 - 31 mmol/L Final    Glucose Level 03/11/2024 92  82 - 115 mg/dL Final    Blood Urea Nitrogen 03/11/2024 17.0  9.8 - 20.1 mg/dL Final    Creatinine 03/11/2024 0.69  0.55 - 1.02 mg/dL Final    Calcium Level Total 03/11/2024 8.1 (L)  8.4 - 10.2 mg/dL Final    Protein Total 03/11/2024 5.8  5.8 - 7.6 gm/dL Final    Albumin Level 03/11/2024 2.5 (L)  3.4 - 4.8 g/dL Final    Globulin 03/11/2024 3.3  2.4 - 3.5 gm/dL Final    Albumin/Globulin Ratio 03/11/2024 0.8 (L)  1.1 -  2.0 ratio Final    Bilirubin Total 03/11/2024 0.9  <=1.5 mg/dL Final    Alkaline Phosphatase 03/11/2024 47  40 - 150 unit/L Final    Alanine Aminotransferase 03/11/2024 9  0 - 55 unit/L Final    Aspartate Aminotransferase 03/11/2024 10  5 - 34 unit/L Final    eGFR 03/11/2024 >60  mls/min/1.73/m2 Final    Clostridium Difficile GDH Antigen 03/11/2024 Positive (A)  Negative Final    Clostridium Difficile Toxin A/B 03/11/2024 Positive (A)  Negative Final    WBC 03/11/2024 1.01 (LL)  4.50 - 11.50 x10(3)/mcL Final    RBC 03/11/2024 4.37  4.20 - 5.40 x10(6)/mcL Final    Hgb 03/11/2024 11.1 (L)  12.0 - 16.0 g/dL Final    Hct 03/11/2024 34.7 (L)  37.0 - 47.0 % Final    MCV 03/11/2024 79.4 (L)  80.0 - 94.0 fL Final    MCH 03/11/2024 25.4 (L)  27.0 - 31.0 pg Final    MCHC 03/11/2024 32.0 (L)  33.0 - 36.0 g/dL Final    RDW 03/11/2024 17.8 (H)  11.5 - 17.0 % Final    Platelet 03/11/2024 346  130 - 400 x10(3)/mcL Final    MPV 03/11/2024 9.8  7.4 - 10.4 fL Final    Neutrophils % 03/11/2024 38 (L)  47 - 80 % Final    Lymphs % 03/11/2024 52 (H)  13 - 40 % Final    Monocytes % 03/11/2024 5  2 - 11 % Final    Eosinophils % 03/11/2024 2  0 - 8 % Final    Basophils % 03/11/2024 3 (H)  0 - 2 % Final    Neutrophils Abs Calc 03/11/2024 0.3838 (L)  2.1 - 9.2 x10(3)/mcL Final    Basophils Abs 03/11/2024 0.0303  0 - 0.2 x10(3)/mcL Final    Lymphs Abs 03/11/2024 0.5252 (L)  0.6 - 4.6 x10(3)/mcL Final    Eosinophils Abs 03/11/2024 0.0202  0 - 0.9 x10(3)/mcL Final    Monocytes Abs 03/11/2024 0.0505 (L)  0.1 - 1.3 x10(3)/mcL Final    Platelets 03/11/2024 Adequate  Normal, Adequate Final    RBC Morph 03/11/2024 Normal  Normal Final    Hypochromasia 03/11/2024 2+ (A)  (none) Final       Labs Reviewed   CLOSTRIDIUM DIFFICILE TOXIN A AND B, EIA - Abnormal; Notable for the following components:       Result Value    Clostridium Difficile GDH Antigen Positive (*)     Clostridium Difficile Toxin A/B Positive (*)     All other components within normal  limits   COMPREHENSIVE METABOLIC PANEL - Abnormal; Notable for the following components:    Sodium Level 133 (*)     Carbon Dioxide 20 (*)     Calcium Level Total 8.1 (*)     Albumin Level 2.5 (*)     Albumin/Globulin Ratio 0.8 (*)     All other components within normal limits   CBC WITH DIFFERENTIAL - Abnormal; Notable for the following components:    WBC 1.01 (*)     Hgb 11.1 (*)     Hct 34.7 (*)     MCV 79.4 (*)     MCH 25.4 (*)     MCHC 32.0 (*)     RDW 17.8 (*)     All other components within normal limits   MANUAL DIFFERENTIAL - Abnormal; Notable for the following components:    Neutrophils % 38 (*)     Lymphs % 52 (*)     Basophils % 3 (*)     Neutrophils Abs Calc 0.3838 (*)     Lymphs Abs 0.5252 (*)     Monocytes Abs 0.0505 (*)     Hypochromasia 2+ (*)     All other components within normal limits   CBC W/ AUTO DIFFERENTIAL    Narrative:     The following orders were created for panel order CBC auto differential.  Procedure                               Abnormality         Status                     ---------                               -----------         ------                     CBC with Differential[[id number]]       Abnormal            Final result               Manual Differential[[id number]]         Abnormal            Final result                 Please view results for these tests on the individual orders.        ECG Results              EKG 12-lead (Preliminary result)        Collection Time Result Time QRS Duration OHS QTC Calculation    03/11/24 10:11:00 03/11/24 14:54:48 64 406                     Wet Read by Cleo Kumar MD (03/11/24 14:55:51, [facility] - Emergency Dept, Emergency Medicine)    EKG: Independently reviewed and / or Interpreted by Cleo Kumar MD. independently as Normal Sinus Rhythm, Rate 138, Sinus Tachycardia, Normal Axis, No STEMI, left atrial enlargement.                        In process by Interface, Lab In [facility] (03/11/24 11:17:24)                    Narrative:    Test Reason : R00.0,    Vent. Rate : 138 BPM     Atrial Rate : 138 BPM     P-R Int : 136 ms          QRS Dur : 064 ms      QT Int : 268 ms       P-R-T Axes : 069 064 062 degrees     QTc Int : 406 ms    Sinus tachycardia  Possible Left atrial enlargement  Borderline Abnormal ECG  When compared with ECG of 12-FEB-2024 08:57,  No significant change was found    Referred By: [email or web address]   SELF           Confirmed By:                                   Imaging Results    None          Medications   lactated ringers bolus 1,000 mL (0 mLs Intravenous Stopped 3/11/24 1210)   lactated ringers bolus 1,000 mL (0 mLs Intravenous Stopped 3/11/24 1319)     Medical Decision Making    Mansi Jin is 64 y.o. female who  has a past medical history of ASTHMA, COPD (chronic obstructive pulmonary disease), GERD (gastroesophageal reflux disease), High cholesterol, HLD (hyperlipidemia), Hypothyroidism, unspecified, Lung cancer (12/08/2022), Stage 2 chronic kidney disease, TIA (transient ischemic attack), Tobacco abuse, Unspecified osteoarthritis, unspecified site, and Uterine cancer (1989). arrives in ER with c/o Diarrhea (C/o diarrhea x3-4 weeks. Denies pain.)    Patient is on chemotherapy and has been having diarrhea for 3-4 weeks.  According to daughter she has lung cancer, she is only drinking milk, shakes, at ice cream, does not like to eat food, when asked him if they have checked her stool, they told me that they did check the stool, they gave her some medicine which from the stool for a couple of days but then she went back to liquid stool again.    I am going to check the stool for C diff, WBCs, occult blood, which was already positive to begin with, and GI PCR I will give her Ringer's lactate 1 L bolus in the emergency room and will decide further.    Amount and/or Complexity of Data Reviewed  Labs: ordered. Decision-making details documented in ED Course.    Risk  Prescription drug management.                ED Course as of 03/11/24 1501   Mon Mar 11, 2024   1216 WBC(!!): 1.01 [GQ]   1217 CO2(!): 20 [GQ]   1217 BUN: 17.0 [GQ]   1217 Creatinine: 0.69 [GQ]   1217 Potassium: 3.7 [GQ]   1217 Hemoglobin(!): 11.1 [GQ]   1217 Hematocrit(!): 34.7  Patient's white count is 1.01 but she is on chemotherapy, her heart rate is in 110s and 120s, patient's daughter reports that they feel that her tachycardia is because of the chemotherapy, they did go to the cardiologist for tachycardia and cardiologist has cleared and she usually runs heart rate of 110s and 120s, so I am going to give her 1 more L of normal saline bolus, she says she can not give me a stool sample at this time, I have advised him to try it if she can give me a stool sample I can run the test in the emergency room otherwise she can collect a stool sample and bring it to the family doctor or the oncologist to run the test on the stool to determine what type of diarrhea she is having. [GQ]   1330 Patient has not been able to give a stool sample in the emergency room, I will let her go home with instruction to collect a stool sample and bring her to her family doctor or Oncology to run the test on it to see what type of diarrhea she is having.  She has not dehydrated, she does not have any major electrolyte abnormality, and as stated she has leukopenia due to chemotherapy I will keep her out of the hospital let her go home.  And to see her family doctor for follow-up [GQ]   1333 Will also advise her to stop taking Chronulac, docusate sodium and MiraLax if she is still taking it. [GQ]   1455 Patient gave stool sample after she was discharged, decided to send it to get checked, her C diff is positive, I am going to call in Flagyl for her and will advise her to talk to her family doctor for follow-up. [GQ]      ED Course User Index  [GQ] Cleo Kumar MD                           Clinical Impression:  Final diagnoses:  [R00.0] Tachycardia  [R19.7] Diarrhea,  unspecified type (Primary)  [A04.72] C. difficile diarrhea          ED Disposition Condition    Discharge Stable          ED Prescriptions       Medication Sig Dispense Start Date End Date Auth. Provider    metroNIDAZOLE (FLAGYL) 500 MG tablet Take 1 tablet (500 mg total) by mouth 3 (three) times daily. for 10 days 21 tablet 3/11/2024 3/21/2024 Cleo Kumar MD          Follow-up Information       Follow up With Specialties Details Why Contact Info    Jennifer Dacosta, P [facility] In 1 day  [address] [facility] [id number] [phone]               Cleo Kumar MD  03/11/24 3020       Cleo Kumar MD  03/11/24 6981

## 2024-03-18 ENCOUNTER — OFFICE VISIT (OUTPATIENT)
Dept: HEMATOLOGY/ONCOLOGY | Facility: CLINIC | Age: 64
End: 2024-03-18
Payer: MEDICARE

## 2024-03-18 ENCOUNTER — INFUSION (OUTPATIENT)
Dept: INFUSION THERAPY | Facility: HOSPITAL | Age: 64
End: 2024-03-18
Attending: NURSE PRACTITIONER
Payer: MEDICARE

## 2024-03-18 VITALS
HEIGHT: 67 IN | WEIGHT: 135 LBS | HEART RATE: 109 BPM | OXYGEN SATURATION: 96 % | RESPIRATION RATE: 16 BRPM | DIASTOLIC BLOOD PRESSURE: 55 MMHG | BODY MASS INDEX: 21.19 KG/M2 | SYSTOLIC BLOOD PRESSURE: 92 MMHG | TEMPERATURE: 99 F

## 2024-03-18 DIAGNOSIS — I26.99 PULMONARY EMBOLISM, UNSPECIFIED CHRONICITY, UNSPECIFIED PULMONARY EMBOLISM TYPE, UNSPECIFIED WHETHER ACUTE COR PULMONALE PRESENT: ICD-10-CM

## 2024-03-18 DIAGNOSIS — Z79.899 ON ANTINEOPLASTIC CHEMOTHERAPY: ICD-10-CM

## 2024-03-18 DIAGNOSIS — A04.72 C. DIFFICILE DIARRHEA: ICD-10-CM

## 2024-03-18 DIAGNOSIS — G89.3 CANCER RELATED PAIN: ICD-10-CM

## 2024-03-18 DIAGNOSIS — C34.91 NSCLC OF RIGHT LUNG: Primary | ICD-10-CM

## 2024-03-18 DIAGNOSIS — Z79.899 IMMUNODEFICIENCY DUE TO CHEMOTHERAPY: ICD-10-CM

## 2024-03-18 DIAGNOSIS — Z79.01 ON CONTINUOUS ORAL ANTICOAGULATION: ICD-10-CM

## 2024-03-18 DIAGNOSIS — D50.9 IRON DEFICIENCY ANEMIA, UNSPECIFIED IRON DEFICIENCY ANEMIA TYPE: ICD-10-CM

## 2024-03-18 DIAGNOSIS — E87.6 HYPOKALEMIA: ICD-10-CM

## 2024-03-18 DIAGNOSIS — D84.821 IMMUNODEFICIENCY DUE TO CHEMOTHERAPY: ICD-10-CM

## 2024-03-18 DIAGNOSIS — T45.1X5A IMMUNODEFICIENCY DUE TO CHEMOTHERAPY: ICD-10-CM

## 2024-03-18 DIAGNOSIS — C79.51 METASTASIS TO BONE: ICD-10-CM

## 2024-03-18 PROCEDURE — 1159F MED LIST DOCD IN RCRD: CPT | Mod: CPTII,S$GLB,,

## 2024-03-18 PROCEDURE — 63600175 PHARM REV CODE 636 W HCPCS

## 2024-03-18 PROCEDURE — 99215 OFFICE O/P EST HI 40 MIN: CPT | Mod: S$GLB,,,

## 2024-03-18 PROCEDURE — 3288F FALL RISK ASSESSMENT DOCD: CPT | Mod: CPTII,S$GLB,,

## 2024-03-18 PROCEDURE — A4216 STERILE WATER/SALINE, 10 ML: HCPCS

## 2024-03-18 PROCEDURE — 25000003 PHARM REV CODE 250

## 2024-03-18 PROCEDURE — 1101F PT FALLS ASSESS-DOCD LE1/YR: CPT | Mod: CPTII,S$GLB,,

## 2024-03-18 PROCEDURE — 1126F AMNT PAIN NOTED NONE PRSNT: CPT | Mod: CPTII,S$GLB,,

## 2024-03-18 PROCEDURE — 99999 PR PBB SHADOW E&M-EST. PATIENT-LVL IV: CPT | Mod: PBBFAC,,,

## 2024-03-18 PROCEDURE — 1160F RVW MEDS BY RX/DR IN RCRD: CPT | Mod: CPTII,S$GLB,,

## 2024-03-18 PROCEDURE — 96365 THER/PROPH/DIAG IV INF INIT: CPT

## 2024-03-18 PROCEDURE — 96366 THER/PROPH/DIAG IV INF ADDON: CPT

## 2024-03-18 RX ORDER — HEPARIN 100 UNIT/ML
500 SYRINGE INTRAVENOUS
Status: DISCONTINUED | OUTPATIENT
Start: 2024-03-18 | End: 2024-03-18 | Stop reason: HOSPADM

## 2024-03-18 RX ORDER — SODIUM CHLORIDE 0.9 % (FLUSH) 0.9 %
10 SYRINGE (ML) INJECTION
Status: CANCELLED | OUTPATIENT
Start: 2024-03-18

## 2024-03-18 RX ORDER — SODIUM CHLORIDE 0.9 % (FLUSH) 0.9 %
10 SYRINGE (ML) INJECTION
Status: DISCONTINUED | OUTPATIENT
Start: 2024-03-18 | End: 2024-03-18 | Stop reason: HOSPADM

## 2024-03-18 RX ORDER — HEPARIN 100 UNIT/ML
500 SYRINGE INTRAVENOUS
Status: CANCELLED | OUTPATIENT
Start: 2024-03-18

## 2024-03-18 RX ADMIN — Medication 500 UNITS: at 04:03

## 2024-03-18 RX ADMIN — Medication 10 ML: at 04:03

## 2024-03-18 RX ADMIN — POTASSIUM CHLORIDE 250 ML/HR: 2 INJECTION, SOLUTION, CONCENTRATE INTRAVENOUS at 12:03

## 2024-03-18 NOTE — PROGRESS NOTES
HEMATOLOGY/ONCOLOGY OFFICE CLINIC VISIT  [facility]    ONCOLOGICAL HISTORY:     Diagnosis:  - Stage IV NSCLC with bone mets.  - NSCLC / Adenocarcinoma Stage IIIA pT2a pN2--dx 12/2022  - PD-L1 1%  - History Uterine cancer - Dx 1989    Current Treatment:   Taxotere + Xgeva- 10/24/23-->  Held cycle 3 12/6/23 r/t bilateral PE, started on Eliquis      Treatment History:  - 1989 s/p total hysterectomy   - 12/08/2022: Right lower and middle bilobectomy with mediastinal lymph node dissection.   - Carboplatin + Pemetrexed  3/15/2023-5/1/2023  - Palliative RT   - Carboplatin + Pemetrexed started 3/15/2023  - Keytruda stopped 9/19/23 r/t elevated TSH and PET/CT progression, last dose given 8/29/23  - Xgeva- 7/18/2023-->    Plan of care: palliative systemic therapy      IMAGING:   - 08/10/2022 PET: The elongated subpleural right lower lobe nodule demonstrates fairly intense FDG activity.  Infectious, inflammatory and neoplastic etiologies remain possible. No suspicious PET findings elsewhere.  - 12/08/2022 CT HEAD without contrast: No acute intracranial abnormality identified.  Findings of mild microvascular ischemic disease.  - 12/2022: U/S Upper extremity: Thrombophlebitis of the left cephalic vein. No deep venous thrombosis in the left upper extremity.  - 01/03/2023 MRI Brain : GARY   - 01/12/2023 NM PET CT 1. Postsurgical changes of recent right lower and middle lobectomies.  There is a small right pleural effusion.  There is nonspecific peripheral uptake along the pleura at the right lung base and perihilar region which may be related to healing response in the setting of recent surgery.  Similarly borderline mediastinal uptake is nonspecific in the recent postoperative setting and could be reactive.  Continued attention recommended on follow-up. 2. Focal Uptake at the medial right acetabular roof has a max SUV of 6.4. This is new compared to previous exam. Metastasis is a consideration  -PET 6/5/2023: Previously  visualized moderately FDG avid opacities towards the right lung base improved.  Pleural effusion also improved.  No new suspicious PET findings in the lungs.  Severe emphysema. Some left adrenal thickening is similar to prior but there is now moderate associated FDG activity, max SUV is 3.6. FDG activity is again seen associated with the right superior acetabulum.  FDG avid area appears slightly larger today with slightly increased sclerosis.  SUV is 9.7, previously 6.4.  There is a newly evident subcentimeter FDG avid lesion left pedicle T10. Additional subcentimeter FDG avid lesion T4 vertebral body SUV 3.4.suspicious for metastatic disease.  -MRI T spine 6/15/2023:   1. Foci of abnormal signal intensity and enhancement at the inferior endplate of T3 and at body and left pedicle of T10.  Metastatic neoplastic etiology must be considered.  2. Thoracic spondylosis  3. Mild encroachment into the right neural foramina at the T3-4 and T4-5 secondary to mild posterior disc bulge and posterior osteophyte formation  4. Multilevel mild posterior disc bulge which does not result in significant central spinal stenosis.  9/27/23 Thyroid U/S:  Very minimal to no significant uptake of radiotracer isotope within the thyroid lobes bilaterally.  Clinical correlation is indicated  CT CAP 9/27/23:  1. Interim increasing nodular soft tissue densities/nodes/mass is at the mediastinum and right hilum measuring up the 3.0 cm.  A neoplastic etiology must be considered  2. Interim development of a 2.5 cm low-attenuation mass at the left adrenal gland.  A metastatic etiology must be considered  3. Interim development of a sclerotic foci at T3 and T10.  A metastatic etiology must be considered  4. Small right pleural effusion with associated infiltrate and atelectasis at the right lung base suspect  5. Suspect small cyst at the left kidney as described  6. Mild ill-defined soft tissue fullness at the cervical vaginal region  7. Borderline  cardiomegaly  10/6/23 PET/CT:  1. Disease progression with new FDG avid metastatic lymph nodes in the chest and right hilum.  2. Bilateral axillary and inguinal lymph nodes with low level uptake are indeterminate but concerning for metastasis given the additional findings.  3. Worsening and new FDG avid osseous disease involving both the axial and appendicular skeleton.  4. Enlarging left adrenal metastasis.  12/6/23 CTA:  1. Soft tissue masslike density again evident at the right hilum causing mass effect on the right main pulmonary artery  2. Interim development of filling defects/pulmonary emboli at the diminutive posterior right lower lobe pulmonary arteries.  A few small defects/pulmonary emboli have developed at the pulmonary arteries at the posterior left lower lobe since the prior exam.  3. Postsurgical changes right hilum with volume loss at the right lung and elevation of the right hemidiaphragm with associated posterior right basilar pleural reaction, infiltrate, and atelectasis  4. Advanced emphysematous changes with the right side greater than the left  5. Thoracic spondylosis with the sclerotic foci at the T3 and T10  6. The cancer center was notified of the findings on 12/06/2023 at 10:00  1/16/24 CT CAP:  1. Nodular opacities again identified at the right hilum and mediastinum which have a similar appearance to the prior exam  2. Low-attenuation mass again evident at the left adrenal gland  3. Sclerotic lytic lesions evident at T3, T5, T10, and L5.  A metastatic neoplastic etiology must be considered  4. Advanced emphysematous changes with resolution of small right pleural effusion.  There is patchy infiltrate, atelectasis and or scarring at the posterior right lung base.  5. Findings consistent with cysts at the left kidney  6. Ill-defined soft tissue fullness again evident at the cervical vaginal region  7. Findings of mild constipation  1/16/24 NM Bone Scan:  1. Scattered focal activity at the  thoracic spine, lumbar spine, right hip/trochanteric region, left iliac bone, and left sacrum.  A metastatic neoplastic etiology must be considered  2. Bar like CT intense activity at T10 suggesting acute compression fracture  3. Mild asymmetric prominence of the right upper renal collecting system compared to the left    PATHOLOGY:  - 12/08/2022: Invasive solid adenocarcinoma (3.1 CM); G3, poorly differentiated. Visceral Pleura Invasion present without definite serosal surface involvement. Lymphovascular invasion (Vein) present. All margins negative for invasive carcinoma, distance 2.5 cm. LN - 4/14 (10R: Hilar, Posterior mediastinal (RACHEAL), 4R Lower paratracheal, 9R Pulmonary ligament, 10R). pT2a pN2  - 02/22/2023: RIGHT ACETABULAR LESION, CT - GUIDE CORE NEEDLE BIOPSY : FIBRINOPURULENT EXUDATE ADMIXED WITH ATYPICAL EPITHELIOID GROUPS,   NORMAL    MARROW ELEMENTS AND ABUNDANT BLOOD CLOT      Subjective:     HPI  Mansi Jin  64 y.o.  female with past medical history significant for HTN, HLD, uterine cancer status post total hysterectomy in 1989,  CKD Stage 2, COPD Stage 3, referred for management of NSCLC s/p RLL / RML lobectomy with Mediastinal Lymph Node Dissection on 12/8/2022.     She was noted to have RLL lung mass and underwent CT guided biopsy which was non diagnostic. PET scan demonstrated significant avidity within the lesion. Given high risk for malignancy, she underwent right lower lobectomy and right middle lobectomy on 12/08/2022. Pathology came back with invasive adenocarcinoma, poorly differentiated with solid morphology, focal tumor involvement of visceral pleura and focal tumor vascular invasion (vein), with mediastinal lymph nodes positive for metastatic carcinoma.      PET on 01/12/2023 showed focal uptake at the medial right acetabular roof but the biopsy was negative for malignancy. Discussed with radiology, they recommended adjuvant chemotherapy and then they will reassess later for  radiation therapy.     Chief Complaint: NSCLC (Pt reports no new complaints)      Interval History:   She returns to the clinic today for a follow-up. She was diagnosed with Cdiff one week after recieveing C 7 taxotere. She completed her Flagyl today. Diarrhea has improved, now only having four small bowel movements a day. She does feel week and tired. Appetite has now returned. She was able to eat something today. She is scheduled to see her PCP 3/21. Pain is stable with Nocro 10 as needed.  She continues taking her Eliquis 5 mg BID for bilateral PE. She is taking her oral iron every other day and tolerating well. Now seeing dermatology for vitiligo to bilateral fingertips.      Past Medical History:   Diagnosis Date    ASTHMA     COPD (chronic obstructive pulmonary disease)     SEVERE    GERD (gastroesophageal reflux disease)     High cholesterol     HLD (hyperlipidemia)     Hypothyroidism, unspecified     Lung cancer 12/08/2022    invasive adenocarcinoma, poorly differentiated with solid morphology, focal tumor involvement of visceral pleura and focal tumor vascular invasion (vein), with 2 of 3 posterior mediastinal lymph nodes positive for metastatic carcinoma    Stage 2 chronic kidney disease     TIA (transient ischemic attack)     no deficits    Tobacco abuse     Unspecified osteoarthritis, unspecified site     Uterine cancer 1989      Past Surgical History:   Procedure Laterality Date    INSERTION OF TUNNELED CENTRAL VENOUS CATHETER (CVC) WITH SUBCUTANEOUS PORT N/A 12/04/2023    Procedure: [id number];  Surgeon: Peter Dial MD;  Location: [facility] OR;  Service: Peripheral Vascular;  Laterality: N/A;    lipoma multiple sites      LUNG LOBECTOMY Right 12/08/2022    Procedure: LOBECTOMY, LUNG;  Surgeon: Jass Browne IV, MD;  Location: [facility] OR;  Service: Cardiothoracic;  Laterality: Right;    SURGICAL REMOVAL OF LYMPH NODE  12/08/2022    Procedure: EXCISION, LYMPH NODE;  Surgeon: Jass POON  Pavan STEVENSON MD;  Location: [facility] OR;  Service: Cardiothoracic;;    THORACOTOMY Right 2022    Procedure: THORACOTOMY;  Surgeon: Jass Browne IV, MD;  Location: [facility] OR;  Service: Cardiothoracic;  Laterality: Right;  Right Lower Lobectomy with Lymph Node Dissection    TOTAL ABDOMINAL HYSTERECTOMY W/ BILATERAL SALPINGOOPHORECTOMY       Social History     Socioeconomic History    Marital status: Single    Number of children: 5   Tobacco Use    Smoking status: Former     Current packs/day: 0.00     Average packs/day: 0.5 packs/day for 50.0 years (25.0 ttl pk-yrs)     Types: Cigarettes     Start date: 1972     Quit date: 2022     Years since quittin.2    Smokeless tobacco: Never   Substance and Sexual Activity    Alcohol use: Not Currently     Comment: quit 25years ago    Drug use: Never    Sexual activity: Yes     Partners: Male     Birth control/protection: See Surgical Hx     Social Determinants of Health     Financial Resource Strain: Low Risk  (2023)    Overall Financial Resource Strain (CARDIA)     Difficulty of Paying Living Expenses: Not very hard   Food Insecurity: No Food Insecurity (2023)    Hunger Vital Sign     Worried About Running Out of Food in the Last Year: Never true     Ran Out of Food in the Last Year: Never true   Transportation Needs: No Transportation Needs (2023)    PRAPARE - Transportation     Lack of Transportation (Medical): No     Lack of Transportation (Non-Medical): No   Physical Activity: Inactive (2023)    Exercise Vital Sign     Days of Exercise per Week: 0 days     Minutes of Exercise per Session: 0 min   Stress: No Stress Concern Present (2023)    [employer] - Occupational Stress Questionnaire     Feeling of Stress : Only a little   Social Connections: Socially Integrated (2023)    Social Connection and Isolation Panel [NHANES]     Frequency of Communication with Friends and Family: More than three  times a week     Frequency of Social Gatherings with Friends and Family: Three times a week     Attends [employer] Services: More than 4 times per year     Active Member of Clubs or Organizations: Yes     Attends Club or Organization Meetings: 1 to 4 times per year     Marital Status: Living with partner   Housing Stability: Low Risk  (9/22/2023)    Housing Stability Vital Sign     Unable to Pay for Housing in the Last Year: No     Number of Places Lived in the Last Year: 1     Unstable Housing in the Last Year: No      Family History   Problem Relation Age of Onset    Cancer Maternal Grandfather     Heart failure Father     Prostate cancer Father     Hypertension Mother     Hepatitis Mother     Ovarian cancer Mother     Liver cancer Mother     Asthma Brother     Asthma Brother     Fibroids Sister     Skin cancer Sister       Review of patient's allergies indicates:  No Known Allergies     Review of Systems   Constitutional:  Positive for fatigue. Negative for activity change, appetite change, chills, fever and unexpected weight change.   HENT:  Negative for mouth dryness, mouth sores, nosebleeds, sinus pressure/congestion, sore throat and trouble swallowing.    Eyes: Negative.  Negative for visual disturbance.   Respiratory:  Positive for cough and shortness of breath (on exertion, chronic).    Cardiovascular:  Negative for chest pain, palpitations and leg swelling.   Gastrointestinal:  Negative for abdominal distention, abdominal pain, blood in stool, change in bowel habit, constipation, diarrhea, nausea and vomiting.   Endocrine: Negative.    Genitourinary: Negative.  Negative for dysuria, frequency, hematuria and urgency.   Musculoskeletal:  Negative for arthralgias, back pain, leg pain, myalgias and neck pain.   Integumentary:  Negative for rash, breast mass, breast discharge and breast tenderness. Negative.   Allergic/Immunologic: Negative.    Neurological:  Negative for dizziness, tremors, syncope, speech  difficulty, weakness, light-headedness, numbness, headaches and memory loss.   Hematological: Negative.  Does not bruise/bleed easily.   Psychiatric/Behavioral: Negative.  Negative for confusion and suicidal ideas.    Breast: Negative for mass and tenderness        Objective:        There were no vitals filed for this visit.      Wt Readings from Last 6 Encounters:   03/11/24 61.2 kg (135 lb)   03/06/24 62.4 kg (137 lb 9.6 oz)   03/06/24 62.4 kg (137 lb 9.6 oz)   02/23/24 62.1 kg (137 lb)   02/14/24 65 kg (143 lb 3.2 oz)   02/12/24 65.8 kg (145 lb)     There is no height or weight on file to calculate BMI.  There is no height or weight on file to calculate BSA.       Physical Exam  Vitals and nursing note reviewed.   Constitutional:       Appearance: Normal appearance.   HENT:      Head: Normocephalic and atraumatic.      Nose: No congestion.   Eyes:      General: No scleral icterus.     Extraocular Movements: Extraocular movements intact.      Conjunctiva/sclera: Conjunctivae normal.   Neck:      Vascular: No JVD.   Cardiovascular:      Rate and Rhythm: Normal rate and regular rhythm.      Heart sounds: No murmur heard.  Pulmonary:      Effort: Pulmonary effort is normal.      Breath sounds: Decreased breath sounds present. No wheezing or rhonchi.   Abdominal:      General: Bowel sounds are normal. There is no distension.      Palpations: Abdomen is soft.      Tenderness: There is no abdominal tenderness.   Musculoskeletal:      Cervical back: Neck supple.   Lymphadenopathy:      Head:      Right side of head: No submental or submandibular adenopathy.      Left side of head: No submental or submandibular adenopathy.      Cervical: No cervical adenopathy.      Upper Body:      Right upper body: No supraclavicular or axillary adenopathy.      Left upper body: No supraclavicular or axillary adenopathy.      Lower Body: No right inguinal adenopathy. No left inguinal adenopathy.      Comments: No adenopathy noted  bilaterally   Skin:     General: Skin is warm.      Coloration: Skin is not jaundiced.      Findings: No lesion or rash.      Comments: Change of skin color to bilateral fingertips   Neurological:      General: No focal deficit present.      Mental Status: She is alert and oriented to person, place, and time.      Cranial Nerves: Cranial nerves 2-12 are intact.      Gait: Gait normal.   Psychiatric:         Attention and Perception: Attention normal.         Speech: Speech normal.         Behavior: Behavior is cooperative.         Cognition and Memory: Cognition normal.         Judgment: Judgment normal.       ECOG SCORE    2 - Capable of all selfcare but unable to carry out any work activities, active > 50% of hours           LABS      Lab Visit on 03/18/2024   Component Date Value    Sodium Level 03/18/2024 138     Potassium Level 03/18/2024 2.3 (LL)     Chloride 03/18/2024 99     Carbon Dioxide 03/18/2024 32 (H)     Glucose Level 03/18/2024 100     Blood Urea Nitrogen 03/18/2024 6.0 (L)     Creatinine 03/18/2024 0.80     Calcium Level Total 03/18/2024 7.5 (L)     Protein Total 03/18/2024 5.4 (L)     Albumin Level 03/18/2024 2.4 (L)     Globulin 03/18/2024 3.0     Albumin/Globulin Ratio 03/18/2024 0.8 (L)     Bilirubin Total 03/18/2024 0.2     Alkaline Phosphatase 03/18/2024 51     Alanine Aminotransferase 03/18/2024 11     Aspartate Aminotransfera* 03/18/2024 25     eGFR 03/18/2024 >60     Magnesium Level 03/18/2024 1.90     Phosphorus Level 03/18/2024 1.8 (L)     WBC 03/18/2024 8.55     RBC 03/18/2024 3.99 (L)     Hgb 03/18/2024 10.1 (L)     Hct 03/18/2024 32.8 (L)     MCV 03/18/2024 82.2     MCH 03/18/2024 25.3 (L)     MCHC 03/18/2024 30.8 (L)     RDW 03/18/2024 19.0 (H)     Platelet 03/18/2024 457 (H)     MPV 03/18/2024 8.9     Neut % 03/18/2024 78.0     Lymph % 03/18/2024 11.1     Mono % 03/18/2024 6.0     Eos % 03/18/2024 0.1     Basophil % 03/18/2024 0.2     Lymph # 03/18/2024 0.95     Neut # 03/18/2024  6.67     Mono # 03/18/2024 0.51     Eos # 03/18/2024 0.01     Baso # 03/18/2024 0.02     IG# 03/18/2024 0.39 (H)     IG% 03/18/2024 4.6          Assessment:       1. NSCLC of right lung    2. C. difficile diarrhea    3. Metastasis to bone    4. Pulmonary embolism, unspecified chronicity, unspecified pulmonary embolism type, unspecified whether acute cor pulmonale present    5. Cancer related pain    6. On antineoplastic chemotherapy    7. Immunodeficiency due to chemotherapy    8. Iron deficiency anemia, unspecified iron deficiency anemia type    9. On continuous oral anticoagulation                Stage IV NSCLC with bone mets.    Originally: NSCLC / Adenocarcinoma Stage IIIA pT2a pN2  - 12/08/2022: Right lower and middle bilobectomy with mediastinal lymph node dissection. Path: Invasive adenocarcinoma, poorly differentiated, focal tumor involvement of visceral pleura and focal tumor vascular invasion (vein), with mediastinal lymph nodes positive for metastatic carcinoma   - TMB - 5, MSI-S, MET amplification, ROS1.  No mutations in EGFR, BRAF, ALK, ERBB2, KRAS, RET,    - 1% tumor cells are positive for PD-L1   - patient evaluated by Radiation Oncology with recommendation to treat with chemotherapy 1st and will re-evaluate the patient by the end of chemotherapy for radiation therapy  - 01/12/2023 PET: Focal uptake at the medial right acetabular roof. Metastasis is a consideration  - 02/22/2023: RIGHT ACETABULAR LESION, CT - GUIDE CORE NEEDLE BIOPSY : FIBRINOPURULENT EXUDATE ADMIXED WITH ATYPICAL EPITHELIOID GROUPS,   NORMAL    MARROW ELEMENTS AND ABUNDANT BLOOD CLOT  PET scan showed a suspicious finding in the right acetabular roof and patient then had a CT-guided biopsy no evidence of malignancy. Started with adjuvant chemotherapy with Carboplatin and pemetrexed x 4 cycles (3/15/23-5/18/23)   Patient has been seen by Rad/Onc (Dr Mireles) since the completion of her chemotherapy and plan was to pursue radiation x  5-6 weeks.  She discussed with the patient that they have showed no overall survival benefit in the setting of postoperative radiation therapy but data supports a larger benefit inpatient with extracapsular extension.  If she tolerated chemotherapy well and continue with good performance status then plan was for PORT   Restaging PET-CT after completion of chemotherapy and prior to radiation therapy showed progression at the right acetabulum.  Patient with progression of disease now with bone metastases.  She bagan palliative radiation treatment with Dr. Mireles on 6/28/23, 10 treatments over two weeks.  PET CT 6/5/23 with progression Right superior acetabulum.  FDG avid area appears slightly larger with slightly increased sclerosis.  SUV is 9.7, previously 6.4.  She had palliative radiation. Started on Keytruda 7/18/23. Developed skin changes with Keytruda and later on diagnosed with vitiligo. Now on Taxotere + Xgeva- 10/24/23 due to progression.  Cycle 3 of Taxotere held 12/6/23 r/t bilateral PE  Bone mets   Xgeva q4w  She was seen by Rad/Onc for palliative RT but recommendations were to start chemotherapy and re eval after 2 cycles  Iron deficiency Anemia  Continue with 1 tab PO iron QOD with Vitamin C.   Currently scheduled for colonoscopy 6/2024   Hypothyroid   Followed by endocrinology Dr. Elena LYN  Now on Eliquis 5 mg twice daily  Vitiligo to bilateral fingertips  Followed by Dermatology                  Plan:   IV potassium today in 1L NS.  Will continue to follow closely.   Continue Eliquis 5 mg BID  Moisturize bilateral hands.  Continue follow up with Dermatology  Continue with the Xgeva q4w   Continue oral iron every other day  Norco 10 PRN  Scans due 4/16/2024. Scheduled for 4/15/2024.  RTC in one week with NP for FU/lab/possible treat   Cbc, cmp, mag, phos- 1 hr prior @ [facility]      The patient was seen, interviewed and examined. Pertinent lab and radiology studies were reviewed.   The patient was given  ample opportunity to ask questions, and to the best of my abilities, all questions answered to satisfaction; patient demonstrated understanding of what we discussed and agreeable to the plan. Pt instructed to call should develop concerning signs/symptoms or have further questions.           Shelbi Johansen, DOUGIEP-C  Oncology/Hematology   [facility]

## 2024-03-20 ENCOUNTER — INFUSION (OUTPATIENT)
Dept: INFUSION THERAPY | Facility: HOSPITAL | Age: 64
End: 2024-03-20
Attending: NURSE PRACTITIONER
Payer: MEDICARE

## 2024-03-20 VITALS
SYSTOLIC BLOOD PRESSURE: 111 MMHG | OXYGEN SATURATION: 99 % | WEIGHT: 136.69 LBS | RESPIRATION RATE: 18 BRPM | DIASTOLIC BLOOD PRESSURE: 69 MMHG | HEART RATE: 110 BPM | HEIGHT: 67 IN | BODY MASS INDEX: 21.45 KG/M2 | TEMPERATURE: 99 F

## 2024-03-20 DIAGNOSIS — C34.91 NSCLC OF RIGHT LUNG: Primary | ICD-10-CM

## 2024-03-20 PROCEDURE — 96365 THER/PROPH/DIAG IV INF INIT: CPT

## 2024-03-20 PROCEDURE — 96366 THER/PROPH/DIAG IV INF ADDON: CPT

## 2024-03-20 PROCEDURE — 25000003 PHARM REV CODE 250

## 2024-03-20 PROCEDURE — 63600175 PHARM REV CODE 636 W HCPCS

## 2024-03-20 RX ORDER — SODIUM CHLORIDE 0.9 % (FLUSH) 0.9 %
10 SYRINGE (ML) INJECTION
Status: CANCELLED | OUTPATIENT
Start: 2024-03-20

## 2024-03-20 RX ORDER — HEPARIN 100 UNIT/ML
500 SYRINGE INTRAVENOUS
Status: CANCELLED | OUTPATIENT
Start: 2024-03-20

## 2024-03-20 RX ADMIN — POTASSIUM CHLORIDE 500 ML/HR: 2 INJECTION, SOLUTION, CONCENTRATE INTRAVENOUS at 10:03

## 2024-03-21 ENCOUNTER — LAB VISIT (OUTPATIENT)
Dept: LAB | Facility: HOSPITAL | Age: 64
End: 2024-03-21
Attending: NURSE PRACTITIONER
Payer: MEDICARE

## 2024-03-21 ENCOUNTER — OFFICE VISIT (OUTPATIENT)
Dept: INTERNAL MEDICINE | Facility: CLINIC | Age: 64
End: 2024-03-21
Payer: MEDICARE

## 2024-03-21 VITALS
RESPIRATION RATE: 18 BRPM | HEART RATE: 100 BPM | DIASTOLIC BLOOD PRESSURE: 65 MMHG | WEIGHT: 136 LBS | HEIGHT: 67 IN | BODY MASS INDEX: 21.35 KG/M2 | TEMPERATURE: 98 F | SYSTOLIC BLOOD PRESSURE: 93 MMHG

## 2024-03-21 DIAGNOSIS — E87.6 HYPOKALEMIA: Primary | ICD-10-CM

## 2024-03-21 DIAGNOSIS — C34.91 NSCLC OF RIGHT LUNG: ICD-10-CM

## 2024-03-21 DIAGNOSIS — J44.9 STAGE 3 SEVERE COPD BY GOLD CLASSIFICATION: Chronic | ICD-10-CM

## 2024-03-21 DIAGNOSIS — E87.6 HYPOKALEMIA: ICD-10-CM

## 2024-03-21 LAB
ANION GAP SERPL CALC-SCNC: 7 MEQ/L
BUN SERPL-MCNC: 5.7 MG/DL (ref 9.8–20.1)
CALCIUM SERPL-MCNC: 8.5 MG/DL (ref 8.4–10.2)
CHLORIDE SERPL-SCNC: 110 MMOL/L (ref 98–107)
CO2 SERPL-SCNC: 26 MMOL/L (ref 23–31)
CREAT SERPL-MCNC: 0.67 MG/DL (ref 0.55–1.02)
CREAT/UREA NIT SERPL: 9
GFR SERPLBLD CREATININE-BSD FMLA CKD-EPI: >60 MLS/MIN/1.73/M2
GLUCOSE SERPL-MCNC: 79 MG/DL (ref 82–115)
POTASSIUM SERPL-SCNC: 3.2 MMOL/L (ref 3.5–5.1)
SODIUM SERPL-SCNC: 143 MMOL/L (ref 136–145)

## 2024-03-21 PROCEDURE — 36415 COLL VENOUS BLD VENIPUNCTURE: CPT

## 2024-03-21 PROCEDURE — 3288F FALL RISK ASSESSMENT DOCD: CPT | Mod: CPTII,,, | Performed by: NURSE PRACTITIONER

## 2024-03-21 PROCEDURE — 1160F RVW MEDS BY RX/DR IN RCRD: CPT | Mod: CPTII,,, | Performed by: NURSE PRACTITIONER

## 2024-03-21 PROCEDURE — 99215 OFFICE O/P EST HI 40 MIN: CPT | Mod: PBBFAC | Performed by: NURSE PRACTITIONER

## 2024-03-21 PROCEDURE — 3078F DIAST BP <80 MM HG: CPT | Mod: CPTII,,, | Performed by: NURSE PRACTITIONER

## 2024-03-21 PROCEDURE — 1159F MED LIST DOCD IN RCRD: CPT | Mod: CPTII,,, | Performed by: NURSE PRACTITIONER

## 2024-03-21 PROCEDURE — 1126F AMNT PAIN NOTED NONE PRSNT: CPT | Mod: CPTII,,, | Performed by: NURSE PRACTITIONER

## 2024-03-21 PROCEDURE — 80048 BASIC METABOLIC PNL TOTAL CA: CPT

## 2024-03-21 PROCEDURE — 3074F SYST BP LT 130 MM HG: CPT | Mod: CPTII,,, | Performed by: NURSE PRACTITIONER

## 2024-03-21 PROCEDURE — 1101F PT FALLS ASSESS-DOCD LE1/YR: CPT | Mod: CPTII,,, | Performed by: NURSE PRACTITIONER

## 2024-03-21 PROCEDURE — 3008F BODY MASS INDEX DOCD: CPT | Mod: CPTII,,, | Performed by: NURSE PRACTITIONER

## 2024-03-21 PROCEDURE — 99214 OFFICE O/P EST MOD 30 MIN: CPT | Mod: S$PBB,,, | Performed by: NURSE PRACTITIONER

## 2024-03-21 NOTE — PROGRESS NOTES
Patient ID: [id number]     Chief Complaint: Follow-up (6 mth lab review)    HPI:     Mansi Jin is a 64 y.o. female with diagnosis of HTN, HLD, CKD 2, COPD 3, Stage IV NSCLC with bone mets (01/2023), Right Lower and Middle Bilobectomy with Mediastinal Lymph Node Dissection S/T Lung Mass (12/18/2022), Tobacco Use. Patient seen today for follow up. Patient last seen in clinic on 09/22/2023.   Today, patient states doing well. States was started on new treatment for her Lung Cancer, appetite decreased however she was started on Megace and appetite improved. Patient also states her thyroid levels decreased which is a side effect of her cancer treatment, so she was referred to endocrinology, Dr. Parker.  Patient had lumbar X-ray completed on 11/22/2022; indicated degenerative listhesis related to facet arthropathy in the lower lumbar spine, similar to prior. Patient referred to physical therapy, patient states she never received an appointment, referral was denied due to clinic not accepting her insurance. Patient states right leg pain, back pain improved. Patient states she is doing well, denies any acute complaints.   Patient states she does user her Trelegy Ellipta and Combivent as prescribed for COPD.   Patient's B/P medications discontinued due to hypotension after surgery for lung mass. Patient states she B/P controlled at home. Patient denies SOB, chest pain.   Today, patient request renewal of handicap parking tag due to increased SOB with ambulation.   Patient received IV potassium with Oncology, no repeat lab noted.   Patient denies any acute complaints.     Patient followed by Oncology Clinic. Last appointment on 03/18/2024. Stage IV NSCLC with bone met; Originally: NSCLC / Adenocarcinoma Stage IIIA pT2a pN2: Restaging PET-CT after completion of chemotherapy and prior to radiation therapy showed progression at the right acetabulum. Patient with progression of disease now with bone metastases. She april  palliative radiation treatment with Dr. Mireles on 6/28/23, 10 treatments over two weeks. Bone mets: Xgeva q4w. She was seen by Rad/Onc for palliative RT but recommendations were to start chemotherapy and re eval after 2 cycles. Iron deficiency Anemia: Continue with 1 tab PO iron QOD with Vitamin C. Currently scheduled for colonoscopy 6/2024. Hypothyroid: Followed by endocrinology Dr. Parker. PE: Now on Eliquis 5 mg twice daily. Vitiligo to bilateral fingertips: Followed by Dermatology. Patient has follow up appointment scheduled for 03/25/2024.     Patient followed by GYN. Last appointment on 01/19/2024. ASCUS with positive high risk HPV cervical: Colposcopy performed. Repeat pap in 1 year.     Patient followed by Pulmonology Clinic. Last appointment on 05/16/2023. Right lung mass status post right middle lobe and right lower lobe lobectomy on 12/08/2022 -- Pathology came back with invasive adenocarcinoma, poorly differentiated with solid morphology, focal tumor involvement of visceral pleura and focal tumor vascular invasion (vein), with 2 of 3 posterior mediastinal lymph nodes positive for metastatic carcinom, stage III A T2a N2. Stage III COPD. Hypoxia. Left upper extremity edema. Plan: NSCLC adenocarcenoma stage 3a/T2a/N2 s/p RLL lobectomy with possible metastasis however bx negative now receiving chemotherapy with plans for adjuvant radiation therapy; pending completion of chemotherapy course; Continue trelegy; currently sating well ORA walking >1 mile daily without SOB. Patient has follow up appointment scheduled for 05/14/2024.      Patient followed by CV Surgeon, Dr. Browne. Last appointment on 01/10/2023. Patient returns now 1 month out from a right lower bilobectomy. Her pathology returned positive for a 3.1 cm poorly differentiated adenocarcinoma with 2 posterior mediastinal lymph nodes positive. Patient was doing well for the 1st several weeks after surgery. She was walking without any shortness of  breath or pain. However over the last 3 days she is suddenly developed a spasm and shooting pain in the right thigh. She is been to the emergency room for evaluation and she did not have a DVT and all x-rays were negative. She was told she has frequent muscle spasms. She is really getting not much relief from this. Her incision has healed nicely. She is already lined up with oncology for chemotherapy. She will follow up with them and with her primary care for further evaluation of this right leg problem. We will see her back p.r.n.      Patient followed by Radiation Oncologist, Dr. Dinorah Mireles. Last appointment on 01/04/2023. The patient has a stage III non-small cell lung cancer status post resection. We discussed the controversy does around postoperative radiotherapy in the setting of positive mediastinal nodes. She does have a high risk factor of extranodal extension. Explained to the patient and her daughter that at this time the most important component of her treatment is the chemotherapy. Will see her back towards the end of chemotherapy and we will rediscuss the role of postoperative radiation therapy. Recent meta analyses have shown no overall survival benefit in the setting of postoperative radiation therapy but there is data support a larger benefit in patients with extracapsular extension. If the patient tolerates chemotherapy well and has a good performance status, I will be more likely to recommend PORT. Also if the patient only receives 1-2 cycles of chemotherapy, I will be more inclined to recommend radiotherapy. If the patient is able to complete most of her recommended chemotherapy but has a limited performance status after completing chemotherapy, we will likely not proceed with radiation therapy. The patient and her daughter understand the plan and are happy that we will reassess after completing chemotherapy.    Review of patient's allergies indicates:  No Known Allergies    Breast Cancer  Screening: MMG negative on 01/23/2023   Cervical Cancer Screening: Hysterectomy  Colorectal Cancer Screening: Cologuard negative on 04/11/2022  Diabetic Eye Exam: N/A  Diabetic Foot Exam: N/A  Lung Cancer Screening: Followed by Oncology/Pulmonology for Lung Cancer  Prostate Cancer Screening: N/A  AAA Screening: N/A  Osteoporosis Screening: ordered  Medicare Wellness: N/A  Immunizations:   Immunization History   Administered Date(s) Administered    COVID-19, MRNA, LN-S, PF (MODERNA FULL 0.5 ML DOSE) 03/02/2021, 03/30/2021, 11/05/2021    Influenza 10/19/2017    Influenza - Quadrivalent 12/01/2020, 03/17/2022    Influenza - Quadrivalent - PF (6-35 months) 10/19/2017, 11/12/2018    Influenza - Quadrivalent - PF *Preferred* (6 months and older) 11/22/2022    Influenza - Trivalent - PF (ADULT) 11/15/2011    Pneumococcal Polysaccharide - 23 Valent 03/17/2022    Tdap 10/19/2017    Zoster 01/28/2021, 07/21/2021    Zoster Recombinant 01/28/2021, 07/21/2021     Past Surgical History:   Procedure Laterality Date    INSERTION OF TUNNELED CENTRAL VENOUS CATHETER (CVC) WITH SUBCUTANEOUS PORT N/A 12/04/2023    Procedure: [id number]-CATH;  Surgeon: Peter Dial MD;  Location: [facility];  Service: Peripheral Vascular;  Laterality: N/A;    lipoma multiple sites      LUNG LOBECTOMY Right 12/08/2022    Procedure: LOBECTOMY, LUNG;  Surgeon: Jass Browne IV, MD;  Location: [facility] OR;  Service: Cardiothoracic;  Laterality: Right;    SURGICAL REMOVAL OF LYMPH NODE  12/08/2022    Procedure: EXCISION, LYMPH NODE;  Surgeon: Jass Browne IV, MD;  Location: [facility] OR;  Service: Cardiothoracic;;    THORACOTOMY Right 12/08/2022    Procedure: THORACOTOMY;  Surgeon: Jass Browne IV, MD;  Location: [facility] OR;  Service: Cardiothoracic;  Laterality: Right;  Right Lower Lobectomy with Lymph Node Dissection    TOTAL ABDOMINAL HYSTERECTOMY W/ BILATERAL SALPINGOOPHORECTOMY  1989     family history includes Asthma in her brother and  brother; Cancer in her maternal grandfather; Fibroids in her sister; Heart failure in her father; Hepatitis in her mother; Hypertension in her mother; Liver cancer in her mother; Ovarian cancer in her mother; Prostate cancer in her father; Skin cancer in her sister.    Social History     Socioeconomic History    Marital status: Single    Number of children: 5   Tobacco Use    Smoking status: Former     Current packs/day: 0.00     Average packs/day: 0.5 packs/day for 50.0 years (25.0 ttl pk-yrs)     Types: Cigarettes     Start date: 1972     Quit date: 2022     Years since quittin.2    Smokeless tobacco: Never   Substance and Sexual Activity    Alcohol use: Not Currently     Comment: quit 25years ago    Drug use: Never    Sexual activity: Yes     Partners: Male     Birth control/protection: See Surgical Hx     Social Determinants of Health     Financial Resource Strain: Low Risk  (2023)    Overall Financial Resource Strain (CARDIA)     Difficulty of Paying Living Expenses: Not very hard   Food Insecurity: No Food Insecurity (2023)    Hunger Vital Sign     Worried About Running Out of Food in the Last Year: Never true     Ran Out of Food in the Last Year: Never true   Transportation Needs: No Transportation Needs (2023)    PRAPARE - Transportation     Lack of Transportation (Medical): No     Lack of Transportation (Non-Medical): No   Physical Activity: Inactive (2023)    Exercise Vital Sign     Days of Exercise per Week: 0 days     Minutes of Exercise per Session: 0 min   Stress: No Stress Concern Present (2023)    [employer] - Occupational Stress Questionnaire     Feeling of Stress : Only a little   Social Connections: Socially Integrated (2023)    Social Connection and Isolation Panel [NHANES]     Frequency of Communication with Friends and Family: More than three times a week     Frequency of Social Gatherings with Friends and Family: Three  times a week     Attends Zoroastrian Services: More than 4 times per year     Active Member of Clubs or Organizations: Yes     Attends Club or Organization Meetings: 1 to 4 times per year     Marital Status: Living with partner   Housing Stability: Low Risk  (9/22/2023)    Housing Stability Vital Sign     Unable to Pay for Housing in the Last Year: No     Number of Places Lived in the Last Year: 1     Unstable Housing in the Last Year: No     Current Outpatient Medications   Medication Instructions    acetaminophen (TYLENOL) 1,000 mg, Oral, Every 6 hours PRN    albuterol (PROVENTIL) 2.5 mg, Nebulization, 3 times daily PRN, rescue    apixaban (ELIQUIS) 5 mg, Oral, 2 times daily    ascorbic acid (vitamin C) (VITAMIN C) 500 mg, Oral, Daily    cholecalciferol (vitamin D3) 50,000 Units, Oral, Every 7 days    dexAMETHasone (DECADRON) 4 MG Tab TAKE 2 TABLETS BY MOUTH DAY PRIOR TO CHEMPTHERAPY AND ON DAY 2, 3 AND 4 FOLLOW CHEMOTHERAPY    dicyclomine (BENTYL) 10 mg, Oral, 3 times daily PRN    fluticasone propionate (FLONASE) 50 mcg, Each Nostril, Daily    fluticasone-umeclidin-vilanter (TRELEGY ELLIPTA) 100-62.5-25 mcg DsDv 1 puff, Inhalation, Daily    gabapentin (NEURONTIN) 300 mg, Oral, 3 times daily    HYDROcodone-acetaminophen (NORCO)  mg per tablet 1 tablet, Oral, Every 6 hours PRN    ipratropium-albuteroL (COMBIVENT)  mcg/actuation inhaler 1 puff, Inhalation, Every 6 hours PRN, Rescue    iron polysac-iron heme polypep (FEOSOL BIFERA) 28 mg Tab 1 tablet, Oral, Every other day, Takes 3 times a week    k phos di & mono-sod phos mono (K-PHOS-NEUTRAL) 250 mg Tab 1 tablet, Oral, 2 times daily    levothyroxine (SYNTHROID) 75 MCG tablet No dose, route, or frequency recorded.    loratadine (CLARITIN) 10 mg, Oral, Daily    megestroL (MEGACE) 40 mg, Oral, 4 times daily, For low appetite.    pantoprazole (PROTONIX) 40 mg, Oral, Daily    potassium chloride SA (K-DUR,KLOR-CON) 20 MEQ tablet 20 mEq, Oral, Daily     "triamcinolone acetonide 0.1% (KENALOG) 0.1 % cream        Subjective:     Review of Systems   Constitutional: Negative.    HENT: Negative.     Eyes: Negative.    Respiratory: Negative.     Cardiovascular: Negative.    Gastrointestinal: Negative.    Endocrine: Negative.    Genitourinary: Negative.    Musculoskeletal: Negative.    Skin: Negative.    Allergic/Immunologic: Negative.    Neurological: Negative.    Hematological: Negative.    Psychiatric/Behavioral: Negative.         Objective:     Visit Vitals  BP 93/65 (BP Location: Right arm, Patient Position: Sitting, BP Method: Medium (Automatic))   Pulse 100   Temp 97.6 °F (36.4 °C) (Oral)   Resp 18   Ht 5' 7" (1.702 m)   Wt 61.7 kg (136 lb)   BMI 21.30 kg/m²       Physical Exam  Vitals reviewed.   Constitutional:       Appearance: Normal appearance.   HENT:      Head: Normocephalic and atraumatic.      Mouth/Throat:      Mouth: Mucous membranes are moist.      Pharynx: Oropharynx is clear.   Eyes:      Extraocular Movements: Extraocular movements intact.      Conjunctiva/sclera: Conjunctivae normal.      Pupils: Pupils are equal, round, and reactive to light.   Cardiovascular:      Rate and Rhythm: Normal rate and regular rhythm.      Heart sounds: Normal heart sounds.   Pulmonary:      Effort: Pulmonary effort is normal.      Breath sounds: Normal breath sounds.   Abdominal:      General: Bowel sounds are normal.   Musculoskeletal:         General: Normal range of motion.      Cervical back: Normal range of motion.   Skin:     General: Skin is warm and dry.   Neurological:      Mental Status: She is alert and oriented to person, place, and time.   Psychiatric:         Mood and Affect: Mood normal.         Behavior: Behavior normal.       Labs Reviewed:     Hematology:  Lab Results   Component Value Date    WBC 7.77 03/20/2024    HGB 9.9 (L) 03/20/2024    HCT 32.1 (L) 03/20/2024     (H) 03/20/2024     Chemistry:  Lab Results   Component Value Date     " 03/21/2024    K 3.2 (L) 03/21/2024    CHLORIDE 110 (H) 03/21/2024    BUN 5.7 (L) 03/21/2024    CREATININE 0.67 03/21/2024    EGFRNORACEVR >60 03/21/2024    GLUCOSE 79 (L) 03/21/2024    CALCIUM 8.5 03/21/2024    ALKPHOS 47 03/20/2024    LABPROT 5.2 (L) 03/20/2024    LABPROT 14.2 02/22/2023    ALBUMIN 2.3 (L) 03/20/2024    BILIDIR 0.2 07/19/2021    IBILI 0.30 07/19/2021    AST 20 03/20/2024    ALT 9 03/20/2024    MG 1.70 03/20/2024    PHOS 1.8 (L) 03/18/2024    OTIQNRMA23OA 51.5 12/06/2023     Lab Results   Component Value Date    HGBA1C 6.0 05/09/2019     Lipid Panel:  Lab Results   Component Value Date    CHOL 73 03/20/2024    HDL 23 (L) 03/20/2024    LDL 39.00 (L) 03/20/2024    TRIG 53 03/20/2024    TOTALCHOLEST 3 03/20/2024     Thyroid:  Lab Results   Component Value Date    TSH 1.309 01/16/2024     Urine:  Lab Results   Component Value Date    COLORUA Yellow 11/22/2023    APPEARANCEUA Clear 11/22/2023    SGUA 1.010 11/22/2023    PHUA 7.0 11/22/2023    PROTEINUA Negative 11/22/2023    GLUCOSEUA Negative 11/22/2023    KETONESUA Negative 11/22/2023    BLOODUA Negative 11/22/2023    NITRITESUA Negative 11/22/2023    LEUKOCYTESUR Negative 11/22/2023    RBCUA 0-5 03/22/2023    WBCUA 0-5 03/22/2023    BACTERIA None Seen 03/22/2023    SQEPUA Few (A) 03/22/2023    HYALINECASTS 0-2 (A) 03/22/2023    CREATRANDUR 188.0 (H) 03/22/2023        Assessment:       ICD-10-CM ICD-9-CM   1. Hypokalemia  E87.6 276.8   2. Stage 3 severe COPD by GOLD classification  J44.9 496   3. NSCLC of right lung  C34.91 162.9       Plan:     1. Hypokalemia  Potassium level: 3.0  BMP ordered, if potassium level decreased, will order KCL  - Basic Metabolic Panel; Future    2. Stage 3 severe COPD by GOLD classification  Continue Trelegy     3. NSCLC of right lung  Followed by Oncology  Current receiving chemotherapy      Follow up in about 6 months (around 9/21/2024) for Labs. In addition to their scheduled follow up, the patient has also been  instructed to follow up on as needed basis.     Jennifer Dacosta, DOUGIEP

## 2024-03-21 NOTE — LETTER
I certify that (Name) Mansi Jin meets the requirements as outlined in #  (shown on reverse side) and qualifies for a mobility impaired license plate/hang-tag. I further understand that willful and false certification shall subject me to fines/imprisonment as outlined in R.S. 47:463.4 (G) (4). The applicant's information is as follows:    YOB: 1960            Race:Black or         Gender:Female    Address:  [address] [place] [address]    City:[place]                               State:[place]     Zip Code:[address]     []Permanently Impaired - Applicant has a total or lifelong condition of mobility impairment from which little or no improvement or recovery can reasonably be expected. A medical examiners certification is required on initial application only.      [] Temporarily Impaired - Applicant has a temporary condition of mobility impairment of which improvement or recovery can reasonably be expected. Applicant is entitled to a hangtag, which will be valid for one (1) year. A medical examiners certification is required for the renewal of the hangtag      [] Unable to appear in person at the Office of Motor Vehicles - Applicant must bring facial photo        Medical Examiner's Signature________________________________________ Date:3/21/24_________________________    Printed Name:___________________________________________________    State License #_____________________________    Address: [address]                                     Phone Number: [phone]                                                                                                                                                                                                                  City: [place]__________________________________ State: [place] __Zip Code: [address] _______________    TO BE COMPLETED BY MOTOR VEHICLE ANALYST ONLY    [facility]   Lic. Plate #      Hangtag Control #   Hangtag ID  #      Date Issued:    #:   Office #:

## 2024-03-22 ENCOUNTER — TELEPHONE (OUTPATIENT)
Dept: INTERNAL MEDICINE | Facility: CLINIC | Age: 64
End: 2024-03-22
Payer: MEDICARE

## 2024-03-22 RX ORDER — POTASSIUM CHLORIDE 20 MEQ/1
20 TABLET, EXTENDED RELEASE ORAL DAILY
Qty: 3 TABLET | Refills: 0 | Status: SHIPPED | OUTPATIENT
Start: 2024-03-22 | End: 2024-03-25

## 2024-03-22 NOTE — TELEPHONE ENCOUNTER
Pt notified of decreased potassium level and advised to begin KCL once daily for 3 days. Pt verbalized understanding and will call with any questions or concerns.

## 2024-03-22 NOTE — TELEPHONE ENCOUNTER
Please notify patient that potassium level mildly decreased at 3.2. I did prescribe KCL 20 mEq tab, patient is to take 1 tab daily x3 days.

## 2024-03-24 ENCOUNTER — HOSPITAL ENCOUNTER (INPATIENT)
Facility: HOSPITAL | Age: 64
LOS: 5 days | Discharge: HOME | DRG: 356 | End: 2024-03-29
Attending: STUDENT IN AN ORGANIZED HEALTH CARE EDUCATION/TRAINING PROGRAM | Admitting: SURGERY
Payer: MEDICARE

## 2024-03-24 DIAGNOSIS — K37 APPENDICITIS, UNSPECIFIED APPENDICITIS TYPE: ICD-10-CM

## 2024-03-24 DIAGNOSIS — K81.0 CHOLECYSTITIS, ACUTE: ICD-10-CM

## 2024-03-24 DIAGNOSIS — R11.2 NAUSEA & VOMITING: ICD-10-CM

## 2024-03-24 DIAGNOSIS — D64.9 ANEMIA, UNSPECIFIED TYPE: ICD-10-CM

## 2024-03-24 DIAGNOSIS — K52.9 COLITIS: Primary | ICD-10-CM

## 2024-03-24 LAB
ALBUMIN SERPL-MCNC: 2.7 G/DL (ref 3.4–4.8)
ALBUMIN/GLOB SERPL: 0.8 RATIO (ref 1.1–2)
ALP SERPL-CCNC: 52 UNIT/L (ref 40–150)
ALT SERPL-CCNC: 13 UNIT/L (ref 0–55)
APPEARANCE UR: ABNORMAL
AST SERPL-CCNC: 16 UNIT/L (ref 5–34)
BASOPHILS # BLD AUTO: 0.02 X10(3)/MCL
BASOPHILS NFR BLD AUTO: 0.3 %
BILIRUB SERPL-MCNC: 0.5 MG/DL
BILIRUB UR QL STRIP.AUTO: NEGATIVE
BUN SERPL-MCNC: 6 MG/DL (ref 9.8–20.1)
CALCIUM SERPL-MCNC: 8.6 MG/DL (ref 8.4–10.2)
CHLORIDE SERPL-SCNC: 111 MMOL/L (ref 98–107)
CO2 SERPL-SCNC: 22 MMOL/L (ref 23–31)
COLOR UR AUTO: YELLOW
CREAT SERPL-MCNC: 0.65 MG/DL (ref 0.55–1.02)
EOSINOPHIL # BLD AUTO: 0 X10(3)/MCL (ref 0–0.9)
EOSINOPHIL NFR BLD AUTO: 0 %
ERYTHROCYTE [DISTWIDTH] IN BLOOD BY AUTOMATED COUNT: 20.6 % (ref 11.5–17)
FLUAV AG UPPER RESP QL IA.RAPID: NOT DETECTED
FLUBV AG UPPER RESP QL IA.RAPID: NOT DETECTED
GFR SERPLBLD CREATININE-BSD FMLA CKD-EPI: >60 MLS/MIN/1.73/M2
GLOBULIN SER-MCNC: 3.3 GM/DL (ref 2.4–3.5)
GLUCOSE SERPL-MCNC: 80 MG/DL (ref 82–115)
GLUCOSE UR QL STRIP.AUTO: NEGATIVE
HCT VFR BLD AUTO: 32.2 % (ref 37–47)
HGB BLD-MCNC: 10.1 G/DL (ref 12–16)
IMM GRANULOCYTES # BLD AUTO: 0.05 X10(3)/MCL (ref 0–0.04)
IMM GRANULOCYTES NFR BLD AUTO: 0.7 %
KETONES UR QL STRIP.AUTO: ABNORMAL
LACTATE SERPL-SCNC: 1.2 MMOL/L (ref 0.5–2.2)
LEUKOCYTE ESTERASE UR QL STRIP.AUTO: NEGATIVE
LIPASE SERPL-CCNC: 55 U/L
LYMPHOCYTES # BLD AUTO: 1.27 X10(3)/MCL (ref 0.6–4.6)
LYMPHOCYTES NFR BLD AUTO: 17.1 %
MAGNESIUM SERPL-MCNC: 1.5 MG/DL (ref 1.6–2.6)
MCH RBC QN AUTO: 25.8 PG (ref 27–31)
MCHC RBC AUTO-ENTMCNC: 31.4 G/DL (ref 33–36)
MCV RBC AUTO: 82.1 FL (ref 80–94)
MONOCYTES # BLD AUTO: 0.34 X10(3)/MCL (ref 0.1–1.3)
MONOCYTES NFR BLD AUTO: 4.6 %
NEUTROPHILS # BLD AUTO: 5.74 X10(3)/MCL (ref 2.1–9.2)
NEUTROPHILS NFR BLD AUTO: 77.3 %
NITRITE UR QL STRIP.AUTO: NEGATIVE
OHS QRS DURATION: 62 MS
OHS QTC CALCULATION: 433 MS
PH UR STRIP.AUTO: 8.5 [PH]
PLATELET # BLD AUTO: 379 X10(3)/MCL (ref 130–400)
PMV BLD AUTO: 9 FL (ref 7.4–10.4)
POTASSIUM SERPL-SCNC: 4 MMOL/L (ref 3.5–5.1)
PROT SERPL-MCNC: 6 GM/DL (ref 5.8–7.6)
PROT UR QL STRIP.AUTO: NEGATIVE
RBC # BLD AUTO: 3.92 X10(6)/MCL (ref 4.2–5.4)
RBC UR QL AUTO: NEGATIVE
SARS-COV-2 RNA RESP QL NAA+PROBE: NOT DETECTED
SODIUM SERPL-SCNC: 141 MMOL/L (ref 136–145)
SP GR UR STRIP.AUTO: 1.01 (ref 1–1.03)
TROPONIN I SERPL-MCNC: <0.01 NG/ML (ref 0–0.04)
UROBILINOGEN UR STRIP-ACNC: 0.2
WBC # SPEC AUTO: 7.42 X10(3)/MCL (ref 4.5–11.5)

## 2024-03-24 PROCEDURE — 99285 EMERGENCY DEPT VISIT HI MDM: CPT | Mod: 25

## 2024-03-24 PROCEDURE — 11000001 HC ACUTE MED/SURG PRIVATE ROOM

## 2024-03-24 PROCEDURE — 81003 URINALYSIS AUTO W/O SCOPE: CPT | Performed by: STUDENT IN AN ORGANIZED HEALTH CARE EDUCATION/TRAINING PROGRAM

## 2024-03-24 PROCEDURE — 85025 COMPLETE CBC W/AUTO DIFF WBC: CPT | Performed by: STUDENT IN AN ORGANIZED HEALTH CARE EDUCATION/TRAINING PROGRAM

## 2024-03-24 PROCEDURE — 87040 BLOOD CULTURE FOR BACTERIA: CPT | Performed by: STUDENT IN AN ORGANIZED HEALTH CARE EDUCATION/TRAINING PROGRAM

## 2024-03-24 PROCEDURE — 84484 ASSAY OF TROPONIN QUANT: CPT | Performed by: STUDENT IN AN ORGANIZED HEALTH CARE EDUCATION/TRAINING PROGRAM

## 2024-03-24 PROCEDURE — 25500020 PHARM REV CODE 255: Performed by: STUDENT IN AN ORGANIZED HEALTH CARE EDUCATION/TRAINING PROGRAM

## 2024-03-24 PROCEDURE — 93005 ELECTROCARDIOGRAM TRACING: CPT

## 2024-03-24 PROCEDURE — 83605 ASSAY OF LACTIC ACID: CPT | Performed by: STUDENT IN AN ORGANIZED HEALTH CARE EDUCATION/TRAINING PROGRAM

## 2024-03-24 PROCEDURE — 0240U COVID/FLU A&B PCR: CPT | Performed by: STUDENT IN AN ORGANIZED HEALTH CARE EDUCATION/TRAINING PROGRAM

## 2024-03-24 PROCEDURE — 27000207 HC ISOLATION

## 2024-03-24 PROCEDURE — 80053 COMPREHEN METABOLIC PANEL: CPT | Performed by: STUDENT IN AN ORGANIZED HEALTH CARE EDUCATION/TRAINING PROGRAM

## 2024-03-24 PROCEDURE — 96375 TX/PRO/DX INJ NEW DRUG ADDON: CPT

## 2024-03-24 PROCEDURE — 83735 ASSAY OF MAGNESIUM: CPT | Performed by: STUDENT IN AN ORGANIZED HEALTH CARE EDUCATION/TRAINING PROGRAM

## 2024-03-24 PROCEDURE — 25000003 PHARM REV CODE 250: Performed by: STUDENT IN AN ORGANIZED HEALTH CARE EDUCATION/TRAINING PROGRAM

## 2024-03-24 PROCEDURE — 63600175 PHARM REV CODE 636 W HCPCS: Performed by: STUDENT IN AN ORGANIZED HEALTH CARE EDUCATION/TRAINING PROGRAM

## 2024-03-24 PROCEDURE — 21400001 HC TELEMETRY ROOM

## 2024-03-24 PROCEDURE — 94761 N-INVAS EAR/PLS OXIMETRY MLT: CPT

## 2024-03-24 PROCEDURE — 83690 ASSAY OF LIPASE: CPT | Performed by: STUDENT IN AN ORGANIZED HEALTH CARE EDUCATION/TRAINING PROGRAM

## 2024-03-24 PROCEDURE — 63600175 PHARM REV CODE 636 W HCPCS: Performed by: SURGERY

## 2024-03-24 PROCEDURE — 96365 THER/PROPH/DIAG IV INF INIT: CPT

## 2024-03-24 PROCEDURE — 25000003 PHARM REV CODE 250: Performed by: SURGERY

## 2024-03-24 RX ORDER — SODIUM CHLORIDE, SODIUM LACTATE, POTASSIUM CHLORIDE, CALCIUM CHLORIDE 600; 310; 30; 20 MG/100ML; MG/100ML; MG/100ML; MG/100ML
INJECTION, SOLUTION INTRAVENOUS CONTINUOUS
Status: DISCONTINUED | OUTPATIENT
Start: 2024-03-24 | End: 2024-03-29 | Stop reason: HOSPADM

## 2024-03-24 RX ORDER — ONDANSETRON HYDROCHLORIDE 2 MG/ML
4 INJECTION, SOLUTION INTRAVENOUS
Status: COMPLETED | OUTPATIENT
Start: 2024-03-24 | End: 2024-03-24

## 2024-03-24 RX ORDER — MAGNESIUM SULFATE HEPTAHYDRATE 40 MG/ML
2 INJECTION, SOLUTION INTRAVENOUS
Status: COMPLETED | OUTPATIENT
Start: 2024-03-24 | End: 2024-03-24

## 2024-03-24 RX ORDER — SODIUM CHLORIDE 0.9 % (FLUSH) 0.9 %
10 SYRINGE (ML) INJECTION
Status: DISCONTINUED | OUTPATIENT
Start: 2024-03-24 | End: 2024-03-29 | Stop reason: HOSPADM

## 2024-03-24 RX ORDER — POLYETHYLENE GLYCOL 3350 17 G/17G
272 POWDER, FOR SOLUTION ORAL ONCE
Status: COMPLETED | OUTPATIENT
Start: 2024-03-24 | End: 2024-03-24

## 2024-03-24 RX ORDER — ASPIRIN 81 MG/1
81 TABLET ORAL DAILY
COMMUNITY

## 2024-03-24 RX ORDER — DIPHENHYDRAMINE HYDROCHLORIDE 50 MG/ML
25 INJECTION INTRAMUSCULAR; INTRAVENOUS EVERY 6 HOURS PRN
Status: DISCONTINUED | OUTPATIENT
Start: 2024-03-24 | End: 2024-03-24

## 2024-03-24 RX ORDER — ONDANSETRON HYDROCHLORIDE 2 MG/ML
4 INJECTION, SOLUTION INTRAVENOUS EVERY 8 HOURS PRN
Status: DISCONTINUED | OUTPATIENT
Start: 2024-03-24 | End: 2024-03-29 | Stop reason: HOSPADM

## 2024-03-24 RX ORDER — DIPHENHYDRAMINE HYDROCHLORIDE 50 MG/ML
25 INJECTION INTRAMUSCULAR; INTRAVENOUS EVERY 6 HOURS PRN
Status: DISCONTINUED | OUTPATIENT
Start: 2024-03-24 | End: 2024-03-29 | Stop reason: HOSPADM

## 2024-03-24 RX ORDER — ATORVASTATIN CALCIUM 40 MG/1
40 TABLET, FILM COATED ORAL DAILY
COMMUNITY

## 2024-03-24 RX ORDER — MUPIROCIN 20 MG/G
OINTMENT TOPICAL 2 TIMES DAILY
Status: DISCONTINUED | OUTPATIENT
Start: 2024-03-24 | End: 2024-03-29 | Stop reason: HOSPADM

## 2024-03-24 RX ORDER — TALC
6 POWDER (GRAM) TOPICAL NIGHTLY PRN
Status: DISCONTINUED | OUTPATIENT
Start: 2024-03-24 | End: 2024-03-29 | Stop reason: HOSPADM

## 2024-03-24 RX ORDER — HYDROMORPHONE HYDROCHLORIDE 2 MG/ML
0.2 INJECTION, SOLUTION INTRAMUSCULAR; INTRAVENOUS; SUBCUTANEOUS EVERY 4 HOURS PRN
Status: DISCONTINUED | OUTPATIENT
Start: 2024-03-24 | End: 2024-03-26

## 2024-03-24 RX ADMIN — SODIUM CHLORIDE, POTASSIUM CHLORIDE, SODIUM LACTATE AND CALCIUM CHLORIDE: 600; 310; 30; 20 INJECTION, SOLUTION INTRAVENOUS at 05:03

## 2024-03-24 RX ADMIN — POLYETHYLENE GLYCOL 3350 272 G: 17 POWDER, FOR SOLUTION ORAL at 08:03

## 2024-03-24 RX ADMIN — DIPHENHYDRAMINE HYDROCHLORIDE 25 MG: 50 INJECTION INTRAMUSCULAR; INTRAVENOUS at 10:03

## 2024-03-24 RX ADMIN — PIPERACILLIN SODIUM AND TAZOBACTAM SODIUM 4.5 G: 4; .5 INJECTION, POWDER, LYOPHILIZED, FOR SOLUTION INTRAVENOUS at 04:03

## 2024-03-24 RX ADMIN — MAGNESIUM SULFATE HEPTAHYDRATE 2 G: 40 INJECTION, SOLUTION INTRAVENOUS at 03:03

## 2024-03-24 RX ADMIN — SODIUM CHLORIDE, POTASSIUM CHLORIDE, SODIUM LACTATE AND CALCIUM CHLORIDE 1000 ML: 600; 310; 30; 20 INJECTION, SOLUTION INTRAVENOUS at 03:03

## 2024-03-24 RX ADMIN — HYDROMORPHONE HYDROCHLORIDE 0.2 MG: 2 INJECTION INTRAMUSCULAR; INTRAVENOUS; SUBCUTANEOUS at 08:03

## 2024-03-24 RX ADMIN — ONDANSETRON 4 MG: 2 INJECTION INTRAMUSCULAR; INTRAVENOUS at 03:03

## 2024-03-24 RX ADMIN — PIPERACILLIN SODIUM AND TAZOBACTAM SODIUM 4.5 G: 4; .5 INJECTION, POWDER, LYOPHILIZED, FOR SOLUTION INTRAVENOUS at 10:03

## 2024-03-24 RX ADMIN — IOPAMIDOL 75 ML: 755 INJECTION, SOLUTION INTRAVENOUS at 03:03

## 2024-03-24 NOTE — ED PROVIDER NOTES
Encounter Date: 3/24/2024       History     Chief Complaint   Patient presents with    Vomiting     C/o vomiting with generalized pain x 2 days but worse since 5am. Hx lung Ca, receiving treatment here at [facility]     HPI    64-year-old female with a past medical history stage IV non-small-cell lung cancer with bone Mets, COPD, CKD stage 2, hypertension, hyperlipidemia as well as recently diagnosed and treated C diff presents emergency department for nausea vomiting with abdominal pain.  Patient states that she started vomiting this morning.  Tried taking Zofran but vomited up within 10 minutes.  States she started having abdominal pain 2 days ago.  States that the pain is everywhere in her stomach.  Daughter states she has not been eating much since the diagnosis of the C diff. states that she completed the antibiotics with a 10 day duration and the diarrhea has completely stopped.  States that she has been more weak since the C diff infection.  She denies any cough or chest pain.  No worsening in her chronic shortness of breath.  Denies any diarrhea or constipation.  No known sick contacts or abnormal foods.  Last chemotherapy treatment was 3 weeks ago.    Review of patient's allergies indicates:  No Known Allergies  Past Medical History:   Diagnosis Date    ASTHMA     COPD (chronic obstructive pulmonary disease)     SEVERE    GERD (gastroesophageal reflux disease)     High cholesterol     HLD (hyperlipidemia)     Hypothyroidism, unspecified     Lung cancer 12/08/2022    invasive adenocarcinoma, poorly differentiated with solid morphology, focal tumor involvement of visceral pleura and focal tumor vascular invasion (vein), with 2 of 3 posterior mediastinal lymph nodes positive for metastatic carcinoma    Stage 2 chronic kidney disease     TIA (transient ischemic attack)     no deficits    Tobacco abuse     Unspecified osteoarthritis, unspecified site     Uterine cancer 1989     Past Surgical History:   Procedure  Laterality Date    INSERTION OF TUNNELED CENTRAL VENOUS CATHETER (CVC) WITH SUBCUTANEOUS PORT N/A 2023    Procedure: [id number];  Surgeon: Peter Dial MD;  Location: [facility] OR;  Service: Peripheral Vascular;  Laterality: N/A;    lipoma multiple sites      LUNG LOBECTOMY Right 2022    Procedure: LOBECTOMY, LUNG;  Surgeon: Jass Browne IV, MD;  Location: [facility] OR;  Service: Cardiothoracic;  Laterality: Right;    SURGICAL REMOVAL OF LYMPH NODE  2022    Procedure: EXCISION, LYMPH NODE;  Surgeon: Jass Browne IV, MD;  Location: [facility] OR;  Service: Cardiothoracic;;    THORACOTOMY Right 2022    Procedure: THORACOTOMY;  Surgeon: Jass Browne IV, MD;  Location: [facility] OR;  Service: Cardiothoracic;  Laterality: Right;  Right Lower Lobectomy with Lymph Node Dissection    TOTAL ABDOMINAL HYSTERECTOMY W/ BILATERAL SALPINGOOPHORECTOMY       Family History   Problem Relation Age of Onset    Cancer Maternal Grandfather     Heart failure Father     Prostate cancer Father     Hypertension Mother     Hepatitis Mother     Ovarian cancer Mother     Liver cancer Mother     Asthma Brother     Asthma Brother     Fibroids Sister     Skin cancer Sister      Social History     Tobacco Use    Smoking status: Former     Current packs/day: 0.00     Average packs/day: 0.5 packs/day for 50.0 years (25.0 ttl pk-yrs)     Types: Cigarettes     Start date: 1972     Quit date: 2022     Years since quittin.2    Smokeless tobacco: Never   Substance Use Topics    Alcohol use: Not Currently     Comment: quit 25years ago    Drug use: Never     Review of Systems   Constitutional:  Positive for activity change, appetite change and fatigue. Negative for fever.   HENT:  Negative for sore throat.    Respiratory:  Negative for cough and shortness of breath.    Cardiovascular:  Negative for chest pain.   Gastrointestinal:  Positive for abdominal pain, nausea and vomiting. Negative for constipation  and diarrhea.   Genitourinary:  Negative for dysuria.   Musculoskeletal:  Negative for back pain.   Skin:  Negative for rash.   Neurological:  Negative for weakness and headaches.   Hematological:  Does not bruise/bleed easily.   All other systems reviewed and are negative.      Physical Exam     Initial Vitals [03/24/24 1359]   BP Pulse Resp Temp SpO2   (!) 144/82 108 18 97.9 °F (36.6 °C) 99 %      MAP       --         Physical Exam    Nursing note and vitals reviewed.  Constitutional: She appears well-developed and well-nourished. No distress.   Cardiovascular:  Normal rate and regular rhythm.           Pulmonary/Chest: Breath sounds normal. No respiratory distress. She has no wheezes. She has no rhonchi. She has no rales.   Abdominal: Abdomen is soft. There is abdominal tenderness (mild generalized) in the right lower quadrant and suprapubic area.     There is no rebound and no guarding.   Musculoskeletal:         General: No tenderness. Normal range of motion.     Neurological: She is alert and oriented to person, place, and time. She has normal strength.   Skin: Skin is warm. Capillary refill takes less than 2 seconds.         ED Course   Procedures  Labs Reviewed   COMPREHENSIVE METABOLIC PANEL - Abnormal; Notable for the following components:       Result Value    Chloride 111 (*)     Carbon Dioxide 22 (*)     Glucose Level 80 (*)     Blood Urea Nitrogen 6.0 (*)     Albumin Level 2.7 (*)     Albumin/Globulin Ratio 0.8 (*)     All other components within normal limits   MAGNESIUM - Abnormal; Notable for the following components:    Magnesium Level 1.50 (*)     All other components within normal limits   CBC WITH DIFFERENTIAL - Abnormal; Notable for the following components:    RBC 3.92 (*)     Hgb 10.1 (*)     Hct 32.2 (*)     MCH 25.8 (*)     MCHC 31.4 (*)     RDW 20.6 (*)     IG# 0.05 (*)     All other components within normal limits   COVID/FLU A&B PCR - Normal    Narrative:     The Xpert Xpress  SARS-CoV-2/FLU/RSV plus is a rapid, multiplexed real-time PCR test intended for the simultaneous qualitative detection and differentiation of SARS-CoV-2, Influenza A, Influenza B, and respiratory syncytial virus (RSV) viral RNA in either nasopharyngeal swab or nasal swab specimens.         LIPASE - Normal   TROPONIN I - Normal   BLOOD CULTURE OLG   BLOOD CULTURE OLG   CBC W/ AUTO DIFFERENTIAL    Narrative:     The following orders were created for panel order CBC auto differential.  Procedure                               Abnormality         Status                     ---------                               -----------         ------                     CBC with Differential[[id number]]       Abnormal            Final result                 Please view results for these tests on the individual orders.   URINALYSIS, REFLEX TO URINE CULTURE   LACTIC ACID, PLASMA     EKG Readings: (Independently Interpreted)   Initial Reading: No STEMI. Rhythm: Sinus Tachycardia. Heart Rate: 105. Ectopy: No Ectopy. Conduction: Normal. ST Segments: Normal ST Segments. T Waves: Normal. Clinical Impression: Sinus Tachycardia       Imaging Results              CT Abdomen Pelvis With IV Contrast NO Oral Contrast (Final result)  Result time 03/24/24 15:28:43      Final result by Sebastian Castro MD (03/24/24 15:28:43)                   Impression:      1. Circumferential thickening of the cecum with surrounding inflammatory stranding.  This may represent an infectious or inflammatory colitis, however acute appendicitis cannot be entirely excluded as the appendix is not visualized on this exam.  2. Unchanged left adrenal nodule suspicious for metastatic disease.  3. Unchanged sclerotic metastases.  4. Emphysematous changes.      Electronically signed by: Sebastian Castro MD  Date:    03/24/2024  Time:    15:28               Narrative:    EXAMINATION:  CT ABDOMEN PELVIS WITH IV CONTRAST    CLINICAL HISTORY:  Abdominal pain, acute,  nonlocalized;    TECHNIQUE:  Axial images of the abdomen and pelvis were obtained with IV contrast administration.  Coronal and sagittal reconstructions were provided.  Three dimensional and MIP images were obtained and evaluated.  Total DLP was 320 mGy-cm. Dose lowering technique and automated exposure control were utilized for this exam.    COMPARISON:  CT of the chest abdomen and pelvis 01/16/2024.    FINDINGS:  There are emphysematous changes in the bilateral lung bases.  The heart is not enlarged.    The liver is homogeneous in attenuation.  The portal vein is patent.  The gallbladder, spleen, pancreas, and right adrenal gland are normal.  There is an unchanged left adrenal nodule.  There is a simple cyst in the left kidney.  The right kidney is normal.  There is no hydronephrosis or nephrolithiasis.    The stomach and small bowel are decompressed.  There is no bowel obstruction.  There is circumferential wall thickening of the cecum with surrounding inflammatory stranding.  The appendix is not definitively visualized.  The colon is normal.  The urinary bladder is normal.  There is no pelvic or retroperitoneal adenopathy.  The aorta is nonaneurysmal.  The multifocal osseous lesions are unchanged.                                       Medications   lactated ringers bolus 1,000 mL (1,000 mLs Intravenous New Bag 3/24/24 1500)   magnesium sulfate 2g in water 50mL IVPB (premix) (2 g Intravenous New Bag 3/24/24 1524)   piperacillin-tazobactam (ZOSYN) 4.5 g in dextrose 5 % in water (D5W) 100 mL IVPB (MB+) (has no administration in time range)   sodium chloride 0.9% flush 10 mL (has no administration in time range)   melatonin tablet 6 mg (has no administration in time range)   lactated ringers infusion (has no administration in time range)   ondansetron injection 4 mg (has no administration in time range)   iopamidoL (ISOVUE-370) injection 100 mL (75 mLs Intravenous Given 3/24/24 1508)   ondansetron injection 4 mg (4  mg Intravenous Given 3/24/24 1528)     Medical Decision Making  differential diagnosis  Nausea, vomiting, gastroenteritis, gastritis/GERD, pancreatitis, intra-abdominal pathology, metabolic derangement/dehydration, UTI,  as well as multiple other possible etiologies      Problems Addressed:  Appendicitis, unspecified appendicitis type: undiagnosed new problem with uncertain prognosis  Colitis: acute illness or injury    Amount and/or Complexity of Data Reviewed  Labs: ordered. Decision-making details documented in ED Course.  Radiology: ordered.  ECG/medicine tests: ordered and independent interpretation performed.    Risk  Prescription drug management.  Decision regarding hospitalization.               ED Course as of 03/24/24 1546   Sun Mar 24, 2024   1425 WBC: 7.42 [BS]   1425 Hemoglobin(!): 10.1 [BS]   1425 Hematocrit(!): 32.2 [BS]   1425 Platelet Count: 379 [BS]   1443 Sodium: 141 [BS]   1443 Potassium: 4.0 [BS]   1443 Chloride(!): 111 [BS]   1443 CO2(!): 22 [BS]   1443 Glucose(!): 80 [BS]   1443 BUN(!): 6.0 [BS]   1443 Creatinine: 0.65 [BS]   1443 BILIRUBIN TOTAL: 0.5 [BS]   1443 ALP: 52 [BS]   1443 ALT: 13 [BS]   1443 AST: 16 [BS]   1444 Troponin I: <0.010 [BS]   1444 Magnesium (!): 1.50  Will replaced with 2 g of Mag [BS]   1542 Spoke with Dr Bradford, general surgeon, he agrees that this possibly could be colitis versus appendicitis.  States patient needs to be placed on IV antibiotics and admitted for possible surgery/washout with possible colostomy placement.  He is okay with being primary on the case.  Requesting oncology consultation.  Okay with Zosyn.  Family informed of these findings and possible plan.  They agree with plan thus far. [BS]      ED Course User Index  [BS] Pavel Azar MD                             Clinical Impression:  Final diagnoses:  [R11.2] Nausea & vomiting  [K52.9] Colitis (Primary)  [K37] Appendicitis, unspecified appendicitis type          ED Disposition Condition     Admit                 Pavel Azar MD  03/24/24 1806

## 2024-03-25 ENCOUNTER — ANESTHESIA EVENT (OUTPATIENT)
Dept: SURGERY | Facility: HOSPITAL | Age: 64
DRG: 356 | End: 2024-03-25
Payer: MEDICARE

## 2024-03-25 ENCOUNTER — ANESTHESIA (OUTPATIENT)
Dept: SURGERY | Facility: HOSPITAL | Age: 64
DRG: 356 | End: 2024-03-25
Payer: MEDICARE

## 2024-03-25 LAB
ADV 40+41 DNA STL QL NAA+NON-PROBE: NOT DETECTED
ALBUMIN SERPL-MCNC: 2.5 G/DL (ref 3.4–4.8)
ALBUMIN/GLOB SERPL: 0.8 RATIO (ref 1.1–2)
ALP SERPL-CCNC: 51 UNIT/L (ref 40–150)
ALT SERPL-CCNC: 11 UNIT/L (ref 0–55)
AST SERPL-CCNC: 17 UNIT/L (ref 5–34)
ASTRO TYP 1-8 RNA STL QL NAA+NON-PROBE: NOT DETECTED
BASOPHILS # BLD AUTO: 0.02 X10(3)/MCL
BASOPHILS NFR BLD AUTO: 0.3 %
BILIRUB SERPL-MCNC: 0.7 MG/DL
BUN SERPL-MCNC: 6 MG/DL (ref 9.8–20.1)
C CAYETANENSIS DNA STL QL NAA+NON-PROBE: NOT DETECTED
C COLI+JEJ+UPSA DNA STL QL NAA+NON-PROBE: NOT DETECTED
C DIFF TOX A+B STL QL IA: POSITIVE
CALCIUM SERPL-MCNC: 8.2 MG/DL (ref 8.4–10.2)
CHLORIDE SERPL-SCNC: 106 MMOL/L (ref 98–107)
CLOSTRIDIUM DIFFICILE GDH ANTIGEN (OHS): POSITIVE
CO2 SERPL-SCNC: 20 MMOL/L (ref 23–31)
CREAT SERPL-MCNC: 0.66 MG/DL (ref 0.55–1.02)
CRYPTOSP DNA STL QL NAA+NON-PROBE: NOT DETECTED
E HISTOLYT DNA STL QL NAA+NON-PROBE: NOT DETECTED
EAEC PAA PLAS AGGR+AATA ST NAA+NON-PRB: NOT DETECTED
EC STX1+STX2 GENES STL QL NAA+NON-PROBE: NOT DETECTED
EOSINOPHIL # BLD AUTO: 0 X10(3)/MCL (ref 0–0.9)
EOSINOPHIL NFR BLD AUTO: 0 %
EPEC EAE GENE STL QL NAA+NON-PROBE: NOT DETECTED
ERYTHROCYTE [DISTWIDTH] IN BLOOD BY AUTOMATED COUNT: 20.6 % (ref 11.5–17)
ETEC LTA+ST1A+ST1B TOX ST NAA+NON-PROBE: NOT DETECTED
G LAMBLIA DNA STL QL NAA+NON-PROBE: NOT DETECTED
GFR SERPLBLD CREATININE-BSD FMLA CKD-EPI: >60 MLS/MIN/1.73/M2
GLOBULIN SER-MCNC: 3 GM/DL (ref 2.4–3.5)
GLUCOSE SERPL-MCNC: 88 MG/DL (ref 82–115)
H. PYLORI STOOL: NEGATIVE
HCT VFR BLD AUTO: 29.7 % (ref 37–47)
HGB BLD-MCNC: 9.2 G/DL (ref 12–16)
IMM GRANULOCYTES # BLD AUTO: 0.03 X10(3)/MCL (ref 0–0.04)
IMM GRANULOCYTES NFR BLD AUTO: 0.4 %
LYMPHOCYTES # BLD AUTO: 0.87 X10(3)/MCL (ref 0.6–4.6)
LYMPHOCYTES NFR BLD AUTO: 12.2 %
MCH RBC QN AUTO: 25.7 PG (ref 27–31)
MCHC RBC AUTO-ENTMCNC: 31 G/DL (ref 33–36)
MCV RBC AUTO: 83 FL (ref 80–94)
MONOCYTES # BLD AUTO: 0.25 X10(3)/MCL (ref 0.1–1.3)
MONOCYTES NFR BLD AUTO: 3.5 %
NEUTROPHILS # BLD AUTO: 5.94 X10(3)/MCL (ref 2.1–9.2)
NEUTROPHILS NFR BLD AUTO: 83.6 %
NOROVIRUS GI+II RNA STL QL NAA+NON-PROBE: NOT DETECTED
P SHIGELLOIDES DNA STL QL NAA+NON-PROBE: NOT DETECTED
PLATELET # BLD AUTO: 352 X10(3)/MCL (ref 130–400)
PMV BLD AUTO: 8.9 FL (ref 7.4–10.4)
POTASSIUM SERPL-SCNC: 4.2 MMOL/L (ref 3.5–5.1)
PROT SERPL-MCNC: 5.5 GM/DL (ref 5.8–7.6)
RBC # BLD AUTO: 3.58 X10(6)/MCL (ref 4.2–5.4)
RVA RNA STL QL NAA+NON-PROBE: NOT DETECTED
S ENT+BONG DNA STL QL NAA+NON-PROBE: NOT DETECTED
SAPO I+II+IV+V RNA STL QL NAA+NON-PROBE: NOT DETECTED
SHIGELLA SP+EIEC IPAH ST NAA+NON-PROBE: NOT DETECTED
SODIUM SERPL-SCNC: 135 MMOL/L (ref 136–145)
V CHOL+PARA+VUL DNA STL QL NAA+NON-PROBE: NOT DETECTED
V CHOLERAE DNA STL QL NAA+NON-PROBE: NOT DETECTED
WBC # SPEC AUTO: 7.11 X10(3)/MCL (ref 4.5–11.5)
Y ENTEROCOL DNA STL QL NAA+NON-PROBE: NOT DETECTED

## 2024-03-25 PROCEDURE — 80053 COMPREHEN METABOLIC PANEL: CPT | Performed by: STUDENT IN AN ORGANIZED HEALTH CARE EDUCATION/TRAINING PROGRAM

## 2024-03-25 PROCEDURE — 84597 ASSAY OF VITAMIN K: CPT | Performed by: SURGERY

## 2024-03-25 PROCEDURE — 94761 N-INVAS EAR/PLS OXIMETRY MLT: CPT

## 2024-03-25 PROCEDURE — 27000207 HC ISOLATION

## 2024-03-25 PROCEDURE — 86318 IA INFECTIOUS AGENT ANTIBODY: CPT | Performed by: SURGERY

## 2024-03-25 PROCEDURE — 21400001 HC TELEMETRY ROOM

## 2024-03-25 PROCEDURE — 85025 COMPLETE CBC W/AUTO DIFF WBC: CPT | Performed by: STUDENT IN AN ORGANIZED HEALTH CARE EDUCATION/TRAINING PROGRAM

## 2024-03-25 PROCEDURE — 63600175 PHARM REV CODE 636 W HCPCS: Performed by: SURGERY

## 2024-03-25 PROCEDURE — 87507 IADNA-DNA/RNA PROBE TQ 12-25: CPT | Performed by: SURGERY

## 2024-03-25 PROCEDURE — A9537 TC99M MEBROFENIN: HCPCS | Performed by: SURGERY

## 2024-03-25 PROCEDURE — 87338 HPYLORI STOOL AG IA: CPT | Performed by: SURGERY

## 2024-03-25 PROCEDURE — 25000003 PHARM REV CODE 250: Performed by: SURGERY

## 2024-03-25 PROCEDURE — 25000003 PHARM REV CODE 250: Performed by: STUDENT IN AN ORGANIZED HEALTH CARE EDUCATION/TRAINING PROGRAM

## 2024-03-25 PROCEDURE — 63600175 PHARM REV CODE 636 W HCPCS: Performed by: STUDENT IN AN ORGANIZED HEALTH CARE EDUCATION/TRAINING PROGRAM

## 2024-03-25 RX ORDER — SODIUM CHLORIDE, SODIUM LACTATE, POTASSIUM CHLORIDE, CALCIUM CHLORIDE 600; 310; 30; 20 MG/100ML; MG/100ML; MG/100ML; MG/100ML
INJECTION, SOLUTION INTRAVENOUS CONTINUOUS
Status: CANCELLED | OUTPATIENT
Start: 2024-03-25

## 2024-03-25 RX ORDER — CYANOCOBALAMIN 1000 UG/ML
1000 INJECTION, SOLUTION INTRAMUSCULAR; SUBCUTANEOUS DAILY
Status: COMPLETED | OUTPATIENT
Start: 2024-03-26 | End: 2024-03-28

## 2024-03-25 RX ORDER — POLYETHYLENE GLYCOL 3350 17 G/17G
136 POWDER, FOR SOLUTION ORAL ONCE
Status: COMPLETED | OUTPATIENT
Start: 2024-03-25 | End: 2024-03-25

## 2024-03-25 RX ORDER — KIT FOR THE PREPARATION OF TECHNETIUM TC 99M MEBROFENIN 45 MG/10ML
8 INJECTION, POWDER, LYOPHILIZED, FOR SOLUTION INTRAVENOUS
Status: COMPLETED | OUTPATIENT
Start: 2024-03-25 | End: 2024-03-25

## 2024-03-25 RX ADMIN — SODIUM CHLORIDE, POTASSIUM CHLORIDE, SODIUM LACTATE AND CALCIUM CHLORIDE: 600; 310; 30; 20 INJECTION, SOLUTION INTRAVENOUS at 10:03

## 2024-03-25 RX ADMIN — HYDROMORPHONE HYDROCHLORIDE 0.2 MG: 2 INJECTION INTRAMUSCULAR; INTRAVENOUS; SUBCUTANEOUS at 05:03

## 2024-03-25 RX ADMIN — SODIUM BICARBONATE: 84 INJECTION, SOLUTION INTRAVENOUS at 07:03

## 2024-03-25 RX ADMIN — PIPERACILLIN SODIUM AND TAZOBACTAM SODIUM 4.5 G: 4; .5 INJECTION, POWDER, LYOPHILIZED, FOR SOLUTION INTRAVENOUS at 09:03

## 2024-03-25 RX ADMIN — HYDROMORPHONE HYDROCHLORIDE 0.2 MG: 2 INJECTION INTRAMUSCULAR; INTRAVENOUS; SUBCUTANEOUS at 10:03

## 2024-03-25 RX ADMIN — SODIUM CHLORIDE, POTASSIUM CHLORIDE, SODIUM LACTATE AND CALCIUM CHLORIDE: 600; 310; 30; 20 INJECTION, SOLUTION INTRAVENOUS at 12:03

## 2024-03-25 RX ADMIN — MUPIROCIN 1 G: 20 OINTMENT TOPICAL at 09:03

## 2024-03-25 RX ADMIN — SODIUM CHLORIDE 125 MG: 9 INJECTION, SOLUTION INTRAVENOUS at 06:03

## 2024-03-25 RX ADMIN — POLYETHYLENE GLYCOL 3350 136 G: 17 POWDER, FOR SOLUTION ORAL at 09:03

## 2024-03-25 RX ADMIN — VANCOMYCIN HYDROCHLORIDE 250 MG: KIT at 06:03

## 2024-03-25 RX ADMIN — PIPERACILLIN SODIUM AND TAZOBACTAM SODIUM 4.5 G: 4; .5 INJECTION, POWDER, LYOPHILIZED, FOR SOLUTION INTRAVENOUS at 10:03

## 2024-03-25 RX ADMIN — PIPERACILLIN SODIUM AND TAZOBACTAM SODIUM 4.5 G: 4; .5 INJECTION, POWDER, LYOPHILIZED, FOR SOLUTION INTRAVENOUS at 04:03

## 2024-03-25 RX ADMIN — KIT FOR THE PREPARATION OF TECHNETIUM TC 99M MEBROFENIN 7.5 MILLICURIE: 45 INJECTION, POWDER, LYOPHILIZED, FOR SOLUTION INTRAVENOUS at 01:03

## 2024-03-25 RX ADMIN — PIPERACILLIN SODIUM AND TAZOBACTAM SODIUM 4.5 G: 4; .5 INJECTION, POWDER, LYOPHILIZED, FOR SOLUTION INTRAVENOUS at 03:03

## 2024-03-25 NOTE — H&P
Patient is a 64 yr old female with a past medical history stage IV non-small-cell lung cancer with bone METS, COPD stage 2, HTN(Hypertension), Hyperlipidemia as well as recently diagnosed and treated C diff presents to the Emergency Department for nausea, vomiting, and abdominal pain.   Patient states that she started vomiting this morning.  Tried taking Zofran but vomited up within 10 minutes. States she started having abdominal pain about 2 days ago. States that the pain is everywhere in her stomach.  Daughter states she has not been eating much since the diagnosis of the Cv- Diff. Patient states that she completed the antibiotics with a 10 day duration and the diarrhea has completely stopped.  She denies any cough or chest pain.  No worsening in her chronic shortness of breath.  Denies any diarrhea or constipation.  No known sick contacts or abnormal foods. Las chemotherapy treatment was 3 weeks ago.     No Known Allergies       Past Medical History  Asthma  COPD(chronic obstructive pulmonary disease)- Severe/ Tobacco Abuse   GERD (gastroesophageal reflux disease)- Protonix  High Cholesterol/HLD(Hyperlipidemia) - Lipitor   Hypothyroidism - Synthroid  Lung Cancer - invasive adenocarcinoma, poorly differentiated with solid morphology, focal tumor involvement of visceral pleura and focal tumor vascular invasion (vein), with 2 of 3 mediastinal lymph nodes positive for metastatic carcinoma. - Off of [place] Drug(Cancer Drug) due to the effects to her skin and thyroids.   TIA(transient ischemic attack) - No Deficits - Lt Side Weakened - 12.5 yrs ago.   Unspecified osteoarthritis, unspecified site  Uterine Ca, Secondary to TAHBSO - Bleeding 35 yrs ago- 1989      Past Surgical History   Insertion of tunneled Central Venous Catheter (CVC) with Subcutaneous Port - Dr Peter Dial - Peripheral Vascular - 12/04/2023  Lipoma Multiple Sites   Lung Lobectomy - Right - 12/08/2022 - Dr Jass Browne IV - 12/08/2022  Surgical  "Removal of Lymph Node - Dr Jass Browne IV, - 2022  Thoracotomy - Right lower lobectomy with Lymph Node Dissection - Right - 2022  Total Abdominal Hysterectomy W/Bilateral Salpingoophorectomy -   No EGD  Colonoscopy scheduled for 2024  No Stress Test  No Echocardiogram   No Angiogram     Immunizations   DTAP>5 yrs   Flu vaccine -   Pneumonia up to date   Covid X 3  Shingles X 2  No Hepatitis     Family History  Mother  of HTN, Hepatitis, Ovarian Ca, and Liver Ca.  Father  of Heart failure & Prostate Ca  Maternal Grandmother  of Melanoma Ca  Brother  X 2 has Asthma  Sister  of Fibroids   Sister  of Skin Ca.    Social History   Smoker - 1.5 PPD for 38 yrs - Quit 2022  ETOH - 6 Toni Per Weekend X 20 yrs & quit 20 yrs ago,.  No Drugs   No penitentiary   No Rehab  No    Level Education - Adult Education - 9th grade level  GED   50 yrs - Common Law -  is  -  AB1  9.   Homemaker  10. Lives in [place], [place]  11. PCP is PAULINE Pablo    Review of Systems   Patient is positive for activity change, appetite change, and fatigue.   Patient is positive for abdominal pain, nausea and vomiting.     Objective   Vital Signs - 2024  BP is 144/82  Pulse is 108  Resp is 18  Temp is 97.9F(36.6 C)  SPO2 is 99 %  Height is 5' 7" (170.23 cm)  Last Weight was 62.6 kg and Adj Weight is 62 kg(136 lbs 10.9 oz)   BMI is 21.61 kg/m2    Physical Exam   Patient appears well-developed and well-nourished with no distress  Normal rate and regular rhythm   Head is atraumatic   Noted that patient has dentures, top & bottom  Pupils are round and equal and reactive to light. Noted that patient wears glasses   Abdomen is soft with abdominal tenderness(mild generalized) in the right lower quadrant and suprapubic area.   Neck is supple with normal range of motion.   Patient is alert and oriented to person, place and time.   Skin is warm     Lab Findings   CBC " -03/24/2024  7.42 >      10.1/32.2    <379    82.1/25.8  CMP  141/4.0        111/22     6.0/0.65       <80    8.6/1.50       CT Abdomen Pelvis With IV Contrast, No Oral Contrast 03/24/2025  Impression  1. Circumferential thickening of the cecum with surrounding inflammatory stranding.  This may represent an infectious or inflammatory colitis, however acute appendicitis cannot be entirely excluded as the appendix is not visualized on this exam.  2. Unchanged left adrenal nodule suspicious for metastatic disease.  3. Unchanged sclerotic metastases.  4. Emphysematous changes.    Xray Chest 1 View 03/24/2024  Impression   1.    No acute chest disease is identified..   2.     MediPort insertion     US Abdomen Complete - 03/25/2024  Impression  Reading not complete      Assessment   Patient is a 64 yr old female with a past medical history stage IV non-small-cell lung cancer with bone METS, COPD stage 2, HTN(Hypertension), Hyperlipidemia as well as recently diagnosed and treated C diff presents to the Emergency Department for nausea, vomiting, and abdominal pain.   Patient states that she started vomiting this morning.  Tried taking Zofran but vomited up within 10 minutes. States she started having abdominal pain about 2 days ago. States that the pain is everywhere in her stomach.  Daughter states she has not been eating much since the diagnosis of the Cv- Diff. Patient states that she completed the antibiotics with a 10 day duration and the diarrhea has completely stopped.  She denies any cough or chest pain.  No worsening in her chronic shortness of breath.  Denies any diarrhea or constipation.  No known sick contacts or abnormal foods. Las chemotherapy treatment was 3 weeks ago.     Plan  US ABD   2.   HIDA Scan with CCR  3.   Miralex Prep  4.   Check Stool PCR   5.   Check C-Diff  6.    Check for H-pylori   7.    Colonoscopy

## 2024-03-25 NOTE — ANESTHESIA PREPROCEDURE EVALUATION
03/25/2024  Mansi Jin is a 64 y.o., female.      Pre-op Assessment    I have reviewed the Patient Summary Reports.     I have reviewed the Nursing Notes. I have reviewed the NPO Status.   I have reviewed the Medications.     Review of Systems  Anesthesia Hx:             Denies Family Hx of Anesthesia complications.    Denies Personal Hx of Anesthesia complications.                    Social:  Smoker, No Alcohol Use       Hematology/Oncology:       -- Anemia:                                  Cardiovascular:     Hypertension, well controlled            STUBBS                            Pulmonary:   COPD, moderate Asthma moderate Shortness of breath                  Renal/:  Chronic Renal Disease, CKD                Hepatic/GI:     GERD, well controlled             Musculoskeletal:  Arthritis               Neurological:  TIA,                                     Endocrine:   Hypothyroidism          Dermatological:  Skin Normal    Psych:  Psychiatric Normal                    Physical Exam  General: Cooperative, Alert and Oriented    Airway:  Mallampati: II   Mouth Opening: Normal  TM Distance: Normal  Tongue: Normal  Neck ROM: Normal ROM    Dental:  Intact        Anesthesia Plan  Type of Anesthesia, risks & benefits discussed:    Anesthesia Type: Gen Natural Airway  Intra-op Monitoring Plan: Standard ASA Monitors  Post Op Pain Control Plan:   (medical reason for not using multimodal pain management)  Induction:  IV  Informed Consent: Informed consent signed with the Patient and all parties understand the risks and agree with anesthesia plan.  All questions answered. Patient consented to blood products? Yes  ASA Score: 3    Ready For Surgery From Anesthesia Perspective.     .

## 2024-03-25 NOTE — PLAN OF CARE
Problem: Adult Inpatient Plan of Care  Goal: Plan of Care Review  Outcome: Ongoing, Progressing  Goal: Patient-Specific Goal (Individualized)  Outcome: Ongoing, Progressing  Goal: Absence of Hospital-Acquired Illness or Injury  Outcome: Ongoing, Progressing  Goal: Optimal Comfort and Wellbeing  Outcome: Ongoing, Progressing  Goal: Readiness for Transition of Care  Outcome: Ongoing, Progressing     Problem: Coping Ineffective (Oncology Care)  Goal: Effective Coping  Outcome: Ongoing, Progressing     Problem: Fatigue (Oncology Care)  Goal: Improved Activity Tolerance  Outcome: Ongoing, Progressing     Problem: Oral Intake Altered (Oncology Care)  Goal: Optimal Oral Intake  Outcome: Ongoing, Progressing     Problem: Oral Mucositis (Oncology Care)  Goal: Improved Oral Mucous Membrane Integrity  Outcome: Ongoing, Progressing     Problem: Pain Acute (Oncology Care)  Goal: Optimal Pain Control  Outcome: Ongoing, Progressing     Problem: Adjustment to Illness (Bowel Disease, Inflammatory)  Goal: Optimal Adaptation to Chronic Illness  Outcome: Ongoing, Progressing     Problem: Diarrhea (Bowel Disease, Inflammatory)  Goal: Diarrhea Symptom Relief  Outcome: Ongoing, Progressing     Problem: Infection (Bowel Disease, Inflammatory)  Goal: Absence of Infection Signs and Symptoms  Outcome: Ongoing, Progressing     Problem: Nutrition Impaired (Bowel Disease, Inflammatory)  Goal: Optimal Nutrition  Outcome: Ongoing, Progressing     Problem: Pain (Bowel Disease, Inflammatory)  Goal: Acceptable Pain Control  Outcome: Ongoing, Progressing     Problem: Pain Acute  Goal: Acceptable Pain Control and Functional Ability  Outcome: Ongoing, Progressing     Problem: Infection  Goal: Absence of Infection Signs and Symptoms  Outcome: Ongoing, Progressing

## 2024-03-25 NOTE — CONSULTS
HEMATOLOGY/ONCOLOGY OFFICE CLINIC VISIT  [facility] [place]    ONCOLOGICAL HISTORY:     Diagnosis:  - Stage IV NSCLC with bone mets.  - NSCLC / Adenocarcinoma Stage IIIA pT2a pN2--dx 12/2022  - PD-L1 1%  - History Uterine cancer - Dx 1989    Current Treatment:   Taxotere + Xgeva- 10/24/23-->  Held cycle 3 12/6/23 r/t bilateral PE, started on Eliquis      Treatment History:  - 1989 s/p total hysterectomy   - 12/08/2022: Right lower and middle bilobectomy with mediastinal lymph node dissection.   - Carboplatin + Pemetrexed  3/15/2023-5/1/2023  - Palliative RT   - Carboplatin + Pemetrexed started 3/15/2023  - Keytruda stopped 9/19/23 r/t elevated TSH and PET/CT progression, last dose given 8/29/23  - Xgeva- 7/18/2023-->    Plan of care: palliative systemic therapy      IMAGING:   - 08/10/2022 PET: The elongated subpleural right lower lobe nodule demonstrates fairly intense FDG activity.  Infectious, inflammatory and neoplastic etiologies remain possible. No suspicious PET findings elsewhere.  - 12/08/2022 CT HEAD without contrast: No acute intracranial abnormality identified.  Findings of mild microvascular ischemic disease.  - 12/2022: U/S Upper extremity: Thrombophlebitis of the left cephalic vein. No deep venous thrombosis in the left upper extremity.  - 01/03/2023 MRI Brain : GARY   - 01/12/2023 NM PET CT 1. Postsurgical changes of recent right lower and middle lobectomies.  There is a small right pleural effusion.  There is nonspecific peripheral uptake along the pleura at the right lung base and perihilar region which may be related to healing response in the setting of recent surgery.  Similarly borderline mediastinal uptake is nonspecific in the recent postoperative setting and could be reactive.  Continued attention recommended on follow-up. 2. Focal Uptake at the medial right acetabular roof has a max SUV of 6.4. This is new compared to previous exam. Metastasis is a consideration  -PET 6/5/2023: Previously  visualized moderately FDG avid opacities towards the right lung base improved.  Pleural effusion also improved.  No new suspicious PET findings in the lungs.  Severe emphysema. Some left adrenal thickening is similar to prior but there is now moderate associated FDG activity, max SUV is 3.6. FDG activity is again seen associated with the right superior acetabulum.  FDG avid area appears slightly larger today with slightly increased sclerosis.  SUV is 9.7, previously 6.4.  There is a newly evident subcentimeter FDG avid lesion left pedicle T10. Additional subcentimeter FDG avid lesion T4 vertebral body SUV 3.4.suspicious for metastatic disease.  -MRI T spine 6/15/2023:   1. Foci of abnormal signal intensity and enhancement at the inferior endplate of T3 and at body and left pedicle of T10.  Metastatic neoplastic etiology must be considered.  2. Thoracic spondylosis  3. Mild encroachment into the right neural foramina at the T3-4 and T4-5 secondary to mild posterior disc bulge and posterior osteophyte formation  4. Multilevel mild posterior disc bulge which does not result in significant central spinal stenosis.  9/27/23 Thyroid U/S:  Very minimal to no significant uptake of radiotracer isotope within the thyroid lobes bilaterally.  Clinical correlation is indicated  CT CAP 9/27/23:  1. Interim increasing nodular soft tissue densities/nodes/mass is at the mediastinum and right hilum measuring up the 3.0 cm.  A neoplastic etiology must be considered  2. Interim development of a 2.5 cm low-attenuation mass at the left adrenal gland.  A metastatic etiology must be considered  3. Interim development of a sclerotic foci at T3 and T10.  A metastatic etiology must be considered  4. Small right pleural effusion with associated infiltrate and atelectasis at the right lung base suspect  5. Suspect small cyst at the left kidney as described  6. Mild ill-defined soft tissue fullness at the cervical vaginal region  7. Borderline  cardiomegaly  10/6/23 PET/CT:  1. Disease progression with new FDG avid metastatic lymph nodes in the chest and right hilum.  2. Bilateral axillary and inguinal lymph nodes with low level uptake are indeterminate but concerning for metastasis given the additional findings.  3. Worsening and new FDG avid osseous disease involving both the axial and appendicular skeleton.  4. Enlarging left adrenal metastasis.  12/6/23 CTA:  1. Soft tissue masslike density again evident at the right hilum causing mass effect on the right main pulmonary artery  2. Interim development of filling defects/pulmonary emboli at the diminutive posterior right lower lobe pulmonary arteries.  A few small defects/pulmonary emboli have developed at the pulmonary arteries at the posterior left lower lobe since the prior exam.  3. Postsurgical changes right hilum with volume loss at the right lung and elevation of the right hemidiaphragm with associated posterior right basilar pleural reaction, infiltrate, and atelectasis  4. Advanced emphysematous changes with the right side greater than the left  5. Thoracic spondylosis with the sclerotic foci at the T3 and T10  6. The cancer center was notified of the findings on 12/06/2023 at 10:00  1/16/24 CT CAP:  1. Nodular opacities again identified at the right hilum and mediastinum which have a similar appearance to the prior exam  2. Low-attenuation mass again evident at the left adrenal gland  3. Sclerotic lytic lesions evident at T3, T5, T10, and L5.  A metastatic neoplastic etiology must be considered  4. Advanced emphysematous changes with resolution of small right pleural effusion.  There is patchy infiltrate, atelectasis and or scarring at the posterior right lung base.  5. Findings consistent with cysts at the left kidney  6. Ill-defined soft tissue fullness again evident at the cervical vaginal region  7. Findings of mild constipation  1/16/24 NM Bone Scan:  1. Scattered focal activity at the  thoracic spine, lumbar spine, right hip/trochanteric region, left iliac bone, and left sacrum.  A metastatic neoplastic etiology must be considered  2. Bar like CT intense activity at T10 suggesting acute compression fracture  3. Mild asymmetric prominence of the right upper renal collecting system compared to the left    PATHOLOGY:  - 12/08/2022: Invasive solid adenocarcinoma (3.1 CM); G3, poorly differentiated. Visceral Pleura Invasion present without definite serosal surface involvement. Lymphovascular invasion (Vein) present. All margins negative for invasive carcinoma, distance 2.5 cm. LN - 4/14 (10R: Hilar, Posterior mediastinal (RACHEAL), 4R Lower paratracheal, 9R Pulmonary ligament, 10R). pT2a pN2  - 02/22/2023: RIGHT ACETABULAR LESION, CT - GUIDE CORE NEEDLE BIOPSY : FIBRINOPURULENT EXUDATE ADMIXED WITH ATYPICAL EPITHELIOID GROUPS,   NORMAL    MARROW ELEMENTS AND ABUNDANT BLOOD CLOT      Subjective:     HPI  Mansi Jin  64 y.o.  female with past medical history significant for HTN, HLD, uterine cancer status post total hysterectomy in 1989,  CKD Stage 2, COPD Stage 3, referred for management of NSCLC s/p RLL / RML lobectomy with Mediastinal Lymph Node Dissection on 12/8/2022.     She was noted to have RLL lung mass and underwent CT guided biopsy which was non diagnostic. PET scan demonstrated significant avidity within the lesion. Given high risk for malignancy, she underwent right lower lobectomy and right middle lobectomy on 12/08/2022. Pathology came back with invasive adenocarcinoma, poorly differentiated with solid morphology, focal tumor involvement of visceral pleura and focal tumor vascular invasion (vein), with mediastinal lymph nodes positive for metastatic carcinoma.      PET on 01/12/2023 showed focal uptake at the medial right acetabular roof but the biopsy was negative for malignancy. Discussed with radiology, they recommended adjuvant chemotherapy and then they will reassess later for  radiation therapy.     Chief Complaint: Vomiting (C/o vomiting with generalized pain x 2 days but worse since 5am. Hx lung Ca, receiving treatment here at [facility])      Interval History:   She returns to the clinic today for a follow-up. She was diagnosed with Cdiff one week after recieveing C 7 taxotere. She completed her Flagyl today. Diarrhea has improved, now only having four small bowel movements a day. She does feel week and tired. Appetite has now returned. She was able to eat something today. She is scheduled to see her PCP 3/21. Pain is stable with Nocro 10 as needed.  She continues taking her Eliquis 5 mg BID for bilateral PE. She is taking her oral iron every other day and tolerating well. Now seeing dermatology for vitiligo to bilateral fingertips.      Past Medical History:   Diagnosis Date    ASTHMA     COPD (chronic obstructive pulmonary disease)     SEVERE    GERD (gastroesophageal reflux disease)     High cholesterol     HLD (hyperlipidemia)     Hypothyroidism, unspecified     Lung cancer 12/08/2022    invasive adenocarcinoma, poorly differentiated with solid morphology, focal tumor involvement of visceral pleura and focal tumor vascular invasion (vein), with 2 of 3 posterior mediastinal lymph nodes positive for metastatic carcinoma    Stage 2 chronic kidney disease     TIA (transient ischemic attack)     no deficits    Tobacco abuse     Unspecified osteoarthritis, unspecified site     Uterine cancer 1989      Past Surgical History:   Procedure Laterality Date    INSERTION OF TUNNELED CENTRAL VENOUS CATHETER (CVC) WITH SUBCUTANEOUS PORT N/A 12/04/2023    Procedure: [id number];  Surgeon: Peter Dial MD;  Location: [facility] OR;  Service: Peripheral Vascular;  Laterality: N/A;    lipoma multiple sites      LUNG LOBECTOMY Right 12/08/2022    Procedure: LOBECTOMY, LUNG;  Surgeon: Jass Browne IV, MD;  Location: [facility] OR;  Service: Cardiothoracic;  Laterality: Right;    SURGICAL REMOVAL OF  LYMPH NODE  2022    Procedure: EXCISION, LYMPH NODE;  Surgeon: Jass Browne IV, MD;  Location: [facility] OR;  Service: Cardiothoracic;;    THORACOTOMY Right 2022    Procedure: THORACOTOMY;  Surgeon: Jass Browne IV, MD;  Location: [facility] OR;  Service: Cardiothoracic;  Laterality: Right;  Right Lower Lobectomy with Lymph Node Dissection    TOTAL ABDOMINAL HYSTERECTOMY W/ BILATERAL SALPINGOOPHORECTOMY       Social History     Socioeconomic History    Marital status: Single    Number of children: 5   Tobacco Use    Smoking status: Former     Current packs/day: 0.00     Average packs/day: 0.5 packs/day for 50.0 years (25.0 ttl pk-yrs)     Types: Cigarettes     Start date: 1972     Quit date: 2022     Years since quittin.2    Smokeless tobacco: Never   Substance and Sexual Activity    Alcohol use: Not Currently     Comment: quit 25years ago    Drug use: Never    Sexual activity: Yes     Partners: Male     Birth control/protection: See Surgical Hx     Social Determinants of Health     Financial Resource Strain: Low Risk  (2023)    Overall Financial Resource Strain (CARDIA)     Difficulty of Paying Living Expenses: Not very hard   Food Insecurity: No Food Insecurity (2023)    Hunger Vital Sign     Worried About Running Out of Food in the Last Year: Never true     Ran Out of Food in the Last Year: Never true   Transportation Needs: No Transportation Needs (2023)    PRAPARE - Transportation     Lack of Transportation (Medical): No     Lack of Transportation (Non-Medical): No   Physical Activity: Inactive (2023)    Exercise Vital Sign     Days of Exercise per Week: 0 days     Minutes of Exercise per Session: 0 min   Stress: No Stress Concern Present (2023)    [employer] - Occupational Stress Questionnaire     Feeling of Stress : Only a little   Social Connections: Socially Integrated (2023)    Social Connection and Isolation Panel  [NHANES]     Frequency of Communication with Friends and Family: More than three times a week     Frequency of Social Gatherings with Friends and Family: Three times a week     Attends [employer] Services: More than 4 times per year     Active Member of Clubs or Organizations: Yes     Attends Club or Organization Meetings: 1 to 4 times per year     Marital Status: Living with partner   Housing Stability: Low Risk  (9/22/2023)    Housing Stability Vital Sign     Unable to Pay for Housing in the Last Year: No     Number of Places Lived in the Last Year: 1     Unstable Housing in the Last Year: No      Family History   Problem Relation Age of Onset    Cancer Maternal Grandfather     Heart failure Father     Prostate cancer Father     Hypertension Mother     Hepatitis Mother     Ovarian cancer Mother     Liver cancer Mother     Asthma Brother     Asthma Brother     Fibroids Sister     Skin cancer Sister       Review of patient's allergies indicates:  No Known Allergies     Review of Systems   Constitutional:  Positive for fatigue. Negative for activity change, appetite change, chills, fever and unexpected weight change.   HENT:  Negative for mouth dryness, mouth sores, nosebleeds, sinus pressure/congestion, sore throat and trouble swallowing.    Eyes: Negative.  Negative for visual disturbance.   Respiratory:  Positive for cough and shortness of breath (on exertion, chronic).    Cardiovascular:  Negative for chest pain, palpitations and leg swelling.   Gastrointestinal:  Negative for abdominal distention, abdominal pain, blood in stool, change in bowel habit, constipation, diarrhea, nausea and vomiting.   Endocrine: Negative.    Genitourinary: Negative.  Negative for dysuria, frequency, hematuria and urgency.   Musculoskeletal:  Negative for arthralgias, back pain, leg pain, myalgias and neck pain.   Integumentary:  Negative for rash, breast mass, breast discharge and breast tenderness. Negative.   Allergic/Immunologic:  Negative.    Neurological:  Negative for dizziness, tremors, syncope, speech difficulty, weakness, light-headedness, numbness, headaches and memory loss.   Hematological: Negative.  Does not bruise/bleed easily.   Psychiatric/Behavioral: Negative.  Negative for confusion and suicidal ideas.    Breast: Negative for mass and tenderness        Objective:        Vitals:    03/25/24 1134   BP: 101/67   Pulse: 100   Resp: 18   Temp: 98 °F (36.7 °C)         Wt Readings from Last 6 Encounters:   03/24/24 62.6 kg (138 lb)   03/21/24 61.7 kg (136 lb)   03/20/24 62 kg (136 lb 11 oz)   03/18/24 61.2 kg (135 lb)   03/11/24 61.2 kg (135 lb)   03/06/24 62.4 kg (137 lb 9.6 oz)     Body mass index is 21.61 kg/m².  Body surface area is 1.72 meters squared.       Physical Exam  Vitals and nursing note reviewed.   Constitutional:       Appearance: Normal appearance.   HENT:      Head: Normocephalic and atraumatic.      Nose: No congestion.   Eyes:      General: No scleral icterus.     Extraocular Movements: Extraocular movements intact.      Conjunctiva/sclera: Conjunctivae normal.   Neck:      Vascular: No JVD.   Cardiovascular:      Rate and Rhythm: Normal rate and regular rhythm.      Heart sounds: No murmur heard.  Pulmonary:      Effort: Pulmonary effort is normal.      Breath sounds: Decreased breath sounds present. No wheezing or rhonchi.   Abdominal:      General: Bowel sounds are normal. There is no distension.      Palpations: Abdomen is soft.      Tenderness: There is no abdominal tenderness.   Musculoskeletal:      Cervical back: Neck supple.   Lymphadenopathy:      Head:      Right side of head: No submental or submandibular adenopathy.      Left side of head: No submental or submandibular adenopathy.      Cervical: No cervical adenopathy.      Upper Body:      Right upper body: No supraclavicular or axillary adenopathy.      Left upper body: No supraclavicular or axillary adenopathy.      Lower Body: No right inguinal  adenopathy. No left inguinal adenopathy.      Comments: No adenopathy noted bilaterally   Skin:     General: Skin is warm.      Coloration: Skin is not jaundiced.      Findings: No lesion or rash.      Comments: Change of skin color to bilateral fingertips   Neurological:      General: No focal deficit present.      Mental Status: She is alert and oriented to person, place, and time.      Cranial Nerves: Cranial nerves 2-12 are intact.      Gait: Gait normal.   Psychiatric:         Attention and Perception: Attention normal.         Speech: Speech normal.         Behavior: Behavior is cooperative.         Cognition and Memory: Cognition normal.         Judgment: Judgment normal.       ECOG SCORE               LABS      Admission on 03/24/2024   Component Date Value    Sodium Level 03/24/2024 141     Potassium Level 03/24/2024 4.0     Chloride 03/24/2024 111 (H)     Carbon Dioxide 03/24/2024 22 (L)     Glucose Level 03/24/2024 80 (L)     Blood Urea Nitrogen 03/24/2024 6.0 (L)     Creatinine 03/24/2024 0.65     Calcium Level Total 03/24/2024 8.6     Protein Total 03/24/2024 6.0     Albumin Level 03/24/2024 2.7 (L)     Globulin 03/24/2024 3.3     Albumin/Globulin Ratio 03/24/2024 0.8 (L)     Bilirubin Total 03/24/2024 0.5     Alkaline Phosphatase 03/24/2024 52     Alanine Aminotransferase 03/24/2024 13     Aspartate Aminotransfera* 03/24/2024 16     eGFR 03/24/2024 >60     Influenza A PCR 03/24/2024 Not Detected     Influenza B PCR 03/24/2024 Not Detected     SARS-CoV-2 PCR 03/24/2024 Not Detected     Lipase Level 03/24/2024 55     Magnesium Level 03/24/2024 1.50 (L)     Troponin-I 03/24/2024 <0.010     Color, UA 03/24/2024 Yellow     Appearance, UA 03/24/2024 Cloudy (A)     Specific [place], UA 03/24/2024 1.015     pH, UA 03/24/2024 8.5     Protein, UA 03/24/2024 Negative     Glucose, UA 03/24/2024 Negative     Ketones, UA 03/24/2024 2+ (A)     Blood, UA 03/24/2024 Negative     Bilirubin, UA 03/24/2024 Negative      Urobilinogen, UA 03/24/2024 0.2     Nitrites, UA 03/24/2024 Negative     Leukocyte Esterase, UA 03/24/2024 Negative     QRS Duration 03/24/2024 62     OHS QTC Calculation 03/24/2024 433     WBC 03/24/2024 7.42     RBC 03/24/2024 3.92 (L)     Hgb 03/24/2024 10.1 (L)     Hct 03/24/2024 32.2 (L)     MCV 03/24/2024 82.1     MCH 03/24/2024 25.8 (L)     MCHC 03/24/2024 31.4 (L)     RDW 03/24/2024 20.6 (H)     Platelet 03/24/2024 379     MPV 03/24/2024 9.0     Neut % 03/24/2024 77.3     Lymph % 03/24/2024 17.1     Mono % 03/24/2024 4.6     Eos % 03/24/2024 0.0     Basophil % 03/24/2024 0.3     Lymph # 03/24/2024 1.27     Neut # 03/24/2024 5.74     Mono # 03/24/2024 0.34     Eos # 03/24/2024 0.00     Baso # 03/24/2024 0.02     IG# 03/24/2024 0.05 (H)     IG% 03/24/2024 0.7     Lactic Acid Level 03/24/2024 1.2     CAMPYLOBACTER 03/25/2024 Not Detected     PLESIOMONAS SHIGELLOIDES 03/25/2024 Not Detected     SALMONELLA 03/25/2024 Not Detected     Vibrio 03/25/2024 Not Detected     YERSINIA ENTEROCOLITICA 03/25/2024 Not Detected     ENTEROAGGREGATIVE E. COL* 03/25/2024 Not Detected     ENTEROPATHOGENIC E. COLI* 03/25/2024 Not Detected     Enterotoxigenic E. coli * 03/25/2024 Not Detected     SHIGA-LIKE TOXIN-PRODUCI* 03/25/2024 Not Detected     Shigella/Enteroinvasive * 03/25/2024 Not Detected     CRYPTOSPORIDIUM 03/25/2024 Not Detected     Cycolospora cayetanensis 03/25/2024 Not Detected     Entamoeba histolytica 03/25/2024 Not Detected     Giardia lamblia 03/25/2024 Not Detected     Adenovirus F 40/41 03/25/2024 Not Detected     Astrovirus 03/25/2024 Not Detected     Norovirus GI/GII 03/25/2024 Not Detected     Rotavirus A 03/25/2024 Not Detected     Sapovirus 03/25/2024 Not Detected     VIBRIO CHOLERAE 03/25/2024 Not Detected     Clostridium Difficile GD* 03/25/2024 Positive (A)     Clostridium Difficile To* 03/25/2024 Positive (A)     Sodium Level 03/25/2024 135 (L)     Potassium Level 03/25/2024 4.2     Chloride  03/25/2024 106     Carbon Dioxide 03/25/2024 20 (L)     Glucose Level 03/25/2024 88     Blood Urea Nitrogen 03/25/2024 6.0 (L)     Creatinine 03/25/2024 0.66     Calcium Level Total 03/25/2024 8.2 (L)     Protein Total 03/25/2024 5.5 (L)     Albumin Level 03/25/2024 2.5 (L)     Globulin 03/25/2024 3.0     Albumin/Globulin Ratio 03/25/2024 0.8 (L)     Bilirubin Total 03/25/2024 0.7     Alkaline Phosphatase 03/25/2024 51     Alanine Aminotransferase 03/25/2024 11     Aspartate Aminotransfera* 03/25/2024 17     eGFR 03/25/2024 >60     WBC 03/25/2024 7.11     RBC 03/25/2024 3.58 (L)     Hgb 03/25/2024 9.2 (L)     Hct 03/25/2024 29.7 (L)     MCV 03/25/2024 83.0     MCH 03/25/2024 25.7 (L)     MCHC 03/25/2024 31.0 (L)     RDW 03/25/2024 20.6 (H)     Platelet 03/25/2024 352     MPV 03/25/2024 8.9     Neut % 03/25/2024 83.6     Lymph % 03/25/2024 12.2     Mono % 03/25/2024 3.5     Eos % 03/25/2024 0.0     Basophil % 03/25/2024 0.3     Lymph # 03/25/2024 0.87     Neut # 03/25/2024 5.94     Mono # 03/25/2024 0.25     Eos # 03/25/2024 0.00     Baso # 03/25/2024 0.02     IG# 03/25/2024 0.03     IG% 03/25/2024 0.4          Assessment:       1. Colitis    2. Nausea & vomiting    3. Appendicitis, unspecified appendicitis type                Stage IV NSCLC with bone mets.    Originally: NSCLC / Adenocarcinoma Stage IIIA pT2a pN2  - 12/08/2022: Right lower and middle bilobectomy with mediastinal lymph node dissection. Path: Invasive adenocarcinoma, poorly differentiated, focal tumor involvement of visceral pleura and focal tumor vascular invasion (vein), with mediastinal lymph nodes positive for metastatic carcinoma   - TMB - 5, MSI-S, MET amplification, ROS1.  No mutations in EGFR, BRAF, ALK, ERBB2, KRAS, RET,    - 1% tumor cells are positive for PD-L1   - patient evaluated by Radiation Oncology with recommendation to treat with chemotherapy 1st and will re-evaluate the patient by the end of chemotherapy for radiation therapy  -  01/12/2023 PET: Focal uptake at the medial right acetabular roof. Metastasis is a consideration  - 02/22/2023: RIGHT ACETABULAR LESION, CT - GUIDE CORE NEEDLE BIOPSY : FIBRINOPURULENT EXUDATE ADMIXED WITH ATYPICAL EPITHELIOID GROUPS,   NORMAL    MARROW ELEMENTS AND ABUNDANT BLOOD CLOT  PET scan showed a suspicious finding in the right acetabular roof and patient then had a CT-guided biopsy no evidence of malignancy. Started with adjuvant chemotherapy with Carboplatin and pemetrexed x 4 cycles (3/15/23-5/18/23)   Patient has been seen by Rad/Onc (Dr Mireles) since the completion of her chemotherapy and plan was to pursue radiation x 5-6 weeks.  She discussed with the patient that they have showed no overall survival benefit in the setting of postoperative radiation therapy but data supports a larger benefit inpatient with extracapsular extension.  If she tolerated chemotherapy well and continue with good performance status then plan was for PORT   Restaging PET-CT after completion of chemotherapy and prior to radiation therapy showed progression at the right acetabulum.  Patient with progression of disease now with bone metastases.  She bagan palliative radiation treatment with Dr. Mireles on 6/28/23, 10 treatments over two weeks.  PET CT 6/5/23 with progression Right superior acetabulum.  FDG avid area appears slightly larger with slightly increased sclerosis.  SUV is 9.7, previously 6.4.  She had palliative radiation. Started on Keytruda 7/18/23. Developed skin changes with Keytruda and later on diagnosed with vitiligo. Now on Taxotere + Xgeva- 10/24/23 due to progression.  Cycle 3 of Taxotere held 12/6/23 r/t bilateral PE  Bone mets   Xgeva q4w  She was seen by Rad/Onc for palliative RT but recommendations were to start chemotherapy and re eval after 2 cycles  Iron deficiency Anemia  Continue with 1 tab PO iron QOD with Vitamin C.   Currently scheduled for colonoscopy 6/2024   Hypothyroid   Followed by  endocrinology Dr. Elena LYN  Now on Eliquis 5 mg twice daily  Vitiligo to bilateral fingertips  Followed by Dermatology                  Plan:   Patient with recent vomiting. Last chemo was 3 weeks ago and is was to be due tomorrow (though pateint states not due until next week). Recently treated for C diff and profound hypokalemia. CT this admit shows inflammation I the cecum. Patient states she had double vision until her potassium was corrected. She states no headaches. The nausea has resolved as has the double vision. Is to have a scope this afternoon. Initially, I would have recommend a head CT prior to discharge to assure that new brain mets are not the etiology of her vomiting. However, the patient assures me she is back to normal and declines this study. No other recommendations. An ileus from hypokalemia is another possible etiology of her vomiting but this was not seen on Ct.      The patient was seen, interviewed and examined. Pertinent lab and radiology studies were reviewed.   The patient was given ample opportunity to ask questions, and to the best of my abilities, all questions answered to satisfaction; patient demonstrated understanding of what we discussed and agreeable to the plan. Pt instructed to call should develop concerning signs/symptoms or have further questions.             Oncology/Hematology   [facility]

## 2024-03-25 NOTE — PROGRESS NOTES
Patient is a 64 yr old female with a past medical history stage IV non-small-cell lung cancer with bone METS, COPD stage 2, HTN(Hypertension), Hyperlipidemia as well as recently diagnosed and treated C diff presents to the Emergency Department for nausea, vomiting, and abdominal pain.   Patient states that she started vomiting this morning.  Tried taking Zofran but vomited up within 10 minutes. States she started having abdominal pain about 2 days ago. States that the pain is everywhere in her stomach.  Daughter states she has not been eating much since the diagnosis of the Cv- Diff. Patient states that she completed the antibiotics with a 10 day duration and the diarrhea has completely stopped.  She denies any cough or chest pain.  No worsening in her chronic shortness of breath.  Denies any diarrhea or constipation.  No known sick contacts or abnormal foods. Las chemotherapy treatment was 3 weeks ago.      Plan  US ABD   2.   HIDA Scan with CCR  3.   Miralex Prep  4.   Check Stool PCR   5.   Check C-Diff  6.    Check for H-pylori   7.    Colonoscopy    03/25/2024  Patient is hospital day 1  Patient complains of right-sided pain mostly in the hip undergoing pain medication and supplementation.  Patient had thickened wall right colon consistent with C diff colitis that she already had.  Patient's stools were negative for blood thus far patient has successfully navigated her MiraLax prep  C diff antigen and toxin are still positive both on 03/11/24 and 03/25/2024  Stool PCR otherwise was negative   I am going to start the patient on vancomycin 250 p.o. Q 6 hours to further treat her C diff colitis.  Patient will need a colonoscopy either as an in or outpatient  She had complaints of midepigastric right upper quadrant pain ordered an ultrasound of the abdomen that was normal patient's HIDA scan showed a 36% ejection fraction that reproduced some of her symptoms.  Patient has been prep for colonoscopy to evaluate the  right colon and the thickening of the wall noted on CT scan I suspect that is secondary to her C diff colitis.  Overall the patient is in stable condition at this point in time her bicarb was a little low at 20 I gave her a L of half-normal saline with 2 amps of bicarb x2 doses.

## 2024-03-25 NOTE — PLAN OF CARE
03/25/24 1441   Discharge Assessment   Assessment Type Discharge Planning Assessment   Source of Information patient;family   People in Home child(noe), dependent   Do you expect to return to your current living situation? Yes   Do you have help at home or someone to help you manage your care at home? Yes   Who are your caregiver(s) and their phone number(s)? fly   Prior to hospitilization cognitive status: Alert/Oriented;No Deficits   Current cognitive status: Alert/Oriented;No Deficits   Equipment Currently Used at Home none   Do you currently have service(s) that help you manage your care at home? No   Do you take prescription medications? Yes   Do you have prescription coverage? Yes   Do you have any problems affording any of your prescribed medications? No   Is the patient taking medications as prescribed? yes   Who is going to help you get home at discharge? fly   Are you on dialysis? No   Do you take coumadin? No   Discharge Plan A Home with family   Discharge Plan B Home with family;Home Health   DME Needed Upon Discharge  none   Transition of Care Barriers None   Physical Activity   On average, how many days per week do you engage in moderate to strenuous exercise (like a brisk walk)? Pt Declined   On average, how many minutes do you engage in exercise at this level? Pt Declined   Financial Resource Strain   How hard is it for you to pay for the very basics like food, housing, medical care, and heating? Pt Declined   Housing Stability   In the last 12 months, was there a time when you were not able to pay the mortgage or rent on time? Pt Declined   In the last 12 months, was there a time when you did not have a steady place to sleep or slept in a shelter (including now)? Pt Declined   Transportation Needs   In the past 12 months, has lack of transportation kept you from medical appointments or from getting medications? Pt Declined   In the past 12 months, has lack of transportation kept you from  meetings, work, or from getting things needed for daily living? Pt Declined   Food Insecurity   Within the past 12 months, you worried that your food would run out before you got the money to buy more. Pt Declined   Within the past 12 months, the food you bought just didn't last and you didn't have money to get more. Pt Declined   Stress   Do you feel stress - tense, restless, nervous, or anxious, or unable to sleep at night because your mind is troubled all the time - these days? Pt Declined   Social Connections   In a typical week, how many times do you talk on the phone with family, friends, or neighbors? Pt Declined   How often do you get together with friends or relatives? Pt Declined   How often do you attend Hoahaoism or Shinto services? Pt Declined   Do you belong to any clubs or organizations such as Hoahaoism groups, unions, fraternal or athletic groups, or school groups? Pt Declined   How often do you attend meetings of the clubs or organizations you belong to? Pt Declined   Are you , , , , never , or living with a partner? Pt Declined   Alcohol Use   Q1: How often do you have a drink containing alcohol? Pt Declined   Q2: How many drinks containing alcohol do you have on a typical day when you are drinking? Pt Declined   Q3: How often do you have six or more drinks on one occasion? Pt Declined

## 2024-03-26 PROBLEM — K52.9 COLITIS: Status: ACTIVE | Noted: 2024-03-26

## 2024-03-26 LAB
ALBUMIN SERPL-MCNC: 2.2 G/DL (ref 3.4–4.8)
ALBUMIN/GLOB SERPL: 0.8 RATIO (ref 1.1–2)
ALP SERPL-CCNC: 46 UNIT/L (ref 40–150)
ALT SERPL-CCNC: 11 UNIT/L (ref 0–55)
AST SERPL-CCNC: 17 UNIT/L (ref 5–34)
BASOPHILS # BLD AUTO: 0.01 X10(3)/MCL
BASOPHILS NFR BLD AUTO: 0.2 %
BILIRUB SERPL-MCNC: 0.5 MG/DL
BUN SERPL-MCNC: 3 MG/DL (ref 9.8–20.1)
CALCIUM SERPL-MCNC: 7.9 MG/DL (ref 8.4–10.2)
CHLORIDE SERPL-SCNC: 111 MMOL/L (ref 98–107)
CO2 SERPL-SCNC: 22 MMOL/L (ref 23–31)
CREAT SERPL-MCNC: 0.62 MG/DL (ref 0.55–1.02)
EOSINOPHIL # BLD AUTO: 0.02 X10(3)/MCL (ref 0–0.9)
EOSINOPHIL NFR BLD AUTO: 0.4 %
ERYTHROCYTE [DISTWIDTH] IN BLOOD BY AUTOMATED COUNT: 20.9 % (ref 11.5–17)
GFR SERPLBLD CREATININE-BSD FMLA CKD-EPI: >60 MLS/MIN/1.73/M2
GLOBULIN SER-MCNC: 2.7 GM/DL (ref 2.4–3.5)
GLUCOSE SERPL-MCNC: 74 MG/DL (ref 82–115)
HCT VFR BLD AUTO: 26.8 % (ref 37–47)
HGB BLD-MCNC: 8.3 G/DL (ref 12–16)
IMM GRANULOCYTES # BLD AUTO: 0.05 X10(3)/MCL (ref 0–0.04)
IMM GRANULOCYTES NFR BLD AUTO: 1 %
LYMPHOCYTES # BLD AUTO: 1.03 X10(3)/MCL (ref 0.6–4.6)
LYMPHOCYTES NFR BLD AUTO: 19.6 %
MAGNESIUM SERPL-MCNC: 1.6 MG/DL (ref 1.6–2.6)
MCH RBC QN AUTO: 26 PG (ref 27–31)
MCHC RBC AUTO-ENTMCNC: 31 G/DL (ref 33–36)
MCV RBC AUTO: 84 FL (ref 80–94)
MONOCYTES # BLD AUTO: 0.42 X10(3)/MCL (ref 0.1–1.3)
MONOCYTES NFR BLD AUTO: 8 %
NEUTROPHILS # BLD AUTO: 3.72 X10(3)/MCL (ref 2.1–9.2)
NEUTROPHILS NFR BLD AUTO: 70.8 %
PHOSPHATE SERPL-MCNC: 1.6 MG/DL (ref 2.3–4.7)
PLATELET # BLD AUTO: 341 X10(3)/MCL (ref 130–400)
PMV BLD AUTO: 8.9 FL (ref 7.4–10.4)
POTASSIUM SERPL-SCNC: 3.8 MMOL/L (ref 3.5–5.1)
PROT SERPL-MCNC: 4.9 GM/DL (ref 5.8–7.6)
RBC # BLD AUTO: 3.19 X10(6)/MCL (ref 4.2–5.4)
SODIUM SERPL-SCNC: 139 MMOL/L (ref 136–145)
WBC # SPEC AUTO: 5.25 X10(3)/MCL (ref 4.5–11.5)

## 2024-03-26 PROCEDURE — D9220A PRA ANESTHESIA: Mod: ,,, | Performed by: NURSE ANESTHETIST, CERTIFIED REGISTERED

## 2024-03-26 PROCEDURE — 21400001 HC TELEMETRY ROOM

## 2024-03-26 PROCEDURE — 85025 COMPLETE CBC W/AUTO DIFF WBC: CPT | Performed by: SURGERY

## 2024-03-26 PROCEDURE — 25000003 PHARM REV CODE 250: Performed by: SURGERY

## 2024-03-26 PROCEDURE — 27201423 OPTIME MED/SURG SUP & DEVICES STERILE SUPPLY: Performed by: SURGERY

## 2024-03-26 PROCEDURE — 63600175 PHARM REV CODE 636 W HCPCS: Performed by: SURGERY

## 2024-03-26 PROCEDURE — 27000207 HC ISOLATION

## 2024-03-26 PROCEDURE — 63600175 PHARM REV CODE 636 W HCPCS: Performed by: STUDENT IN AN ORGANIZED HEALTH CARE EDUCATION/TRAINING PROGRAM

## 2024-03-26 PROCEDURE — 25000003 PHARM REV CODE 250: Performed by: NURSE ANESTHETIST, CERTIFIED REGISTERED

## 2024-03-26 PROCEDURE — 25000003 PHARM REV CODE 250: Performed by: STUDENT IN AN ORGANIZED HEALTH CARE EDUCATION/TRAINING PROGRAM

## 2024-03-26 PROCEDURE — 88305 TISSUE EXAM BY PATHOLOGIST: CPT | Performed by: SURGERY

## 2024-03-26 PROCEDURE — 37000009 HC ANESTHESIA EA ADD 15 MINS: Performed by: SURGERY

## 2024-03-26 PROCEDURE — 94761 N-INVAS EAR/PLS OXIMETRY MLT: CPT

## 2024-03-26 PROCEDURE — 84100 ASSAY OF PHOSPHORUS: CPT | Performed by: SURGERY

## 2024-03-26 PROCEDURE — 97116 GAIT TRAINING THERAPY: CPT

## 2024-03-26 PROCEDURE — 45380 COLONOSCOPY AND BIOPSY: CPT | Performed by: SURGERY

## 2024-03-26 PROCEDURE — 37000008 HC ANESTHESIA 1ST 15 MINUTES: Performed by: SURGERY

## 2024-03-26 PROCEDURE — 80053 COMPREHEN METABOLIC PANEL: CPT | Performed by: SURGERY

## 2024-03-26 PROCEDURE — 63600175 PHARM REV CODE 636 W HCPCS: Performed by: NURSE ANESTHETIST, CERTIFIED REGISTERED

## 2024-03-26 PROCEDURE — 97161 PT EVAL LOW COMPLEX 20 MIN: CPT

## 2024-03-26 PROCEDURE — 0DBH8ZX EXCISION OF CECUM, VIA NATURAL OR ARTIFICIAL OPENING ENDOSCOPIC, DIAGNOSTIC: ICD-10-PCS | Performed by: SURGERY

## 2024-03-26 PROCEDURE — 83735 ASSAY OF MAGNESIUM: CPT | Performed by: SURGERY

## 2024-03-26 RX ORDER — DIPHENHYDRAMINE HYDROCHLORIDE 50 MG/ML
25 INJECTION INTRAMUSCULAR; INTRAVENOUS ONCE
Status: COMPLETED | OUTPATIENT
Start: 2024-03-26 | End: 2024-03-26

## 2024-03-26 RX ORDER — LORAZEPAM 0.5 MG/1
0.5 TABLET ORAL EVERY 6 HOURS PRN
Status: DISCONTINUED | OUTPATIENT
Start: 2024-03-26 | End: 2024-03-29 | Stop reason: HOSPADM

## 2024-03-26 RX ORDER — FUROSEMIDE 10 MG/ML
20 INJECTION INTRAMUSCULAR; INTRAVENOUS ONCE
Status: COMPLETED | OUTPATIENT
Start: 2024-03-26 | End: 2024-03-26

## 2024-03-26 RX ORDER — ACETAMINOPHEN 10 MG/ML
1000 INJECTION, SOLUTION INTRAVENOUS ONCE
Status: COMPLETED | OUTPATIENT
Start: 2024-03-26 | End: 2024-03-26

## 2024-03-26 RX ORDER — KETOROLAC TROMETHAMINE 30 MG/ML
15 INJECTION, SOLUTION INTRAMUSCULAR; INTRAVENOUS EVERY 6 HOURS PRN
Status: DISPENSED | OUTPATIENT
Start: 2024-03-26 | End: 2024-03-29

## 2024-03-26 RX ORDER — LIDOCAINE HYDROCHLORIDE 20 MG/ML
INJECTION INTRAVENOUS
Status: DISCONTINUED | OUTPATIENT
Start: 2024-03-26 | End: 2024-03-26

## 2024-03-26 RX ORDER — LORAZEPAM 2 MG/ML
0.5 INJECTION INTRAMUSCULAR ONCE
Status: DISCONTINUED | OUTPATIENT
Start: 2024-03-26 | End: 2024-03-28

## 2024-03-26 RX ORDER — PROPOFOL 10 MG/ML
VIAL (ML) INTRAVENOUS
Status: DISCONTINUED | OUTPATIENT
Start: 2024-03-26 | End: 2024-03-26

## 2024-03-26 RX ORDER — HALOPERIDOL 5 MG/ML
5 INJECTION INTRAMUSCULAR ONCE
Status: DISCONTINUED | OUTPATIENT
Start: 2024-03-26 | End: 2024-03-28

## 2024-03-26 RX ORDER — PROMETHAZINE HYDROCHLORIDE 25 MG/ML
12.5 INJECTION, SOLUTION INTRAMUSCULAR; INTRAVENOUS EVERY 6 HOURS PRN
Status: DISCONTINUED | OUTPATIENT
Start: 2024-03-26 | End: 2024-03-27

## 2024-03-26 RX ADMIN — PROPOFOL 20 MG: 10 INJECTION, EMULSION INTRAVENOUS at 05:03

## 2024-03-26 RX ADMIN — MUPIROCIN 1 G: 20 OINTMENT TOPICAL at 09:03

## 2024-03-26 RX ADMIN — PIPERACILLIN SODIUM AND TAZOBACTAM SODIUM 4.5 G: 4; .5 INJECTION, POWDER, LYOPHILIZED, FOR SOLUTION INTRAVENOUS at 10:03

## 2024-03-26 RX ADMIN — VANCOMYCIN HYDROCHLORIDE 250 MG: KIT at 01:03

## 2024-03-26 RX ADMIN — PROMETHAZINE HYDROCHLORIDE 12.5 MG: 25 INJECTION INTRAMUSCULAR; INTRAVENOUS at 08:03

## 2024-03-26 RX ADMIN — FUROSEMIDE 20 MG: 10 INJECTION, SOLUTION INTRAMUSCULAR; INTRAVENOUS at 05:03

## 2024-03-26 RX ADMIN — KETOROLAC TROMETHAMINE 15 MG: 30 INJECTION, SOLUTION INTRAMUSCULAR at 11:03

## 2024-03-26 RX ADMIN — VANCOMYCIN HYDROCHLORIDE 250 MG: KIT at 06:03

## 2024-03-26 RX ADMIN — LIDOCAINE HYDROCHLORIDE 100 MG: 20 INJECTION, SOLUTION INTRAVENOUS at 05:03

## 2024-03-26 RX ADMIN — Medication 6 MG: at 08:03

## 2024-03-26 RX ADMIN — VANCOMYCIN HYDROCHLORIDE 250 MG: KIT at 11:03

## 2024-03-26 RX ADMIN — PIPERACILLIN SODIUM AND TAZOBACTAM SODIUM 4.5 G: 4; .5 INJECTION, POWDER, LYOPHILIZED, FOR SOLUTION INTRAVENOUS at 06:03

## 2024-03-26 RX ADMIN — MUPIROCIN 1 G: 20 OINTMENT TOPICAL at 08:03

## 2024-03-26 RX ADMIN — ACETAMINOPHEN 1000 MG: 10 INJECTION INTRAVENOUS at 06:03

## 2024-03-26 RX ADMIN — DIPHENHYDRAMINE HYDROCHLORIDE 25 MG: 50 INJECTION INTRAMUSCULAR; INTRAVENOUS at 07:03

## 2024-03-26 RX ADMIN — CYANOCOBALAMIN 1000 MCG: 1000 INJECTION INTRAMUSCULAR; SUBCUTANEOUS at 09:03

## 2024-03-26 RX ADMIN — PROPOFOL 80 MG: 10 INJECTION, EMULSION INTRAVENOUS at 05:03

## 2024-03-26 RX ADMIN — SODIUM CHLORIDE, POTASSIUM CHLORIDE, SODIUM LACTATE AND CALCIUM CHLORIDE: 600; 310; 30; 20 INJECTION, SOLUTION INTRAVENOUS at 09:03

## 2024-03-26 RX ADMIN — DIPHENHYDRAMINE HYDROCHLORIDE 25 MG: 50 INJECTION INTRAMUSCULAR; INTRAVENOUS at 01:03

## 2024-03-26 RX ADMIN — ONDANSETRON 4 MG: 2 INJECTION INTRAMUSCULAR; INTRAVENOUS at 10:03

## 2024-03-26 RX ADMIN — SODIUM CHLORIDE, POTASSIUM CHLORIDE, SODIUM LACTATE AND CALCIUM CHLORIDE: 600; 310; 30; 20 INJECTION, SOLUTION INTRAVENOUS at 05:03

## 2024-03-26 RX ADMIN — THIAMINE HYDROCHLORIDE 300 MG: 100 INJECTION, SOLUTION INTRAMUSCULAR; INTRAVENOUS at 09:03

## 2024-03-26 RX ADMIN — ONDANSETRON 4 MG: 2 INJECTION INTRAMUSCULAR; INTRAVENOUS at 08:03

## 2024-03-26 RX ADMIN — KETOROLAC TROMETHAMINE 15 MG: 30 INJECTION, SOLUTION INTRAMUSCULAR at 06:03

## 2024-03-26 RX ADMIN — DIPHENHYDRAMINE HYDROCHLORIDE 25 MG: 50 INJECTION INTRAMUSCULAR; INTRAVENOUS at 08:03

## 2024-03-26 RX ADMIN — PIPERACILLIN SODIUM AND TAZOBACTAM SODIUM 4.5 G: 4; .5 INJECTION, POWDER, LYOPHILIZED, FOR SOLUTION INTRAVENOUS at 01:03

## 2024-03-26 NOTE — PT/OT/SLP EVAL
Physical Therapy Evaluation    Patient Name:  Mansi Jin   MRN:  [id number]    Recommendations:     Discharge Recommendations: Low Intensity Therapy   Discharge Equipment Recommendations: walker, rolling   Barriers to discharge: None    Assessment:     Mansi Jin is a 64 y.o. female admitted with a medical diagnosis of <principal problem not specified>.  She presents with the following impairments/functional limitations: weakness, impaired endurance, impaired self care skills, impaired functional mobility, gait instability, impaired balance, decreased coordination, pain .    Rehab Prognosis: Good; patient would benefit from acute skilled PT services to address these deficits and reach maximum level of function.    Recent Surgery: * No surgery found *      Plan:     During this hospitalization, patient to be seen 5 x/week to address the identified rehab impairments via gait training, therapeutic activities, therapeutic exercises and progress toward the following goals:    Plan of Care Expires:       Subjective     Chief Complaint: Pt reports she is unsure about walking in the halls, requests PT assistance for her safety.  Patient/Family Comments/goals: to rtn home when medically able to   Pain/Comfort:       Patients cultural, spiritual, Hindu conflicts given the current situation:      Living Environment:  Pt lives at home with family  Prior to admission, patients level of function was I amb.  Equipment used at home: none.  DME owned (not currently used): none.  Upon discharge, patient will have assistance from family.    Objective:     Communicated with pt, caregiver prior to session.  Patient found HOB elevated with    upon PT entry to room.    General Precautions: Standard, fall  Orthopedic Precautions:    Braces:    Respiratory Status: Room air    Exams:  RLE ROM: WFL  LLE ROM: WFL    Functional Mobility:  Bed Mobility:     Scooting: moderate assistance  Supine to Sit: minimum  assistance  Transfers:     Sit to Stand:  minimum assistance with rolling walker  Gait: amb 100ft Aldo-SBA with RW, IV en tow, chair close behind pt for safety  Balance: fair      AM-PAC 6 CLICK MOBILITY  Total Score:        Treatment & Education:  Tf to chair Aldo    Patient left up in chair with all lines intact, call button in reach, and chair alarm on.    GOALS:   Multidisciplinary Problems       Physical Therapy Goals          Problem: Physical Therapy    Goal Priority Disciplines Outcome Goal Variances Interventions   Physical Therapy Goal     PT, PT/OT Ongoing, Progressing     Description: 1.  Pt will be provided assistance by PT to safely ambulate in the halls with appropriate assistive device, IV en tow, Olsen en tow, helper to follow with chair to rest in as needed, etc to prevent a fall while avoiding the debilitating effects of bedrest.  2.  Pt will be provided with a safe recliner chair in the pt room to sit in and assisted to and from chair by staff as appropriate in effort to avoid the debilitating effects of bedrest  3.  Pt will be instructed in HEP to maintain strength while inpt                       History:     Past Medical History:   Diagnosis Date    ASTHMA     COPD (chronic obstructive pulmonary disease)     SEVERE    GERD (gastroesophageal reflux disease)     High cholesterol     HLD (hyperlipidemia)     Hypothyroidism, unspecified     Lung cancer 12/08/2022    invasive adenocarcinoma, poorly differentiated with solid morphology, focal tumor involvement of visceral pleura and focal tumor vascular invasion (vein), with 2 of 3 posterior mediastinal lymph nodes positive for metastatic carcinoma    Stage 2 chronic kidney disease     TIA (transient ischemic attack)     no deficits    Tobacco abuse     Unspecified osteoarthritis, unspecified site     Uterine cancer 1989       Past Surgical History:   Procedure Laterality Date    INSERTION OF TUNNELED CENTRAL VENOUS CATHETER (CVC) WITH SUBCUTANEOUS  PORT N/A 12/04/2023    Procedure: [id number];  Surgeon: Peter Dial MD;  Location: [facility];  Service: Peripheral Vascular;  Laterality: N/A;    lipoma multiple sites      LUNG LOBECTOMY Right 12/08/2022    Procedure: LOBECTOMY, LUNG;  Surgeon: Jass Browne IV, MD;  Location: [facility] OR;  Service: Cardiothoracic;  Laterality: Right;    SURGICAL REMOVAL OF LYMPH NODE  12/08/2022    Procedure: EXCISION, LYMPH NODE;  Surgeon: Jass Browne IV, MD;  Location: [facility] OR;  Service: Cardiothoracic;;    THORACOTOMY Right 12/08/2022    Procedure: THORACOTOMY;  Surgeon: Jass Browne IV, MD;  Location: [facility] OR;  Service: Cardiothoracic;  Laterality: Right;  Right Lower Lobectomy with Lymph Node Dissection    TOTAL ABDOMINAL HYSTERECTOMY W/ BILATERAL SALPINGOOPHORECTOMY  1989       Time Tracking:     PT Received On: 03/26/24  PT Start Time: 1130     PT Stop Time: 1200  PT Total Time (min): 30 min     Billable Minutes: Evaluation 15, Gait Training 10, and Therapeutic Activity 5      03/26/2024

## 2024-03-26 NOTE — OP NOTE
[facility]  Medical Surgical Unit  Operative Note      Date of Procedure: 3/26/2024     Procedure: Procedure(s) (LRB):  COLONOSCOPY (N/A)  COLONOSCOPY, WITH 1 OR MORE BIOPSIES (N/A)     Surgeon(s) and Role:     * Vickey Patino MD - Primary    Assisting Surgeon: None    Pre-Operative Diagnosis: * No pre-op diagnosis entered *    Post-Operative Diagnosis: Post-Op Diagnosis Codes:     * Anemia, unspecified type [D64.9]  Normal terminal ileum up to 10 cm   Colitis of the cecum consistent with C diff colitis  Biopsy taken of the cecum   Normal transverse descending rectosigmoid colon   Moderate tone no masses on rectal exam  Anesthesia: General    Operative Findings (including complications, if any):  Patient is a 64-year-old  female with metastatic lung cancer who had C diff colitis and right lower quadrant abdominal pain etiology unclear.  Patient had swollen edematous right colon noted on CT scan.  Patient was started on p.o. vancomycin and seemed to have some symptomatic improvement.  Patient was consented for colonoscopy to evaluate the colon for any abnormalities and biopsy.  Patient was brought to the GI suite underwent sedation and examination of the colon all the way to the cecum with intubation of ileocecal valve       Normal terminal ileum up to 10 cm   Colitis of the cecum consistent with C diff colitis  Biopsy taken of the cecum   Normal transverse descending rectosigmoid colon   Moderate tone no masses on rectal exam    Plan   1. Continue oral vancomycin   2. Consider cholecystectomy for biliary dyskinesia   3. Follow up in the office as an outpatient to check biopsy results of the cecum  Anesthesia: General      Description of Technical Procedures:  Noted above       Significant Surgical Tasks Conducted by the Assistant(s), if Applicable:  None       Estimated Blood Loss (EBL): 0 mL           Implants: * No implants in log *    Specimens:   Specimen (24h ago, onward)        Start     Ordered    03/26/24 1746  Specimen to Pathology  RELEASE UPON ORDERING        References:    Click here for ordering Quick Tip   Question:  Release to patient  Answer:  Immediate    03/26/24 1746                            Condition: Good    Disposition: PACU - hemodynamically stable.    Attestation: I was present and scrubbed for the entire procedure.    Discharge Note    OUTCOME: Patient tolerated treatment/procedure well without complication and is now ready for discharge.    DISPOSITION: Home or Self Care    FINAL DIAGNOSIS:  Colitis  Normal terminal ileum up to 10 cm   Colitis of the cecum consistent with C diff colitis  Biopsy taken of the cecum   Normal transverse descending rectosigmoid colon   Moderate tone no masses on rectal exam  Anesthesia: General  FOLLOWUP: In clinic 1 week    DISCHARGE INSTRUCTIONS:  No discharge procedures on file.

## 2024-03-26 NOTE — DISCHARGE SUMMARY
[facility]  Medical Surgical Unit  Discharge Note  Short Stay    Procedure(s) (LRB):  COLONOSCOPY (N/A)  COLONOSCOPY, WITH 1 OR MORE BIOPSIES (N/A)      OUTCOME: Patient tolerated treatment/procedure well without complication and is now ready for discharge.    DISPOSITION: Home or Self Care      FINAL DIAGNOSIS:  Colitis  Normal terminal ileum up to 10 cm   Colitis of the cecum consistent with C diff colitis  Biopsy taken of the cecum   Normal transverse descending rectosigmoid colon   Moderate tone no masses on rectal exam  Anesthesia: General  FOLLOWUP: In clinic 1 week    DISCHARGE INSTRUCTIONS:  No discharge procedures on file.     TIME SPENT ON DISCHARGE:  5 minutes

## 2024-03-26 NOTE — ANESTHESIA POSTPROCEDURE EVALUATION
Anesthesia Post Evaluation    Patient: Mansi Jin    Procedure(s) Performed: Procedure(s) (LRB):  COLONOSCOPY (N/A)  COLONOSCOPY, WITH 1 OR MORE BIOPSIES (N/A)    Final Anesthesia Type: general      Patient location during evaluation: OPS  Patient participation: Yes- Able to Participate  Level of consciousness: awake  Post-procedure vital signs: reviewed and stable  Pain management: adequate  Airway patency: patent  HERMINIO mitigation strategies: Multimodal analgesia  PONV status at discharge: No PONV  Anesthetic complications: no      Cardiovascular status: hemodynamically stable  Respiratory status: unassisted, room air and spontaneous ventilation  Hydration status: euvolemic  Follow-up not needed.              Vitals Value Taken Time   /83 03/26/24 1624   Temp 36.9 °C (98.5 °F) 03/26/24 1624   Pulse 110 03/26/24 1624   Resp 20 03/26/24 1100   SpO2 98 % 03/26/24 1624         No case tracking events are documented in the log.      Pain/Estela Score: Pain Rating Prior to Med Admin: 9 (3/26/2024 11:28 AM)  Pain Rating Post Med Admin: 3 (3/26/2024 11:39 AM)

## 2024-03-27 LAB
ALBUMIN SERPL-MCNC: 2.7 G/DL (ref 3.4–4.8)
ALBUMIN/GLOB SERPL: 0.8 RATIO (ref 1.1–2)
ALP SERPL-CCNC: 70 UNIT/L (ref 40–150)
ALT SERPL-CCNC: 12 UNIT/L (ref 0–55)
AST SERPL-CCNC: 22 UNIT/L (ref 5–34)
BASOPHILS # BLD AUTO: 0.02 X10(3)/MCL
BASOPHILS NFR BLD AUTO: 0.2 %
BILIRUB SERPL-MCNC: 0.6 MG/DL
BUN SERPL-MCNC: 4 MG/DL (ref 9.8–20.1)
CALCIUM SERPL-MCNC: 8.3 MG/DL (ref 8.4–10.2)
CHLORIDE SERPL-SCNC: 104 MMOL/L (ref 98–107)
CO2 SERPL-SCNC: 18 MMOL/L (ref 23–31)
CREAT SERPL-MCNC: 0.62 MG/DL (ref 0.55–1.02)
EOSINOPHIL # BLD AUTO: 0 X10(3)/MCL (ref 0–0.9)
EOSINOPHIL NFR BLD AUTO: 0 %
ERYTHROCYTE [DISTWIDTH] IN BLOOD BY AUTOMATED COUNT: 20.5 % (ref 11.5–17)
GFR SERPLBLD CREATININE-BSD FMLA CKD-EPI: >60 MLS/MIN/1.73/M2
GLOBULIN SER-MCNC: 3.3 GM/DL (ref 2.4–3.5)
GLUCOSE SERPL-MCNC: 98 MG/DL (ref 82–115)
HCT VFR BLD AUTO: 31.2 % (ref 37–47)
HGB BLD-MCNC: 9.6 G/DL (ref 12–16)
IMM GRANULOCYTES # BLD AUTO: 0.04 X10(3)/MCL (ref 0–0.04)
IMM GRANULOCYTES NFR BLD AUTO: 0.5 %
LYMPHOCYTES # BLD AUTO: 0.89 X10(3)/MCL (ref 0.6–4.6)
LYMPHOCYTES NFR BLD AUTO: 11.1 %
MAGNESIUM SERPL-MCNC: 1.6 MG/DL (ref 1.6–2.6)
MCH RBC QN AUTO: 25.5 PG (ref 27–31)
MCHC RBC AUTO-ENTMCNC: 30.8 G/DL (ref 33–36)
MCV RBC AUTO: 83 FL (ref 80–94)
MONOCYTES # BLD AUTO: 0.4 X10(3)/MCL (ref 0.1–1.3)
MONOCYTES NFR BLD AUTO: 5 %
NEUTROPHILS # BLD AUTO: 6.67 X10(3)/MCL (ref 2.1–9.2)
NEUTROPHILS NFR BLD AUTO: 83.2 %
PHOSPHATE SERPL-MCNC: 1.9 MG/DL (ref 2.3–4.7)
PLATELET # BLD AUTO: 395 X10(3)/MCL (ref 130–400)
PMV BLD AUTO: 9.2 FL (ref 7.4–10.4)
POTASSIUM SERPL-SCNC: 3.8 MMOL/L (ref 3.5–5.1)
PROT SERPL-MCNC: 6 GM/DL (ref 5.8–7.6)
RBC # BLD AUTO: 3.76 X10(6)/MCL (ref 4.2–5.4)
SODIUM SERPL-SCNC: 131 MMOL/L (ref 136–145)
WBC # SPEC AUTO: 8.02 X10(3)/MCL (ref 4.5–11.5)

## 2024-03-27 PROCEDURE — 80053 COMPREHEN METABOLIC PANEL: CPT | Performed by: SURGERY

## 2024-03-27 PROCEDURE — 27000207 HC ISOLATION

## 2024-03-27 PROCEDURE — 63600175 PHARM REV CODE 636 W HCPCS: Performed by: STUDENT IN AN ORGANIZED HEALTH CARE EDUCATION/TRAINING PROGRAM

## 2024-03-27 PROCEDURE — 63600175 PHARM REV CODE 636 W HCPCS: Performed by: SURGERY

## 2024-03-27 PROCEDURE — 97116 GAIT TRAINING THERAPY: CPT

## 2024-03-27 PROCEDURE — 85025 COMPLETE CBC W/AUTO DIFF WBC: CPT | Performed by: SURGERY

## 2024-03-27 PROCEDURE — 94761 N-INVAS EAR/PLS OXIMETRY MLT: CPT

## 2024-03-27 PROCEDURE — 21400001 HC TELEMETRY ROOM

## 2024-03-27 PROCEDURE — 25000003 PHARM REV CODE 250: Performed by: SURGERY

## 2024-03-27 PROCEDURE — 97530 THERAPEUTIC ACTIVITIES: CPT

## 2024-03-27 PROCEDURE — 84100 ASSAY OF PHOSPHORUS: CPT | Performed by: SURGERY

## 2024-03-27 PROCEDURE — 83735 ASSAY OF MAGNESIUM: CPT | Performed by: SURGERY

## 2024-03-27 RX ORDER — METOCLOPRAMIDE 10 MG/1
10 TABLET ORAL DAILY
Status: DISCONTINUED | OUTPATIENT
Start: 2024-03-27 | End: 2024-03-29 | Stop reason: HOSPADM

## 2024-03-27 RX ORDER — PROCHLORPERAZINE EDISYLATE 5 MG/ML
5 INJECTION INTRAMUSCULAR; INTRAVENOUS EVERY 6 HOURS PRN
Status: DISCONTINUED | OUTPATIENT
Start: 2024-03-27 | End: 2024-03-29 | Stop reason: HOSPADM

## 2024-03-27 RX ADMIN — POTASSIUM PHOSPHATE, MONOBASIC 500 MG: 500 TABLET, SOLUBLE ORAL at 05:03

## 2024-03-27 RX ADMIN — VANCOMYCIN HYDROCHLORIDE 250 MG: KIT at 11:03

## 2024-03-27 RX ADMIN — VANCOMYCIN HYDROCHLORIDE 250 MG: KIT at 05:03

## 2024-03-27 RX ADMIN — PIPERACILLIN SODIUM AND TAZOBACTAM SODIUM 4.5 G: 4; .5 INJECTION, POWDER, LYOPHILIZED, FOR SOLUTION INTRAVENOUS at 05:03

## 2024-03-27 RX ADMIN — MUPIROCIN 1 G: 20 OINTMENT TOPICAL at 09:03

## 2024-03-27 RX ADMIN — CYANOCOBALAMIN 1000 MCG: 1000 INJECTION INTRAMUSCULAR; SUBCUTANEOUS at 09:03

## 2024-03-27 RX ADMIN — PIPERACILLIN SODIUM AND TAZOBACTAM SODIUM 4.5 G: 4; .5 INJECTION, POWDER, LYOPHILIZED, FOR SOLUTION INTRAVENOUS at 10:03

## 2024-03-27 RX ADMIN — SODIUM CHLORIDE, POTASSIUM CHLORIDE, SODIUM LACTATE AND CALCIUM CHLORIDE: 600; 310; 30; 20 INJECTION, SOLUTION INTRAVENOUS at 11:03

## 2024-03-27 RX ADMIN — KETOROLAC TROMETHAMINE 15 MG: 30 INJECTION, SOLUTION INTRAMUSCULAR at 04:03

## 2024-03-27 RX ADMIN — PROMETHAZINE HYDROCHLORIDE 12.5 MG: 25 INJECTION INTRAMUSCULAR; INTRAVENOUS at 04:03

## 2024-03-27 RX ADMIN — METOCLOPRAMIDE 10 MG: 10 TABLET ORAL at 09:03

## 2024-03-27 RX ADMIN — THIAMINE HYDROCHLORIDE 300 MG: 100 INJECTION, SOLUTION INTRAMUSCULAR; INTRAVENOUS at 09:03

## 2024-03-27 RX ADMIN — ONDANSETRON 4 MG: 2 INJECTION INTRAMUSCULAR; INTRAVENOUS at 03:03

## 2024-03-27 RX ADMIN — PROCHLORPERAZINE EDISYLATE 5 MG: 5 INJECTION INTRAMUSCULAR; INTRAVENOUS at 05:03

## 2024-03-27 RX ADMIN — PIPERACILLIN SODIUM AND TAZOBACTAM SODIUM 4.5 G: 4; .5 INJECTION, POWDER, LYOPHILIZED, FOR SOLUTION INTRAVENOUS at 03:03

## 2024-03-27 NOTE — PT/OT/SLP PROGRESS
Physical Therapy Treatment    Patient Name:  Mansi Jin   MRN:  [id number]    Recommendations:     Discharge Recommendations: Low Intensity Therapy  Discharge Equipment Recommendations: walker, rolling  Barriers to discharge:  medical status    Assessment:     Mansi Jin is a 64 y.o. female admitted with a medical diagnosis of Colitis.  She presents with the following impairments/functional limitations: weakness, impaired endurance, impaired self care skills, impaired functional mobility, gait instability, impaired balance, decreased coordination, pain, nausea.    Rehab Prognosis: Good; patient would benefit from acute skilled PT services to address these deficits and reach maximum level of function.    Recent Surgery: Procedure(s) (LRB):  COLONOSCOPY (N/A)  COLONOSCOPY, WITH 1 OR MORE BIOPSIES (N/A) 1 Day Post-Op    Plan:     During this hospitalization, patient to be seen 5 x/week to address the identified rehab impairments via gait training, therapeutic activities, therapeutic exercises and progress toward the following goals:    Plan of Care Expires:       Subjective     Chief Complaint: Pt reports she gets waves of pain and nausea, awaiting cholecystectomy in the morning  Patient/Family Comments/goals: to feel better  Pain/Comfort:         Objective:     Communicated with pt and caregivers prior to session.  Patient found HOB elevated with   upon PT entry to room.     General Precautions: Standard, fall  Orthopedic Precautions:    Braces:    Respiratory Status: Room air     Functional Mobility:  Bed Mobility:     Scooting: minimum assistance  Supine to Sit: minimum assistance  Transfers:     Sit to Stand:  minimum assistance with rolling walker  Gait: Pt amb 100ft, 50ftx2, 30ft RW min-SBA with seated rest breaks to recover from pain/nausea between bouts of gt and uprt standing      AM-PAC 6 CLICK MOBILITY          Treatment & Education:  Pt declines up in chair at this time.  Pt returned to bed with  minimal assistance.    Patient left HOB elevated with all lines intact, call button in reach, and caregivers present..    GOALS:   Multidisciplinary Problems       Physical Therapy Goals          Problem: Physical Therapy    Goal Priority Disciplines Outcome Goal Variances Interventions   Physical Therapy Goal     PT, PT/OT Ongoing, Progressing     Description: 1.  Pt will be provided assistance by PT to safely ambulate in the halls with appropriate assistive device, IV en tow, Olsen en tow, helper to follow with chair to rest in as needed, etc to prevent a fall while avoiding the debilitating effects of bedrest.  2.  Pt will be provided with a safe recliner chair in the pt room to sit in and assisted to and from chair by staff as appropriate in effort to avoid the debilitating effects of bedrest  3.  Pt will be instructed in HEP to maintain strength while inpt                       Time Tracking:     PT Received On: 03/27/24  PT Start Time: 1330     PT Stop Time: 1358  PT Total Time (min): 28 min     Billable Minutes: Gait Training 15 and Therapeutic Activity 13    Treatment Type: Treatment  PT/PTA: PT           03/27/2024

## 2024-03-27 NOTE — PT/OT/SLP PROGRESS
Physical Therapy      Patient Name:  Mansi Jin   MRN:  [id number]    Patient not seen today at 0815 secondary to  pt wants to wait until her daugher bathes her/cleans her up and then she will get up and walk. Will follow-up later.

## 2024-03-28 ENCOUNTER — ANESTHESIA (OUTPATIENT)
Dept: SURGERY | Facility: HOSPITAL | Age: 64
DRG: 356 | End: 2024-03-28
Payer: MEDICARE

## 2024-03-28 ENCOUNTER — ANESTHESIA EVENT (OUTPATIENT)
Dept: SURGERY | Facility: HOSPITAL | Age: 64
DRG: 356 | End: 2024-03-28
Payer: MEDICARE

## 2024-03-28 LAB
ALBUMIN SERPL-MCNC: 2.6 G/DL (ref 3.4–4.8)
ALBUMIN/GLOB SERPL: 0.7 RATIO (ref 1.1–2)
ALP SERPL-CCNC: 71 UNIT/L (ref 40–150)
ALT SERPL-CCNC: 10 UNIT/L (ref 0–55)
AST SERPL-CCNC: 20 UNIT/L (ref 5–34)
BASOPHILS # BLD AUTO: 0.01 X10(3)/MCL
BASOPHILS NFR BLD AUTO: 0.1 %
BILIRUB SERPL-MCNC: 0.8 MG/DL
BUN SERPL-MCNC: 4 MG/DL (ref 9.8–20.1)
CALCIUM SERPL-MCNC: 8.2 MG/DL (ref 8.4–10.2)
CHLORIDE SERPL-SCNC: 98 MMOL/L (ref 98–107)
CO2 SERPL-SCNC: 17 MMOL/L (ref 23–31)
CREAT SERPL-MCNC: 0.61 MG/DL (ref 0.55–1.02)
EOSINOPHIL # BLD AUTO: 0.01 X10(3)/MCL (ref 0–0.9)
EOSINOPHIL NFR BLD AUTO: 0.1 %
ERYTHROCYTE [DISTWIDTH] IN BLOOD BY AUTOMATED COUNT: 20.3 % (ref 11.5–17)
GFR SERPLBLD CREATININE-BSD FMLA CKD-EPI: >60 MLS/MIN/1.73/M2
GLOBULIN SER-MCNC: 3.6 GM/DL (ref 2.4–3.5)
GLUCOSE SERPL-MCNC: 93 MG/DL (ref 82–115)
GROUP & RH: NORMAL
HCT VFR BLD AUTO: 32.6 % (ref 37–47)
HGB BLD-MCNC: 10.3 G/DL (ref 12–16)
IMM GRANULOCYTES # BLD AUTO: 0.04 X10(3)/MCL (ref 0–0.04)
IMM GRANULOCYTES NFR BLD AUTO: 0.5 %
INDIRECT COOMBS: NORMAL
LYMPHOCYTES # BLD AUTO: 1.21 X10(3)/MCL (ref 0.6–4.6)
LYMPHOCYTES NFR BLD AUTO: 13.8 %
MAGNESIUM SERPL-MCNC: 1.6 MG/DL (ref 1.6–2.6)
MCH RBC QN AUTO: 25.8 PG (ref 27–31)
MCHC RBC AUTO-ENTMCNC: 31.6 G/DL (ref 33–36)
MCV RBC AUTO: 81.5 FL (ref 80–94)
MONOCYTES # BLD AUTO: 0.75 X10(3)/MCL (ref 0.1–1.3)
MONOCYTES NFR BLD AUTO: 8.5 %
NEUTROPHILS # BLD AUTO: 6.77 X10(3)/MCL (ref 2.1–9.2)
NEUTROPHILS NFR BLD AUTO: 77 %
PHOSPHATE SERPL-MCNC: 1.5 MG/DL (ref 2.3–4.7)
PLATELET # BLD AUTO: 460 X10(3)/MCL (ref 130–400)
PMV BLD AUTO: 9.1 FL (ref 7.4–10.4)
POTASSIUM SERPL-SCNC: 3.7 MMOL/L (ref 3.5–5.1)
PROT SERPL-MCNC: 6.2 GM/DL (ref 5.8–7.6)
RBC # BLD AUTO: 4 X10(6)/MCL (ref 4.2–5.4)
SODIUM SERPL-SCNC: 124 MMOL/L (ref 136–145)
SPECIMEN OUTDATE: NORMAL
WBC # SPEC AUTO: 8.79 X10(3)/MCL (ref 4.5–11.5)

## 2024-03-28 PROCEDURE — 25000003 PHARM REV CODE 250: Performed by: NURSE ANESTHETIST, CERTIFIED REGISTERED

## 2024-03-28 PROCEDURE — D9220A PRA ANESTHESIA: Mod: ,,, | Performed by: NURSE ANESTHETIST, CERTIFIED REGISTERED

## 2024-03-28 PROCEDURE — 94761 N-INVAS EAR/PLS OXIMETRY MLT: CPT

## 2024-03-28 PROCEDURE — 63600175 PHARM REV CODE 636 W HCPCS

## 2024-03-28 PROCEDURE — 63600175 PHARM REV CODE 636 W HCPCS: Performed by: SURGERY

## 2024-03-28 PROCEDURE — 37000008 HC ANESTHESIA 1ST 15 MINUTES: Performed by: SURGERY

## 2024-03-28 PROCEDURE — 71000033 HC RECOVERY, INTIAL HOUR: Performed by: SURGERY

## 2024-03-28 PROCEDURE — 27000207 HC ISOLATION

## 2024-03-28 PROCEDURE — 85025 COMPLETE CBC W/AUTO DIFF WBC: CPT | Performed by: SURGERY

## 2024-03-28 PROCEDURE — 36000708 HC OR TIME LEV III 1ST 15 MIN: Performed by: SURGERY

## 2024-03-28 PROCEDURE — 0FT44ZZ RESECTION OF GALLBLADDER, PERCUTANEOUS ENDOSCOPIC APPROACH: ICD-10-PCS | Performed by: SURGERY

## 2024-03-28 PROCEDURE — 37000009 HC ANESTHESIA EA ADD 15 MINS: Performed by: SURGERY

## 2024-03-28 PROCEDURE — 83735 ASSAY OF MAGNESIUM: CPT | Performed by: SURGERY

## 2024-03-28 PROCEDURE — BF502Z0 OTHER IMAGING OF BILE DUCTS USING FLUORESCING AGENT, INTRAOPERATIVE: ICD-10-PCS | Performed by: SURGERY

## 2024-03-28 PROCEDURE — 25500020 PHARM REV CODE 255: Performed by: SURGERY

## 2024-03-28 PROCEDURE — 63600175 PHARM REV CODE 636 W HCPCS: Performed by: NURSE ANESTHETIST, CERTIFIED REGISTERED

## 2024-03-28 PROCEDURE — 36000709 HC OR TIME LEV III EA ADD 15 MIN: Performed by: SURGERY

## 2024-03-28 PROCEDURE — P9045 ALBUMIN (HUMAN), 5%, 250 ML: HCPCS | Mod: JZ,JG | Performed by: NURSE ANESTHETIST, CERTIFIED REGISTERED

## 2024-03-28 PROCEDURE — 63600175 PHARM REV CODE 636 W HCPCS: Performed by: STUDENT IN AN ORGANIZED HEALTH CARE EDUCATION/TRAINING PROGRAM

## 2024-03-28 PROCEDURE — 25000003 PHARM REV CODE 250: Performed by: STUDENT IN AN ORGANIZED HEALTH CARE EDUCATION/TRAINING PROGRAM

## 2024-03-28 PROCEDURE — 84100 ASSAY OF PHOSPHORUS: CPT | Performed by: SURGERY

## 2024-03-28 PROCEDURE — 86850 RBC ANTIBODY SCREEN: CPT | Performed by: SURGERY

## 2024-03-28 PROCEDURE — 80053 COMPREHEN METABOLIC PANEL: CPT | Performed by: SURGERY

## 2024-03-28 PROCEDURE — 27201423 OPTIME MED/SURG SUP & DEVICES STERILE SUPPLY: Performed by: SURGERY

## 2024-03-28 PROCEDURE — 21400001 HC TELEMETRY ROOM

## 2024-03-28 PROCEDURE — 25000003 PHARM REV CODE 250: Performed by: SURGERY

## 2024-03-28 PROCEDURE — 88304 TISSUE EXAM BY PATHOLOGIST: CPT | Performed by: SURGERY

## 2024-03-28 RX ORDER — FENTANYL CITRATE 50 UG/ML
INJECTION, SOLUTION INTRAMUSCULAR; INTRAVENOUS
Status: DISCONTINUED | OUTPATIENT
Start: 2024-03-28 | End: 2024-03-28

## 2024-03-28 RX ORDER — FENTANYL CITRATE 50 UG/ML
25 INJECTION, SOLUTION INTRAMUSCULAR; INTRAVENOUS EVERY 5 MIN PRN
Status: DISCONTINUED | OUTPATIENT
Start: 2024-03-28 | End: 2024-03-28

## 2024-03-28 RX ORDER — VASOPRESSIN 20 [USP'U]/ML
INJECTION, SOLUTION INTRAMUSCULAR; SUBCUTANEOUS
Status: DISCONTINUED | OUTPATIENT
Start: 2024-03-28 | End: 2024-03-28

## 2024-03-28 RX ORDER — SODIUM CHLORIDE 0.9 % (FLUSH) 0.9 %
10 SYRINGE (ML) INJECTION
Status: DISCONTINUED | OUTPATIENT
Start: 2024-03-28 | End: 2024-03-28

## 2024-03-28 RX ORDER — ROCURONIUM BROMIDE 10 MG/ML
INJECTION, SOLUTION INTRAVENOUS
Status: DISCONTINUED | OUTPATIENT
Start: 2024-03-28 | End: 2024-03-28

## 2024-03-28 RX ORDER — PROMETHAZINE HYDROCHLORIDE 25 MG/ML
INJECTION, SOLUTION INTRAMUSCULAR; INTRAVENOUS
Status: DISCONTINUED | OUTPATIENT
Start: 2024-03-28 | End: 2024-03-28

## 2024-03-28 RX ORDER — IOPAMIDOL 612 MG/ML
INJECTION, SOLUTION INTRATHECAL
Status: DISCONTINUED | OUTPATIENT
Start: 2024-03-28 | End: 2024-03-28 | Stop reason: HOSPADM

## 2024-03-28 RX ORDER — MIDAZOLAM HYDROCHLORIDE 1 MG/ML
INJECTION INTRAMUSCULAR; INTRAVENOUS
Status: DISCONTINUED | OUTPATIENT
Start: 2024-03-28 | End: 2024-03-28

## 2024-03-28 RX ORDER — ESMOLOL HYDROCHLORIDE 10 MG/ML
INJECTION INTRAVENOUS
Status: DISCONTINUED | OUTPATIENT
Start: 2024-03-28 | End: 2024-03-28

## 2024-03-28 RX ORDER — FUROSEMIDE 10 MG/ML
20 INJECTION INTRAMUSCULAR; INTRAVENOUS ONCE
Status: COMPLETED | OUTPATIENT
Start: 2024-03-28 | End: 2024-03-28

## 2024-03-28 RX ORDER — ONDANSETRON HYDROCHLORIDE 2 MG/ML
INJECTION, SOLUTION INTRAMUSCULAR; INTRAVENOUS
Status: DISCONTINUED | OUTPATIENT
Start: 2024-03-28 | End: 2024-03-28

## 2024-03-28 RX ORDER — PROPOFOL 10 MG/ML
VIAL (ML) INTRAVENOUS
Status: DISCONTINUED | OUTPATIENT
Start: 2024-03-28 | End: 2024-03-28

## 2024-03-28 RX ORDER — BUPIVACAINE HYDROCHLORIDE 2.5 MG/ML
INJECTION, SOLUTION EPIDURAL; INFILTRATION; INTRACAUDAL
Status: DISCONTINUED | OUTPATIENT
Start: 2024-03-28 | End: 2024-03-28 | Stop reason: HOSPADM

## 2024-03-28 RX ORDER — HYDROMORPHONE HYDROCHLORIDE 2 MG/ML
0.2 INJECTION, SOLUTION INTRAMUSCULAR; INTRAVENOUS; SUBCUTANEOUS EVERY 5 MIN PRN
Status: DISCONTINUED | OUTPATIENT
Start: 2024-03-28 | End: 2024-03-28

## 2024-03-28 RX ORDER — ALBUMIN HUMAN 50 G/1000ML
SOLUTION INTRAVENOUS
Status: DISCONTINUED | OUTPATIENT
Start: 2024-03-28 | End: 2024-03-28

## 2024-03-28 RX ORDER — DEXAMETHASONE SODIUM PHOSPHATE 4 MG/ML
INJECTION, SOLUTION INTRA-ARTICULAR; INTRALESIONAL; INTRAMUSCULAR; INTRAVENOUS; SOFT TISSUE
Status: DISCONTINUED | OUTPATIENT
Start: 2024-03-28 | End: 2024-03-28

## 2024-03-28 RX ORDER — LIDOCAINE HYDROCHLORIDE 20 MG/ML
INJECTION, SOLUTION EPIDURAL; INFILTRATION; INTRACAUDAL; PERINEURAL
Status: DISCONTINUED | OUTPATIENT
Start: 2024-03-28 | End: 2024-03-28

## 2024-03-28 RX ORDER — ACETAMINOPHEN 10 MG/ML
INJECTION, SOLUTION INTRAVENOUS
Status: DISCONTINUED | OUTPATIENT
Start: 2024-03-28 | End: 2024-03-28

## 2024-03-28 RX ORDER — SODIUM CHLORIDE, SODIUM LACTATE, POTASSIUM CHLORIDE, CALCIUM CHLORIDE 600; 310; 30; 20 MG/100ML; MG/100ML; MG/100ML; MG/100ML
INJECTION, SOLUTION INTRAVENOUS CONTINUOUS
Status: CANCELLED | OUTPATIENT
Start: 2024-03-28

## 2024-03-28 RX ADMIN — MUPIROCIN: 20 OINTMENT TOPICAL at 09:03

## 2024-03-28 RX ADMIN — VANCOMYCIN HYDROCHLORIDE 250 MG: KIT at 05:03

## 2024-03-28 RX ADMIN — PHENYLEPHRINE HYDROCHLORIDE 20 MCG/MIN: 10 INJECTION INTRAVENOUS at 03:03

## 2024-03-28 RX ADMIN — PIPERACILLIN SODIUM AND TAZOBACTAM SODIUM 4.5 G: 4; .5 INJECTION, POWDER, LYOPHILIZED, FOR SOLUTION INTRAVENOUS at 06:03

## 2024-03-28 RX ADMIN — FENTANYL CITRATE 100 MCG: 50 INJECTION, SOLUTION INTRAMUSCULAR; INTRAVENOUS at 02:03

## 2024-03-28 RX ADMIN — PROMETHAZINE HYDROCHLORIDE 6.25 MG: 25 INJECTION INTRAMUSCULAR; INTRAVENOUS at 03:03

## 2024-03-28 RX ADMIN — VASOPRESSIN 2 UNITS: 20 INJECTION, SOLUTION INTRAMUSCULAR; SUBCUTANEOUS at 03:03

## 2024-03-28 RX ADMIN — FUROSEMIDE 20 MG: 10 INJECTION, SOLUTION INTRAMUSCULAR; INTRAVENOUS at 06:03

## 2024-03-28 RX ADMIN — SUGAMMADEX 200 MG: 100 INJECTION, SOLUTION INTRAVENOUS at 03:03

## 2024-03-28 RX ADMIN — ONDANSETRON HYDROCHLORIDE 4 MG: 2 INJECTION, SOLUTION INTRAMUSCULAR; INTRAVENOUS at 02:03

## 2024-03-28 RX ADMIN — METOCLOPRAMIDE 10 MG: 10 TABLET ORAL at 09:03

## 2024-03-28 RX ADMIN — ESMOLOL HYDROCHLORIDE 20 MG: 100 INJECTION, SOLUTION INTRAVENOUS at 03:03

## 2024-03-28 RX ADMIN — CYANOCOBALAMIN 1000 MCG: 1000 INJECTION INTRAMUSCULAR; SUBCUTANEOUS at 09:03

## 2024-03-28 RX ADMIN — VANCOMYCIN HYDROCHLORIDE 250 MG: KIT at 06:03

## 2024-03-28 RX ADMIN — ALBUMIN (HUMAN) 250 ML: 2.5 SOLUTION INTRAVENOUS at 02:03

## 2024-03-28 RX ADMIN — MIDAZOLAM 2 MG: 1 INJECTION INTRAMUSCULAR; INTRAVENOUS at 02:03

## 2024-03-28 RX ADMIN — VANCOMYCIN HYDROCHLORIDE 250 MG: KIT at 11:03

## 2024-03-28 RX ADMIN — MUPIROCIN 1 G: 20 OINTMENT TOPICAL at 10:03

## 2024-03-28 RX ADMIN — DEXAMETHASONE SODIUM PHOSPHATE 8 MG: 4 INJECTION, SOLUTION INTRA-ARTICULAR; INTRALESIONAL; INTRAMUSCULAR; INTRAVENOUS; SOFT TISSUE at 03:03

## 2024-03-28 RX ADMIN — ROCURONIUM BROMIDE 20 MG: 10 INJECTION, SOLUTION INTRAVENOUS at 03:03

## 2024-03-28 RX ADMIN — ROCURONIUM BROMIDE 50 MG: 10 INJECTION, SOLUTION INTRAVENOUS at 02:03

## 2024-03-28 RX ADMIN — POTASSIUM PHOSPHATE, MONOBASIC 500 MG: 500 TABLET, SOLUBLE ORAL at 06:03

## 2024-03-28 RX ADMIN — KETOROLAC TROMETHAMINE 15 MG: 30 INJECTION, SOLUTION INTRAMUSCULAR at 04:03

## 2024-03-28 RX ADMIN — THIAMINE HYDROCHLORIDE 300 MG: 100 INJECTION, SOLUTION INTRAMUSCULAR; INTRAVENOUS at 11:03

## 2024-03-28 RX ADMIN — Medication 6 MG: at 10:03

## 2024-03-28 RX ADMIN — PIPERACILLIN SODIUM AND TAZOBACTAM SODIUM 4.5 G: 4; .5 INJECTION, POWDER, LYOPHILIZED, FOR SOLUTION INTRAVENOUS at 10:03

## 2024-03-28 RX ADMIN — PROPOFOL 100 MG: 10 INJECTION, EMULSION INTRAVENOUS at 02:03

## 2024-03-28 RX ADMIN — ONDANSETRON 4 MG: 2 INJECTION INTRAMUSCULAR; INTRAVENOUS at 11:03

## 2024-03-28 RX ADMIN — SODIUM CHLORIDE, POTASSIUM CHLORIDE, SODIUM LACTATE AND CALCIUM CHLORIDE: 600; 310; 30; 20 INJECTION, SOLUTION INTRAVENOUS at 02:03

## 2024-03-28 RX ADMIN — ALBUMIN (HUMAN) 250 ML: 2.5 SOLUTION INTRAVENOUS at 03:03

## 2024-03-28 RX ADMIN — KETOROLAC TROMETHAMINE 15 MG: 30 INJECTION, SOLUTION INTRAMUSCULAR at 11:03

## 2024-03-28 RX ADMIN — ESMOLOL HYDROCHLORIDE 30 MG: 100 INJECTION, SOLUTION INTRAVENOUS at 02:03

## 2024-03-28 RX ADMIN — PIPERACILLIN SODIUM AND TAZOBACTAM SODIUM 4.5 G: 4; .5 INJECTION, POWDER, LYOPHILIZED, FOR SOLUTION INTRAVENOUS at 02:03

## 2024-03-28 RX ADMIN — ACETAMINOPHEN 1000 MG: 10 INJECTION, SOLUTION INTRAVENOUS at 03:03

## 2024-03-28 RX ADMIN — LIDOCAINE HYDROCHLORIDE 50 MG: 20 INJECTION, SOLUTION EPIDURAL; INFILTRATION; INTRACAUDAL; PERINEURAL at 02:03

## 2024-03-28 NOTE — PROGRESS NOTES
"Inpatient Nutrition Evaluation    Admit Date: 3/24/2024   Total duration of encounter: 4 days    Nutrition Recommendation/Prescription     Pt will likely benefit from a Low Residue/Fiber Restricted diet when able to lift NPO status and advance diet.   Monitor lytes and replete as medically feasible.   Monitor NPO status, weight, and labs.     RD available as needed. Thank you.         Nutrition Assessment     Chart Review    Reason Seen: continuous nutrition monitoring    Malnutrition Screening Tool Results   Have you recently lost weight without trying?: No  Have you been eating poorly because of a decreased appetite?: No   MST Score: 0     Diagnosis:  Colitis.     Relevant Medical History:   ASTHMA      COPD (chronic obstructive pulmonary disease)  SEVERE    GERD (gastroesophageal reflux disease)      High cholesterol      HLD (hyperlipidemia)      Hypothyroidism, unspecified      Lung cancer 12/08/2022 invasive adenocarcinoma, poorly differentiated with solid morphology, focal tumor involvement of visceral pleura and focal tumor vascular invasion (vein), with 2 of 3 posterior mediastinal lymph nodes positive for metastatic carcinoma    Stage 2 chronic kidney disease      TIA (transient ischemic attack)  no deficits    Tobacco abuse      Unspecified osteoarthritis, unspecified site      Uterine cancer          Nutrition-Related Medications:   None at this time.     Nutrition-Related Labs:  3/28: H/H 10.3/32.6(L); Na+ 124(L); CO2 17(L); BUN 4.0(L); Ca+ 8.2(L); Phos 1.5(L); Alb 2.6(L).    Diet Order: Diet NPO Except for: Sips with Medication  Oral Supplement Order: none  Appetite/Oral Intake: NPO/NPO  Factors Affecting Nutritional Intake: NPO  Food/Bahai/Cultural Preferences: none reported  Food Allergies: none reported       Wound(s):       Comments    3/28: Pt currently NPO for procedure. No recent weight loss noted/reported. Will continue to monitor during stay.     Anthropometrics    Height: 5' 7" (170.2 cm) "    Last Weight: 62.6 kg (138 lb) (03/24/24 1749) Weight Method: Bed Scale  BMI (Calculated): 21.6  BMI Classification: normal (BMI 18.5-24.9)     Ideal Body Weight (IBW), Female: 135 lb     % Ideal Body Weight, Female (lb): 102.22 %                             Usual Weight Provided By: EMR weight history    Wt Readings from Last 5 Encounters:   03/24/24 62.6 kg (138 lb)   03/21/24 61.7 kg (136 lb)   03/20/24 62 kg (136 lb 11 oz)   03/18/24 61.2 kg (135 lb)   03/11/24 61.2 kg (135 lb)     Weight Change(s) Since Admission:  Admit Weight: 62.6 kg (138 lb) (03/24/24 1359)      Patient Education    Not applicable.    Monitoring & Evaluation     Dietitian will monitor food and beverage intake, energy intake, weight, weight change, electrolyte/renal panel, glucose/endocrine profile, and gastrointestinal profile.  Nutrition Risk/Follow-Up: low (follow-up in 5-7 days)  Patients assigned 'low nutrition risk' status do not qualify for a full nutritional assessment but will be monitored and re-evaluated in a 5-7 day time period. Please consult if re-evaluation needed sooner.

## 2024-03-28 NOTE — ANESTHESIA PROCEDURE NOTES
Intubation    Date/Time: 3/28/2024 3:01 PM    Performed by: Lee Crocker CRNA  Authorized by: Lee Crocker CRNA    Intubation:     Induction:  Intravenous    Intubated:  Postinduction    Mask Ventilation:  Easy mask    Attempts:  1    Attempted By:  Student    Method of Intubation:  Direct    Blade:  Davidson 2    Laryngeal View Grade: Grade I - full view of cords      Difficult Airway Encountered?: No      Complications:  None    Airway Device:  Oral endotracheal tube    Airway Device Size:  7.0    Style/Cuff Inflation:  Cuffed (inflated to minimal occlusive pressure)    Tube secured:  22    Secured at:  The lips    Placement Verified By:  Capnometry and Colorimetric ETCO2 device    Complicating Factors:  None    Findings Post-Intubation:  BS equal bilateral and atraumatic/condition of teeth unchanged

## 2024-03-28 NOTE — TRANSFER OF CARE
"Anesthesia Transfer of Care Note    Patient: Mansi Jin    Procedure(s) Performed: Procedure(s) (LRB):  CHOLECYSTECTOMY, LAPAROSCOPIC, WITH CHOLANGIOGRAM (N/A)    Patient location: PACU    Anesthesia Type: general    Transport from OR: Transported from OR on room air with adequate spontaneous ventilation    Post pain: adequate analgesia    Post assessment: no apparent anesthetic complications    Post vital signs: stable    Level of consciousness: responds to stimulation    Nausea/Vomiting: no nausea/vomiting    Complications: none    Transfer of care protocol was followed      Last vitals: Visit Vitals  /81   Pulse (!) 117   Temp 36.6 °C (97.9 °F) (Oral)   Resp 18   Ht 5' 7" (1.702 m)   Wt 62.6 kg (138 lb)   SpO2 99%   Breastfeeding No   BMI 21.61 kg/m²     "

## 2024-03-28 NOTE — PROGRESS NOTES
Patient is a 64 yr old female with a past medical history stage IV non-small-cell lung cancer with bone METS, COPD stage 2, HTN(Hypertension), Hyperlipidemia as well as recently diagnosed and treated C diff presents to the Emergency Department for nausea, vomiting, and abdominal pain.   Patient states that she started vomiting this morning.  Tried taking Zofran but vomited up within 10 minutes. States she started having abdominal pain about 2 days ago. States that the pain is everywhere in her stomach.  Daughter states she has not been eating much since the diagnosis of the Cv- Diff. Patient states that she completed the antibiotics with a 10 day duration and the diarrhea has completely stopped.  She denies any cough or chest pain.  No worsening in her chronic shortness of breath.  Denies any diarrhea or constipation.  No known sick contacts or abnormal foods. Las chemotherapy treatment was 3 weeks ago.      Plan  US ABD   2.   HIDA Scan with CCR  3.   Miralex Prep  4.   Check Stool PCR   5.   Check C-Diff  6.    Check for H-pylori   7.    Colonoscopy    03/25/2024  Patient is hospital day 1  Patient complains of right-sided pain mostly in the hip undergoing pain medication and supplementation.  Patient had thickened wall right colon consistent with C diff colitis that she already had.  Patient's stools were negative for blood thus far patient has successfully navigated her MiraLax prep  C diff antigen and toxin are still positive both on 03/11/24 and 03/25/2024  Stool PCR otherwise was negative   I am going to start the patient on vancomycin 250 p.o. Q 6 hours to further treat her C diff colitis.  Patient will need a colonoscopy either as an in or outpatient  She had complaints of midepigastric right upper quadrant pain ordered an ultrasound of the abdomen that was normal patient's HIDA scan showed a 36% ejection fraction that reproduced some of her symptoms.  Patient has been prep for colonoscopy to evaluate the  right colon and the thickening of the wall noted on CT scan I suspect that is secondary to her C diff colitis.  Overall the patient is in stable condition at this point in time her bicarb was a little low at 20 I gave her a L of half-normal saline with 2 amps of bicarb x2 doses.    03/26/2024  te of Procedure: 3/26/2024      Procedure: Procedure(s) (LRB):  COLONOSCOPY (N/A)  COLONOSCOPY, WITH 1 OR MORE BIOPSIES (N/A)      Surgeon(s) and Role:     * Vickey Patino MD - Primary     Assisting Surgeon: None     Pre-Operative Diagnosis: * No pre-op diagnosis entered *     Post-Operative Diagnosis: Post-Op Diagnosis Codes:     * Anemia, unspecified type [D64.9]  Normal terminal ileum up to 10 cm   Colitis of the cecum consistent with C diff colitis  Biopsy taken of the cecum   Normal transverse descending rectosigmoid colon   Moderate tone no masses on rectal exam  Anesthesia: General    03/27/2024  Patient is still continues to persist with nausea bloating and vomiting.  She has not able to tolerate diet.  It is my impression she probably has biliary dyskinesia has a possible source of her nausea vomiting given her HIDA scan of 36%   Ultrasound was negative   Patient wants to think about possible cholecystectomy I will recheck in the morning see if she wants to go ahead and proceed

## 2024-03-28 NOTE — PLAN OF CARE
Rn CALLED AND NOTIFIED THE DAUGHTER THAT THE PROCEDURE WAS OVER AND THAT SHE WAS GOING TO RECOVERY AND DOING WELL.

## 2024-03-28 NOTE — ANESTHESIA PREPROCEDURE EVALUATION
03/28/2024  Mansi Jin is a 64 y.o., female.      Pre-op Assessment    I have reviewed the Patient Summary Reports.     I have reviewed the Nursing Notes. I have reviewed the NPO Status.   I have reviewed the Medications.     Review of Systems  Anesthesia Hx:             Denies Family Hx of Anesthesia complications.    Denies Personal Hx of Anesthesia complications.                    Social:  Smoker, No Alcohol Use       Cardiovascular:     Hypertension, well controlled            STUBBS  ECG has been reviewed.                          Pulmonary:   COPD, moderate Asthma moderate Shortness of breath                  Renal/:  Chronic Renal Disease, CKD                Hepatic/GI:     GERD, well controlled             Musculoskeletal:  Arthritis               Neurological:  TIA,                                     Endocrine:   Hypothyroidism              Physical Exam  General: Cooperative, Alert and Oriented    Airway:  Mallampati: II   Mouth Opening: Normal  TM Distance: Normal  Tongue: Normal  Neck ROM: Normal ROM    Dental:  Intact        Anesthesia Plan  Type of Anesthesia, risks & benefits discussed:    Anesthesia Type: Gen ETT  Intra-op Monitoring Plan: Standard ASA Monitors  Post Op Pain Control Plan: multimodal analgesia  Induction:  IV  Airway Plan: Direct  Informed Consent: Informed consent signed with the Patient and all parties understand the risks and agree with anesthesia plan.  All questions answered.   ASA Score: 3    Ready For Surgery From Anesthesia Perspective.     .

## 2024-03-28 NOTE — PT/OT/SLP PROGRESS
Physical Therapy      Patient Name:  Mansi Jin   MRN:  [id number]    Patient not seen today secondary to pt has surgical procedure and prefers not to amb before surgery . Will follow-up Saturday 3/30/24 (no PT available for Holiday 3/29/24).  Nsg notified.

## 2024-03-28 NOTE — OP NOTE
[facility]  Medical Surgical Unit  Operative Note      Date of Procedure: 3/28/2024     Procedure: Procedure(s) (LRB):  CHOLECYSTECTOMY, LAPAROSCOPIC, WITH CHOLANGIOGRAM (N/A)     Surgeon(s) and Role:     * Vickey Patino MD - Primary    Assisting Surgeon: None    Pre-Operative Diagnosis: Cholecystitis, acute [K81.0]  Chronic cholecystitis with the associated nausea and  Post-Operative Diagnosis: Post-Op Diagnosis Codes:     * Cholecystitis, acute [K81.0]  Cholecystitis with normal intraoperative cholangiogram  Anesthesia: General    Operative Findings (including complications, if any):  Patient is a 64-year-old  female with a history of metastatic lung cancer who has nausea vomiting inability maintain diet.  Patient had HIDA scan with a 36% ejection fraction ultrasound showed no evidence of sludge in the gallbladder.  Patient continued to persist with nausea vomiting and as a result was consented for possible cholecystectomy.    Patient was brought to the OR supine position general anesthetic prepped and draped in a sterile fashion had a supraumbilical incision made with insertion of Veress needle insufflation abdomen of 14 mm of mercury with insertion of a 5 mm trocar.  Patient then underwent insertion of 2 lateral 5 mm trocars and 1 5 mm trocar just to the right of falciform ligament.  Gallbladder is grasped and mobilized cystic duct and artery were isolated triangle Calot was clearly dissected intraoperative cholangiogram was obtained.  Patient had clipping of the cystic duct and artery cauterization of gallbladder off the liver bed removed through the epigastric trocar site.  The aponeuroses of the 5 mm trocar sites were closed with 0 Vicryl on  needle.  SubQ was closed with 4-0 plain gut suture.  Dermabond was used for skin.  Sterile dressings were applied no problems were encountered patient tolerated procedure well.          Description of Technical Procedures:  Noted  above       Significant Surgical Tasks Conducted by the Assistant(s), if Applicable:  None       Estimated Blood Loss (EBL): * No values recorded between 3/28/2024  3:23 PM and 3/28/2024  3:48 PM *           Implants: * No implants in log *    Specimens:   Specimen (24h ago, onward)       Start     Ordered    03/28/24 1534  Specimen to Pathology  RELEASE UPON ORDERING        References:    Click here for ordering Quick Tip   Question:  Release to patient  Answer:  Immediate    03/28/24 1534                            Condition: Good    Disposition: PACU - hemodynamically stable.    Attestation: I was present and scrubbed for the entire procedure.    Discharge Note    OUTCOME: Patient tolerated treatment/procedure well without complication and is now ready for discharge.        DISPOSITION: Home or Self Care    FINAL DIAGNOSIS:  Colitis  Biliary dyskinesia status post laparoscopic cholecystectomy with intraoperative cholangiography under fluoroscopy  FOLLOWUP: In clinic 1 week    DISCHARGE INSTRUCTIONS:  No discharge procedures on file.

## 2024-03-28 NOTE — PROGRESS NOTES
Patient is a 64 yr old female with a past medical history stage IV non-small-cell lung cancer with bone METS, COPD stage 2, HTN(Hypertension), Hyperlipidemia as well as recently diagnosed and treated C diff presents to the Emergency Department for nausea, vomiting, and abdominal pain.   Patient states that she started vomiting this morning.  Tried taking Zofran but vomited up within 10 minutes. States she started having abdominal pain about 2 days ago. States that the pain is everywhere in her stomach.  Daughter states she has not been eating much since the diagnosis of the Cv- Diff. Patient states that she completed the antibiotics with a 10 day duration and the diarrhea has completely stopped.  She denies any cough or chest pain.  No worsening in her chronic shortness of breath.  Denies any diarrhea or constipation.  No known sick contacts or abnormal foods. Las chemotherapy treatment was 3 weeks ago.      Plan  US ABD   2.   HIDA Scan with CCR  3.   Miralex Prep  4.   Check Stool PCR   5.   Check C-Diff  6.    Check for H-pylori   7.    Colonoscopy    03/25/2024  Patient is hospital day 1  Patient complains of right-sided pain mostly in the hip undergoing pain medication and supplementation.  Patient had thickened wall right colon consistent with C diff colitis that she already had.  Patient's stools were negative for blood thus far patient has successfully navigated her MiraLax prep  C diff antigen and toxin are still positive both on 03/11/24 and 03/25/2024  Stool PCR otherwise was negative   I am going to start the patient on vancomycin 250 p.o. Q 6 hours to further treat her C diff colitis.  Patient will need a colonoscopy either as an in or outpatient  She had complaints of midepigastric right upper quadrant pain ordered an ultrasound of the abdomen that was normal patient's HIDA scan showed a 36% ejection fraction that reproduced some of her symptoms.  Patient has been prep for colonoscopy to evaluate the  right colon and the thickening of the wall noted on CT scan I suspect that is secondary to her C diff colitis.  Overall the patient is in stable condition at this point in time her bicarb was a little low at 20 I gave her a L of half-normal saline with 2 amps of bicarb x2 doses.    03/26/2024  te of Procedure: 3/26/2024      Procedure: Procedure(s) (LRB):  COLONOSCOPY (N/A)  COLONOSCOPY, WITH 1 OR MORE BIOPSIES (N/A)      Surgeon(s) and Role:     * Vickey Patino MD - Primary     Assisting Surgeon: None     Pre-Operative Diagnosis: * No pre-op diagnosis entered *     Post-Operative Diagnosis: Post-Op Diagnosis Codes:     * Anemia, unspecified type [D64.9]  Normal terminal ileum up to 10 cm   Colitis of the cecum consistent with C diff colitis  Biopsy taken of the cecum   Normal transverse descending rectosigmoid colon   Moderate tone no masses on rectal exam  Anesthesia: General    03/27/2024  Patient is still continues to persist with nausea bloating and vomiting.  She has not able to tolerate diet.  It is my impression she probably has biliary dyskinesia has a possible source of her nausea vomiting given her HIDA scan of 36%   Ultrasound was negative   Patient wants to think about possible cholecystectomy I will recheck in the morning see if she wants to go ahead and proceed    03/28/2024    Date of Procedure: 3/28/2024      Procedure: Procedure(s) (LRB):  CHOLECYSTECTOMY, LAPAROSCOPIC, WITH CHOLANGIOGRAM (N/A)      Surgeon(s) and Role:     * Vickey Patino MD - Primary     Assisting Surgeon: None     Pre-Operative Diagnosis: Cholecystitis, acute [K81.0]  Chronic cholecystitis with the associated nausea and  Post-Operative Diagnosis: Post-Op Diagnosis Codes:     * Cholecystitis, acute [K81.0]  Cholecystitis with normal intraoperative cholangiogram  Anesthesia: General

## 2024-03-28 NOTE — ANESTHESIA POSTPROCEDURE EVALUATION
Anesthesia Post Evaluation    Patient: Mansi Jin    Procedure(s) Performed: Procedure(s) (LRB):  CHOLECYSTECTOMY, LAPAROSCOPIC, WITH CHOLANGIOGRAM (N/A)    Final Anesthesia Type: general      Patient location during evaluation: OPS  Patient participation: Yes- Able to Participate  Level of consciousness: awake and alert  Post-procedure vital signs: reviewed and stable  Pain management: adequate  Airway patency: patent  HERMINIO mitigation strategies: Multimodal analgesia  PONV status at discharge: No PONV  Anesthetic complications: no      Cardiovascular status: hemodynamically stable  Respiratory status: unassisted, spontaneous ventilation and room air  Hydration status: euvolemic  Follow-up not needed.              Vitals Value Taken Time   /81 03/28/24 1600   Temp 36.7 03/28/24 1600   Pulse 90 03/28/24 1600   Resp 14 03/28/24 1600   SpO2 100 % 03/28/24 1600   Vitals shown include unvalidated device data.      No case tracking events are documented in the log.      Pain/Estela Score: Pain Rating Prior to Med Admin: 8 (3/28/2024 11:38 AM)  Pain Rating Post Med Admin: 3 (3/28/2024  4:39 AM)

## 2024-03-29 VITALS
WEIGHT: 138 LBS | HEIGHT: 67 IN | OXYGEN SATURATION: 99 % | BODY MASS INDEX: 21.66 KG/M2 | DIASTOLIC BLOOD PRESSURE: 66 MMHG | TEMPERATURE: 97 F | RESPIRATION RATE: 20 BRPM | HEART RATE: 106 BPM | SYSTOLIC BLOOD PRESSURE: 97 MMHG

## 2024-03-29 LAB — PHYTONADIONE SERPL-MCNC: 0.48 NG/ML (ref 0.1–2.2)

## 2024-03-29 PROCEDURE — 25000003 PHARM REV CODE 250: Performed by: SURGERY

## 2024-03-29 PROCEDURE — 63600175 PHARM REV CODE 636 W HCPCS: Performed by: SURGERY

## 2024-03-29 RX ORDER — METOPROLOL TARTRATE 1 MG/ML
5 INJECTION, SOLUTION INTRAVENOUS EVERY 6 HOURS PRN
Status: DISCONTINUED | OUTPATIENT
Start: 2024-03-29 | End: 2024-03-29 | Stop reason: HOSPADM

## 2024-03-29 RX ORDER — HYDROCODONE BITARTRATE AND ACETAMINOPHEN 5; 325 MG/1; MG/1
1 TABLET ORAL EVERY 6 HOURS PRN
Status: DISCONTINUED | OUTPATIENT
Start: 2024-03-29 | End: 2024-03-29

## 2024-03-29 RX ORDER — GABAPENTIN 300 MG/1
300 CAPSULE ORAL 3 TIMES DAILY
Status: DISCONTINUED | OUTPATIENT
Start: 2024-03-29 | End: 2024-03-29 | Stop reason: HOSPADM

## 2024-03-29 RX ORDER — HYDROCODONE BITARTRATE AND ACETAMINOPHEN 10; 325 MG/1; MG/1
1 TABLET ORAL EVERY 6 HOURS PRN
Status: DISCONTINUED | OUTPATIENT
Start: 2024-03-29 | End: 2024-03-29 | Stop reason: HOSPADM

## 2024-03-29 RX ADMIN — GABAPENTIN 300 MG: 300 CAPSULE ORAL at 02:03

## 2024-03-29 RX ADMIN — PIPERACILLIN SODIUM AND TAZOBACTAM SODIUM 4.5 G: 4; .5 INJECTION, POWDER, LYOPHILIZED, FOR SOLUTION INTRAVENOUS at 05:03

## 2024-03-29 RX ADMIN — SODIUM CHLORIDE, POTASSIUM CHLORIDE, SODIUM LACTATE AND CALCIUM CHLORIDE: 600; 310; 30; 20 INJECTION, SOLUTION INTRAVENOUS at 10:03

## 2024-03-29 RX ADMIN — KETOROLAC TROMETHAMINE 15 MG: 30 INJECTION, SOLUTION INTRAMUSCULAR at 08:03

## 2024-03-29 RX ADMIN — HYDROCODONE BITARTRATE AND ACETAMINOPHEN 1 TABLET: 5; 325 TABLET ORAL at 05:03

## 2024-03-29 RX ADMIN — GABAPENTIN 300 MG: 300 CAPSULE ORAL at 10:03

## 2024-03-29 RX ADMIN — VANCOMYCIN HYDROCHLORIDE 250 MG: KIT at 11:03

## 2024-03-29 RX ADMIN — PIPERACILLIN SODIUM AND TAZOBACTAM SODIUM 4.5 G: 4; .5 INJECTION, POWDER, LYOPHILIZED, FOR SOLUTION INTRAVENOUS at 02:03

## 2024-03-29 RX ADMIN — METOCLOPRAMIDE 10 MG: 10 TABLET ORAL at 11:03

## 2024-03-29 RX ADMIN — VANCOMYCIN HYDROCHLORIDE 250 MG: KIT at 05:03

## 2024-03-29 RX ADMIN — PROCHLORPERAZINE EDISYLATE 5 MG: 5 INJECTION INTRAMUSCULAR; INTRAVENOUS at 03:03

## 2024-03-29 RX ADMIN — METOPROLOL TARTRATE 5 MG: 1 INJECTION, SOLUTION INTRAVENOUS at 03:03

## 2024-03-29 RX ADMIN — KETOROLAC TROMETHAMINE 15 MG: 30 INJECTION, SOLUTION INTRAMUSCULAR at 03:03

## 2024-03-29 NOTE — DISCHARGE SUMMARY
Patient is a 64 yr old female with a past medical history stage IV non-small-cell lung cancer with bone METS, COPD stage 2, HTN(Hypertension), Hyperlipidemia as well as recently diagnosed and treated C diff presents to the Emergency Department for nausea, vomiting, and abdominal pain.   Patient states that she started vomiting this morning.  Tried taking Zofran but vomited up within 10 minutes. States she started having abdominal pain about 2 days ago. States that the pain is everywhere in her stomach.  Daughter states she has not been eating much since the diagnosis of the Cv- Diff. Patient states that she completed the antibiotics with a 10 day duration and the diarrhea has completely stopped.  She denies any cough or chest pain.  No worsening in her chronic shortness of breath.  Denies any diarrhea or constipation.  No known sick contacts or abnormal foods. Las chemotherapy treatment was 3 weeks ago.      Plan  US ABD   2.   HIDA Scan with CCR  3.   Miralex Prep  4.   Check Stool PCR   5.   Check C-Diff  6.    Check for H-pylori   7.    Colonoscopy    03/25/2024  Patient is hospital day 1  Patient complains of right-sided pain mostly in the hip undergoing pain medication and supplementation.  Patient had thickened wall right colon consistent with C diff colitis that she already had.  Patient's stools were negative for blood thus far patient has successfully navigated her MiraLax prep  C diff antigen and toxin are still positive both on 03/11/24 and 03/25/2024  Stool PCR otherwise was negative   I am going to start the patient on vancomycin 250 p.o. Q 6 hours to further treat her C diff colitis.  Patient will need a colonoscopy either as an in or outpatient  She had complaints of midepigastric right upper quadrant pain ordered an ultrasound of the abdomen that was normal patient's HIDA scan showed a 36% ejection fraction that reproduced some of her symptoms.  Patient has been prep for colonoscopy to evaluate the  right colon and the thickening of the wall noted on CT scan I suspect that is secondary to her C diff colitis.  Overall the patient is in stable condition at this point in time her bicarb was a little low at 20 I gave her a L of half-normal saline with 2 amps of bicarb x2 doses.    03/26/2024  te of Procedure: 3/26/2024      Procedure: Procedure(s) (LRB):  COLONOSCOPY (N/A)  COLONOSCOPY, WITH 1 OR MORE BIOPSIES (N/A)      Surgeon(s) and Role:     * Vickey Patino MD - Primary     Assisting Surgeon: None     Pre-Operative Diagnosis: * No pre-op diagnosis entered *     Post-Operative Diagnosis: Post-Op Diagnosis Codes:     * Anemia, unspecified type [D64.9]  Normal terminal ileum up to 10 cm   Colitis of the cecum consistent with C diff colitis  Biopsy taken of the cecum   Normal transverse descending rectosigmoid colon   Moderate tone no masses on rectal exam  Anesthesia: General    03/27/2024  Patient is still continues to persist with nausea bloating and vomiting.  She has not able to tolerate diet.  It is my impression she probably has biliary dyskinesia has a possible source of her nausea vomiting given her HIDA scan of 36%   Ultrasound was negative   Patient wants to think about possible cholecystectomy I will recheck in the morning see if she wants to go ahead and proceed    03/28/2024    Date of Procedure: 3/28/2024      Procedure: Procedure(s) (LRB):  CHOLECYSTECTOMY, LAPAROSCOPIC, WITH CHOLANGIOGRAM (N/A)      Surgeon(s) and Role:     * Vickey Patino MD - Primary     Assisting Surgeon: None     Pre-Operative Diagnosis: Cholecystitis, acute [K81.0]  Chronic cholecystitis with the associated nausea and  Post-Operative Diagnosis: Post-Op Diagnosis Codes:     * Cholecystitis, acute [K81.0]  Cholecystitis with normal intraoperative cholangiogram  Anesthesia: General    03/29/2024   Postop day 1 status post cholecystectomy  Patient is doing well tolerating diet passing her bowels seems to be without any  nausea if feels better her only complaint is the back pain she had prior to coming to the hospital.  She says she would be more comfortable at home.  Her daughter is with her at the time of my evaluation.  Her laboratory studies include a white count of 8 hemoglobin 10 hematocrit 32 platelet count was 460 renal profile is unremarkable CO2 17 secondary to probably dehydration.  Overall she looks much better skin colored better she is getting out of bed sitting in a chair she is moving about and says she can take care of herself at home  Patient will be discharged home with follow up in the office on Monday at 5:30 a.m.   Patient is to stay on a full liquid diet till then   Condition is good  Disposition to home

## 2024-03-29 NOTE — PLAN OF CARE
Problem: Adult Inpatient Plan of Care  Goal: Plan of Care Review  Outcome: Ongoing, Progressing  Goal: Patient-Specific Goal (Individualized)  Outcome: Ongoing, Progressing  Goal: Absence of Hospital-Acquired Illness or Injury  Outcome: Ongoing, Progressing  Goal: Optimal Comfort and Wellbeing  Outcome: Ongoing, Progressing  Goal: Readiness for Transition of Care  Outcome: Ongoing, Progressing     Problem: Coping Ineffective (Oncology Care)  Goal: Effective Coping  Outcome: Ongoing, Progressing     Problem: Fatigue (Oncology Care)  Goal: Improved Activity Tolerance  Outcome: Ongoing, Progressing     Problem: Oral Intake Altered (Oncology Care)  Goal: Optimal Oral Intake  Outcome: Ongoing, Progressing     Problem: Oral Mucositis (Oncology Care)  Goal: Improved Oral Mucous Membrane Integrity  Outcome: Ongoing, Progressing     Problem: Pain Acute (Oncology Care)  Goal: Optimal Pain Control  Outcome: Ongoing, Progressing     Problem: Adjustment to Illness (Bowel Disease, Inflammatory)  Goal: Optimal Adaptation to Chronic Illness  Outcome: Ongoing, Progressing     Problem: Diarrhea (Bowel Disease, Inflammatory)  Goal: Diarrhea Symptom Relief  Outcome: Ongoing, Progressing     Problem: Infection (Bowel Disease, Inflammatory)  Goal: Absence of Infection Signs and Symptoms  Outcome: Ongoing, Progressing     Problem: Nutrition Impaired (Bowel Disease, Inflammatory)  Goal: Optimal Nutrition  Outcome: Ongoing, Progressing     Problem: Pain (Bowel Disease, Inflammatory)  Goal: Acceptable Pain Control  Outcome: Ongoing, Progressing     Problem: Pain Acute  Goal: Acceptable Pain Control and Functional Ability  Outcome: Ongoing, Progressing     Problem: Infection  Goal: Absence of Infection Signs and Symptoms  Outcome: Ongoing, Progressing     Problem: Bleeding (Cholecystectomy)  Goal: Absence of Bleeding  Outcome: Ongoing, Progressing     Problem: Bowel Motility Impaired (Cholecystectomy)  Goal: Effective Bowel  Elimination  Outcome: Ongoing, Progressing     Problem: Fluid and Electrolyte Imbalance (Cholecystectomy)  Goal: Fluid and Electrolyte Balance  Outcome: Ongoing, Progressing     Problem: Infection (Cholecystectomy)  Goal: Absence of Infection Signs and Symptoms  Outcome: Ongoing, Progressing     Problem: Ongoing Anesthesia Effects (Cholecystectomy)  Goal: Anesthesia/Sedation Recovery  Outcome: Ongoing, Progressing     Problem: Pain (Cholecystectomy)  Goal: Acceptable Pain Control  Outcome: Ongoing, Progressing     Problem: Postoperative Nausea and Vomiting (Cholecystectomy)  Goal: Nausea and Vomiting Relief  Outcome: Ongoing, Progressing     Problem: Postoperative Urinary Retention (Cholecystectomy)  Goal: Effective Urinary Elimination  Outcome: Ongoing, Progressing     Problem: Respiratory Compromise (Cholecystectomy)  Goal: Effective Oxygenation and Ventilation  Outcome: Ongoing, Progressing     Problem: Fall Injury Risk  Goal: Absence of Fall and Fall-Related Injury  Outcome: Ongoing, Progressing     Problem: Fatigue  Goal: Improved Activity Tolerance  Outcome: Ongoing, Progressing

## 2024-03-30 LAB
BACTERIA BLD CULT: NORMAL
BACTERIA BLD CULT: NORMAL

## 2024-04-01 ENCOUNTER — PATIENT OUTREACH (OUTPATIENT)
Dept: ADMINISTRATIVE | Facility: CLINIC | Age: 64
End: 2024-04-01
Payer: MEDICARE

## 2024-04-01 LAB — PSYCHE PATHOLOGY RESULT: NORMAL

## 2024-04-02 DIAGNOSIS — C34.91 NSCLC OF RIGHT LUNG: Primary | ICD-10-CM

## 2024-04-02 LAB — PSYCHE PATHOLOGY RESULT: NORMAL

## 2024-04-08 ENCOUNTER — OFFICE VISIT (OUTPATIENT)
Dept: HEMATOLOGY/ONCOLOGY | Facility: CLINIC | Age: 64
End: 2024-04-08
Payer: MEDICARE

## 2024-04-08 ENCOUNTER — INFUSION (OUTPATIENT)
Dept: INFUSION THERAPY | Facility: HOSPITAL | Age: 64
End: 2024-04-08
Attending: NURSE PRACTITIONER
Payer: MEDICARE

## 2024-04-08 VITALS
RESPIRATION RATE: 18 BRPM | DIASTOLIC BLOOD PRESSURE: 75 MMHG | BODY MASS INDEX: 19.68 KG/M2 | TEMPERATURE: 98 F | OXYGEN SATURATION: 97 % | WEIGHT: 125.38 LBS | SYSTOLIC BLOOD PRESSURE: 110 MMHG | HEIGHT: 67 IN | HEART RATE: 114 BPM

## 2024-04-08 DIAGNOSIS — A04.72 C. DIFFICILE DIARRHEA: ICD-10-CM

## 2024-04-08 DIAGNOSIS — C79.51 METASTASIS TO BONE: ICD-10-CM

## 2024-04-08 DIAGNOSIS — D50.9 IRON DEFICIENCY ANEMIA, UNSPECIFIED IRON DEFICIENCY ANEMIA TYPE: ICD-10-CM

## 2024-04-08 DIAGNOSIS — E87.6 HYPOKALEMIA: ICD-10-CM

## 2024-04-08 DIAGNOSIS — D84.821 IMMUNODEFICIENCY DUE TO CHEMOTHERAPY: ICD-10-CM

## 2024-04-08 DIAGNOSIS — C34.91 NSCLC OF RIGHT LUNG: Primary | ICD-10-CM

## 2024-04-08 DIAGNOSIS — Z79.899 IMMUNODEFICIENCY DUE TO CHEMOTHERAPY: ICD-10-CM

## 2024-04-08 DIAGNOSIS — Z79.899 ON ANTINEOPLASTIC CHEMOTHERAPY: ICD-10-CM

## 2024-04-08 DIAGNOSIS — C79.51 METASTASIS TO BONE: Primary | ICD-10-CM

## 2024-04-08 DIAGNOSIS — I26.99 PULMONARY EMBOLISM, UNSPECIFIED CHRONICITY, UNSPECIFIED PULMONARY EMBOLISM TYPE, UNSPECIFIED WHETHER ACUTE COR PULMONALE PRESENT: ICD-10-CM

## 2024-04-08 DIAGNOSIS — L81.9 DISCOLORATION OF SKIN: ICD-10-CM

## 2024-04-08 DIAGNOSIS — G89.3 CANCER RELATED PAIN: ICD-10-CM

## 2024-04-08 DIAGNOSIS — Z79.01 ON CONTINUOUS ORAL ANTICOAGULATION: ICD-10-CM

## 2024-04-08 DIAGNOSIS — R06.02 SOB (SHORTNESS OF BREATH) ON EXERTION: ICD-10-CM

## 2024-04-08 DIAGNOSIS — Z29.89 IMMUNOTHERAPY: ICD-10-CM

## 2024-04-08 DIAGNOSIS — T45.1X5A IMMUNODEFICIENCY DUE TO CHEMOTHERAPY: ICD-10-CM

## 2024-04-08 PROCEDURE — 1111F DSCHRG MED/CURRENT MED MERGE: CPT | Mod: CPTII,S$GLB,,

## 2024-04-08 PROCEDURE — 1159F MED LIST DOCD IN RCRD: CPT | Mod: CPTII,S$GLB,,

## 2024-04-08 PROCEDURE — 63600175 PHARM REV CODE 636 W HCPCS: Mod: JZ,JG | Performed by: INTERNAL MEDICINE

## 2024-04-08 PROCEDURE — 96372 THER/PROPH/DIAG INJ SC/IM: CPT

## 2024-04-08 PROCEDURE — 3288F FALL RISK ASSESSMENT DOCD: CPT | Mod: CPTII,S$GLB,,

## 2024-04-08 PROCEDURE — 3074F SYST BP LT 130 MM HG: CPT | Mod: CPTII,S$GLB,,

## 2024-04-08 PROCEDURE — 3078F DIAST BP <80 MM HG: CPT | Mod: CPTII,S$GLB,,

## 2024-04-08 PROCEDURE — 99215 OFFICE O/P EST HI 40 MIN: CPT | Mod: S$GLB,,,

## 2024-04-08 PROCEDURE — 3008F BODY MASS INDEX DOCD: CPT | Mod: CPTII,S$GLB,,

## 2024-04-08 PROCEDURE — 1126F AMNT PAIN NOTED NONE PRSNT: CPT | Mod: CPTII,S$GLB,,

## 2024-04-08 PROCEDURE — 99999 PR PBB SHADOW E&M-EST. PATIENT-LVL V: CPT | Mod: PBBFAC,,,

## 2024-04-08 PROCEDURE — 1101F PT FALLS ASSESS-DOCD LE1/YR: CPT | Mod: CPTII,S$GLB,,

## 2024-04-08 PROCEDURE — 1160F RVW MEDS BY RX/DR IN RCRD: CPT | Mod: CPTII,S$GLB,,

## 2024-04-08 RX ORDER — POTASSIUM CHLORIDE 750 MG/1
20 CAPSULE, EXTENDED RELEASE ORAL DAILY
Qty: 60 CAPSULE | Refills: 3 | Status: SHIPPED | OUTPATIENT
Start: 2024-04-08

## 2024-04-08 RX ADMIN — DENOSUMAB 120 MG: 120 INJECTION SUBCUTANEOUS at 10:04

## 2024-04-08 NOTE — PROGRESS NOTES
HEMATOLOGY/ONCOLOGY OFFICE CLINIC VISIT  [facility]    ONCOLOGICAL HISTORY:     Diagnosis:  - Stage IV NSCLC with bone mets.  - NSCLC / Adenocarcinoma Stage IIIA pT2a pN2--dx 12/2022  - PD-L1 1%  - History Uterine cancer - Dx 1989    Current Treatment:   Taxotere + Xgeva- 10/24/23-->  Held cycle 3 12/6/23 r/t bilateral PE, started on Eliquis      Treatment History:  - 1989 s/p total hysterectomy   - 12/08/2022: Right lower and middle bilobectomy with mediastinal lymph node dissection.   - Carboplatin + Pemetrexed  3/15/2023-5/1/2023  - Palliative RT   - Carboplatin + Pemetrexed started 3/15/2023  - Keytruda stopped 9/19/23 r/t elevated TSH and PET/CT progression, last dose given 8/29/23  - Xgeva- 7/18/2023-->    Plan of care: palliative systemic therapy      IMAGING:   - 08/10/2022 PET: The elongated subpleural right lower lobe nodule demonstrates fairly intense FDG activity.  Infectious, inflammatory and neoplastic etiologies remain possible. No suspicious PET findings elsewhere.  - 12/08/2022 CT HEAD without contrast: No acute intracranial abnormality identified.  Findings of mild microvascular ischemic disease.  - 12/2022: U/S Upper extremity: Thrombophlebitis of the left cephalic vein. No deep venous thrombosis in the left upper extremity.  - 01/03/2023 MRI Brain : GARY   - 01/12/2023 NM PET CT 1. Postsurgical changes of recent right lower and middle lobectomies.  There is a small right pleural effusion.  There is nonspecific peripheral uptake along the pleura at the right lung base and perihilar region which may be related to healing response in the setting of recent surgery.  Similarly borderline mediastinal uptake is nonspecific in the recent postoperative setting and could be reactive.  Continued attention recommended on follow-up. 2. Focal Uptake at the medial right acetabular roof has a max SUV of 6.4. This is new compared to previous exam. Metastasis is a consideration  -PET 6/5/2023: Previously  visualized moderately FDG avid opacities towards the right lung base improved.  Pleural effusion also improved.  No new suspicious PET findings in the lungs.  Severe emphysema. Some left adrenal thickening is similar to prior but there is now moderate associated FDG activity, max SUV is 3.6. FDG activity is again seen associated with the right superior acetabulum.  FDG avid area appears slightly larger today with slightly increased sclerosis.  SUV is 9.7, previously 6.4.  There is a newly evident subcentimeter FDG avid lesion left pedicle T10. Additional subcentimeter FDG avid lesion T4 vertebral body SUV 3.4.suspicious for metastatic disease.  -MRI T spine 6/15/2023:   1. Foci of abnormal signal intensity and enhancement at the inferior endplate of T3 and at body and left pedicle of T10.  Metastatic neoplastic etiology must be considered.  2. Thoracic spondylosis  3. Mild encroachment into the right neural foramina at the T3-4 and T4-5 secondary to mild posterior disc bulge and posterior osteophyte formation  4. Multilevel mild posterior disc bulge which does not result in significant central spinal stenosis.  9/27/23 Thyroid U/S:  Very minimal to no significant uptake of radiotracer isotope within the thyroid lobes bilaterally.  Clinical correlation is indicated  CT CAP 9/27/23:  1. Interim increasing nodular soft tissue densities/nodes/mass is at the mediastinum and right hilum measuring up the 3.0 cm.  A neoplastic etiology must be considered  2. Interim development of a 2.5 cm low-attenuation mass at the left adrenal gland.  A metastatic etiology must be considered  3. Interim development of a sclerotic foci at T3 and T10.  A metastatic etiology must be considered  4. Small right pleural effusion with associated infiltrate and atelectasis at the right lung base suspect  5. Suspect small cyst at the left kidney as described  6. Mild ill-defined soft tissue fullness at the cervical vaginal region  7. Borderline  cardiomegaly  10/6/23 PET/CT:  1. Disease progression with new FDG avid metastatic lymph nodes in the chest and right hilum.  2. Bilateral axillary and inguinal lymph nodes with low level uptake are indeterminate but concerning for metastasis given the additional findings.  3. Worsening and new FDG avid osseous disease involving both the axial and appendicular skeleton.  4. Enlarging left adrenal metastasis.  12/6/23 CTA:  1. Soft tissue masslike density again evident at the right hilum causing mass effect on the right main pulmonary artery  2. Interim development of filling defects/pulmonary emboli at the diminutive posterior right lower lobe pulmonary arteries.  A few small defects/pulmonary emboli have developed at the pulmonary arteries at the posterior left lower lobe since the prior exam.  3. Postsurgical changes right hilum with volume loss at the right lung and elevation of the right hemidiaphragm with associated posterior right basilar pleural reaction, infiltrate, and atelectasis  4. Advanced emphysematous changes with the right side greater than the left  5. Thoracic spondylosis with the sclerotic foci at the T3 and T10  6. The cancer center was notified of the findings on 12/06/2023 at 10:00  1/16/24 CT CAP:  1. Nodular opacities again identified at the right hilum and mediastinum which have a similar appearance to the prior exam  2. Low-attenuation mass again evident at the left adrenal gland  3. Sclerotic lytic lesions evident at T3, T5, T10, and L5.  A metastatic neoplastic etiology must be considered  4. Advanced emphysematous changes with resolution of small right pleural effusion.  There is patchy infiltrate, atelectasis and or scarring at the posterior right lung base.  5. Findings consistent with cysts at the left kidney  6. Ill-defined soft tissue fullness again evident at the cervical vaginal region  7. Findings of mild constipation  1/16/24 NM Bone Scan:  1. Scattered focal activity at the  thoracic spine, lumbar spine, right hip/trochanteric region, left iliac bone, and left sacrum.  A metastatic neoplastic etiology must be considered  2. Bar like CT intense activity at T10 suggesting acute compression fracture  3. Mild asymmetric prominence of the right upper renal collecting system compared to the left    PATHOLOGY:  - 12/08/2022: Invasive solid adenocarcinoma (3.1 CM); G3, poorly differentiated. Visceral Pleura Invasion present without definite serosal surface involvement. Lymphovascular invasion (Vein) present. All margins negative for invasive carcinoma, distance 2.5 cm. LN - 4/14 (10R: Hilar, Posterior mediastinal (RACHEAL), 4R Lower paratracheal, 9R Pulmonary ligament, 10R). pT2a pN2  - 02/22/2023: RIGHT ACETABULAR LESION, CT - GUIDE CORE NEEDLE BIOPSY : FIBRINOPURULENT EXUDATE ADMIXED WITH ATYPICAL EPITHELIOID GROUPS,   NORMAL    MARROW ELEMENTS AND ABUNDANT BLOOD CLOT      Subjective:     HPI  Mansi Jin  64 y.o.  female with past medical history significant for HTN, HLD, uterine cancer status post total hysterectomy in 1989,  CKD Stage 2, COPD Stage 3, referred for management of NSCLC s/p RLL / RML lobectomy with Mediastinal Lymph Node Dissection on 12/8/2022.     She was noted to have RLL lung mass and underwent CT guided biopsy which was non diagnostic. PET scan demonstrated significant avidity within the lesion. Given high risk for malignancy, she underwent right lower lobectomy and right middle lobectomy on 12/08/2022. Pathology came back with invasive adenocarcinoma, poorly differentiated with solid morphology, focal tumor involvement of visceral pleura and focal tumor vascular invasion (vein), with mediastinal lymph nodes positive for metastatic carcinoma.      PET on 01/12/2023 showed focal uptake at the medial right acetabular roof but the biopsy was negative for malignancy. Discussed with radiology, they recommended adjuvant chemotherapy and then they will reassess later for  radiation therapy.     Chief Complaint: NSCLC (Pt reports no new complaints)      Interval History:   She returns to the clinic today for a hospital follow-up. She had her gallbladder removed 3/28/2024 by Dr. Charbel Patino. She also had a colonoscopy which continued to show Cdiff related colitis. She is currently on oral vanc for this. Reports no diarrhea. She is feeling weak and tired today. She has increased back pain since D/C from the hospital, controlled by her pain meds. Appetite is slowly improving.  She continues taking her Eliquis 5 mg BID for bilateral PE. She is taking her oral iron every other day and tolerating well. Now seeing dermatology for vitiligo to bilateral fingertips. She denies any fever, chills, worsened SOB, CP, headache, swelling, or nausea/vomiting.      Past Medical History:   Diagnosis Date    ASTHMA     COPD (chronic obstructive pulmonary disease)     SEVERE    GERD (gastroesophageal reflux disease)     High cholesterol     HLD (hyperlipidemia)     Hypothyroidism, unspecified     Lung cancer 12/08/2022    invasive adenocarcinoma, poorly differentiated with solid morphology, focal tumor involvement of visceral pleura and focal tumor vascular invasion (vein), with 2 of 3 posterior mediastinal lymph nodes positive for metastatic carcinoma    Stage 2 chronic kidney disease     TIA (transient ischemic attack)     no deficits    Tobacco abuse     Unspecified osteoarthritis, unspecified site     Uterine cancer 1989      Past Surgical History:   Procedure Laterality Date    COLONOSCOPY N/A 3/26/2024    Procedure: COLONOSCOPY;  Surgeon: Vickey Patino MD;  Location: [facility];  Service: Endoscopy;  Laterality: N/A;  post op: c. diff colitis of right colon    COLONOSCOPY, WITH 1 OR MORE BIOPSIES N/A 3/26/2024    Procedure: COLONOSCOPY, WITH 1 OR MORE BIOPSIES;  Surgeon: Vickey Patino MD;  Location: [facility];  Service: Endoscopy;  Laterality: N/A;  cecum    INSERTION OF TUNNELED  CENTRAL VENOUS CATHETER (CVC) WITH SUBCUTANEOUS PORT N/A 2023    Procedure: [id number]-CATH;  Surgeon: Peter Dial MD;  Location: [facility] OR;  Service: Peripheral Vascular;  Laterality: N/A;    LAPAROSCOPIC CHOLECYSTECTOMY WITH CHOLANGIOGRAPHY N/A 3/28/2024    Procedure: CHOLECYSTECTOMY, LAPAROSCOPIC, WITH CHOLANGIOGRAM;  Surgeon: Vickey Patino MD;  Location: [facility] OR;  Service: General;  Laterality: N/A;    lipoma multiple sites      LUNG LOBECTOMY Right 2022    Procedure: LOBECTOMY, LUNG;  Surgeon: Jass Browne IV, MD;  Location: [facility] OR;  Service: Cardiothoracic;  Laterality: Right;    SURGICAL REMOVAL OF LYMPH NODE  2022    Procedure: EXCISION, LYMPH NODE;  Surgeon: Jass Browne IV, MD;  Location: [facility] OR;  Service: Cardiothoracic;;    THORACOTOMY Right 2022    Procedure: THORACOTOMY;  Surgeon: Jass Browne IV, MD;  Location: [facility] OR;  Service: Cardiothoracic;  Laterality: Right;  Right Lower Lobectomy with Lymph Node Dissection    TOTAL ABDOMINAL HYSTERECTOMY W/ BILATERAL SALPINGOOPHORECTOMY       Social History     Socioeconomic History    Marital status: Single    Number of children: 5   Tobacco Use    Smoking status: Former     Current packs/day: 0.00     Average packs/day: 0.5 packs/day for 50.0 years (25.0 ttl pk-yrs)     Types: Cigarettes     Start date: 1972     Quit date: 2022     Years since quittin.3    Smokeless tobacco: Never   Substance and Sexual Activity    Alcohol use: Not Currently     Comment: quit 25years ago    Drug use: Never    Sexual activity: Yes     Partners: Male     Birth control/protection: See Surgical Hx     Social Determinants of Health     Financial Resource Strain: Patient Declined (3/25/2024)    Overall Financial Resource Strain (CARDIA)     Difficulty of Paying Living Expenses: Patient declined   Food Insecurity: Patient Declined (3/25/2024)    Hunger Vital Sign     Worried About Running Out of Food in the  Last Year: Patient declined     Ran Out of Food in the Last Year: Patient declined   Transportation Needs: Patient Declined (3/25/2024)    PRAPARE - Transportation     Lack of Transportation (Medical): Patient declined     Lack of Transportation (Non-Medical): Patient declined   Physical Activity: Patient Declined (3/25/2024)    Exercise Vital Sign     Days of Exercise per Week: Patient declined     Minutes of Exercise per Session: Patient declined   Stress: Patient Declined (3/25/2024)    [employer] - Occupational Stress Questionnaire     Feeling of Stress : Patient declined   Social Connections: Patient Declined (3/25/2024)    Social Connection and Isolation Panel [NHANES]     Frequency of Communication with Friends and Family: Patient declined     Frequency of Social Gatherings with Friends and Family: Patient declined     Attends [employer] Services: Patient declined     Active Member of Clubs or Organizations: Patient declined     Attends Club or Organization Meetings: Patient declined     Marital Status: Patient declined   Housing Stability: Patient Declined (3/25/2024)    Housing Stability Vital Sign     Unable to Pay for Housing in the Last Year: Patient declined     Number of Places Lived in the Last Year: 1     Unstable Housing in the Last Year: Patient declined      Family History   Problem Relation Age of Onset    Cancer Maternal Grandfather     Heart failure Father     Prostate cancer Father     Hypertension Mother     Hepatitis Mother     Ovarian cancer Mother     Liver cancer Mother     Asthma Brother     Asthma Brother     Fibroids Sister     Skin cancer Sister       Review of patient's allergies indicates:  No Known Allergies     Review of Systems   Constitutional:  Positive for fatigue. Negative for activity change, appetite change, chills, fever and unexpected weight change.   HENT:  Negative for mouth dryness, mouth sores, nosebleeds, sinus pressure/congestion, sore  throat and trouble swallowing.    Eyes: Negative.  Negative for visual disturbance.   Respiratory:  Positive for shortness of breath (on exertion, chronic). Negative for cough.    Cardiovascular:  Negative for chest pain, palpitations and leg swelling.   Gastrointestinal:  Negative for abdominal distention, abdominal pain, blood in stool, change in bowel habit, constipation, diarrhea, nausea and vomiting.   Endocrine: Negative.    Genitourinary: Negative.  Negative for dysuria, frequency, hematuria and urgency.   Musculoskeletal:  Negative for arthralgias, back pain, leg pain, myalgias and neck pain.   Integumentary:  Negative for rash, breast mass, breast discharge and breast tenderness. Negative.   Allergic/Immunologic: Negative.    Neurological:  Negative for dizziness, tremors, syncope, speech difficulty, weakness, light-headedness, numbness, headaches and memory loss.   Hematological: Negative.  Does not bruise/bleed easily.   Psychiatric/Behavioral: Negative.  Negative for confusion and suicidal ideas.    Breast: Negative for mass and tenderness        Objective:        Vitals:    04/08/24 0927   BP: 110/75   Pulse: (!) 114   Resp: 18   Temp: 97.8 °F (36.6 °C)         Wt Readings from Last 6 Encounters:   04/08/24 56.9 kg (125 lb 6.4 oz)   03/24/24 62.6 kg (138 lb)   03/21/24 61.7 kg (136 lb)   03/20/24 62 kg (136 lb 11 oz)   03/18/24 61.2 kg (135 lb)   03/11/24 61.2 kg (135 lb)     Body mass index is 19.64 kg/m².  Body surface area is 1.64 meters squared.       Physical Exam  Vitals and nursing note reviewed.   Constitutional:       Appearance: Normal appearance.   HENT:      Head: Normocephalic and atraumatic.      Nose: No congestion.   Eyes:      General: No scleral icterus.     Extraocular Movements: Extraocular movements intact.      Conjunctiva/sclera: Conjunctivae normal.   Neck:      Vascular: No JVD.   Cardiovascular:      Rate and Rhythm: Normal rate and regular rhythm.      Heart sounds: No murmur  heard.  Pulmonary:      Effort: Pulmonary effort is normal.      Breath sounds: Decreased breath sounds present. No wheezing or rhonchi.   Abdominal:      General: Bowel sounds are normal. There is no distension.      Palpations: Abdomen is soft.      Tenderness: There is no abdominal tenderness.   Musculoskeletal:      Cervical back: Neck supple.   Lymphadenopathy:      Head:      Right side of head: No submental or submandibular adenopathy.      Left side of head: No submental or submandibular adenopathy.      Cervical: No cervical adenopathy.      Upper Body:      Right upper body: No supraclavicular or axillary adenopathy.      Left upper body: No supraclavicular or axillary adenopathy.      Lower Body: No right inguinal adenopathy. No left inguinal adenopathy.      Comments: No adenopathy noted bilaterally   Skin:     General: Skin is warm.      Coloration: Skin is not jaundiced.      Findings: No lesion or rash.      Comments: Change of skin color to bilateral fingertips   Neurological:      General: No focal deficit present.      Mental Status: She is alert and oriented to person, place, and time.      Cranial Nerves: Cranial nerves 2-12 are intact.      Motor: No weakness.      Gait: Gait normal.   Psychiatric:         Attention and Perception: Attention normal.         Speech: Speech normal.         Behavior: Behavior is cooperative.         Cognition and Memory: Cognition normal.         Judgment: Judgment normal.       ECOG SCORE    2 - Capable of all selfcare but unable to carry out any work activities, active > 50% of hours           LABS      Lab Visit on 04/08/2024   Component Date Value    Sodium Level 04/08/2024 137     Potassium Level 04/08/2024 3.4 (L)     Chloride 04/08/2024 106     Carbon Dioxide 04/08/2024 23     Glucose Level 04/08/2024 161 (H)     Blood Urea Nitrogen 04/08/2024 8.0 (L)     Creatinine 04/08/2024 0.88     Calcium Level Total 04/08/2024 9.5     Protein Total 04/08/2024 7.0      Albumin Level 04/08/2024 2.8 (L)     Globulin 04/08/2024 4.2 (H)     Albumin/Globulin Ratio 04/08/2024 0.7 (L)     Bilirubin Total 04/08/2024 0.4     Alkaline Phosphatase 04/08/2024 87     Alanine Aminotransferase 04/08/2024 6     Aspartate Aminotransfera* 04/08/2024 15     eGFR 04/08/2024 >60     Magnesium Level 04/08/2024 1.60     Phosphorus Level 04/08/2024 1.8 (L)     WBC 04/08/2024 10.01     RBC 04/08/2024 4.04 (L)     Hgb 04/08/2024 10.4 (L)     Hct 04/08/2024 34.1 (L)     MCV 04/08/2024 84.4     MCH 04/08/2024 25.7 (L)     MCHC 04/08/2024 30.5 (L)     RDW 04/08/2024 20.5 (H)     Platelet 04/08/2024 556 (H)     MPV 04/08/2024 8.8     Neut % 04/08/2024 81.2     Lymph % 04/08/2024 11.8     Mono % 04/08/2024 5.4     Eos % 04/08/2024 0.7     Basophil % 04/08/2024 0.3     Lymph # 04/08/2024 1.18     Neut # 04/08/2024 8.13     Mono # 04/08/2024 0.54     Eos # 04/08/2024 0.07     Baso # 04/08/2024 0.03     IG# 04/08/2024 0.06 (H)     IG% 04/08/2024 0.6          Assessment:       1. Hypokalemia    2. NSCLC of right lung                  Stage IV NSCLC with bone mets.    Originally: NSCLC / Adenocarcinoma Stage IIIA pT2a pN2  - 12/08/2022: Right lower and middle bilobectomy with mediastinal lymph node dissection. Path: Invasive adenocarcinoma, poorly differentiated, focal tumor involvement of visceral pleura and focal tumor vascular invasion (vein), with mediastinal lymph nodes positive for metastatic carcinoma   - TMB - 5, MSI-S, MET amplification, ROS1.  No mutations in EGFR, BRAF, ALK, ERBB2, KRAS, RET,    - 1% tumor cells are positive for PD-L1   - patient evaluated by Radiation Oncology with recommendation to treat with chemotherapy 1st and will re-evaluate the patient by the end of chemotherapy for radiation therapy  - 01/12/2023 PET: Focal uptake at the medial right acetabular roof. Metastasis is a consideration  - 02/22/2023: RIGHT ACETABULAR LESION, CT - GUIDE CORE NEEDLE BIOPSY : FIBRINOPURULENT EXUDATE  ADMIXED WITH ATYPICAL EPITHELIOID GROUPS,   NORMAL    MARROW ELEMENTS AND ABUNDANT BLOOD CLOT  PET scan showed a suspicious finding in the right acetabular roof and patient then had a CT-guided biopsy no evidence of malignancy. Started with adjuvant chemotherapy with Carboplatin and pemetrexed x 4 cycles (3/15/23-5/18/23)   Patient has been seen by Rad/Onc (Dr Mireles) since the completion of her chemotherapy and plan was to pursue radiation x 5-6 weeks.  She discussed with the patient that they have showed no overall survival benefit in the setting of postoperative radiation therapy but data supports a larger benefit inpatient with extracapsular extension.  If she tolerated chemotherapy well and continue with good performance status then plan was for PORT   Restaging PET-CT after completion of chemotherapy and prior to radiation therapy showed progression at the right acetabulum.  Patient with progression of disease now with bone metastases.  She bagan palliative radiation treatment with Dr. Mireles on 6/28/23, 10 treatments over two weeks.  PET CT 6/5/23 with progression Right superior acetabulum.  FDG avid area appears slightly larger with slightly increased sclerosis.  SUV is 9.7, previously 6.4.  She had palliative radiation. Started on Keytruda 7/18/23. Developed skin changes with Keytruda and later on diagnosed with vitiligo. Now on Taxotere + Xgeva- 10/24/23 due to progression.  Cycle 3 of Taxotere held 12/6/23 r/t bilateral PE  Bone mets   Xgeva q4w  She was seen by Rad/Onc for palliative RT but recommendations were to start chemotherapy and re eval after 2 cycles    Iron deficiency Anemia  Continue with 1 tab PO iron QOD with Vitamin C.   Currently scheduled for colonoscopy 6/2024     Hypothyroid   Followed by endocrinology Dr. Elena LYN  Now on Eliquis 5 mg twice daily    Vitiligo to bilateral fingertips  Followed by Dermatology  Fransico Arreolaff  Now on oral vanc and reporting no diarrhea                   Plan:   Labs stable today.  Continue with Xgeva as scheduled.  Will order PT next week if not more functional at home.  Hold chemo today due to recent surgery and oral abx.  Continue Eliquis 5 mg BID  Moisturize bilateral hands.  Continue follow up with Dermatology  Continue oral iron every other day  Norco 10 PRN  Scans scheduled for 4/15/2024.  RTC in one week with MD for FU/lab/scan review  Cbc, cmp, mag, phos- 1 hr prior @ [facility]      The patient was seen, interviewed and examined. Pertinent lab and radiology studies were reviewed.   The patient was given ample opportunity to ask questions, and to the best of my abilities, all questions answered to satisfaction; patient demonstrated understanding of what we discussed and agreeable to the plan. Pt instructed to call should develop concerning signs/symptoms or have further questions.           Shelbi Johansen, FNP-C  Oncology/Hematology   [facility]

## 2024-04-15 ENCOUNTER — HOSPITAL ENCOUNTER (OUTPATIENT)
Dept: RADIOLOGY | Facility: HOSPITAL | Age: 64
Discharge: HOME | End: 2024-04-15
Payer: MEDICARE

## 2024-04-15 DIAGNOSIS — C79.51 METASTASIS TO BONE: ICD-10-CM

## 2024-04-15 DIAGNOSIS — C34.91 NSCLC OF RIGHT LUNG: ICD-10-CM

## 2024-04-15 PROCEDURE — 25500020 PHARM REV CODE 255

## 2024-04-15 PROCEDURE — 78306 BONE IMAGING WHOLE BODY: CPT | Mod: TC

## 2024-04-15 PROCEDURE — 74177 CT ABD & PELVIS W/CONTRAST: CPT | Mod: TC

## 2024-04-15 PROCEDURE — A9503 TC99M MEDRONATE: HCPCS

## 2024-04-15 RX ORDER — TC 99M MEDRONATE 20 MG/10ML
25 INJECTION, POWDER, LYOPHILIZED, FOR SOLUTION INTRAVENOUS
Status: COMPLETED | OUTPATIENT
Start: 2024-04-15 | End: 2024-04-15

## 2024-04-15 RX ADMIN — IOPAMIDOL 75 ML: 755 INJECTION, SOLUTION INTRAVENOUS at 10:04

## 2024-04-15 RX ADMIN — TECHNETIUM TC 99M MEDRONATE 25 MILLICURIE: 20 INJECTION, POWDER, LYOPHILIZED, FOR SOLUTION INTRAVENOUS at 09:04

## 2024-04-15 RX ADMIN — DIATRIZOATE MEGLUMINE AND DIATRIZOATE SODIUM 30 ML: 660; 100 LIQUID ORAL; RECTAL at 10:04

## 2024-04-15 NOTE — PROGRESS NOTES
HEMATOLOGY/ONCOLOGY OFFICE CLINIC VISIT  [facility]    ONCOLOGICAL HISTORY:     Diagnosis:  - Stage IV NSCLC with bone mets.  - NSCLC / Adenocarcinoma Stage IIIA pT2a pN2--dx 12/2022  - PD-L1 1%  - History Uterine cancer - Dx 1989    Current Treatment:   Taxotere + Xgeva- 10/24/23-->  Held cycle 3 12/6/23 r/t bilateral PE, started on Eliquis      Treatment History:  - 1989 s/p total hysterectomy   - 12/08/2022: Right lower and middle bilobectomy with mediastinal lymph node dissection.   - Carboplatin + Pemetrexed  3/15/2023-5/1/2023  - Palliative RT   - Carboplatin + Pemetrexed started 3/15/2023  - Keytruda stopped 9/19/23 r/t elevated TSH and PET/CT progression, last dose given 8/29/23  - Xgeva- 7/18/2023-->    Plan of care: palliative systemic therapy      IMAGING:   - 08/10/2022 PET: The elongated subpleural right lower lobe nodule demonstrates fairly intense FDG activity.  Infectious, inflammatory and neoplastic etiologies remain possible. No suspicious PET findings elsewhere.  - 12/08/2022 CT HEAD without contrast: No acute intracranial abnormality identified.  Findings of mild microvascular ischemic disease.  - 12/2022: U/S Upper extremity: Thrombophlebitis of the left cephalic vein. No deep venous thrombosis in the left upper extremity.  - 01/03/2023 MRI Brain : GARY   - 01/12/2023 NM PET CT 1. Postsurgical changes of recent right lower and middle lobectomies.  There is a small right pleural effusion.  There is nonspecific peripheral uptake along the pleura at the right lung base and perihilar region which may be related to healing response in the setting of recent surgery.  Similarly borderline mediastinal uptake is nonspecific in the recent postoperative setting and could be reactive.  Continued attention recommended on follow-up. 2. Focal Uptake at the medial right acetabular roof has a max SUV of 6.4. This is new compared to previous exam. Metastasis is a consideration  -PET 6/5/2023: Previously  visualized moderately FDG avid opacities towards the right lung base improved.  Pleural effusion also improved.  No new suspicious PET findings in the lungs.  Severe emphysema. Some left adrenal thickening is similar to prior but there is now moderate associated FDG activity, max SUV is 3.6. FDG activity is again seen associated with the right superior acetabulum.  FDG avid area appears slightly larger today with slightly increased sclerosis.  SUV is 9.7, previously 6.4.  There is a newly evident subcentimeter FDG avid lesion left pedicle T10. Additional subcentimeter FDG avid lesion T4 vertebral body SUV 3.4.suspicious for metastatic disease.  -MRI T spine 6/15/2023:   1. Foci of abnormal signal intensity and enhancement at the inferior endplate of T3 and at body and left pedicle of T10.  Metastatic neoplastic etiology must be considered.  2. Thoracic spondylosis  3. Mild encroachment into the right neural foramina at the T3-4 and T4-5 secondary to mild posterior disc bulge and posterior osteophyte formation  4. Multilevel mild posterior disc bulge which does not result in significant central spinal stenosis.  9/27/23 Thyroid U/S:  Very minimal to no significant uptake of radiotracer isotope within the thyroid lobes bilaterally.  Clinical correlation is indicated  CT CAP 9/27/23:  1. Interim increasing nodular soft tissue densities/nodes/mass is at the mediastinum and right hilum measuring up the 3.0 cm.  A neoplastic etiology must be considered  2. Interim development of a 2.5 cm low-attenuation mass at the left adrenal gland.  A metastatic etiology must be considered  3. Interim development of a sclerotic foci at T3 and T10.  A metastatic etiology must be considered  4. Small right pleural effusion with associated infiltrate and atelectasis at the right lung base suspect  5. Suspect small cyst at the left kidney as described  6. Mild ill-defined soft tissue fullness at the cervical vaginal region  7. Borderline  cardiomegaly  10/6/23 PET/CT:  1. Disease progression with new FDG avid metastatic lymph nodes in the chest and right hilum.  2. Bilateral axillary and inguinal lymph nodes with low level uptake are indeterminate but concerning for metastasis given the additional findings.  3. Worsening and new FDG avid osseous disease involving both the axial and appendicular skeleton.  4. Enlarging left adrenal metastasis.  12/6/23 CTA:  1. Soft tissue masslike density again evident at the right hilum causing mass effect on the right main pulmonary artery  2. Interim development of filling defects/pulmonary emboli at the diminutive posterior right lower lobe pulmonary arteries.  A few small defects/pulmonary emboli have developed at the pulmonary arteries at the posterior left lower lobe since the prior exam.  3. Postsurgical changes right hilum with volume loss at the right lung and elevation of the right hemidiaphragm with associated posterior right basilar pleural reaction, infiltrate, and atelectasis  4. Advanced emphysematous changes with the right side greater than the left  5. Thoracic spondylosis with the sclerotic foci at the T3 and T10  6. The cancer center was notified of the findings on 12/06/2023 at 10:00  1/16/24 CT CAP:  1. Nodular opacities again identified at the right hilum and mediastinum which have a similar appearance to the prior exam  2. Low-attenuation mass again evident at the left adrenal gland  3. Sclerotic lytic lesions evident at T3, T5, T10, and L5.  A metastatic neoplastic etiology must be considered  4. Advanced emphysematous changes with resolution of small right pleural effusion.  There is patchy infiltrate, atelectasis and or scarring at the posterior right lung base.  5. Findings consistent with cysts at the left kidney  6. Ill-defined soft tissue fullness again evident at the cervical vaginal region  7. Findings of mild constipation  1/16/24 NM Bone Scan:  1. Scattered focal activity at the  thoracic spine, lumbar spine, right hip/trochanteric region, left iliac bone, and left sacrum.  A metastatic neoplastic etiology must be considered  2. Bar like CT intense activity at T10 suggesting acute compression fracture  3. Mild asymmetric prominence of the right upper renal collecting system compared to the left  4/15/24 CT CAP:  1. Scattered sclerotic densities again evident at the thoracolumbar spine, left pelvis, and right femur suspicious for metastases which have a similar appearance to the recent exam  2. Mild interim increase in size of a left adrenal nodule now measuring 3.2 cm in diameter. A metastatic etiology must be considered  3. Extensive atherosclerosis  4. Nodular soft tissue masses/lymph nodes again identified at the right hilum and mediastinum again mass effect on the right main pulmonary artery which have a similar appearance to the recent exam  5. Advanced emphysematous changes  6. Findings of constipation with prominent fecal load at the right side of the colon  4/15/24 NM Bone Scan:  1. Scattered foci of abnormal activity at the thoracic spine, lower lumbar spine, left pelvis, and right hip. A metastatic neoplastic etiology must be considered  2. Mild asymmetric prominence of the right upper renal collecting system    PATHOLOGY:  - 12/08/2022: Invasive solid adenocarcinoma (3.1 CM); G3, poorly differentiated. Visceral Pleura Invasion present without definite serosal surface involvement. Lymphovascular invasion (Vein) present. All margins negative for invasive carcinoma, distance 2.5 cm. LN - 4/14 (10R: Hilar, Posterior mediastinal (RACHEAL), 4R Lower paratracheal, 9R Pulmonary ligament, 10R). pT2a pN2  - 02/22/2023: RIGHT ACETABULAR LESION, CT - GUIDE CORE NEEDLE BIOPSY : FIBRINOPURULENT EXUDATE ADMIXED WITH ATYPICAL EPITHELIOID GROUPS,   NORMAL    MARROW ELEMENTS AND ABUNDANT BLOOD CLOT      Subjective:     HPI  Mansi Sabrina Jin  64 y.o.  female with past medical history significant for  HTN, HLD, uterine cancer status post total hysterectomy in 1989,  CKD Stage 2, COPD Stage 3, referred for management of NSCLC s/p RLL / RML lobectomy with Mediastinal Lymph Node Dissection on 12/8/2022.     She was noted to have RLL lung mass and underwent CT guided biopsy which was non diagnostic. PET scan demonstrated significant avidity within the lesion. Given high risk for malignancy, she underwent right lower lobectomy and right middle lobectomy on 12/08/2022. Pathology came back with invasive adenocarcinoma, poorly differentiated with solid morphology, focal tumor involvement of visceral pleura and focal tumor vascular invasion (vein), with mediastinal lymph nodes positive for metastatic carcinoma.      PET on 01/12/2023 showed focal uptake at the medial right acetabular roof but the biopsy was negative for malignancy. Discussed with radiology, they recommended adjuvant chemotherapy and then they will reassess later for radiation therapy.     Chief Complaint: NSCLC of right lung (Pt reports back pain when taking breathes and R leg pain and weakness when putting pressure on it. Takes pain meds Q6H and it helps for a little while then wears off. Pt reports increased fatigue and ongoing weight loss. )      Interval History:   She returns to the clinic today for a hospital follow-up. She had her gallbladder removed 3/28/2024 by Dr. Charbel Patino. Reports no diarrhea. She is feeling weak and tired today. She is on a wheelchair today. Daughter admits that she has been declining since her surgery.  She has increased back pain since D/C from the hospital, controlled by her pain meds. Appetite is poor. She is having right leg pain and is having weakness when putting pressure to right leg. She continues taking her Eliquis 5 mg BID for bilateral PE. She is taking her oral iron every other day and tolerating well. Now seeing dermatology for vitiligo to bilateral fingertips. She denies any fever, chills, worsened SOB,  CP, headache, swelling, or nausea/vomiting.      Past Medical History:   Diagnosis Date    ASTHMA     COPD (chronic obstructive pulmonary disease)     SEVERE    GERD (gastroesophageal reflux disease)     High cholesterol     HLD (hyperlipidemia)     Hypothyroidism, unspecified     Lung cancer 12/08/2022    invasive adenocarcinoma, poorly differentiated with solid morphology, focal tumor involvement of visceral pleura and focal tumor vascular invasion (vein), with 2 of 3 posterior mediastinal lymph nodes positive for metastatic carcinoma    Stage 2 chronic kidney disease     TIA (transient ischemic attack)     no deficits    Tobacco abuse     Unspecified osteoarthritis, unspecified site     Uterine cancer 1989      Past Surgical History:   Procedure Laterality Date    COLONOSCOPY N/A 3/26/2024    Procedure: COLONOSCOPY;  Surgeon: Vickey Patino MD;  Location: [facility] [place], [place];  Service: Endoscopy;  Laterality: N/A;  post op: c. diff colitis of right colon    COLONOSCOPY, WITH 1 OR MORE BIOPSIES N/A 3/26/2024    Procedure: COLONOSCOPY, WITH 1 OR MORE BIOPSIES;  Surgeon: Vickey Patino MD;  Location: [facility] [place], [place];  Service: Endoscopy;  Laterality: N/A;  cecum    INSERTION OF TUNNELED CENTRAL VENOUS CATHETER (CVC) WITH SUBCUTANEOUS PORT N/A 12/04/2023    Procedure: [id number];  Surgeon: Peter Dial MD;  Location: [facility] OR;  Service: Peripheral Vascular;  Laterality: N/A;    LAPAROSCOPIC CHOLECYSTECTOMY WITH CHOLANGIOGRAPHY N/A 3/28/2024    Procedure: CHOLECYSTECTOMY, LAPAROSCOPIC, WITH CHOLANGIOGRAM;  Surgeon: Vickey Patino MD;  Location: [facility] OR;  Service: General;  Laterality: N/A;    lipoma multiple sites      LUNG LOBECTOMY Right 12/08/2022    Procedure: LOBECTOMY, LUNG;  Surgeon: Jass Browne IV, MD;  Location: [facility] OR;  Service: Cardiothoracic;  Laterality: Right;    SURGICAL REMOVAL OF LYMPH NODE  12/08/2022    Procedure: EXCISION, LYMPH NODE;  Surgeon: Jass Browne IV, MD;   Location: [facility] OR;  Service: Cardiothoracic;;    THORACOTOMY Right 2022    Procedure: THORACOTOMY;  Surgeon: Jass Browne IV, MD;  Location: [facility] OR;  Service: Cardiothoracic;  Laterality: Right;  Right Lower Lobectomy with Lymph Node Dissection    TOTAL ABDOMINAL HYSTERECTOMY W/ BILATERAL SALPINGOOPHORECTOMY       Social History     Socioeconomic History    Marital status: Single    Number of children: 5   Tobacco Use    Smoking status: Former     Current packs/day: 0.00     Average packs/day: 0.5 packs/day for 50.0 years (25.0 ttl pk-yrs)     Types: Cigarettes     Start date: 1972     Quit date: 2022     Years since quittin.3    Smokeless tobacco: Never   Substance and Sexual Activity    Alcohol use: Not Currently     Comment: quit 25years ago    Drug use: Never    Sexual activity: Yes     Partners: Male     Birth control/protection: See Surgical Hx     Social Determinants of Health     Financial Resource Strain: Patient Declined (3/25/2024)    Overall Financial Resource Strain (CARDIA)     Difficulty of Paying Living Expenses: Patient declined   Food Insecurity: Patient Declined (3/25/2024)    Hunger Vital Sign     Worried About Running Out of Food in the Last Year: Patient declined     Ran Out of Food in the Last Year: Patient declined   Transportation Needs: Patient Declined (3/25/2024)    PRAPARE - Transportation     Lack of Transportation (Medical): Patient declined     Lack of Transportation (Non-Medical): Patient declined   Physical Activity: Patient Declined (3/25/2024)    Exercise Vital Sign     Days of Exercise per Week: Patient declined     Minutes of Exercise per Session: Patient declined   Stress: Patient Declined (3/25/2024)    [employer] [place] [employer] - Occupational Stress Questionnaire     Feeling of Stress : Patient declined   Social Connections: Patient Declined (3/25/2024)    Social Connection and Isolation Panel [NHANES]     Frequency of Communication  with Friends and Family: Patient declined     Frequency of Social Gatherings with Friends and Family: Patient declined     Attends Confucianism Services: Patient declined     Active Member of Clubs or Organizations: Patient declined     Attends Club or Organization Meetings: Patient declined     Marital Status: Patient declined   Housing Stability: Patient Declined (3/25/2024)    Housing Stability Vital Sign     Unable to Pay for Housing in the Last Year: Patient declined     Number of Places Lived in the Last Year: 1     Unstable Housing in the Last Year: Patient declined      Family History   Problem Relation Name Age of Onset    Cancer Maternal Grandfather      Heart failure Father      Prostate cancer Father      Hypertension Mother      Hepatitis Mother      Ovarian cancer Mother      Liver cancer Mother      Asthma Brother      Asthma Brother      Fibroids Sister      Skin cancer Sister        Review of patient's allergies indicates:  No Known Allergies     Review of Systems   Constitutional:  Positive for fatigue. Negative for activity change, appetite change, chills, fever and unexpected weight change.   HENT:  Negative for mouth dryness, mouth sores, nosebleeds, sinus pressure/congestion, sore throat and trouble swallowing.    Eyes: Negative.  Negative for visual disturbance.   Respiratory:  Positive for shortness of breath (on exertion, chronic). Negative for cough.    Cardiovascular:  Negative for chest pain, palpitations and leg swelling.   Gastrointestinal:  Negative for abdominal distention, abdominal pain, blood in stool, change in bowel habit, constipation, diarrhea, nausea and vomiting.   Endocrine: Negative.    Genitourinary: Negative.  Negative for dysuria, frequency, hematuria and urgency.   Musculoskeletal:  Positive for leg pain. Negative for arthralgias, back pain, myalgias and neck pain.   Integumentary:  Negative for rash, breast mass, breast discharge and breast tenderness. Negative.    Allergic/Immunologic: Negative.    Neurological:  Positive for weakness. Negative for dizziness, tremors, syncope, speech difficulty, light-headedness, numbness, headaches and memory loss.   Hematological: Negative.  Does not bruise/bleed easily.   Psychiatric/Behavioral: Negative.  Negative for confusion and suicidal ideas.    Breast: Negative for mass and tenderness        Objective:        Vitals:    04/16/24 1006   BP: 100/64   Pulse: 74   Resp: 20   Temp: 97.5 °F (36.4 °C)           Wt Readings from Last 6 Encounters:   04/16/24 55.6 kg (122 lb 9.6 oz)   04/08/24 56.9 kg (125 lb 6.4 oz)   03/24/24 62.6 kg (138 lb)   03/21/24 61.7 kg (136 lb)   03/20/24 62 kg (136 lb 11 oz)   03/18/24 61.2 kg (135 lb)     Body mass index is 19.2 kg/m².  Body surface area is 1.62 meters squared.       Physical Exam  Vitals and nursing note reviewed.   Constitutional:       Appearance: She is underweight. She is ill-appearing.   HENT:      Head: Normocephalic and atraumatic.      Nose: No congestion.   Eyes:      General: No scleral icterus.     Extraocular Movements: Extraocular movements intact.      Conjunctiva/sclera: Conjunctivae normal.   Neck:      Vascular: No JVD.   Cardiovascular:      Rate and Rhythm: Normal rate and regular rhythm.      Heart sounds: No murmur heard.  Pulmonary:      Effort: Pulmonary effort is normal.      Breath sounds: Decreased breath sounds present. No wheezing or rhonchi.   Abdominal:      General: There is no distension.      Palpations: Abdomen is soft.      Tenderness: There is no abdominal tenderness.   Musculoskeletal:      Cervical back: Neck supple.   Lymphadenopathy:      Head:      Right side of head: No submental or submandibular adenopathy.      Left side of head: No submental or submandibular adenopathy.      Cervical: No cervical adenopathy.      Upper Body:      Right upper body: No supraclavicular or axillary adenopathy.      Left upper body: No supraclavicular or axillary  adenopathy.      Lower Body: No right inguinal adenopathy. No left inguinal adenopathy.      Comments: No adenopathy noted bilaterally   Skin:     General: Skin is warm.      Coloration: Skin is not jaundiced.      Findings: No lesion or rash.      Comments: Change of skin color to bilateral fingertips   Neurological:      Mental Status: She is alert and oriented to person, place, and time.      Cranial Nerves: Cranial nerves 2-12 are intact.      Motor: Weakness present.      Comments: On wheelchair today   Psychiatric:         Attention and Perception: Attention normal.         Mood and Affect: Mood is depressed.         Speech: Speech normal.         Behavior: Behavior is cooperative.         Cognition and Memory: Cognition normal.         Judgment: Judgment normal.       ECOG SCORE    3 - Capable of only limited selfcare, confined to bed or chair more than 50% of waking hours           LABS      No visits with results within 1 Day(s) from this visit.   Latest known visit with results is:   Lab Visit on 04/15/2024   Component Date Value    Sodium Level 04/15/2024 133 (L)     Potassium Level 04/15/2024 4.9     Chloride 04/15/2024 103     Carbon Dioxide 04/15/2024 22 (L)     Glucose Level 04/15/2024 91     Blood Urea Nitrogen 04/15/2024 19.0     Creatinine 04/15/2024 0.74     Calcium Level Total 04/15/2024 10.3 (H)     Protein Total 04/15/2024 7.7 (H)     Albumin Level 04/15/2024 2.9 (L)     Globulin 04/15/2024 4.8 (H)     Albumin/Globulin Ratio 04/15/2024 0.6 (L)     Bilirubin Total 04/15/2024 0.4     Alkaline Phosphatase 04/15/2024 114     Alanine Aminotransferase 04/15/2024 7     Aspartate Aminotransfera* 04/15/2024 15     eGFR 04/15/2024 >60     Magnesium Level 04/15/2024 1.50 (L)     Phosphorus Level 04/15/2024 3.5     WBC 04/15/2024 10.67     RBC 04/15/2024 3.92 (L)     Hgb 04/15/2024 10.1 (L)     Hct 04/15/2024 32.2 (L)     MCV 04/15/2024 82.1     MCH 04/15/2024 25.8 (L)     MCHC 04/15/2024 31.4 (L)     RDW  04/15/2024 19.4 (H)     Platelet 04/15/2024 525 (H)     MPV 04/15/2024 8.9     Neut % 04/15/2024 77.9     Lymph % 04/15/2024 14.2     Mono % 04/15/2024 6.3     Eos % 04/15/2024 0.7     Basophil % 04/15/2024 0.3     Lymph # 04/15/2024 1.51     Neut # 04/15/2024 8.33     Mono # 04/15/2024 0.67     Eos # 04/15/2024 0.07     Baso # 04/15/2024 0.03     IG# 04/15/2024 0.06 (H)     IG% 04/15/2024 0.6          Assessment:       1. NSCLC of right lung    2. Metastasis to bone    3. Malignant neoplasm metastatic to adrenal gland, unspecified laterality    4. On antineoplastic chemotherapy    5. Physical deconditioning    6. Immunodeficient state due to drug therapy    7. History of pulmonary embolism    8. On continuous oral anticoagulation      Stage IV NSCLC with bone mets.    Originally: NSCLC / Adenocarcinoma Stage IIIA pT2a pN2  - 12/08/2022: Right lower and middle bilobectomy with mediastinal lymph node dissection. Path: Invasive adenocarcinoma, poorly differentiated, focal tumor involvement of visceral pleura and focal tumor vascular invasion (vein), with mediastinal lymph nodes positive for metastatic carcinoma   - TMB - 5, MSI-S, MET amplification, ROS1.  No mutations in EGFR, BRAF, ALK, ERBB2, KRAS, RET,    - 1% tumor cells are positive for PD-L1   - patient evaluated by Radiation Oncology with recommendation to treat with chemotherapy 1st and will re-evaluate the patient by the end of chemotherapy for radiation therapy  - 01/12/2023 PET: Focal uptake at the medial right acetabular roof. Metastasis is a consideration  - 02/22/2023: RIGHT ACETABULAR LESION, CT - GUIDE CORE NEEDLE BIOPSY : FIBRINOPURULENT EXUDATE ADMIXED WITH ATYPICAL EPITHELIOID GROUPS,   NORMAL    MARROW ELEMENTS AND ABUNDANT BLOOD CLOT  PET scan showed a suspicious finding in the right acetabular roof and patient then had a CT-guided biopsy no evidence of malignancy. Started with adjuvant chemotherapy with Carboplatin and pemetrexed x 4 cycles  (3/15/23-5/18/23)   Patient has been seen by Rad/Onc (Dr Mireles) since the completion of her chemotherapy and plan was to pursue radiation x 5-6 weeks.  She discussed with the patient that they have showed no overall survival benefit in the setting of postoperative radiation therapy but data supports a larger benefit inpatient with extracapsular extension.  If she tolerated chemotherapy well and continue with good performance status then plan was for PORT   Restaging PET-CT after completion of chemotherapy and prior to radiation therapy showed progression at the right acetabulum.  Patient with progression of disease now with bone metastases.  She bagan palliative radiation treatment with Dr. Mireles on 6/28/23, 10 treatments over two weeks.  PET CT 6/5/23 with progression Right superior acetabulum.  FDG avid area appears slightly larger with slightly increased sclerosis.  SUV is 9.7, previously 6.4.  She had palliative radiation. Started on Keytruda 7/18/23. Developed skin changes with Keytruda and later on diagnosed with vitiligo. Now on Taxotere + Xgeva- 10/24/23 due to progression.  Cycle 3 of Taxotere held 12/6/23 r/t bilateral PE  Bone mets   Xgeva q4w  She was seen by Rad/Onc for palliative RT but recommendations were to start chemotherapy and re eval after 2 cycles  Bone pain worsened . Will refer back to Rad/Onc    Iron deficiency Anemia  Continue with 1 tab PO iron QOD with Vitamin C.   Currently scheduled for colonoscopy 6/2024     Hypothyroid   Followed by endocrinology Dr. Elena LYN  Now on Eliquis 5 mg twice daily    Vitiligo to bilateral fingertips  Followed by Dermatology  Fransico Cdiff  Now on oral vanc and reporting no diarrhea                  Plan:   Labs stable today.  Continue with Xgeva as scheduled.  Hold chemo today due to recent surgery and generalized weakness and deconditioning  Refer to dietician r/t weight loss  Refer to Rad/Onc for increase back pain  Will order MRI C, T, and L  spine today  Continue Eliquis 5 mg BID  Moisturize bilateral hands. Continue follow up with Dermatology  Continue oral iron every other day  Norco 10 PRN  RTC in 2 week with NP for FU/lab/treat  Cbc, cmp, mag, phos- 1 hr prior @ [facility]      The patient was seen, interviewed and examined. Pertinent lab and radiology studies were reviewed.   The patient was given ample opportunity to ask questions, and to the best of my abilities, all questions answered to satisfaction; patient demonstrated understanding of what we discussed and agreeable to the plan. Pt instructed to call should develop concerning signs/symptoms or have further questions.       Phoebe Gomez MD  Hematology/Oncology       Professional Services   I, Clau Hodges LPN, acted solely as a scribe for and in the presence of Dr. Phoebe Gomez, who performed these services.

## 2024-04-16 ENCOUNTER — OFFICE VISIT (OUTPATIENT)
Dept: HEMATOLOGY/ONCOLOGY | Facility: CLINIC | Age: 64
End: 2024-04-16
Payer: MEDICARE

## 2024-04-16 VITALS
RESPIRATION RATE: 20 BRPM | TEMPERATURE: 98 F | WEIGHT: 122.63 LBS | BODY MASS INDEX: 19.25 KG/M2 | HEART RATE: 74 BPM | SYSTOLIC BLOOD PRESSURE: 100 MMHG | OXYGEN SATURATION: 100 % | HEIGHT: 67 IN | DIASTOLIC BLOOD PRESSURE: 64 MMHG

## 2024-04-16 DIAGNOSIS — Z79.01 ON CONTINUOUS ORAL ANTICOAGULATION: ICD-10-CM

## 2024-04-16 DIAGNOSIS — D84.821 IMMUNODEFICIENT STATE DUE TO DRUG THERAPY: ICD-10-CM

## 2024-04-16 DIAGNOSIS — C79.51 METASTASIS TO BONE: ICD-10-CM

## 2024-04-16 DIAGNOSIS — C79.51 METASTASIS TO BONE: Primary | ICD-10-CM

## 2024-04-16 DIAGNOSIS — Z79.899 ON ANTINEOPLASTIC CHEMOTHERAPY: ICD-10-CM

## 2024-04-16 DIAGNOSIS — R53.81 PHYSICAL DECONDITIONING: ICD-10-CM

## 2024-04-16 DIAGNOSIS — C34.91 NSCLC OF RIGHT LUNG: ICD-10-CM

## 2024-04-16 DIAGNOSIS — C79.70 MALIGNANT NEOPLASM METASTATIC TO ADRENAL GLAND, UNSPECIFIED LATERALITY: ICD-10-CM

## 2024-04-16 DIAGNOSIS — C34.91 NSCLC OF RIGHT LUNG: Primary | ICD-10-CM

## 2024-04-16 DIAGNOSIS — Z79.899 IMMUNODEFICIENT STATE DUE TO DRUG THERAPY: ICD-10-CM

## 2024-04-16 DIAGNOSIS — Z86.711 HISTORY OF PULMONARY EMBOLISM: ICD-10-CM

## 2024-04-16 PROCEDURE — 99215 OFFICE O/P EST HI 40 MIN: CPT | Mod: S$GLB,,, | Performed by: INTERNAL MEDICINE

## 2024-04-16 PROCEDURE — 3288F FALL RISK ASSESSMENT DOCD: CPT | Mod: CPTII,S$GLB,, | Performed by: INTERNAL MEDICINE

## 2024-04-16 PROCEDURE — 1111F DSCHRG MED/CURRENT MED MERGE: CPT | Mod: CPTII,S$GLB,, | Performed by: INTERNAL MEDICINE

## 2024-04-16 PROCEDURE — 1159F MED LIST DOCD IN RCRD: CPT | Mod: CPTII,S$GLB,, | Performed by: INTERNAL MEDICINE

## 2024-04-16 PROCEDURE — 3074F SYST BP LT 130 MM HG: CPT | Mod: CPTII,S$GLB,, | Performed by: INTERNAL MEDICINE

## 2024-04-16 PROCEDURE — 1160F RVW MEDS BY RX/DR IN RCRD: CPT | Mod: CPTII,S$GLB,, | Performed by: INTERNAL MEDICINE

## 2024-04-16 PROCEDURE — 1101F PT FALLS ASSESS-DOCD LE1/YR: CPT | Mod: CPTII,S$GLB,, | Performed by: INTERNAL MEDICINE

## 2024-04-16 PROCEDURE — 3008F BODY MASS INDEX DOCD: CPT | Mod: CPTII,S$GLB,, | Performed by: INTERNAL MEDICINE

## 2024-04-16 PROCEDURE — 3078F DIAST BP <80 MM HG: CPT | Mod: CPTII,S$GLB,, | Performed by: INTERNAL MEDICINE

## 2024-04-16 PROCEDURE — 1125F AMNT PAIN NOTED PAIN PRSNT: CPT | Mod: CPTII,S$GLB,, | Performed by: INTERNAL MEDICINE

## 2024-04-16 PROCEDURE — 99999 PR PBB SHADOW E&M-EST. PATIENT-LVL V: CPT | Mod: PBBFAC,,, | Performed by: INTERNAL MEDICINE

## 2024-04-16 RX ORDER — VANCOMYCIN HYDROCHLORIDE 250 MG/1
CAPSULE ORAL
COMMUNITY
Start: 2024-04-10

## 2024-04-19 ENCOUNTER — CLINICAL SUPPORT (OUTPATIENT)
Dept: RADIATION THERAPY | Facility: HOSPITAL | Age: 64
End: 2024-04-19
Attending: RADIOLOGY
Payer: MEDICARE

## 2024-04-19 DIAGNOSIS — C79.51 METASTASIS TO BONE: ICD-10-CM

## 2024-04-19 DIAGNOSIS — C34.91 NSCLC OF RIGHT LUNG: ICD-10-CM

## 2024-04-19 PROCEDURE — 77334 RADIATION TREATMENT AID(S): CPT | Performed by: RADIOLOGY

## 2024-04-19 PROCEDURE — 77290 THER RAD SIMULAJ FIELD CPLX: CPT | Performed by: RADIOLOGY

## 2024-04-23 DIAGNOSIS — C79.51 METASTASIS TO BONE: Primary | ICD-10-CM

## 2024-04-23 DIAGNOSIS — C79.70 MALIGNANT NEOPLASM METASTATIC TO ADRENAL GLAND, UNSPECIFIED LATERALITY: ICD-10-CM

## 2024-04-23 DIAGNOSIS — C34.91 NSCLC OF RIGHT LUNG: ICD-10-CM

## 2024-04-23 DIAGNOSIS — G89.3 CANCER RELATED PAIN: ICD-10-CM

## 2024-04-23 RX ORDER — MORPHINE SULFATE 30 MG/1
30 TABLET, FILM COATED, EXTENDED RELEASE ORAL 2 TIMES DAILY
Qty: 60 TABLET | Refills: 0 | Status: SHIPPED | OUTPATIENT
Start: 2024-04-23 | End: 2024-04-24 | Stop reason: SDUPTHER

## 2024-04-24 DIAGNOSIS — C79.51 METASTASIS TO BONE: ICD-10-CM

## 2024-04-24 DIAGNOSIS — G89.3 CANCER RELATED PAIN: ICD-10-CM

## 2024-04-24 DIAGNOSIS — C34.91 NSCLC OF RIGHT LUNG: ICD-10-CM

## 2024-04-24 DIAGNOSIS — C79.70 MALIGNANT NEOPLASM METASTATIC TO ADRENAL GLAND, UNSPECIFIED LATERALITY: ICD-10-CM

## 2024-04-24 RX ORDER — MORPHINE SULFATE 30 MG/1
30 TABLET, FILM COATED, EXTENDED RELEASE ORAL 2 TIMES DAILY
Qty: 60 TABLET | Refills: 0 | Status: SHIPPED | OUTPATIENT
Start: 2024-04-24 | End: 2024-06-11 | Stop reason: SDUPTHER

## 2024-04-29 PROCEDURE — 77280 THER RAD SIMULAJ FIELD SMPL: CPT | Performed by: RADIOLOGY

## 2024-04-29 PROCEDURE — 77412 RADIATION TX DELIVERY LVL 3: CPT | Performed by: RADIOLOGY

## 2024-04-29 NOTE — PROGRESS NOTES
HEMATOLOGY/ONCOLOGY OFFICE CLINIC VISIT  [facility]    ONCOLOGICAL HISTORY:     Diagnosis:  - Stage IV NSCLC with bone mets.  - NSCLC / Adenocarcinoma Stage IIIA pT2a pN2--dx 12/2022  - PD-L1 1%  - History Uterine cancer - Dx 1989    Current Treatment:   Taxotere + Xgeva- 10/24/23-->  Held cycle 3 12/6/23 r/t bilateral PE, started on Eliquis      Treatment History:  - 1989 s/p total hysterectomy   - 12/08/2022: Right lower and middle bilobectomy with mediastinal lymph node dissection.   - Carboplatin + Pemetrexed  3/15/2023-5/1/2023  - Palliative RT   - Carboplatin + Pemetrexed started 3/15/2023  - Keytruda stopped 9/19/23 r/t elevated TSH and PET/CT progression, last dose given 8/29/23  - Xgeva- 7/18/2023-->    Plan of care: palliative systemic therapy      IMAGING:   - 08/10/2022 PET: The elongated subpleural right lower lobe nodule demonstrates fairly intense FDG activity.  Infectious, inflammatory and neoplastic etiologies remain possible. No suspicious PET findings elsewhere.  - 12/08/2022 CT HEAD without contrast: No acute intracranial abnormality identified.  Findings of mild microvascular ischemic disease.  - 12/2022: U/S Upper extremity: Thrombophlebitis of the left cephalic vein. No deep venous thrombosis in the left upper extremity.  - 01/03/2023 MRI Brain : GARY   - 01/12/2023 NM PET CT 1. Postsurgical changes of recent right lower and middle lobectomies.  There is a small right pleural effusion.  There is nonspecific peripheral uptake along the pleura at the right lung base and perihilar region which may be related to healing response in the setting of recent surgery.  Similarly borderline mediastinal uptake is nonspecific in the recent postoperative setting and could be reactive.  Continued attention recommended on follow-up. 2. Focal Uptake at the medial right acetabular roof has a max SUV of 6.4. This is new compared to previous exam. Metastasis is a consideration  -PET 6/5/2023: Previously  visualized moderately FDG avid opacities towards the right lung base improved.  Pleural effusion also improved.  No new suspicious PET findings in the lungs.  Severe emphysema. Some left adrenal thickening is similar to prior but there is now moderate associated FDG activity, max SUV is 3.6. FDG activity is again seen associated with the right superior acetabulum.  FDG avid area appears slightly larger today with slightly increased sclerosis.  SUV is 9.7, previously 6.4.  There is a newly evident subcentimeter FDG avid lesion left pedicle T10. Additional subcentimeter FDG avid lesion T4 vertebral body SUV 3.4.suspicious for metastatic disease.  -MRI T spine 6/15/2023:   1. Foci of abnormal signal intensity and enhancement at the inferior endplate of T3 and at body and left pedicle of T10.  Metastatic neoplastic etiology must be considered.  2. Thoracic spondylosis  3. Mild encroachment into the right neural foramina at the T3-4 and T4-5 secondary to mild posterior disc bulge and posterior osteophyte formation  4. Multilevel mild posterior disc bulge which does not result in significant central spinal stenosis.  9/27/23 Thyroid U/S:  Very minimal to no significant uptake of radiotracer isotope within the thyroid lobes bilaterally.  Clinical correlation is indicated  CT CAP 9/27/23:  1. Interim increasing nodular soft tissue densities/nodes/mass is at the mediastinum and right hilum measuring up the 3.0 cm.  A neoplastic etiology must be considered  2. Interim development of a 2.5 cm low-attenuation mass at the left adrenal gland.  A metastatic etiology must be considered  3. Interim development of a sclerotic foci at T3 and T10.  A metastatic etiology must be considered  4. Small right pleural effusion with associated infiltrate and atelectasis at the right lung base suspect  5. Suspect small cyst at the left kidney as described  6. Mild ill-defined soft tissue fullness at the cervical vaginal region  7. Borderline  cardiomegaly  10/6/23 PET/CT:  1. Disease progression with new FDG avid metastatic lymph nodes in the chest and right hilum.  2. Bilateral axillary and inguinal lymph nodes with low level uptake are indeterminate but concerning for metastasis given the additional findings.  3. Worsening and new FDG avid osseous disease involving both the axial and appendicular skeleton.  4. Enlarging left adrenal metastasis.  12/6/23 CTA:  1. Soft tissue masslike density again evident at the right hilum causing mass effect on the right main pulmonary artery  2. Interim development of filling defects/pulmonary emboli at the diminutive posterior right lower lobe pulmonary arteries.  A few small defects/pulmonary emboli have developed at the pulmonary arteries at the posterior left lower lobe since the prior exam.  3. Postsurgical changes right hilum with volume loss at the right lung and elevation of the right hemidiaphragm with associated posterior right basilar pleural reaction, infiltrate, and atelectasis  4. Advanced emphysematous changes with the right side greater than the left  5. Thoracic spondylosis with the sclerotic foci at the T3 and T10  6. The cancer center was notified of the findings on 12/06/2023 at 10:00  1/16/24 CT CAP:  1. Nodular opacities again identified at the right hilum and mediastinum which have a similar appearance to the prior exam  2. Low-attenuation mass again evident at the left adrenal gland  3. Sclerotic lytic lesions evident at T3, T5, T10, and L5.  A metastatic neoplastic etiology must be considered  4. Advanced emphysematous changes with resolution of small right pleural effusion.  There is patchy infiltrate, atelectasis and or scarring at the posterior right lung base.  5. Findings consistent with cysts at the left kidney  6. Ill-defined soft tissue fullness again evident at the cervical vaginal region  7. Findings of mild constipation  1/16/24 NM Bone Scan:  1. Scattered focal activity at the  thoracic spine, lumbar spine, right hip/trochanteric region, left iliac bone, and left sacrum.  A metastatic neoplastic etiology must be considered  2. Bar like CT intense activity at T10 suggesting acute compression fracture  3. Mild asymmetric prominence of the right upper renal collecting system compared to the left  4/15/24 CT CAP:  1. Scattered sclerotic densities again evident at the thoracolumbar spine, left pelvis, and right femur suspicious for metastases which have a similar appearance to the recent exam  2. Mild interim increase in size of a left adrenal nodule now measuring 3.2 cm in diameter. A metastatic etiology must be considered  3. Extensive atherosclerosis  4. Nodular soft tissue masses/lymph nodes again identified at the right hilum and mediastinum again mass effect on the right main pulmonary artery which have a similar appearance to the recent exam  5. Advanced emphysematous changes  6. Findings of constipation with prominent fecal load at the right side of the colon  4/15/24 NM Bone Scan:  1. Scattered foci of abnormal activity at the thoracic spine, lower lumbar spine, left pelvis, and right hip. A metastatic neoplastic etiology must be considered  2. Mild asymmetric prominence of the right upper renal collecting system    PATHOLOGY:  - 12/08/2022: Invasive solid adenocarcinoma (3.1 CM); G3, poorly differentiated. Visceral Pleura Invasion present without definite serosal surface involvement. Lymphovascular invasion (Vein) present. All margins negative for invasive carcinoma, distance 2.5 cm. LN - 4/14 (10R: Hilar, Posterior mediastinal (RACHEAL), 4R Lower paratracheal, 9R Pulmonary ligament, 10R). pT2a pN2  - 02/22/2023: RIGHT ACETABULAR LESION, CT - GUIDE CORE NEEDLE BIOPSY : FIBRINOPURULENT EXUDATE ADMIXED WITH ATYPICAL EPITHELIOID GROUPS,   NORMAL    MARROW ELEMENTS AND ABUNDANT BLOOD CLOT      Subjective:     HPI  Mansi Sabrina Jin  64 y.o.  female with past medical history significant for  HTN, HLD, uterine cancer status post total hysterectomy in 1989,  CKD Stage 2, COPD Stage 3, referred for management of NSCLC s/p RLL / RML lobectomy with Mediastinal Lymph Node Dissection on 12/8/2022.     She was noted to have RLL lung mass and underwent CT guided biopsy which was non diagnostic. PET scan demonstrated significant avidity within the lesion. Given high risk for malignancy, she underwent right lower lobectomy and right middle lobectomy on 12/08/2022. Pathology came back with invasive adenocarcinoma, poorly differentiated with solid morphology, focal tumor involvement of visceral pleura and focal tumor vascular invasion (vein), with mediastinal lymph nodes positive for metastatic carcinoma.      PET on 01/12/2023 showed focal uptake at the medial right acetabular roof but the biopsy was negative for malignancy. Discussed with radiology, they recommended adjuvant chemotherapy and then they will reassess later for radiation therapy.     Chief Complaint: NSCLC of right lung (Pt is here for 2 wk follow up/labs/treat. Pt reports starting radiation 4/29/24 with a total of 10 rounds. Pt states improvement with R leg weakness. Pt is in a wheelchair again today so she doesn't over do it. Pt c/o weight loss due to no appetite. Pt says that the megace is not helping. Pt also reports feeling much better today.)      Interval History:   She returns to the clinic today for a 2 week follow-up and treatment clearance for Docetaxel q3w. She began radiation with Dr. Mireles on 4/29/2024 and already notes improvements in her pain. She will get a total of ten treatments. She continues to report a decreased appetite and does report taking megace as prescribed. She continues to have increased fatigue and weakness. Her daughter reports that she does not move around much while at home. She continues taking her Eliquis 5 mg BID for bilateral PE. She is taking her oral iron every other day and tolerating well. Followed by  dermatology for vitiligo to bilateral fingertips. She denies any fever, chills, worsened SOB, CP, headache, swelling, or nausea/vomiting.        Past Medical History:   Diagnosis Date    ASTHMA     COPD (chronic obstructive pulmonary disease)     SEVERE    GERD (gastroesophageal reflux disease)     High cholesterol     HLD (hyperlipidemia)     Hypothyroidism, unspecified     Lung cancer 12/08/2022    invasive adenocarcinoma, poorly differentiated with solid morphology, focal tumor involvement of visceral pleura and focal tumor vascular invasion (vein), with 2 of 3 posterior mediastinal lymph nodes positive for metastatic carcinoma    Stage 2 chronic kidney disease     TIA (transient ischemic attack)     no deficits    Tobacco abuse     Unspecified osteoarthritis, unspecified site     Uterine cancer 1989      Past Surgical History:   Procedure Laterality Date    COLONOSCOPY N/A 3/26/2024    Procedure: COLONOSCOPY;  Surgeon: Vickey Patino MD;  Location: [facility];  Service: Endoscopy;  Laterality: N/A;  post op: c. diff colitis of right colon    COLONOSCOPY, WITH 1 OR MORE BIOPSIES N/A 3/26/2024    Procedure: COLONOSCOPY, WITH 1 OR MORE BIOPSIES;  Surgeon: Vickey Patino MD;  Location: [facility];  Service: Endoscopy;  Laterality: N/A;  cecum    INSERTION OF TUNNELED CENTRAL VENOUS CATHETER (CVC) WITH SUBCUTANEOUS PORT N/A 12/04/2023    Procedure: [id number];  Surgeon: Peter Dial MD;  Location: [facility];  Service: Peripheral Vascular;  Laterality: N/A;    LAPAROSCOPIC CHOLECYSTECTOMY WITH CHOLANGIOGRAPHY N/A 3/28/2024    Procedure: CHOLECYSTECTOMY, LAPAROSCOPIC, WITH CHOLANGIOGRAM;  Surgeon: Vickey Patino MD;  Location: [facility] OR;  Service: General;  Laterality: N/A;    lipoma multiple sites      LUNG LOBECTOMY Right 12/08/2022    Procedure: LOBECTOMY, LUNG;  Surgeon: Jass Browne IV, MD;  Location: [facility] OR;  Service: Cardiothoracic;  Laterality: Right;    SURGICAL REMOVAL OF LYMPH  NODE  2022    Procedure: EXCISION, LYMPH NODE;  Surgeon: Jass Browne IV, MD;  Location: [facility];  Service: Cardiothoracic;;    THORACOTOMY Right 2022    Procedure: THORACOTOMY;  Surgeon: Jass Browne IV, MD;  Location: [facility] OR;  Service: Cardiothoracic;  Laterality: Right;  Right Lower Lobectomy with Lymph Node Dissection    TOTAL ABDOMINAL HYSTERECTOMY W/ BILATERAL SALPINGOOPHORECTOMY       Social History     Socioeconomic History    Marital status: Single    Number of children: 5   Tobacco Use    Smoking status: Former     Current packs/day: 0.00     Average packs/day: 0.5 packs/day for 50.0 years (25.0 ttl pk-yrs)     Types: Cigarettes     Start date: 1972     Quit date: 2022     Years since quittin.3    Smokeless tobacco: Never   Substance and Sexual Activity    Alcohol use: Not Currently     Comment: quit 25years ago    Drug use: Never    Sexual activity: Yes     Partners: Male     Birth control/protection: See Surgical Hx     Social Determinants of Health     Financial Resource Strain: Patient Declined (3/25/2024)    Overall Financial Resource Strain (CARDIA)     Difficulty of Paying Living Expenses: Patient declined   Food Insecurity: Patient Declined (3/25/2024)    Hunger Vital Sign     Worried About Running Out of Food in the Last Year: Patient declined     Ran Out of Food in the Last Year: Patient declined   Transportation Needs: Patient Declined (3/25/2024)    PRAPARE - Transportation     Lack of Transportation (Medical): Patient declined     Lack of Transportation (Non-Medical): Patient declined   Physical Activity: Patient Declined (3/25/2024)    Exercise Vital Sign     Days of Exercise per Week: Patient declined     Minutes of Exercise per Session: Patient declined   Stress: Patient Declined (3/25/2024)    [employer] - Occupational Stress Questionnaire     Feeling of Stress : Patient declined   Housing Stability: Patient Declined  (3/25/2024)    Housing Stability Vital Sign     Unable to Pay for Housing in the Last Year: Patient declined     Number of Places Lived in the Last Year: 1     Unstable Housing in the Last Year: Patient declined      Family History   Problem Relation Name Age of Onset    Cancer Maternal Grandfather      Heart failure Father      Prostate cancer Father      Hypertension Mother      Hepatitis Mother      Ovarian cancer Mother      Liver cancer Mother      Asthma Brother      Asthma Brother      Fibroids Sister      Skin cancer Sister        Review of patient's allergies indicates:  No Known Allergies     Review of Systems   Constitutional:  Positive for activity change, appetite change and fatigue. Negative for chills, fever and unexpected weight change.   HENT:  Negative for mouth dryness, mouth sores, nosebleeds, sinus pressure/congestion, sore throat and trouble swallowing.    Eyes: Negative.  Negative for visual disturbance.   Respiratory:  Positive for shortness of breath (on exertion, chronic). Negative for cough.    Cardiovascular:  Negative for chest pain, palpitations and leg swelling.   Gastrointestinal:  Negative for abdominal distention, abdominal pain, blood in stool, change in bowel habit, constipation, diarrhea, nausea and vomiting.   Endocrine: Negative.    Genitourinary: Negative.  Negative for dysuria, frequency, hematuria and urgency.   Musculoskeletal:  Positive for leg pain. Negative for arthralgias, back pain, myalgias and neck pain.   Integumentary:  Negative for rash, breast mass, breast discharge and breast tenderness. Negative.   Allergic/Immunologic: Negative.    Neurological:  Positive for weakness. Negative for dizziness, tremors, syncope, speech difficulty, light-headedness, numbness, headaches and memory loss.   Hematological: Negative.  Does not bruise/bleed easily.   Psychiatric/Behavioral: Negative.  Negative for confusion and suicidal ideas.    Breast: Negative for mass and  tenderness        Objective:        Vitals:    04/30/24 0853   BP: 95/67   Pulse: (!) 120   Resp: 20   Temp: 97.5 °F (36.4 °C)             Wt Readings from Last 6 Encounters:   04/30/24 52.6 kg (116 lb)   04/16/24 55.6 kg (122 lb 9.6 oz)   04/08/24 56.9 kg (125 lb 6.4 oz)   03/24/24 62.6 kg (138 lb)   03/21/24 61.7 kg (136 lb)   03/20/24 62 kg (136 lb 11 oz)     Body mass index is 18.16 kg/m².  Body surface area is 1.58 meters squared.       Physical Exam  Vitals and nursing note reviewed.   Constitutional:       Appearance: She is underweight. She is ill-appearing.   HENT:      Head: Normocephalic and atraumatic.      Nose: No congestion.   Eyes:      General: No scleral icterus.     Extraocular Movements: Extraocular movements intact.      Conjunctiva/sclera: Conjunctivae normal.   Neck:      Vascular: No JVD.   Cardiovascular:      Rate and Rhythm: Regular rhythm. Tachycardia present.      Heart sounds: No murmur heard.  Pulmonary:      Effort: Pulmonary effort is normal.      Breath sounds: Decreased breath sounds present. No wheezing or rhonchi.   Abdominal:      General: There is no distension.      Palpations: Abdomen is soft.      Tenderness: There is no abdominal tenderness.   Musculoskeletal:      Cervical back: Neck supple.   Lymphadenopathy:      Head:      Right side of head: No submental or submandibular adenopathy.      Left side of head: No submental or submandibular adenopathy.      Cervical: No cervical adenopathy.      Upper Body:      Right upper body: No supraclavicular or axillary adenopathy.      Left upper body: No supraclavicular or axillary adenopathy.      Lower Body: No right inguinal adenopathy. No left inguinal adenopathy.      Comments: No adenopathy noted bilaterally   Skin:     General: Skin is warm.      Coloration: Skin is not jaundiced.      Findings: No lesion or rash.      Comments: Change of skin color to bilateral fingertips   Neurological:      Mental Status: She is alert and  oriented to person, place, and time.      Cranial Nerves: Cranial nerves 2-12 are intact.      Motor: Weakness present.      Comments: On wheelchair today   Psychiatric:         Attention and Perception: Attention normal.         Mood and Affect: Mood is depressed.         Speech: Speech normal.         Behavior: Behavior is cooperative.         Cognition and Memory: Cognition normal.         Judgment: Judgment normal.       ECOG SCORE    3 - Capable of only limited selfcare, confined to bed or chair more than 50% of waking hours           LABS      Lab Visit on 04/30/2024   Component Date Value    Sodium Level 04/30/2024 129 (L)     Potassium Level 04/30/2024 4.2     Chloride 04/30/2024 100     Carbon Dioxide 04/30/2024 20 (L)     Glucose Level 04/30/2024 97     Blood Urea Nitrogen 04/30/2024 14.0     Creatinine 04/30/2024 0.72     Calcium Level Total 04/30/2024 8.8     Protein Total 04/30/2024 7.2     Albumin Level 04/30/2024 2.6 (L)     Globulin 04/30/2024 4.6 (H)     Albumin/Globulin Ratio 04/30/2024 0.6 (L)     Bilirubin Total 04/30/2024 0.4     Alkaline Phosphatase 04/30/2024 121     Alanine Aminotransferase 04/30/2024 9     Aspartate Aminotransfera* 04/30/2024 19     eGFR 04/30/2024 >60     Magnesium Level 04/30/2024 1.90     Phosphorus Level 04/30/2024 1.6 (L)     WBC 04/30/2024 8.45     RBC 04/30/2024 3.75 (L)     Hgb 04/30/2024 9.5 (L)     Hct 04/30/2024 30.1 (L)     MCV 04/30/2024 80.3     MCH 04/30/2024 25.3 (L)     MCHC 04/30/2024 31.6 (L)     RDW 04/30/2024 19.0 (H)     Platelet 04/30/2024 681 (H)     MPV 04/30/2024 8.9     Neut % 04/30/2024 80.9     Lymph % 04/30/2024 12.2     Mono % 04/30/2024 5.7     Eos % 04/30/2024 0.4     Basophil % 04/30/2024 0.2     Lymph # 04/30/2024 1.03     Neut # 04/30/2024 6.84     Mono # 04/30/2024 0.48     Eos # 04/30/2024 0.03     Baso # 04/30/2024 0.02     IG# 04/30/2024 0.05 (H)     IG% 04/30/2024 0.6          Assessment:       1. Cancer cachexia    2. Metastasis to  bone    3. NSCLC of right lung    4. Immunodeficiency due to chemotherapy        Stage IV NSCLC with bone mets.    Originally: NSCLC / Adenocarcinoma Stage IIIA pT2a pN2  - 12/08/2022: Right lower and middle bilobectomy with mediastinal lymph node dissection. Path: Invasive adenocarcinoma, poorly differentiated, focal tumor involvement of visceral pleura and focal tumor vascular invasion (vein), with mediastinal lymph nodes positive for metastatic carcinoma   - TMB - 5, MSI-S, MET amplification, ROS1.  No mutations in EGFR, BRAF, ALK, ERBB2, KRAS, RET,    - 1% tumor cells are positive for PD-L1   - patient evaluated by Radiation Oncology with recommendation to treat with chemotherapy 1st and will re-evaluate the patient by the end of chemotherapy for radiation therapy  - 01/12/2023 PET: Focal uptake at the medial right acetabular roof. Metastasis is a consideration  - 02/22/2023: RIGHT ACETABULAR LESION, CT - GUIDE CORE NEEDLE BIOPSY : FIBRINOPURULENT EXUDATE ADMIXED WITH ATYPICAL EPITHELIOID GROUPS,   NORMAL    MARROW ELEMENTS AND ABUNDANT BLOOD CLOT  PET scan showed a suspicious finding in the right acetabular roof and patient then had a CT-guided biopsy no evidence of malignancy. Started with adjuvant chemotherapy with Carboplatin and pemetrexed x 4 cycles (3/15/23-5/18/23)   Patient has been seen by Rad/Onc (Dr Mireles) since the completion of her chemotherapy and plan was to pursue radiation x 5-6 weeks.  She discussed with the patient that they have showed no overall survival benefit in the setting of postoperative radiation therapy but data supports a larger benefit inpatient with extracapsular extension.  If she tolerated chemotherapy well and continue with good performance status then plan was for PORT   Restaging PET-CT after completion of chemotherapy and prior to radiation therapy showed progression at the right acetabulum.  Patient with progression of disease now with bone metastases.  She april  palliative radiation treatment with Dr. Mireles on 6/28/23, 10 treatments over two weeks.  PET CT 6/5/23 with progression Right superior acetabulum.  FDG avid area appears slightly larger with slightly increased sclerosis.  SUV is 9.7, previously 6.4.  She had palliative radiation. Started on Keytruda 7/18/23. Developed skin changes with Keytruda and later on diagnosed with vitiligo. Now on Taxotere + Xgeva- 10/24/23 due to progression.  Cycle 3 of Taxotere held 12/6/23 r/t bilateral PE  Bone mets   Xgeva q4w  She was seen by Rad/Onc for palliative RT but recommendations were to start chemotherapy and re eval after 2 cycles  Bone pain worsened .   Currently receiving 10 radiation treatments with Dr. Mireles    Iron deficiency Anemia  Continue with 1 tab PO iron QOD with Vitamin C.   Currently scheduled for colonoscopy 6/2024     Hypothyroid   Followed by endocrinology Dr. Elena LYN  Now on Eliquis 5 mg twice daily    Vitiligo to bilateral fingertips  Followed by Dermatology    Recurrent Cdiff  Stable today with no diarrhea noted.                   Plan:   Labs stable today.  Continue with Xgeva as scheduled.  Hold chemo today due to generalized weakness and deconditioning  IV fluids today.  Lab and MVI next week.  PT and HH ordered.   Remeron ordered for appetite, okay to stop megace for now.  Continue with Rad/Onc for back pain  MRI C, T, and L spine scheduled for 5/1  Pulmonology 5/14/2024.  Continue Eliquis 5 mg BID  Continue follow up with Dermatology  Continue oral iron every other day  Norco 10 PRN  RTC in 2 weeks with NP for FU/lab/treat  Cbc, cmp, mag, phos- 1 hr prior @ [facility]      The patient was seen, interviewed and examined. Pertinent lab and radiology studies were reviewed.   The patient was given ample opportunity to ask questions, and to the best of my abilities, all questions answered to satisfaction; patient demonstrated understanding of what we discussed and agreeable to the plan. Pt instructed to  call should develop concerning signs/symptoms or have further questions.       Shelbi Johansen, FNP-C  Oncology/Hematology   [facility]

## 2024-04-30 ENCOUNTER — OFFICE VISIT (OUTPATIENT)
Dept: HEMATOLOGY/ONCOLOGY | Facility: CLINIC | Age: 64
End: 2024-04-30
Payer: MEDICARE

## 2024-04-30 ENCOUNTER — INFUSION (OUTPATIENT)
Dept: INFUSION THERAPY | Facility: HOSPITAL | Age: 64
End: 2024-04-30
Attending: NURSE PRACTITIONER
Payer: MEDICARE

## 2024-04-30 VITALS
OXYGEN SATURATION: 100 % | WEIGHT: 116 LBS | SYSTOLIC BLOOD PRESSURE: 95 MMHG | TEMPERATURE: 98 F | HEART RATE: 120 BPM | HEIGHT: 67 IN | BODY MASS INDEX: 18.21 KG/M2 | RESPIRATION RATE: 20 BRPM | DIASTOLIC BLOOD PRESSURE: 67 MMHG

## 2024-04-30 VITALS — DIASTOLIC BLOOD PRESSURE: 80 MMHG | SYSTOLIC BLOOD PRESSURE: 113 MMHG | RESPIRATION RATE: 18 BRPM | HEART RATE: 112 BPM

## 2024-04-30 DIAGNOSIS — C34.91 NSCLC OF RIGHT LUNG: ICD-10-CM

## 2024-04-30 DIAGNOSIS — C79.51 METASTASIS TO BONE: ICD-10-CM

## 2024-04-30 DIAGNOSIS — C79.51 METASTASIS TO BONE: Primary | ICD-10-CM

## 2024-04-30 DIAGNOSIS — C34.91 NSCLC OF RIGHT LUNG: Primary | ICD-10-CM

## 2024-04-30 DIAGNOSIS — Z79.899 IMMUNODEFICIENCY DUE TO CHEMOTHERAPY: ICD-10-CM

## 2024-04-30 DIAGNOSIS — D84.821 IMMUNODEFICIENCY DUE TO CHEMOTHERAPY: ICD-10-CM

## 2024-04-30 DIAGNOSIS — R64 CANCER CACHEXIA: ICD-10-CM

## 2024-04-30 DIAGNOSIS — T45.1X5A IMMUNODEFICIENCY DUE TO CHEMOTHERAPY: ICD-10-CM

## 2024-04-30 DIAGNOSIS — R64 CANCER CACHEXIA: Primary | ICD-10-CM

## 2024-04-30 PROCEDURE — 3078F DIAST BP <80 MM HG: CPT | Mod: CPTII,S$GLB,,

## 2024-04-30 PROCEDURE — 96365 THER/PROPH/DIAG IV INF INIT: CPT

## 2024-04-30 PROCEDURE — 96372 THER/PROPH/DIAG INJ SC/IM: CPT | Mod: 59

## 2024-04-30 PROCEDURE — 77412 RADIATION TX DELIVERY LVL 3: CPT | Performed by: RADIOLOGY

## 2024-04-30 PROCEDURE — 25000003 PHARM REV CODE 250

## 2024-04-30 PROCEDURE — 1126F AMNT PAIN NOTED NONE PRSNT: CPT | Mod: CPTII,S$GLB,,

## 2024-04-30 PROCEDURE — 1101F PT FALLS ASSESS-DOCD LE1/YR: CPT | Mod: CPTII,S$GLB,,

## 2024-04-30 PROCEDURE — 63600175 PHARM REV CODE 636 W HCPCS

## 2024-04-30 PROCEDURE — 3074F SYST BP LT 130 MM HG: CPT | Mod: CPTII,S$GLB,,

## 2024-04-30 PROCEDURE — 96366 THER/PROPH/DIAG IV INF ADDON: CPT

## 2024-04-30 PROCEDURE — 99215 OFFICE O/P EST HI 40 MIN: CPT | Mod: S$GLB,,,

## 2024-04-30 PROCEDURE — 99999 PR PBB SHADOW E&M-EST. PATIENT-LVL V: CPT | Mod: PBBFAC,,,

## 2024-04-30 PROCEDURE — 3288F FALL RISK ASSESSMENT DOCD: CPT | Mod: CPTII,S$GLB,,

## 2024-04-30 PROCEDURE — 3008F BODY MASS INDEX DOCD: CPT | Mod: CPTII,S$GLB,,

## 2024-04-30 PROCEDURE — 63600175 PHARM REV CODE 636 W HCPCS: Mod: JZ,JG

## 2024-04-30 RX ORDER — MIRTAZAPINE 7.5 MG/1
7.5 TABLET, FILM COATED ORAL NIGHTLY
Qty: 30 TABLET | Refills: 6 | Status: SHIPPED | OUTPATIENT
Start: 2024-04-30 | End: 2024-05-15 | Stop reason: SDUPTHER

## 2024-04-30 RX ORDER — SODIUM CHLORIDE 0.9 % (FLUSH) 0.9 %
10 SYRINGE (ML) INJECTION
Status: CANCELLED | OUTPATIENT
Start: 2024-04-30

## 2024-04-30 RX ORDER — HEPARIN 100 UNIT/ML
500 SYRINGE INTRAVENOUS
Status: CANCELLED | OUTPATIENT
Start: 2024-04-30

## 2024-04-30 RX ADMIN — DENOSUMAB 120 MG: 120 INJECTION SUBCUTANEOUS at 10:04

## 2024-04-30 RX ADMIN — ASCORBIC ACID, VITAMIN A PALMITATE, CHOLECALCIFEROL, THIAMINE HYDROCHLORIDE, RIBOFLAVIN-5 PHOSPHATE SODIUM, PYRIDOXINE HYDROCHLORIDE, NIACINAMIDE, DEXPANTHENOL, ALPHA-TOCOPHEROL ACETATE, VITAMIN K1, FOLIC ACID, BIOTIN, CYANOCOBALAMIN: 200; 3300; 200; 6; 3.6; 6; 40; 15; 10; 150; 600; 60; 5 INJECTION, SOLUTION INTRAVENOUS at 10:04

## 2024-04-30 RX ADMIN — Medication 500 UNITS: at 12:04

## 2024-05-01 ENCOUNTER — CLINICAL SUPPORT (OUTPATIENT)
Dept: RADIATION THERAPY | Facility: HOSPITAL | Age: 64
End: 2024-05-01
Attending: RADIOLOGY
Payer: MEDICARE

## 2024-05-01 PROCEDURE — 77412 RADIATION TX DELIVERY LVL 3: CPT | Performed by: RADIOLOGY

## 2024-05-02 PROCEDURE — 77412 RADIATION TX DELIVERY LVL 3: CPT | Performed by: RADIOLOGY

## 2024-05-03 PROCEDURE — 77412 RADIATION TX DELIVERY LVL 3: CPT | Performed by: RADIOLOGY

## 2024-05-06 PROCEDURE — 77336 RADIATION PHYSICS CONSULT: CPT | Performed by: RADIOLOGY

## 2024-05-06 PROCEDURE — 77417 THER RADIOLOGY PORT IMAGE(S): CPT | Performed by: RADIOLOGY

## 2024-05-06 PROCEDURE — 77412 RADIATION TX DELIVERY LVL 3: CPT | Performed by: RADIOLOGY

## 2024-05-07 ENCOUNTER — LAB VISIT (OUTPATIENT)
Dept: LAB | Facility: HOSPITAL | Age: 64
End: 2024-05-07
Payer: MEDICARE

## 2024-05-07 ENCOUNTER — INFUSION (OUTPATIENT)
Dept: INFUSION THERAPY | Facility: HOSPITAL | Age: 64
DRG: 392 | End: 2024-05-07
Attending: NURSE PRACTITIONER
Payer: MEDICARE

## 2024-05-07 VITALS
WEIGHT: 116 LBS | HEIGHT: 67 IN | SYSTOLIC BLOOD PRESSURE: 110 MMHG | BODY MASS INDEX: 18.21 KG/M2 | OXYGEN SATURATION: 99 % | TEMPERATURE: 98 F | DIASTOLIC BLOOD PRESSURE: 73 MMHG | HEART RATE: 112 BPM

## 2024-05-07 DIAGNOSIS — R64 CANCER CACHEXIA: ICD-10-CM

## 2024-05-07 DIAGNOSIS — C34.91 NSCLC OF RIGHT LUNG: Primary | ICD-10-CM

## 2024-05-07 DIAGNOSIS — C79.51 METASTASIS TO BONE: ICD-10-CM

## 2024-05-07 DIAGNOSIS — C34.91 NSCLC OF RIGHT LUNG: ICD-10-CM

## 2024-05-07 LAB
ALBUMIN SERPL-MCNC: 2.4 G/DL (ref 3.4–4.8)
ALBUMIN/GLOB SERPL: 0.6 RATIO (ref 1.1–2)
ALP SERPL-CCNC: 135 UNIT/L (ref 40–150)
ALT SERPL-CCNC: 12 UNIT/L (ref 0–55)
AST SERPL-CCNC: 21 UNIT/L (ref 5–34)
BASOPHILS # BLD AUTO: 0.02 X10(3)/MCL
BASOPHILS NFR BLD AUTO: 0.3 %
BILIRUB SERPL-MCNC: 0.4 MG/DL
BUN SERPL-MCNC: 17 MG/DL (ref 9.8–20.1)
CALCIUM SERPL-MCNC: 8.5 MG/DL (ref 8.4–10.2)
CHLORIDE SERPL-SCNC: 105 MMOL/L (ref 98–107)
CO2 SERPL-SCNC: 19 MMOL/L (ref 23–31)
CREAT SERPL-MCNC: 0.71 MG/DL (ref 0.55–1.02)
EOSINOPHIL # BLD AUTO: 0.05 X10(3)/MCL (ref 0–0.9)
EOSINOPHIL NFR BLD AUTO: 0.7 %
ERYTHROCYTE [DISTWIDTH] IN BLOOD BY AUTOMATED COUNT: 19.3 % (ref 11.5–17)
GFR SERPLBLD CREATININE-BSD FMLA CKD-EPI: >60 MLS/MIN/1.73/M2
GLOBULIN SER-MCNC: 4 GM/DL (ref 2.4–3.5)
GLUCOSE SERPL-MCNC: 139 MG/DL (ref 82–115)
HCT VFR BLD AUTO: 28.2 % (ref 37–47)
HGB BLD-MCNC: 8.9 G/DL (ref 12–16)
IMM GRANULOCYTES # BLD AUTO: 0.04 X10(3)/MCL (ref 0–0.04)
IMM GRANULOCYTES NFR BLD AUTO: 0.6 %
LYMPHOCYTES # BLD AUTO: 0.57 X10(3)/MCL (ref 0.6–4.6)
LYMPHOCYTES NFR BLD AUTO: 8 %
MAGNESIUM SERPL-MCNC: 1.8 MG/DL (ref 1.6–2.6)
MCH RBC QN AUTO: 25.1 PG (ref 27–31)
MCHC RBC AUTO-ENTMCNC: 31.6 G/DL (ref 33–36)
MCV RBC AUTO: 79.7 FL (ref 80–94)
MONOCYTES # BLD AUTO: 0.54 X10(3)/MCL (ref 0.1–1.3)
MONOCYTES NFR BLD AUTO: 7.5 %
NEUTROPHILS # BLD AUTO: 5.94 X10(3)/MCL (ref 2.1–9.2)
NEUTROPHILS NFR BLD AUTO: 82.9 %
PHOSPHATE SERPL-MCNC: 2.2 MG/DL (ref 2.3–4.7)
PLATELET # BLD AUTO: 655 X10(3)/MCL (ref 130–400)
PMV BLD AUTO: 9 FL (ref 7.4–10.4)
POTASSIUM SERPL-SCNC: 4.1 MMOL/L (ref 3.5–5.1)
PROT SERPL-MCNC: 6.4 GM/DL (ref 5.8–7.6)
RBC # BLD AUTO: 3.54 X10(6)/MCL (ref 4.2–5.4)
SODIUM SERPL-SCNC: 132 MMOL/L (ref 136–145)
WBC # SPEC AUTO: 7.16 X10(3)/MCL (ref 4.5–11.5)

## 2024-05-07 PROCEDURE — 83735 ASSAY OF MAGNESIUM: CPT

## 2024-05-07 PROCEDURE — A4216 STERILE WATER/SALINE, 10 ML: HCPCS

## 2024-05-07 PROCEDURE — 63600175 PHARM REV CODE 636 W HCPCS

## 2024-05-07 PROCEDURE — 36415 COLL VENOUS BLD VENIPUNCTURE: CPT

## 2024-05-07 PROCEDURE — 85025 COMPLETE CBC W/AUTO DIFF WBC: CPT

## 2024-05-07 PROCEDURE — 96365 THER/PROPH/DIAG IV INF INIT: CPT

## 2024-05-07 PROCEDURE — 96366 THER/PROPH/DIAG IV INF ADDON: CPT

## 2024-05-07 PROCEDURE — 77412 RADIATION TX DELIVERY LVL 3: CPT | Performed by: RADIOLOGY

## 2024-05-07 PROCEDURE — 80053 COMPREHEN METABOLIC PANEL: CPT

## 2024-05-07 PROCEDURE — 84100 ASSAY OF PHOSPHORUS: CPT

## 2024-05-07 PROCEDURE — 25000003 PHARM REV CODE 250

## 2024-05-07 RX ORDER — SODIUM CHLORIDE 0.9 % (FLUSH) 0.9 %
10 SYRINGE (ML) INJECTION
Status: CANCELLED | OUTPATIENT
Start: 2024-05-07

## 2024-05-07 RX ORDER — HEPARIN 100 UNIT/ML
500 SYRINGE INTRAVENOUS
Status: DISCONTINUED | OUTPATIENT
Start: 2024-05-07 | End: 2024-05-07 | Stop reason: HOSPADM

## 2024-05-07 RX ORDER — SODIUM CHLORIDE 0.9 % (FLUSH) 0.9 %
10 SYRINGE (ML) INJECTION
Status: DISCONTINUED | OUTPATIENT
Start: 2024-05-07 | End: 2024-05-07 | Stop reason: HOSPADM

## 2024-05-07 RX ORDER — HEPARIN 100 UNIT/ML
500 SYRINGE INTRAVENOUS
Status: CANCELLED | OUTPATIENT
Start: 2024-05-07

## 2024-05-07 RX ADMIN — Medication 500 UNITS: at 10:05

## 2024-05-07 RX ADMIN — ASCORBIC ACID, VITAMIN A PALMITATE, CHOLECALCIFEROL, THIAMINE HYDROCHLORIDE, RIBOFLAVIN-5 PHOSPHATE SODIUM, PYRIDOXINE HYDROCHLORIDE, NIACINAMIDE, DEXPANTHENOL, ALPHA-TOCOPHEROL ACETATE, VITAMIN K1, FOLIC ACID, BIOTIN, CYANOCOBALAMIN: 200; 3300; 200; 6; 3.6; 6; 40; 15; 10; 150; 600; 60; 5 INJECTION, SOLUTION INTRAVENOUS at 08:05

## 2024-05-07 RX ADMIN — Medication 10 ML: at 10:05

## 2024-05-08 PROCEDURE — 77412 RADIATION TX DELIVERY LVL 3: CPT | Performed by: RADIOLOGY

## 2024-05-09 PROCEDURE — 77412 RADIATION TX DELIVERY LVL 3: CPT | Performed by: RADIOLOGY

## 2024-05-10 PROCEDURE — 77412 RADIATION TX DELIVERY LVL 3: CPT | Performed by: RADIOLOGY

## 2024-05-11 ENCOUNTER — HOSPITAL ENCOUNTER (EMERGENCY)
Facility: HOSPITAL | Age: 64
Discharge: HOME | End: 2024-05-12
Attending: INTERNAL MEDICINE
Payer: MEDICARE

## 2024-05-11 DIAGNOSIS — C79.51 MALIGNANT NEOPLASM METASTATIC TO BONE: Primary | ICD-10-CM

## 2024-05-11 DIAGNOSIS — G89.29 CHRONIC LOW BACK PAIN, UNSPECIFIED BACK PAIN LATERALITY, UNSPECIFIED WHETHER SCIATICA PRESENT: ICD-10-CM

## 2024-05-11 DIAGNOSIS — R00.0 TACHYCARDIA: ICD-10-CM

## 2024-05-11 DIAGNOSIS — F41.9 ANXIETY: ICD-10-CM

## 2024-05-11 DIAGNOSIS — G89.3 CANCER ASSOCIATED PAIN: ICD-10-CM

## 2024-05-11 DIAGNOSIS — M54.50 CHRONIC LOW BACK PAIN, UNSPECIFIED BACK PAIN LATERALITY, UNSPECIFIED WHETHER SCIATICA PRESENT: ICD-10-CM

## 2024-05-11 PROCEDURE — 63600175 PHARM REV CODE 636 W HCPCS: Performed by: INTERNAL MEDICINE

## 2024-05-11 PROCEDURE — 96374 THER/PROPH/DIAG INJ IV PUSH: CPT

## 2024-05-11 PROCEDURE — 25000003 PHARM REV CODE 250: Performed by: INTERNAL MEDICINE

## 2024-05-11 PROCEDURE — 99284 EMERGENCY DEPT VISIT MOD MDM: CPT | Mod: 25

## 2024-05-11 RX ORDER — SODIUM CHLORIDE 9 MG/ML
1000 INJECTION, SOLUTION INTRAVENOUS
Status: COMPLETED | OUTPATIENT
Start: 2024-05-12 | End: 2024-05-11

## 2024-05-11 RX ORDER — SODIUM CHLORIDE 9 MG/ML
1000 INJECTION, SOLUTION INTRAVENOUS
Status: COMPLETED | OUTPATIENT
Start: 2024-05-11 | End: 2024-05-11

## 2024-05-11 RX ORDER — PROCHLORPERAZINE EDISYLATE 5 MG/ML
10 INJECTION INTRAMUSCULAR; INTRAVENOUS
Status: COMPLETED | OUTPATIENT
Start: 2024-05-11 | End: 2024-05-11

## 2024-05-11 RX ADMIN — SODIUM CHLORIDE 1000 ML: 9 INJECTION, SOLUTION INTRAVENOUS at 11:05

## 2024-05-11 RX ADMIN — SODIUM CHLORIDE 1000 ML: 9 INJECTION, SOLUTION INTRAVENOUS at 10:05

## 2024-05-11 RX ADMIN — PROCHLORPERAZINE EDISYLATE 10 MG: 5 INJECTION INTRAMUSCULAR; INTRAVENOUS at 11:05

## 2024-05-12 VITALS
HEIGHT: 67 IN | BODY MASS INDEX: 18.83 KG/M2 | OXYGEN SATURATION: 98 % | RESPIRATION RATE: 21 BRPM | TEMPERATURE: 98 F | WEIGHT: 120 LBS | HEART RATE: 99 BPM | DIASTOLIC BLOOD PRESSURE: 66 MMHG | SYSTOLIC BLOOD PRESSURE: 101 MMHG

## 2024-05-12 PROCEDURE — 25000003 PHARM REV CODE 250: Performed by: INTERNAL MEDICINE

## 2024-05-12 RX ORDER — PROPRANOLOL HYDROCHLORIDE 40 MG/1
40 TABLET ORAL
Status: COMPLETED | OUTPATIENT
Start: 2024-05-12 | End: 2024-05-12

## 2024-05-12 RX ADMIN — PROPRANOLOL HYDROCHLORIDE 40 MG: 40 TABLET ORAL at 12:05

## 2024-05-12 NOTE — ED PROVIDER NOTES
Encounter Date: 5/11/2024       History     Chief Complaint   Patient presents with    Back Pain     Pt c/o whole back pain since early evening. States has lung ca and had radiation tx yesterday. Took pain  med at 5pm but not helping at all.      64-year-old black female with metastatic lung cancer with Mets to the bone presents with worsening bone and back pain      Review of patient's allergies indicates:  No Known Allergies  Past Medical History:   Diagnosis Date    ASTHMA     COPD (chronic obstructive pulmonary disease)     SEVERE    GERD (gastroesophageal reflux disease)     High cholesterol     HLD (hyperlipidemia)     Hypothyroidism, unspecified     Lung cancer 12/08/2022    invasive adenocarcinoma, poorly differentiated with solid morphology, focal tumor involvement of visceral pleura and focal tumor vascular invasion (vein), with 2 of 3 posterior mediastinal lymph nodes positive for metastatic carcinoma    Stage 2 chronic kidney disease     TIA (transient ischemic attack)     no deficits    Tobacco abuse     Unspecified osteoarthritis, unspecified site     Uterine cancer 1989     Past Surgical History:   Procedure Laterality Date    COLONOSCOPY N/A 3/26/2024    Procedure: COLONOSCOPY;  Surgeon: Vickey Patino MD;  Location: [facility];  Service: Endoscopy;  Laterality: N/A;  post op: c. diff colitis of right colon    COLONOSCOPY, WITH 1 OR MORE BIOPSIES N/A 3/26/2024    Procedure: COLONOSCOPY, WITH 1 OR MORE BIOPSIES;  Surgeon: Vickey Patino MD;  Location: [facility];  Service: Endoscopy;  Laterality: N/A;  cecum    INSERTION OF TUNNELED CENTRAL VENOUS CATHETER (CVC) WITH SUBCUTANEOUS PORT N/A 12/04/2023    Procedure: [id number];  Surgeon: Peter Dial MD;  Location: [facility];  Service: Peripheral Vascular;  Laterality: N/A;    LAPAROSCOPIC CHOLECYSTECTOMY WITH CHOLANGIOGRAPHY N/A 3/28/2024    Procedure: CHOLECYSTECTOMY, LAPAROSCOPIC, WITH CHOLANGIOGRAM;  Surgeon: Sukumar  MD Vickey;  Location: [facility] OR;  Service: General;  Laterality: N/A;    lipoma multiple sites      LUNG LOBECTOMY Right 2022    Procedure: LOBECTOMY, LUNG;  Surgeon: Jass Browne IV, MD;  Location: [facility] OR;  Service: Cardiothoracic;  Laterality: Right;    SURGICAL REMOVAL OF LYMPH NODE  2022    Procedure: EXCISION, LYMPH NODE;  Surgeon: Jass Browne IV, MD;  Location: [facility] OR;  Service: Cardiothoracic;;    THORACOTOMY Right 2022    Procedure: THORACOTOMY;  Surgeon: Jass Browne IV, MD;  Location: [facility] OR;  Service: Cardiothoracic;  Laterality: Right;  Right Lower Lobectomy with Lymph Node Dissection    TOTAL ABDOMINAL HYSTERECTOMY W/ BILATERAL SALPINGOOPHORECTOMY       Family History   Problem Relation Name Age of Onset    Cancer Maternal Grandfather      Heart failure Father      Prostate cancer Father      Hypertension Mother      Hepatitis Mother      Ovarian cancer Mother      Liver cancer Mother      Asthma Brother      Asthma Brother      Fibroids Sister      Skin cancer Sister       Social History     Tobacco Use    Smoking status: Former     Current packs/day: 0.00     Average packs/day: 0.5 packs/day for 50.0 years (25.0 ttl pk-yrs)     Types: Cigarettes     Start date: 1972     Quit date: 2022     Years since quittin.4    Smokeless tobacco: Never   Substance Use Topics    Alcohol use: Not Currently     Comment: quit 25years ago    Drug use: Never     Review of Systems   Constitutional: Negative.  Negative for activity change, appetite change, chills, diaphoresis, fatigue, fever and unexpected weight change.   HENT: Negative.  Negative for congestion, dental problem, drooling, ear discharge, ear pain, facial swelling, hearing loss, mouth sores, nosebleeds, postnasal drip, rhinorrhea, sinus pressure, sinus pain, sneezing, sore throat, tinnitus, trouble swallowing and voice change.    Eyes: Negative.  Negative for photophobia, pain, discharge, redness, itching  and visual disturbance.   Respiratory: Negative.  Negative for apnea, cough, choking, chest tightness, shortness of breath, wheezing and stridor.    Cardiovascular: Negative.  Negative for chest pain, palpitations and leg swelling.   Gastrointestinal: Negative.  Negative for abdominal distention, abdominal pain, anal bleeding, blood in stool, constipation, diarrhea, nausea, rectal pain and vomiting.   Endocrine: Negative.  Negative for cold intolerance, heat intolerance, polydipsia, polyphagia and polyuria.   Genitourinary: Negative.  Negative for decreased urine volume, difficulty urinating, dyspareunia, dysuria, enuresis, flank pain, frequency, genital sores, hematuria, menstrual problem, pelvic pain, urgency, vaginal bleeding, vaginal discharge and vaginal pain.   Musculoskeletal:  Positive for back pain, gait problem and myalgias. Negative for arthralgias, joint swelling, neck pain and neck stiffness.   Skin: Negative.  Negative for color change, pallor, rash and wound.   Allergic/Immunologic: Negative.  Negative for environmental allergies, food allergies and immunocompromised state.   Neurological:  Negative for dizziness, tremors, seizures, syncope, facial asymmetry, speech difficulty, weakness, light-headedness, numbness and headaches.   Hematological: Negative.  Negative for adenopathy. Does not bruise/bleed easily.   Psychiatric/Behavioral: Negative.  Negative for agitation, behavioral problems, confusion, decreased concentration, dysphoric mood, hallucinations, self-injury, sleep disturbance and suicidal ideas. The patient is not nervous/anxious and is not hyperactive.    All other systems reviewed and are negative.      Physical Exam     Initial Vitals [05/11/24 2204]   BP Pulse Resp Temp SpO2   96/68 (!) 130 20 97.6 °F (36.4 °C) 96 %      MAP       --         Physical Exam    Nursing note and vitals reviewed.  Constitutional: She appears well-developed. She is not diaphoretic.   Very anxious and appears  to be in pain, cachexia   HENT:   Head: Normocephalic and atraumatic.   Mouth/Throat: Oropharynx is clear and moist. No oropharyngeal exudate.   Eyes: Conjunctivae and EOM are normal. Pupils are equal, round, and reactive to light. No scleral icterus.   Neck: Neck supple. No JVD present.   Normal range of motion.  Cardiovascular:  Regular rhythm, normal heart sounds and intact distal pulses.     Exam reveals no gallop and no friction rub.       No murmur heard.  Tachycardia   Pulmonary/Chest: Breath sounds normal. No respiratory distress. She has no wheezes. She exhibits no tenderness.   Abdominal: Abdomen is soft. Bowel sounds are normal. She exhibits no distension. There is no abdominal tenderness. There is no rebound.   Musculoskeletal:         General: Tenderness present.      Cervical back: Normal range of motion and neck supple.      Comments: Tenderness along her thoracic and lumbar spine.     Neurological: She is alert and oriented to person, place, and time.   Skin: Skin is warm and dry. Capillary refill takes less than 2 seconds.   Psychiatric:   Extremely anxious         ED Course   Procedures  Labs Reviewed - No data to display       Imaging Results    None          Medications   0.9%  NaCl infusion (1,000 mLs Intravenous New Bag 5/11/24 2237)   prochlorperazine injection Soln 10 mg (10 mg Intravenous Given 5/11/24 2328)   0.9%  NaCl infusion (1,000 mLs Intravenous New Bag 5/11/24 2357)   propranoloL tablet 40 mg (40 mg Oral Given 5/12/24 0039)     Medical Decision Making  64-year-old black female with metastatic lung cancer to the bone presents with worsening pain in her back.  Patient underwent radiation a few days ago and states that tonight her pain meds were just not working.  Differential diagnosis included progression of disease, compression fractures, anxiety, muscle spasms, cauda equina syndrome, sciatica, fracture.  Exam indicated she was very tachycardic with a heart rate in the 130s and was  extremely anxious.  She would already taken some narcotics at home and stated it gave her no relief.  Elected to give her a L of fluid because of her tachycardia and thought maybe a there would be a component of dehydration and this really did not do anything to improve her pulse however while we had an IV and she was complaining of nausea with the pain I gave her some Compazine which worked fantastic L relaxer after the Compazine she stated she has not in pain in her family reports that this is the best she is looked at some time.  She continued to remain tachycardic so we gave her another L of fluid and that only dropped her pulse into the 120s so I gave her propranolol was dropped her pulse down to around 100 and she is feeling good enough so will discharge home.  I recommended that she speak to her primary caregiver and possibly her oncologist about getting other anxiety medications to help her out with her anxiety    Problems Addressed:  Anxiety: acute illness or injury with systemic symptoms  Cancer associated pain: chronic illness or injury  Chronic low back pain, unspecified back pain laterality, unspecified whether sciatica present: chronic illness or injury  Malignant neoplasm metastatic to bone: chronic illness or injury that poses a threat to life or bodily functions  Tachycardia: chronic illness or injury with exacerbation, progression, or side effects of treatment    Amount and/or Complexity of Data Reviewed  Independent Historian: caregiver  External Data Reviewed: labs, radiology and notes.    Risk  OTC drugs.  Prescription drug management.  Decision regarding hospitalization.  Decision not to resuscitate or to de-escalate care because of poor prognosis.  Diagnosis or treatment significantly limited by social determinants of health.  Risk Details: Discussed with family of options.  I elected not to further image as I did an extensive chart review and she recently underwent MRIs in his currently  undergoing radiation therapy               ED Course as of 05/12/24 0119   Sat May 11, 2024   4846 She was feeling markedly improved after Compazine that was given in her sister reports that she was looking best that she has looked in quite some time and she was very relaxed.  I discussed with him that I did not actually give him pain meds I gave Compazine which would help her relax and a L of fluid and discussed with him that maybe they should speak with the providers about getting her some to relax which would help her pain meds worked better.  She still very tachycardic so will give 1 more L of fluid [PL]      ED Course User Index  [PL] Michael Shrestha MD                           Clinical Impression:  Final diagnoses:  [C79.51] Malignant neoplasm metastatic to bone (Primary)  [G89.3] Cancer associated pain  [M54.50, G89.29] Chronic low back pain, unspecified back pain laterality, unspecified whether sciatica present  [F41.9] Anxiety  [R00.0] Tachycardia          ED Disposition Condition    Discharge Stable          ED Prescriptions    None       Follow-up Information       Follow up With Specialties Details Why Contact Info    Jennifer Dacosta, FNP Family Medicine In 2 days  [address] [facility] [id number] [phone]               Michael Shrestha MD  05/12/24 0119

## 2024-05-13 DIAGNOSIS — C34.91 NSCLC OF RIGHT LUNG: ICD-10-CM

## 2024-05-13 DIAGNOSIS — G89.3 CANCER RELATED PAIN: ICD-10-CM

## 2024-05-13 RX ORDER — HYDROCODONE BITARTRATE AND ACETAMINOPHEN 10; 325 MG/1; MG/1
1 TABLET ORAL EVERY 6 HOURS PRN
Qty: 90 TABLET | Refills: 0 | Status: SHIPPED | OUTPATIENT
Start: 2024-05-13 | End: 2024-06-11 | Stop reason: SDUPTHER

## 2024-05-14 ENCOUNTER — OFFICE VISIT (OUTPATIENT)
Dept: PULMONOLOGY | Facility: CLINIC | Age: 64
End: 2024-05-14
Payer: MEDICARE

## 2024-05-14 VITALS
HEART RATE: 119 BPM | OXYGEN SATURATION: 98 % | HEIGHT: 67 IN | BODY MASS INDEX: 18.3 KG/M2 | WEIGHT: 116.63 LBS | RESPIRATION RATE: 18 BRPM | TEMPERATURE: 97 F | DIASTOLIC BLOOD PRESSURE: 62 MMHG | SYSTOLIC BLOOD PRESSURE: 90 MMHG

## 2024-05-14 DIAGNOSIS — C34.31 MALIGNANT NEOPLASM OF LOWER LOBE OF RIGHT LUNG: Primary | ICD-10-CM

## 2024-05-14 DIAGNOSIS — J44.9 STAGE 3 SEVERE COPD BY GOLD CLASSIFICATION: Chronic | ICD-10-CM

## 2024-05-14 PROCEDURE — 99213 OFFICE O/P EST LOW 20 MIN: CPT | Mod: S$PBB,,, | Performed by: INTERNAL MEDICINE

## 2024-05-14 PROCEDURE — 99215 OFFICE O/P EST HI 40 MIN: CPT | Mod: PBBFAC

## 2024-05-14 PROCEDURE — 1101F PT FALLS ASSESS-DOCD LE1/YR: CPT | Mod: CPTII,,, | Performed by: INTERNAL MEDICINE

## 2024-05-14 PROCEDURE — 1159F MED LIST DOCD IN RCRD: CPT | Mod: CPTII,,, | Performed by: INTERNAL MEDICINE

## 2024-05-14 PROCEDURE — 3074F SYST BP LT 130 MM HG: CPT | Mod: CPTII,,, | Performed by: INTERNAL MEDICINE

## 2024-05-14 PROCEDURE — 3078F DIAST BP <80 MM HG: CPT | Mod: CPTII,,, | Performed by: INTERNAL MEDICINE

## 2024-05-14 PROCEDURE — 1160F RVW MEDS BY RX/DR IN RCRD: CPT | Mod: CPTII,,, | Performed by: INTERNAL MEDICINE

## 2024-05-14 PROCEDURE — 3008F BODY MASS INDEX DOCD: CPT | Mod: CPTII,,, | Performed by: INTERNAL MEDICINE

## 2024-05-14 PROCEDURE — 3288F FALL RISK ASSESSMENT DOCD: CPT | Mod: CPTII,,, | Performed by: INTERNAL MEDICINE

## 2024-05-14 PROCEDURE — 1126F AMNT PAIN NOTED NONE PRSNT: CPT | Mod: CPTII,,, | Performed by: INTERNAL MEDICINE

## 2024-05-14 NOTE — PROGRESS NOTES
Subjective:       Patient ID: Mansi Jni is a 64 y.o. female.    Chief Complaint: Est pt/ NSCLC right lung; no complaints today  ( 05/16/2023)    HPI  64-year-old with stage III COPD and stage IV non-small-cell lung cancer originally diagnosed in 2022 and status post right lower and middle lobectomy.  Was stage IIIA at that time and has undergone radiation and chemotherapy with Oncology.  Subsequently found to have evidence metastasis to the right acetabulum and recently completed radiation therapy.  Respiratory status has been stable.  She is using her Trelegy as prescribed with excellent response.  Uses nebulizer treatments as needed.  Has occasional dry nonproductive cough.  Her activity is limited by her right hip and back pain.      ROS  Constitutional: no fever, admits to fatigue, weakness  Eye: no vision loss, eye redness, drainage, or pain  ENMT: no sore throat, ear pain, sinus pain/congestion, nasal congestion/drainage  Respiratory: no cough, no wheezing, admits to episodes of SOB  Cardiovascular: no chest pain, no palpitations, no edema  Gastrointestinal: no nausea, vomiting, or diarrhea. No abdominal pain  Genitourinary: no dysuria, no urinary frequency or urgency, no hematuria  Hema/Lymph: no abnormal bruising or bleeding  Endocrine: no heat or cold intolerance, no excessive thirst or excessive urination  Musculoskeletal: no muscle or joint pain, no joint swelling  Integumentary: no skin rash or abnormal lesion  Neurologic: no headache, no dizziness, no weakness or numbness        Current Outpatient Medications   Medication Instructions    acetaminophen (TYLENOL) 1,000 mg, Oral, Every 6 hours PRN    albuterol (PROVENTIL) 2.5 mg, Nebulization, 3 times daily PRN, rescue    apixaban (ELIQUIS) 5 mg, Oral, 2 times daily    ascorbic acid (vitamin C) (VITAMIN C) 1,000 mg, Oral, Daily    aspirin (ECOTRIN) 81 mg, Oral, Daily    atorvastatin (LIPITOR) 40 mg, Oral, Daily    cholecalciferol (vitamin D3)  50,000 Units, Oral, Every 7 days    dexAMETHasone (DECADRON) 4 MG Tab TAKE 2 TABLETS BY MOUTH DAY PRIOR TO CHEMPTHERAPY AND ON DAY 2, 3 AND 4 FOLLOW CHEMOTHERAPY    fluticasone propionate (FLONASE) 50 mcg, Each Nostril, Daily    fluticasone-umeclidin-vilanter (TRELEGY ELLIPTA) 100-62.5-25 mcg DsDv 1 puff, Inhalation, Daily    gabapentin (NEURONTIN) 300 mg, Oral, 3 times daily    HYDROcodone-acetaminophen (NORCO)  mg per tablet 1 tablet, Oral, Every 6 hours PRN    ipratropium (ATROVENT HFA) 17 mcg/actuation inhaler 2 puffs, Inhalation, Every 6 hours, Rescue    ipratropium-albuteroL (COMBIVENT)  mcg/actuation inhaler 1 puff, Inhalation, Every 6 hours PRN, Rescue    iron polysac-iron heme polypep (FEOSOL BIFERA) 28 mg Tab 1 tablet, Oral, Every other day, Takes 3 times a week    k phos di & mono-sod phos mono (K-PHOS-NEUTRAL) 250 mg Tab 1 tablet, Oral, 2 times daily    levothyroxine (SYNTHROID) 75 mcg, Oral, Before breakfast    loratadine (CLARITIN) 10 mg, Oral, Daily    megestroL (MEGACE) 40 mg, Oral, 4 times daily, For low appetite.    mirtazapine (REMERON) 7.5 mg, Oral, Nightly    morphine (MS CONTIN) 30 mg, Oral, 2 times daily    pantoprazole (PROTONIX) 40 mg, Oral, Daily    potassium chloride (MICRO-K) 10 MEQ CpSR 20 mEq, Oral, Daily    triamcinolone acetonide 0.1% (KENALOG) 0.1 % cream     vancomycin (VANCOCIN) 250 MG capsule TAKE 1 CAPSULE BY MOUTH EVERY 6 HOURS AS DIRECTED           Objective:      Physical exam:   General appearance: alert, thin, frail, appears stated age, cooperative and no distress  Head: Normocephalic, without obvious abnormality, atraumatic  Neck: no adenopathy, no carotid bruit, no JVD and supple, symmetrical, trachea midline  Lungs: diminished breath sounds RUL, no wheezes, no rales, noronchi  Heart: regular rate and rhythm, S1, S2 normal, no murmur, click, rub or gallop  Abdomen: soft, non-tender; bowel sounds normal; no masses,  no organomegaly  Extremities: extremities  normal, atraumatic, no cyanosis or edema  Pulses: 2+ and symmetric  Skin: Skin color, texture, turgor normal. No rashes or lesions  Neurologic: Grossly normal        Assessment:       Right lung mass status post right middle lobe and right lower lobe lobectomy on 12/08/2022 -- Pathology came back with invasive adenocarcinoma, poorly differentiated with solid morphology, focal tumor involvement of visceral pleura and focal tumor vascular invasion (vein), with 2 of 3 posterior mediastinal lymph nodes positive for metastatic carcinom, stage III A T2a N2  Now with metastatic disease to the right acetabulum.  Completed radiation therapy yesterday.  Now stage IV.  Stage III COPD  Hypoxia   Left upper extremity edema       Plan:     Continue current inhaler regimen along with PRN albuterol per nebulizer.  She is scheduled to see Oncology again tomorrow in regards to further treatment plans.  Follow-up in pulmonary clinic in 1 year or sooner if needed.    05/14/2024

## 2024-05-14 NOTE — PROGRESS NOTES
HEMATOLOGY/ONCOLOGY OFFICE CLINIC VISIT  [facility] [place]    ONCOLOGICAL HISTORY:     Diagnosis:  - Stage IV NSCLC with bone mets.  - NSCLC / Adenocarcinoma Stage IIIA pT2a pN2--dx 12/2022  - PD-L1 1%  - History Uterine cancer - Dx 1989    Current Treatment:   Taxotere + Xgeva- 10/24/23-->  Held cycle 3 12/6/23 r/t bilateral PE, started on Eliquis      Treatment History:  - 1989 s/p total hysterectomy   - 12/08/2022: Right lower and middle bilobectomy with mediastinal lymph node dissection.   - Carboplatin + Pemetrexed  3/15/2023-5/1/2023  - Palliative RT   - Carboplatin + Pemetrexed started 3/15/2023  - Keytruda stopped 9/19/23 r/t elevated TSH and PET/CT progression, last dose given 8/29/23  - Xgeva- 7/18/2023-->  - Palliative RT to back 4/2024-5/2024    Plan of care: palliative systemic therapy      IMAGING:   - 08/10/2022 PET: The elongated subpleural right lower lobe nodule demonstrates fairly intense FDG activity.  Infectious, inflammatory and neoplastic etiologies remain possible. No suspicious PET findings elsewhere.  - 12/08/2022 CT HEAD without contrast: No acute intracranial abnormality identified.  Findings of mild microvascular ischemic disease.  - 12/2022: U/S Upper extremity: Thrombophlebitis of the left cephalic vein. No deep venous thrombosis in the left upper extremity.  - 01/03/2023 MRI Brain : GARY   - 01/12/2023 NM PET CT 1. Postsurgical changes of recent right lower and middle lobectomies.  There is a small right pleural effusion.  There is nonspecific peripheral uptake along the pleura at the right lung base and perihilar region which may be related to healing response in the setting of recent surgery.  Similarly borderline mediastinal uptake is nonspecific in the recent postoperative setting and could be reactive.  Continued attention recommended on follow-up. 2. Focal Uptake at the medial right acetabular roof has a max SUV of 6.4. This is new compared to previous exam. Metastasis is a  consideration  -PET 6/5/2023: Previously visualized moderately FDG avid opacities towards the right lung base improved.  Pleural effusion also improved.  No new suspicious PET findings in the lungs.  Severe emphysema. Some left adrenal thickening is similar to prior but there is now moderate associated FDG activity, max SUV is 3.6. FDG activity is again seen associated with the right superior acetabulum.  FDG avid area appears slightly larger today with slightly increased sclerosis.  SUV is 9.7, previously 6.4.  There is a newly evident subcentimeter FDG avid lesion left pedicle T10. Additional subcentimeter FDG avid lesion T4 vertebral body SUV 3.4.suspicious for metastatic disease.  -MRI T spine 6/15/2023:   1. Foci of abnormal signal intensity and enhancement at the inferior endplate of T3 and at body and left pedicle of T10.  Metastatic neoplastic etiology must be considered.  2. Thoracic spondylosis  3. Mild encroachment into the right neural foramina at the T3-4 and T4-5 secondary to mild posterior disc bulge and posterior osteophyte formation  4. Multilevel mild posterior disc bulge which does not result in significant central spinal stenosis.  9/27/23 Thyroid U/S:  Very minimal to no significant uptake of radiotracer isotope within the thyroid lobes bilaterally.  Clinical correlation is indicated  CT CAP 9/27/23:  1. Interim increasing nodular soft tissue densities/nodes/mass is at the mediastinum and right hilum measuring up the 3.0 cm.  A neoplastic etiology must be considered  2. Interim development of a 2.5 cm low-attenuation mass at the left adrenal gland.  A metastatic etiology must be considered  3. Interim development of a sclerotic foci at T3 and T10.  A metastatic etiology must be considered  4. Small right pleural effusion with associated infiltrate and atelectasis at the right lung base suspect  5. Suspect small cyst at the left kidney as described  6. Mild ill-defined soft tissue fullness at the  cervical vaginal region  7. Borderline cardiomegaly  10/6/23 PET/CT:  1. Disease progression with new FDG avid metastatic lymph nodes in the chest and right hilum.  2. Bilateral axillary and inguinal lymph nodes with low level uptake are indeterminate but concerning for metastasis given the additional findings.  3. Worsening and new FDG avid osseous disease involving both the axial and appendicular skeleton.  4. Enlarging left adrenal metastasis.  12/6/23 CTA:  1. Soft tissue masslike density again evident at the right hilum causing mass effect on the right main pulmonary artery  2. Interim development of filling defects/pulmonary emboli at the diminutive posterior right lower lobe pulmonary arteries.  A few small defects/pulmonary emboli have developed at the pulmonary arteries at the posterior left lower lobe since the prior exam.  3. Postsurgical changes right hilum with volume loss at the right lung and elevation of the right hemidiaphragm with associated posterior right basilar pleural reaction, infiltrate, and atelectasis  4. Advanced emphysematous changes with the right side greater than the left  5. Thoracic spondylosis with the sclerotic foci at the T3 and T10  6. The cancer center was notified of the findings on 12/06/2023 at 10:00  1/16/24 CT CAP:  1. Nodular opacities again identified at the right hilum and mediastinum which have a similar appearance to the prior exam  2. Low-attenuation mass again evident at the left adrenal gland  3. Sclerotic lytic lesions evident at T3, T5, T10, and L5.  A metastatic neoplastic etiology must be considered  4. Advanced emphysematous changes with resolution of small right pleural effusion.  There is patchy infiltrate, atelectasis and or scarring at the posterior right lung base.  5. Findings consistent with cysts at the left kidney  6. Ill-defined soft tissue fullness again evident at the cervical vaginal region  7. Findings of mild constipation  1/16/24 NM Bone  Scan:  1. Scattered focal activity at the thoracic spine, lumbar spine, right hip/trochanteric region, left iliac bone, and left sacrum.  A metastatic neoplastic etiology must be considered  2. Bar like CT intense activity at T10 suggesting acute compression fracture  3. Mild asymmetric prominence of the right upper renal collecting system compared to the left  4/15/24 CT CAP:  1. Scattered sclerotic densities again evident at the thoracolumbar spine, left pelvis, and right femur suspicious for metastases which have a similar appearance to the recent exam  2. Mild interim increase in size of a left adrenal nodule now measuring 3.2 cm in diameter. A metastatic etiology must be considered  3. Extensive atherosclerosis  4. Nodular soft tissue masses/lymph nodes again identified at the right hilum and mediastinum again mass effect on the right main pulmonary artery which have a similar appearance to the recent exam  5. Advanced emphysematous changes  6. Findings of constipation with prominent fecal load at the right side of the colon  4/15/24 NM Bone Scan:  1. Scattered foci of abnormal activity at the thoracic spine, lower lumbar spine, left pelvis, and right hip. A metastatic neoplastic etiology must be considered  2. Mild asymmetric prominence of the right upper renal collecting system    PATHOLOGY:  - 12/08/2022: Invasive solid adenocarcinoma (3.1 CM); G3, poorly differentiated. Visceral Pleura Invasion present without definite serosal surface involvement. Lymphovascular invasion (Vein) present. All margins negative for invasive carcinoma, distance 2.5 cm. LN - 4/14 (10R: Hilar, Posterior mediastinal (RACHEAL), 4R Lower paratracheal, 9R Pulmonary ligament, 10R). pT2a pN2  - 02/22/2023: RIGHT ACETABULAR LESION, CT - GUIDE CORE NEEDLE BIOPSY : FIBRINOPURULENT EXUDATE ADMIXED WITH ATYPICAL EPITHELIOID GROUPS,   NORMAL    MARROW ELEMENTS AND ABUNDANT BLOOD CLOT      Subjective:     HPI  Mansi Sabrina Jin  64 y.o.  female  "with past medical history significant for HTN, HLD, uterine cancer status post total hysterectomy in 1989,  CKD Stage 2, COPD Stage 3, referred for management of NSCLC s/p RLL / RML lobectomy with Mediastinal Lymph Node Dissection on 12/8/2022.     She was noted to have RLL lung mass and underwent CT guided biopsy which was non diagnostic. PET scan demonstrated significant avidity within the lesion. Given high risk for malignancy, she underwent right lower lobectomy and right middle lobectomy on 12/08/2022. Pathology came back with invasive adenocarcinoma, poorly differentiated with solid morphology, focal tumor involvement of visceral pleura and focal tumor vascular invasion (vein), with mediastinal lymph nodes positive for metastatic carcinoma.      PET on 01/12/2023 showed focal uptake at the medial right acetabular roof but the biopsy was negative for malignancy. Discussed with radiology, they recommended adjuvant chemotherapy and then they will reassess later for radiation therapy.     Chief Complaint: NSCLC (Pt reports no new complaints)      Interval History:   She returns to the clinic today for a 2 week follow-up and treatment clearance for Docetaxel q3w. She completed radiation with Dr. Mireles on 5/10/2024. She continues to have back pain that she is not taking her medication for as she "does not want to get addicted". She did begin with physical therapy, but continues to report fatigue. Weakness did improve with addition of weekly hydration. She feels as if she is ready to begin chemo again. She continues taking her Eliquis 5 mg BID for bilateral PE. She is taking her oral iron every other day and tolerating well. Followed by dermatology for vitiligo to bilateral fingertips. She denies any fever, chills, worsened SOB, CP, headache, swelling, or nausea/vomiting.        Past Medical History:   Diagnosis Date    ASTHMA     COPD (chronic obstructive pulmonary disease)     SEVERE    GERD (gastroesophageal " reflux disease)     High cholesterol     HLD (hyperlipidemia)     Hypothyroidism, unspecified     Lung cancer 12/08/2022    invasive adenocarcinoma, poorly differentiated with solid morphology, focal tumor involvement of visceral pleura and focal tumor vascular invasion (vein), with 2 of 3 posterior mediastinal lymph nodes positive for metastatic carcinoma    Stage 2 chronic kidney disease     TIA (transient ischemic attack)     no deficits    Tobacco abuse     Unspecified osteoarthritis, unspecified site     Uterine cancer 1989      Past Surgical History:   Procedure Laterality Date    COLONOSCOPY N/A 3/26/2024    Procedure: COLONOSCOPY;  Surgeon: Vickey Patino MD;  Location: [facility];  Service: Endoscopy;  Laterality: N/A;  post op: c. diff colitis of right colon    COLONOSCOPY, WITH 1 OR MORE BIOPSIES N/A 3/26/2024    Procedure: COLONOSCOPY, WITH 1 OR MORE BIOPSIES;  Surgeon: Vickey Patino MD;  Location: [facility];  Service: Endoscopy;  Laterality: N/A;  cecum    INSERTION OF TUNNELED CENTRAL VENOUS CATHETER (CVC) WITH SUBCUTANEOUS PORT N/A 12/04/2023    Procedure: [id number];  Surgeon: Peter Dial MD;  Location: [facility];  Service: Peripheral Vascular;  Laterality: N/A;    LAPAROSCOPIC CHOLECYSTECTOMY WITH CHOLANGIOGRAPHY N/A 3/28/2024    Procedure: CHOLECYSTECTOMY, LAPAROSCOPIC, WITH CHOLANGIOGRAM;  Surgeon: Vickey Patino MD;  Location: [facility];  Service: General;  Laterality: N/A;    lipoma multiple sites      LUNG LOBECTOMY Right 12/08/2022    Procedure: LOBECTOMY, LUNG;  Surgeon: Jass Browne IV, MD;  Location: [facility] OR;  Service: Cardiothoracic;  Laterality: Right;    SURGICAL REMOVAL OF LYMPH NODE  12/08/2022    Procedure: EXCISION, LYMPH NODE;  Surgeon: Jass Browne IV, MD;  Location: [facility] OR;  Service: Cardiothoracic;;    THORACOTOMY Right 12/08/2022    Procedure: THORACOTOMY;  Surgeon: Jass Browne IV, MD;  Location: [facility] OR;  Service: Cardiothoracic;   Laterality: Right;  Right Lower Lobectomy with Lymph Node Dissection    TOTAL ABDOMINAL HYSTERECTOMY W/ BILATERAL SALPINGOOPHORECTOMY       Social History     Socioeconomic History    Marital status: Single    Number of children: 5   Tobacco Use    Smoking status: Former     Current packs/day: 0.00     Average packs/day: 0.5 packs/day for 50.0 years (25.0 ttl pk-yrs)     Types: Cigarettes     Start date: 1972     Quit date: 2022     Years since quittin.4    Smokeless tobacco: Never   Substance and Sexual Activity    Alcohol use: Not Currently     Comment: quit 25years ago    Drug use: Never    Sexual activity: Yes     Partners: Male     Birth control/protection: See Surgical Hx     Social Determinants of Health     Financial Resource Strain: Patient Declined (3/25/2024)    Overall Financial Resource Strain (CARDIA)     Difficulty of Paying Living Expenses: Patient declined   Food Insecurity: Patient Declined (3/25/2024)    Hunger Vital Sign     Worried About Running Out of Food in the Last Year: Patient declined     Ran Out of Food in the Last Year: Patient declined   Transportation Needs: Patient Declined (3/25/2024)    PRAPARE - Transportation     Lack of Transportation (Medical): Patient declined     Lack of Transportation (Non-Medical): Patient declined   Physical Activity: Patient Declined (3/25/2024)    Exercise Vital Sign     Days of Exercise per Week: Patient declined     Minutes of Exercise per Session: Patient declined   Stress: Patient Declined (3/25/2024)    [place] [employer] - Occupational Stress Questionnaire     Feeling of Stress : Patient declined   Housing Stability: Patient Declined (3/25/2024)    Housing Stability Vital Sign     Unable to Pay for Housing in the Last Year: Patient declined     Number of Places Lived in the Last Year: 1     Unstable Housing in the Last Year: Patient declined      Family History   Problem Relation Name Age of Onset    Cancer  Maternal Grandfather      Heart failure Father      Prostate cancer Father      Hypertension Mother      Hepatitis Mother      Ovarian cancer Mother      Liver cancer Mother      Asthma Brother      Asthma Brother      Fibroids Sister      Skin cancer Sister        Review of patient's allergies indicates:   Allergen Reactions    Dilaudid [hydromorphone] Itching and Swelling        Review of Systems   Constitutional:  Positive for activity change, appetite change and fatigue. Negative for chills, fever and unexpected weight change.   HENT:  Negative for mouth dryness, mouth sores, nosebleeds, sinus pressure/congestion, sore throat and trouble swallowing.    Eyes: Negative.  Negative for visual disturbance.   Respiratory:  Positive for shortness of breath (on exertion, chronic). Negative for cough.    Cardiovascular:  Negative for chest pain, palpitations and leg swelling.   Gastrointestinal:  Negative for abdominal distention, abdominal pain, blood in stool, change in bowel habit, constipation, diarrhea, nausea and vomiting.   Endocrine: Negative.    Genitourinary: Negative.  Negative for dysuria, frequency, hematuria and urgency.   Musculoskeletal:  Positive for leg pain. Negative for arthralgias, back pain, myalgias and neck pain.   Integumentary:  Negative for rash, breast mass, breast discharge and breast tenderness. Negative.   Allergic/Immunologic: Negative.    Neurological:  Positive for weakness. Negative for dizziness, tremors, syncope, speech difficulty, light-headedness, numbness, headaches and memory loss.   Hematological: Negative.  Does not bruise/bleed easily.   Psychiatric/Behavioral: Negative.  Negative for confusion and suicidal ideas.    Breast: Negative for mass and tenderness        Objective:        Vitals:    05/15/24 0906   BP: 93/63   Pulse: (!) 116   Resp: 18   Temp: 97.6 °F (36.4 °C)               Wt Readings from Last 6 Encounters:   05/15/24 51.3 kg (113 lb 1.6 oz)   05/14/24 52.9 kg (116  lb 10 oz)   05/11/24 54.4 kg (120 lb)   05/07/24 52.6 kg (116 lb)   04/30/24 52.6 kg (116 lb)   04/16/24 55.6 kg (122 lb 9.6 oz)     Body mass index is 17.71 kg/m².  Body surface area is 1.56 meters squared.       Physical Exam  Vitals and nursing note reviewed.   Constitutional:       Appearance: She is underweight. She is ill-appearing.   HENT:      Head: Normocephalic and atraumatic.      Nose: No congestion.   Eyes:      General: No scleral icterus.     Extraocular Movements: Extraocular movements intact.      Conjunctiva/sclera: Conjunctivae normal.   Neck:      Vascular: No JVD.   Cardiovascular:      Rate and Rhythm: Regular rhythm. Tachycardia present.      Heart sounds: No murmur heard.  Pulmonary:      Effort: Pulmonary effort is normal.      Breath sounds: Decreased breath sounds present. No wheezing or rhonchi.   Abdominal:      General: There is no distension.      Palpations: Abdomen is soft.      Tenderness: There is no abdominal tenderness.   Musculoskeletal:      Cervical back: Neck supple.   Lymphadenopathy:      Head:      Right side of head: No submental or submandibular adenopathy.      Left side of head: No submental or submandibular adenopathy.      Cervical: No cervical adenopathy.      Upper Body:      Right upper body: No supraclavicular or axillary adenopathy.      Left upper body: No supraclavicular or axillary adenopathy.      Lower Body: No right inguinal adenopathy. No left inguinal adenopathy.      Comments: No adenopathy noted bilaterally   Skin:     General: Skin is warm.      Coloration: Skin is not jaundiced.      Findings: No lesion or rash.      Comments: Change of skin color to bilateral fingertips   Neurological:      Mental Status: She is alert and oriented to person, place, and time.      Cranial Nerves: Cranial nerves 2-12 are intact.      Motor: Weakness present.      Comments: On wheelchair today   Psychiatric:         Attention and Perception: Attention normal.          Mood and Affect: Mood is depressed.         Speech: Speech normal.         Behavior: Behavior is cooperative.         Cognition and Memory: Cognition normal.         Judgment: Judgment normal.       ECOG SCORE    3 - Capable of only limited selfcare, confined to bed or chair more than 50% of waking hours, 2 - Capable of all selfcare but unable to carry out any work activities, active > 50% of hours           LABS      Lab Visit on 05/15/2024   Component Date Value    Sodium 05/15/2024 133 (L)     Potassium 05/15/2024 3.9     Chloride 05/15/2024 105     CO2 05/15/2024 21 (L)     Glucose 05/15/2024 124 (H)     Blood Urea Nitrogen 05/15/2024 12.0     Creatinine 05/15/2024 0.67     Calcium 05/15/2024 8.2 (L)     Protein Total 05/15/2024 6.0     Albumin 05/15/2024 2.2 (L)     Globulin 05/15/2024 3.8 (H)     Albumin/Globulin Ratio 05/15/2024 0.6 (L)     Bilirubin Total 05/15/2024 0.4     ALP 05/15/2024 146     ALT 05/15/2024 9     AST 05/15/2024 20     eGFR 05/15/2024 >60     Magnesium Level 05/15/2024 1.70     Phosphorus Level 05/15/2024 2.7     Iron Binding Capacity Un* 05/15/2024 111     Iron Level 05/15/2024 20 (L)     Iron Binding Capacity To* 05/15/2024 131 (L)     Iron Saturation 05/15/2024 15 (L)     Ferritin Level 05/15/2024 641.02 (H)     WBC 05/15/2024 5.18     RBC 05/15/2024 3.71 (L)     Hgb 05/15/2024 9.2 (L)     Hct 05/15/2024 29.8 (L)     MCV 05/15/2024 80.3     MCH 05/15/2024 24.8 (L)     MCHC 05/15/2024 30.9 (L)     RDW 05/15/2024 19.6 (H)     Platelet 05/15/2024 726 (H)     MPV 05/15/2024 8.7     Neut % 05/15/2024 78.7     Lymph % 05/15/2024 12.2     Mono % 05/15/2024 7.5     Eos % 05/15/2024 0.8     Basophil % 05/15/2024 0.2     Lymph # 05/15/2024 0.63     Neut # 05/15/2024 4.08     Mono # 05/15/2024 0.39     Eos # 05/15/2024 0.04     Baso # 05/15/2024 0.01     IG# 05/15/2024 0.03     IG% 05/15/2024 0.6          Assessment:       1. NSCLC of right lung    2. Cancer cachexia    3. Metastasis to bone     4. Chemotherapy-induced nausea    5. Immunodeficiency due to chemotherapy    6. Malignant neoplasm metastatic to adrenal gland, unspecified laterality    7. On antineoplastic chemotherapy    8. Physical deconditioning    9. Immunodeficient state due to drug therapy    10. History of pulmonary embolism    11. On continuous oral anticoagulation          Stage IV NSCLC with bone mets.    Originally: NSCLC / Adenocarcinoma Stage IIIA pT2a pN2  - 12/08/2022: Right lower and middle bilobectomy with mediastinal lymph node dissection. Path: Invasive adenocarcinoma, poorly differentiated, focal tumor involvement of visceral pleura and focal tumor vascular invasion (vein), with mediastinal lymph nodes positive for metastatic carcinoma   - TMB - 5, MSI-S, MET amplification, ROS1.  No mutations in EGFR, BRAF, ALK, ERBB2, KRAS, RET,    - 1% tumor cells are positive for PD-L1   - patient evaluated by Radiation Oncology with recommendation to treat with chemotherapy 1st and will re-evaluate the patient by the end of chemotherapy for radiation therapy  - 01/12/2023 PET: Focal uptake at the medial right acetabular roof. Metastasis is a consideration  - 02/22/2023: RIGHT ACETABULAR LESION, CT - GUIDE CORE NEEDLE BIOPSY : FIBRINOPURULENT EXUDATE ADMIXED WITH ATYPICAL EPITHELIOID GROUPS,   NORMAL    MARROW ELEMENTS AND ABUNDANT BLOOD CLOT  PET scan showed a suspicious finding in the right acetabular roof and patient then had a CT-guided biopsy no evidence of malignancy. Started with adjuvant chemotherapy with Carboplatin and pemetrexed x 4 cycles (3/15/23-5/18/23)   Patient has been seen by Rad/Onc (Dr Mireles) since the completion of her chemotherapy and plan was to pursue radiation x 5-6 weeks.  She discussed with the patient that they have showed no overall survival benefit in the setting of postoperative radiation therapy but data supports a larger benefit inpatient with extracapsular extension.  If she tolerated chemotherapy  well and continue with good performance status then plan was for PORT   Restaging PET-CT after completion of chemotherapy and prior to radiation therapy showed progression at the right acetabulum.  Patient with progression of disease now with bone metastases.  She bagan palliative radiation treatment with Dr. Mireles on 6/28/23, 10 treatments over two weeks.  PET CT 6/5/23 with progression Right superior acetabulum.  FDG avid area appears slightly larger with slightly increased sclerosis.  SUV is 9.7, previously 6.4.  She had palliative radiation. Started on Keytruda 7/18/23. Developed skin changes with Keytruda and later on diagnosed with vitiligo. Now on Taxotere + Xgeva- 10/24/23 due to progression.  Cycle 3 of Taxotere held 12/6/23 r/t bilateral PE  Bone mets   Xgeva q4w  She was seen by Rad/Onc for palliative RT but recommendations were to start chemotherapy and re eval after 2 cycles  Received 10 radiation treatments with Dr. Mireles 5/2024.  Medication regimen discussed in detail with her.     Iron deficiency Anemia  Continue with 1 tab PO iron QOD with Vitamin C.   Currently scheduled for colonoscopy 7/2024     Hypothyroid   Followed by endocrinology Dr. Elena LYN  Now on Eliquis 5 mg twice daily    Vitiligo to bilateral fingertips  Followed by Dermatology    Recurrent Cdiff  Stable today with no diarrhea noted.     Cancer cachexia   Currently on Remeron 7.5mg, increased to 15mg today.                   Plan:   I again had a long discussion regarding the risk and benefits of continuing with chemo as this time. She verbalized understanding and wanted to continue with treatment today as she has tolerated well in the past. Will watch closely with weekly labs and IV fluids twice a week. Strict ED precautions were given. She was educated again on her pain medication regimen and verbalized understanding.     Labs stable today.  Continue with Taxotere as scheduled.   Continue with Xgeva as scheduled.  Lab weekly  with MVI twice weekly.   Continue with PT and HH  Remeron increased to 15mg.   Continue with Rad/Onc for back pain  Unable to complete MRI C, T, and L spine due to back pain.   Pulmonology 5/14/2025  Continue Eliquis 5 mg BID  Continue follow up with Dermatology  Continue oral iron every other day  RTC in 3 weeks with NP for FU/lab/treat  Cbc, cmp, mag, phos- 1 hr prior @ [facility]      The patient was seen, interviewed and examined. Pertinent lab and radiology studies were reviewed.   The patient was given ample opportunity to ask questions, and to the best of my abilities, all questions answered to satisfaction; patient demonstrated understanding of what we discussed and agreeable to the plan. Pt instructed to call should develop concerning signs/symptoms or have further questions.       Shelbi Johansen, DOUGIEP-C  Oncology/Hematology   [facility]

## 2024-05-15 ENCOUNTER — OFFICE VISIT (OUTPATIENT)
Dept: HEMATOLOGY/ONCOLOGY | Facility: CLINIC | Age: 64
DRG: 392 | End: 2024-05-15
Payer: MEDICARE

## 2024-05-15 ENCOUNTER — INFUSION (OUTPATIENT)
Dept: INFUSION THERAPY | Facility: HOSPITAL | Age: 64
DRG: 392 | End: 2024-05-15
Attending: NURSE PRACTITIONER
Payer: MEDICARE

## 2024-05-15 VITALS
BODY MASS INDEX: 17.75 KG/M2 | HEART RATE: 116 BPM | WEIGHT: 113.13 LBS | RESPIRATION RATE: 18 BRPM | SYSTOLIC BLOOD PRESSURE: 93 MMHG | TEMPERATURE: 98 F | DIASTOLIC BLOOD PRESSURE: 63 MMHG | OXYGEN SATURATION: 99 % | HEIGHT: 67 IN

## 2024-05-15 VITALS — DIASTOLIC BLOOD PRESSURE: 72 MMHG | SYSTOLIC BLOOD PRESSURE: 112 MMHG

## 2024-05-15 DIAGNOSIS — R64 CANCER CACHEXIA: ICD-10-CM

## 2024-05-15 DIAGNOSIS — R11.0 CHEMOTHERAPY-INDUCED NAUSEA: ICD-10-CM

## 2024-05-15 DIAGNOSIS — E53.8 FOLIC ACID DEFICIENCY: ICD-10-CM

## 2024-05-15 DIAGNOSIS — D84.821 IMMUNODEFICIENCY DUE TO CHEMOTHERAPY: ICD-10-CM

## 2024-05-15 DIAGNOSIS — T45.1X5A CHEMOTHERAPY-INDUCED NAUSEA: ICD-10-CM

## 2024-05-15 DIAGNOSIS — R53.81 PHYSICAL DECONDITIONING: ICD-10-CM

## 2024-05-15 DIAGNOSIS — C79.51 METASTASIS TO BONE: ICD-10-CM

## 2024-05-15 DIAGNOSIS — C34.91 NSCLC OF RIGHT LUNG: Primary | ICD-10-CM

## 2024-05-15 DIAGNOSIS — D84.821 IMMUNODEFICIENT STATE DUE TO DRUG THERAPY: ICD-10-CM

## 2024-05-15 DIAGNOSIS — Z79.899 ON ANTINEOPLASTIC CHEMOTHERAPY: ICD-10-CM

## 2024-05-15 DIAGNOSIS — Z79.899 IMMUNODEFICIENT STATE DUE TO DRUG THERAPY: ICD-10-CM

## 2024-05-15 DIAGNOSIS — T45.1X5A IMMUNODEFICIENCY DUE TO CHEMOTHERAPY: ICD-10-CM

## 2024-05-15 DIAGNOSIS — C79.70 MALIGNANT NEOPLASM METASTATIC TO ADRENAL GLAND, UNSPECIFIED LATERALITY: ICD-10-CM

## 2024-05-15 DIAGNOSIS — Z86.711 HISTORY OF PULMONARY EMBOLISM: ICD-10-CM

## 2024-05-15 DIAGNOSIS — Z79.01 ON CONTINUOUS ORAL ANTICOAGULATION: ICD-10-CM

## 2024-05-15 DIAGNOSIS — Z79.899 IMMUNODEFICIENCY DUE TO CHEMOTHERAPY: ICD-10-CM

## 2024-05-15 PROCEDURE — 96413 CHEMO IV INFUSION 1 HR: CPT

## 2024-05-15 PROCEDURE — 99215 OFFICE O/P EST HI 40 MIN: CPT | Mod: S$GLB,,,

## 2024-05-15 PROCEDURE — 96367 TX/PROPH/DG ADDL SEQ IV INF: CPT

## 2024-05-15 PROCEDURE — 25000003 PHARM REV CODE 250

## 2024-05-15 PROCEDURE — 63600175 PHARM REV CODE 636 W HCPCS

## 2024-05-15 PROCEDURE — 25000003 PHARM REV CODE 250: Performed by: INTERNAL MEDICINE

## 2024-05-15 PROCEDURE — 1160F RVW MEDS BY RX/DR IN RCRD: CPT | Mod: CPTII,S$GLB,,

## 2024-05-15 PROCEDURE — 1101F PT FALLS ASSESS-DOCD LE1/YR: CPT | Mod: CPTII,S$GLB,,

## 2024-05-15 PROCEDURE — 96375 TX/PRO/DX INJ NEW DRUG ADDON: CPT

## 2024-05-15 PROCEDURE — A4216 STERILE WATER/SALINE, 10 ML: HCPCS

## 2024-05-15 PROCEDURE — 96366 THER/PROPH/DIAG IV INF ADDON: CPT

## 2024-05-15 PROCEDURE — 1159F MED LIST DOCD IN RCRD: CPT | Mod: CPTII,S$GLB,,

## 2024-05-15 PROCEDURE — 1126F AMNT PAIN NOTED NONE PRSNT: CPT | Mod: CPTII,S$GLB,,

## 2024-05-15 PROCEDURE — 3288F FALL RISK ASSESSMENT DOCD: CPT | Mod: CPTII,S$GLB,,

## 2024-05-15 PROCEDURE — 3074F SYST BP LT 130 MM HG: CPT | Mod: CPTII,S$GLB,,

## 2024-05-15 PROCEDURE — 3078F DIAST BP <80 MM HG: CPT | Mod: CPTII,S$GLB,,

## 2024-05-15 PROCEDURE — 99999 PR PBB SHADOW E&M-EST. PATIENT-LVL V: CPT | Mod: PBBFAC,,,

## 2024-05-15 PROCEDURE — A4216 STERILE WATER/SALINE, 10 ML: HCPCS | Performed by: INTERNAL MEDICINE

## 2024-05-15 PROCEDURE — 3008F BODY MASS INDEX DOCD: CPT | Mod: CPTII,S$GLB,,

## 2024-05-15 RX ORDER — DIPHENHYDRAMINE HYDROCHLORIDE 50 MG/ML
25 INJECTION INTRAMUSCULAR; INTRAVENOUS
Status: CANCELLED
Start: 2024-05-15

## 2024-05-15 RX ORDER — DIPHENHYDRAMINE HYDROCHLORIDE 50 MG/ML
25 INJECTION INTRAMUSCULAR; INTRAVENOUS
Status: COMPLETED | OUTPATIENT
Start: 2024-05-15 | End: 2024-05-15

## 2024-05-15 RX ORDER — HEPARIN 100 UNIT/ML
500 SYRINGE INTRAVENOUS
Status: CANCELLED | OUTPATIENT
Start: 2024-05-15

## 2024-05-15 RX ORDER — ONDANSETRON 8 MG/1
8 TABLET, ORALLY DISINTEGRATING ORAL EVERY 6 HOURS PRN
Qty: 1 TABLET | Refills: 0 | Status: SHIPPED | OUTPATIENT
Start: 2024-05-15

## 2024-05-15 RX ORDER — SODIUM CHLORIDE 0.9 % (FLUSH) 0.9 %
10 SYRINGE (ML) INJECTION
Status: CANCELLED | OUTPATIENT
Start: 2024-05-15

## 2024-05-15 RX ORDER — ONDANSETRON HYDROCHLORIDE 2 MG/ML
8 INJECTION, SOLUTION INTRAVENOUS ONCE
Status: CANCELLED
Start: 2024-05-15 | End: 2024-05-15

## 2024-05-15 RX ORDER — ONDANSETRON HYDROCHLORIDE 2 MG/ML
8 INJECTION, SOLUTION INTRAVENOUS ONCE
Status: COMPLETED | OUTPATIENT
Start: 2024-05-15 | End: 2024-05-15

## 2024-05-15 RX ORDER — SODIUM CHLORIDE 0.9 % (FLUSH) 0.9 %
10 SYRINGE (ML) INJECTION
Status: DISCONTINUED | OUTPATIENT
Start: 2024-05-15 | End: 2024-05-15 | Stop reason: HOSPADM

## 2024-05-15 RX ORDER — MIRTAZAPINE 15 MG/1
15 TABLET, FILM COATED ORAL NIGHTLY
Qty: 30 TABLET | Refills: 6 | Status: SHIPPED | OUTPATIENT
Start: 2024-05-15 | End: 2025-05-15

## 2024-05-15 RX ORDER — ONDANSETRON 8 MG/1
8 TABLET, ORALLY DISINTEGRATING ORAL EVERY 6 HOURS PRN
Qty: 1 TABLET | Refills: 0 | Status: SHIPPED | OUTPATIENT
Start: 2024-05-15 | End: 2024-05-15 | Stop reason: SDUPTHER

## 2024-05-15 RX ORDER — HEPARIN 100 UNIT/ML
500 SYRINGE INTRAVENOUS
Status: DISCONTINUED | OUTPATIENT
Start: 2024-05-15 | End: 2024-05-15 | Stop reason: HOSPADM

## 2024-05-15 RX ORDER — PROCHLORPERAZINE MALEATE 10 MG
10 TABLET ORAL EVERY 6 HOURS PRN
Qty: 30 TABLET | Refills: 1 | Status: SHIPPED | OUTPATIENT
Start: 2024-05-15 | End: 2025-05-15

## 2024-05-15 RX ORDER — FOLIC ACID 1 MG/1
1 TABLET ORAL DAILY
Qty: 30 TABLET | Refills: 6 | Status: SHIPPED | OUTPATIENT
Start: 2024-05-15 | End: 2025-05-15

## 2024-05-15 RX ADMIN — SODIUM CHLORIDE, PRESERVATIVE FREE 10 ML: 5 INJECTION INTRAVENOUS at 10:05

## 2024-05-15 RX ADMIN — ASCORBIC ACID, VITAMIN A PALMITATE, CHOLECALCIFEROL, THIAMINE HYDROCHLORIDE, RIBOFLAVIN-5 PHOSPHATE SODIUM, PYRIDOXINE HYDROCHLORIDE, NIACINAMIDE, DEXPANTHENOL, ALPHA-TOCOPHEROL ACETATE, VITAMIN K1, FOLIC ACID, BIOTIN, CYANOCOBALAMIN: 200; 3300; 200; 6; 3.6; 6; 40; 15; 10; 150; 600; 60; 5 INJECTION, SOLUTION INTRAVENOUS at 12:05

## 2024-05-15 RX ADMIN — DEXAMETHASONE SODIUM PHOSPHATE 20 MG: 10 INJECTION, SOLUTION INTRAMUSCULAR; INTRAVENOUS at 10:05

## 2024-05-15 RX ADMIN — Medication 500 UNITS: at 01:05

## 2024-05-15 RX ADMIN — SODIUM CHLORIDE: 9 INJECTION, SOLUTION INTRAVENOUS at 10:05

## 2024-05-15 RX ADMIN — ONDANSETRON 8 MG: 2 INJECTION INTRAMUSCULAR; INTRAVENOUS at 10:05

## 2024-05-15 RX ADMIN — Medication 10 ML: at 10:05

## 2024-05-15 RX ADMIN — DOCETAXEL 106 MG: 20 INJECTION, SOLUTION, CONCENTRATE INTRAVENOUS at 11:05

## 2024-05-15 RX ADMIN — DIPHENHYDRAMINE HYDROCHLORIDE 25 MG: 50 INJECTION, SOLUTION INTRAMUSCULAR; INTRAVENOUS at 10:05

## 2024-05-17 ENCOUNTER — INFUSION (OUTPATIENT)
Dept: INFUSION THERAPY | Facility: HOSPITAL | Age: 64
DRG: 392 | End: 2024-05-17
Attending: NURSE PRACTITIONER
Payer: MEDICARE

## 2024-05-17 VITALS
TEMPERATURE: 98 F | OXYGEN SATURATION: 99 % | SYSTOLIC BLOOD PRESSURE: 105 MMHG | DIASTOLIC BLOOD PRESSURE: 71 MMHG | HEART RATE: 115 BPM | RESPIRATION RATE: 18 BRPM | WEIGHT: 111.63 LBS | HEIGHT: 67 IN | BODY MASS INDEX: 17.52 KG/M2

## 2024-05-17 DIAGNOSIS — C34.91 NSCLC OF RIGHT LUNG: Primary | ICD-10-CM

## 2024-05-17 PROCEDURE — 25000003 PHARM REV CODE 250

## 2024-05-17 PROCEDURE — A4216 STERILE WATER/SALINE, 10 ML: HCPCS | Performed by: INTERNAL MEDICINE

## 2024-05-17 PROCEDURE — 96365 THER/PROPH/DIAG IV INF INIT: CPT

## 2024-05-17 PROCEDURE — 96366 THER/PROPH/DIAG IV INF ADDON: CPT

## 2024-05-17 PROCEDURE — 63600175 PHARM REV CODE 636 W HCPCS

## 2024-05-17 PROCEDURE — 25000003 PHARM REV CODE 250: Performed by: INTERNAL MEDICINE

## 2024-05-17 RX ORDER — SODIUM CHLORIDE 0.9 % (FLUSH) 0.9 %
10 SYRINGE (ML) INJECTION
Status: DISCONTINUED | OUTPATIENT
Start: 2024-05-17 | End: 2024-05-17 | Stop reason: HOSPADM

## 2024-05-17 RX ORDER — HEPARIN 100 UNIT/ML
500 SYRINGE INTRAVENOUS
Status: CANCELLED | OUTPATIENT
Start: 2024-05-17

## 2024-05-17 RX ORDER — SODIUM CHLORIDE 0.9 % (FLUSH) 0.9 %
10 SYRINGE (ML) INJECTION
Status: CANCELLED | OUTPATIENT
Start: 2024-05-17

## 2024-05-17 RX ORDER — HEPARIN 100 UNIT/ML
500 SYRINGE INTRAVENOUS
Status: DISCONTINUED | OUTPATIENT
Start: 2024-05-17 | End: 2024-05-17 | Stop reason: HOSPADM

## 2024-05-17 RX ADMIN — SODIUM CHLORIDE, PRESERVATIVE FREE 10 ML: 5 INJECTION INTRAVENOUS at 10:05

## 2024-05-17 RX ADMIN — ASCORBIC ACID, VITAMIN A PALMITATE, CHOLECALCIFEROL, THIAMINE HYDROCHLORIDE, RIBOFLAVIN-5 PHOSPHATE SODIUM, PYRIDOXINE HYDROCHLORIDE, NIACINAMIDE, DEXPANTHENOL, ALPHA-TOCOPHEROL ACETATE, VITAMIN K1, FOLIC ACID, BIOTIN, CYANOCOBALAMIN: 200; 3300; 200; 6; 3.6; 6; 40; 15; 10; 150; 600; 60; 5 INJECTION, SOLUTION INTRAVENOUS at 08:05

## 2024-05-17 RX ADMIN — Medication 500 UNITS: at 10:05

## 2024-05-18 ENCOUNTER — HOSPITAL ENCOUNTER (INPATIENT)
Facility: HOSPITAL | Age: 64
LOS: 1 days | Discharge: HOME | DRG: 392 | End: 2024-05-21
Attending: EMERGENCY MEDICINE | Admitting: INTERNAL MEDICINE
Payer: MEDICARE

## 2024-05-18 DIAGNOSIS — T45.1X5A CHEMOTHERAPY INDUCED NAUSEA AND VOMITING: Primary | ICD-10-CM

## 2024-05-18 DIAGNOSIS — R11.2 CHEMOTHERAPY INDUCED NAUSEA AND VOMITING: Primary | ICD-10-CM

## 2024-05-18 DIAGNOSIS — E83.51 HYPOCALCEMIA: ICD-10-CM

## 2024-05-18 DIAGNOSIS — E87.1 HYPONATREMIA: ICD-10-CM

## 2024-05-18 DIAGNOSIS — C34.90 MALIGNANT NEOPLASM OF LUNG, UNSPECIFIED LATERALITY, UNSPECIFIED PART OF LUNG: ICD-10-CM

## 2024-05-18 DIAGNOSIS — R53.1 WEAKNESS: ICD-10-CM

## 2024-05-18 LAB
ALBUMIN SERPL-MCNC: 2.9 G/DL (ref 3.4–4.8)
ALBUMIN/GLOB SERPL: 0.7 RATIO (ref 1.1–2)
ALP SERPL-CCNC: 113 UNIT/L (ref 40–150)
ALT SERPL-CCNC: 19 UNIT/L (ref 0–55)
ANION GAP SERPL CALC-SCNC: 10 MEQ/L
AST SERPL-CCNC: 35 UNIT/L (ref 5–34)
BASOPHILS # BLD AUTO: 0 X10(3)/MCL
BASOPHILS NFR BLD AUTO: 0 %
BILIRUB SERPL-MCNC: 1.1 MG/DL
BUN SERPL-MCNC: 16 MG/DL (ref 9.8–20.1)
CALCIUM SERPL-MCNC: 7.8 MG/DL (ref 8.4–10.2)
CHLORIDE SERPL-SCNC: 95 MMOL/L (ref 98–107)
CK SERPL-CCNC: 61 U/L (ref 29–168)
CO2 SERPL-SCNC: 19 MMOL/L (ref 23–31)
CREAT SERPL-MCNC: 0.57 MG/DL (ref 0.55–1.02)
CREAT/UREA NIT SERPL: 28
EOSINOPHIL # BLD AUTO: 0.01 X10(3)/MCL (ref 0–0.9)
EOSINOPHIL NFR BLD AUTO: 0.2 %
ERYTHROCYTE [DISTWIDTH] IN BLOOD BY AUTOMATED COUNT: 19.1 % (ref 11.5–17)
GFR SERPLBLD CREATININE-BSD FMLA CKD-EPI: >60 ML/MIN/1.73/M2
GLOBULIN SER-MCNC: 3.9 GM/DL (ref 2.4–3.5)
GLUCOSE SERPL-MCNC: 125 MG/DL (ref 82–115)
HCT VFR BLD AUTO: 31.4 % (ref 37–47)
HGB BLD-MCNC: 10.1 G/DL (ref 12–16)
IMM GRANULOCYTES # BLD AUTO: 0.05 X10(3)/MCL (ref 0–0.04)
IMM GRANULOCYTES NFR BLD AUTO: 0.8 %
LACTATE SERPL-SCNC: 0.9 MMOL/L (ref 0.5–2.2)
LYMPHOCYTES # BLD AUTO: 0.17 X10(3)/MCL (ref 0.6–4.6)
LYMPHOCYTES NFR BLD AUTO: 2.8 %
MCH RBC QN AUTO: 25.4 PG (ref 27–31)
MCHC RBC AUTO-ENTMCNC: 32.2 G/DL (ref 33–36)
MCV RBC AUTO: 78.9 FL (ref 80–94)
MONOCYTES # BLD AUTO: 0.03 X10(3)/MCL (ref 0.1–1.3)
MONOCYTES NFR BLD AUTO: 0.5 %
NEUTROPHILS # BLD AUTO: 5.8 X10(3)/MCL (ref 2.1–9.2)
NEUTROPHILS NFR BLD AUTO: 95.7 %
PLATELET # BLD AUTO: 554 X10(3)/MCL (ref 130–400)
PMV BLD AUTO: 9.5 FL (ref 7.4–10.4)
POTASSIUM SERPL-SCNC: 4.8 MMOL/L (ref 3.5–5.1)
PROT SERPL-MCNC: 6.8 GM/DL (ref 5.8–7.6)
RBC # BLD AUTO: 3.98 X10(6)/MCL (ref 4.2–5.4)
SODIUM SERPL-SCNC: 124 MMOL/L (ref 136–145)
TROPONIN I SERPL-MCNC: 0.02 NG/ML (ref 0–0.04)
WBC # SPEC AUTO: 6.06 X10(3)/MCL (ref 4.5–11.5)

## 2024-05-18 PROCEDURE — 99285 EMERGENCY DEPT VISIT HI MDM: CPT | Mod: 25

## 2024-05-18 PROCEDURE — 80053 COMPREHEN METABOLIC PANEL: CPT | Performed by: EMERGENCY MEDICINE

## 2024-05-18 PROCEDURE — 83605 ASSAY OF LACTIC ACID: CPT | Performed by: EMERGENCY MEDICINE

## 2024-05-18 PROCEDURE — 84484 ASSAY OF TROPONIN QUANT: CPT | Performed by: EMERGENCY MEDICINE

## 2024-05-18 PROCEDURE — 82550 ASSAY OF CK (CPK): CPT | Performed by: EMERGENCY MEDICINE

## 2024-05-18 PROCEDURE — 96361 HYDRATE IV INFUSION ADD-ON: CPT

## 2024-05-18 PROCEDURE — 93010 ELECTROCARDIOGRAM REPORT: CPT | Mod: ,,, | Performed by: INTERNAL MEDICINE

## 2024-05-18 PROCEDURE — 63600175 PHARM REV CODE 636 W HCPCS: Performed by: EMERGENCY MEDICINE

## 2024-05-18 PROCEDURE — 93005 ELECTROCARDIOGRAM TRACING: CPT

## 2024-05-18 PROCEDURE — 25000003 PHARM REV CODE 250: Performed by: EMERGENCY MEDICINE

## 2024-05-18 PROCEDURE — 96374 THER/PROPH/DIAG INJ IV PUSH: CPT

## 2024-05-18 PROCEDURE — 85025 COMPLETE CBC W/AUTO DIFF WBC: CPT | Performed by: EMERGENCY MEDICINE

## 2024-05-18 RX ORDER — LEVOTHYROXINE SODIUM 75 UG/1
75 TABLET ORAL
Status: DISCONTINUED | OUTPATIENT
Start: 2024-05-19 | End: 2024-05-21 | Stop reason: HOSPADM

## 2024-05-18 RX ORDER — MORPHINE SULFATE 15 MG/1
30 TABLET, FILM COATED, EXTENDED RELEASE ORAL 2 TIMES DAILY
Status: DISCONTINUED | OUTPATIENT
Start: 2024-05-19 | End: 2024-05-21 | Stop reason: HOSPADM

## 2024-05-18 RX ORDER — ONDANSETRON HYDROCHLORIDE 2 MG/ML
4 INJECTION, SOLUTION INTRAVENOUS
Status: COMPLETED | OUTPATIENT
Start: 2024-05-18 | End: 2024-05-18

## 2024-05-18 RX ORDER — ASPIRIN 81 MG/1
81 TABLET ORAL DAILY
Status: DISCONTINUED | OUTPATIENT
Start: 2024-05-19 | End: 2024-05-21 | Stop reason: HOSPADM

## 2024-05-18 RX ORDER — ATORVASTATIN CALCIUM 40 MG/1
40 TABLET, FILM COATED ORAL DAILY
Status: DISCONTINUED | OUTPATIENT
Start: 2024-05-19 | End: 2024-05-21 | Stop reason: HOSPADM

## 2024-05-18 RX ORDER — HYDROCODONE BITARTRATE AND ACETAMINOPHEN 10; 325 MG/1; MG/1
1 TABLET ORAL EVERY 6 HOURS PRN
Status: DISCONTINUED | OUTPATIENT
Start: 2024-05-19 | End: 2024-05-21 | Stop reason: HOSPADM

## 2024-05-18 RX ORDER — ACETAMINOPHEN 500 MG
1000 TABLET ORAL EVERY 6 HOURS PRN
Status: DISCONTINUED | OUTPATIENT
Start: 2024-05-19 | End: 2024-05-21 | Stop reason: HOSPADM

## 2024-05-18 RX ADMIN — ONDANSETRON 4 MG: 2 INJECTION INTRAMUSCULAR; INTRAVENOUS at 11:05

## 2024-05-18 RX ADMIN — SODIUM CHLORIDE 1000 ML: 9 INJECTION, SOLUTION INTRAVENOUS at 11:05

## 2024-05-19 PROBLEM — E44.0 MODERATE PROTEIN-CALORIE MALNUTRITION: Chronic | Status: ACTIVE | Noted: 2024-05-19

## 2024-05-19 LAB
ANION GAP SERPL CALC-SCNC: 9 MEQ/L
BILIRUB UR QL STRIP.AUTO: NEGATIVE
BUN SERPL-MCNC: 12 MG/DL (ref 9.8–20.1)
CALCIUM SERPL-MCNC: 7.3 MG/DL (ref 8.4–10.2)
CHLORIDE SERPL-SCNC: 97 MMOL/L (ref 98–107)
CLARITY UR: CLEAR
CO2 SERPL-SCNC: 19 MMOL/L (ref 23–31)
COLOR UR AUTO: YELLOW
CREAT SERPL-MCNC: 0.49 MG/DL (ref 0.55–1.02)
CREAT/UREA NIT SERPL: 24
GFR SERPLBLD CREATININE-BSD FMLA CKD-EPI: >60 ML/MIN/1.73/M2
GLUCOSE SERPL-MCNC: 115 MG/DL (ref 82–115)
GLUCOSE UR QL STRIP: NEGATIVE
HGB UR QL STRIP: NEGATIVE
KETONES UR QL STRIP: ABNORMAL
LEUKOCYTE ESTERASE UR QL STRIP: NEGATIVE
NITRITE UR QL STRIP: NEGATIVE
OHS QRS DURATION: 60 MS
OHS QTC CALCULATION: 450 MS
PH UR STRIP: 8 [PH]
POTASSIUM SERPL-SCNC: 3.6 MMOL/L (ref 3.5–5.1)
PROT UR QL STRIP: NEGATIVE
SODIUM SERPL-SCNC: 125 MMOL/L (ref 136–145)
SP GR UR STRIP.AUTO: 1.01 (ref 1–1.03)
UROBILINOGEN UR STRIP-ACNC: 0.2

## 2024-05-19 PROCEDURE — 96360 HYDRATION IV INFUSION INIT: CPT | Mod: 59

## 2024-05-19 PROCEDURE — 36415 COLL VENOUS BLD VENIPUNCTURE: CPT | Performed by: EMERGENCY MEDICINE

## 2024-05-19 PROCEDURE — 94761 N-INVAS EAR/PLS OXIMETRY MLT: CPT

## 2024-05-19 PROCEDURE — 25000003 PHARM REV CODE 250: Performed by: EMERGENCY MEDICINE

## 2024-05-19 PROCEDURE — 25000003 PHARM REV CODE 250: Performed by: INTERNAL MEDICINE

## 2024-05-19 PROCEDURE — 81003 URINALYSIS AUTO W/O SCOPE: CPT | Performed by: EMERGENCY MEDICINE

## 2024-05-19 PROCEDURE — 99900035 HC TECH TIME PER 15 MIN (STAT)

## 2024-05-19 PROCEDURE — 80048 BASIC METABOLIC PNL TOTAL CA: CPT | Performed by: EMERGENCY MEDICINE

## 2024-05-19 PROCEDURE — G0378 HOSPITAL OBSERVATION PER HR: HCPCS

## 2024-05-19 PROCEDURE — 96361 HYDRATE IV INFUSION ADD-ON: CPT

## 2024-05-19 PROCEDURE — 96375 TX/PRO/DX INJ NEW DRUG ADDON: CPT

## 2024-05-19 RX ORDER — SODIUM CHLORIDE 9 MG/ML
INJECTION, SOLUTION INTRAVENOUS CONTINUOUS
Status: DISCONTINUED | OUTPATIENT
Start: 2024-05-19 | End: 2024-05-21 | Stop reason: HOSPADM

## 2024-05-19 RX ORDER — METOPROLOL TARTRATE 1 MG/ML
2.5 INJECTION, SOLUTION INTRAVENOUS ONCE
Status: COMPLETED | OUTPATIENT
Start: 2024-05-19 | End: 2024-05-19

## 2024-05-19 RX ORDER — SODIUM CHLORIDE 0.9 % (FLUSH) 0.9 %
10 SYRINGE (ML) INJECTION
Status: DISCONTINUED | OUTPATIENT
Start: 2024-05-19 | End: 2024-05-21 | Stop reason: HOSPADM

## 2024-05-19 RX ORDER — TALC
6 POWDER (GRAM) TOPICAL NIGHTLY PRN
Status: DISCONTINUED | OUTPATIENT
Start: 2024-05-19 | End: 2024-05-21 | Stop reason: HOSPADM

## 2024-05-19 RX ADMIN — LEVOTHYROXINE SODIUM 75 MCG: 75 TABLET ORAL at 05:05

## 2024-05-19 RX ADMIN — SODIUM CHLORIDE: 9 INJECTION, SOLUTION INTRAVENOUS at 03:05

## 2024-05-19 RX ADMIN — SODIUM CHLORIDE: 9 INJECTION, SOLUTION INTRAVENOUS at 08:05

## 2024-05-19 RX ADMIN — APIXABAN 5 MG: 5 TABLET, FILM COATED ORAL at 01:05

## 2024-05-19 RX ADMIN — MORPHINE SULFATE 30 MG: 15 TABLET, FILM COATED, EXTENDED RELEASE ORAL at 01:05

## 2024-05-19 RX ADMIN — ASPIRIN 81 MG: 81 TABLET, COATED ORAL at 08:05

## 2024-05-19 RX ADMIN — ATORVASTATIN CALCIUM 40 MG: 40 TABLET, FILM COATED ORAL at 08:05

## 2024-05-19 RX ADMIN — APIXABAN 5 MG: 5 TABLET, FILM COATED ORAL at 08:05

## 2024-05-19 RX ADMIN — METOPROLOL TARTRATE 2.5 MG: 1 INJECTION, SOLUTION INTRAVENOUS at 05:05

## 2024-05-19 RX ADMIN — Medication 6 MG: at 08:05

## 2024-05-19 RX ADMIN — MORPHINE SULFATE 30 MG: 15 TABLET, FILM COATED, EXTENDED RELEASE ORAL at 08:05

## 2024-05-19 NOTE — PLAN OF CARE
Problem: Adult Inpatient Plan of Care  Goal: Plan of Care Review  Outcome: Progressing  Goal: Patient-Specific Goal (Individualized)  Outcome: Progressing  Goal: Absence of Hospital-Acquired Illness or Injury  Outcome: Progressing  Goal: Optimal Comfort and Wellbeing  Outcome: Progressing  Goal: Readiness for Transition of Care  Outcome: Progressing     Problem: Comorbidity Management  Goal: Maintenance of Asthma Control  Outcome: Progressing  Goal: Maintenance of COPD Symptom Control  Outcome: Progressing  Goal: Blood Pressure in Desired Range  Outcome: Progressing     Problem: Pain Acute  Goal: Optimal Pain Control and Function  Outcome: Progressing     Problem: Fatigue  Goal: Improved Activity Tolerance  Outcome: Progressing     Problem: Fall Injury Risk  Goal: Absence of Fall and Fall-Related Injury  Outcome: Progressing     Problem: Oral Intake Inadequate  Goal: Improved Oral Intake  Outcome: Progressing     Problem: Nausea and Vomiting  Goal: Nausea and Vomiting Relief  Outcome: Progressing     Problem: Cardiac Rhythm Management Device  Goal: Optimal Adjustment to Device  Outcome: Progressing  Goal: Absence of Bleeding  Outcome: Progressing  Goal: Effective Device Function  Outcome: Progressing  Goal: Absence of Infection Signs and Symptoms  Outcome: Progressing  Goal: Acceptable Pain Level  Outcome: Progressing  Goal: Effective Oxygenation and Ventilation  Outcome: Progressing     Problem: Dysrhythmia  Goal: Normalized Cardiac Rhythm  Outcome: Progressing     Problem: Oncology Care  Goal: Effective Coping  Outcome: Progressing  Goal: Improved Activity Tolerance  Outcome: Progressing  Goal: Optimal Oral Intake  Outcome: Progressing  Goal: Improved Oral Mucous Membrane Integrity  Outcome: Progressing  Goal: Optimal Pain Control and Function  Outcome: Progressing     Problem: Electrolyte Imbalance  Goal: Electrolyte Balance  Outcome: Progressing

## 2024-05-19 NOTE — ASSESSMENT & PLAN NOTE
Patient meets ASPEN criteria for moderate malnutrition of chronic illness per RD assessment as evidenced by:  Energy Intake (Malnutrition): less than 75% for greater than or equal to 3 months  Weight Loss (Malnutrition): greater than 7.5% in 3 months                 A minimum of two characteristics is recommended for diagnosis of either severe or non-severe malnutrition.

## 2024-05-19 NOTE — CONSULTS
Inpatient Nutrition Assessment    Admit Date: 5/18/2024   Total duration of encounter: 1 day   Patient Age: 64 y.o.    Nutrition Recommendation/Prescription     Agree with advance diet as tolerated to Cardiac. Provide Boost VHC BID once diet advances  Daily weights due to pt underweight/BMI 18.1  Medical management of nausea  If unable to tolerate diet advancement, consider PPN/TPN. Consult for rec's.     Communication of Recommendations:  EMR    Nutrition Assessment     Malnutrition Assessment/Nutrition-Focused Physical Exam    Malnutrition Context: chronic illness (05/19/24 0813)  Malnutrition Level: moderate (05/19/24 0813)  Energy Intake (Malnutrition): less than 75% for greater than or equal to 3 months (05/19/24 0813)  Weight Loss (Malnutrition): greater than 7.5% in 3 months (05/19/24 0813)                                                  A minimum of two characteristics is recommended for diagnosis of either severe or non-severe malnutrition.    Chart Review    Reason Seen: physician consult for poor intake    Malnutrition Screening Tool Results   Have you recently lost weight without trying?: Yes: 2-13 lbs  Have you been eating poorly because of a decreased appetite?: Yes   MST Score: 2   Diagnosis:  Non-small cell lung cancer s/p chemo    Relevant Medical History:   COPD, GERD, high chol, HLD, hypothyroidism, lung cancer, Stage II CKD, TIA, hx uterine cancer, OA    Scheduled Medications:  apixaban, 5 mg, BID  aspirin, 81 mg, Daily  atorvastatin, 40 mg, Daily  levothyroxine, 75 mcg, Before breakfast  morphine, 30 mg, BID    Continuous Infusions:  sodium chloride 0.9%, Last Rate: 125 mL/hr at 05/19/24 0811    PRN Medications:  acetaminophen, 1,000 mg, Q6H PRN  HYDROcodone-acetaminophen, 1 tablet, Q6H PRN  melatonin, 6 mg, Nightly PRN  sodium chloride 0.9%, 10 mL, PRN    Calorie Containing IV Medications: no significant kcals from medications at this time    Recent Labs   Lab 05/15/24  0809 05/18/24  0506  "24  0253   * 124* 125*   K 3.9 4.8 3.6   CALCIUM 8.2* 7.8* 7.3*   PHOS 2.7  --   --    MG 1.70  --   --    CHLORIDE 105 95* 97*   CO2 21* 19* 19*   BUN 12.0 16.0 12.0   CREATININE 0.67 0.57 0.49*   EGFRNORACEVR >60 >60 >60   GLUCOSE 124* 125* 115   BILITOT 0.4 1.1  --    ALKPHOS 146 113  --    ALT 9 19  --    AST 20 35*  --    ALBUMIN 2.2* 2.9*  --    WBC 5.18 6.06  --    HGB 9.2* 10.1*  --    HCT 29.8* 31.4*  --      Nutrition Orders:  Diet Clear Liquid      Appetite/Oral Intake: poor/0-25% of meals  Factors Affecting Nutritional Intake: decreased appetite, nausea, vomiting, and chemo  Social Needs Impacting Access to Food: unable to assess at this time; will attempt on follow-up  Food/Nondenominational/Cultural Preferences: unable to obtain  Food Allergies: no known food allergies  Last Bowel Movement: 24  Wound(s):  no pressure ulcer noted    Comments    () Assessment completed with EMR. Pt admitted with worsening nausea/vomiting since Wednesday. No diarrhea. Undergoing chemo at this time. No difficulties chewing or swallowing. Med/labs reviewed. UBW per #. CBW: 115# () Pt has lost 25# (17.8%) in 4 months (severe) Weight loss likely due to chemo    Anthropometrics    Height: 5' 7" (170.2 cm), Height Method: Stated  Last Weight: 52.2 kg (115 lb) (24 2212), Weight Method: Standard Scale  BMI (Calculated): 18  BMI Classification: underweight (BMI less than 18.5)     Ideal Body Weight (IBW), Female: 135 lb     % Ideal Body Weight, Female (lb): 85.19 %                    Usual Body Weight (UBW), k.6 kg  % Usual Body Weight: 82.19     Usual Weight Provided By: EMR weight history    Wt Readings from Last 5 Encounters:   24 52.2 kg (115 lb)   24 50.6 kg (111 lb 9.6 oz)   05/15/24 51.3 kg (113 lb 1.6 oz)   24 52.9 kg (116 lb 10 oz)   24 54.4 kg (120 lb)     Weight Change(s) Since Admission: new admit  Wt Readings from Last 1 Encounters:   24 2212 52.2 kg " (115 lb)   Admit Weight: 52.2 kg (115 lb) (05/18/24 2212), Weight Method: Standard Scale    Estimated Needs    Weight Used For Calorie Calculations: 52.2 kg (115 lb 1.3 oz)  Energy Calorie Requirements (kcal): 1827 kcal (35 kcal/kg/BW)  Energy Need Method: Kcal/kg  Weight Used For Protein Calculations: 52.2 kg (115 lb 1.3 oz)  Protein Requirements: 105 gm (1.5 gm/kg/BW)  Fluid Requirements (mL): 1827 ml (1 ml/kcal)        Enteral Nutrition     Patient not receiving enteral nutrition at this time.    Parenteral Nutrition     Patient not receiving parenteral nutrition support at this time.    Evaluation of Received Nutrient Intake    Calories: not meeting estimated needs  Protein: not meeting estimated needs    Patient Education     Not applicable.    Nutrition Diagnosis     PES: Unintended weight loss related to chronic illness as evidenced by pt has lost 25# (17.8%) in 4 months. (new)     PES: Moderate chronic disease or condition related malnutrition related to inability to consume sufficient nutrients as evidenced by less than 75% needs met for greater than or equal to 3 months and greater than 7.5% weight loss in 3 months. (new)    Nutrition Interventions     Intervention(s): general/healthful diet, commercial beverage, and collaboration with other providers    Goal: Meet greater than 80% of nutritional needs by follow-up. (new)  Goal: Maintain weight throughout hospitalization. Improve weight throughout hospitalization. (new)    Nutrition Goals & Monitoring     Dietitian will monitor: energy intake, weight, electrolyte/renal panel, glucose/endocrine profile, and gastrointestinal profile  Discharge planning: too early to determine; pending clinical course  Nutrition Risk/Follow-Up: high (follow-up in 1-4 days)   Please consult if re-assessment needed sooner.

## 2024-05-19 NOTE — ED PROVIDER NOTES
Encounter Date: 5/18/2024       History     Chief Complaint   Patient presents with    Weakness     Have not eaten and is vomiting since yesterday. Just does not feel good.  Patient is on Chemo     The patient is a 64-year-old female with a history of metastatic lung cancer, on chemotherapy, COPD, GERD, hypertension, PE, who presents emergency department with nausea and generalized weakness.  Patient states he would chemotherapy on Wednesday for the 1st time in 4 weeks.  She states since then she has not been able to eat or drink anything, has been taking her prescribed nausea medication without any significant improvement in her symptoms prompting visit here to the emergency department.  She denies having any cough, pain of any sort, change in shortness of breath, diarrhea, constipation, dysuria, fever, chills, or any other associated symptoms.        Review of patient's allergies indicates:   Allergen Reactions    Dilaudid [hydromorphone] Itching and Swelling     Past Medical History:   Diagnosis Date    ASTHMA     COPD (chronic obstructive pulmonary disease)     SEVERE    GERD (gastroesophageal reflux disease)     High cholesterol     HLD (hyperlipidemia)     Hypothyroidism, unspecified     Lung cancer 12/08/2022    invasive adenocarcinoma, poorly differentiated with solid morphology, focal tumor involvement of visceral pleura and focal tumor vascular invasion (vein), with 2 of 3 posterior mediastinal lymph nodes positive for metastatic carcinoma    Stage 2 chronic kidney disease     TIA (transient ischemic attack)     no deficits    Tobacco abuse     Unspecified osteoarthritis, unspecified site     Uterine cancer 1989     Past Surgical History:   Procedure Laterality Date    COLONOSCOPY N/A 3/26/2024    Procedure: COLONOSCOPY;  Surgeon: Vickey Patino MD;  Location: [facility];  Service: Endoscopy;  Laterality: N/A;  post op: c. diff colitis of right colon    COLONOSCOPY, WITH 1 OR MORE BIOPSIES N/A  3/26/2024    Procedure: COLONOSCOPY, WITH 1 OR MORE BIOPSIES;  Surgeon: Vickey Patino MD;  Location: [facility] ENDO;  Service: Endoscopy;  Laterality: N/A;  cecum    INSERTION OF TUNNELED CENTRAL VENOUS CATHETER (CVC) WITH SUBCUTANEOUS PORT N/A 2023    Procedure: [id number];  Surgeon: Peter Dial MD;  Location: [facility] OR;  Service: Peripheral Vascular;  Laterality: N/A;    LAPAROSCOPIC CHOLECYSTECTOMY WITH CHOLANGIOGRAPHY N/A 3/28/2024    Procedure: CHOLECYSTECTOMY, LAPAROSCOPIC, WITH CHOLANGIOGRAM;  Surgeon: Vickey Patino MD;  Location: [facility] OR;  Service: General;  Laterality: N/A;    lipoma multiple sites      LUNG LOBECTOMY Right 2022    Procedure: LOBECTOMY, LUNG;  Surgeon: Jass Browne IV, MD;  Location: [facility] OR;  Service: Cardiothoracic;  Laterality: Right;    SURGICAL REMOVAL OF LYMPH NODE  2022    Procedure: EXCISION, LYMPH NODE;  Surgeon: Jass Browne IV, MD;  Location: [facility] OR;  Service: Cardiothoracic;;    THORACOTOMY Right 2022    Procedure: THORACOTOMY;  Surgeon: Jass Browne IV, MD;  Location: [facility] OR;  Service: Cardiothoracic;  Laterality: Right;  Right Lower Lobectomy with Lymph Node Dissection    TOTAL ABDOMINAL HYSTERECTOMY W/ BILATERAL SALPINGOOPHORECTOMY       Family History   Problem Relation Name Age of Onset    Cancer Maternal Grandfather      Heart failure Father      Prostate cancer Father      Hypertension Mother      Hepatitis Mother      Ovarian cancer Mother      Liver cancer Mother      Asthma Brother      Asthma Brother      Fibroids Sister      Skin cancer Sister       Social History     Tobacco Use    Smoking status: Former     Current packs/day: 0.00     Average packs/day: 0.5 packs/day for 50.0 years (25.0 ttl pk-yrs)     Types: Cigarettes     Start date: 1972     Quit date: 2022     Years since quittin.4    Smokeless tobacco: Never   Substance Use Topics    Alcohol use: Not Currently     Comment: quit  25years ago    Drug use: Never     Review of Systems   Constitutional:  Positive for appetite change and fatigue. Negative for fever.   HENT:  Negative for sore throat.    Respiratory:  Negative for shortness of breath.    Cardiovascular:  Negative for chest pain.   Gastrointestinal:  Positive for nausea.   Genitourinary:  Negative for dysuria.   Musculoskeletal:  Negative for back pain.   Skin:  Negative for rash.   Neurological:  Negative for weakness.   Hematological:  Does not bruise/bleed easily.   All other systems reviewed and are negative.      Physical Exam     Initial Vitals [05/18/24 2212]   BP Pulse Resp Temp SpO2   111/80 (!) 132 18 97.5 °F (36.4 °C) 99 %      MAP       --         Physical Exam    Nursing note and vitals reviewed.  Constitutional: She is not diaphoretic. No distress.   Listless, thin and frail   HENT:   Head: Normocephalic and atraumatic.   Dry mucous membranes   Eyes: Conjunctivae and EOM are normal. Pupils are equal, round, and reactive to light.   Neck: Neck supple. No JVD present.   Normal range of motion.  Cardiovascular:  Normal rate, regular rhythm and normal heart sounds.           No murmur heard.  Pulmonary/Chest: Breath sounds normal. No respiratory distress. She has no wheezes. She has no rhonchi.   Port-A-Cath in right anterior chest wall   Abdominal: Abdomen is soft. Bowel sounds are normal. There is no abdominal tenderness. There is no rebound and no guarding.   Musculoskeletal:         General: Normal range of motion.      Cervical back: Normal range of motion and neck supple.     Neurological: She is alert and oriented to person, place, and time. She has normal strength. No sensory deficit. GCS score is 15. GCS eye subscore is 4. GCS verbal subscore is 5. GCS motor subscore is 6.   Skin: Skin is warm and dry. Capillary refill takes less than 2 seconds.   Psychiatric: She has a normal mood and affect. Her behavior is normal. Judgment and thought content normal.          ED Course   Procedures  Labs Reviewed   COMPREHENSIVE METABOLIC PANEL - Abnormal; Notable for the following components:       Result Value    Sodium 124 (*)     Chloride 95 (*)     CO2 19 (*)     Glucose 125 (*)     Calcium 7.8 (*)     Albumin 2.9 (*)     Globulin 3.9 (*)     Albumin/Globulin Ratio 0.7 (*)     AST 35 (*)     All other components within normal limits   CBC WITH DIFFERENTIAL - Abnormal; Notable for the following components:    RBC 3.98 (*)     Hgb 10.1 (*)     Hct 31.4 (*)     MCV 78.9 (*)     MCH 25.4 (*)     MCHC 32.2 (*)     RDW 19.1 (*)     Platelet 554 (*)     Lymph # 0.17 (*)     Mono # 0.03 (*)     IG# 0.05 (*)     All other components within normal limits   CK - Normal   LACTIC ACID, PLASMA - Normal   TROPONIN I - Normal   CBC W/ AUTO DIFFERENTIAL    Narrative:     The following orders were created for panel order CBC auto differential.  Procedure                               Abnormality         Status                     ---------                               -----------         ------                     CBC with Differential[[id number]]       Abnormal            Final result                 Please view results for these tests on the individual orders.   URINALYSIS, REFLEX TO URINE CULTURE     EKG Readings: (Independently Interpreted)   05/18/2024 at 10:55 p.m.   Ventricular rate 122   Sinus tachycardia  Normal axis   All intervals normal   Rare PVC   No acute ischemic changes  No STEMI   Interpreted by MD       Imaging Results    None          Medications   sodium chloride 0.9% bolus 1,000 mL 1,000 mL (1,000 mLs Intravenous New Bag 5/18/24 9670)   acetaminophen tablet 1,000 mg (has no administration in time range)   apixaban tablet 5 mg (has no administration in time range)   aspirin EC tablet 81 mg (has no administration in time range)   atorvastatin tablet 40 mg (has no administration in time range)   HYDROcodone-acetaminophen  mg per tablet 1 tablet (has no administration  in time range)   levothyroxine tablet 75 mcg (has no administration in time range)   morphine 12 hr tablet 30 mg (has no administration in time range)   ondansetron injection 4 mg (4 mg Intravenous Given 5/18/24 2309)     Medical Decision Making  Amount and/or Complexity of Data Reviewed  Labs: ordered.    Risk  Prescription drug management.               ED Course as of 05/19/24 0002   Sat May 18, 2024   2330 Patient with some hyponatremia, normal sodiums around 131, has been as low as 129.  Patient's calcium today also 7.8, was 8.2 when labs were checked on May 15th.  The patient is afebrile, has a normal white blood cell count and H&H.  We will discuss with patient's oncologist to see if she would be agreeable for patient to stay here for fluid replacement and treatment versus transferred to main [place]. [AA]   2333 Cancer center [facility] in a contacted.  Dr. Perez is on-call for Dr. Gomez, she has been paged. [AA]   2344 I spoke to Dr. Perez, she states it is fine for the patient to stay here for treatment. [AA]   2344 Hospitalist paged [AA]   234 I spoke with Dr. Harrison, he accepts admission. [AA]   2352 Discussed findings and plan for admission with the patient and family at bedside.  She is agreeable to plan. [AA]      ED Course User Index  [AA] France Ugalde MD                           Clinical Impression:  Final diagnoses:  [R53.1] Weakness  [C34.90] Malignant neoplasm of lung, unspecified laterality, unspecified part of lung (Primary)  [E87.1] Hyponatremia  [E83.51] Hypocalcemia          ED Disposition Condition    Observation                 France Ugalde MD  05/19/24 0002

## 2024-05-19 NOTE — H&P
[facility] - Emergency Dept    History & Physical      Patient Name: Mansi Jin  MRN: [id number]  Admission Date: 5/18/2024  Attending Physician: France Ugalde MD   Primary Care Provider: Jennifer Dacosta FNP         Patient information was obtained from patient and ER records.     Subjective:     Principal Problem:<principal problem not specified>    Chief Complaint: Weakness  Chief Complaint   Patient presents with    Weakness     Have not eaten and is vomiting since yesterday. Just does not feel good.  Patient is on Chemo        HPI: Patient is a 64-year-old female with history of stage for non-small cell lung cancer who is status post chemotherapy which began this past Wednesday. She presents with nausea, vomiting and weakness that has been worsening since Wednesday. She is prescribed Compazine and Zofran but has not hit relief at home. She is fine to have a serum sodium of 124 and a calcium 7.8. She denies any chest pain or shortness of breath. There's no fever chills, cough, dysuria or diarrhea. Patient is not neutropenic.    Past Medical History:   Diagnosis Date    ASTHMA     COPD (chronic obstructive pulmonary disease)     SEVERE    GERD (gastroesophageal reflux disease)     High cholesterol     HLD (hyperlipidemia)     Hypothyroidism, unspecified     Lung cancer 12/08/2022    invasive adenocarcinoma, poorly differentiated with solid morphology, focal tumor involvement of visceral pleura and focal tumor vascular invasion (vein), with 2 of 3 posterior mediastinal lymph nodes positive for metastatic carcinoma    Stage 2 chronic kidney disease     TIA (transient ischemic attack)     no deficits    Tobacco abuse     Unspecified osteoarthritis, unspecified site     Uterine cancer 1989       Past Surgical History:   Procedure Laterality Date    COLONOSCOPY N/A 3/26/2024    Procedure: COLONOSCOPY;  Surgeon: Vickey Patino MD;  Location: [facility];  Service: Endoscopy;  Laterality: N/A;  post  op: c. diff colitis of right colon    COLONOSCOPY, WITH 1 OR MORE BIOPSIES N/A 3/26/2024    Procedure: COLONOSCOPY, WITH 1 OR MORE BIOPSIES;  Surgeon: Vickey Patino MD;  Location: [facility] ENDO;  Service: Endoscopy;  Laterality: N/A;  cecum    INSERTION OF TUNNELED CENTRAL VENOUS CATHETER (CVC) WITH SUBCUTANEOUS PORT N/A 12/04/2023    Procedure: [id number];  Surgeon: Peter Dial MD;  Location: [facility] OR;  Service: Peripheral Vascular;  Laterality: N/A;    LAPAROSCOPIC CHOLECYSTECTOMY WITH CHOLANGIOGRAPHY N/A 3/28/2024    Procedure: CHOLECYSTECTOMY, LAPAROSCOPIC, WITH CHOLANGIOGRAM;  Surgeon: Vickey Patino MD;  Location: [facility] OR;  Service: General;  Laterality: N/A;    lipoma multiple sites      LUNG LOBECTOMY Right 12/08/2022    Procedure: LOBECTOMY, LUNG;  Surgeon: Jass Browne IV, MD;  Location: [facility] OR;  Service: Cardiothoracic;  Laterality: Right;    SURGICAL REMOVAL OF LYMPH NODE  12/08/2022    Procedure: EXCISION, LYMPH NODE;  Surgeon: Jass Browne IV, MD;  Location: [facility] OR;  Service: Cardiothoracic;;    THORACOTOMY Right 12/08/2022    Procedure: THORACOTOMY;  Surgeon: Jass Browne IV, MD;  Location: [facility] OR;  Service: Cardiothoracic;  Laterality: Right;  Right Lower Lobectomy with Lymph Node Dissection    TOTAL ABDOMINAL HYSTERECTOMY W/ BILATERAL SALPINGOOPHORECTOMY  1989       Review of patient's allergies indicates:   Allergen Reactions    Dilaudid [hydromorphone] Itching and Swelling       Current Facility-Administered Medications on File Prior to Encounter   Medication    alteplase injection 2 mg    alteplase injection 2 mg    alteplase injection 2 mg    heparin, porcine (PF) 100 unit/mL injection flush 500 Units    heparin, porcine (PF) 100 unit/mL injection flush 500 Units    heparin, porcine (PF) 100 unit/mL injection flush 500 Units    sodium chloride 0.9% 250 mL flush bag    sodium chloride 0.9% flush 10 mL    sodium chloride 0.9% flush 10 mL    sodium chloride  0.9% flush 10 mL     Current Outpatient Medications on File Prior to Encounter   Medication Sig    acetaminophen (TYLENOL) 500 MG tablet Take 1,000 mg by mouth every 6 (six) hours as needed for Pain.    albuterol (PROVENTIL) 5 mg/mL nebulizer solution Take 2.5 mg by nebulization 3 (three) times daily as needed for Wheezing. rescue    apixaban (ELIQUIS) 5 mg Tab Take 1 tablet (5 mg total) by mouth 2 (two) times daily.    ascorbic acid, vitamin C, (VITAMIN C) 500 MG tablet Take 1,000 mg by mouth once daily.    aspirin (ECOTRIN) 81 MG EC tablet Take 81 mg by mouth once daily.    atorvastatin (LIPITOR) 40 MG tablet Take 40 mg by mouth once daily.    cholecalciferol, vitamin D3, 1,250 mcg (50,000 unit) capsule Take 50,000 Units by mouth every 7 days.    dexAMETHasone (DECADRON) 4 MG Tab TAKE 2 TABLETS BY MOUTH DAY PRIOR TO CHEMPTHERAPY AND ON DAY 2, 3 AND 4 FOLLOW CHEMOTHERAPY    fluticasone propionate (FLONASE) 50 mcg/actuation nasal spray 1 spray (50 mcg total) by Each Nostril route once daily. (Patient taking differently: 1 spray by Each Nostril route as needed.)    fluticasone-umeclidin-vilanter (TRELEGY ELLIPTA) 100-62.5-25 mcg DsDv Inhale 1 puff into the lungs once daily.    folic acid (FOLVITE) 1 MG tablet Take 1 tablet (1 mg total) by mouth once daily.    gabapentin (NEURONTIN) 300 MG capsule Take 1 capsule (300 mg total) by mouth 3 (three) times daily. (Patient taking differently: Take 300 mg by mouth as needed.)    HYDROcodone-acetaminophen (NORCO)  mg per tablet Take 1 tablet by mouth every 6 (six) hours as needed for Pain.    ipratropium (ATROVENT HFA) 17 mcg/actuation inhaler Inhale 2 puffs into the lungs every 6 (six) hours. Rescue    ipratropium-albuteroL (COMBIVENT)  mcg/actuation inhaler Inhale 1 puff into the lungs every 6 (six) hours as needed for Wheezing. Rescue    iron polysac-iron heme polypep (FEOSOL BIFERA) 28 mg Tab Take 1 tablet by mouth every other day. Takes 3 times a week    k  phos di & mono-sod phos mono (K-PHOS-NEUTRAL) 250 mg Tab Take 1 tablet by mouth 2 (two) times a day.    levothyroxine (SYNTHROID) 75 MCG tablet Take 75 mcg by mouth before breakfast.    loratadine (CLARITIN) 10 mg tablet Take 1 tablet (10 mg total) by mouth once daily. (Patient taking differently: Take 10 mg by mouth daily as needed.)    megestroL (MEGACE) 40 MG Tab Take 1 tablet (40 mg total) by mouth 4 (four) times daily For low appetite.. (Patient not taking: Reported on 5/15/2024.)    mirtazapine (REMERON) 15 MG tablet Take 1 tablet (15 mg total) by mouth every evening.    morphine (MS CONTIN) 30 MG 12 hr tablet Take 1 tablet (30 mg total) by mouth 2 (two) times daily.    ondansetron (ZOFRAN-ODT) 8 MG TbDL Take 1 tablet (8 mg total) by mouth every 6 (six) hours as needed (nasuea/vomting).    pantoprazole (PROTONIX) 40 MG tablet Take 1 tablet (40 mg total) by mouth once daily.    potassium chloride (MICRO-K) 10 MEQ CpSR Take 2 capsules (20 mEq total) by mouth once daily.    prochlorperazine (COMPAZINE) 10 MG tablet Take 1 tablet (10 mg total) by mouth every 6 (six) hours as needed (nasuea).    triamcinolone acetonide 0.1% (KENALOG) 0.1 % cream     vancomycin (VANCOCIN) 250 MG capsule TAKE 1 CAPSULE BY MOUTH EVERY 6 HOURS AS DIRECTED (Patient not taking: Reported on 5/15/2024)     Family History       Problem Relation (Age of Onset)    Asthma Brother, Brother    Cancer Maternal Grandfather    Fibroids Sister    Heart failure Father    Hepatitis Mother    Hypertension Mother    Liver cancer Mother    Ovarian cancer Mother    Prostate cancer Father    Skin cancer Sister          Tobacco Use    Smoking status: Former     Current packs/day: 0.00     Average packs/day: 0.5 packs/day for 50.0 years (25.0 ttl pk-yrs)     Types: Cigarettes     Start date: 1972     Quit date: 2022     Years since quittin.4    Smokeless tobacco: Never   Substance and Sexual Activity    Alcohol use: Not Currently     Comment:  "quit 25years ago    Drug use: Never    Sexual activity: Yes     Partners: Male     Birth control/protection: See Surgical Hx     Review of [id number] ROS is performed and is otherwise negative unless stated as above.  Objective:     Vital Signs (Most Recent):  Temp: 97.5 °F (36.4 °C) (05/18/24 2212)  Pulse: (!) 132 (05/18/24 2212)  Resp: 18 (05/18/24 2212)  BP: 111/80 (05/18/24 2212)  SpO2: 99 % (05/18/24 2212) Vital Signs (24h Range):  Temp:  [97.5 °F (36.4 °C)] 97.5 °F (36.4 °C)  Pulse:  [132] 132  Resp:  [18] 18  SpO2:  [99 %] 99 %  BP: (111)/(80) 111/80     Weight: 52.2 kg (115 lb)  Body mass index is 18.01 kg/m².    Physical Exam   /80   Pulse (!) 132   Temp 97.5 °F (36.4 °C) (Oral)   Resp 18   Ht 5' 7" (1.702 m)   Wt 52.2 kg (115 lb)   SpO2 99%   BMI 18.01 kg/m²     General Appearance:  Alert, cooperative, no distress, appears stated age   Head:  Normocephalic, without obvious abnormality, atraumatic   Eyes:  PERRL, conjunctiva/corneas clear, EOM's intact, fundi benign, both eyes   Ears:  Normal TM's and external ear canals, both ears   Nose: Nares normal, septum midline,mucosa normal, no drainage or sinus tenderness   Throat: Lips, mucosa, and tongue normal; teeth and gums normal   Neck: Supple, symmetrical, trachea midline, no adenopathy;  thyroid: not enlarged, symmetric, no tenderness/mass/nodules; no carotid bruit or JVD   Back:   Symmetric, no curvature, ROM normal, no CVA tenderness   Lungs:   Clear to auscultation bilaterally, respirations unlabored   Breasts:  No masses or tenderness   Heart:  Regular rate and rhythm, S1 and S2 normal, no murmur, rub, or gallop   Abdomen:   Soft, non-tender, bowel sounds active all four quadrants,  no masses, no organomegaly   Pelvic: Deferred   Extremities: Extremities normal, atraumatic, no cyanosis or edema   Pulses: 2+ and symmetric   Skin: Skin color, texture, turgor normal, no rashes or lesions   Lymph nodes: Cervical, supraclavicular, and axillary " nodes normal   Neurologic: Normal         Significant Labs: All pertinent labs within the past 24 hours have been reviewed.  Recent Lab Results         05/18/24  2253        Albumin/Globulin Ratio 0.7       Albumin 2.9              ALT 19       Anion Gap 10.0       AST 35       Baso # 0.00       Basophil % 0.0       BILIRUBIN TOTAL 1.1       BUN 16.0       BUN/CREAT RATIO 28       Calcium 7.8       Chloride 95       CO2 19       CPK 61       Creatinine 0.57       eGFR >60       Eos # 0.01       Eos % 0.2       Globulin, Total 3.9       Glucose 125       Hematocrit 31.4       Hemoglobin 10.1       Immature Grans (Abs) 0.05       Immature Granulocytes 0.8       Lactic Acid Level 0.9       Lymph # 0.17       LYMPH % 2.8       MCH 25.4       MCHC 32.2       MCV 78.9       Mono # 0.03       Mono % 0.5       MPV 9.5       Neut # 5.80       Neut % 95.7       Platelet Count 554       Potassium 4.8       PROTEIN TOTAL 6.8       RBC 3.98       RDW 19.1       Sodium 124       Troponin I 0.021       WBC 6.06               Significant Imaging: I have reviewed all pertinent imaging results/findings within the past 24 hours.  I have reviewed and interpreted all pertinent imaging results/findings within the past 24 hours.    Assessment/Plan:     Active Diagnoses:    Diagnosis Date Noted POA    Hyponatremia [E87.1] 05/18/2024 Unknown    NSCLC of right lung [C34.91] 12/28/2022 Yes    Hypertension [I10] 07/14/2022 Yes     - Hyponatremia is likely due to volume depletion. Will start aggressive volume replacement with NS at 150cc/hr.   - Pain, nausea control   - Reconcile home meds as warranted  - Follow serum sodium  - Check CXR    This encounter was completed via telemedicine (audio/video) w/ nursing at bedside ot assist w/ clinical exam.  SOC Audio/Visual Equipment is using HIPPA Compliant Web Platform. The patient is located in the hospital and the provider is located off site. This patient is placed in observation status  under my care.       Participants on Call: Bedside RN, Patient, Physician on Call.   Problems Resolved During this Admission:     VTE Risk Mitigation (From admission, onward)           Ordered     apixaban tablet 5 mg  2 times daily         05/18/24 7147                      Saroj Harrison MD  Department of Hospital Medicine   [facility] - Emergency Dept

## 2024-05-19 NOTE — PROGRESS NOTES
Hospital Medicine Progress Note        Chief Complaint: Inpatient Follow-up for     HPI:   Patient is a 64-year-old female with history of stage for non-small cell lung cancer who is status post chemotherapy which began this past Wednesday. She presents with nausea, vomiting and weakness that has been worsening since Wednesday. She is prescribed Compazine and Zofran but has not hit relief at home. She is fine to have a serum sodium of 124 and a calcium 7.8. She denies any chest pain or shortness of breath. There's no fever chills, cough, dysuria or diarrhea. Patient is not neutropenic.     May 19, 2024-dehydration was treated with normal saline 125 cc/hour, no nausea, no vomiting, no fever, complain of generalized weakness    Case was discussed with patient's nurse and  on the floor.    Objective/physical exam:  General: In no acute distress, afebrile  Chest: Clear to auscultation bilaterally  Heart: RRR, +S1, S2, no appreciable murmur  Abdomen: Soft, nontender, BS +  MSK: Warm, no lower extremity edema, no clubbing or cyanosis  Neurologic: Alert and oriented x4, Cranial nerve II-XII intact, Strength 5/5 in all 4 extremities    VITAL SIGNS: 24 HRS MIN & MAX LAST   Temp  Min: 97.5 °F (36.4 °C)  Max: 99 °F (37.2 °C) 99 °F (37.2 °C)   BP  Min: 95/62  Max: 149/98 95/62   Pulse  Min: 119  Max: 132  (!) 123   Resp  Min: 18  Max: 21 18   SpO2  Min: 98 %  Max: 100 % 99 %     I have reviewed the following labs:  Recent Labs   Lab 05/15/24  0809 05/18/24  2253   WBC 5.18 6.06   RBC 3.71* 3.98*   HGB 9.2* 10.1*   HCT 29.8* 31.4*   MCV 80.3 78.9*   MCH 24.8* 25.4*   MCHC 30.9* 32.2*   RDW 19.6* 19.1*   * 554*   MPV 8.7 9.5     Recent Labs   Lab 05/15/24  0809 05/18/24 2253 05/19/24  0253   * 124* 125*   K 3.9 4.8 3.6   CO2 21* 19* 19*   BUN 12.0 16.0 12.0   CREATININE 0.67 0.57 0.49*   CALCIUM 8.2* 7.8* 7.3*   MG 1.70  --   --    ALBUMIN 2.2* 2.9*  --    ALKPHOS 146 113  --    ALT 9 19  --    AST 20  35*  --    BILITOT 0.4 1.1  --      Microbiology Results (last 7 days)       ** No results found for the last 168 hours. **             See below for Radiology    Scheduled Med:   apixaban  5 mg Oral BID    aspirin  81 mg Oral Daily    atorvastatin  40 mg Oral Daily    levothyroxine  75 mcg Oral Before breakfast    morphine  30 mg Oral BID      Continuous Infusions:   sodium chloride 0.9%   Intravenous Continuous 125 mL/hr at 05/19/24 0811 New Bag at 05/19/24 0811      PRN Meds:    Current Facility-Administered Medications:     acetaminophen, 1,000 mg, Oral, Q6H PRN    HYDROcodone-acetaminophen, 1 tablet, Oral, Q6H PRN    melatonin, 6 mg, Oral, Nightly PRN    sodium chloride 0.9%, 10 mL, Intravenous, PRN     Assessment/Plan:  Dehydration secondary to vomiting   Vomiting secondary to chemotherapy   Stage IV non-small-cell lung carcinoma   Hypertension   Hyponatremia  Chronic COPD   Hypercholesterolemia   Hypothyroidism   Tobacco abuse  Anemia         Continue hydration with normal saline 125 cc/hour  Continue p.o. morphine for pain control  Continue Eliquis 5 mg p.o. b.i.d.  Check CBC BMP in a.m.   Possible discharge home tomorrow      Patient condition:  Stable/Fair/Guarded/ Serious/ Critical    Anticipated discharge and Disposition:         All diagnosis and differential diagnosis have been reviewed; assessment and plan has been documented; I have personally reviewed the labs and test results that are presently available; I have reviewed the patients medication list; I have reviewed the consulting providers response and recommendations. I have reviewed or attempted to review medical records based upon their availability    All of the patient's questions have been  addressed and answered. Patient's is agreeable to the above stated plan. I will continue to monitor closely and make adjustments to medical management as needed.  _____________________________________________________________________    Nutrition  Status:    Radiology:  I have personally reviewed the following imaging and agree with the radiologist.     X-Ray Chest 1 View  Narrative: EXAMINATION:  XR CHEST 1 VIEW    CLINICAL HISTORY:  sob;    TECHNIQUE:  Single frontal view of the chest was performed.    COMPARISON:  December 4, 2023    FINDINGS:  The cardiomediastinal silhouette remains unchanged.  Infusion catheter remains.  Chronic pleural blunting is again identified in the right lung base with associated scarring.  The left lung is clear.  No acute infiltrates are seen.  No pneumothorax is noted.  Impression: Chronic changes are identified in the right lung base.  The remainder of the examination appears unremarkable.    Electronically signed by: Perfecto Winn  Date:    05/19/2024  Time:    08:16      Dayron Leong MD  Department of Hospital Medicine   [facility]   05/19/2024

## 2024-05-20 LAB
ANION GAP SERPL CALC-SCNC: 8 MEQ/L
BASOPHILS # BLD AUTO: 0.02 X10(3)/MCL
BASOPHILS NFR BLD AUTO: 0.8 %
BUN SERPL-MCNC: 10 MG/DL (ref 9.8–20.1)
CALCIUM SERPL-MCNC: 6.8 MG/DL (ref 8.4–10.2)
CHLORIDE SERPL-SCNC: 106 MMOL/L (ref 98–107)
CO2 SERPL-SCNC: 20 MMOL/L (ref 23–31)
CREAT SERPL-MCNC: 0.49 MG/DL (ref 0.55–1.02)
CREAT/UREA NIT SERPL: 20
EOSINOPHIL # BLD AUTO: 0.01 X10(3)/MCL (ref 0–0.9)
EOSINOPHIL NFR BLD AUTO: 0.4 %
ERYTHROCYTE [DISTWIDTH] IN BLOOD BY AUTOMATED COUNT: 18.5 % (ref 11.5–17)
GFR SERPLBLD CREATININE-BSD FMLA CKD-EPI: >60 ML/MIN/1.73/M2
GLUCOSE SERPL-MCNC: 100 MG/DL (ref 82–115)
HCT VFR BLD AUTO: 25.5 % (ref 37–47)
HGB BLD-MCNC: 8.1 G/DL (ref 12–16)
IMM GRANULOCYTES # BLD AUTO: 0.1 X10(3)/MCL (ref 0–0.04)
IMM GRANULOCYTES NFR BLD AUTO: 4 %
LYMPHOCYTES # BLD AUTO: 0.36 X10(3)/MCL (ref 0.6–4.6)
LYMPHOCYTES NFR BLD AUTO: 14.5 %
MCH RBC QN AUTO: 25.1 PG (ref 27–31)
MCHC RBC AUTO-ENTMCNC: 31.8 G/DL (ref 33–36)
MCV RBC AUTO: 78.9 FL (ref 80–94)
MONOCYTES # BLD AUTO: 0.03 X10(3)/MCL (ref 0.1–1.3)
MONOCYTES NFR BLD AUTO: 1.2 %
NEUTROPHILS # BLD AUTO: 1.97 X10(3)/MCL (ref 2.1–9.2)
NEUTROPHILS NFR BLD AUTO: 79.1 %
PLATELET # BLD AUTO: 529 X10(3)/MCL (ref 130–400)
PMV BLD AUTO: 9.6 FL (ref 7.4–10.4)
POTASSIUM SERPL-SCNC: 3 MMOL/L (ref 3.5–5.1)
RBC # BLD AUTO: 3.23 X10(6)/MCL (ref 4.2–5.4)
SODIUM SERPL-SCNC: 134 MMOL/L (ref 136–145)
WBC # SPEC AUTO: 2.49 X10(3)/MCL (ref 4.5–11.5)

## 2024-05-20 PROCEDURE — 96376 TX/PRO/DX INJ SAME DRUG ADON: CPT

## 2024-05-20 PROCEDURE — 80048 BASIC METABOLIC PNL TOTAL CA: CPT | Performed by: INTERNAL MEDICINE

## 2024-05-20 PROCEDURE — 85025 COMPLETE CBC W/AUTO DIFF WBC: CPT | Performed by: INTERNAL MEDICINE

## 2024-05-20 PROCEDURE — 25000003 PHARM REV CODE 250: Performed by: INTERNAL MEDICINE

## 2024-05-20 PROCEDURE — 63600175 PHARM REV CODE 636 W HCPCS: Performed by: INTERNAL MEDICINE

## 2024-05-20 PROCEDURE — 96361 HYDRATE IV INFUSION ADD-ON: CPT

## 2024-05-20 PROCEDURE — 25000003 PHARM REV CODE 250: Performed by: EMERGENCY MEDICINE

## 2024-05-20 PROCEDURE — 36415 COLL VENOUS BLD VENIPUNCTURE: CPT | Performed by: INTERNAL MEDICINE

## 2024-05-20 PROCEDURE — 94761 N-INVAS EAR/PLS OXIMETRY MLT: CPT

## 2024-05-20 PROCEDURE — 21400001 HC TELEMETRY ROOM

## 2024-05-20 RX ORDER — POTASSIUM CHLORIDE 7.45 MG/ML
40 INJECTION INTRAVENOUS ONCE
Status: COMPLETED | OUTPATIENT
Start: 2024-05-20 | End: 2024-05-20

## 2024-05-20 RX ORDER — MUPIROCIN 20 MG/G
OINTMENT TOPICAL 2 TIMES DAILY
Status: DISCONTINUED | OUTPATIENT
Start: 2024-05-20 | End: 2024-05-21 | Stop reason: HOSPADM

## 2024-05-20 RX ORDER — ONDANSETRON HYDROCHLORIDE 2 MG/ML
4 INJECTION, SOLUTION INTRAVENOUS EVERY 6 HOURS PRN
Status: DISCONTINUED | OUTPATIENT
Start: 2024-05-20 | End: 2024-05-21 | Stop reason: HOSPADM

## 2024-05-20 RX ADMIN — MORPHINE SULFATE 30 MG: 15 TABLET, FILM COATED, EXTENDED RELEASE ORAL at 08:05

## 2024-05-20 RX ADMIN — MUPIROCIN 1 G: 20 OINTMENT TOPICAL at 08:05

## 2024-05-20 RX ADMIN — SODIUM CHLORIDE: 9 INJECTION, SOLUTION INTRAVENOUS at 09:05

## 2024-05-20 RX ADMIN — APIXABAN 5 MG: 5 TABLET, FILM COATED ORAL at 08:05

## 2024-05-20 RX ADMIN — ATORVASTATIN CALCIUM 40 MG: 40 TABLET, FILM COATED ORAL at 08:05

## 2024-05-20 RX ADMIN — DEXTROSE MONOHYDRATE 25 MG: 5 INJECTION, SOLUTION INTRAVENOUS at 12:05

## 2024-05-20 RX ADMIN — MUPIROCIN 1 G: 20 OINTMENT TOPICAL at 02:05

## 2024-05-20 RX ADMIN — ONDANSETRON 4 MG: 2 INJECTION INTRAMUSCULAR; INTRAVENOUS at 04:05

## 2024-05-20 RX ADMIN — POTASSIUM CHLORIDE 10 MEQ: 7.46 INJECTION, SOLUTION INTRAVENOUS at 02:05

## 2024-05-20 RX ADMIN — SODIUM CHLORIDE: 9 INJECTION, SOLUTION INTRAVENOUS at 02:05

## 2024-05-20 RX ADMIN — HYDROCODONE BITARTRATE AND ACETAMINOPHEN 1 TABLET: 10; 325 TABLET ORAL at 06:05

## 2024-05-20 RX ADMIN — ASPIRIN 81 MG: 81 TABLET, COATED ORAL at 08:05

## 2024-05-20 NOTE — PT/OT/SLP PROGRESS
Physical Therapy      Patient Name:  Mansi Jin   MRN:  [id number]    Patient not seen today secondary to Unarousable, Patient fatigue. Attempted physical therapy evaluation but patient had just been given phenergan and was finally asleep. Spoke with family present who stated patient lives with her spouse and daughter in a home with 2-3 steps to enter with a handrail. Patient's sister, Tfifanie, states patient used a RW prior to her admit and had home health services at home. States her family is able to assist with all of her needs.    Will follow-up next day.

## 2024-05-20 NOTE — PROGRESS NOTES
Hospital Medicine Progress Note        Chief Complaint: Inpatient Follow-up for     HPI:   Patient is a 64-year-old female with history of stage for non-small cell lung cancer who is status post chemotherapy which began this past Wednesday. She presents with nausea, vomiting and weakness that has been worsening since Wednesday. She is prescribed Compazine and Zofran but has not hit relief at home. She is fine to have a serum sodium of 124 and a calcium 7.8. She denies any chest pain or shortness of breath. There's no fever chills, cough, dysuria or diarrhea. Patient is not neutropenic.     May 19, 2024-dehydration was treated with normal saline 125 cc/hour, no nausea, no vomiting, no fever, complain of generalized weakness    May 20, 2024-patient has nausea and vomiting this morning, no fever, no shortness for breath    Case was discussed with patient's nurse and  on the floor.    Objective/physical exam:  General: In no acute distress, afebrile  Chest: Clear to auscultation bilaterally  Heart: RRR, +S1, S2, no appreciable murmur  Abdomen: Soft, nontender, BS +  MSK: Warm, no lower extremity edema, no clubbing or cyanosis  Neurologic: Alert and oriented x4, Cranial nerve II-XII intact, Strength 5/5 in all 4 extremities    VITAL SIGNS: 24 HRS MIN & MAX LAST   Temp  Min: 98 °F (36.7 °C)  Max: 98.8 °F (37.1 °C) 98 °F (36.7 °C)   BP  Min: 89/56  Max: 135/80 135/80   Pulse  Min: 102  Max: 123  105   Resp  Min: 18  Max: 20 20   SpO2  Min: 99 %  Max: 100 % 100 %     I have reviewed the following labs:  Recent Labs   Lab 05/15/24  0809 05/18/24  2253 05/20/24  0253   WBC 5.18 6.06 2.49*   RBC 3.71* 3.98* 3.23*   HGB 9.2* 10.1* 8.1*   HCT 29.8* 31.4* 25.5*   MCV 80.3 78.9* 78.9*   MCH 24.8* 25.4* 25.1*   MCHC 30.9* 32.2* 31.8*   RDW 19.6* 19.1* 18.5*   * 554* 529*   MPV 8.7 9.5 9.6     Recent Labs   Lab 05/15/24  0809 05/18/24  2253 05/19/24  0253 05/20/24  0253   * 124* 125* 134*   K 3.9 4.8 3.6  3.0*   CO2 21* 19* 19* 20*   BUN 12.0 16.0 12.0 10.0   CREATININE 0.67 0.57 0.49* 0.49*   CALCIUM 8.2* 7.8* 7.3* 6.8*   MG 1.70  --   --   --    ALBUMIN 2.2* 2.9*  --   --    ALKPHOS 146 113  --   --    ALT 9 19  --   --    AST 20 35*  --   --    BILITOT 0.4 1.1  --   --      Microbiology Results (last 7 days)       ** No results found for the last 168 hours. **             See below for Radiology    Scheduled Med:   apixaban  5 mg Oral BID    aspirin  81 mg Oral Daily    atorvastatin  40 mg Oral Daily    levothyroxine  75 mcg Oral Before breakfast    morphine  30 mg Oral BID      Continuous Infusions:   sodium chloride 0.9%   Intravenous Continuous 125 mL/hr at 05/20/24 0604 Rate Verify at 05/20/24 0604      PRN Meds:    Current Facility-Administered Medications:     acetaminophen, 1,000 mg, Oral, Q6H PRN    HYDROcodone-acetaminophen, 1 tablet, Oral, Q6H PRN    melatonin, 6 mg, Oral, Nightly PRN    ondansetron, 4 mg, Intravenous, Q6H PRN    sodium chloride 0.9%, 10 mL, Intravenous, PRN     Assessment/Plan:  Dehydration secondary to vomiting   Vomiting secondary to chemotherapy   Stage IV non-small-cell lung carcinoma   Hypertension   Hyponatremia  Chronic COPD   Hypercholesterolemia   Hypothyroidism   Tobacco abuse  Anemia   Hypokalemia      Continue hydration with normal saline 125 cc/hour  Continue p.o. morphine for pain control  Continue Eliquis 5 mg p.o. b.i.d.  Supplement potassium p.r.n.  Check CBC BMP in a.m.   Possible discharge home tomorrow      Patient condition:  Stable/Fair/Guarded/ Serious/ Critical    Anticipated discharge and Disposition:         All diagnosis and differential diagnosis have been reviewed; assessment and plan has been documented; I have personally reviewed the labs and test results that are presently available; I have reviewed the patients medication list; I have reviewed the consulting providers response and recommendations. I have reviewed or attempted to review medical records  based upon their availability    All of the patient's questions have been  addressed and answered. Patient's is agreeable to the above stated plan. I will continue to monitor closely and make adjustments to medical management as needed.  _____________________________________________________________________    Nutrition Status:    Radiology:  I have personally reviewed the following imaging and agree with the radiologist.     X-Ray Chest 1 View  Narrative: EXAMINATION:  XR CHEST 1 VIEW    CLINICAL HISTORY:  sob;    TECHNIQUE:  Single frontal view of the chest was performed.    COMPARISON:  December 4, 2023    FINDINGS:  The cardiomediastinal silhouette remains unchanged.  Infusion catheter remains.  Chronic pleural blunting is again identified in the right lung base with associated scarring.  The left lung is clear.  No acute infiltrates are seen.  No pneumothorax is noted.  Impression: Chronic changes are identified in the right lung base.  The remainder of the examination appears unremarkable.    Electronically signed by: Perfecto Winn  Date:    05/19/2024  Time:    08:16      Dayron Leong MD  Department of Hospital Medicine   [facility]   05/20/2024

## 2024-05-20 NOTE — CONSULTS
Inpatient Nutrition Assessment    Admit Date: 5/18/2024   Total duration of encounter: 2 days   Patient Age: 64 y.o.    Nutrition Recommendation/Prescription     Pt is likely at risk for Refeeding Syndrome. Initiate nutrition slowly and monitor for signs and symptoms of Refeeding Syndrome.   Add Boost Very High Calorie mixed with Strawberry Ice Cream, TID. Provides 540 kcal, 25 g protein per serving.  Continue current diet as tolerated.   May consider PEG placement if pt is unable to meet >/= 50% of Estimated Needs via po intake alone and if medically appropriate.   Monitor lytes and replete as needed.   Monitor intake, tolerance, weight, and labs.       RD following and available as needed. Thank you.     Communication of Recommendations: reviewed with nurse, reviewed with family, and EMR.     Nutrition Assessment     Malnutrition Assessment/Nutrition-Focused Physical Exam    Malnutrition Context: chronic illness (05/19/24 0813)  Malnutrition Level: moderate (05/19/24 0813)  Energy Intake (Malnutrition): less than 75% for greater than or equal to 3 months (05/19/24 0813)  Weight Loss (Malnutrition): greater than 7.5% in 3 months (05/19/24 0813)                                                  A minimum of two characteristics is recommended for diagnosis of either severe or non-severe malnutrition.    Chart Review    Reason Seen: physician consult for Poor appetite.     Malnutrition Screening Tool Results   Have you recently lost weight without trying?: Yes: 2-13 lbs  Have you been eating poorly because of a decreased appetite?: Yes   MST Score: 2   Diagnosis:  Dehydration secondary to vomiting   Vomiting secondary to chemotherapy     Relevant Medical History:   ASTHMA      COPD (chronic obstructive pulmonary disease)  SEVERE    GERD (gastroesophageal reflux disease)      High cholesterol      HLD (hyperlipidemia)      Hypothyroidism, unspecified      Lung cancer 12/08/2022 invasive adenocarcinoma, poorly  differentiated with solid morphology, focal tumor involvement of visceral pleura and focal tumor vascular invasion (vein), with 2 of 3 posterior mediastinal lymph nodes positive for metastatic carcinoma    Stage 2 chronic kidney disease      TIA (transient ischemic attack)  no deficits    Tobacco abuse      Unspecified osteoarthritis, unspecified site      Uterine cancer          Scheduled Medications:  apixaban, 5 mg, BID  aspirin, 81 mg, Daily  atorvastatin, 40 mg, Daily  levothyroxine, 75 mcg, Before breakfast  morphine, 30 mg, BID  mupirocin, , BID  potassium chloride, 40 mEq, Once    Continuous Infusions:  sodium chloride 0.9%, Last Rate: 125 mL/hr at 05/20/24 0604    PRN Medications:  acetaminophen, 1,000 mg, Q6H PRN  HYDROcodone-acetaminophen, 1 tablet, Q6H PRN  melatonin, 6 mg, Nightly PRN  ondansetron, 4 mg, Q6H PRN  promethazine (PHENERGAN) 25 mg in dextrose 5 % (D5W) 50 mL IVPB, 25 mg, Q4H PRN  sodium chloride 0.9%, 10 mL, PRN    Calorie Containing IV Medications: no significant kcals from medications at this time    Recent Labs   Lab 05/15/24  0809 05/18/24  2253 05/19/24  0253 05/20/24  0253   * 124* 125* 134*   K 3.9 4.8 3.6 3.0*   CALCIUM 8.2* 7.8* 7.3* 6.8*   PHOS 2.7  --   --   --    MG 1.70  --   --   --    CHLORIDE 105 95* 97* 106   CO2 21* 19* 19* 20*   BUN 12.0 16.0 12.0 10.0   CREATININE 0.67 0.57 0.49* 0.49*   EGFRNORACEVR >60 >60 >60 >60   GLUCOSE 124* 125* 115 100   BILITOT 0.4 1.1  --   --    ALKPHOS 146 113  --   --    ALT 9 19  --   --    AST 20 35*  --   --    ALBUMIN 2.2* 2.9*  --   --    WBC 5.18 6.06  --  2.49*   HGB 9.2* 10.1*  --  8.1*   HCT 29.8* 31.4*  --  25.5*     Nutrition Orders:  Diet Low Sodium, 2gm  Dietary nutrition supplements Boost Very High Calorie Nutritional Drink - Vanilla; TID    Appetite/Oral Intake: poor/0-25% of meals  Factors Affecting Nutritional Intake: decreased appetite, nausea, vomiting, and chemotherapy.   Social Needs Impacting Access to Food: none  "identified  Food/Pentecostal/Cultural Preferences:  Drinks Strawberry Ensure and likes Strawberry Flavor.   Food Allergies: none reported  Last Bowel Movement: 24  Wound(s):      Comments    : Consult received secondary to pt with poor appetite and intake. Pt unable to provide information during rounds. Several family members present and provided information. Also obtained information from nursing and EMR. Pt diagnosed with Stage IV non-small-cell lung carcinoma. Family reports pt has received radiation and is receiving chemotherapy. Noted lunch tray at bedside had not been touched. Family confirms pt is not able to eat secondary to nausea and vomiting. Severe muscle and fat wasting noted. Family states pt typically drinks Strawberry Ensure at home and had been able to tolerate it in the past. Agreed to Boost Very High Calorie. Hospital does not have Strawberry, only Vanilla. Will ask kitchen to mix with Strawberry ice cream and see if pt will tolerated. Pt is likely at Risk for Refeeding Syndrome. Recommend initiating nutrition slowly and monitoring for signs and symptoms of Refeeding Syndrome. Labs and meds reviewed. Will continue to monitor during stay.     Anthropometrics    Height: 5' 7.01" (170.2 cm), Height Method: Stated  Last Weight: 52.2 kg (115 lb) (24), Weight Method: Standard Scale  BMI (Calculated): 18  BMI Classification: underweight (BMI less than 18.5)     Ideal Body Weight (IBW), Female: 135.05 lb     % Ideal Body Weight, Female (lb): 85.19 %                    Usual Body Weight (UBW), k.6 kg  % Usual Body Weight: 82.19     Usual Weight Provided By: EMR weight history    Wt Readings from Last 5 Encounters:   24 52.2 kg (115 lb)   24 50.6 kg (111 lb 9.6 oz)   05/15/24 51.3 kg (113 lb 1.6 oz)   24 52.9 kg (116 lb 10 oz)   24 54.4 kg (120 lb)     Weight Change(s) Since Admission:   Wt Readings from Last 1 Encounters:   24 52.2 kg (115 lb) "   Admit Weight: 52.2 kg (115 lb) (05/18/24 2212), Weight Method: Standard Scale    Estimated Needs    Weight Used For Calorie Calculations: 52.2 kg (115 lb 1.3 oz)  Energy Calorie Requirements (kcal): 1560 to 1827 kcals/day (30-35 kcals/kg/CBW). (Pt is likely at risk for Refeeding Syndrome. Initiate nutrition slowly and monitor for signs and symptoms of Refeeding Syndrome.)  Energy Need Method: Kcal/kg  Weight Used For Protein Calculations: 52.2 kg (115 lb 1.3 oz)  Protein Requirements: 80 to 110 g/day (1.5-2.0 g/kg/CBW).  Fluid Requirements (mL): 1560 to 1827 mL/day (1mL/kcal) or per MD Guidance.        Enteral Nutrition     Patient not receiving enteral nutrition at this time.    Parenteral Nutrition     Patient not receiving parenteral nutrition support at this time.    Evaluation of Received Nutrient Intake    Calories: not meeting estimated needs  Protein: not meeting estimated needs    Patient Education     Not applicable.    Nutrition Diagnosis     PES: Inadequate energy intake related to catabolic illness as evidenced by </= 50% Estimated Energy Requirements >/= 7 days;  4% weight loss x 1 week; Nausea and vomiting. (new)     PES: Severe chronic disease or condition related malnutrition related to catabolic illness as evidenced by less than 75% needs met for greater than or equal to 1 month, severe fat depletion, severe muscle depletion, and greater than 2% weight loss in 1 week. (new)    Nutrition Interventions     Intervention(s): commercial beverage and collaboration with other providers    Goal: Maintain weight throughout hospitalization. (new)    Nutrition Goals & Monitoring     Dietitian will monitor: food and beverage intake, energy intake, weight, weight change, electrolyte/renal panel, glucose/endocrine profile, and gastrointestinal profile  Discharge planning: too early to determine; pending clinical course  Nutrition Risk/Follow-Up: high (follow-up in 1-4 days)   Please consult if re-assessment  needed sooner.

## 2024-05-21 VITALS
RESPIRATION RATE: 16 BRPM | WEIGHT: 115 LBS | OXYGEN SATURATION: 96 % | HEART RATE: 109 BPM | SYSTOLIC BLOOD PRESSURE: 107 MMHG | BODY MASS INDEX: 18.05 KG/M2 | TEMPERATURE: 98 F | DIASTOLIC BLOOD PRESSURE: 64 MMHG | HEIGHT: 67 IN

## 2024-05-21 PROBLEM — R11.2 CHEMOTHERAPY INDUCED NAUSEA AND VOMITING: Status: ACTIVE | Noted: 2024-05-21

## 2024-05-21 PROBLEM — T45.1X5A CHEMOTHERAPY INDUCED NAUSEA AND VOMITING: Status: ACTIVE | Noted: 2024-05-21

## 2024-05-21 LAB
ANION GAP SERPL CALC-SCNC: 7 MEQ/L
BASOPHILS # BLD AUTO: 0.02 X10(3)/MCL
BASOPHILS NFR BLD AUTO: 1.1 %
BUN SERPL-MCNC: 5 MG/DL (ref 9.8–20.1)
CALCIUM SERPL-MCNC: 6.5 MG/DL (ref 8.4–10.2)
CHLORIDE SERPL-SCNC: 106 MMOL/L (ref 98–107)
CO2 SERPL-SCNC: 19 MMOL/L (ref 23–31)
CREAT SERPL-MCNC: 0.51 MG/DL (ref 0.55–1.02)
CREAT/UREA NIT SERPL: 10
EOSINOPHIL # BLD AUTO: 0.01 X10(3)/MCL (ref 0–0.9)
EOSINOPHIL NFR BLD AUTO: 0.6 %
ERYTHROCYTE [DISTWIDTH] IN BLOOD BY AUTOMATED COUNT: 18.7 % (ref 11.5–17)
GFR SERPLBLD CREATININE-BSD FMLA CKD-EPI: >60 ML/MIN/1.73/M2
GLUCOSE SERPL-MCNC: 80 MG/DL (ref 82–115)
HCT VFR BLD AUTO: 25.2 % (ref 37–47)
HGB BLD-MCNC: 7.9 G/DL (ref 12–16)
IMM GRANULOCYTES # BLD AUTO: 0.04 X10(3)/MCL (ref 0–0.04)
IMM GRANULOCYTES NFR BLD AUTO: 2.3 %
LYMPHOCYTES # BLD AUTO: 0.29 X10(3)/MCL (ref 0.6–4.6)
LYMPHOCYTES NFR BLD AUTO: 16.5 %
MCH RBC QN AUTO: 25.2 PG (ref 27–31)
MCHC RBC AUTO-ENTMCNC: 31.3 G/DL (ref 33–36)
MCV RBC AUTO: 80.5 FL (ref 80–94)
MONOCYTES # BLD AUTO: 0.04 X10(3)/MCL (ref 0.1–1.3)
MONOCYTES NFR BLD AUTO: 2.3 %
NEUTROPHILS # BLD AUTO: 1.36 X10(3)/MCL (ref 2.1–9.2)
NEUTROPHILS NFR BLD AUTO: 77.2 %
PLATELET # BLD AUTO: 471 X10(3)/MCL (ref 130–400)
PMV BLD AUTO: 9.4 FL (ref 7.4–10.4)
POCT GLUCOSE: 116 MG/DL (ref 70–110)
POTASSIUM SERPL-SCNC: 3 MMOL/L (ref 3.5–5.1)
RBC # BLD AUTO: 3.13 X10(6)/MCL (ref 4.2–5.4)
SODIUM SERPL-SCNC: 132 MMOL/L (ref 136–145)
WBC # SPEC AUTO: 1.76 X10(3)/MCL (ref 4.5–11.5)

## 2024-05-21 PROCEDURE — 25000003 PHARM REV CODE 250: Performed by: INTERNAL MEDICINE

## 2024-05-21 PROCEDURE — 63600175 PHARM REV CODE 636 W HCPCS: Performed by: EMERGENCY MEDICINE

## 2024-05-21 PROCEDURE — 25000003 PHARM REV CODE 250: Performed by: EMERGENCY MEDICINE

## 2024-05-21 PROCEDURE — 36415 COLL VENOUS BLD VENIPUNCTURE: CPT | Performed by: INTERNAL MEDICINE

## 2024-05-21 PROCEDURE — 63600175 PHARM REV CODE 636 W HCPCS: Performed by: INTERNAL MEDICINE

## 2024-05-21 PROCEDURE — 85025 COMPLETE CBC W/AUTO DIFF WBC: CPT | Performed by: INTERNAL MEDICINE

## 2024-05-21 PROCEDURE — 80048 BASIC METABOLIC PNL TOTAL CA: CPT | Performed by: INTERNAL MEDICINE

## 2024-05-21 PROCEDURE — 97161 PT EVAL LOW COMPLEX 20 MIN: CPT

## 2024-05-21 PROCEDURE — 97530 THERAPEUTIC ACTIVITIES: CPT

## 2024-05-21 PROCEDURE — 94761 N-INVAS EAR/PLS OXIMETRY MLT: CPT

## 2024-05-21 PROCEDURE — 25000003 PHARM REV CODE 250: Mod: JZ,JG | Performed by: EMERGENCY MEDICINE

## 2024-05-21 RX ORDER — CALCIUM GLUCONATE 20 MG/ML
1 INJECTION, SOLUTION INTRAVENOUS ONCE
Status: COMPLETED | OUTPATIENT
Start: 2024-05-21 | End: 2024-05-21

## 2024-05-21 RX ORDER — POTASSIUM CHLORIDE 7.45 MG/ML
10 INJECTION INTRAVENOUS
Status: DISCONTINUED | OUTPATIENT
Start: 2024-05-21 | End: 2024-05-21

## 2024-05-21 RX ORDER — LORATADINE 10 MG/1
10 TABLET ORAL DAILY PRN
Start: 2024-05-21 | End: 2025-05-21

## 2024-05-21 RX ORDER — POTASSIUM CHLORIDE 7.45 MG/ML
10 INJECTION INTRAVENOUS ONCE
Status: COMPLETED | OUTPATIENT
Start: 2024-05-21 | End: 2024-05-21

## 2024-05-21 RX ADMIN — CALCIUM GLUCONATE 1 G: 20 INJECTION, SOLUTION INTRAVENOUS at 10:05

## 2024-05-21 RX ADMIN — ASPIRIN 81 MG: 81 TABLET, COATED ORAL at 10:05

## 2024-05-21 RX ADMIN — SODIUM CHLORIDE: 9 INJECTION, SOLUTION INTRAVENOUS at 07:05

## 2024-05-21 RX ADMIN — POTASSIUM CHLORIDE 10 MEQ: 7.46 INJECTION, SOLUTION INTRAVENOUS at 01:05

## 2024-05-21 RX ADMIN — POTASSIUM CHLORIDE 10 MEQ: 7.46 INJECTION, SOLUTION INTRAVENOUS at 10:05

## 2024-05-21 RX ADMIN — ATORVASTATIN CALCIUM 40 MG: 40 TABLET, FILM COATED ORAL at 10:05

## 2024-05-21 RX ADMIN — APIXABAN 5 MG: 5 TABLET, FILM COATED ORAL at 10:05

## 2024-05-21 RX ADMIN — MORPHINE SULFATE 30 MG: 15 TABLET, FILM COATED, EXTENDED RELEASE ORAL at 10:05

## 2024-05-21 RX ADMIN — LEVOTHYROXINE SODIUM 75 MCG: 75 TABLET ORAL at 07:05

## 2024-05-21 RX ADMIN — ONDANSETRON 4 MG: 2 INJECTION INTRAMUSCULAR; INTRAVENOUS at 10:05

## 2024-05-21 NOTE — PT/OT/SLP EVAL
Physical Therapy Evaluation    Patient Name:  Mansi Jin   MRN:  [id number]    Recommendations:     Discharge Recommendations: No Therapy Indicated   Discharge Equipment Recommendations: none   Barriers to discharge:  current medical status    Assessment:     Mansi Jin is a 64 y.o. female admitted with a medical diagnosis of Chemotherapy induced nausea and vomiting.  She presents with the following impairments/functional limitations: weakness, impaired endurance, impaired functional mobility .    Rehab Prognosis: Good; patient would benefit from acute skilled PT services to address these deficits and reach maximum level of function.    Recent Surgery: * No surgery found *      Plan:     During this hospitalization, patient to be seen 5 x/week to address the identified rehab impairments via gait training, therapeutic activities, therapeutic exercises and progress toward the following goals:    Plan of Care Expires:       Subjective     Chief Complaint: Pt hoping to go home today, has been in the bed for her stay and reports she may be a little weak today.  Patient/Family Comments/goals: to return home   Pain/Comfort:       Patients cultural, spiritual, Latter day conflicts given the current situation:      Living Environment:  Pt lives in  home with 3 steps 1 rail to enter with family  Prior to admission, patients level of function was I amb.  Equipment used at home: bedside commode, walker, rolling.  DME owned (not currently used): none.  Upon discharge, patient will have assistance from family.    Objective:     Communicated with pt and family and nurse prior to session.  Patient found HOB elevated with    upon PT entry to room.    General Precautions: Standard, neutropenic  Orthopedic Precautions:    Braces:    Respiratory Status: Room air    Exams:  RLE ROM: WFL  LLE ROM: WFL    Functional Mobility:  Bed Mobility:     Scooting: stand by assistance  Supine to Sit: stand by assistance  Transfers:      Sit to Stand:  contact guard assistance with no AD  Gait: amb to chair 6ft SBA  Balance: fair+      AM-PAC 6 CLICK MOBILITY  Total Score:        Treatment & Education:  Pt declines use of walker, states she is indep amb at home.    Patient left up in chair with all lines intact, call button in reach, and nurse present.    GOALS:   Multidisciplinary Problems       Physical Therapy Goals          Problem: Physical Therapy    Goal Priority Disciplines Outcome Goal Variances Interventions   Physical Therapy Goal     PT, PT/OT Progressing     Description: Pt will be supervised/assisted by PT to get up to chair and amb to tolerance while inpt                       History:     Past Medical History:   Diagnosis Date    ASTHMA     COPD (chronic obstructive pulmonary disease)     SEVERE    GERD (gastroesophageal reflux disease)     High cholesterol     HLD (hyperlipidemia)     Hypothyroidism, unspecified     Lung cancer 12/08/2022    invasive adenocarcinoma, poorly differentiated with solid morphology, focal tumor involvement of visceral pleura and focal tumor vascular invasion (vein), with 2 of 3 posterior mediastinal lymph nodes positive for metastatic carcinoma    Stage 2 chronic kidney disease     TIA (transient ischemic attack)     no deficits    Tobacco abuse     Unspecified osteoarthritis, unspecified site     Uterine cancer 1989       Past Surgical History:   Procedure Laterality Date    COLONOSCOPY N/A 3/26/2024    Procedure: COLONOSCOPY;  Surgeon: Vickey Patino MD;  Location: [facility];  Service: Endoscopy;  Laterality: N/A;  post op: c. diff colitis of right colon    COLONOSCOPY, WITH 1 OR MORE BIOPSIES N/A 3/26/2024    Procedure: COLONOSCOPY, WITH 1 OR MORE BIOPSIES;  Surgeon: Vickey Patino MD;  Location: [facility];  Service: Endoscopy;  Laterality: N/A;  cecum    INSERTION OF TUNNELED CENTRAL VENOUS CATHETER (CVC) WITH SUBCUTANEOUS PORT N/A 12/04/2023    Procedure: [id number];  Surgeon:  Peter Dial MD;  Location: [facility] OR;  Service: Peripheral Vascular;  Laterality: N/A;    LAPAROSCOPIC CHOLECYSTECTOMY WITH CHOLANGIOGRAPHY N/A 3/28/2024    Procedure: CHOLECYSTECTOMY, LAPAROSCOPIC, WITH CHOLANGIOGRAM;  Surgeon: Vickey Patino MD;  Location: [facility] OR;  Service: General;  Laterality: N/A;    lipoma multiple sites      LUNG LOBECTOMY Right 12/08/2022    Procedure: LOBECTOMY, LUNG;  Surgeon: Jass Browne IV, MD;  Location: [facility] OR;  Service: Cardiothoracic;  Laterality: Right;    SURGICAL REMOVAL OF LYMPH NODE  12/08/2022    Procedure: EXCISION, LYMPH NODE;  Surgeon: Jass Browne IV, MD;  Location: [facility] OR;  Service: Cardiothoracic;;    THORACOTOMY Right 12/08/2022    Procedure: THORACOTOMY;  Surgeon: Jass Browne IV, MD;  Location: [facility] OR;  Service: Cardiothoracic;  Laterality: Right;  Right Lower Lobectomy with Lymph Node Dissection    TOTAL ABDOMINAL HYSTERECTOMY W/ BILATERAL SALPINGOOPHORECTOMY  1989       Time Tracking:     PT Received On: 05/21/24  PT Start Time: 0942     PT Stop Time: 1016  PT Total Time (min): 34 min     Billable Minutes: Evaluation 15 and Therapeutic Activity 15      05/21/2024

## 2024-05-21 NOTE — DISCHARGE SUMMARY
[facility] - Medical Surgical Unit  Hospital Medicine  Discharge Summary      Patient Name: Mansi Jin  MRN: [id number]  Admission Date: 5/18/2024  Hospital Length of Stay: 1 days  Discharge Date and Time:  05/21/2024 10:21 AM  Attending Physician: Dayron Leong MD   Discharging Provider: Ward Bradshaw MD  Discharge Provider Team: Networked reference to record PCT   Primary Care Provider: Jennifer Dacosta FNP        HPI:   Patient is a 64-year-old female with history of stage for non-small cell lung cancer who is status post chemotherapy which began this past Wednesday. She presents with nausea, vomiting and weakness that has been worsening since Wednesday. She is prescribed Compazine and Zofran but has not hit relief at home. She is fine to have a serum sodium of 124 and a calcium 7.8. She denies any chest pain or shortness of breath. There's no fever chills, cough, dysuria or diarrhea. Patient is not neutropenic.     * No surgery found *      Hospital Course:   May 19, 2024-dehydration was treated with normal saline 125 cc/hour, no nausea, no vomiting, no fever, complain of generalized weakness     May 20, 2024-patient has nausea and vomiting this morning, no fever, no shortness for breath     5/21-she is feeling better; she is now tolerating liquids and simply has no appetite for solids; pain is well controlled, Respiratory status is at baseline; potassium and calcium were supplemented prior to discharge; she has a scheduled follow-up appointment with Oncology this week; labs will be repeated at that time    ---    Dehydration secondary to vomiting   Vomiting secondary to chemotherapy   Stage IV non-small-cell lung carcinoma   Hypertension   Hyponatremia  COPD without exacerbation  Hypercholesterolemia   Hypothyroidism   Tobacco abuse  Anemia   Hypokalemia  Hypocalcemia  Leukopenia due to chemotherapy    Physical exam  Constitution-cachectic appearing, normally developed female in  NAD  Eyes-PERRL, EOMI  HENT-normocephalic, atraumatic  Neck-supple  Respiratory-normal respirations  Heart-RRR  Abdomen-soft, nontender, nondistended  Genitourinary-deferred  Musculoskeletal-no joint abnormalities, normal ROM throughout  Skin-warm, dry; no rashes  Neurologic-alert and oriented x3    Consults:   Consults (From admission, onward)          Status Ordering Provider     Inpatient consult to Registered Dietitian/Nutritionist  Once        Provider:  (Not yet assigned)    Completed TIFFANIE BRICENO     Inpatient consult to Registered Dietitian/Nutritionist  Once        Provider:  (Not yet assigned)    Completed TIFFANIE BRICENO     Inpatient consult to Social Work/Case Management  Once        Provider:  (Not yet assigned)    Acknowledged TIFFANIE BRICENO            Final Active Diagnoses:    Diagnosis Date Noted POA    PRINCIPAL PROBLEM:  Chemotherapy induced nausea and vomiting [R11.2, T45.1X5A] 05/21/2024 Yes    Moderate protein-calorie malnutrition [E44.0] 05/19/2024 Yes     Chronic    Hyponatremia [E87.1] 05/18/2024 Unknown    NSCLC of right lung [C34.91] 12/28/2022 Yes    Hypertension [I10] 07/14/2022 Yes      Problems Resolved During this Admission:      Discharged Condition: stable    Disposition: Home or Self Care    Follow Up:   Follow-up Information       Jennifer Dacosta FNP Follow up in 1 week(s).    Specialty: Family Medicine  Contact information:  [address] [place] [id number]  [phone]               Phoebe Gomez MD Follow up.    Specialty: Hematology and Oncology  Why: Keep scheduled appointment  Contact information:  [address] [address]  [phone]                           Patient Instructions:      Diet Adult Regular     Activity as tolerated     Medications:  Reconciled Home Medications:      Medication List        CHANGE how you take these medications      fluticasone propionate 50 mcg/actuation nasal spray  Commonly known as:  FLONASE  1 spray (50 mcg total) by Each Nostril route once daily.  What changed:   when to take this  reasons to take this     gabapentin 300 MG capsule  Commonly known as: NEURONTIN  Take 1 capsule (300 mg total) by mouth 3 (three) times daily.  What changed:   when to take this  reasons to take this     loratadine 10 mg tablet  Commonly known as: CLARITIN  Take 1 tablet (10 mg total) by mouth daily as needed for Allergies.  What changed:   when to take this  reasons to take this            CONTINUE taking these medications      acetaminophen 500 MG tablet  Commonly known as: TYLENOL  Take 1,000 mg by mouth every 6 (six) hours as needed for Pain.     albuterol 5 mg/mL nebulizer solution  Commonly known as: PROVENTIL  Take 2.5 mg by nebulization 3 (three) times daily as needed for Wheezing. rescue     apixaban 5 mg Tab  Commonly known as: ELIQUIS  Take 1 tablet (5 mg total) by mouth 2 (two) times daily.     aspirin 81 MG EC tablet  Commonly known as: ECOTRIN  Take 81 mg by mouth once daily.     atorvastatin 40 MG tablet  Commonly known as: LIPITOR  Take 40 mg by mouth once daily.     cholecalciferol (vitamin D3) 1,250 mcg (50,000 unit) capsule  Take 50,000 Units by mouth every 7 days.     dexAMETHasone 4 MG Tab  Commonly known as: DECADRON  TAKE 2 TABLETS BY MOUTH DAY PRIOR TO CHEMPTHERAPY AND ON DAY 2, 3 AND 4 FOLLOW CHEMOTHERAPY     FEOSOL BIFERA 28 mg Tab  Generic drug: iron polysac-iron heme polypep  Take 1 tablet by mouth every other day. Takes 3 times a week     fluticasone-umeclidin-vilanter 100-62.5-25 mcg Dsdv  Commonly known as: TRELEGY ELLIPTA  Inhale 1 puff into the lungs once daily.     folic acid 1 MG tablet  Commonly known as: FOLVITE  Take 1 tablet (1 mg total) by mouth once daily.     HYDROcodone-acetaminophen  mg per tablet  Commonly known as: NORCO  Take 1 tablet by mouth every 6 (six) hours as needed for Pain.     ipratropium 17 mcg/actuation inhaler  Commonly known as: ATROVENT HFA  Inhale  2 puffs into the lungs every 6 (six) hours. Rescue     ipratropium-albuteroL  mcg/actuation inhaler  Commonly known as: CombiVENT  Inhale 1 puff into the lungs every 6 (six) hours as needed for Wheezing. Rescue     levothyroxine 75 MCG tablet  Commonly known as: SYNTHROID  Take 75 mcg by mouth before breakfast.     mirtazapine 15 MG tablet  Commonly known as: REMERON  Take 1 tablet (15 mg total) by mouth every evening.     morphine 30 MG 12 hr tablet  Commonly known as: MS CONTIN  Take 1 tablet (30 mg total) by mouth 2 (two) times daily.     ondansetron 8 MG Tbdl  Commonly known as: ZOFRAN-ODT  Take 1 tablet (8 mg total) by mouth every 6 (six) hours as needed (nasuea/vomting).     pantoprazole 40 MG tablet  Commonly known as: PROTONIX  Take 1 tablet (40 mg total) by mouth once daily.     potassium chloride 10 MEQ Cpsr  Commonly known as: MICRO-K  Take 2 capsules (20 mEq total) by mouth once daily.     prochlorperazine 10 MG tablet  Commonly known as: COMPAZINE  Take 1 tablet (10 mg total) by mouth every 6 (six) hours as needed (nasuea).     triamcinolone acetonide 0.1% 0.1 % cream  Commonly known as: KENALOG     VITAMIN C 500 MG tablet  Generic drug: ascorbic acid (vitamin C)  Take 1,000 mg by mouth once daily.            ASK your doctor about these medications      k phos di & mono-sod phos mono 250 mg Tab  Commonly known as: K-Phos-NeutraL  Take 1 tablet by mouth 2 (two) times a day.     megestroL 40 MG Tab  Commonly known as: MEGACE  Take 1 tablet (40 mg total) by mouth 4 (four) times daily For low appetite..     vancomycin 250 MG capsule  Commonly known as: VANCOCIN  TAKE 1 CAPSULE BY MOUTH EVERY 6 HOURS AS DIRECTED              Significant Diagnostic Studies: Labs: CMP   Recent Labs   Lab 05/20/24  0253 05/21/24  0251   * 132*   K 3.0* 3.0*   CO2 20* 19*   BUN 10.0 5.0*   CREATININE 0.49* 0.51*   CALCIUM 6.8* 6.5*    and CBC   Recent Labs   Lab 05/20/24  0253 05/21/24  0251   WBC 2.49* 1.76*   HGB  8.1* 7.9*   HCT 25.5* 25.2*   * 471*     Radiology:   CXR  Impression:  Chronic changes are identified in the right lung base.  The remainder of the examination appears unremarkable.    Pending Diagnostic Studies:       None          Indwelling Lines/Drains at time of discharge:   Lines/Drains/Airways       Central Venous Catheter Line  Duration                  PowerPort A Cath Single Lumen 12/04/23 0800 Atrial Right 169 days                  Social determinants of health limiting diagnosis/treatment   none      Time spent on the discharge of patient: 41 minutes         Ward Bradshaw MD  Department of Hospital Medicine  [facility] - [facility] Surgical [facility]

## 2024-05-21 NOTE — PLAN OF CARE
05/21/24 1158   Final Note   Assessment Type Final Discharge Note   Anticipated Discharge Disposition Home   Post-Acute Status   Discharge Delays None known at this time     D/c home.  No needs.

## 2024-05-22 ENCOUNTER — PATIENT OUTREACH (OUTPATIENT)
Dept: ADMINISTRATIVE | Facility: CLINIC | Age: 64
End: 2024-05-22
Payer: MEDICARE

## 2024-05-24 ENCOUNTER — LAB VISIT (OUTPATIENT)
Dept: LAB | Facility: HOSPITAL | Age: 64
End: 2024-05-24
Payer: MEDICARE

## 2024-05-24 ENCOUNTER — INFUSION (OUTPATIENT)
Dept: INFUSION THERAPY | Facility: HOSPITAL | Age: 64
DRG: 392 | End: 2024-05-24
Attending: NURSE PRACTITIONER
Payer: MEDICARE

## 2024-05-24 VITALS
HEART RATE: 118 BPM | HEIGHT: 67 IN | TEMPERATURE: 98 F | BODY MASS INDEX: 16.45 KG/M2 | WEIGHT: 104.81 LBS | SYSTOLIC BLOOD PRESSURE: 104 MMHG | OXYGEN SATURATION: 99 % | DIASTOLIC BLOOD PRESSURE: 68 MMHG | RESPIRATION RATE: 18 BRPM

## 2024-05-24 DIAGNOSIS — R11.0 CHEMOTHERAPY-INDUCED NAUSEA: ICD-10-CM

## 2024-05-24 DIAGNOSIS — C34.91 NSCLC OF RIGHT LUNG: Primary | ICD-10-CM

## 2024-05-24 DIAGNOSIS — Z72.0 NICOTINE ABUSE: Primary | ICD-10-CM

## 2024-05-24 DIAGNOSIS — C79.51 METASTASIS TO BONE: ICD-10-CM

## 2024-05-24 DIAGNOSIS — F17.203 NICOTINE WITHDRAWAL: ICD-10-CM

## 2024-05-24 DIAGNOSIS — C34.91 NSCLC OF RIGHT LUNG: ICD-10-CM

## 2024-05-24 DIAGNOSIS — R64 CANCER CACHEXIA: Primary | ICD-10-CM

## 2024-05-24 DIAGNOSIS — T45.1X5A CHEMOTHERAPY-INDUCED NAUSEA: ICD-10-CM

## 2024-05-24 DIAGNOSIS — F17.213 CIGARETTE NICOTINE DEPENDENCE WITH WITHDRAWAL: ICD-10-CM

## 2024-05-24 LAB
ALBUMIN SERPL-MCNC: 2.6 G/DL (ref 3.4–4.8)
ALBUMIN/GLOB SERPL: 0.7 RATIO (ref 1.1–2)
ALP SERPL-CCNC: 80 UNIT/L (ref 40–150)
ALT SERPL-CCNC: 20 UNIT/L (ref 0–55)
ANION GAP SERPL CALC-SCNC: 14 MEQ/L
AST SERPL-CCNC: 23 UNIT/L (ref 5–34)
BASOPHILS # BLD AUTO: 0.01 X10(3)/MCL
BASOPHILS NFR BLD AUTO: 0.4 %
BILIRUB SERPL-MCNC: 0.3 MG/DL
BUN SERPL-MCNC: 7 MG/DL (ref 9.8–20.1)
CALCIUM SERPL-MCNC: 8.4 MG/DL (ref 8.4–10.2)
CHLORIDE SERPL-SCNC: 101 MMOL/L (ref 98–107)
CO2 SERPL-SCNC: 17 MMOL/L (ref 23–31)
CREAT SERPL-MCNC: 0.69 MG/DL (ref 0.55–1.02)
CREAT/UREA NIT SERPL: 10
EOSINOPHIL # BLD AUTO: 0.01 X10(3)/MCL (ref 0–0.9)
EOSINOPHIL NFR BLD AUTO: 0.4 %
ERYTHROCYTE [DISTWIDTH] IN BLOOD BY AUTOMATED COUNT: 18.9 % (ref 11.5–17)
GFR SERPLBLD CREATININE-BSD FMLA CKD-EPI: >60 ML/MIN/1.73/M2
GLOBULIN SER-MCNC: 3.5 GM/DL (ref 2.4–3.5)
GLUCOSE SERPL-MCNC: 69 MG/DL (ref 82–115)
HCT VFR BLD AUTO: 31.2 % (ref 37–47)
HGB BLD-MCNC: 9.7 G/DL (ref 12–16)
IMM GRANULOCYTES # BLD AUTO: 0.1 X10(3)/MCL (ref 0–0.04)
IMM GRANULOCYTES NFR BLD AUTO: 3.6 %
LYMPHOCYTES # BLD AUTO: 0.45 X10(3)/MCL (ref 0.6–4.6)
LYMPHOCYTES NFR BLD AUTO: 16 %
MAGNESIUM SERPL-MCNC: 1.8 MG/DL (ref 1.6–2.6)
MCH RBC QN AUTO: 25.1 PG (ref 27–31)
MCHC RBC AUTO-ENTMCNC: 31.1 G/DL (ref 33–36)
MCV RBC AUTO: 80.8 FL (ref 80–94)
MONOCYTES # BLD AUTO: 0.48 X10(3)/MCL (ref 0.1–1.3)
MONOCYTES NFR BLD AUTO: 17.1 %
NEUTROPHILS # BLD AUTO: 1.76 X10(3)/MCL (ref 2.1–9.2)
NEUTROPHILS NFR BLD AUTO: 62.5 %
PLATELET # BLD AUTO: 586 X10(3)/MCL (ref 130–400)
PMV BLD AUTO: 9.5 FL (ref 7.4–10.4)
POTASSIUM SERPL-SCNC: 3.2 MMOL/L (ref 3.5–5.1)
PROT SERPL-MCNC: 6.1 GM/DL (ref 5.8–7.6)
RBC # BLD AUTO: 3.86 X10(6)/MCL (ref 4.2–5.4)
SODIUM SERPL-SCNC: 132 MMOL/L (ref 136–145)
WBC # SPEC AUTO: 2.81 X10(3)/MCL (ref 4.5–11.5)

## 2024-05-24 PROCEDURE — 85025 COMPLETE CBC W/AUTO DIFF WBC: CPT

## 2024-05-24 PROCEDURE — 96361 HYDRATE IV INFUSION ADD-ON: CPT

## 2024-05-24 PROCEDURE — 63600175 PHARM REV CODE 636 W HCPCS

## 2024-05-24 PROCEDURE — A4216 STERILE WATER/SALINE, 10 ML: HCPCS

## 2024-05-24 PROCEDURE — 36415 COLL VENOUS BLD VENIPUNCTURE: CPT

## 2024-05-24 PROCEDURE — 96365 THER/PROPH/DIAG IV INF INIT: CPT

## 2024-05-24 PROCEDURE — 25000003 PHARM REV CODE 250

## 2024-05-24 PROCEDURE — 96366 THER/PROPH/DIAG IV INF ADDON: CPT

## 2024-05-24 PROCEDURE — 80053 COMPREHEN METABOLIC PANEL: CPT

## 2024-05-24 PROCEDURE — 83735 ASSAY OF MAGNESIUM: CPT

## 2024-05-24 RX ORDER — PROMETHAZINE HYDROCHLORIDE 25 MG/1
25 TABLET ORAL EVERY 6 HOURS PRN
Qty: 30 TABLET | Refills: 2 | Status: SHIPPED | OUTPATIENT
Start: 2024-05-24

## 2024-05-24 RX ORDER — HEPARIN 100 UNIT/ML
500 SYRINGE INTRAVENOUS
Status: DISCONTINUED | OUTPATIENT
Start: 2024-05-24 | End: 2024-05-24 | Stop reason: HOSPADM

## 2024-05-24 RX ORDER — HEPARIN 100 UNIT/ML
500 SYRINGE INTRAVENOUS
Status: CANCELLED | OUTPATIENT
Start: 2024-05-24

## 2024-05-24 RX ORDER — MEGESTROL ACETATE 40 MG/ML
200 SUSPENSION ORAL DAILY
Qty: 150 ML | Refills: 11 | Status: SHIPPED | OUTPATIENT
Start: 2024-05-24 | End: 2025-05-24

## 2024-05-24 RX ORDER — SODIUM CHLORIDE 0.9 % (FLUSH) 0.9 %
10 SYRINGE (ML) INJECTION
Status: DISCONTINUED | OUTPATIENT
Start: 2024-05-24 | End: 2024-05-24 | Stop reason: HOSPADM

## 2024-05-24 RX ORDER — IBUPROFEN 200 MG
1 TABLET ORAL DAILY
Qty: 42 PATCH | Refills: 0 | Status: SHIPPED | OUTPATIENT
Start: 2024-05-24 | End: 2024-07-05

## 2024-05-24 RX ORDER — IBUPROFEN 200 MG
1 TABLET ORAL DAILY
Qty: 14 PATCH | Refills: 0 | Status: SHIPPED | OUTPATIENT
Start: 2024-05-24

## 2024-05-24 RX ORDER — SODIUM CHLORIDE 0.9 % (FLUSH) 0.9 %
10 SYRINGE (ML) INJECTION
Status: CANCELLED | OUTPATIENT
Start: 2024-05-24

## 2024-05-24 RX ORDER — NICOTINE 7MG/24HR
1 PATCH, TRANSDERMAL 24 HOURS TRANSDERMAL DAILY
Qty: 14 PATCH | Refills: 0 | Status: SHIPPED | OUTPATIENT
Start: 2024-05-24

## 2024-05-24 RX ADMIN — POTASSIUM BICARBONATE 20 MEQ: 782 TABLET, EFFERVESCENT ORAL at 09:05

## 2024-05-24 RX ADMIN — SODIUM CHLORIDE, PRESERVATIVE FREE 10 ML: 5 INJECTION INTRAVENOUS at 11:05

## 2024-05-24 RX ADMIN — ASCORBIC ACID, VITAMIN A PALMITATE, CHOLECALCIFEROL, THIAMINE HYDROCHLORIDE, RIBOFLAVIN-5 PHOSPHATE SODIUM, PYRIDOXINE HYDROCHLORIDE, NIACINAMIDE, DEXPANTHENOL, ALPHA-TOCOPHEROL ACETATE, VITAMIN K1, FOLIC ACID, BIOTIN, CYANOCOBALAMIN: 200; 3300; 200; 6; 3.6; 6; 40; 15; 10; 150; 600; 60; 5 INJECTION, SOLUTION INTRAVENOUS at 08:05

## 2024-05-24 RX ADMIN — Medication 500 UNITS: at 11:05

## 2024-05-24 RX ADMIN — SODIUM CHLORIDE 500 ML: 9 INJECTION, SOLUTION INTRAVENOUS at 10:05

## 2024-05-28 ENCOUNTER — LAB VISIT (OUTPATIENT)
Dept: LAB | Facility: HOSPITAL | Age: 64
End: 2024-05-28
Payer: MEDICARE

## 2024-05-28 ENCOUNTER — INFUSION (OUTPATIENT)
Dept: INFUSION THERAPY | Facility: HOSPITAL | Age: 64
DRG: 392 | End: 2024-05-28
Attending: NURSE PRACTITIONER
Payer: MEDICARE

## 2024-05-28 VITALS
HEIGHT: 67 IN | OXYGEN SATURATION: 99 % | HEART RATE: 120 BPM | WEIGHT: 105.81 LBS | RESPIRATION RATE: 18 BRPM | BODY MASS INDEX: 16.61 KG/M2 | SYSTOLIC BLOOD PRESSURE: 95 MMHG | TEMPERATURE: 98 F | DIASTOLIC BLOOD PRESSURE: 68 MMHG

## 2024-05-28 DIAGNOSIS — C34.91 NSCLC OF RIGHT LUNG: ICD-10-CM

## 2024-05-28 DIAGNOSIS — C79.51 METASTASIS TO BONE: Primary | ICD-10-CM

## 2024-05-28 DIAGNOSIS — C79.51 METASTASIS TO BONE: ICD-10-CM

## 2024-05-28 DIAGNOSIS — E83.39 HYPOPHOSPHATEMIA: ICD-10-CM

## 2024-05-28 LAB
ALBUMIN SERPL-MCNC: 2.6 G/DL (ref 3.4–4.8)
ALBUMIN/GLOB SERPL: 0.8 RATIO (ref 1.1–2)
ALP SERPL-CCNC: 114 UNIT/L (ref 40–150)
ALT SERPL-CCNC: 15 UNIT/L (ref 0–55)
ANION GAP SERPL CALC-SCNC: 7 MEQ/L
AST SERPL-CCNC: 25 UNIT/L (ref 5–34)
BASOPHILS # BLD AUTO: 0.01 X10(3)/MCL
BASOPHILS NFR BLD AUTO: 0.1 %
BILIRUB SERPL-MCNC: 0.3 MG/DL
BUN SERPL-MCNC: 13 MG/DL (ref 9.8–20.1)
CALCIUM SERPL-MCNC: 8.1 MG/DL (ref 8.4–10.2)
CHLORIDE SERPL-SCNC: 101 MMOL/L (ref 98–107)
CO2 SERPL-SCNC: 27 MMOL/L (ref 23–31)
CREAT SERPL-MCNC: 0.59 MG/DL (ref 0.55–1.02)
CREAT/UREA NIT SERPL: 22
EOSINOPHIL # BLD AUTO: 0 X10(3)/MCL (ref 0–0.9)
EOSINOPHIL NFR BLD AUTO: 0 %
ERYTHROCYTE [DISTWIDTH] IN BLOOD BY AUTOMATED COUNT: 18.7 % (ref 11.5–17)
GFR SERPLBLD CREATININE-BSD FMLA CKD-EPI: >60 ML/MIN/1.73/M2
GLOBULIN SER-MCNC: 3.2 GM/DL (ref 2.4–3.5)
GLUCOSE SERPL-MCNC: 129 MG/DL (ref 82–115)
HCT VFR BLD AUTO: 29.2 % (ref 37–47)
HGB BLD-MCNC: 9.2 G/DL (ref 12–16)
IMM GRANULOCYTES # BLD AUTO: 0.1 X10(3)/MCL (ref 0–0.04)
IMM GRANULOCYTES NFR BLD AUTO: 1.3 %
LYMPHOCYTES # BLD AUTO: 0.49 X10(3)/MCL (ref 0.6–4.6)
LYMPHOCYTES NFR BLD AUTO: 6.4 %
MAGNESIUM SERPL-MCNC: 1.5 MG/DL (ref 1.6–2.6)
MCH RBC QN AUTO: 25 PG (ref 27–31)
MCHC RBC AUTO-ENTMCNC: 31.5 G/DL (ref 33–36)
MCV RBC AUTO: 79.3 FL (ref 80–94)
MONOCYTES # BLD AUTO: 0.61 X10(3)/MCL (ref 0.1–1.3)
MONOCYTES NFR BLD AUTO: 8 %
NEUTROPHILS # BLD AUTO: 6.45 X10(3)/MCL (ref 2.1–9.2)
NEUTROPHILS NFR BLD AUTO: 84.2 %
PHOSPHATE SERPL-MCNC: 1.2 MG/DL (ref 2.3–4.7)
PLATELET # BLD AUTO: 414 X10(3)/MCL (ref 130–400)
PMV BLD AUTO: 8.5 FL (ref 7.4–10.4)
POTASSIUM SERPL-SCNC: 4 MMOL/L (ref 3.5–5.1)
PROT SERPL-MCNC: 5.8 GM/DL (ref 5.8–7.6)
RBC # BLD AUTO: 3.68 X10(6)/MCL (ref 4.2–5.4)
SODIUM SERPL-SCNC: 135 MMOL/L (ref 136–145)
WBC # SPEC AUTO: 7.66 X10(3)/MCL (ref 4.5–11.5)

## 2024-05-28 PROCEDURE — 84100 ASSAY OF PHOSPHORUS: CPT

## 2024-05-28 PROCEDURE — 36415 COLL VENOUS BLD VENIPUNCTURE: CPT

## 2024-05-28 PROCEDURE — 80053 COMPREHEN METABOLIC PANEL: CPT

## 2024-05-28 PROCEDURE — 96372 THER/PROPH/DIAG INJ SC/IM: CPT

## 2024-05-28 PROCEDURE — 83735 ASSAY OF MAGNESIUM: CPT

## 2024-05-28 PROCEDURE — 85025 COMPLETE CBC W/AUTO DIFF WBC: CPT

## 2024-05-28 PROCEDURE — 63600175 PHARM REV CODE 636 W HCPCS: Mod: JZ,JG

## 2024-05-28 RX ADMIN — DENOSUMAB 120 MG: 120 INJECTION SUBCUTANEOUS at 01:05

## 2024-05-31 ENCOUNTER — INFUSION (OUTPATIENT)
Dept: INFUSION THERAPY | Facility: HOSPITAL | Age: 64
DRG: 392 | End: 2024-05-31
Attending: NURSE PRACTITIONER
Payer: MEDICARE

## 2024-05-31 VITALS
TEMPERATURE: 98 F | HEART RATE: 118 BPM | SYSTOLIC BLOOD PRESSURE: 89 MMHG | OXYGEN SATURATION: 97 % | HEIGHT: 67 IN | BODY MASS INDEX: 16.74 KG/M2 | RESPIRATION RATE: 18 BRPM | WEIGHT: 106.63 LBS | DIASTOLIC BLOOD PRESSURE: 63 MMHG

## 2024-05-31 DIAGNOSIS — C34.91 NSCLC OF RIGHT LUNG: Primary | ICD-10-CM

## 2024-05-31 DIAGNOSIS — E83.39 HYPOPHOSPHATEMIA: ICD-10-CM

## 2024-05-31 PROCEDURE — 25000003 PHARM REV CODE 250

## 2024-05-31 PROCEDURE — A4216 STERILE WATER/SALINE, 10 ML: HCPCS

## 2024-05-31 PROCEDURE — 63600175 PHARM REV CODE 636 W HCPCS

## 2024-05-31 PROCEDURE — 63600175 PHARM REV CODE 636 W HCPCS: Performed by: INTERNAL MEDICINE

## 2024-05-31 PROCEDURE — 96365 THER/PROPH/DIAG IV INF INIT: CPT

## 2024-05-31 PROCEDURE — 96366 THER/PROPH/DIAG IV INF ADDON: CPT

## 2024-05-31 RX ORDER — HEPARIN 100 UNIT/ML
500 SYRINGE INTRAVENOUS
Status: DISCONTINUED | OUTPATIENT
Start: 2024-05-31 | End: 2024-05-31 | Stop reason: HOSPADM

## 2024-05-31 RX ORDER — SODIUM CHLORIDE 0.9 % (FLUSH) 0.9 %
10 SYRINGE (ML) INJECTION
Status: CANCELLED | OUTPATIENT
Start: 2024-05-31

## 2024-05-31 RX ORDER — HEPARIN 100 UNIT/ML
500 SYRINGE INTRAVENOUS
Status: CANCELLED | OUTPATIENT
Start: 2024-05-31

## 2024-05-31 RX ORDER — SODIUM CHLORIDE 0.9 % (FLUSH) 0.9 %
10 SYRINGE (ML) INJECTION
Status: DISCONTINUED | OUTPATIENT
Start: 2024-05-31 | End: 2024-05-31 | Stop reason: HOSPADM

## 2024-05-31 RX ADMIN — Medication 500 UNITS: at 10:05

## 2024-05-31 RX ADMIN — SODIUM CHLORIDE, PRESERVATIVE FREE 10 ML: 5 INJECTION INTRAVENOUS at 10:05

## 2024-05-31 RX ADMIN — ASCORBIC ACID, VITAMIN A PALMITATE, CHOLECALCIFEROL, THIAMINE HYDROCHLORIDE, RIBOFLAVIN-5 PHOSPHATE SODIUM, PYRIDOXINE HYDROCHLORIDE, NIACINAMIDE, DEXPANTHENOL, ALPHA-TOCOPHEROL ACETATE, VITAMIN K1, FOLIC ACID, BIOTIN, CYANOCOBALAMIN: 200; 3300; 200; 6; 3.6; 6; 40; 15; 10; 150; 600; 60; 5 INJECTION, SOLUTION INTRAVENOUS at 08:05

## 2024-06-04 ENCOUNTER — TELEPHONE (OUTPATIENT)
Dept: ENDOSCOPY | Facility: HOSPITAL | Age: 64
End: 2024-06-04
Payer: MEDICARE

## 2024-06-04 ENCOUNTER — HOSPITAL ENCOUNTER (EMERGENCY)
Facility: HOSPITAL | Age: 64
Discharge: HOME | End: 2024-06-04
Attending: INTERNAL MEDICINE
Payer: MEDICARE

## 2024-06-04 VITALS
HEIGHT: 67 IN | WEIGHT: 115 LBS | OXYGEN SATURATION: 100 % | DIASTOLIC BLOOD PRESSURE: 71 MMHG | TEMPERATURE: 98 F | RESPIRATION RATE: 13 BRPM | HEART RATE: 117 BPM | SYSTOLIC BLOOD PRESSURE: 106 MMHG | BODY MASS INDEX: 18.05 KG/M2

## 2024-06-04 DIAGNOSIS — R00.0 SINUS TACHYCARDIA: Primary | ICD-10-CM

## 2024-06-04 DIAGNOSIS — N39.0 URINARY TRACT INFECTION WITHOUT HEMATURIA, SITE UNSPECIFIED: ICD-10-CM

## 2024-06-04 LAB
ALBUMIN SERPL-MCNC: 2.3 G/DL (ref 3.4–4.8)
ALBUMIN/GLOB SERPL: 0.6 RATIO (ref 1.1–2)
ALP SERPL-CCNC: 125 UNIT/L (ref 40–150)
ALT SERPL-CCNC: 13 UNIT/L (ref 0–55)
ANION GAP SERPL CALC-SCNC: 10 MEQ/L
AST SERPL-CCNC: 17 UNIT/L (ref 5–34)
BACTERIA #/AREA URNS AUTO: ABNORMAL /HPF
BASOPHILS # BLD AUTO: 0.02 X10(3)/MCL
BASOPHILS NFR BLD AUTO: 0.1 %
BILIRUB SERPL-MCNC: 0.6 MG/DL
BILIRUB UR QL STRIP.AUTO: NEGATIVE
BNP BLD-MCNC: 48.1 PG/ML
BUN SERPL-MCNC: 13 MG/DL (ref 9.8–20.1)
CALCIUM SERPL-MCNC: 8.6 MG/DL (ref 8.4–10.2)
CHLORIDE SERPL-SCNC: 99 MMOL/L (ref 98–107)
CLARITY UR: ABNORMAL
CO2 SERPL-SCNC: 21 MMOL/L (ref 23–31)
COLOR UR AUTO: YELLOW
CREAT SERPL-MCNC: 0.61 MG/DL (ref 0.55–1.02)
CREAT/UREA NIT SERPL: 21
EOSINOPHIL # BLD AUTO: 0.01 X10(3)/MCL (ref 0–0.9)
EOSINOPHIL NFR BLD AUTO: 0.1 %
ERYTHROCYTE [DISTWIDTH] IN BLOOD BY AUTOMATED COUNT: 19.4 % (ref 11.5–17)
GFR SERPLBLD CREATININE-BSD FMLA CKD-EPI: >60 ML/MIN/1.73/M2
GLOBULIN SER-MCNC: 3.6 GM/DL (ref 2.4–3.5)
GLUCOSE SERPL-MCNC: 87 MG/DL (ref 82–115)
GLUCOSE UR QL STRIP: NEGATIVE
HCT VFR BLD AUTO: 31.2 % (ref 37–47)
HGB BLD-MCNC: 10 G/DL (ref 12–16)
HGB UR QL STRIP: ABNORMAL
IMM GRANULOCYTES # BLD AUTO: 0.11 X10(3)/MCL (ref 0–0.04)
IMM GRANULOCYTES NFR BLD AUTO: 0.7 %
KETONES UR QL STRIP: ABNORMAL
LACTATE SERPL-SCNC: 0.9 MMOL/L (ref 0.5–2.2)
LEUKOCYTE ESTERASE UR QL STRIP: ABNORMAL
LYMPHOCYTES # BLD AUTO: 0.61 X10(3)/MCL (ref 0.6–4.6)
LYMPHOCYTES NFR BLD AUTO: 3.9 %
MCH RBC QN AUTO: 25.1 PG (ref 27–31)
MCHC RBC AUTO-ENTMCNC: 32.1 G/DL (ref 33–36)
MCV RBC AUTO: 78.4 FL (ref 80–94)
MONOCYTES # BLD AUTO: 1.29 X10(3)/MCL (ref 0.1–1.3)
MONOCYTES NFR BLD AUTO: 8.2 %
NEUTROPHILS # BLD AUTO: 13.73 X10(3)/MCL (ref 2.1–9.2)
NEUTROPHILS NFR BLD AUTO: 87 %
NITRITE UR QL STRIP: POSITIVE
OHS QRS DURATION: 62 MS
OHS QTC CALCULATION: 424 MS
PH UR STRIP: 6.5 [PH]
PLATELET # BLD AUTO: 368 X10(3)/MCL (ref 130–400)
PMV BLD AUTO: 8.9 FL (ref 7.4–10.4)
POTASSIUM SERPL-SCNC: 4.4 MMOL/L (ref 3.5–5.1)
PROT SERPL-MCNC: 5.9 GM/DL (ref 5.8–7.6)
PROT UR QL STRIP: ABNORMAL
RBC # BLD AUTO: 3.98 X10(6)/MCL (ref 4.2–5.4)
RBC #/AREA URNS AUTO: ABNORMAL /HPF
SODIUM SERPL-SCNC: 130 MMOL/L (ref 136–145)
SP GR UR STRIP.AUTO: 1.01 (ref 1–1.03)
SQUAMOUS #/AREA URNS AUTO: ABNORMAL /HPF
TROPONIN I SERPL-MCNC: <0.01 NG/ML (ref 0–0.04)
TSH SERPL-ACNC: 0.36 UIU/ML (ref 0.35–4.94)
UROBILINOGEN UR STRIP-ACNC: 1
WBC # SPEC AUTO: 15.77 X10(3)/MCL (ref 4.5–11.5)
WBC #/AREA URNS AUTO: >100 /HPF

## 2024-06-04 PROCEDURE — 93005 ELECTROCARDIOGRAM TRACING: CPT

## 2024-06-04 PROCEDURE — 80053 COMPREHEN METABOLIC PANEL: CPT | Performed by: INTERNAL MEDICINE

## 2024-06-04 PROCEDURE — 25000003 PHARM REV CODE 250: Performed by: INTERNAL MEDICINE

## 2024-06-04 PROCEDURE — 96361 HYDRATE IV INFUSION ADD-ON: CPT

## 2024-06-04 PROCEDURE — 87040 BLOOD CULTURE FOR BACTERIA: CPT | Performed by: INTERNAL MEDICINE

## 2024-06-04 PROCEDURE — 63600175 PHARM REV CODE 636 W HCPCS: Performed by: INTERNAL MEDICINE

## 2024-06-04 PROCEDURE — 81003 URINALYSIS AUTO W/O SCOPE: CPT | Performed by: INTERNAL MEDICINE

## 2024-06-04 PROCEDURE — 84484 ASSAY OF TROPONIN QUANT: CPT | Performed by: INTERNAL MEDICINE

## 2024-06-04 PROCEDURE — 96365 THER/PROPH/DIAG IV INF INIT: CPT

## 2024-06-04 PROCEDURE — 85025 COMPLETE CBC W/AUTO DIFF WBC: CPT | Performed by: INTERNAL MEDICINE

## 2024-06-04 PROCEDURE — 96366 THER/PROPH/DIAG IV INF ADDON: CPT

## 2024-06-04 PROCEDURE — 84443 ASSAY THYROID STIM HORMONE: CPT | Performed by: INTERNAL MEDICINE

## 2024-06-04 PROCEDURE — 83605 ASSAY OF LACTIC ACID: CPT | Performed by: INTERNAL MEDICINE

## 2024-06-04 PROCEDURE — 99284 EMERGENCY DEPT VISIT MOD MDM: CPT | Mod: 25

## 2024-06-04 PROCEDURE — 87086 URINE CULTURE/COLONY COUNT: CPT | Performed by: INTERNAL MEDICINE

## 2024-06-04 PROCEDURE — 83880 ASSAY OF NATRIURETIC PEPTIDE: CPT | Performed by: INTERNAL MEDICINE

## 2024-06-04 RX ORDER — CEFDINIR 300 MG/1
300 CAPSULE ORAL 2 TIMES DAILY
Qty: 20 CAPSULE | Refills: 0 | Status: SHIPPED | OUTPATIENT
Start: 2024-06-04 | End: 2024-06-14

## 2024-06-04 RX ADMIN — SODIUM CHLORIDE, POTASSIUM CHLORIDE, SODIUM LACTATE AND CALCIUM CHLORIDE 1566 ML: 600; 310; 30; 20 INJECTION, SOLUTION INTRAVENOUS at 02:06

## 2024-06-04 RX ADMIN — CEFTRIAXONE SODIUM 1 G: 1 INJECTION, POWDER, FOR SOLUTION INTRAMUSCULAR; INTRAVENOUS at 03:06

## 2024-06-04 NOTE — TELEPHONE ENCOUNTER
Notified patient's daughter Ms. Mcnair to inform her, 07/11/2024 colonoscopy procedure will be cancelled. Ms. Mcnair states, her mother had a colonoscopy in March 2024 and is being followed by Dr. Barger in [place]. Instructed Ms. Mcnair to call with any questions or concerns with ED precautions. Ms. Mcnair verbalized understanding.MS

## 2024-06-04 NOTE — ED PROVIDER NOTES
Encounter Date: 6/4/2024  History from patient, daughter     History     Chief Complaint   Patient presents with    Tachycardia     [facility] home health nurse told her to come because her heart rate was 152 at home. Daughter states she has seen a cardiologist in February for this and it was determined to be elevated due to her chemo. Pt states she feels ok     HPI    Mansi Jin is 64 y.o. female who  has a past medical history of ASTHMA, COPD (chronic obstructive pulmonary disease), GERD (gastroesophageal reflux disease), High cholesterol, HLD (hyperlipidemia), Hypothyroidism, unspecified, Lung cancer (12/08/2022), Stage 2 chronic kidney disease, TIA (transient ischemic attack), Tobacco abuse, Unspecified osteoarthritis, unspecified site, and Uterine cancer (1989). arrives in ER with c/o Tachycardia ([facility] home health nurse told her to come because her heart rate was 152 at home. Daughter states she has seen a cardiologist in February for this and it was determined to be elevated due to her chemo. Pt states she feels ok)      Review of patient's allergies indicates:   Allergen Reactions    Dilaudid [hydromorphone] Itching and Swelling     Past Medical History:   Diagnosis Date    ASTHMA     COPD (chronic obstructive pulmonary disease)     SEVERE    GERD (gastroesophageal reflux disease)     High cholesterol     HLD (hyperlipidemia)     Hypothyroidism, unspecified     Lung cancer 12/08/2022    invasive adenocarcinoma, poorly differentiated with solid morphology, focal tumor involvement of visceral pleura and focal tumor vascular invasion (vein), with 2 of 3 posterior mediastinal lymph nodes positive for metastatic carcinoma    Stage 2 chronic kidney disease     TIA (transient ischemic attack)     no deficits    Tobacco abuse     Unspecified osteoarthritis, unspecified site     Uterine cancer 1989     Past Surgical History:   Procedure Laterality Date    COLONOSCOPY N/A 3/26/2024    Procedure: COLONOSCOPY;   Surgeon: Vickey Patino MD;  Location: [facility];  Service: Endoscopy;  Laterality: N/A;  post op: c. diff colitis of right colon    COLONOSCOPY, WITH 1 OR MORE BIOPSIES N/A 3/26/2024    Procedure: COLONOSCOPY, WITH 1 OR MORE BIOPSIES;  Surgeon: Vickey Patino MD;  Location: [facility] ENDO;  Service: Endoscopy;  Laterality: N/A;  cecum    INSERTION OF TUNNELED CENTRAL VENOUS CATHETER (CVC) WITH SUBCUTANEOUS PORT N/A 12/04/2023    Procedure: [id number];  Surgeon: Peter Dial MD;  Location: [facility];  Service: Peripheral Vascular;  Laterality: N/A;    LAPAROSCOPIC CHOLECYSTECTOMY WITH CHOLANGIOGRAPHY N/A 3/28/2024    Procedure: CHOLECYSTECTOMY, LAPAROSCOPIC, WITH CHOLANGIOGRAM;  Surgeon: Vickey Patino MD;  Location: [facility] OR;  Service: General;  Laterality: N/A;    lipoma multiple sites      LUNG LOBECTOMY Right 12/08/2022    Procedure: LOBECTOMY, LUNG;  Surgeon: Jass Browne IV, MD;  Location: [facility] OR;  Service: Cardiothoracic;  Laterality: Right;    SURGICAL REMOVAL OF LYMPH NODE  12/08/2022    Procedure: EXCISION, LYMPH NODE;  Surgeon: Jass Browne IV, MD;  Location: [facility] OR;  Service: Cardiothoracic;;    THORACOTOMY Right 12/08/2022    Procedure: THORACOTOMY;  Surgeon: Jass Browne IV, MD;  Location: [facility] OR;  Service: Cardiothoracic;  Laterality: Right;  Right Lower Lobectomy with Lymph Node Dissection    TOTAL ABDOMINAL HYSTERECTOMY W/ BILATERAL SALPINGOOPHORECTOMY  1989     Family History   Problem Relation Name Age of Onset    Cancer Maternal Grandfather      Heart failure Father      Prostate cancer Father      Hypertension Mother      Hepatitis Mother      Ovarian cancer Mother      Liver cancer Mother      Asthma Brother      Asthma Brother      Fibroids Sister      Skin cancer Sister       Social History     Tobacco Use    Smoking status: Former     Current packs/day: 0.00     Average packs/day: 0.5 packs/day for 50.0 years (25.0 ttl pk-yrs)     Types: Cigarettes      Start date: 1972     Quit date: 2022     Years since quittin.4    Smokeless tobacco: Never   Substance Use Topics    Alcohol use: Not Currently     Comment: quit 25years ago    Drug use: Never     Review of Systems   Constitutional:  Negative for fever.   HENT:  Negative for trouble swallowing and voice change.    Eyes:  Negative for visual disturbance.   Respiratory:  Negative for cough and shortness of breath.    Cardiovascular:  Negative for chest pain.   Gastrointestinal:  Negative for abdominal pain, diarrhea and vomiting.   Genitourinary:  Positive for flank pain. Negative for dysuria and hematuria.   Musculoskeletal:  Negative for back pain and gait problem.   Skin:  Negative for color change and rash.   Neurological:  Negative for headaches.   Psychiatric/Behavioral:  Negative for behavioral problems and sleep disturbance.    All other systems reviewed and are negative.      Physical Exam     Initial Vitals [24 1330]   BP Pulse Resp Temp SpO2   99/75 (!) 140 15 97.6 °F (36.4 °C) 96 %      MAP       --         Physical Exam    Nursing note and vitals reviewed.  Constitutional: She appears well-developed. No distress.   Elderly looking frail lady, skin and bones   HENT:   Head: Atraumatic.   Dry mucous membranes   Eyes: EOM are normal.   Neck: Neck supple.   Cardiovascular:  Normal rate, regular rhythm and normal heart sounds.           Pulmonary/Chest: Breath sounds normal. No respiratory distress. She has no wheezes. She has no rales.   Abdominal: Abdomen is soft. Bowel sounds are normal. She exhibits no distension. There is no abdominal tenderness.   Left CVA tenderness There is no rebound.   Musculoskeletal:         General: No tenderness or edema. Normal range of motion.      Cervical back: Neck supple.     Neurological: She is alert and oriented to person, place, and time. GCS score is 15. GCS eye subscore is 4. GCS verbal subscore is 5. GCS motor subscore is 6.   Skin: Skin is warm  and dry.   Psychiatric: She has a normal mood and affect.         ED Course   Procedures  Labs Reviewed   COMPREHENSIVE METABOLIC PANEL - Abnormal; Notable for the following components:       Result Value    Sodium 130 (*)     CO2 21 (*)     Albumin 2.3 (*)     Globulin 3.6 (*)     Albumin/Globulin Ratio 0.6 (*)     All other components within normal limits   URINALYSIS, REFLEX TO URINE CULTURE - Abnormal; Notable for the following components:    Appearance, UA Cloudy (*)     Protein, UA 1+ (*)     Ketones, UA 4+ (*)     Blood, UA 1+ (*)     Nitrites, UA Positive (*)     Leukocyte Esterase, UA 3+ (*)     All other components within normal limits   CBC WITH DIFFERENTIAL - Abnormal; Notable for the following components:    WBC 15.77 (*)     RBC 3.98 (*)     Hgb 10.0 (*)     Hct 31.2 (*)     MCV 78.4 (*)     MCH 25.1 (*)     MCHC 32.1 (*)     RDW 19.4 (*)     Neut # 13.73 (*)     IG# 0.11 (*)     All other components within normal limits   URINALYSIS, MICROSCOPIC - Abnormal; Notable for the following components:    Bacteria, UA Many (*)     WBC, UA >100 (*)     All other components within normal limits   LACTIC ACID, PLASMA - Normal   B-TYPE NATRIURETIC PEPTIDE - Normal   TROPONIN I - Normal   TSH - Normal   BLOOD CULTURE OLG   BLOOD CULTURE OLG   CULTURE, URINE   CBC W/ AUTO DIFFERENTIAL    Narrative:     The following orders were created for panel order CBC auto differential.  Procedure                               Abnormality         Status                     ---------                               -----------         ------                     CBC with Differential[[id number]]       Abnormal            Final result                 Please view results for these tests on the individual orders.        ECG Results              EKG 12-lead (Final result)        Collection Time Result Time QRS Duration OHS QTC Calculation    06/04/24 13:32:12 06/04/24 14:29:20 62 424                     Final result by Interface, Lab  In [facility] (06/04/24 14:29:31)                   Narrative:    Test Reason : R00.0,    Vent. Rate : 127 BPM     Atrial Rate : 127 BPM     P-R Int : 138 ms          QRS Dur : 062 ms      QT Int : 292 ms       P-R-T Axes : 064 029 038 degrees     QTc Int : 424 ms    Sinus tachycardia  Otherwise normal ECG  When compared with ECG of 18-MAY-2024 22:55,  Premature ventricular complexes are no longer Present  Confirmed by Johnny Koch MD ([id number]) on 6/4/2024 2:29:18 PM    Referred By: BARRINGTON   SELF           Confirmed By:Johnny Koch MD                                  Imaging Results    None          Medications   cefTRIAXone (Rocephin) 1 g in dextrose 5 % in water (D5W) 100 mL IVPB (MB+) (1 g Intravenous New Bag 6/4/24 1530)   lactated ringers bolus 1,566 mL (1,566 mLs Intravenous New Bag 6/4/24 1449)     Medical Decision Making    Mansi Jin is 64 y.o. female who  has a past medical history of ASTHMA, COPD (chronic obstructive pulmonary disease), GERD (gastroesophageal reflux disease), High cholesterol, HLD (hyperlipidemia), Hypothyroidism, unspecified, Lung cancer (12/08/2022), Stage 2 chronic kidney disease, TIA (transient ischemic attack), Tobacco abuse, Unspecified osteoarthritis, unspecified site, and Uterine cancer (1989). arrives in ER with c/o Tachycardia ([facility] home health nurse told her to come because her heart rate was 152 at home. Daughter states she has seen a cardiologist in February for this and it was determined to be elevated due to her chemo. Pt states she feels ok)    Patient has the CA lung, being treated these days, and today according to home health nurse her heart rate was 150 so she told them to come to the emergency room.  Patient's daughter says that they saw the cardiologist for this in February and after workup it was determined that her tachycardia is because of her chemotherapy.  Patient does have dry mucous membranes, she only complains of left flank pain, and she does have  tenderness in the left CVA area, I will go ahead and give her 30 mL/kg bolus, I will check the urine and blood work and decide further.    Amount and/or Complexity of Data Reviewed  Labs: ordered. Decision-making details documented in ED Course.    Risk  Prescription drug management.               ED Course as of 06/04/24 1653 Tue Jun 04, 2024   1519 WBC, UA(!): >100 [GQ]   1519 NITRITE UA(!): Positive [GQ]   1519 Leukocyte Esterase, UA(!): 3+ [GQ]   1519 Bacteria, UA(!): Many [GQ]   1519 WBC(!): 15.77 [GQ]   1520 Sodium(!): 130 [GQ]   1520 CO2(!): 21 [GQ]   1520 BUN: 13.0 [GQ]   1520 Creatinine: 0.61  Patient's heart rate has come down to 110s, blood pressure is 106/74, oxygen saturations 99% on room air, she is comfortably sitting in the bed denying any complaints, she has had this problem with the tachycardia in the past also and she says the cardiologist did not find anything wrong with her, and they said it was because of chemotherapy, patient does have a urinary tract infection today, I am going to give her a g of Rocephin IV in the emergency room and I will put her on cefdinir 300 mg twice a day for UTI will send the urine for culture and will give her about 1500 mL of normal saline bolus and let her go home with instruction to come back to the emergency room in case she develops any other symptoms, otherwise see her family doctor for follow-up. [GQ]   1535 Lactic Acid Level: 0.9 [GQ]   1536 BNP: 48.1 [GQ]   1536 Troponin I: <0.010 [GQ]   1536 TSH: 0.360  Her TSH is normal, troponin BNP and lactic acid is normal, so I do not think that she is septic, and I do not think that she is having tachycardia because of any acute cardiac event, as mentioned she has been seen by cardiologist for the same problem in the past and they felt it was because of chemotherapy that she has tachycardia. [GQ]   1651 Patient's IV fluids are finished, we have given her IV antibiotic in the emergency room and I will go ahead and  discharge her home.  Her blood pressure is 106/71, her heart rate is 115, O2 sat is 100% on room air. [GQ]      ED Course User Index  [GQ] Cleo Kumar MD                           Clinical Impression:  Final diagnoses:  [R00.0] Sinus tachycardia (Primary)  [N39.0] Urinary tract infection without hematuria, site unspecified          ED Disposition Condition    Discharge Stable          ED Prescriptions       Medication Sig Dispense Start Date End Date Auth. Provider    cefdinir (OMNICEF) 300 MG capsule Take 1 capsule (300 mg total) by mouth 2 (two) times daily. for 10 days 20 capsule 6/4/2024 6/14/2024 Cleo Kumar MD          Follow-up Information       Follow up With Specialties Details Why Contact Info    Jennifer Dacosta, FNP Family Medicine In 2 days  [address] [facility] [address]  [phone]               Cleo Kumar MD  06/04/24 [id number]

## 2024-06-05 ENCOUNTER — OFFICE VISIT (OUTPATIENT)
Dept: HEMATOLOGY/ONCOLOGY | Facility: CLINIC | Age: 64
End: 2024-06-05
Payer: MEDICARE

## 2024-06-05 ENCOUNTER — INFUSION (OUTPATIENT)
Dept: INFUSION THERAPY | Facility: HOSPITAL | Age: 64
End: 2024-06-05
Attending: NURSE PRACTITIONER
Payer: MEDICARE

## 2024-06-05 VITALS
OXYGEN SATURATION: 98 % | HEIGHT: 67 IN | HEART RATE: 132 BPM | RESPIRATION RATE: 18 BRPM | DIASTOLIC BLOOD PRESSURE: 73 MMHG | SYSTOLIC BLOOD PRESSURE: 106 MMHG | BODY MASS INDEX: 16.54 KG/M2 | WEIGHT: 105.38 LBS | TEMPERATURE: 97 F

## 2024-06-05 DIAGNOSIS — Z79.899 IMMUNODEFICIENCY DUE TO CHEMOTHERAPY: ICD-10-CM

## 2024-06-05 DIAGNOSIS — Z79.899 ON ANTINEOPLASTIC CHEMOTHERAPY: ICD-10-CM

## 2024-06-05 DIAGNOSIS — C34.91 NSCLC OF RIGHT LUNG: Primary | ICD-10-CM

## 2024-06-05 DIAGNOSIS — E83.42 HYPOMAGNESEMIA: ICD-10-CM

## 2024-06-05 DIAGNOSIS — D84.821 IMMUNODEFICIENCY DUE TO CHEMOTHERAPY: ICD-10-CM

## 2024-06-05 DIAGNOSIS — R64 CANCER CACHEXIA: ICD-10-CM

## 2024-06-05 DIAGNOSIS — R53.82 CHRONIC FATIGUE, UNSPECIFIED: ICD-10-CM

## 2024-06-05 DIAGNOSIS — C79.51 METASTASIS TO BONE: ICD-10-CM

## 2024-06-05 DIAGNOSIS — T45.1X5A IMMUNODEFICIENCY DUE TO CHEMOTHERAPY: ICD-10-CM

## 2024-06-05 PROCEDURE — 96366 THER/PROPH/DIAG IV INF ADDON: CPT

## 2024-06-05 PROCEDURE — 99215 OFFICE O/P EST HI 40 MIN: CPT | Mod: S$GLB,,,

## 2024-06-05 PROCEDURE — 1125F AMNT PAIN NOTED PAIN PRSNT: CPT | Mod: CPTII,S$GLB,,

## 2024-06-05 PROCEDURE — 96365 THER/PROPH/DIAG IV INF INIT: CPT

## 2024-06-05 PROCEDURE — 3074F SYST BP LT 130 MM HG: CPT | Mod: CPTII,S$GLB,,

## 2024-06-05 PROCEDURE — 63600175 PHARM REV CODE 636 W HCPCS: Performed by: INTERNAL MEDICINE

## 2024-06-05 PROCEDURE — 1159F MED LIST DOCD IN RCRD: CPT | Mod: CPTII,S$GLB,,

## 2024-06-05 PROCEDURE — 1160F RVW MEDS BY RX/DR IN RCRD: CPT | Mod: CPTII,S$GLB,,

## 2024-06-05 PROCEDURE — 1101F PT FALLS ASSESS-DOCD LE1/YR: CPT | Mod: CPTII,S$GLB,,

## 2024-06-05 PROCEDURE — 25000003 PHARM REV CODE 250

## 2024-06-05 PROCEDURE — 99999 PR PBB SHADOW E&M-EST. PATIENT-LVL V: CPT | Mod: PBBFAC,,,

## 2024-06-05 PROCEDURE — 25000003 PHARM REV CODE 250: Performed by: INTERNAL MEDICINE

## 2024-06-05 PROCEDURE — 3288F FALL RISK ASSESSMENT DOCD: CPT | Mod: CPTII,S$GLB,,

## 2024-06-05 PROCEDURE — 3008F BODY MASS INDEX DOCD: CPT | Mod: CPTII,S$GLB,,

## 2024-06-05 PROCEDURE — A4216 STERILE WATER/SALINE, 10 ML: HCPCS | Performed by: INTERNAL MEDICINE

## 2024-06-05 PROCEDURE — 3078F DIAST BP <80 MM HG: CPT | Mod: CPTII,S$GLB,,

## 2024-06-05 PROCEDURE — 1111F DSCHRG MED/CURRENT MED MERGE: CPT | Mod: CPTII,S$GLB,,

## 2024-06-05 RX ORDER — HEPARIN 100 UNIT/ML
500 SYRINGE INTRAVENOUS
Status: CANCELLED | OUTPATIENT
Start: 2024-06-05

## 2024-06-05 RX ORDER — SODIUM CHLORIDE 0.9 % (FLUSH) 0.9 %
10 SYRINGE (ML) INJECTION
Status: CANCELLED | OUTPATIENT
Start: 2024-06-05

## 2024-06-05 RX ORDER — LANOLIN ALCOHOL/MO/W.PET/CERES
400 CREAM (GRAM) TOPICAL DAILY
Qty: 30 TABLET | Refills: 0 | Status: SHIPPED | OUTPATIENT
Start: 2024-06-05 | End: 2024-07-05

## 2024-06-05 RX ADMIN — Medication 500 UNITS: at 12:06

## 2024-06-05 RX ADMIN — SODIUM CHLORIDE, PRESERVATIVE FREE 10 ML: 5 INJECTION INTRAVENOUS at 12:06

## 2024-06-05 RX ADMIN — ASCORBIC ACID, VITAMIN A PALMITATE, CHOLECALCIFEROL, THIAMINE HYDROCHLORIDE, RIBOFLAVIN-5 PHOSPHATE SODIUM, PYRIDOXINE HYDROCHLORIDE, NIACINAMIDE, DEXPANTHENOL, ALPHA-TOCOPHEROL ACETATE, VITAMIN K1, FOLIC ACID, BIOTIN, CYANOCOBALAMIN: 200; 3300; 200; 6; 3.6; 6; 40; 15; 10; 150; 600; 60; 5 INJECTION, SOLUTION INTRAVENOUS at 09:06

## 2024-06-05 NOTE — PROGRESS NOTES
HEMATOLOGY/ONCOLOGY OFFICE CLINIC VISIT  [facility] [place]    ONCOLOGICAL HISTORY:     Diagnosis:  - Stage IV NSCLC with bone mets.  - NSCLC / Adenocarcinoma Stage IIIA pT2a pN2--dx 12/2022  - PD-L1 1%  - History Uterine cancer - Dx 1989    Current Treatment:   Taxotere + Xgeva- 10/24/23-->  Held cycle 3 12/6/23 r/t bilateral PE, started on Eliquis      Treatment History:  - 1989 s/p total hysterectomy   - 12/08/2022: Right lower and middle bilobectomy with mediastinal lymph node dissection.   - Carboplatin + Pemetrexed  3/15/2023-5/1/2023  - Palliative RT   - Carboplatin + Pemetrexed started 3/15/2023  - Keytruda stopped 9/19/23 r/t elevated TSH and PET/CT progression, last dose given 8/29/23  - Xgeva- 7/18/2023-->  - Palliative RT to back 4/2024-5/2024    Plan of care: palliative systemic therapy      IMAGING:   - 08/10/2022 PET: The elongated subpleural right lower lobe nodule demonstrates fairly intense FDG activity.  Infectious, inflammatory and neoplastic etiologies remain possible. No suspicious PET findings elsewhere.  - 12/08/2022 CT HEAD without contrast: No acute intracranial abnormality identified.  Findings of mild microvascular ischemic disease.  - 12/2022: U/S Upper extremity: Thrombophlebitis of the left cephalic vein. No deep venous thrombosis in the left upper extremity.  - 01/03/2023 MRI Brain : GARY   - 01/12/2023 NM PET CT 1. Postsurgical changes of recent right lower and middle lobectomies.  There is a small right pleural effusion.  There is nonspecific peripheral uptake along the pleura at the right lung base and perihilar region which may be related to healing response in the setting of recent surgery.  Similarly borderline mediastinal uptake is nonspecific in the recent postoperative setting and could be reactive.  Continued attention recommended on follow-up. 2. Focal Uptake at the medial right acetabular roof has a max SUV of 6.4. This is new compared to previous exam. Metastasis is a  consideration  -PET 6/5/2023: Previously visualized moderately FDG avid opacities towards the right lung base improved.  Pleural effusion also improved.  No new suspicious PET findings in the lungs.  Severe emphysema. Some left adrenal thickening is similar to prior but there is now moderate associated FDG activity, max SUV is 3.6. FDG activity is again seen associated with the right superior acetabulum.  FDG avid area appears slightly larger today with slightly increased sclerosis.  SUV is 9.7, previously 6.4.  There is a newly evident subcentimeter FDG avid lesion left pedicle T10. Additional subcentimeter FDG avid lesion T4 vertebral body SUV 3.4.suspicious for metastatic disease.  -MRI T spine 6/15/2023:   1. Foci of abnormal signal intensity and enhancement at the inferior endplate of T3 and at body and left pedicle of T10.  Metastatic neoplastic etiology must be considered.  2. Thoracic spondylosis  3. Mild encroachment into the right neural foramina at the T3-4 and T4-5 secondary to mild posterior disc bulge and posterior osteophyte formation  4. Multilevel mild posterior disc bulge which does not result in significant central spinal stenosis.  9/27/23 Thyroid U/S:  Very minimal to no significant uptake of radiotracer isotope within the thyroid lobes bilaterally.  Clinical correlation is indicated  CT CAP 9/27/23:  1. Interim increasing nodular soft tissue densities/nodes/mass is at the mediastinum and right hilum measuring up the 3.0 cm.  A neoplastic etiology must be considered  2. Interim development of a 2.5 cm low-attenuation mass at the left adrenal gland.  A metastatic etiology must be considered  3. Interim development of a sclerotic foci at T3 and T10.  A metastatic etiology must be considered  4. Small right pleural effusion with associated infiltrate and atelectasis at the right lung base suspect  5. Suspect small cyst at the left kidney as described  6. Mild ill-defined soft tissue fullness at the  cervical vaginal region  7. Borderline cardiomegaly  10/6/23 PET/CT:  1. Disease progression with new FDG avid metastatic lymph nodes in the chest and right hilum.  2. Bilateral axillary and inguinal lymph nodes with low level uptake are indeterminate but concerning for metastasis given the additional findings.  3. Worsening and new FDG avid osseous disease involving both the axial and appendicular skeleton.  4. Enlarging left adrenal metastasis.  12/6/23 CTA:  1. Soft tissue masslike density again evident at the right hilum causing mass effect on the right main pulmonary artery  2. Interim development of filling defects/pulmonary emboli at the diminutive posterior right lower lobe pulmonary arteries.  A few small defects/pulmonary emboli have developed at the pulmonary arteries at the posterior left lower lobe since the prior exam.  3. Postsurgical changes right hilum with volume loss at the right lung and elevation of the right hemidiaphragm with associated posterior right basilar pleural reaction, infiltrate, and atelectasis  4. Advanced emphysematous changes with the right side greater than the left  5. Thoracic spondylosis with the sclerotic foci at the T3 and T10  6. The cancer center was notified of the findings on 12/06/2023 at 10:00  1/16/24 CT CAP:  1. Nodular opacities again identified at the right hilum and mediastinum which have a similar appearance to the prior exam  2. Low-attenuation mass again evident at the left adrenal gland  3. Sclerotic lytic lesions evident at T3, T5, T10, and L5.  A metastatic neoplastic etiology must be considered  4. Advanced emphysematous changes with resolution of small right pleural effusion.  There is patchy infiltrate, atelectasis and or scarring at the posterior right lung base.  5. Findings consistent with cysts at the left kidney  6. Ill-defined soft tissue fullness again evident at the cervical vaginal region  7. Findings of mild constipation  1/16/24 NM Bone  Scan:  1. Scattered focal activity at the thoracic spine, lumbar spine, right hip/trochanteric region, left iliac bone, and left sacrum.  A metastatic neoplastic etiology must be considered  2. Bar like CT intense activity at T10 suggesting acute compression fracture  3. Mild asymmetric prominence of the right upper renal collecting system compared to the left  4/15/24 CT CAP:  1. Scattered sclerotic densities again evident at the thoracolumbar spine, left pelvis, and right femur suspicious for metastases which have a similar appearance to the recent exam  2. Mild interim increase in size of a left adrenal nodule now measuring 3.2 cm in diameter. A metastatic etiology must be considered  3. Extensive atherosclerosis  4. Nodular soft tissue masses/lymph nodes again identified at the right hilum and mediastinum again mass effect on the right main pulmonary artery which have a similar appearance to the recent exam  5. Advanced emphysematous changes  6. Findings of constipation with prominent fecal load at the right side of the colon  4/15/24 NM Bone Scan:  1. Scattered foci of abnormal activity at the thoracic spine, lower lumbar spine, left pelvis, and right hip. A metastatic neoplastic etiology must be considered  2. Mild asymmetric prominence of the right upper renal collecting system    PATHOLOGY:  - 12/08/2022: Invasive solid adenocarcinoma (3.1 CM); G3, poorly differentiated. Visceral Pleura Invasion present without definite serosal surface involvement. Lymphovascular invasion (Vein) present. All margins negative for invasive carcinoma, distance 2.5 cm. LN - 4/14 (10R: Hilar, Posterior mediastinal (RACHEAL), 4R Lower paratracheal, 9R Pulmonary ligament, 10R). pT2a pN2  - 02/22/2023: RIGHT ACETABULAR LESION, CT - GUIDE CORE NEEDLE BIOPSY : FIBRINOPURULENT EXUDATE ADMIXED WITH ATYPICAL EPITHELIOID GROUPS,   NORMAL    MARROW ELEMENTS AND ABUNDANT BLOOD CLOT      Subjective:     HPI  Mansi Sabrina Jin  64 y.o.  female  "with past medical history significant for HTN, HLD, uterine cancer status post total hysterectomy in 1989,  CKD Stage 2, COPD Stage 3, referred for management of NSCLC s/p RLL / RML lobectomy with Mediastinal Lymph Node Dissection on 12/8/2022.     She was noted to have RLL lung mass and underwent CT guided biopsy which was non diagnostic. PET scan demonstrated significant avidity within the lesion. Given high risk for malignancy, she underwent right lower lobectomy and right middle lobectomy on 12/08/2022. Pathology came back with invasive adenocarcinoma, poorly differentiated with solid morphology, focal tumor involvement of visceral pleura and focal tumor vascular invasion (vein), with mediastinal lymph nodes positive for metastatic carcinoma.      PET on 01/12/2023 showed focal uptake at the medial right acetabular roof but the biopsy was negative for malignancy. Discussed with radiology, they recommended adjuvant chemotherapy and then they will reassess later for radiation therapy.     Chief Complaint: Lung Cancer (Patient c/o lower back and leg weakness since last visit. Patient was seen in the ER yesterday and was dx with a bladder infection. Patient had 1 dose of antibiotics in the ER. )      Interval History:   She returns to the clinic today for a 2 week follow-up and treatment clearance for Docetaxel q3w, accompanied by her daughter. She completed radiation with Dr. Mireles on 5/10/2024. She continues to have back pain that she is not taking her medication for as she "does not want to get addicted". She did begin with physical therapy, but continues to report fatigue and weakness. She had an ER visit on yesterday for sinus tachycardia, heart rate at home was 152 and was instructed by  to report to ER. She was diagnosed with a UTI and is currently on antibiotics, cefdinir 300 mg BID for 10 days. She continues taking her Eliquis 5 mg BID for bilateral PE. She is taking her oral iron every other day and " tolerating well. Followed by dermatology for vitiligo to bilateral fingertips. She denies any fever, chills, worsened SOB, CP, headache, swelling, or nausea/vomiting.        Past Medical History:   Diagnosis Date    ASTHMA     COPD (chronic obstructive pulmonary disease)     SEVERE    GERD (gastroesophageal reflux disease)     High cholesterol     HLD (hyperlipidemia)     Hypothyroidism, unspecified     Lung cancer 12/08/2022    invasive adenocarcinoma, poorly differentiated with solid morphology, focal tumor involvement of visceral pleura and focal tumor vascular invasion (vein), with 2 of 3 posterior mediastinal lymph nodes positive for metastatic carcinoma    Stage 2 chronic kidney disease     TIA (transient ischemic attack)     no deficits    Tobacco abuse     Unspecified osteoarthritis, unspecified site     Uterine cancer 1989      Past Surgical History:   Procedure Laterality Date    COLONOSCOPY N/A 3/26/2024    Procedure: COLONOSCOPY;  Surgeon: Vickey Patino MD;  Location: [facility];  Service: Endoscopy;  Laterality: N/A;  post op: c. diff colitis of right colon    COLONOSCOPY, WITH 1 OR MORE BIOPSIES N/A 3/26/2024    Procedure: COLONOSCOPY, WITH 1 OR MORE BIOPSIES;  Surgeon: Vickey Patino MD;  Location: [facility];  Service: Endoscopy;  Laterality: N/A;  cecum    INSERTION OF TUNNELED CENTRAL VENOUS CATHETER (CVC) WITH SUBCUTANEOUS PORT N/A 12/04/2023    Procedure: [id number];  Surgeon: Peter Dial MD;  Location: [facility];  Service: Peripheral Vascular;  Laterality: N/A;    LAPAROSCOPIC CHOLECYSTECTOMY WITH CHOLANGIOGRAPHY N/A 3/28/2024    Procedure: CHOLECYSTECTOMY, LAPAROSCOPIC, WITH CHOLANGIOGRAM;  Surgeon: Vickey Patino MD;  Location: [facility];  Service: General;  Laterality: N/A;    lipoma multiple sites      LUNG LOBECTOMY Right 12/08/2022    Procedure: LOBECTOMY, LUNG;  Surgeon: Jass Browne IV, MD;  Location: [facility];  Service: Cardiothoracic;  Laterality: Right;     SURGICAL REMOVAL OF LYMPH NODE  2022    Procedure: EXCISION, LYMPH NODE;  Surgeon: Jass Browne IV, MD;  Location: [facility] OR;  Service: Cardiothoracic;;    THORACOTOMY Right 2022    Procedure: THORACOTOMY;  Surgeon: Jass Browne IV, MD;  Location: [facility];  Service: Cardiothoracic;  Laterality: Right;  Right Lower Lobectomy with Lymph Node Dissection    TOTAL ABDOMINAL HYSTERECTOMY W/ BILATERAL SALPINGOOPHORECTOMY       Social History     Socioeconomic History    Marital status: Single    Number of children: 5   Tobacco Use    Smoking status: Former     Current packs/day: 0.00     Average packs/day: 0.5 packs/day for 50.0 years (25.0 ttl pk-yrs)     Types: Cigarettes     Start date: 1972     Quit date: 2022     Years since quittin.4    Smokeless tobacco: Never   Substance and Sexual Activity    Alcohol use: Not Currently     Comment: quit 25years ago    Drug use: Never    Sexual activity: Yes     Partners: Male     Birth control/protection: See Surgical Hx     Social Determinants of Health     Financial Resource Strain: Patient Declined (3/25/2024)    Overall Financial Resource Strain (CARDIA)     Difficulty of Paying Living Expenses: Patient declined   Food Insecurity: Patient Declined (3/25/2024)    Hunger Vital Sign     Worried About Running Out of Food in the Last Year: Patient declined     Ran Out of Food in the Last Year: Patient declined   Transportation Needs: Patient Declined (3/25/2024)    PRAPARE - Transportation     Lack of Transportation (Medical): Patient declined     Lack of Transportation (Non-Medical): Patient declined   Physical Activity: Patient Declined (3/25/2024)    Exercise Vital Sign     Days of Exercise per Week: Patient declined     Minutes of Exercise per Session: Patient declined   Stress: Patient Declined (3/25/2024)    [employer] - Occupational Stress Questionnaire     Feeling of Stress : Patient declined   Housing  Stability: Patient Declined (3/25/2024)    Housing Stability Vital Sign     Unable to Pay for Housing in the Last Year: Patient declined     Number of Places Lived in the Last Year: 1     Unstable Housing in the Last Year: Patient declined      Family History   Problem Relation Name Age of Onset    Cancer Maternal Grandfather      Heart failure Father      Prostate cancer Father      Hypertension Mother      Hepatitis Mother      Ovarian cancer Mother      Liver cancer Mother      Asthma Brother      Asthma Brother      Fibroids Sister      Skin cancer Sister        Review of patient's allergies indicates:   Allergen Reactions    Dilaudid [hydromorphone] Itching and Swelling        Review of Systems   Constitutional:  Positive for activity change, appetite change and fatigue. Negative for chills, fever and unexpected weight change.   HENT:  Negative for mouth dryness, mouth sores, nosebleeds, sinus pressure/congestion, sore throat and trouble swallowing.    Eyes: Negative.  Negative for visual disturbance.   Respiratory:  Positive for shortness of breath (on exertion, chronic). Negative for cough.    Cardiovascular:  Negative for chest pain, palpitations and leg swelling.   Gastrointestinal:  Negative for abdominal distention, abdominal pain, blood in stool, change in bowel habit, constipation, diarrhea, nausea and vomiting.   Endocrine: Negative.    Genitourinary: Negative.  Negative for dysuria, frequency, hematuria and urgency.   Musculoskeletal:  Positive for leg pain. Negative for arthralgias, back pain, myalgias and neck pain.   Integumentary:  Negative for rash, breast mass, breast discharge and breast tenderness. Negative.   Allergic/Immunologic: Negative.  Negative for immunocompromised state.   Neurological:  Positive for weakness. Negative for dizziness, tremors, syncope, speech difficulty, light-headedness, numbness, headaches and memory loss.   Hematological: Negative.  Does not bruise/bleed easily.    Psychiatric/Behavioral: Negative.  Negative for confusion and suicidal ideas.    Breast: Negative for mass and tenderness        Objective:        Vitals:    06/05/24 0819   BP: 106/73   Pulse: (!) 132   Resp: 18   Temp: 97.4 °F (36.3 °C)               Wt Readings from Last 6 Encounters:   06/05/24 47.8 kg (105 lb 6.4 oz)   06/04/24 52.2 kg (115 lb)   05/31/24 48.4 kg (106 lb 9.6 oz)   05/28/24 48 kg (105 lb 12.8 oz)   05/24/24 47.5 kg (104 lb 12.8 oz)   05/18/24 52.2 kg (115 lb)     Body mass index is 16.51 kg/m².  Body surface area is 1.5 meters squared.       Physical Exam  Vitals and nursing note reviewed.   Constitutional:       Appearance: She is underweight. She is ill-appearing.   HENT:      Head: Normocephalic and atraumatic.      Nose: No congestion.   Eyes:      General: No scleral icterus.     Extraocular Movements: Extraocular movements intact.      Conjunctiva/sclera: Conjunctivae normal.   Neck:      Vascular: No JVD.   Cardiovascular:      Rate and Rhythm: Regular rhythm. Tachycardia present.      Heart sounds: No murmur heard.  Pulmonary:      Effort: Pulmonary effort is normal.      Breath sounds: Decreased breath sounds present. No wheezing or rhonchi.   Abdominal:      General: There is no distension.      Palpations: Abdomen is soft.      Tenderness: There is no abdominal tenderness.   Musculoskeletal:      Cervical back: Neck supple.   Lymphadenopathy:      Head:      Right side of head: No submental or submandibular adenopathy.      Left side of head: No submental or submandibular adenopathy.      Cervical: No cervical adenopathy.      Upper Body:      Right upper body: No supraclavicular or axillary adenopathy.      Left upper body: No supraclavicular or axillary adenopathy.      Lower Body: No right inguinal adenopathy. No left inguinal adenopathy.      Comments: No adenopathy noted bilaterally   Skin:     General: Skin is warm.      Coloration: Skin is not jaundiced.      Findings: No  lesion or rash.      Comments: Change of skin color to bilateral fingertips   Neurological:      Mental Status: She is alert and oriented to person, place, and time.      Cranial Nerves: Cranial nerves 2-12 are intact.      Motor: Weakness present.      Comments: On wheelchair today   Psychiatric:         Attention and Perception: Attention normal.         Mood and Affect: Mood is depressed.         Speech: Speech normal.         Behavior: Behavior is cooperative.         Cognition and Memory: Cognition normal.         Judgment: Judgment normal.       ECOG SCORE               LABS      Lab Visit on 06/05/2024   Component Date Value    Sodium 06/05/2024 132 (L)     Potassium 06/05/2024 4.4     Chloride 06/05/2024 101     CO2 06/05/2024 22 (L)     Glucose 06/05/2024 108     Blood Urea Nitrogen 06/05/2024 10.0     Creatinine 06/05/2024 0.63     Calcium 06/05/2024 8.0 (L)     Protein Total 06/05/2024 5.7 (L)     Albumin 06/05/2024 2.1 (L)     Globulin 06/05/2024 3.6 (H)     Albumin/Globulin Ratio 06/05/2024 0.6 (L)     Bilirubin Total 06/05/2024 0.5     ALP 06/05/2024 110     ALT 06/05/2024 12     AST 06/05/2024 19     eGFR 06/05/2024 >60     Anion Gap 06/05/2024 9.0     BUN/Creatinine Ratio 06/05/2024 16     Magnesium Level 06/05/2024 1.50 (L)     Phosphorus Level 06/05/2024 2.4     WBC 06/05/2024 10.97     RBC 06/05/2024 3.80 (L)     Hgb 06/05/2024 9.6 (L)     Hct 06/05/2024 30.2 (L)     MCV 06/05/2024 79.5 (L)     MCH 06/05/2024 25.3 (L)     MCHC 06/05/2024 31.8 (L)     RDW 06/05/2024 19.3 (H)     Platelet 06/05/2024 414 (H)     MPV 06/05/2024 9.1     Neut % 06/05/2024 90.1     Lymph % 06/05/2024 2.2     Mono % 06/05/2024 7.1     Eos % 06/05/2024 0.0     Basophil % 06/05/2024 0.1     Lymph # 06/05/2024 0.24 (L)     Neut # 06/05/2024 9.88 (H)     Mono # 06/05/2024 0.78     Eos # 06/05/2024 0.00     Baso # 06/05/2024 0.01     IG# 06/05/2024 0.06 (H)     IG% 06/05/2024 0.5          Assessment:       1. NSCLC of right  lung    2. Metastasis to bone    3. Immunodeficiency due to chemotherapy    4. On antineoplastic chemotherapy    5. Cancer cachexia    6. Chronic fatigue, unspecified    7. Hypomagnesemia            Stage IV NSCLC with bone mets.    Originally: NSCLC / Adenocarcinoma Stage IIIA pT2a pN2  - 12/08/2022: Right lower and middle bilobectomy with mediastinal lymph node dissection. Path: Invasive adenocarcinoma, poorly differentiated, focal tumor involvement of visceral pleura and focal tumor vascular invasion (vein), with mediastinal lymph nodes positive for metastatic carcinoma   - TMB - 5, MSI-S, MET amplification, ROS1.  No mutations in EGFR, BRAF, ALK, ERBB2, KRAS, RET,    - 1% tumor cells are positive for PD-L1   - patient evaluated by Radiation Oncology with recommendation to treat with chemotherapy 1st and will re-evaluate the patient by the end of chemotherapy for radiation therapy  - 01/12/2023 PET: Focal uptake at the medial right acetabular roof. Metastasis is a consideration  - 02/22/2023: RIGHT ACETABULAR LESION, CT - GUIDE CORE NEEDLE BIOPSY : FIBRINOPURULENT EXUDATE ADMIXED WITH ATYPICAL EPITHELIOID GROUPS,   NORMAL    MARROW ELEMENTS AND ABUNDANT BLOOD CLOT  PET scan showed a suspicious finding in the right acetabular roof and patient then had a CT-guided biopsy no evidence of malignancy. Started with adjuvant chemotherapy with Carboplatin and pemetrexed x 4 cycles (3/15/23-5/18/23)   Patient has been seen by Rad/Onc (Dr Mireles) since the completion of her chemotherapy and plan was to pursue radiation x 5-6 weeks.  She discussed with the patient that they have showed no overall survival benefit in the setting of postoperative radiation therapy but data supports a larger benefit inpatient with extracapsular extension.  If she tolerated chemotherapy well and continue with good performance status then plan was for PORT   Restaging PET-CT after completion of chemotherapy and prior to radiation therapy showed  progression at the right acetabulum.  Patient with progression of disease now with bone metastases.  She bagan palliative radiation treatment with Dr. Mireles on 6/28/23, 10 treatments over two weeks.  PET CT 6/5/23 with progression Right superior acetabulum.  FDG avid area appears slightly larger with slightly increased sclerosis.  SUV is 9.7, previously 6.4.  She had palliative radiation. Started on Keytruda 7/18/23. Developed skin changes with Keytruda and later on diagnosed with vitiligo. Now on Taxotere + Xgeva- 10/24/23 due to progression.  Cycle 3 of Taxotere held 12/6/23 r/t bilateral PE  Bone mets   Xgeva q4w  She was seen by Rad/Onc for palliative RT but recommendations were to start chemotherapy and re eval after 2 cycles  Received 10 radiation treatments with Dr. Mireles 5/2024.  Medication regimen discussed in detail with her.     Iron deficiency Anemia  Continue with 1 tab PO iron QOD with Vitamin C.   Currently scheduled for colonoscopy 7/2024     Hypothyroid   Followed by endocrinology Dr. Elena LYN  Now on Eliquis 5 mg twice daily    Vitiligo to bilateral fingertips  Followed by Dermatology    Recurrent Cdiff  Stable today with no diarrhea noted.     Cancer cachexia   Currently on Remeron 7.5mg, increased to 15mg today.                   Plan:   I again had a long discussion regarding the risk and benefits of continuing with chemo as this time. She verbalized understanding and wanted to continue with treatment today as she has tolerated well in the past. Will watch closely with weekly labs and IV fluids twice a week. Strict ED precautions were given. She was educated again on her pain medication regimen and verbalized understanding.     Labs stable today.  Hold Taxotere today  Mag Oxide 400 mg daily sent to pharmacy  Continue with Xgeva as scheduled.  Lab weekly with [facility] twice weekly.   Continue with PT and HH  Remeron increased to 15mg.   Continue with Rad/Onc for back pain  Unable to complete  MRI C, T, and L spine due to back pain.   Pulmonology 5/14/2025  Continue Eliquis 5 mg BID  Continue follow up with Dermatology  Continue oral iron every other day  RTC in 1 week with MD for FU/lab  Cbc, cmp, mag, phos- 1 hr prior @ [facility] ([place])      The patient was seen, interviewed and examined. Pertinent lab and radiology studies were reviewed.   The patient was given ample opportunity to ask questions, and to the best of my abilities, all questions answered to satisfaction; patient demonstrated understanding of what we discussed and agreeable to the plan. Pt instructed to call should develop concerning signs/symptoms or have further questions.     Fozia Calhoun, DOUGIEP-C  Oncology/Hematology  [facility]

## 2024-06-07 ENCOUNTER — INFUSION (OUTPATIENT)
Dept: INFUSION THERAPY | Facility: HOSPITAL | Age: 64
End: 2024-06-07
Attending: NURSE PRACTITIONER
Payer: MEDICARE

## 2024-06-07 VITALS
OXYGEN SATURATION: 98 % | RESPIRATION RATE: 18 BRPM | TEMPERATURE: 97 F | WEIGHT: 106 LBS | HEART RATE: 106 BPM | DIASTOLIC BLOOD PRESSURE: 64 MMHG | HEIGHT: 67 IN | SYSTOLIC BLOOD PRESSURE: 99 MMHG | BODY MASS INDEX: 16.64 KG/M2

## 2024-06-07 DIAGNOSIS — C34.91 NSCLC OF RIGHT LUNG: Primary | ICD-10-CM

## 2024-06-07 DIAGNOSIS — Z12.11 SCREEN FOR COLON CANCER: Primary | ICD-10-CM

## 2024-06-07 LAB
BACTERIA UR CULT: ABNORMAL
BACTERIA UR CULT: ABNORMAL

## 2024-06-07 PROCEDURE — 96366 THER/PROPH/DIAG IV INF ADDON: CPT

## 2024-06-07 PROCEDURE — 96365 THER/PROPH/DIAG IV INF INIT: CPT

## 2024-06-07 PROCEDURE — 63600175 PHARM REV CODE 636 W HCPCS

## 2024-06-07 PROCEDURE — A4216 STERILE WATER/SALINE, 10 ML: HCPCS

## 2024-06-07 PROCEDURE — 25000003 PHARM REV CODE 250

## 2024-06-07 RX ORDER — SODIUM CHLORIDE 0.9 % (FLUSH) 0.9 %
10 SYRINGE (ML) INJECTION
OUTPATIENT
Start: 2024-06-07

## 2024-06-07 RX ORDER — HEPARIN 100 UNIT/ML
500 SYRINGE INTRAVENOUS
OUTPATIENT
Start: 2024-06-07

## 2024-06-07 RX ORDER — HEPARIN 100 UNIT/ML
500 SYRINGE INTRAVENOUS
Status: DISCONTINUED | OUTPATIENT
Start: 2024-06-07 | End: 2024-06-07 | Stop reason: HOSPADM

## 2024-06-07 RX ORDER — SODIUM CHLORIDE 0.9 % (FLUSH) 0.9 %
10 SYRINGE (ML) INJECTION
Status: DISCONTINUED | OUTPATIENT
Start: 2024-06-07 | End: 2024-06-07 | Stop reason: HOSPADM

## 2024-06-07 RX ADMIN — ASCORBIC ACID, VITAMIN A PALMITATE, CHOLECALCIFEROL, THIAMINE HYDROCHLORIDE, RIBOFLAVIN-5 PHOSPHATE SODIUM, PYRIDOXINE HYDROCHLORIDE, NIACINAMIDE, DEXPANTHENOL, ALPHA-TOCOPHEROL ACETATE, VITAMIN K1, FOLIC ACID, BIOTIN, CYANOCOBALAMIN: 200; 3300; 200; 6; 3.6; 6; 40; 15; 10; 150; 600; 60; 5 INJECTION, SOLUTION INTRAVENOUS at 09:06

## 2024-06-07 RX ADMIN — SODIUM CHLORIDE, PRESERVATIVE FREE 10 ML: 5 INJECTION INTRAVENOUS at 10:06

## 2024-06-07 RX ADMIN — HEPARIN SODIUM (PORCINE) LOCK FLUSH IV SOLN 100 UNIT/ML 500 UNITS: 100 SOLUTION at 10:06

## 2024-06-08 ENCOUNTER — TELEPHONE (OUTPATIENT)
Dept: EMERGENCY MEDICINE | Facility: HOSPITAL | Age: 64
End: 2024-06-08
Payer: MEDICARE

## 2024-06-09 LAB
BACTERIA BLD CULT: NORMAL
BACTERIA BLD CULT: NORMAL

## 2024-06-10 DIAGNOSIS — T45.1X5A IMMUNODEFICIENCY DUE TO CHEMOTHERAPY: ICD-10-CM

## 2024-06-10 DIAGNOSIS — Z79.899 IMMUNODEFICIENCY DUE TO CHEMOTHERAPY: ICD-10-CM

## 2024-06-10 DIAGNOSIS — C34.91 NSCLC OF RIGHT LUNG: ICD-10-CM

## 2024-06-10 DIAGNOSIS — N39.0 URINARY TRACT INFECTION WITHOUT HEMATURIA, SITE UNSPECIFIED: Primary | ICD-10-CM

## 2024-06-10 DIAGNOSIS — C79.51 METASTASIS TO BONE: ICD-10-CM

## 2024-06-10 DIAGNOSIS — D84.821 IMMUNODEFICIENCY DUE TO CHEMOTHERAPY: ICD-10-CM

## 2024-06-11 ENCOUNTER — LAB VISIT (OUTPATIENT)
Dept: LAB | Facility: HOSPITAL | Age: 64
End: 2024-06-11
Payer: MEDICARE

## 2024-06-11 ENCOUNTER — TELEPHONE (OUTPATIENT)
Dept: HEMATOLOGY/ONCOLOGY | Facility: CLINIC | Age: 64
End: 2024-06-11
Payer: MEDICARE

## 2024-06-11 ENCOUNTER — OFFICE VISIT (OUTPATIENT)
Dept: HEMATOLOGY/ONCOLOGY | Facility: CLINIC | Age: 64
End: 2024-06-11
Payer: MEDICARE

## 2024-06-11 VITALS
DIASTOLIC BLOOD PRESSURE: 68 MMHG | WEIGHT: 100.88 LBS | RESPIRATION RATE: 16 BRPM | BODY MASS INDEX: 15.83 KG/M2 | SYSTOLIC BLOOD PRESSURE: 102 MMHG | OXYGEN SATURATION: 99 % | HEART RATE: 129 BPM | HEIGHT: 67 IN

## 2024-06-11 DIAGNOSIS — Z79.899 IMMUNODEFICIENCY DUE TO CHEMOTHERAPY: ICD-10-CM

## 2024-06-11 DIAGNOSIS — R53.1 WEAKNESS: ICD-10-CM

## 2024-06-11 DIAGNOSIS — T45.1X5A IMMUNODEFICIENCY DUE TO CHEMOTHERAPY: ICD-10-CM

## 2024-06-11 DIAGNOSIS — N39.0 URINARY TRACT INFECTION WITHOUT HEMATURIA, SITE UNSPECIFIED: ICD-10-CM

## 2024-06-11 DIAGNOSIS — R53.81 PHYSICAL DECONDITIONING: ICD-10-CM

## 2024-06-11 DIAGNOSIS — C34.91 NSCLC OF RIGHT LUNG: Primary | ICD-10-CM

## 2024-06-11 DIAGNOSIS — G89.3 CANCER RELATED PAIN: ICD-10-CM

## 2024-06-11 DIAGNOSIS — C34.91 NSCLC OF RIGHT LUNG: ICD-10-CM

## 2024-06-11 DIAGNOSIS — R64 CANCER CACHEXIA: ICD-10-CM

## 2024-06-11 DIAGNOSIS — C79.51 METASTASIS TO BONE: ICD-10-CM

## 2024-06-11 DIAGNOSIS — D84.821 IMMUNODEFICIENCY DUE TO CHEMOTHERAPY: ICD-10-CM

## 2024-06-11 DIAGNOSIS — E83.42 HYPOMAGNESEMIA: ICD-10-CM

## 2024-06-11 DIAGNOSIS — Z79.899 ON ANTINEOPLASTIC CHEMOTHERAPY: ICD-10-CM

## 2024-06-11 DIAGNOSIS — C79.70 MALIGNANT NEOPLASM METASTATIC TO ADRENAL GLAND, UNSPECIFIED LATERALITY: ICD-10-CM

## 2024-06-11 LAB
ALBUMIN SERPL-MCNC: 2.1 G/DL (ref 3.4–4.8)
ALBUMIN/GLOB SERPL: 0.5 RATIO (ref 1.1–2)
ALP SERPL-CCNC: 107 UNIT/L (ref 40–150)
ALT SERPL-CCNC: 12 UNIT/L (ref 0–55)
ANION GAP SERPL CALC-SCNC: 7 MEQ/L
AST SERPL-CCNC: 24 UNIT/L (ref 5–34)
BASOPHILS # BLD AUTO: 0.01 X10(3)/MCL
BASOPHILS NFR BLD AUTO: 0.2 %
BILIRUB SERPL-MCNC: 0.3 MG/DL
BUN SERPL-MCNC: 8 MG/DL (ref 9.8–20.1)
CALCIUM SERPL-MCNC: 9.2 MG/DL (ref 8.4–10.2)
CHLORIDE SERPL-SCNC: 101 MMOL/L (ref 98–107)
CO2 SERPL-SCNC: 23 MMOL/L (ref 23–31)
CREAT SERPL-MCNC: 0.64 MG/DL (ref 0.55–1.02)
CREAT/UREA NIT SERPL: 13
EOSINOPHIL # BLD AUTO: 0.01 X10(3)/MCL (ref 0–0.9)
EOSINOPHIL NFR BLD AUTO: 0.2 %
ERYTHROCYTE [DISTWIDTH] IN BLOOD BY AUTOMATED COUNT: 18.8 % (ref 11.5–17)
GFR SERPLBLD CREATININE-BSD FMLA CKD-EPI: >60 ML/MIN/1.73/M2
GLOBULIN SER-MCNC: 4.2 GM/DL (ref 2.4–3.5)
GLUCOSE SERPL-MCNC: 105 MG/DL (ref 82–115)
HCT VFR BLD AUTO: 30.1 % (ref 37–47)
HGB BLD-MCNC: 9.5 G/DL (ref 12–16)
IMM GRANULOCYTES # BLD AUTO: 0.08 X10(3)/MCL (ref 0–0.04)
IMM GRANULOCYTES NFR BLD AUTO: 1.3 %
LYMPHOCYTES # BLD AUTO: 0.5 X10(3)/MCL (ref 0.6–4.6)
LYMPHOCYTES NFR BLD AUTO: 7.9 %
MAGNESIUM SERPL-MCNC: 1.8 MG/DL (ref 1.6–2.6)
MCH RBC QN AUTO: 25 PG (ref 27–31)
MCHC RBC AUTO-ENTMCNC: 31.6 G/DL (ref 33–36)
MCV RBC AUTO: 79.2 FL (ref 80–94)
MONOCYTES # BLD AUTO: 0.71 X10(3)/MCL (ref 0.1–1.3)
MONOCYTES NFR BLD AUTO: 11.2 %
NEUTROPHILS # BLD AUTO: 5.01 X10(3)/MCL (ref 2.1–9.2)
NEUTROPHILS NFR BLD AUTO: 79.2 %
NRBC BLD AUTO-RTO: 0 %
PHOSPHATE SERPL-MCNC: 1.7 MG/DL (ref 2.3–4.7)
PLATELET # BLD AUTO: 615 X10(3)/MCL (ref 130–400)
PMV BLD AUTO: 8.4 FL (ref 7.4–10.4)
POTASSIUM SERPL-SCNC: 4.3 MMOL/L (ref 3.5–5.1)
PROT SERPL-MCNC: 6.3 GM/DL (ref 5.8–7.6)
RBC # BLD AUTO: 3.8 X10(6)/MCL (ref 4.2–5.4)
SODIUM SERPL-SCNC: 131 MMOL/L (ref 136–145)
WBC # SPEC AUTO: 6.32 X10(3)/MCL (ref 4.5–11.5)

## 2024-06-11 PROCEDURE — 1159F MED LIST DOCD IN RCRD: CPT | Mod: CPTII,S$GLB,, | Performed by: INTERNAL MEDICINE

## 2024-06-11 PROCEDURE — 3074F SYST BP LT 130 MM HG: CPT | Mod: CPTII,S$GLB,, | Performed by: INTERNAL MEDICINE

## 2024-06-11 PROCEDURE — 1101F PT FALLS ASSESS-DOCD LE1/YR: CPT | Mod: CPTII,S$GLB,, | Performed by: INTERNAL MEDICINE

## 2024-06-11 PROCEDURE — 83735 ASSAY OF MAGNESIUM: CPT

## 2024-06-11 PROCEDURE — 3078F DIAST BP <80 MM HG: CPT | Mod: CPTII,S$GLB,, | Performed by: INTERNAL MEDICINE

## 2024-06-11 PROCEDURE — 36415 COLL VENOUS BLD VENIPUNCTURE: CPT

## 2024-06-11 PROCEDURE — 99999 PR PBB SHADOW E&M-EST. PATIENT-LVL V: CPT | Mod: PBBFAC,,, | Performed by: INTERNAL MEDICINE

## 2024-06-11 PROCEDURE — 99215 OFFICE O/P EST HI 40 MIN: CPT | Mod: S$GLB,,, | Performed by: INTERNAL MEDICINE

## 2024-06-11 PROCEDURE — 1125F AMNT PAIN NOTED PAIN PRSNT: CPT | Mod: CPTII,S$GLB,, | Performed by: INTERNAL MEDICINE

## 2024-06-11 PROCEDURE — 3008F BODY MASS INDEX DOCD: CPT | Mod: CPTII,S$GLB,, | Performed by: INTERNAL MEDICINE

## 2024-06-11 PROCEDURE — 80053 COMPREHEN METABOLIC PANEL: CPT

## 2024-06-11 PROCEDURE — 3288F FALL RISK ASSESSMENT DOCD: CPT | Mod: CPTII,S$GLB,, | Performed by: INTERNAL MEDICINE

## 2024-06-11 PROCEDURE — 85025 COMPLETE CBC W/AUTO DIFF WBC: CPT

## 2024-06-11 PROCEDURE — 84100 ASSAY OF PHOSPHORUS: CPT

## 2024-06-11 PROCEDURE — 1160F RVW MEDS BY RX/DR IN RCRD: CPT | Mod: CPTII,S$GLB,, | Performed by: INTERNAL MEDICINE

## 2024-06-11 PROCEDURE — 1111F DSCHRG MED/CURRENT MED MERGE: CPT | Mod: CPTII,S$GLB,, | Performed by: INTERNAL MEDICINE

## 2024-06-11 RX ORDER — LEVOFLOXACIN 500 MG/1
500 TABLET, FILM COATED ORAL
COMMUNITY
Start: 2024-06-09

## 2024-06-11 RX ORDER — HYDROCODONE BITARTRATE AND ACETAMINOPHEN 10; 325 MG/1; MG/1
1 TABLET ORAL EVERY 4 HOURS PRN
Qty: 90 TABLET | Refills: 0 | Status: SHIPPED | OUTPATIENT
Start: 2024-06-11

## 2024-06-11 RX ORDER — MORPHINE SULFATE 30 MG/1
30 TABLET, FILM COATED, EXTENDED RELEASE ORAL 3 TIMES DAILY
Qty: 60 TABLET | Refills: 0 | Status: SHIPPED | OUTPATIENT
Start: 2024-06-11

## 2024-06-11 RX ORDER — POLYETHYLENE GLYCOL 3350, SODIUM SULFATE, SODIUM CHLORIDE, POTASSIUM CHLORIDE, SODIUM ASCORBATE, AND ASCORBIC ACID 7.5-2.691G
KIT ORAL
Qty: 1 KIT | Refills: 0 | Status: SHIPPED | OUTPATIENT
Start: 2024-06-11

## 2024-06-11 NOTE — PROGRESS NOTES
HEMATOLOGY/ONCOLOGY OFFICE CLINIC VISIT  [facility]    ONCOLOGICAL HISTORY:     Diagnosis:  - Stage IV NSCLC with bone mets.  - NSCLC / Adenocarcinoma Stage IIIA pT2a pN2--dx 12/2022  - PD-L1 1%  - History Uterine cancer - Dx 1989    Current Treatment:   Hospice referral-planned      Treatment History:  - 1989 s/p total hysterectomy   - 12/08/2022: Right lower and middle bilobectomy with mediastinal lymph node dissection.   - Carboplatin + Pemetrexed  3/15/2023-5/1/2023  - Palliative RT   - Carboplatin + Pemetrexed started 3/15/2023  - Keytruda stopped 9/19/23 r/t elevated TSH and PET/CT progression, last dose given 8/29/23  - Xgeva- 7/18/2023-->  - Palliative RT to back 4/2024-5/2024  -Taxotere + Xgeva- 10/24/23--> Held cycle 3 12/6/23 r/t bilateral PE, started on Eliquis    Goal of care: palliative and comfort care      IMAGING:   - 08/10/2022 PET: The elongated subpleural right lower lobe nodule demonstrates fairly intense FDG activity.  Infectious, inflammatory and neoplastic etiologies remain possible. No suspicious PET findings elsewhere.  - 12/08/2022 CT HEAD without contrast: No acute intracranial abnormality identified.  Findings of mild microvascular ischemic disease.  - 12/2022: U/S Upper extremity: Thrombophlebitis of the left cephalic vein. No deep venous thrombosis in the left upper extremity.  - 01/03/2023 MRI Brain : GARY   - 01/12/2023 NM PET CT 1. Postsurgical changes of recent right lower and middle lobectomies.  There is a small right pleural effusion.  There is nonspecific peripheral uptake along the pleura at the right lung base and perihilar region which may be related to healing response in the setting of recent surgery.  Similarly borderline mediastinal uptake is nonspecific in the recent postoperative setting and could be reactive.  Continued attention recommended on follow-up. 2. Focal Uptake at the medial right acetabular roof has a max SUV of 6.4. This is new compared to previous exam.  Metastasis is a consideration  -PET 6/5/2023: Previously visualized moderately FDG avid opacities towards the right lung base improved.  Pleural effusion also improved.  No new suspicious PET findings in the lungs.  Severe emphysema. Some left adrenal thickening is similar to prior but there is now moderate associated FDG activity, max SUV is 3.6. FDG activity is again seen associated with the right superior acetabulum.  FDG avid area appears slightly larger today with slightly increased sclerosis.  SUV is 9.7, previously 6.4.  There is a newly evident subcentimeter FDG avid lesion left pedicle T10. Additional subcentimeter FDG avid lesion T4 vertebral body SUV 3.4.suspicious for metastatic disease.  -MRI T spine 6/15/2023:   1. Foci of abnormal signal intensity and enhancement at the inferior endplate of T3 and at body and left pedicle of T10.  Metastatic neoplastic etiology must be considered.  2. Thoracic spondylosis  3. Mild encroachment into the right neural foramina at the T3-4 and T4-5 secondary to mild posterior disc bulge and posterior osteophyte formation  4. Multilevel mild posterior disc bulge which does not result in significant central spinal stenosis.  9/27/23 Thyroid U/S:  Very minimal to no significant uptake of radiotracer isotope within the thyroid lobes bilaterally.  Clinical correlation is indicated  CT CAP 9/27/23:  1. Interim increasing nodular soft tissue densities/nodes/mass is at the mediastinum and right hilum measuring up the 3.0 cm.  A neoplastic etiology must be considered  2. Interim development of a 2.5 cm low-attenuation mass at the left adrenal gland.  A metastatic etiology must be considered  3. Interim development of a sclerotic foci at T3 and T10.  A metastatic etiology must be considered  4. Small right pleural effusion with associated infiltrate and atelectasis at the right lung base suspect  5. Suspect small cyst at the left kidney as described  6. Mild ill-defined soft tissue  fullness at the cervical vaginal region  7. Borderline cardiomegaly  10/6/23 PET/CT:  1. Disease progression with new FDG avid metastatic lymph nodes in the chest and right hilum.  2. Bilateral axillary and inguinal lymph nodes with low level uptake are indeterminate but concerning for metastasis given the additional findings.  3. Worsening and new FDG avid osseous disease involving both the axial and appendicular skeleton.  4. Enlarging left adrenal metastasis.  12/6/23 CTA:  1. Soft tissue masslike density again evident at the right hilum causing mass effect on the right main pulmonary artery  2. Interim development of filling defects/pulmonary emboli at the diminutive posterior right lower lobe pulmonary arteries.  A few small defects/pulmonary emboli have developed at the pulmonary arteries at the posterior left lower lobe since the prior exam.  3. Postsurgical changes right hilum with volume loss at the right lung and elevation of the right hemidiaphragm with associated posterior right basilar pleural reaction, infiltrate, and atelectasis  4. Advanced emphysematous changes with the right side greater than the left  5. Thoracic spondylosis with the sclerotic foci at the T3 and T10  6. The cancer center was notified of the findings on 12/06/2023 at 10:00  1/16/24 CT CAP:  1. Nodular opacities again identified at the right hilum and mediastinum which have a similar appearance to the prior exam  2. Low-attenuation mass again evident at the left adrenal gland  3. Sclerotic lytic lesions evident at T3, T5, T10, and L5.  A metastatic neoplastic etiology must be considered  4. Advanced emphysematous changes with resolution of small right pleural effusion.  There is patchy infiltrate, atelectasis and or scarring at the posterior right lung base.  5. Findings consistent with cysts at the left kidney  6. Ill-defined soft tissue fullness again evident at the cervical vaginal region  7. Findings of mild  constipation  1/16/24 NM Bone Scan:  1. Scattered focal activity at the thoracic spine, lumbar spine, right hip/trochanteric region, left iliac bone, and left sacrum.  A metastatic neoplastic etiology must be considered  2. Bar like CT intense activity at T10 suggesting acute compression fracture  3. Mild asymmetric prominence of the right upper renal collecting system compared to the left  4/15/24 CT CAP:  1. Scattered sclerotic densities again evident at the thoracolumbar spine, left pelvis, and right femur suspicious for metastases which have a similar appearance to the recent exam  2. Mild interim increase in size of a left adrenal nodule now measuring 3.2 cm in diameter. A metastatic etiology must be considered  3. Extensive atherosclerosis  4. Nodular soft tissue masses/lymph nodes again identified at the right hilum and mediastinum again mass effect on the right main pulmonary artery which have a similar appearance to the recent exam  5. Advanced emphysematous changes  6. Findings of constipation with prominent fecal load at the right side of the colon  4/15/24 NM Bone Scan:  1. Scattered foci of abnormal activity at the thoracic spine, lower lumbar spine, left pelvis, and right hip. A metastatic neoplastic etiology must be considered  2. Mild asymmetric prominence of the right upper renal collecting system    PATHOLOGY:  - 12/08/2022: Invasive solid adenocarcinoma (3.1 CM); G3, poorly differentiated. Visceral Pleura Invasion present without definite serosal surface involvement. Lymphovascular invasion (Vein) present. All margins negative for invasive carcinoma, distance 2.5 cm. LN - 4/14 (10R: Hilar, Posterior mediastinal (RACHEAL), 4R Lower paratracheal, 9R Pulmonary ligament, 10R). pT2a pN2  - 02/22/2023: RIGHT ACETABULAR LESION, CT - GUIDE CORE NEEDLE BIOPSY : FIBRINOPURULENT EXUDATE ADMIXED WITH ATYPICAL EPITHELIOID GROUPS,   NORMAL    MARROW ELEMENTS AND ABUNDANT BLOOD CLOT      Subjective:     HPI  Mansi  Sabrina Jin  64 y.o.  female with past medical history significant for HTN, HLD, uterine cancer status post total hysterectomy in 1989,  CKD Stage 2, COPD Stage 3, referred for management of NSCLC s/p RLL / RML lobectomy with Mediastinal Lymph Node Dissection on 12/8/2022.     She was noted to have RLL lung mass and underwent CT guided biopsy which was non diagnostic. PET scan demonstrated significant avidity within the lesion. Given high risk for malignancy, she underwent right lower lobectomy and right middle lobectomy on 12/08/2022. Pathology came back with invasive adenocarcinoma, poorly differentiated with solid morphology, focal tumor involvement of visceral pleura and focal tumor vascular invasion (vein), with mediastinal lymph nodes positive for metastatic carcinoma.      PET on 01/12/2023 showed focal uptake at the medial right acetabular roof but the biopsy was negative for malignancy. Discussed with radiology, they recommended adjuvant chemotherapy and then they will reassess later for radiation therapy.     Chief Complaint: Lung Cancer (Patient c/o contant fatigue, weakness, back pain and leg pain since last visit. )      Interval History:   She returns to the clinic today for a 1 week follow-up and treatment clearance for Docetaxel q3w, accompanied by her daughter. She continues to have back pain, she has started taking her pain medication again. She states the Norco 10 q6h and MS 30 mg q12h is not helping with pain at all. She did begin with physical therapy, but continues to report fatigue and weakness. She is a wheelchair and is so debilitated that can not sit straight. She was diagnosed with a UTI and is currently on antibiotics, cefdinir 300 mg BID for 10 days. She continues taking her Eliquis 5 mg BID for bilateral PE. She is taking her oral iron every other day and tolerating well. Followed by dermatology for vitiligo to bilateral fingertips. She denies any fever, chills, worsened SOB, CP,  headache, swelling, or nausea/vomiting.     Patient continues to lose weight.  She spent most of the time in bed or the chair while she is at home.  She has not walking at all.  She looks cachectic.  She has not a candidate for chemotherapy secondary to  overall for performance status on medical problems.    Hospice: I had a long conversation with the patient regarding end of life issues, palliative, comfort care as well as hospice. Explained that Palliative care services focuses on providing patients with relief from the symptoms. The goal is to improve quality of life for both the patient and the family. Palliative care focuses on symptoms such as pain, shortness of breath, fatigue, constipation, nausea, loss of appetite, difficulty sleeping and depression, etc. Hospice focuses on caring, not curing and in most cases care is provided in the patient's home. This is offer when patient's are not a candidate for aggressive chemotherapy or treatment or if patient's performance status is not acceptable for treatment. This service goes hand to hand with comfort care and is provided to patients that prognosis is expected to be <  6 months.      Patient as well as a daughter understood was hospice care is about and willing to proceed.      Past Medical History:   Diagnosis Date    ASTHMA     COPD (chronic obstructive pulmonary disease)     SEVERE    GERD (gastroesophageal reflux disease)     High cholesterol     HLD (hyperlipidemia)     Hypothyroidism, unspecified     Lung cancer 12/08/2022    invasive adenocarcinoma, poorly differentiated with solid morphology, focal tumor involvement of visceral pleura and focal tumor vascular invasion (vein), with 2 of 3 posterior mediastinal lymph nodes positive for metastatic carcinoma    Stage 2 chronic kidney disease     TIA (transient ischemic attack)     no deficits    Tobacco abuse     Unspecified osteoarthritis, unspecified site     Uterine cancer 1989      Past Surgical  History:   Procedure Laterality Date    COLONOSCOPY N/A 3/26/2024    Procedure: COLONOSCOPY;  Surgeon: Vickey Patino MD;  Location: [facility];  Service: Endoscopy;  Laterality: N/A;  post op: c. diff colitis of right colon    COLONOSCOPY, WITH 1 OR MORE BIOPSIES N/A 3/26/2024    Procedure: COLONOSCOPY, WITH 1 OR MORE BIOPSIES;  Surgeon: Vickey Patino MD;  Location: [facility] ENDO;  Service: Endoscopy;  Laterality: N/A;  cecum    INSERTION OF TUNNELED CENTRAL VENOUS CATHETER (CVC) WITH SUBCUTANEOUS PORT N/A 2023    Procedure: [id number];  Surgeon: Peter Dial MD;  Location: [facility];  Service: Peripheral Vascular;  Laterality: N/A;    LAPAROSCOPIC CHOLECYSTECTOMY WITH CHOLANGIOGRAPHY N/A 3/28/2024    Procedure: CHOLECYSTECTOMY, LAPAROSCOPIC, WITH CHOLANGIOGRAM;  Surgeon: Vickey Patino MD;  Location: [facility] OR;  Service: General;  Laterality: N/A;    lipoma multiple sites      LUNG LOBECTOMY Right 2022    Procedure: LOBECTOMY, LUNG;  Surgeon: Jass Browne IV, MD;  Location: [facility] OR;  Service: Cardiothoracic;  Laterality: Right;    SURGICAL REMOVAL OF LYMPH NODE  2022    Procedure: EXCISION, LYMPH NODE;  Surgeon: Jass Browne IV, MD;  Location: [facility] OR;  Service: Cardiothoracic;;    THORACOTOMY Right 2022    Procedure: THORACOTOMY;  Surgeon: Jass Browne IV, MD;  Location: [facility] OR;  Service: Cardiothoracic;  Laterality: Right;  Right Lower Lobectomy with Lymph Node Dissection    TOTAL ABDOMINAL HYSTERECTOMY W/ BILATERAL SALPINGOOPHORECTOMY       Social History     Socioeconomic History    Marital status: Single    Number of children: 5   Tobacco Use    Smoking status: Former     Current packs/day: 0.00     Average packs/day: 0.5 packs/day for 50.0 years (25.0 ttl pk-yrs)     Types: Cigarettes     Start date: 1972     Quit date: 2022     Years since quittin.5    Smokeless tobacco: Never   Substance and Sexual Activity    Alcohol use: Not  Currently     Comment: quit 25years ago    Drug use: Never    Sexual activity: Yes     Partners: Male     Birth control/protection: See Surgical Hx     Social Determinants of Health     Financial Resource Strain: Patient Declined (3/25/2024)    Overall Financial Resource Strain (CARDIA)     Difficulty of Paying Living Expenses: Patient declined   Food Insecurity: Patient Declined (3/25/2024)    Hunger Vital Sign     Worried About Running Out of Food in the Last Year: Patient declined     Ran Out of Food in the Last Year: Patient declined   Transportation Needs: Patient Declined (3/25/2024)    PRAPARE - Transportation     Lack of Transportation (Medical): Patient declined     Lack of Transportation (Non-Medical): Patient declined   Physical Activity: Patient Declined (3/25/2024)    Exercise Vital Sign     Days of Exercise per Week: Patient declined     Minutes of Exercise per Session: Patient declined   Stress: Patient Declined (3/25/2024)    [employer] - Occupational Stress Questionnaire     Feeling of Stress : Patient declined   Housing Stability: Patient Declined (3/25/2024)    Housing Stability Vital Sign     Unable to Pay for Housing in the Last Year: Patient declined     Number of Places Lived in the Last Year: 1     Unstable Housing in the Last Year: Patient declined      Family History   Problem Relation Name Age of Onset    Cancer Maternal Grandfather      Heart failure Father      Prostate cancer Father      Hypertension Mother      Hepatitis Mother      Ovarian cancer Mother      Liver cancer Mother      Asthma Brother      Asthma Brother      Fibroids Sister      Skin cancer Sister        Review of patient's allergies indicates:   Allergen Reactions    Dilaudid [hydromorphone] Itching and Swelling        Review of Systems   Constitutional:  Positive for activity change, appetite change and fatigue. Negative for chills, fever and unexpected weight change.   HENT:  Negative for  mouth dryness, mouth sores, nosebleeds, sinus pressure/congestion, sore throat and trouble swallowing.    Eyes: Negative.  Negative for visual disturbance.   Respiratory:  Positive for shortness of breath (on exertion, chronic). Negative for cough.    Cardiovascular:  Negative for chest pain, palpitations and leg swelling.   Gastrointestinal:  Negative for abdominal distention, abdominal pain, blood in stool, change in bowel habit, constipation, diarrhea, nausea and vomiting.   Endocrine: Negative.    Genitourinary: Negative.  Negative for dysuria, frequency, hematuria and urgency.   Musculoskeletal:  Positive for leg pain. Negative for arthralgias, back pain, myalgias and neck pain.   Integumentary:  Negative for rash, breast mass, breast discharge and breast tenderness. Negative.   Allergic/Immunologic: Negative.  Negative for immunocompromised state.   Neurological:  Positive for weakness. Negative for dizziness, tremors, syncope, speech difficulty, light-headedness, numbness, headaches and memory loss.   Hematological: Negative.  Does not bruise/bleed easily.   Psychiatric/Behavioral: Negative.  Negative for confusion and suicidal ideas.    Breast: Negative for mass and tenderness        Objective:        Vitals:    06/11/24 0954   BP: 102/68   Pulse: (!) 129   Resp: 16       Wt Readings from Last 6 Encounters:   06/11/24 45.8 kg (100 lb 14.4 oz)   06/07/24 48.1 kg (106 lb)   06/05/24 47.8 kg (105 lb 6.4 oz)   06/04/24 52.2 kg (115 lb)   05/31/24 48.4 kg (106 lb 9.6 oz)   05/28/24 48 kg (105 lb 12.8 oz)     Body mass index is 15.8 kg/m².  Body surface area is 1.47 meters squared.       Physical Exam  Vitals and nursing note reviewed.   Constitutional:       Appearance: She is cachectic. She is ill-appearing.   HENT:      Head: Normocephalic and atraumatic.      Nose: No congestion.   Eyes:      General: No scleral icterus.     Extraocular Movements: Extraocular movements intact.      Conjunctiva/sclera:  Conjunctivae normal.   Neck:      Vascular: No JVD.   Cardiovascular:      Rate and Rhythm: Regular rhythm. Tachycardia present.      Heart sounds: No murmur heard.  Pulmonary:      Effort: Pulmonary effort is normal.      Breath sounds: Decreased breath sounds present. No wheezing or rhonchi.   Abdominal:      General: There is no distension.      Palpations: Abdomen is soft.      Tenderness: There is no abdominal tenderness.   Musculoskeletal:      Cervical back: Neck supple.   Lymphadenopathy:      Head:      Right side of head: No submental or submandibular adenopathy.      Left side of head: No submental or submandibular adenopathy.      Cervical: No cervical adenopathy.      Upper Body:      Right upper body: No supraclavicular or axillary adenopathy.      Left upper body: No supraclavicular or axillary adenopathy.      Lower Body: No right inguinal adenopathy. No left inguinal adenopathy.      Comments: No adenopathy noted bilaterally   Skin:     General: Skin is warm.      Coloration: Skin is not jaundiced.      Findings: No lesion or rash.      Comments: Change of skin color to bilateral fingertips   Neurological:      Mental Status: She is alert and oriented to person, place, and time.      Cranial Nerves: Cranial nerves 2-12 are intact.      Motor: Weakness present.      Gait: Gait abnormal.      Comments: On wheelchair today   Psychiatric:         Attention and Perception: Attention normal.         Mood and Affect: Mood is depressed.         Speech: Speech normal.         Behavior: Behavior is cooperative.         Cognition and Memory: Cognition normal.         Judgment: Judgment normal.       ECOG SCORE    4 - Completely disabled, cannot carry on any selfcare, totally confined to bed or chair           LABS      Lab Visit on 06/11/2024   Component Date Value    Sodium 06/11/2024 131 (L)     Potassium 06/11/2024 4.3     Chloride 06/11/2024 101     CO2 06/11/2024 23     Glucose 06/11/2024 105     Blood  Urea Nitrogen 06/11/2024 8.0 (L)     Creatinine 06/11/2024 0.64     Calcium 06/11/2024 9.2     Protein Total 06/11/2024 6.3     Albumin 06/11/2024 2.1 (L)     Globulin 06/11/2024 4.2 (H)     Albumin/Globulin Ratio 06/11/2024 0.5 (L)     Bilirubin Total 06/11/2024 0.3     ALP 06/11/2024 107     ALT 06/11/2024 12     AST 06/11/2024 24     eGFR 06/11/2024 >60     Anion Gap 06/11/2024 7.0     BUN/Creatinine Ratio 06/11/2024 13     Magnesium Level 06/11/2024 1.80     Phosphorus Level 06/11/2024 1.7 (L)     WBC 06/11/2024 6.32     RBC 06/11/2024 3.80 (L)     Hgb 06/11/2024 9.5 (L)     Hct 06/11/2024 30.1 (L)     MCV 06/11/2024 79.2 (L)     MCH 06/11/2024 25.0 (L)     MCHC 06/11/2024 31.6 (L)     RDW 06/11/2024 18.8 (H)     Platelet 06/11/2024 615 (H)     MPV 06/11/2024 8.4     Neut % 06/11/2024 79.2     Lymph % 06/11/2024 7.9     Mono % 06/11/2024 11.2     Eos % 06/11/2024 0.2     Basophil % 06/11/2024 0.2     Lymph # 06/11/2024 0.50 (L)     Neut # 06/11/2024 5.01     Mono # 06/11/2024 0.71     Eos # 06/11/2024 0.01     Baso # 06/11/2024 0.01     IG# 06/11/2024 0.08 (H)     IG% 06/11/2024 1.3     NRBC% 06/11/2024 0.0          Assessment:       1. NSCLC of right lung    2. Metastasis to bone    3. Immunodeficiency due to chemotherapy    4. On antineoplastic chemotherapy    5. Cancer cachexia    6. Physical deconditioning    7. Weakness      Stage IV NSCLC with bone mets.    Originally: NSCLC / Adenocarcinoma Stage IIIA pT2a pN2  - 12/08/2022: Right lower and middle bilobectomy with mediastinal lymph node dissection. Path: Invasive adenocarcinoma, poorly differentiated, focal tumor involvement of visceral pleura and focal tumor vascular invasion (vein), with mediastinal lymph nodes positive for metastatic carcinoma   - TMB - 5, MSI-S, MET amplification, ROS1.  No mutations in EGFR, BRAF, ALK, ERBB2, KRAS, RET,    - 1% tumor cells are positive for PD-L1   - patient evaluated by Radiation Oncology with recommendation to treat  with chemotherapy 1st and will re-evaluate the patient by the end of chemotherapy for radiation therapy  - 01/12/2023 PET: Focal uptake at the medial right acetabular roof. Metastasis is a consideration  - 02/22/2023: RIGHT ACETABULAR LESION, CT - GUIDE CORE NEEDLE BIOPSY : FIBRINOPURULENT EXUDATE ADMIXED WITH ATYPICAL EPITHELIOID GROUPS,   NORMAL    MARROW ELEMENTS AND ABUNDANT BLOOD CLOT  PET scan showed a suspicious finding in the right acetabular roof and patient then had a CT-guided biopsy no evidence of malignancy. Started with adjuvant chemotherapy with Carboplatin and pemetrexed x 4 cycles (3/15/23-5/18/23)   Patient has been seen by Rad/Onc (Dr Mireles) since the completion of her chemotherapy and plan was to pursue radiation x 5-6 weeks.  She discussed with the patient that they have showed no overall survival benefit in the setting of postoperative radiation therapy but data supports a larger benefit inpatient with extracapsular extension.  If she tolerated chemotherapy well and continue with good performance status then plan was for PORT   Restaging PET-CT after completion of chemotherapy and prior to radiation therapy showed progression at the right acetabulum.  Patient with progression of disease now with bone metastases.  She bagan palliative radiation treatment with Dr. Mireles on 6/28/23, 10 treatments over two weeks.  PET CT 6/5/23 with progression Right superior acetabulum.  FDG avid area appears slightly larger with slightly increased sclerosis.  SUV is 9.7, previously 6.4.  She had palliative radiation. Started on Keytruda 7/18/23. Developed skin changes with Keytruda and later on diagnosed with vitiligo. Now on Taxotere + Xgeva- 10/24/23 due to progression.  Cycle 3 of Taxotere held 12/6/23 r/t bilateral PE  Bone mets   Xgeva q4w  She was seen by Rad/Onc for palliative RT but recommendations were to start chemotherapy and re eval after 2 cycles  Received 10 radiation treatments with Dr. Mireles  5/2024.  Medication regimen discussed in detail with her.     Iron deficiency Anemia  Continue with 1 tab PO iron QOD with Vitamin C.   Currently scheduled for colonoscopy 7/2024     Hypothyroid   Followed by endocrinology Dr. Elena LYN  Now on Eliquis 5 mg twice daily    Vitiligo to bilateral fingertips  Followed by Dermatology    Recurrent Cdiff  Stable today with no diarrhea noted.     Cancer cachexia   Currently on Remeron 7.5mg, increased to 15mg today.                   Plan:    I have a long conversation with her and her daughter regarding hospice.  Hospice: I had a long conversation with the patient regarding end of life issues, palliative, comfort care as well as hospice. Explained that Palliative care services focuses on providing patients with relief from the symptoms. The goal is to improve quality of life for both the patient and the family. Palliative care focuses on symptoms such as pain, shortness of breath, fatigue, constipation, nausea, loss of appetite, difficulty sleeping and depression, etc. Hospice focuses on caring, not curing and in most cases care is provided in the patient's home. This is offer when patient's are not a candidate for aggressive chemotherapy or treatment or if patient's performance status is not acceptable for treatment. This service goes hand to hand with comfort care and is provided to patients that prognosis is expected to be <  6 months.  Prognosis poor and less than 6 months.   At this time patient is very weak, deconditioned, spent most of the time in bed or sitting.  Not walking at all due to weakness.  Due to overall performance status are comorbidities she is not a candidate for chemotherapy.   Systemic therapy toxicities on weight is the benefits and patient will not tolerate.  We will start palliative and comfort care and we will refer to hospice.      Stop all treatment  Change Norco 10 mg to q4h as needed pain  Increase MS Cont to TID  Continue Eliquis 5 mg  BID  Continue follow up with Dermatology  Continue oral iron every other day  Referral to [facility]  RTC PRN      The patient was seen, interviewed and examined. Pertinent lab and radiology studies were reviewed.   The patient was given ample opportunity to ask questions, and to the best of my abilities, all questions answered to satisfaction; patient demonstrated understanding of what we discussed and agreeable to the plan. Pt instructed to call should develop concerning signs/symptoms or have further questions.     Phoebe Gomez MD  Hematology/Oncology      Professional Services   I, Clau Hodges LPN, acted solely as a scribe for and in the presence of Dr. Phoebe Gomez, who performed these services.     God bless the patient and her family!

## 2024-06-11 NOTE — TELEPHONE ENCOUNTER
Please contact [facility] regarding home health services and possible hospice admission. She can be reached @ [phone].

## 2024-06-13 ENCOUNTER — DOCUMENT SCAN (OUTPATIENT)
Dept: HOME HEALTH SERVICES | Facility: HOSPITAL | Age: 64
End: 2024-06-13
Payer: MEDICARE

## 2024-06-20 ENCOUNTER — DOCUMENTATION ONLY (OUTPATIENT)
Dept: HEMATOLOGY/ONCOLOGY | Facility: CLINIC | Age: 64
End: 2024-06-20
Payer: MEDICARE

## (undated) DEVICE — SUT VICRYL 3-0 8-18 PS-1

## (undated) DEVICE — PROTECTOR ULNAR NERVE FOAM

## (undated) DEVICE — TROCAR KII SHLD BLDED 5X100MM

## (undated) DEVICE — ENDOAPTH VAS RELOAD WHITE 2.5

## (undated) DEVICE — GLOVE SIGNATURE ESSNTL LTX 7

## (undated) DEVICE — STAPLER RELOADABLE LINEAR 30M

## (undated) DEVICE — SUT MCRYL PLUS 4-0 PS2 27IN

## (undated) DEVICE — PROGEL

## (undated) DEVICE — ELECTRODE BLD EXT 6.50 ST DISP

## (undated) DEVICE — STAPLER ECHELON FLEX GST 45MM

## (undated) DEVICE — BLADE SURG STAINLESS STEEL #10

## (undated) DEVICE — CATH THOR SIL STR 6 EYELT 28FR

## (undated) DEVICE — NDL SYR 10ML 18X1.5 LL BLUNT

## (undated) DEVICE — SPONGE LAP STRL 18X18IN

## (undated) DEVICE — BLADE SURG STAINLESS STEEL #11

## (undated) DEVICE — DRAPE C ARM 42 X 120 10/BX

## (undated) DEVICE — BOWL STERILE LARGE 32OZ

## (undated) DEVICE — COVER PROBE US 5.5X58L NON LTX

## (undated) DEVICE — DRESSING TELFA N ADH 3X8IN

## (undated) DEVICE — SCISSOR 5MMX35CM DIRECT DRIVE

## (undated) DEVICE — GLOVE SIGNATURE ESSNTL LTX 6.5

## (undated) DEVICE — ELECTRODE MEGADYNE L-WIRE 33CM

## (undated) DEVICE — HOLDER LAP-KIT LAPSCP INSTR

## (undated) DEVICE — BAG SPEC RETRV ENDO 4X6IN DISP

## (undated) DEVICE — TRAY CATH FOL SIL URIMTR 16FR

## (undated) DEVICE — SUT GUT PL. 4-0 27 FS-2

## (undated) DEVICE — SYR LUER LOCK 20ML

## (undated) DEVICE — COVER C-ARM STRAP BAND 44X80IN

## (undated) DEVICE — GLOVE PROTEXIS PI SYN SURG 6.5

## (undated) DEVICE — ELECTRODE BLADE INSULATED 1 IN

## (undated) DEVICE — ADHESIVE DERMABOND ADVANCED

## (undated) DEVICE — DRAPE DEVON INSTRUMENT 10X16IN

## (undated) DEVICE — SUT ETHBND XTRA 1 OS-8 30IN

## (undated) DEVICE — HOLDER SCALPEL SURGICAL GOLD

## (undated) DEVICE — SOL IRRI STRL WATER 1000ML

## (undated) DEVICE — GLOVE SENSICARE PI SURG 6.5

## (undated) DEVICE — SUT VICRYL #2 TP-1 2-27IN

## (undated) DEVICE — CATH CHOLANGIOGRAM 13IN

## (undated) DEVICE — GLOVE PROTEXIS BLUE LATEX 7

## (undated) DEVICE — STAPLER RELOADS TITANIUM 30

## (undated) DEVICE — CONTAINER SPECIMEN SCREW 4OZ

## (undated) DEVICE — DRAPE INCISE IOBAN 2 23X17IN

## (undated) DEVICE — GLOVE PROTEXIS PI SYN SURG 7.5

## (undated) DEVICE — CLIP HORIZON LIG TI MED-LG

## (undated) DEVICE — CUSHION  WC FOAM 20X20X.75IN

## (undated) DEVICE — GLOVE PROTEXIS LTX MICRO 8

## (undated) DEVICE — SLEEVE KII ADV FIX 5X100MM

## (undated) DEVICE — Device

## (undated) DEVICE — SUT VICRYL PLUS 3-0 SH 18IN

## (undated) DEVICE — DRAPE INCISE IOBAN 2 13X13IN

## (undated) DEVICE — DRESSING ANTIMIC ISLAND 4X10IN

## (undated) DEVICE — DRESSING TELFA + RECT 6X10IN

## (undated) DEVICE — RELOAD ECHELON ENDOPATH 45MM

## (undated) DEVICE — DRAPE STERI INCISE 23X23IN

## (undated) DEVICE — ELECTRODE REM PLYHSV RETURN 9

## (undated) DEVICE — HOLDER STRIP-T SELF ADH 2X10IN

## (undated) DEVICE — FORCEP ENDO BIOPSY CAPTURA

## (undated) DEVICE — POSITIONER HEEL FOAM CONVOLTD

## (undated) DEVICE — BAG MEDI-PLAST DECANTER C-FLOW

## (undated) DEVICE — DISSECTOR EPIX LAPA 5MMX35CM

## (undated) DEVICE — SYR 10CC LUER LOCK

## (undated) DEVICE — KIT ANTIFOG W/SPONG & FLUID

## (undated) DEVICE — GLOVE PROTEXIS HYDROGEL SZ6.5

## (undated) DEVICE — LIGATING CLIP LARGE

## (undated) DEVICE — NDL PNEUMO INSUFFLATI 120MM

## (undated) DEVICE — APPLIER CLIP EPIX UNIV 5X34

## (undated) DEVICE — PARTICLE ARISTA AH BIONERT 5GM

## (undated) DEVICE — SUT 2-0 VICRYL / CT-1

## (undated) DEVICE — SOL NORMAL USPCA 0.9%

## (undated) DEVICE — KIT MINI STICK MAX 5F 21GX7CM

## (undated) DEVICE — SET TUB INSUFFLATION SUC 20L

## (undated) DEVICE — KIT SURGICAL COLON .25 1.1OZ

## (undated) DEVICE — NDL HYPO STD REG BVL 25GX1.5IN

## (undated) DEVICE — IRRIGATOR HYDRO-SURG PLUS 5MM

## (undated) DEVICE — DRAPE FLUID WARMER 44X44IN

## (undated) DEVICE — SUT 0 VICRYL / UR6 (J603)

## (undated) DEVICE — SUT SILK 0 STRANDS 30IN BLK

## (undated) DEVICE — ECHELON FLEX POWERED VAS STPLR

## (undated) DEVICE — DRESSING TELFA + BARR 4X6IN

## (undated) DEVICE — TUBE SUCTION MEDI-VAC STERILE

## (undated) DEVICE — SUPPORT ULNA NERVE PROTECTOR

## (undated) DEVICE — SUT VICRYL 2-0 36 CT-1

## (undated) DEVICE — GLOVE SENSICARE PI GRN 7

## (undated) DEVICE — NDL SAFETY 22G X 1.5 ECLIPSE

## (undated) DEVICE — DRAIN CHEST DRY SUCTION

## (undated) DEVICE — SUT VICRYL 1 OB 36 CTX

## (undated) DEVICE — GLOVE PROTEXIS LTX MICRO  7

## (undated) DEVICE — ELECTRODE PATIENT RETURN DISP